# Patient Record
Sex: MALE | Race: WHITE | NOT HISPANIC OR LATINO | Employment: FULL TIME | URBAN - METROPOLITAN AREA
[De-identification: names, ages, dates, MRNs, and addresses within clinical notes are randomized per-mention and may not be internally consistent; named-entity substitution may affect disease eponyms.]

---

## 2017-01-03 ENCOUNTER — NON-PROVIDER VISIT (OUTPATIENT)
Dept: INTERNAL MEDICINE | Facility: IMAGING CENTER | Age: 62
End: 2017-01-03
Payer: COMMERCIAL

## 2017-01-03 ENCOUNTER — HOSPITAL ENCOUNTER (OUTPATIENT)
Facility: MEDICAL CENTER | Age: 62
End: 2017-01-03
Attending: FAMILY MEDICINE
Payer: COMMERCIAL

## 2017-01-03 DIAGNOSIS — E11.9 TYPE 2 DIABETES MELLITUS WITHOUT COMPLICATION, WITHOUT LONG-TERM CURRENT USE OF INSULIN (HCC): ICD-10-CM

## 2017-01-03 DIAGNOSIS — Z01.89 ENCOUNTER FOR ROUTINE LABORATORY TESTING: Primary | ICD-10-CM

## 2017-01-03 DIAGNOSIS — E78.5 DYSLIPIDEMIA: ICD-10-CM

## 2017-01-03 DIAGNOSIS — E55.9 VITAMIN D DEFICIENCY: ICD-10-CM

## 2017-01-03 DIAGNOSIS — Z87.19 HISTORY OF PANCREATITIS: ICD-10-CM

## 2017-01-03 LAB
25(OH)D3 SERPL-MCNC: 22 NG/ML (ref 30–100)
ALBUMIN SERPL BCP-MCNC: 4.5 G/DL (ref 3.2–4.9)
ALBUMIN/GLOB SERPL: 1.6 G/DL
ALP SERPL-CCNC: 52 U/L (ref 30–99)
ALT SERPL-CCNC: 29 U/L (ref 2–50)
ANION GAP SERPL CALC-SCNC: 11 MMOL/L (ref 0–11.9)
AST SERPL-CCNC: 21 U/L (ref 12–45)
BASOPHILS # BLD AUTO: 0.04 K/UL (ref 0–0.12)
BASOPHILS NFR BLD AUTO: 0.4 % (ref 0–1.8)
BILIRUB SERPL-MCNC: 0.9 MG/DL (ref 0.1–1.5)
BUN SERPL-MCNC: 16 MG/DL (ref 8–22)
CALCIUM SERPL-MCNC: 9.8 MG/DL (ref 8.5–10.5)
CHLORIDE SERPL-SCNC: 96 MMOL/L (ref 96–112)
CHOLEST SERPL-MCNC: 136 MG/DL (ref 100–199)
CO2 SERPL-SCNC: 25 MMOL/L (ref 20–33)
CREAT SERPL-MCNC: 0.85 MG/DL (ref 0.5–1.4)
CRP SERPL HS-MCNC: 1.1 MG/L (ref 0–7.5)
EOSINOPHIL # BLD: 0.1 K/UL (ref 0–0.51)
EOSINOPHIL NFR BLD AUTO: 0.9 % (ref 0–6.9)
ERYTHROCYTE [DISTWIDTH] IN BLOOD BY AUTOMATED COUNT: 37.5 FL (ref 35.9–50)
EST. AVERAGE GLUCOSE BLD GHB EST-MCNC: 243 MG/DL
GLOBULIN SER CALC-MCNC: 2.8 G/DL (ref 1.9–3.5)
GLUCOSE SERPL-MCNC: 279 MG/DL (ref 65–99)
HBA1C MFR BLD: 10.1 % (ref 0–5.6)
HCT VFR BLD AUTO: 45.7 % (ref 42–52)
HDLC SERPL-MCNC: 35 MG/DL
HGB BLD-MCNC: 16.4 G/DL (ref 14–18)
IMM GRANULOCYTES # BLD AUTO: 0.04 K/UL (ref 0–0.11)
IMM GRANULOCYTES NFR BLD AUTO: 0.4 % (ref 0–0.9)
LDLC SERPL CALC-MCNC: 61 MG/DL
LIPASE SERPL-CCNC: 56 U/L (ref 11–82)
LYMPHOCYTES # BLD: 1.94 K/UL (ref 1–4.8)
LYMPHOCYTES NFR BLD AUTO: 18 % (ref 22–41)
MCH RBC QN AUTO: 30.1 PG (ref 27–33)
MCHC RBC AUTO-ENTMCNC: 35.9 G/DL (ref 33.7–35.3)
MCV RBC AUTO: 84 FL (ref 81.4–97.8)
MONOCYTES # BLD: 0.79 K/UL (ref 0–0.85)
MONOCYTES NFR BLD AUTO: 7.3 % (ref 0–13.4)
NEUTROPHILS # BLD: 7.84 K/UL (ref 1.82–7.42)
NEUTROPHILS NFR BLD AUTO: 73 % (ref 44–72)
NRBC # BLD AUTO: 0 K/UL
NRBC BLD-RTO: 0 /100 WBC
PLATELET # BLD AUTO: 139 K/UL (ref 164–446)
PMV BLD AUTO: 10.8 FL (ref 9–12.9)
POTASSIUM SERPL-SCNC: 4.2 MMOL/L (ref 3.6–5.5)
PROT SERPL-MCNC: 7.3 G/DL (ref 6–8.2)
PSA SERPL DL<=0.01 NG/ML-MCNC: 0.87 NG/ML (ref 0–4)
RBC # BLD AUTO: 5.44 M/UL (ref 4.7–6.1)
SODIUM SERPL-SCNC: 132 MMOL/L (ref 135–145)
TRIGL SERPL-MCNC: 200 MG/DL (ref 0–149)
TSH SERPL DL<=0.005 MIU/L-ACNC: 2.31 UIU/ML (ref 0.3–3.7)
WBC # BLD AUTO: 10.8 K/UL (ref 4.8–10.8)

## 2017-01-03 PROCEDURE — 83036 HEMOGLOBIN GLYCOSYLATED A1C: CPT

## 2017-01-03 PROCEDURE — 82306 VITAMIN D 25 HYDROXY: CPT

## 2017-01-03 PROCEDURE — 80061 LIPID PANEL: CPT

## 2017-01-03 PROCEDURE — 80053 COMPREHEN METABOLIC PANEL: CPT

## 2017-01-03 PROCEDURE — 86141 C-REACTIVE PROTEIN HS: CPT

## 2017-01-03 PROCEDURE — 83690 ASSAY OF LIPASE: CPT

## 2017-01-03 PROCEDURE — 85025 COMPLETE CBC W/AUTO DIFF WBC: CPT

## 2017-01-03 PROCEDURE — 83704 LIPOPROTEIN BLD QUAN PART: CPT

## 2017-01-03 PROCEDURE — 84443 ASSAY THYROID STIM HORMONE: CPT

## 2017-01-03 PROCEDURE — 80061 LIPID PANEL: CPT | Mod: 91

## 2017-01-03 PROCEDURE — 84153 ASSAY OF PSA TOTAL: CPT

## 2017-01-03 NOTE — MR AVS SNAPSHOT
Sharad Millan   1/3/2017 8:00 AM   Non-Provider Visit   MRN: [id number]    Department:  [facility]   Dept Phone:  [phone]    Description:  Male : 1955   Provider:  Yaz Henson R.N.           Reason for Visit     Labs Only           Allergies as of 1/3/2017     Allergen Noted Reactions    Bydureon [Exenatide] 10/12/2012   Hives    hives    Cycloset [Bromocriptine Mesylate] 2012   Unspecified    Pt states he can't remember what happens to him.    Morphine 10/21/2015   Vomiting      You were diagnosed with     Encounter for routine laboratory testing   [[id number]]  -  Primary       Vital Signs     Smoking Status                   Never Smoker            Basic Information     Date Of Birth Sex Race Ethnicity Preferred Language    1955 Male White Non- English      Your appointments     Marvin 10, 2017  1:30 PM   Established Patient with Julian Bhakta M.D.   [facility] (--)    [address].  [place] [id number] [phone]           You will be receiving a confirmation call a few days before your appointment from our automated call confirmation system.            2017 10:00 AM   MA DX30 with S YASMANY MG 1   [facility] IMAGING [facility] MAMMOGRAPHY ([place])    [address]  [place] [address]   [phone]            2017  3:00 PM   FOLLOW UP with Ganga Chavez M.D.   [facility] (--)    [address], [address]  [place] [address]   [phone]              Problem List              ICD-10-CM Priority Class Noted - Resolved    CAD (coronary artery disease) I25.10   2009 - Present    Dyslipidemia E78.5   2009 - Present    Fatty liver K76.0   2011 - Present    PSVT (paroxysmal supraventricular tachycardia) (HCC) I47.1   2012 - Present    Hypertension I10   2012 - Present    Aortic stenosis (Chronic) I35.0    10/20/2011 - Present    Carotid bruit (Chronic) R09.89   12/9/2009 - Present    History of cardiac catheterization (Chronic) Z98.890   7/13/2009 - Present    Obesity (Chronic) E66.9   8/23/2011 - Present    Murmur (Chronic) R01.1   7/7/2009 - Present    Memory loss (Chronic) R41.3   10/20/2011 - Present    Palpitations (Chronic) R00.2   10/20/2011 - Present    History of echocardiogram (Chronic) Z92.89   10/20/2011 - Present    Sciatica (Chronic) M54.30   8/23/2011 - Present    Overweight E66.3   12/6/2012 - Present    Vitamin B12 deficiency E53.8   12/6/2012 - Present    Pain R52   12/6/2012 - Present    Acute reaction to stress F43.0   1/14/2013 - Present    HDL deficiency E78.6   7/26/2013 - Present    Need for pneumococcal vaccination Z23   2/24/2014 - Present    Vitamin D deficiency disease E55.9   3/14/2014 - Present    DM2 (diabetes mellitus, type 2) ([facility]) E11.9   12/24/2014 - Present    Acute pancreatitis K85.90   10/21/2015 - Present    Dehydration E86.0   10/21/2015 - Present    Transaminitis R74.0   10/21/2015 - Present    Elevated lipase R74.8   10/21/2015 - Present    Cholelithiasis K80.20   10/21/2015 - Present    Diabetes mellitus ([facility]) E11.9   10/21/2015 - Present    SVT (supraventricular tachycardia) ([facility]) I47.1   10/24/2015 - Present    Chest pain at rest R07.9   4/29/2016 - Present    S/P coronary artery stent placement Z95.5   4/29/2016 - Present    Coronary artery disease, non-occlusive I25.10   8/31/2016 - Present      Health Maintenance        Date Due Completion Dates    A1C SCREENING 5/17/2016 11/17/2015, 10/21/2015, 12/15/2014, 2/24/2014, 6/25/2013, 3/28/2013, 11/26/2012, 5/8/2012, 2/24/2012, 1/3/2011, 9/23/2010, 5/10/2010, 10/27/2009, 6/23/2009, 2/18/2009    FASTING LIPID PROFILE 11/17/2016 11/17/2015, 10/22/2015, 12/15/2014 (Done), 6/25/2013, 3/28/2013, 11/26/2012, 8/14/2012, 2/24/2012, 1/3/2011, 7/8/2010, 10/27/2009, 2/18/2009    Override on 12/15/2014: Done    DIABETES MONOFILAMTENT /  LE EXAM 11/17/2016 11/17/2015, 1/27/2014, 1/27/2014 (Done), 1/8/2013 (Done)    Override on 1/27/2014: Done    Override on 1/8/2013: Done    SERUM CREATININE 12/9/2016 12/9/2015, 11/17/2015, 11/9/2015, 10/27/2015, 10/26/2015, 10/25/2015, 10/24/2015, 10/23/2015, 10/22/2015, 10/22/2015, 10/21/2015, 12/15/2014, 6/25/2013, 3/28/2013, 11/26/2012, 7/31/2012, 5/8/2012, 2/24/2012, 8/16/2011, 1/3/2011, 7/8/2010, 10/27/2009, 6/23/2009, 2/18/2009    RETINAL SCREENING 1/11/2017 1/11/2016, 9/24/2014 (Done), 6/27/2013 (Done), 12/7/2012 (Done)    Override on 9/24/2014: Done    Override on 6/27/2013: Done    Override on 12/7/2012: Done (Dr Smith)    URINE ACR / MICROALBUMIN 12/28/2017 12/28/2016, 11/17/2015, 12/15/2014, 6/25/2013, 3/28/2013, 11/26/2012, 5/8/2012, 2/24/2012, 10/27/2009, 6/23/2009    COLONOSCOPY 11/28/2022 11/28/2012 (Done), 5/23/2011 (Done)    Override on 11/28/2012: Done    Override on 5/23/2011: Done (due 5 years Dr Mayers)    IMM DTaP/Tdap/Td Vaccine (2 - Td) 2/24/2024 2/24/2014            Current Immunizations     Influenza TIV (IM) 12/10/2012  4:35 PM    Influenza Vaccine Adult HD 1/10/2014  2:56 PM    Influenza Vaccine Pediatric 10/22/2009    Influenza Vaccine Quad Inj (Pf) 12/28/2016    Influenza Vaccine Quad Inj (Preserved) 10/27/2015  1:57 PM    Pneumococcal polysaccharide vaccine (PPSV-23) 6/25/2013    SHINGLES VACCINE 6/25/2013    Tdap Vaccine 2/24/2014  2:07 PM      Below and/or attached are the medications your provider expects you to take. Review all of your home medications and newly ordered medications with your provider and/or pharmacist. Follow medication instructions as directed by your provider and/or pharmacist. Please keep your medication list with you and share with your provider. Update the information when medications are discontinued, doses are changed, or new medications (including over-the-counter products) are added; and carry medication information at all times in the event of emergency  situations     Allergies:  BYDUREON - Hives     CYCLOSET - Unspecified     MORPHINE - Vomiting               Medications  Valid as of: January 03, 2017 - 10:10 AM    Generic Name Brand Name Tablet Size Instructions for use    Aspirin (Tab)  MG Take 325 mg by mouth every 48 hours. 1 tab po every other day        Cholecalciferol (Cap) vitamin D3 5000 UNITS Take 1 Each by mouth every day.        Coenzyme Q10   Take 1 Tab by mouth every day.        Cyanocobalamin (SL Tab) Vitamin B-12 5000 MCG Place 1 Tab under tongue every day.        GlipiZIDE (TABLET SR 24 HR) GLIPIZIDE XL 10 MG TAKE 1 TABLET BY MOUTH EVERY MORNING.        Glucose Blood (Strip) glucose blood  1 Strip by Other route 2 Times a Day.        Lancets (Misc) ACCU-CHEK MULTICLIX LANCETS  1 Each by Does not apply route 2 Times a Day.        Losartan Potassium (Tab) COZAAR 100 MG TAKE 1 TABLET BY MOUTH EVERY DAY.        MetFORMIN HCl (Tab) GLUCOPHAGE 1000 MG TAKE 1 TABLET BY MOUTH 2 TIMES A DAY.        Metoprolol Succinate (TABLET SR 24 HR) TOPROL XL 50 MG TAKE 1 TABLET BY MOUTH 2 TIMES A DAY        Niacin (Tab CR) Niacin  MG Take 4 Tabs by mouth every evening.        Rosuvastatin Calcium (Tab) CRESTOR 10 MG TAKE 1 TABLET BY MOUTH EVERY EVENING        .                 Medicines prescribed today were sent to:     [facility] #[id number] - [place], [place] - [address] [place] [id number]    Phone: [phone] Fax: [phone]    Open 24 Hours?: No      Medication refill instructions:       If your prescription bottle indicates you have medication refills left, it is not necessary to call your provider’s office. Please contact your pharmacy and they will refill your medication.    If your prescription bottle indicates you do not have any refills left, you may request refills at any time through one of the following ways: The online [employer] system (except Urgent Care), by calling your provider’s office, or by asking your pharmacy to contact  your provider’s office with a refill request. Medication refills are processed only during regular business hours and may not be available until the next business day. Your provider may request additional information or to have a follow-up visit with you prior to refilling your medication.   *Please Note: Medication refills are assigned a new Rx number when refilled electronically. Your pharmacy may indicate that no refills were authorized even though a new prescription for the same medication is available at the pharmacy. Please request the medicine by name with the pharmacy before contacting your provider for a refill.        Other Notes About Your Plan     Ongoing Rsided pain.  Colonoscopy 5/23/11 repeat in 5 yrs  Dexa  PSA  A1c  1/8/13  8.6  2/24/14=9.5   Opthmology-Luis 12/7/12  [facility] (1/8/13)           MyChart Access Code: Activation code not generated  Current MyChart Status: Active

## 2017-01-09 ENCOUNTER — OFFICE VISIT (OUTPATIENT)
Dept: INTERNAL MEDICINE | Facility: IMAGING CENTER | Age: 62
End: 2017-01-09
Payer: COMMERCIAL

## 2017-01-09 VITALS
WEIGHT: 223 LBS | TEMPERATURE: 96.6 F | OXYGEN SATURATION: 96 % | SYSTOLIC BLOOD PRESSURE: 130 MMHG | HEART RATE: 88 BPM | RESPIRATION RATE: 14 BRPM | HEIGHT: 70 IN | DIASTOLIC BLOOD PRESSURE: 70 MMHG | BODY MASS INDEX: 31.92 KG/M2

## 2017-01-09 DIAGNOSIS — E66.9 OBESITY (BMI 30.0-34.9): ICD-10-CM

## 2017-01-09 DIAGNOSIS — E11.9 TYPE 2 DIABETES MELLITUS WITHOUT COMPLICATION, WITHOUT LONG-TERM CURRENT USE OF INSULIN (HCC): ICD-10-CM

## 2017-01-09 DIAGNOSIS — I25.10 CORONARY ARTERY DISEASE INVOLVING NATIVE HEART WITHOUT ANGINA PECTORIS, UNSPECIFIED VESSEL OR LESION TYPE: ICD-10-CM

## 2017-01-09 DIAGNOSIS — E55.9 VITAMIN D DEFICIENCY: ICD-10-CM

## 2017-01-09 DIAGNOSIS — E78.5 DYSLIPIDEMIA: ICD-10-CM

## 2017-01-09 DIAGNOSIS — R05.8 PRODUCTIVE COUGH: Primary | ICD-10-CM

## 2017-01-09 DIAGNOSIS — E78.6 HDL DEFICIENCY: ICD-10-CM

## 2017-01-09 LAB
CHOLEST SERPL-MCNC: 139 MG/DL (ref 100–199)
HDL PARTICAL NO Q4363: 29.7 UMOL/L
HDL SERPL QN: 8.8 NM
HDLC SERPL-MCNC: 31 MG/DL
HLD.LARGE SERPL-SCNC: 3.4 UMOL/L
LDL MED SERPL QN: 20.1 NM
LDL SERPL QN: 20.1 NM
LDL SERPL-SCNC: 1018 NMOL/L
LDL SMALL SERPL-SCNC: 659 NMOL/L
LDL SMALL SERPL-SCNC: 659 NMOL/L
LDLC SERPL CALC-MCNC: 65 MG/DL (ref 0–99)
LP IR SCORE Q4364: 70
TRIGL SERPL-MCNC: 214 MG/DL (ref 0–149)
VLDL LARGE SERPL-SCNC: 9.7 NMOL/L
VLDL SERPL QN: 51.1 NM

## 2017-01-09 PROCEDURE — 99214 OFFICE O/P EST MOD 30 MIN: CPT | Performed by: FAMILY MEDICINE

## 2017-01-09 RX ORDER — ACETAMINOPHEN AND CODEINE PHOSPHATE 120; 12 MG/5ML; MG/5ML
5-10 SOLUTION ORAL EVERY 6 HOURS PRN
Qty: 120 ML | Refills: 0 | Status: SHIPPED
Start: 2017-01-09 | End: 2017-08-07

## 2017-01-09 RX ORDER — AZITHROMYCIN 250 MG/1
TABLET, FILM COATED ORAL
Qty: 6 TAB | Refills: 0 | Status: SHIPPED | OUTPATIENT
Start: 2017-01-09 | End: 2017-01-14

## 2017-01-09 NOTE — PATIENT INSTRUCTIONS
Current Outpatient Prescriptions Ordered in [facility]   Medication Sig Dispense Refill   • azithromycin (ZITHROMAX) 250 MG Tab Two by mouth on day one, then one by mouth on days 2-5 6 Tab 0   • metformin (GLUCOPHAGE) 1000 MG tablet TAKE 1 TABLET BY MOUTH 2 TIMES A DAY. 60 Tab 11   • GLIPIZIDE XL 10 MG TABLET SR 24 HR TAKE 1 TABLET BY MOUTH EVERY MORNING. 30 Tab 11   • metoprolol SR (TOPROL XL) 50 MG TABLET SR 24 HR TAKE 1 TABLET BY MOUTH 2 TIMES A DAY 60 Tab 11   • rosuvastatin (CRESTOR) 10 MG Tab TAKE 1 TABLET BY MOUTH EVERY EVENING 30 Tab 11   • losartan (COZAAR) 100 MG Tab TAKE 1 TABLET BY MOUTH EVERY DAY. 30 Tab 2   • glucose blood strip 1 Strip by Other route 2 Times a Day. 60 Strip 11   • ACCU-CHEK MULTICLIX LANCETS Misc 1 Each by Does not apply route 2 Times a Day. 180 Each 12   • Coenzyme Q10 (CO Q 10 PO) Take 1 Tab by mouth every day.     • aspirin (ASA) 325 MG TABS Take 325 mg by mouth every 48 hours. 1 tab po every other day     • Niacin  MG TBCR Take 4 Tabs by mouth every evening.     • Cholecalciferol (VITAMIN D3) 5000 UNITS CAPS Take 1 Each by mouth every day.     • Cyanocobalamin (VITAMIN B-12) 5000 MCG SUBL Place 1 Tab under tongue every day.       No current [facility]-ordered facility-administered medications on file.

## 2017-01-09 NOTE — PROGRESS NOTES
SUBJECTIVE:    Chief Complaint   Patient presents with   • Cough   • Diabetes   • Hyperlipidemia   • Obesity       Sharad Millan is a 61 y.o. male,   Established Patient     PROBLEM #1-HISTORY OF PRESENT ILLNESS  New Problem  PATIENT STATEMENT OF PROBLEM - Productive cough  ONSET - 2+ weeks  COURSE - worsening. No fever.   INTENSITY/STATUS/LOCATION/RADIATION - moderate/ worsening  AGGRAVATORS - ?  RELIEVERS - none  TREATMENTS/COMPLIANCE/@GOAL? - OTC's/ na/ no     PROBLEM #2-HISTORY OF PRESENT ILLNESS  Existing Problem, but requiring re-evaluation  PATIENT STATEMENT OF PROBLEM - DM2, dyslipidemia, obesity  ONSET - years  COURSE - he realizes his compliance and Therapeutic Lifestyle Changes's are poor, but he is motivated to improve over the next 3 months. Current pertinent labs:   HIGH & INT RISK: FBS(279)/A1c(10.1)/LDL-P/Small LDL-P/L-HDL-P/L-VLDL-P/TRIG/HDL/HDL-S/VLDL-S/HDL-P/LP-IR/  UACR/D(22).    Counseled.   INTENSITY/STATUS/LOCATION/RADIATION - variable/ present  AGGRAVATORS - Multifactorial including inadequate Therapeutic Lifestyle Changes    RELIEVERS - meds?   TREATMENTS/COMPLIANCE/@GOAL? - same/ no/ no     Allergies   Allergen Reactions   • Bydureon [Exenatide] Hives     hives   • Cycloset [Bromocriptine Mesylate] Unspecified     Pt states he can't remember what happens to him.   • Morphine Vomiting       Patient Active Problem List    Diagnosis Date Noted   • Coronary artery disease, non-occlusive 08/31/2016   • Chest pain at rest 04/29/2016   • S/P coronary artery stent placement 04/29/2016   • SVT (supraventricular tachycardia) ([facility]) 10/24/2015   • Acute pancreatitis 10/21/2015   • Dehydration 10/21/2015   • Transaminitis 10/21/2015   • Elevated lipase 10/21/2015   • Cholelithiasis 10/21/2015   • Diabetes mellitus ([facility]) 10/21/2015   • DM2 (diabetes mellitus, type 2) ([facility]) 12/24/2014   • Vitamin D deficiency disease 03/14/2014   • Need for pneumococcal vaccination 02/24/2014   • HDL deficiency  07/26/2013   • Acute reaction to stress 01/14/2013   • Overweight 12/06/2012   • Vitamin B12 deficiency 12/06/2012   • Pain 12/06/2012   • PSVT (paroxysmal supraventricular tachycardia) ([facility]) 02/14/2012   • Hypertension 02/14/2012   • Aortic stenosis 10/20/2011   • Memory loss 10/20/2011   • Palpitations 10/20/2011   • History of echocardiogram 10/20/2011   • Obesity 08/23/2011   • Sciatica 08/23/2011   • Fatty liver 01/17/2011   • Carotid bruit 12/09/2009   • History of cardiac catheterization 07/13/2009   • Murmur 07/07/2009   • CAD (coronary artery disease) 06/05/2009   • Dyslipidemia 06/05/2009       Outpatient Encounter Prescriptions as of 1/9/2017   Medication Sig Dispense Refill   • azithromycin (ZITHROMAX) 250 MG Tab Two by mouth on day one, then one by mouth on days 2-5 6 Tab 0   • metformin (GLUCOPHAGE) 1000 MG tablet TAKE 1 TABLET BY MOUTH 2 TIMES A DAY. 60 Tab 11   • GLIPIZIDE XL 10 MG TABLET SR 24 HR TAKE 1 TABLET BY MOUTH EVERY MORNING. 30 Tab 11   • metoprolol SR (TOPROL XL) 50 MG TABLET SR 24 HR TAKE 1 TABLET BY MOUTH 2 TIMES A DAY 60 Tab 11   • rosuvastatin (CRESTOR) 10 MG Tab TAKE 1 TABLET BY MOUTH EVERY EVENING 30 Tab 11   • losartan (COZAAR) 100 MG Tab TAKE 1 TABLET BY MOUTH EVERY DAY. 30 Tab 2   • glucose blood strip 1 Strip by Other route 2 Times a Day. 60 Strip 11   • ACCU-CHEK MULTICLIX LANCETS Misc 1 Each by Does not apply route 2 Times a Day. 180 Each 12   • Coenzyme Q10 (CO Q 10 PO) Take 1 Tab by mouth every day.     • aspirin (ASA) 325 MG TABS Take 325 mg by mouth every 48 hours. 1 tab po every other day     • Niacin  MG TBCR Take 4 Tabs by mouth every evening.     • Cholecalciferol (VITAMIN D3) 5000 UNITS CAPS Take 1 Each by mouth every day.     • Cyanocobalamin (VITAMIN B-12) 5000 MCG SUBL Place 1 Tab under tongue every day.       No facility-administered encounter medications on file as of 1/9/2017.       Social History   Substance Use Topics   • Smoking status: Never Smoker    •  "Smokeless tobacco: Never Used   • Alcohol Use: No       Family History   Problem Relation Age of Onset   • Heart Disease Mother    • Heart Disease Father        Patient's Past, Social, and Family History reviewed and updated by me in EPIC today.    REVIEW OF SYMPTOMS:    GEN: Denies weight changes, appetite changes, unusual weakness, bleeding, fever, chills, recent trauma or infections.    HEENT: Denies vision changes, hearing changes, epistaxis, unusual sneezing, sore throat, swallowing difficulties, ear pain, or facial pain.    NECK: Denies neck pain, swellings, or stiffness.    LUNGS: Denies cough, dyspnea, orthopnea, or hemoptysis.    HEART: Denies palpitations or chest pain.    ABD: Denies abdomen pain, eructation, nausea, vomiting, hematemesis, diarrhea, constipation, hematochezia, melena, acholic stools, or flatulence.    GENT: Denies recent dysuria, urine frequency, urine hesitancy, urine urgency, urine flow-slow, urine retention, nocturia, polyuria, dark urine, or incontinence.    BONES/JOINTS/EXTREMITIES: Denies arthralgia, joint stiffness, back pain, muscle cramps, or myalgia.    SKIN: Denies rashes, lesions, anhidrosis, bruising, pruritus.    NEURO: Denies memory loss, disorientation, syncope, diplopia, dizziness, vertigo, clumsiness, paresthesias, or cephalgia.    OBJECTIVE:    /70 mmHg  Pulse 88  Temp(Src) 35.9 °C (96.6 °F)  Resp 14  Ht 1.778 m (5' 10\")  Wt 101.152 kg (223 lb)  BMI 32.00 kg/m2  SpO2 96%  Body mass index is 32 kg/(m^2).    Well developed, well nourished male, no acute distress, non-ill appearing. Comfortable, appears stated age, pleasant and cooperative  HEAD: atraumatic, normocephalic   EYES: Conjunctiva normal, EOMI, PERRLA, acuity grossly intact.   EARS/NOSE/THROAT: TM's normal, no SSX of infection, no perforation, no hemotympanum, acuity grossly intact. Oropharynx: benign, no lesions noted. Nares: benign.   NECK: supple, no adenopathy, no thyromegaly or nodules, no JVD, " no carotid bruits.   CHEST/LUNGS: clear to auscultation and percussion bilaterally. No adventitious breath sounds.   CARDIOVASCULAR: regular rate and rhythm, no murmur. PMI not displaced. Good central and peripheral pulses.   BACK: no CVA tenderness.   ABDOMEN: soft, non-tender, non-distended, no masses, no hepatosplenomegaly. Normal active bowel tones.   : deferred.   Rectal: deferred.   Extremities: warm/well-perfused, no cyanosis, clubbing, or edema.   SKIN: clear, unbroken, no rashes, normal turgor.   Neuro: Mental Status: Alert and Oriented x 3. CN II-XII grossly intact. Gait normal. Non-focal, intact. Normal strength, sensation    ASSESSMENT:    1. Productive cough  azithromycin (ZITHROMAX) 250 MG Tab   2. Type 2 diabetes mellitus without complication, without long-term current use of insulin (HCC)  Diabetic Monofilament LE Exam   3. Coronary artery disease involving native heart without angina pectoris, unspecified vessel or lesion type     4. Dyslipidemia     5. HDL deficiency     6. Obesity (BMI 30.0-34.9)     7. Vitamin D deficiency         PLAN:    Total Face-to-Face time spent with patient: 30 minutes  Amount of time spent counseling patient and/or coordinating care: 20 minutes    The nature of patient counseling as below:  -Patient Education, including below topics:  -Differential Diagnoses and treatment options discussed  -Risks, benefits, alternatives discussed  -Labs reviewed with patient in detail  -Health Maintenance Exam issues discussed  -Rest/ fluids/ close observation/ OTC medications  -Exercise  -Dietary recommendations discussed  -Weight Loss strategies discussed  -Therapeutic Lifestyle Changes discussed    The nature of coordination of care as below:  -Medications added: Zpack  -Medications discontinued: none  -Medications adjusted: none  -Continue (other) present chronic medications  -Blood Pressure Diary  -Blood Sugar Diary  -Seek medical attention immediately if worse    FOLLOW-UP:  -in  3 months  -and sooner if test/consult results warrant  And for Health Care Maintenance Exams  And as needed.

## 2017-01-09 NOTE — MR AVS SNAPSHOT
"        Sharad Millan   2017 2:00 PM   Office Visit   MRN: [id number]    Department:  [facility]   Dept Phone:  [phone]    Description:  Male : 1955   Provider:  Julian Bhakta M.D.           Reason for Visit     Cough     Diabetes     Hyperlipidemia     Obesity           Allergies as of 2017     Allergen Noted Reactions    Bydureon [Exenatide] 10/12/2012   Hives    hives    Cycloset [Bromocriptine Mesylate] 2012   Unspecified    Pt states he can't remember what happens to him.    Morphine 10/21/2015   Vomiting      You were diagnosed with     Productive cough   [292862]  -  Primary     Type 2 diabetes mellitus without complication, without long-term current use of insulin (HCC)   [5074514]       Coronary artery disease involving native heart without angina pectoris, unspecified vessel or lesion type   [2240501]       Dyslipidemia   [365371]       HDL deficiency   [759653]       Obesity (BMI 30.0-34.9)   [216852]       Vitamin D deficiency   [1749041]         Vital Signs     Blood Pressure Pulse Temperature Respirations Height Weight    130/70 mmHg 88 35.9 °C (96.6 °F) 14 1.778 m (5' 10\") 101.152 kg (223 lb)    Body Mass Index Oxygen Saturation Smoking Status             32.00 kg/m2 96% Never Smoker          Basic Information     Date Of Birth Sex Race Ethnicity Preferred Language    1955 Male White Non- English      Your appointments     2017 10:00 AM   MA DX30 with ABIODUN JADE MG 1   [facility] IMAGING [facility]AP[facility]    [address]  [place] [address]   [phone]            2017  3:00 PM   FOLLOW UP with Ganga Chavez M.D.   [facility] (--)    [address], [address]  [place] [address]   [phone]            2017  9:30 AM   Established Patient with Julian Bhakta M.D.   [facility] (--)    [address]  [place] [address]   [phone]           You will be receiving a confirmation call a few days before your appointment from our automated call confirmation system.              Problem List              ICD-10-CM Priority Class Noted - Resolved    CAD (coronary artery disease) I25.10   6/5/2009 - Present    Dyslipidemia E78.5   6/5/2009 - Present    Fatty liver K76.0   1/17/2011 - Present    PSVT (paroxysmal supraventricular tachycardia) ([facility]) I47.1   2/14/2012 - Present    Hypertension I10   2/14/2012 - Present    Aortic stenosis (Chronic) I35.0   10/20/2011 - Present    Carotid bruit (Chronic) R09.89   12/9/2009 - Present    History of cardiac catheterization (Chronic) Z98.890   7/13/2009 - Present    Obesity (Chronic) E66.9   8/23/2011 - Present    Murmur (Chronic) R01.1   7/7/2009 - Present    Memory loss (Chronic) R41.3   10/20/2011 - Present    Palpitations (Chronic) R00.2   10/20/2011 - Present    History of echocardiogram (Chronic) Z92.89   10/20/2011 - Present    Sciatica (Chronic) M54.30   8/23/2011 - Present    Overweight E66.3   12/6/2012 - Present    Vitamin B12 deficiency E53.8   12/6/2012 - Present    Pain R52   12/6/2012 - Present    Acute reaction to stress F43.0   1/14/2013 - Present    HDL deficiency E78.6   7/26/2013 - Present    Need for pneumococcal vaccination Z23   2/24/2014 - Present    Vitamin D deficiency disease E55.9   3/14/2014 - Present    DM2 (diabetes mellitus, type 2) ([facility]) E11.9   12/24/2014 - Present    Acute pancreatitis K85.90   10/21/2015 - Present    Dehydration E86.0   10/21/2015 - Present    Transaminitis R74.0   10/21/2015 - Present    Elevated lipase R74.8   10/21/2015 - Present    Cholelithiasis K80.20   10/21/2015 - Present    Diabetes mellitus (HCC) E11.9   10/21/2015 - Present    SVT (supraventricular tachycardia) ([facility]) I47.1   10/24/2015 - Present    Chest pain at rest R07.9   4/29/2016 - Present    S/P coronary artery stent placement Z95.5   4/29/2016 -  Present    Coronary artery disease, non-occlusive I25.10   8/31/2016 - Present      Health Maintenance        Date Due Completion Dates    RETINAL SCREENING 1/11/2017 1/11/2016, 9/24/2014 (Done), 6/27/2013 (Done), 12/7/2012 (Done)    Override on 9/24/2014: Done    Override on 6/27/2013: Done    Override on 12/7/2012: Done (Dr Smith)    A1C SCREENING 7/3/2017 1/3/2017, 11/17/2015, 10/21/2015, 12/15/2014, 2/24/2014, 6/25/2013, 3/28/2013, 11/26/2012, 5/8/2012, 2/24/2012, 1/3/2011, 9/23/2010, 5/10/2010, 10/27/2009, 6/23/2009, 2/18/2009    URINE ACR / MICROALBUMIN 12/28/2017 12/28/2016, 11/17/2015, 12/15/2014, 6/25/2013, 3/28/2013, 11/26/2012, 5/8/2012, 2/24/2012, 10/27/2009, 6/23/2009    FASTING LIPID PROFILE 1/3/2018 1/3/2017, 11/17/2015, 10/22/2015, 12/15/2014 (Done), 6/25/2013, 3/28/2013, 11/26/2012, 8/14/2012, 2/24/2012, 1/3/2011, 7/8/2010, 10/27/2009, 2/18/2009    Override on 12/15/2014: Done    SERUM CREATININE 1/3/2018 1/3/2017, 12/9/2015, 11/17/2015, 11/9/2015, 10/27/2015, 10/26/2015, 10/25/2015, 10/24/2015, 10/23/2015, 10/22/2015, 10/22/2015, 10/21/2015, 12/15/2014, 6/25/2013, 3/28/2013, 11/26/2012, 7/31/2012, 5/8/2012, 2/24/2012, 8/16/2011, 1/3/2011, 7/8/2010, 10/27/2009, 6/23/2009, 2/18/2009    DIABETES MONOFILAMTENT / LE EXAM 1/9/2018 1/9/2017 (Done), 11/17/2015, 1/27/2014, 1/27/2014 (Done), 1/8/2013 (Done)    Override on 1/9/2017: Done    Override on 1/27/2014: Done    Override on 1/8/2013: Done    COLONOSCOPY 11/28/2022 11/28/2012 (Done), 5/23/2011 (Done)    Override on 11/28/2012: Done    Override on 5/23/2011: Done (due 5 years Dr Mayers)    IMM DTaP/Tdap/Td Vaccine (2 - Td) 2/24/2024 2/24/2014            Current Immunizations     Influenza TIV (IM) 12/10/2012  4:35 PM    Influenza Vaccine Adult HD 1/10/2014  2:56 PM    Influenza Vaccine Pediatric 10/22/2009    Influenza Vaccine Quad Inj (Pf) 12/28/2016    Influenza Vaccine Quad Inj (Preserved) 10/27/2015  1:57 PM    Pneumococcal polysaccharide vaccine  (PPSV-23) 6/25/2013    SHINGLES VACCINE 6/25/2013    Tdap Vaccine 2/24/2014  2:07 PM      Below and/or attached are the medications your provider expects you to take. Review all of your home medications and newly ordered medications with your provider and/or pharmacist. Follow medication instructions as directed by your provider and/or pharmacist. Please keep your medication list with you and share with your provider. Update the information when medications are discontinued, doses are changed, or new medications (including over-the-counter products) are added; and carry medication information at all times in the event of emergency situations     Allergies:  BYDUREON - Hives     CYCLOSET - Unspecified     MORPHINE - Vomiting               Medications  Valid as of: January 09, 2017 -  3:39 PM    Generic Name Brand Name Tablet Size Instructions for use    Aspirin (Tab)  MG Take 325 mg by mouth every 48 hours. 1 tab po every other day        Azithromycin (Tab) ZITHROMAX 250 MG Two by mouth on day one, then one by mouth on days 2-5        Cholecalciferol (Cap) vitamin D3 5000 UNITS Take 1 Each by mouth every day.        Coenzyme Q10   Take 1 Tab by mouth every day.        Cyanocobalamin (SL Tab) Vitamin B-12 5000 MCG Place 1 Tab under tongue every day.        GlipiZIDE (TABLET SR 24 HR) GLIPIZIDE XL 10 MG TAKE 1 TABLET BY MOUTH EVERY MORNING.        Glucose Blood (Strip) glucose blood  1 Strip by Other route 2 Times a Day.        Lancets (Misc) ACCU-CHEK MULTICLIX LANCETS  1 Each by Does not apply route 2 Times a Day.        Losartan Potassium (Tab) COZAAR 100 MG TAKE 1 TABLET BY MOUTH EVERY DAY.        MetFORMIN HCl (Tab) GLUCOPHAGE 1000 MG TAKE 1 TABLET BY MOUTH 2 TIMES A DAY.        Metoprolol Succinate (TABLET SR 24 HR) TOPROL XL 50 MG TAKE 1 TABLET BY MOUTH 2 TIMES A DAY        Niacin (Tab CR) Niacin  MG Take 4 Tabs by mouth every evening.        Rosuvastatin Calcium (Tab) CRESTOR 10 MG TAKE 1 TABLET BY  MOUTH EVERY EVENING        .                 Medicines prescribed today were sent to:     [id number] - [place], [place] - [address]    [address] [place] [address]    Phone: [phone] Fax: [phone]    Open 24 Hours?: No    [id number] - [place], [place] - [address] [place] [address]    Phone: [phone] Fax: [phone]    Open 24 Hours?: No      Medication refill instructions:       If your prescription bottle indicates you have medication refills left, it is not necessary to call your provider’s office. Please contact your pharmacy and they will refill your medication.    If your prescription bottle indicates you do not have any refills left, you may request refills at any time through one of the following ways: The online [employer] system (except Urgent Care), by calling your provider’s office, or by asking your pharmacy to contact your provider’s office with a refill request. Medication refills are processed only during regular business hours and may not be available until the next business day. Your provider may request additional information or to have a follow-up visit with you prior to refilling your medication.   *Please Note: Medication refills are assigned a new Rx number when refilled electronically. Your pharmacy may indicate that no refills were authorized even though a new prescription for the same medication is available at the pharmacy. Please request the medicine by name with the pharmacy before contacting your provider for a refill.        Instructions    Current Outpatient Prescriptions Ordered in [facility]   Medication Sig Dispense Refill   • azithromycin (ZITHROMAX) 250 MG Tab Two by mouth on day one, then one by mouth on days 2-5 6 Tab 0   • metformin (GLUCOPHAGE) 1000 MG tablet TAKE 1 TABLET BY MOUTH 2 TIMES A DAY. 60 Tab 11   • GLIPIZIDE XL 10 MG TABLET SR 24 HR TAKE 1 TABLET BY MOUTH EVERY MORNING. 30 Tab 11   •  metoprolol SR (TOPROL XL) 50 MG TABLET SR 24 HR TAKE 1 TABLET BY MOUTH 2 TIMES A DAY 60 Tab 11   • rosuvastatin (CRESTOR) 10 MG Tab TAKE 1 TABLET BY MOUTH EVERY EVENING 30 Tab 11   • losartan (COZAAR) 100 MG Tab TAKE 1 TABLET BY MOUTH EVERY DAY. 30 Tab 2   • glucose blood strip 1 Strip by Other route 2 Times a Day. 60 Strip 11   • ACCU-CHEK MULTICLIX LANCETS Misc 1 Each by Does not apply route 2 Times a Day. 180 Each 12   • Coenzyme Q10 (CO Q 10 PO) Take 1 Tab by mouth every day.     • aspirin (ASA) 325 MG TABS Take 325 mg by mouth every 48 hours. 1 tab po every other day     • Niacin  MG TBCR Take 4 Tabs by mouth every evening.     • Cholecalciferol (VITAMIN D3) 5000 UNITS CAPS Take 1 Each by mouth every day.     • Cyanocobalamin (VITAMIN B-12) 5000 MCG SUBL Place 1 Tab under tongue every day.       No current [facility]-ordered facility-administered medications on file.           Other Notes About Your Plan     Ongoing Rsided pain.  Colonoscopy 5/23/11 repeat in 5 yrs  Dexa  PSA  A1c  1/8/13  8.6  2/24/14=9.5   Opthmology-[facility] 12/7/12  [facility] (1/8/13)           MyChart Access Code: Activation code not generated  Current MyChart Status: Active

## 2017-01-17 RX ORDER — LOSARTAN POTASSIUM 100 MG/1
100 TABLET ORAL
Qty: 30 TAB | Refills: 11 | Status: SHIPPED | OUTPATIENT
Start: 2017-01-17 | End: 2018-04-25 | Stop reason: SDUPTHER

## 2017-01-17 RX ORDER — METOPROLOL SUCCINATE 50 MG/1
TABLET, EXTENDED RELEASE ORAL
Qty: 60 TAB | Refills: 11 | Status: SHIPPED | OUTPATIENT
Start: 2017-01-17 | End: 2018-03-06 | Stop reason: SDUPTHER

## 2017-01-17 RX ORDER — ROSUVASTATIN CALCIUM 10 MG/1
TABLET, COATED ORAL
Qty: 30 TAB | Refills: 11 | Status: SHIPPED | OUTPATIENT
Start: 2017-01-17 | End: 2018-03-06 | Stop reason: SDUPTHER

## 2017-01-17 RX ORDER — GLIPIZIDE 10 MG/1
10 TABLET, FILM COATED, EXTENDED RELEASE ORAL EVERY MORNING
Qty: 30 TAB | Refills: 11 | Status: SHIPPED | OUTPATIENT
Start: 2017-01-17 | End: 2018-03-06 | Stop reason: SDUPTHER

## 2017-08-07 ENCOUNTER — OFFICE VISIT (OUTPATIENT)
Dept: INTERNAL MEDICINE | Facility: IMAGING CENTER | Age: 62
End: 2017-08-07
Payer: COMMERCIAL

## 2017-08-07 VITALS
TEMPERATURE: 96.6 F | BODY MASS INDEX: 31.92 KG/M2 | RESPIRATION RATE: 14 BRPM | WEIGHT: 223 LBS | OXYGEN SATURATION: 97 % | HEIGHT: 70 IN | HEART RATE: 78 BPM | DIASTOLIC BLOOD PRESSURE: 70 MMHG | SYSTOLIC BLOOD PRESSURE: 140 MMHG

## 2017-08-07 DIAGNOSIS — I10 ESSENTIAL HYPERTENSION: ICD-10-CM

## 2017-08-07 DIAGNOSIS — E55.9 VITAMIN D DEFICIENCY DISEASE: Chronic | ICD-10-CM

## 2017-08-07 DIAGNOSIS — Z13.29 SCREENING FOR THYROID DISORDER: ICD-10-CM

## 2017-08-07 DIAGNOSIS — E53.8 VITAMIN B12 DEFICIENCY: Chronic | ICD-10-CM

## 2017-08-07 DIAGNOSIS — E87.1 CHRONIC HYPONATREMIA: Chronic | ICD-10-CM

## 2017-08-07 DIAGNOSIS — E66.9 OBESITY (BMI 30-39.9): Chronic | ICD-10-CM

## 2017-08-07 DIAGNOSIS — S09.90XD CHI (CLOSED HEAD INJURY), SUBSEQUENT ENCOUNTER: ICD-10-CM

## 2017-08-07 DIAGNOSIS — E11.8 TYPE 2 DIABETES MELLITUS WITH COMPLICATION, WITHOUT LONG-TERM CURRENT USE OF INSULIN (HCC): ICD-10-CM

## 2017-08-07 DIAGNOSIS — H92.02 LEFT EAR PAIN: ICD-10-CM

## 2017-08-07 PROBLEM — Z87.820 HISTORY OF MULTIPLE CONCUSSIONS: Status: ACTIVE | Noted: 2017-08-07

## 2017-08-07 LAB
HBA1C MFR BLD: NORMAL % (ref ?–5.8)
INT CON NEG: NEGATIVE
INT CON POS: POSITIVE

## 2017-08-07 PROCEDURE — 83036 HEMOGLOBIN GLYCOSYLATED A1C: CPT | Performed by: FAMILY MEDICINE

## 2017-08-07 PROCEDURE — 99215 OFFICE O/P EST HI 40 MIN: CPT | Performed by: FAMILY MEDICINE

## 2017-08-07 RX ORDER — ONDANSETRON 4 MG/1
4 TABLET, FILM COATED ORAL EVERY 4 HOURS PRN
Qty: 10 TAB | Refills: 1 | Status: SHIPPED | OUTPATIENT
Start: 2017-08-07 | End: 2018-10-02

## 2017-08-07 RX ORDER — METHYLPREDNISOLONE 4 MG/1
TABLET ORAL
Qty: 21 TAB | Refills: 0 | Status: SHIPPED | OUTPATIENT
Start: 2017-08-07 | End: 2017-11-02

## 2017-08-07 NOTE — MR AVS SNAPSHOT
Sharad Millan   2017 10:00 AM   Appointment   MRN: [id number]    Department:  [facility]   Dept Phone:  [phone]    Description:  Male : 1955   Provider:  Monika Zarate M.D.           Allergies as of 2017     Allergen Noted Reactions    Bydureon [Exenatide] 10/12/2012   Hives    hives    Cycloset [Bromocriptine Mesylate] 2012   Unspecified    Pt states he can't remember what happens to him.    Morphine 10/21/2015   Vomiting      Vital Signs     Smoking Status                   Never Smoker            Basic Information     Date Of Birth Sex Race Ethnicity Preferred Language    1955 Male White Non- English      Your appointments     Aug 07, 2017 10:00 AM   Established Patient with Monika Zarate M.D.   [facility] (--)    [address]  [facility] [address]   [phone]           You will be receiving a confirmation call a few days before your appointment from our automated call confirmation system.              Problem List              ICD-10-CM Priority Class Noted - Resolved    CAD (coronary artery disease) I25.10   2009 - Present    Dyslipidemia E78.5   2009 - Present    Fatty liver K76.0   2011 - Present    PSVT (paroxysmal supraventricular tachycardia) (CMS-HCC) I47.1   2012 - Present    Hypertension I10   2012 - Present    Aortic stenosis (Chronic) I35.0   10/20/2011 - Present    Carotid bruit (Chronic) R09.89   2009 - Present    History of cardiac catheterization (Chronic) Z98.890   2009 - Present    Obesity (Chronic) E66.9   2011 - Present    Murmur (Chronic) R01.1   2009 - Present    Memory loss (Chronic) R41.3   10/20/2011 - Present    Palpitations (Chronic) R00.2   10/20/2011 - Present    History of echocardiogram (Chronic) Z92.89   10/20/2011 - Present    Sciatica (Chronic) M54.30   2011 - Present    Overweight E66.3   2012 - Present    Vitamin  B12 deficiency E53.8   12/6/2012 - Present    Pain R52   12/6/2012 - Present    Acute reaction to stress F43.0   1/14/2013 - Present    HDL deficiency E78.6   7/26/2013 - Present    Need for pneumococcal vaccination Z23   2/24/2014 - Present    Vitamin D deficiency disease E55.9   3/14/2014 - Present    DM2 (diabetes mellitus, type 2) (CMS-HCC) E11.9   12/24/2014 - Present    Acute pancreatitis K85.90   10/21/2015 - Present    Dehydration E86.0   10/21/2015 - Present    Transaminitis R74.0   10/21/2015 - Present    Elevated lipase R74.8   10/21/2015 - Present    Cholelithiasis K80.20   10/21/2015 - Present    Diabetes mellitus (CMS-HCC) E11.9   10/21/2015 - Present    SVT (supraventricular tachycardia) I47.1   10/24/2015 - Present    Chest pain at rest R07.9   4/29/2016 - Present    S/P coronary artery stent placement Z95.5   4/29/2016 - Present    Coronary artery disease, non-occlusive I25.10   8/31/2016 - Present      Health Maintenance        Date Due Completion Dates    COLONOSCOPY 5/23/2016 5/23/2011 (Done)    Override on 5/23/2011: Done (due 5 years Dr Mayers)    RETINAL SCREENING 1/11/2017 1/11/2016, 9/24/2014 (Done), 6/27/2013 (Done), 12/7/2012 (Done)    Override on 9/24/2014: Done    Override on 6/27/2013: Done    Override on 12/7/2012: Done (Dr Smith)    A1C SCREENING 4/3/2017 1/3/2017, 11/17/2015, 10/21/2015, 12/15/2014, 2/24/2014, 6/25/2013, 3/28/2013, 11/26/2012, 5/8/2012, 2/24/2012, 1/3/2011, 9/23/2010, 5/10/2010, 10/27/2009, 6/23/2009, 2/18/2009    IMM INFLUENZA (1) 9/1/2017 12/28/2016, 10/27/2015, 1/10/2014, 12/10/2012, 10/22/2009    URINE ACR / MICROALBUMIN 12/28/2017 12/28/2016, 11/17/2015, 12/15/2014, 6/25/2013, 3/28/2013, 11/26/2012, 5/8/2012, 2/24/2012, 10/27/2009, 6/23/2009    FASTING LIPID PROFILE 1/3/2018 1/3/2017, 1/3/2017, 11/17/2015, 10/22/2015, 12/15/2014 (Done), 12/15/2014, 6/25/2013, 3/28/2013, 11/26/2012, 8/14/2012, 5/8/2012, 2/24/2012, 1/3/2011, 7/8/2010, 10/27/2009, 2/18/2009     Override on 12/15/2014: Done    SERUM CREATININE 1/3/2018 1/3/2017, 12/9/2015, 11/17/2015, 11/9/2015, 10/27/2015, 10/26/2015, 10/25/2015, 10/24/2015, 10/23/2015, 10/22/2015, 10/22/2015, 10/21/2015, 12/15/2014, 6/25/2013, 3/28/2013, 11/26/2012, 7/31/2012, 5/8/2012, 2/24/2012, 8/16/2011, 1/3/2011, 7/8/2010, 10/27/2009, 6/23/2009, 2/18/2009    DIABETES MONOFILAMENT / LE EXAM 1/9/2018 1/9/2017, 1/9/2017 (Done), 11/17/2015, 1/27/2014, 1/27/2014 (Done), 1/8/2013 (Done)    Override on 1/9/2017: Done    Override on 1/27/2014: Done    Override on 1/8/2013: Done    IMM DTaP/Tdap/Td Vaccine (2 - Td) 2/24/2024 2/24/2014            Current Immunizations     Influenza TIV (IM) 12/10/2012  4:35 PM    Influenza Vaccine Adult HD 1/10/2014  2:56 PM    Influenza Vaccine Pediatric 10/22/2009    Influenza Vaccine Quad Inj (Pf) 12/28/2016    Influenza Vaccine Quad Inj (Preserved) 10/27/2015  1:57 PM    Pneumococcal polysaccharide vaccine (PPSV-23) 6/25/2013    SHINGLES VACCINE 6/25/2013    Tdap Vaccine 2/24/2014  2:07 PM      Below and/or attached are the medications your provider expects you to take. Review all of your home medications and newly ordered medications with your provider and/or pharmacist. Follow medication instructions as directed by your provider and/or pharmacist. Please keep your medication list with you and share with your provider. Update the information when medications are discontinued, doses are changed, or new medications (including over-the-counter products) are added; and carry medication information at all times in the event of emergency situations     Allergies:  BYDUREON - Hives     CYCLOSET - Unspecified     MORPHINE - Vomiting               Medications  Valid as of: August 07, 2017 -  9:57 AM    Generic Name Brand Name Tablet Size Instructions for use    Acetaminophen-Codeine (Solution) TYLENOL-CODEINE 120-12 MG/5ML Take 5-10 mL by mouth every 6 hours as needed.        Aspirin (Tab)  MG Take 325 mg  by mouth every 48 hours. 1 tab po every other day        Cholecalciferol (Cap) vitamin D3 5000 UNITS Take 1 Each by mouth every day.        Coenzyme Q10   Take 1 Tab by mouth every day.        Cyanocobalamin (SL Tab) Vitamin B-12 5000 MCG Place 1 Tab under tongue every day.        GlipiZIDE (TABLET SR 24 HR) GLUCOTROL 10 MG Take 1 Tab by mouth every morning.        Glucose Blood (Strip) glucose blood  1 Strip by Other route 2 Times a Day.        Lancets (Misc) ACCU-CHEK MULTICLIX LANCETS  1 Each by Does not apply route 2 Times a Day.        Losartan Potassium (Tab) COZAAR 100 MG Take 1 Tab by mouth every day.        MetFORMIN HCl (Tab) GLUCOPHAGE 1000 MG TAKE 1 TABLET BY MOUTH 2 TIMES A DAY.        Metoprolol Succinate (TABLET SR 24 HR) TOPROL XL 50 MG TAKE 1 TABLET BY MOUTH 2 TIMES A DAY        Niacin (Tab CR) Niacin  MG Take 4 Tabs by mouth every evening.        Rosuvastatin Calcium (Tab) CRESTOR 10 MG TAKE 1 TABLET BY MOUTH EVERY EVENING        .                 Medicines prescribed today were sent to:     [id number] - [place], [place] - [address] [place] [address]    Phone: [phone] Fax: [phone]    Open 24 Hours?: No      Medication refill instructions:       If your prescription bottle indicates you have medication refills left, it is not necessary to call your provider’s office. Please contact your pharmacy and they will refill your medication.    If your prescription bottle indicates you do not have any refills left, you may request refills at any time through one of the following ways: The online [employer] system (except Urgent Care), by calling your provider’s office, or by asking your pharmacy to contact your provider’s office with a refill request. Medication refills are processed only during regular business hours and may not be available until the next business day. Your provider may request additional information or to have a follow-up visit with you prior to  refilling your medication.   *Please Note: Medication refills are assigned a new Rx number when refilled electronically. Your pharmacy may indicate that no refills were authorized even though a new prescription for the same medication is available at the pharmacy. Please request the medicine by name with the pharmacy before contacting your provider for a refill.        Other Notes About Your Plan     Colonoscopy 5/23/11 repeat in 5 yrs     Opthmology-[facility] 12/7/12  [facility] (1/8/13)           MyChart Access Code: Activation code not generated  Current MyChart Status: Active

## 2017-08-07 NOTE — PROGRESS NOTES
"Chief Complaint   Patient presents with   • Establish Care   • Concussion     possible 8/1/2017, following closed head injury       HPI:  Patient is a 61 y.o. male established patient who presents today to transfer care from Dr. Bhakta to myself and to follow-up on his ER visit at [facility] [place], [place] on 8/1/2017. He unfortunately had a strap snap on a truck bed as he was attempting to close the door causing him to lurch forward and strike the ground head/face first with no LOC (remembers entire event). He sustained a R sided scalp laceration that required 2 staples (due for removal today) and R face abrasions/ R eye area contusions. Head CT was negative at that visit and since then, he has been having intermittent nausea and feeling foggy in his head. He has had four prior concussions and these symptoms are similar in nature.  [facility] records reviewed at this visit with patient. He is also concerned about new L foot bruising since the accident and new L ear pain and fullness feeling since being exposed to a flying spark at work that entered his ear canal approximately three weeks ago. In regards to his chronic medical issues, he has not been seen since January 9, 2017 despite recommended 3 month f/u. He became instantly defensive when I inquired about this and especially with discussion about his chronic uncontrolled DMII. 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 He is not interested in any type of injectable medication for BG control and has not attempted any exercise or diet control since January. He reports three prior referrals to diabetic education and does not want another one at this time. He saw Dr. Reynolds in the remote past for DM and felt that it was not helpful either. He is seven months overdue for his yearly retinal exam and five year screening " "colonoscopy was due May 2016. He attempted to argue with me about me changing the due dates set by Dr. Bhakta until I showed him his medical record snap shot screen. He also reported that his HgA1c was \"7\" two years ago, but with record review in front of him, we noted that his HgA1c was 7.6 3/28/13 and has been worsening/ uncontrolled since then. HgA1c 10.7 today up from 10.1 in January and pt does not check his home BG at all. He continues to do things the way he wants to do them despite counseling now and at past visits.     Patient Active Problem List    Diagnosis Date Noted   • History of multiple concussions 08/07/2017   • Obesity (BMI 30-39.9) 08/07/2017   • Chronic hyponatremia 08/07/2017   • S/P coronary artery stent placement 04/29/2016   • SVT (supraventricular tachycardia) 10/24/2015   • DM2 (diabetes mellitus, type 2) (CMS-[facility]) 12/24/2014   • Vitamin D deficiency disease 03/14/2014   • HDL deficiency 07/26/2013   • Vitamin B12 deficiency 12/06/2012   • Hypertension 02/14/2012   • Aortic stenosis 10/20/2011   • Memory loss 10/20/2011   • Palpitations 10/20/2011   • History of echocardiogram 10/20/2011   • Obesity 08/23/2011   • Sciatica 08/23/2011   • Fatty liver 01/17/2011   • Carotid bruit 12/09/2009   • History of cardiac catheterization 07/13/2009   • Murmur 07/07/2009   • CAD (coronary artery disease) 06/05/2009   • Dyslipidemia 06/05/2009     Past medical, surgical, family, and social history was reviewed and updated in Epic chart by me today.     Medications and allergies reviewed and updated in Epic chart by me today.     ROS:  Pertinent positives listed above in HPI. All other systems have been reviewed and are negative.    PE:   /70 mmHg  Pulse 78  Temp(Src) 35.9 °C (96.6 °F)  Resp 14  Ht 1.778 m (5' 10\")  Wt 101.152 kg (223 lb)  BMI 32.00 kg/m2  SpO2 97%  Vital signs reviewed with patient.     Gen: Well developed; well nourished; no acute distress; appears older than stated " age  HEENT: Normocephalic; R scalp wound with two staples in place over thick scab; small skin abrasion above R eyebrow; one inch round R cheek abrasion with scab and surrounding erythema; R periorbital ecchymosis; PEERLA b/l; sclera clear b/l; R external auditory canals WNL; b/l TM WNL but partially obscured with dense hair growth in canals; L ear canal tissue is a bit grijalva and small area of external canal ecchymosis noted; no L external ear issues noted; oropharynx clear; oral mucosa moist; tongue midline; dentition adequate   Neck: No adenopathy; no thyromegaly  CV: Regular rate and rhythm; S1/ S2 present; no murmur, gallop or rub noted  Pulm: No respiratory distress; clear to ascultation b/l; no wheezing or stridor noted b/l  Abd: Adequate bowel sounds noted; soft and nontender; no rebound, rigidity, nor distention; obese  Extremities: No peripheral edema b/l LE extremities/ no clubbing nor cyanosis noted; L foot has bruised 2nd-5th toes with no joint deformities/ no edema/ bounding L radial pulse  Skin: Warm and dry; no rashes noted; significant b/l arm skin changes noted - multiple scabs/ AKs/ dry skin diffusely  Neuro: No focal deficits note/ stable gait  Psych: AAOx4; mood and affect are appropriate    2 staples removed from scalp wound in normal fashion with staple remover/ no complications and no bleeding noted. Wound care instructions provided to patient.    A/P:  1. Chronic type 2 diabetes mellitus with complication, without long-term current use of insulin (CMS-[facility])  Uncontrolled/ HgA1c 10.7 (10.1 in January) today/ Pt remains non compliant with yearly retinal exam (will allow me to make referral) and will not check home BG/ he refuses injectable medication at this time and will only take his current oral medications, despite the fact that his BG control is extremely poor. He was encouraged to work on his daily diet choices and he continues to have many issues/excuses surrounding daily exercise and  weight loss strategies. He is very aware of the implications of poor DM control on his overall health and well being but chooses not to make necessary lifestyle changes. I provided encouragement to him that Sol JIN and I am ready to help him when he is ready to regain control of his DM. He will be due for repeat HgA1c in 3 months and microalbumin in January.   - POCT A1C  - REFERRAL TO OPHTHALMOLOGY  - COMP METABOLIC PANEL; Future  - MICROALBUMIN CREAT RATIO URINE (LAB COLLECT); Future    2. CHI (closed head injury), subsequent encounter  2 scalp staples removed as detailed above/ supportive care recommended/ zofran PRN for nausea control. Pt well versed in CHI care due to four prior concussions in the past.   - ondansetron (ZOFRAN) 4 MG Tab tablet; Take 1 Tab by mouth every four hours as needed for Nausea/Vomiting.  Dispense: 10 Tab; Refill: 1    3. Left ear pain  Probable eustachian tube dysfunction - will trial medrol dose pack and flonase twice per day. Pt cautioned about medrol and BG control.   - MethylPREDNISolone (MEDROL DOSEPAK) 4 MG Tablet Therapy Pack; As directed on the packaging label.  Dispense: 21 Tab; Refill: 0    4. Chronic vitamin D deficiency disease  Pt to continue daily vitamin D supplementation. Due for vitamin D level in January.   - VITAMIN D,25 HYDROXY; Future    5. Chronic vitamin B12 deficiency  Pt to continue daily B12 supplementation. Due for B12 level in January.   - VITAMIN B12; Future    6. Chronic obesity (BMI 30-39.9)  Pt well aware of need to improve his cardiovascular health but has verbally presented many self-perceived barriers to daily movement. Remains non compliant with ADA diet also.     7. Chronic hyponatremia  Stable/ mild/ fasting CMP due in January.   - COMP METABOLIC PANEL; Future    8. Chronic essential hypertension  Stable/ controlled with current medications/ He would benefit from ACE use but he is not open to medication changes at this visit.   - LIPID PROFILE;  Future  - CBC WITH DIFFERENTIAL; Future    9. Screening for thyroid disorder  Due in January.  - TSH WITH REFLEX TO FT4; Future     Face to face time: 60 minutes with 50% of time spent with direct patient counseling and medical management   Pt encouraged to allow referral for overdue screening colonoscopy. I would like to see him in 3 months/ PRN sooner if current condition changes.

## 2017-09-29 DIAGNOSIS — Z95.5 S/P CORONARY ARTERY STENT PLACEMENT: ICD-10-CM

## 2017-09-29 DIAGNOSIS — I25.10 CORONARY ARTERY DISEASE INVOLVING NATIVE HEART WITHOUT ANGINA PECTORIS, UNSPECIFIED VESSEL OR LESION TYPE: Chronic | ICD-10-CM

## 2017-09-29 DIAGNOSIS — I47.10 SVT (SUPRAVENTRICULAR TACHYCARDIA): ICD-10-CM

## 2017-10-13 ENCOUNTER — TELEPHONE (OUTPATIENT)
Dept: CARDIOLOGY | Facility: MEDICAL CENTER | Age: 62
End: 2017-10-13

## 2017-10-13 ENCOUNTER — OFFICE VISIT (OUTPATIENT)
Dept: CARDIOLOGY | Facility: MEDICAL CENTER | Age: 62
End: 2017-10-13
Payer: COMMERCIAL

## 2017-10-13 VITALS
SYSTOLIC BLOOD PRESSURE: 130 MMHG | BODY MASS INDEX: 31.92 KG/M2 | HEART RATE: 76 BPM | WEIGHT: 223 LBS | HEIGHT: 70 IN | DIASTOLIC BLOOD PRESSURE: 70 MMHG

## 2017-10-13 DIAGNOSIS — R09.89 CAROTID BRUIT, UNSPECIFIED LATERALITY: Chronic | ICD-10-CM

## 2017-10-13 DIAGNOSIS — E78.5 DYSLIPIDEMIA: Chronic | ICD-10-CM

## 2017-10-13 DIAGNOSIS — Z95.5 S/P CORONARY ARTERY STENT PLACEMENT: ICD-10-CM

## 2017-10-13 DIAGNOSIS — I25.10 CORONARY ARTERY DISEASE INVOLVING NATIVE HEART WITHOUT ANGINA PECTORIS, UNSPECIFIED VESSEL OR LESION TYPE: Chronic | ICD-10-CM

## 2017-10-13 DIAGNOSIS — I10 ESSENTIAL HYPERTENSION: Chronic | ICD-10-CM

## 2017-10-13 DIAGNOSIS — I47.10 SVT (SUPRAVENTRICULAR TACHYCARDIA): ICD-10-CM

## 2017-10-13 DIAGNOSIS — I35.0 AORTIC VALVE STENOSIS, ETIOLOGY OF CARDIAC VALVE DISEASE UNSPECIFIED: Chronic | ICD-10-CM

## 2017-10-13 PROCEDURE — 99214 OFFICE O/P EST MOD 30 MIN: CPT | Performed by: INTERNAL MEDICINE

## 2017-10-13 ASSESSMENT — ENCOUNTER SYMPTOMS
MYALGIAS: 0
HEADACHES: 0
DIZZINESS: 0
PALPITATIONS: 0
LOSS OF CONSCIOUSNESS: 0
PND: 0
CLAUDICATION: 0
HEARTBURN: 0

## 2017-10-13 NOTE — LETTER
[facility]   [address], [place] [address]  [place] [address]  Phone: [phone]  Fax: [phone]              Sharad Millan  1955    Encounter Date: 10/13/2017    Ganga Chavez M.D.          PROGRESS NOTE:  Subjective:   Sharad Millan is a 61 y.o. male who presents today for a followup evaluation of aortic stenosis, CAD, palpitations, PSVT, hypertension and hyperlipidemia.    Last seen on 8/31/2016.    Since 8/31/2016 appointment the patient's had no cardiac symptoms.  Had two episodes of palpitations responsive to vagal maneuver.  No lightheadedness or dizziness.  No angina pectoralis.  No shortness of breath.    Since his October, 2015 hospitalization the patient has had almost constant sharp left localized anterior sharp stabbing chest pain radiating to his back.  Not related to exertion.  No shortness of breath or angina pectoris.  No palpitations or syncope.    Between 10/21/2015-10/27/2015 hospitalized at [facility].  Gallstone pancreatitis.  Laparoscopic cholecystectomy.  Echocardiogram showed moderate aortic stenosis.    On 12/3/2013 [facility] ER.  SVT.  Converted with a adenosine.  Toprol increased 100 mg daily.    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  He developed chest pain.  He took a customers sublingual nitroglycerin with relief.  He went to [facility] ER.  EKG showed no changes.  Patient left after waiting an hour and a half to be seen by the doctor.          Past Medical History:   Diagnosis Date   • Aortic stenosis 10/20/2011   • Arrhythmia    • Carotid bruit 12/9/2009   • Chronic right shoulder pain    • Diabetes    • Fatty liver 1/17/2011   • Heart attack    • History of cardiac catheterization 7/13/2009   • History of echocardiogram 10/20/2011   • Hypertension    • Memory loss 10/20/2011   • Murmur 7/7/2009   • Obesity 8/23/2011   • Palpitations 10/20/2011   • PSVT (paroxysmal  supraventricular tachycardia) (CMS-HCC) 2/14/2012   • S/P coronary artery stent placement 1996    coronary stent implantation x3, [facility]   • S/P coronary artery stent placement 1998    coronary stent implantation; [facility]   • S/P coronary artery stent placement 2003    cardiac catheterization; patent stents; [place]   • Sciatica 8/23/2011   • Uncontrolled diabetes mellitus (CMS-HCC) 11/29/2010     Past Surgical History:   Procedure Laterality Date   • CAROTID STENT ANGIOGRAPHY  1996   • CATARACT EXTRACTION WITH IOL     • PO BY LAPAROSCOPY N/A 10/25/2015    Procedure: PO BY LAPAROSCOPY-with grams ;  Surgeon: Ronnie Bowman M.D.;  Location: SURGERY [facility];  Service:      Family History   Problem Relation Age of Onset   • Heart Disease Mother    • Heart Disease Father      History   Smoking Status   • Never Smoker   Smokeless Tobacco   • Never Used     Allergies   Allergen Reactions   • Bydureon [Exenatide] Hives     hives   • Cycloset [Bromocriptine Mesylate] Unspecified     Pt states he can't remember what happens to him.   • Morphine Vomiting     Outpatient Encounter Prescriptions as of 10/13/2017   Medication Sig Dispense Refill   • MethylPREDNISolone (MEDROL DOSEPAK) 4 MG Tablet Therapy Pack As directed on the packaging label. 21 Tab 0   • ondansetron (ZOFRAN) 4 MG Tab tablet Take 1 Tab by mouth every four hours as needed for Nausea/Vomiting. 10 Tab 1   • glipiZIDE SR (GLIPIZIDE XL) 10 MG TABLET SR 24 HR Take 1 Tab by mouth every morning. 30 Tab 11   • losartan (COZAAR) 100 MG Tab Take 1 Tab by mouth every day. 30 Tab 11   • metformin (GLUCOPHAGE) 1000 MG tablet TAKE 1 TABLET BY MOUTH 2 TIMES A DAY. 60 Tab 11   • metoprolol SR (TOPROL XL) 50 MG TABLET SR 24 HR TAKE 1 TABLET BY MOUTH 2 TIMES A DAY (Patient taking differently: Take 50 mg by mouth. TAKE 1 TABLET BY MOUTH 2 TIMES A DAY) 60 Tab 11   • rosuvastatin (CRESTOR) 10 MG Tab TAKE 1 TABLET BY MOUTH EVERY EVENING 30 Tab 11   •  "glucose blood strip 1 Strip by Other route 2 Times a Day. 60 Strip 11   • ACCU-CHEK MULTICLIX LANCETS Misc 1 Each by Does not apply route 2 Times a Day. 180 Each 12   • Coenzyme Q10 (CO Q 10 PO) Take 1 Tab by mouth every day.     • aspirin (ASA) 325 MG TABS Take 325 mg by mouth every 48 hours. 1 tab po every other day     • Niacin  MG TBCR Take 4 Tabs by mouth every evening.     • Cholecalciferol (VITAMIN D3) 5000 UNITS CAPS Take 1 Each by mouth every day.     • Cyanocobalamin (VITAMIN B-12) 5000 MCG SUBL Place 1 Tab under tongue every day.       No facility-administered encounter medications on file as of 10/13/2017.      Review of Systems   Cardiovascular: Negative for chest pain, palpitations, claudication, leg swelling and PND.   Gastrointestinal: Negative for heartburn.   Musculoskeletal: Negative for myalgias.   Neurological: Negative for dizziness, loss of consciousness and headaches.        Objective:   /70   Pulse 76   Ht 1.778 m (5' 10\")   Wt 101.2 kg (223 lb)   BMI 32.00 kg/m²      Physical Exam   Constitutional: He is oriented to person, place, and time. He appears well-nourished. No distress.   HENT:   Head: Normocephalic.   Neck: No JVD present.   Normal jugular venous pressure.   Cardiovascular: Normal rate, regular rhythm, S1 normal and S2 normal.  Exam reveals no gallop and no friction rub.    Murmur heard.   Systolic murmur is present with a grade of 3/6   Pulses:       Carotid pulses are 2+ on the right side with bruit, and 2+ on the left side with bruit.       Radial pulses are 2+ on the right side, and 2+ on the left side.   A2 component present.   Pulmonary/Chest: Effort normal and breath sounds normal. He has no wheezes. He has no rhonchi. He has no rales.   Distinct palpable sternal left-sided chest pain.   Abdominal: He exhibits no distension and no mass.   Obese.   Musculoskeletal: He exhibits no edema.   Neurological: He is alert and oriented to person, place, and time.   "   Skin: Skin is warm and dry.   Psychiatric: He has a normal mood and affect. His behavior is normal.       Assessment:     1. Coronary artery disease involving native heart without angina pectoris, unspecified vessel or lesion type     2. Aortic valve stenosis, etiology of cardiac valve disease unspecified  ECHOCARDIOGRAM COMP W/O CONT   3. Carotid bruit, unspecified laterality  CAROTID DUPLEX   4. S/P coronary artery stent placement     5. Essential hypertension     6. Dyslipidemia     7. SVT (supraventricular tachycardia) (CMS-[facility])       08/08/2012 Echo: EF 55-60%. Moderate aortic stenosis. Peak aortic valve gradient 63 mmHg. Moderate LVH.   07/25/2013 Echo: EF 70-75%. Moderate aortic stenosis. Peak aortic valve gradient 59 mmHg. Moderate LVH.  10/23/2015 ECHOCARDIOGRAM  Normal left ventricular systolic function.  Left ventricular ejection fraction is visually estimated to be 60%.  Grade I diastolic dysfunction.  Mild mitral regurgitation.  Moderate aortic stenosis.  Vmax 3.47 m/s. Transvalvular gradients are - Peak: 48 mmHg, Mean: 30   mmHg.  Mild pulmonic insufficiency.    08/14/2012 Lab: Cholesterol 109. Triglycerides 121. HDL 36. LDL 49.  03/28/2013 Lab: Cholesterol 98. Triglycerides 110. HDL 30. LDL 46.    09/27/2013 EKG: Normal sinus rhythm. No acute changes.       Medical Decision Making:  Today's Assessment / Status / Plan:     Aortic stenosis. Moderate. Asymptomatic.    Hyperlipidemia. Continue current therapy. Check lipid panel.    Hypertension. Controlled. Continue current therapy. CMP pending.    CAD. Clinically stable. Continue current therapy.    Coronary stent implantation: 1996. 1998.    Bilateral carotid bruits.    Obesity. Counseled for weight loss.    SVT. 12/3/2013. One episode of palpitations.    Situational stress syndrome.    Recommendation  Continue current therapy.  He's not interested in any new medications or procedures.  Agrees for echocardiogram and carotid ultrasound.        Monika  DYLAN Zarate M.D.  [address]  [address]  [facility] [address]  VIA In Basket

## 2017-10-13 NOTE — TELEPHONE ENCOUNTER
To BLAKE Jay/Jeremiah     Patient saw Dr Chavez today and the doctor told him to get some names of family physicians that are taking patients. The names of the doctors are: Fatmata Holder, Zackery Trevino, Micehlle Yusuf and Luis Kennedy. He wants a call back at [phone] to find out which one Dr Chavez recommends.

## 2017-10-13 NOTE — PROGRESS NOTES
Subjective:   Sharad Millan is a 61 y.o. male who presents today for a followup evaluation of aortic stenosis, CAD, palpitations, PSVT, hypertension and hyperlipidemia.    Last seen on 8/31/2016.    Since 8/31/2016 appointment the patient's had no cardiac symptoms.  Had two episodes of palpitations responsive to vagal maneuver.  No lightheadedness or dizziness.  No angina pectoralis.  No shortness of breath.    Since his October, 2015 hospitalization the patient has had almost constant sharp left localized anterior sharp stabbing chest pain radiating to his back.  Not related to exertion.  No shortness of breath or angina pectoris.  No palpitations or syncope.    Between 10/21/2015-10/27/2015 hospitalized at [facility].  Gallstone pancreatitis.  Laparoscopic cholecystectomy.  Echocardiogram showed moderate aortic stenosis.    On 12/3/2013 [facility] ER.  SVT.  Converted with a adenosine.  Toprol increased 100 mg daily.    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  He developed chest pain.  He took a customers sublingual nitroglycerin with relief.  He went to [facility] ER.  EKG showed no changes.  Patient left after waiting an hour and a half to be seen by the doctor.          Past Medical History:   Diagnosis Date   • Aortic stenosis 10/20/2011   • Arrhythmia    • Carotid bruit 12/9/2009   • Chronic right shoulder pain    • Diabetes    • Fatty liver 1/17/2011   • Heart attack    • History of cardiac catheterization 7/13/2009   • History of echocardiogram 10/20/2011   • Hypertension    • Memory loss 10/20/2011   • Murmur 7/7/2009   • Obesity 8/23/2011   • Palpitations 10/20/2011   • PSVT (paroxysmal supraventricular tachycardia) (CMS-[facility]) 2/14/2012   • S/P coronary artery stent placement 1996    coronary stent implantation x3, [facility]   • S/P coronary artery stent placement 1998    coronary stent implantation; LAD   • S/P coronary artery stent placement 2003     cardiac catheterization; patent stents; [place]   • Sciatica 8/23/2011   • Uncontrolled diabetes mellitus (CMS-HCC) 11/29/2010     Past Surgical History:   Procedure Laterality Date   • CAROTID STENT ANGIOGRAPHY  1996   • CATARACT EXTRACTION WITH IOL     • PO BY LAPAROSCOPY N/A 10/25/2015    Procedure: PO BY LAPAROSCOPY-with grams ;  Surgeon: Ronnie Bowman M.D.;  Location: SURGERY [facility];  Service:      Family History   Problem Relation Age of Onset   • Heart Disease Mother    • Heart Disease Father      History   Smoking Status   • Never Smoker   Smokeless Tobacco   • Never Used     Allergies   Allergen Reactions   • Bydureon [Exenatide] Hives     hives   • Cycloset [Bromocriptine Mesylate] Unspecified     Pt states he can't remember what happens to him.   • Morphine Vomiting     Outpatient Encounter Prescriptions as of 10/13/2017   Medication Sig Dispense Refill   • MethylPREDNISolone (MEDROL DOSEPAK) 4 MG Tablet Therapy Pack As directed on the packaging label. 21 Tab 0   • ondansetron (ZOFRAN) 4 MG Tab tablet Take 1 Tab by mouth every four hours as needed for Nausea/Vomiting. 10 Tab 1   • glipiZIDE SR (GLIPIZIDE XL) 10 MG TABLET SR 24 HR Take 1 Tab by mouth every morning. 30 Tab 11   • losartan (COZAAR) 100 MG Tab Take 1 Tab by mouth every day. 30 Tab 11   • metformin (GLUCOPHAGE) 1000 MG tablet TAKE 1 TABLET BY MOUTH 2 TIMES A DAY. 60 Tab 11   • metoprolol SR (TOPROL XL) 50 MG TABLET SR 24 HR TAKE 1 TABLET BY MOUTH 2 TIMES A DAY (Patient taking differently: Take 50 mg by mouth. TAKE 1 TABLET BY MOUTH 2 TIMES A DAY) 60 Tab 11   • rosuvastatin (CRESTOR) 10 MG Tab TAKE 1 TABLET BY MOUTH EVERY EVENING 30 Tab 11   • glucose blood strip 1 Strip by Other route 2 Times a Day. 60 Strip 11   • ACCU-CHEK MULTICLIX LANCETS Misc 1 Each by Does not apply route 2 Times a Day. 180 Each 12   • Coenzyme Q10 (CO Q 10 PO) Take 1 Tab by mouth every day.     • aspirin (ASA) 325 MG TABS Take 325 mg by mouth  "every 48 hours. 1 tab po every other day     • Niacin  MG TBCR Take 4 Tabs by mouth every evening.     • Cholecalciferol (VITAMIN D3) 5000 UNITS CAPS Take 1 Each by mouth every day.     • Cyanocobalamin (VITAMIN B-12) 5000 MCG SUBL Place 1 Tab under tongue every day.       No facility-administered encounter medications on file as of 10/13/2017.      Review of Systems   Cardiovascular: Negative for chest pain, palpitations, claudication, leg swelling and PND.   Gastrointestinal: Negative for heartburn.   Musculoskeletal: Negative for myalgias.   Neurological: Negative for dizziness, loss of consciousness and headaches.        Objective:   /70   Pulse 76   Ht 1.778 m (5' 10\")   Wt 101.2 kg (223 lb)   BMI 32.00 kg/m²     Physical Exam   Constitutional: He is oriented to person, place, and time. He appears well-nourished. No distress.   HENT:   Head: Normocephalic.   Neck: No JVD present.   Normal jugular venous pressure.   Cardiovascular: Normal rate, regular rhythm, S1 normal and S2 normal.  Exam reveals no gallop and no friction rub.    Murmur heard.   Systolic murmur is present with a grade of 3/6   Pulses:       Carotid pulses are 2+ on the right side with bruit, and 2+ on the left side with bruit.       Radial pulses are 2+ on the right side, and 2+ on the left side.   A2 component present.   Pulmonary/Chest: Effort normal and breath sounds normal. He has no wheezes. He has no rhonchi. He has no rales.   Distinct palpable sternal left-sided chest pain.   Abdominal: He exhibits no distension and no mass.   Obese.   Musculoskeletal: He exhibits no edema.   Neurological: He is alert and oriented to person, place, and time.   Skin: Skin is warm and dry.   Psychiatric: He has a normal mood and affect. His behavior is normal.       Assessment:     1. Coronary artery disease involving native heart without angina pectoris, unspecified vessel or lesion type     2. Aortic valve stenosis, etiology of cardiac " valve disease unspecified  ECHOCARDIOGRAM COMP W/O CONT   3. Carotid bruit, unspecified laterality  CAROTID DUPLEX   4. S/P coronary artery stent placement     5. Essential hypertension     6. Dyslipidemia     7. SVT (supraventricular tachycardia) (CMS-[facility])       08/08/2012 Echo: EF 55-60%. Moderate aortic stenosis. Peak aortic valve gradient 63 mmHg. Moderate LVH.   07/25/2013 Echo: EF 70-75%. Moderate aortic stenosis. Peak aortic valve gradient 59 mmHg. Moderate LVH.  10/23/2015 ECHOCARDIOGRAM  Normal left ventricular systolic function.  Left ventricular ejection fraction is visually estimated to be 60%.  Grade I diastolic dysfunction.  Mild mitral regurgitation.  Moderate aortic stenosis.  Vmax 3.47 m/s. Transvalvular gradients are - Peak: 48 mmHg, Mean: 30   mmHg.  Mild pulmonic insufficiency.    08/14/2012 Lab: Cholesterol 109. Triglycerides 121. HDL 36. LDL 49.  03/28/2013 Lab: Cholesterol 98. Triglycerides 110. HDL 30. LDL 46.    09/27/2013 EKG: Normal sinus rhythm. No acute changes.       Medical Decision Making:  Today's Assessment / Status / Plan:     Aortic stenosis. Moderate. Asymptomatic.    Hyperlipidemia. Continue current therapy. Check lipid panel.    Hypertension. Controlled. Continue current therapy. CMP pending.    CAD. Clinically stable. Continue current therapy.    Coronary stent implantation: 1996. 1998.    Bilateral carotid bruits.    Obesity. Counseled for weight loss.    SVT. 12/3/2013. One episode of palpitations.    Situational stress syndrome.    Recommendation  Continue current therapy.  He's not interested in any new medications or procedures.  Agrees for echocardiogram and carotid ultrasound.

## 2017-10-14 ENCOUNTER — NON-PROVIDER VISIT (OUTPATIENT)
Dept: INTERNAL MEDICINE | Facility: IMAGING CENTER | Age: 62
End: 2017-10-14
Payer: COMMERCIAL

## 2017-10-14 DIAGNOSIS — Z23 NEED FOR INFLUENZA VACCINATION: ICD-10-CM

## 2017-10-14 PROCEDURE — 90686 IIV4 VACC NO PRSV 0.5 ML IM: CPT | Performed by: FAMILY MEDICINE

## 2017-10-14 PROCEDURE — 90471 IMMUNIZATION ADMIN: CPT | Performed by: FAMILY MEDICINE

## 2017-11-01 ENCOUNTER — HOSPITAL ENCOUNTER (OUTPATIENT)
Dept: CARDIOLOGY | Facility: MEDICAL CENTER | Age: 62
End: 2017-11-01
Attending: INTERNAL MEDICINE
Payer: COMMERCIAL

## 2017-11-01 ENCOUNTER — HOSPITAL ENCOUNTER (OUTPATIENT)
Dept: RADIOLOGY | Facility: MEDICAL CENTER | Age: 62
End: 2017-11-01
Attending: INTERNAL MEDICINE
Payer: COMMERCIAL

## 2017-11-01 DIAGNOSIS — R09.89 CAROTID BRUIT, UNSPECIFIED LATERALITY: Chronic | ICD-10-CM

## 2017-11-01 DIAGNOSIS — I35.0 AORTIC VALVE STENOSIS, ETIOLOGY OF CARDIAC VALVE DISEASE UNSPECIFIED: Chronic | ICD-10-CM

## 2017-11-01 PROCEDURE — 93306 TTE W/DOPPLER COMPLETE: CPT | Mod: 26 | Performed by: INTERNAL MEDICINE

## 2017-11-01 PROCEDURE — 93880 EXTRACRANIAL BILAT STUDY: CPT

## 2017-11-01 PROCEDURE — 93306 TTE W/DOPPLER COMPLETE: CPT

## 2017-11-01 PROCEDURE — 93880 EXTRACRANIAL BILAT STUDY: CPT | Mod: 26 | Performed by: INTERNAL MEDICINE

## 2017-11-02 ENCOUNTER — OFFICE VISIT (OUTPATIENT)
Dept: MEDICAL GROUP | Facility: MEDICAL CENTER | Age: 62
End: 2017-11-02
Payer: COMMERCIAL

## 2017-11-02 VITALS
RESPIRATION RATE: 18 BRPM | OXYGEN SATURATION: 98 % | WEIGHT: 224 LBS | HEART RATE: 95 BPM | DIASTOLIC BLOOD PRESSURE: 70 MMHG | HEIGHT: 70 IN | SYSTOLIC BLOOD PRESSURE: 132 MMHG | BODY MASS INDEX: 32.07 KG/M2

## 2017-11-02 DIAGNOSIS — E66.9 OBESITY (BMI 30-39.9): Chronic | ICD-10-CM

## 2017-11-02 DIAGNOSIS — E11.8 TYPE 2 DIABETES MELLITUS WITH COMPLICATION, WITHOUT LONG-TERM CURRENT USE OF INSULIN (HCC): Chronic | ICD-10-CM

## 2017-11-02 DIAGNOSIS — E78.5 DYSLIPIDEMIA: Chronic | ICD-10-CM

## 2017-11-02 DIAGNOSIS — I10 ESSENTIAL HYPERTENSION: ICD-10-CM

## 2017-11-02 DIAGNOSIS — R05.9 COUGH: ICD-10-CM

## 2017-11-02 LAB
LV EJECT FRACT  99904: 60
LV EJECT FRACT MOD 2C 99903: 62.78
LV EJECT FRACT MOD 4C 99902: 51.64
LV EJECT FRACT MOD BP 99901: 57.33

## 2017-11-02 PROCEDURE — 99214 OFFICE O/P EST MOD 30 MIN: CPT | Performed by: INTERNAL MEDICINE

## 2017-11-02 NOTE — PROGRESS NOTES
CC: Establishing care multiple issues    HPI:   Sharad presents today with the following.    1. Type 2 diabetes mellitus with complication, without long-term current use of insulin (CMS-HCC)  Presents with his last A1c just under 3 months ago. Diabetes been poorly controlled maintain on dual oral therapy. He reports he is very hesitant to do any injectable medications because of the testing frequently that goes along with it. He does have some financial barriers as well. Does not check his sugars regularly.    2. Dyslipidemia  Maintain on statin without myalgias.    3. Obesity (BMI 30-39.9)  Weight is persistently elevated but has been stable over the last few months. He does report a poor diet.    4. Cough  Main concern today is cough for the past 7 days. Fairly nonproductive occasional clear sputum. Does report stuffy nose but no earache and only a mild sore throat but no fevers.    5. Essential hypertension  Denying any current chest pain or shortness of breath blood pressure under reasonable control and office today.      Patient Active Problem List    Diagnosis Date Noted   • History of multiple concussions 08/07/2017   • Obesity (BMI 30-39.9) 08/07/2017   • Chronic hyponatremia 08/07/2017   • S/P coronary artery stent placement 04/29/2016   • SVT (supraventricular tachycardia) (CMS-HCC) 10/24/2015   • DM2 (diabetes mellitus, type 2) (CMS-HCC) 12/24/2014   • Vitamin D deficiency disease 03/14/2014   • HDL deficiency 07/26/2013   • Vitamin B12 deficiency 12/06/2012   • HTN (hypertension) 10/12/2012   • Aortic stenosis 10/20/2011   • Memory loss 10/20/2011   • Palpitations 10/20/2011   • Obesity 08/23/2011   • Sciatica 08/23/2011   • Fatty liver 01/17/2011   • Carotid bruit 12/09/2009   • History of cardiac catheterization 07/13/2009   • Murmur 07/07/2009   • CAD (coronary artery disease) 06/05/2009   • Dyslipidemia 06/05/2009       Current Outpatient Prescriptions   Medication Sig Dispense Refill   • ondansetron  "(ZOFRAN) 4 MG Tab tablet Take 1 Tab by mouth every four hours as needed for Nausea/Vomiting. 10 Tab 1   • glipiZIDE SR (GLIPIZIDE XL) 10 MG TABLET SR 24 HR Take 1 Tab by mouth every morning. 30 Tab 11   • losartan (COZAAR) 100 MG Tab Take 1 Tab by mouth every day. 30 Tab 11   • metformin (GLUCOPHAGE) 1000 MG tablet TAKE 1 TABLET BY MOUTH 2 TIMES A DAY. 60 Tab 11   • metoprolol SR (TOPROL XL) 50 MG TABLET SR 24 HR TAKE 1 TABLET BY MOUTH 2 TIMES A DAY (Patient taking differently: Take 50 mg by mouth. TAKE 1 TABLET BY MOUTH 2 TIMES A DAY) 60 Tab 11   • rosuvastatin (CRESTOR) 10 MG Tab TAKE 1 TABLET BY MOUTH EVERY EVENING 30 Tab 11   • glucose blood strip 1 Strip by Other route 2 Times a Day. 60 Strip 11   • ACCU-CHEK MULTICLIX LANCETS Misc 1 Each by Does not apply route 2 Times a Day. 180 Each 12   • Coenzyme Q10 (CO Q 10 PO) Take 1 Tab by mouth every day.     • aspirin (ASA) 325 MG TABS Take 325 mg by mouth every 48 hours. 1 tab po every other day     • Cholecalciferol (VITAMIN D3) 5000 UNITS CAPS Take 1 Each by mouth every day.     • Cyanocobalamin (VITAMIN B-12) 5000 MCG SUBL Place 1 Tab under tongue every day.       No current facility-administered medications for this visit.          Allergies as of 11/02/2017 - Reviewed 10/13/2017   Allergen Reaction Noted   • Bydureon [exenatide] Hives 10/12/2012   • Cycloset [bromocriptine mesylate] Unspecified 06/21/2012   • Morphine Vomiting 10/21/2015        ROS: As per HPI.    /70   Pulse 95   Resp 18   Ht 1.778 m (5' 10\")   Wt 101.6 kg (224 lb)   SpO2 98%   BMI 32.14 kg/m²      Physical Exam:  Gen:         Alert and oriented, No apparent distress.  Heent:       TMs clear bilaterally, oropharynx without erythema or exudates  Neck:        No Lymphadenopathy or Bruits.  Lungs:     Clear to auscultation bilaterally  CV:          Regular rate and rhythm. No murmurs, rubs or gallops.               Ext:          No clubbing, cyanosis, edema.      Assessment and Plan. "   61 y.o. male with the following issues.    1. Type 2 diabetes mellitus with complication, without long-term current use of insulin (CMS-[facility])  Will get blood work in the coming weeks in follow-up at that time. Likely discussion about adding Victoza versus  Jardiance  - COMP METABOLIC PANEL; Future  - LIPID PROFILE; Future  - HEMOGLOBIN A1C; Future  - MICROALBUMIN CREAT RATIO URINE; Future    2. Dyslipidemia  Continue statin rechecking cholesterol    3. Obesity (BMI 30-39.9)  Discussed diet exercise weight loss.  - Patient identified as having weight management issue.  Appropriate orders and counseling given.    4. Cough  Likely viral in etiology. Have recommended over-the-counter pain control and symptom relief. Patient will followup if spiking fevers or symptoms worsen.    5. Essential hypertension  Currently well controlled, Discuss diet, exercise and salt restriction.

## 2017-11-17 ENCOUNTER — TELEPHONE (OUTPATIENT)
Dept: CARDIOLOGY | Facility: MEDICAL CENTER | Age: 62
End: 2017-11-17

## 2017-11-17 NOTE — TELEPHONE ENCOUNTER
Called mobile number and LM to call back office. Also called home number and pts wife said he was not home. She will notify with to call office.     0930: pt called back. Explained Dr. Chavez's recommendations and echo results. Pt agrees to see MD admitting to a recent episode of chest discomfort. 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 It went away after about 1 hour with rest. Explained to pt this may be a symptom of his aortic stenosis or another heart problem and that he should refrain from any strenuous activity and go to ER if symptoms reoccur and do not subside or if worse than previous. Pt agreed. Appt arrange for 11/20. Pt confirmed appt.     ----- Message from Yaz Singh R.N. sent at 11/16/2017  6:17 PM PST -----  Dr. Duran reviewed echo and would like pt to come in for follow up d/t severe aortic stenosis on echo.

## 2017-11-20 ENCOUNTER — OFFICE VISIT (OUTPATIENT)
Dept: CARDIOLOGY | Facility: MEDICAL CENTER | Age: 62
End: 2017-11-20
Payer: COMMERCIAL

## 2017-11-20 VITALS
BODY MASS INDEX: 31.92 KG/M2 | WEIGHT: 223 LBS | RESPIRATION RATE: 14 BRPM | DIASTOLIC BLOOD PRESSURE: 80 MMHG | HEIGHT: 70 IN | SYSTOLIC BLOOD PRESSURE: 148 MMHG | HEART RATE: 72 BPM | OXYGEN SATURATION: 98 %

## 2017-11-20 DIAGNOSIS — R07.2 PRECORDIAL PAIN: ICD-10-CM

## 2017-11-20 DIAGNOSIS — I10 ESSENTIAL HYPERTENSION: ICD-10-CM

## 2017-11-20 DIAGNOSIS — I35.0 AORTIC VALVE STENOSIS, ETIOLOGY OF CARDIAC VALVE DISEASE UNSPECIFIED: Chronic | ICD-10-CM

## 2017-11-20 DIAGNOSIS — E78.5 DYSLIPIDEMIA: Chronic | ICD-10-CM

## 2017-11-20 DIAGNOSIS — I25.10 CORONARY ARTERY DISEASE INVOLVING NATIVE HEART WITHOUT ANGINA PECTORIS, UNSPECIFIED VESSEL OR LESION TYPE: Chronic | ICD-10-CM

## 2017-11-20 LAB — EKG IMPRESSION: NORMAL

## 2017-11-20 PROCEDURE — 99214 OFFICE O/P EST MOD 30 MIN: CPT | Performed by: INTERNAL MEDICINE

## 2017-11-20 PROCEDURE — 93000 ELECTROCARDIOGRAM COMPLETE: CPT | Performed by: INTERNAL MEDICINE

## 2017-11-20 ASSESSMENT — ENCOUNTER SYMPTOMS
LOSS OF CONSCIOUSNESS: 0
HEADACHES: 0
MYALGIAS: 0
CLAUDICATION: 0
PALPITATIONS: 0
COUGH: 1
PND: 0
DIZZINESS: 0
HEARTBURN: 0

## 2017-11-20 NOTE — PROGRESS NOTES
Subjective:   Sharad Millan is a 61 y.o. male who presents today for a followup evaluation of aortic stenosis, CAD, palpitations, PSVT, hypertension and hyperlipidemiaAnd to review the results of his recent echocardiogram..    Last seen on 10/13/2017.    Since 10/13/2017 appointment the patient's developed a upper respiratory infection being treated conservatively.  At the time of his last appointment and echocardiogram was done now shows severe aortic stenosis.  The patient has no new cardiac symptoms referenced to his aortic stenosis.  He reports 2 weeks ago while eating at a restaurant he had a left upper chest pain that lasted for one hour, resolved spontaneously and has not recurred.    Since 8/31/2016 appointment the patient's had no cardiac symptoms.  Had two episodes of palpitations responsive to vagal maneuver.  No lightheadedness or dizziness.  No angina pectoralis.  No shortness of breath.    Since his October, 2015 hospitalization the patient has had almost constant sharp left localized anterior sharp stabbing chest pain radiating to his back.  Not related to exertion.  No shortness of breath or angina pectoris.  No palpitations or syncope.    Between 10/21/2015-10/27/2015 hospitalized at [facility].  Gallstone pancreatitis.  Laparoscopic cholecystectomy.  Echocardiogram showed moderate aortic stenosis.    On 12/3/2013 [facility] ER.  SVT.  Converted with a adenosine.  Toprol increased 100 mg daily.    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  He developed chest pain.  He took a customers sublingual nitroglycerin with relief.  He went to [facility] ER.  EKG showed no changes.  Patient left after waiting an hour and a half to be seen by the doctor.          Past Medical History:   asdfasdfads Date   • Aortic stenosis 10/20/2011   • Arrhythmia    • Carotid bruit 12/9/2009   • Chronic right shoulder pain    • Diabetes    • Fatty liver 1/17/2011   • Heart attack     • History of cardiac catheterization 7/13/2009   • History of echocardiogram 10/20/2011   • HTN (hypertension) 10/12/2012   • Hypertension    • Memory loss 10/20/2011   • Murmur 7/7/2009   • Obesity 8/23/2011   • Palpitations 10/20/2011   • PSVT (paroxysmal supraventricular tachycardia) (CMS-HCC) 2/14/2012   • S/P coronary artery stent placement 1996    coronary stent implantation x3, [facility]   • S/P coronary artery stent placement 1998    coronary stent implantation; LAD   • S/P coronary artery stent placement 2003    cardiac catheterization; patent stents; [place]   • Sciatica 8/23/2011   • Uncontrolled diabetes mellitus (CMS-HCC) 11/29/2010     Past Surgical History:   Procedure Laterality Date   • CAROTID STENT ANGIOGRAPHY  1996   • CATARACT EXTRACTION WITH IOL     • PO BY LAPAROSCOPY N/A 10/25/2015    Procedure: PO BY LAPAROSCOPY-with grams ;  Surgeon: Ronnie Bowman M.D.;  Location: SURGERY [facility];  Service:      Family History   Problem Relation Age of Onset   • Heart Disease Mother    • Heart Disease Father      History   Smoking Status   • Never Smoker   Smokeless Tobacco   • Never Used     Allergies   Allergen Reactions   • Bydureon [Exenatide] Hives     hives   • Cycloset [Bromocriptine Mesylate] Unspecified     Pt states he can't remember what happens to him.   • Morphine Vomiting     Outpatient Encounter Prescriptions as of 11/20/2017   Medication Sig Dispense Refill   • ondansetron (ZOFRAN) 4 MG Tab tablet Take 1 Tab by mouth every four hours as needed for Nausea/Vomiting. 10 Tab 1   • glipiZIDE SR (GLIPIZIDE XL) 10 MG TABLET SR 24 HR Take 1 Tab by mouth every morning. 30 Tab 11   • losartan (COZAAR) 100 MG Tab Take 1 Tab by mouth every day. 30 Tab 11   • metformin (GLUCOPHAGE) 1000 MG tablet TAKE 1 TABLET BY MOUTH 2 TIMES A DAY. 60 Tab 11   • metoprolol SR (TOPROL XL) 50 MG TABLET SR 24 HR TAKE 1 TABLET BY MOUTH 2 TIMES A DAY (Patient taking differently: Take 50 mg  "by mouth 2 Times a Day.) 60 Tab 11   • rosuvastatin (CRESTOR) 10 MG Tab TAKE 1 TABLET BY MOUTH EVERY EVENING 30 Tab 11   • glucose blood strip 1 Strip by Other route 2 Times a Day. 60 Strip 11   • ACCU-CHEK MULTICLIX LANCETS Misc 1 Each by Does not apply route 2 Times a Day. 180 Each 12   • Coenzyme Q10 (CO Q 10 PO) Take 1 Tab by mouth every day.     • aspirin (ASA) 325 MG TABS Take 325 mg by mouth every 48 hours. 1 tab po every other day     • Cholecalciferol (VITAMIN D3) 5000 UNITS CAPS Take 1 Each by mouth every day.     • Cyanocobalamin (VITAMIN B-12) 5000 MCG SUBL Place 1 Tab under tongue every day.       No facility-administered encounter medications on file as of 11/20/2017.      Review of Systems   Respiratory: Positive for cough.    Cardiovascular: Negative for chest pain, palpitations, claudication, leg swelling and PND.   Gastrointestinal: Negative for heartburn.   Musculoskeletal: Negative for myalgias.   Neurological: Negative for dizziness, loss of consciousness and headaches.        Objective:   /80   Pulse 72   Resp 14   Ht 1.778 m (5' 10\")   Wt 101.2 kg (223 lb)   SpO2 98%   BMI 32.00 kg/m²     Physical Exam   Constitutional: He is oriented to person, place, and time. No distress.   HENT:   Head: Normocephalic.   Neck: No JVD present.   Normal jugular venous pressure.   Cardiovascular: Normal rate, regular rhythm, S1 normal and S2 normal.  Exam reveals no gallop and no friction rub.    Murmur heard.   Systolic murmur is present with a grade of 3/6   Pulses:       Carotid pulses are 2+ on the right side with bruit, and 2+ on the left side with bruit.       Radial pulses are 2+ on the right side, and 2+ on the left side.   A2 component present.   Pulmonary/Chest: Effort normal and breath sounds normal. He has no wheezes. He has no rhonchi. He has no rales.   Distinct palpable sternal left-sided chest pain.   Abdominal: He exhibits no distension and no mass.   Obese.   Musculoskeletal: He " exhibits no edema.   Neurological: He is alert and oriented to person, place, and time.   Skin: Skin is warm and dry.   Psychiatric: He has a normal mood and affect. His behavior is normal.       Assessment:     1. Precordial pain  EKG    NM-CARDIAC STRESS TEST   2. Aortic valve stenosis, etiology of cardiac valve disease unspecified     3. Coronary artery disease involving native heart without angina pectoris, unspecified vessel or lesion type  NM-CARDIAC STRESS TEST   4. Essential hypertension     5. Dyslipidemia       08/08/2012 Echo: EF 55-60%. Moderate aortic stenosis. Peak aortic valve gradient 63 mmHg. Moderate LVH.   07/25/2013 Echo: EF 70-75%. Moderate aortic stenosis. Peak aortic valve gradient 59 mmHg. Moderate LVH.  10/23/2015 ECHOCARDIOGRAM  Normal left ventricular systolic function.  Left ventricular ejection fraction is visually estimated to be 60%.  Grade I diastolic dysfunction.  Mild mitral regurgitation.  Moderate aortic stenosis.  Vmax 3.47 m/s. Transvalvular gradients are - Peak: 48 mmHg, Mean: 30   mmHg.  Mild pulmonic insufficiency.    08/14/2012 Lab: Cholesterol 109. Triglycerides 121. HDL 36. LDL 49.  03/28/2013 Lab: Cholesterol 98. Triglycerides 110. HDL 30. LDL 46.    09/27/2013 EKG: Normal sinus rhythm. No acute changes.  11/20/2017 EKG: Normal sinus rhythm, rate 73. Minor lateral ST-T wave abnormalities. 1st degree AV block. Reviewed by myself.     Medical Decision Making:  Today's Assessment / Status / Plan:     Aortic stenosis. Severe. Asymptomatic.    Chest pain.    Hyperlipidemia. Continue current therapy. Check lipid panel.    Hypertension. Controlled. Continue current therapy. CMP pending.    CAD. Clinically stable. Continue current therapy.    Coronary stent implantation: 1996. 1998.    Bilateral carotid bruits.    Obesity. Counseled for weight loss.    SVT. 12/3/2013. One episode of palpitations.    Situational stress syndrome.     URI.    Recommendation  I had a lengthy and  detailed discussion with patient which I reviewed the current status of his aortic stenosis in addition to concerned about his chest pain symptoms.  We'll proceed with a pharmacologic stress test.  We'll wait for the patient to recover from his respiratory infection.  We'll continue to monitor him clinically with an early follow-up.

## 2017-11-22 ENCOUNTER — HOSPITAL ENCOUNTER (OUTPATIENT)
Dept: LAB | Facility: MEDICAL CENTER | Age: 62
End: 2017-11-22
Attending: INTERNAL MEDICINE
Payer: COMMERCIAL

## 2017-11-22 DIAGNOSIS — E11.8 TYPE 2 DIABETES MELLITUS WITH COMPLICATION, WITHOUT LONG-TERM CURRENT USE OF INSULIN (HCC): Chronic | ICD-10-CM

## 2017-11-22 LAB
ALBUMIN SERPL BCP-MCNC: 4.1 G/DL (ref 3.2–4.9)
ALBUMIN/GLOB SERPL: 1.4 G/DL
ALP SERPL-CCNC: 50 U/L (ref 30–99)
ALT SERPL-CCNC: 27 U/L (ref 2–50)
ANION GAP SERPL CALC-SCNC: 9 MMOL/L (ref 0–11.9)
AST SERPL-CCNC: 23 U/L (ref 12–45)
BILIRUB SERPL-MCNC: 0.6 MG/DL (ref 0.1–1.5)
BUN SERPL-MCNC: 19 MG/DL (ref 8–22)
CALCIUM SERPL-MCNC: 9.8 MG/DL (ref 8.5–10.5)
CHLORIDE SERPL-SCNC: 96 MMOL/L (ref 96–112)
CHOLEST SERPL-MCNC: 118 MG/DL (ref 100–199)
CO2 SERPL-SCNC: 27 MMOL/L (ref 20–33)
CREAT SERPL-MCNC: 0.86 MG/DL (ref 0.5–1.4)
CREAT UR-MCNC: 88.2 MG/DL
EST. AVERAGE GLUCOSE BLD GHB EST-MCNC: 263 MG/DL
GFR SERPL CREATININE-BSD FRML MDRD: >60 ML/MIN/1.73 M 2
GLOBULIN SER CALC-MCNC: 2.9 G/DL (ref 1.9–3.5)
GLUCOSE SERPL-MCNC: 262 MG/DL (ref 65–99)
HBA1C MFR BLD: 10.8 % (ref 0–5.6)
HDLC SERPL-MCNC: 34 MG/DL
LDLC SERPL CALC-MCNC: 42 MG/DL
MICROALBUMIN UR-MCNC: 17.5 MG/DL
MICROALBUMIN/CREAT UR: 198 MG/G (ref 0–30)
POTASSIUM SERPL-SCNC: 4.4 MMOL/L (ref 3.6–5.5)
PROT SERPL-MCNC: 7 G/DL (ref 6–8.2)
SODIUM SERPL-SCNC: 132 MMOL/L (ref 135–145)
TRIGL SERPL-MCNC: 210 MG/DL (ref 0–149)

## 2017-11-22 PROCEDURE — 82570 ASSAY OF URINE CREATININE: CPT

## 2017-11-22 PROCEDURE — 80061 LIPID PANEL: CPT

## 2017-11-22 PROCEDURE — 36415 COLL VENOUS BLD VENIPUNCTURE: CPT

## 2017-11-22 PROCEDURE — 83036 HEMOGLOBIN GLYCOSYLATED A1C: CPT

## 2017-11-22 PROCEDURE — 82043 UR ALBUMIN QUANTITATIVE: CPT

## 2017-11-22 PROCEDURE — 80053 COMPREHEN METABOLIC PANEL: CPT

## 2017-11-28 ENCOUNTER — OFFICE VISIT (OUTPATIENT)
Dept: MEDICAL GROUP | Facility: MEDICAL CENTER | Age: 62
End: 2017-11-28
Payer: COMMERCIAL

## 2017-11-28 ENCOUNTER — TELEPHONE (OUTPATIENT)
Dept: CARDIOLOGY | Facility: MEDICAL CENTER | Age: 62
End: 2017-11-28

## 2017-11-28 VITALS
DIASTOLIC BLOOD PRESSURE: 64 MMHG | RESPIRATION RATE: 16 BRPM | HEART RATE: 79 BPM | SYSTOLIC BLOOD PRESSURE: 114 MMHG | WEIGHT: 223.6 LBS | OXYGEN SATURATION: 97 % | HEIGHT: 70 IN | BODY MASS INDEX: 32.01 KG/M2 | TEMPERATURE: 97.8 F

## 2017-11-28 DIAGNOSIS — E78.5 DYSLIPIDEMIA: Chronic | ICD-10-CM

## 2017-11-28 DIAGNOSIS — E11.9 TYPE 2 DIABETES MELLITUS WITHOUT COMPLICATION, WITHOUT LONG-TERM CURRENT USE OF INSULIN (HCC): Chronic | ICD-10-CM

## 2017-11-28 DIAGNOSIS — J01.10 ACUTE NON-RECURRENT FRONTAL SINUSITIS: ICD-10-CM

## 2017-11-28 PROCEDURE — 99214 OFFICE O/P EST MOD 30 MIN: CPT | Performed by: INTERNAL MEDICINE

## 2017-11-28 RX ORDER — AZITHROMYCIN 250 MG/1
250 TABLET, FILM COATED ORAL DAILY
Qty: 6 TAB | Refills: 0 | Status: SHIPPED | OUTPATIENT
Start: 2017-11-28 | End: 2017-12-03

## 2017-11-28 RX ORDER — BENZONATATE 100 MG/1
100 CAPSULE ORAL 3 TIMES DAILY PRN
Qty: 30 CAP | Refills: 0 | Status: SHIPPED | OUTPATIENT
Start: 2017-11-28 | End: 2018-03-06

## 2017-11-28 RX ORDER — ALBUTEROL SULFATE 90 UG/1
2 AEROSOL, METERED RESPIRATORY (INHALATION) EVERY 6 HOURS PRN
Qty: 8.5 G | Refills: 6 | Status: SHIPPED | OUTPATIENT
Start: 2017-11-28 | End: 2019-02-15

## 2017-11-28 NOTE — TELEPHONE ENCOUNTER
Was the patient seen in the last year in this department? Yes     Does patient have an active prescription for medications requested? Yes     Received Request Via: Patient     Patient called and is requesting a 90 day supply now.

## 2017-11-28 NOTE — PROGRESS NOTES
CC: Follow-up multiple issues    HPI:   Sharad presents today with the following.    1. Type 2 diabetes mellitus without complication, without long-term current use of insulin (CMS-HCC)  Presents after blood work showing A1c of 10.8 reports compliance to medications. He is very has been to do anything injectable. He is willing to take additional oral medications.    2. Dyslipidemia  Maintain on statin without myalgias LDL controlled 42. He is recently diagnosed with aortic stenosis followed by cardiology with stress test pending.    3. Acute non-recurrent frontal sinusitis  He's had sinus pain and tenderness as well as cough the past 4 weeks without significant improvement. No further fevers but has no more tenderness in the sinuses.      Patient Active Problem List    Diagnosis Date Noted   • Precordial pain 11/20/2017   • History of multiple concussions 08/07/2017   • Obesity (BMI 30-39.9) 08/07/2017   • Chronic hyponatremia 08/07/2017   • S/P coronary artery stent placement 04/29/2016   • SVT (supraventricular tachycardia) (CMS-HCC) 10/24/2015   • DM2 (diabetes mellitus, type 2) (CMS-HCC) 12/24/2014   • Vitamin D deficiency disease 03/14/2014   • HDL deficiency 07/26/2013   • Vitamin B12 deficiency 12/06/2012   • HTN (hypertension) 10/12/2012   • Aortic stenosis 10/20/2011   • Memory loss 10/20/2011   • Palpitations 10/20/2011   • Obesity 08/23/2011   • Sciatica 08/23/2011   • Fatty liver 01/17/2011   • Carotid bruit 12/09/2009   • History of cardiac catheterization 07/13/2009   • Murmur 07/07/2009   • CAD (coronary artery disease) 06/05/2009   • Dyslipidemia 06/05/2009       Current Outpatient Prescriptions   Medication Sig Dispense Refill   • azithromycin (ZITHROMAX Z-MARTA) 250 MG Tab Take 1 Tab by mouth every day for 5 days. Take 2 tabs on day 1 6 Tab 0   • benzonatate (TESSALON) 100 MG Cap Take 1 Cap by mouth 3 times a day as needed for Cough. 30 Cap 0   • albuterol 108 (90 Base) MCG/ACT Aero Soln inhalation  "aerosol Inhale 2 Puffs by mouth every 6 hours as needed for Shortness of Breath. 8.5 g 6   • Empagliflozin 25 MG Tab Take 25 mg by mouth every day. 30 Tab 6   • ondansetron (ZOFRAN) 4 MG Tab tablet Take 1 Tab by mouth every four hours as needed for Nausea/Vomiting. 10 Tab 1   • glipiZIDE SR (GLIPIZIDE XL) 10 MG TABLET SR 24 HR Take 1 Tab by mouth every morning. 30 Tab 11   • losartan (COZAAR) 100 MG Tab Take 1 Tab by mouth every day. 30 Tab 11   • metformin (GLUCOPHAGE) 1000 MG tablet TAKE 1 TABLET BY MOUTH 2 TIMES A DAY. 60 Tab 11   • metoprolol SR (TOPROL XL) 50 MG TABLET SR 24 HR TAKE 1 TABLET BY MOUTH 2 TIMES A DAY (Patient taking differently: Take 50 mg by mouth 2 Times a Day.) 60 Tab 11   • rosuvastatin (CRESTOR) 10 MG Tab TAKE 1 TABLET BY MOUTH EVERY EVENING 30 Tab 11   • glucose blood strip 1 Strip by Other route 2 Times a Day. 60 Strip 11   • ACCU-CHEK MULTICLIX LANCETS Misc 1 Each by Does not apply route 2 Times a Day. 180 Each 12   • Coenzyme Q10 (CO Q 10 PO) Take 1 Tab by mouth every day.     • aspirin (ASA) 325 MG TABS Take 325 mg by mouth every 48 hours. 1 tab po every other day     • Cholecalciferol (VITAMIN D3) 5000 UNITS CAPS Take 1 Each by mouth every day.     • Cyanocobalamin (VITAMIN B-12) 5000 MCG SUBL Place 1 Tab under tongue every day.       No current facility-administered medications for this visit.          Allergies as of 11/28/2017 - Reviewed 11/28/2017   Allergen Reaction Noted   • Bydureon [exenatide] Hives 10/12/2012   • Cycloset [bromocriptine mesylate] Unspecified 06/21/2012   • Morphine Vomiting 10/21/2015        ROS: As per HPI.    /64   Pulse 79   Temp 36.6 °C (97.8 °F)   Resp 16   Ht 1.778 m (5' 10\")   Wt 101.4 kg (223 lb 9.6 oz)   SpO2 97%   BMI 32.08 kg/m²     Physical Exam:  Gen:         Alert and oriented, No apparent distress.  Heent:       TMs clear bilaterally, oropharynx without erythema or exudates pain with palpation over maxillary sinus  Neck:        No " Lymphadenopathy or Bruits.  Lungs:     Clear to auscultation bilaterally  CV:          Regular rate and rhythm. No murmurs, rubs or gallops.               Ext:          No clubbing, cyanosis, edema.      Assessment and Plan.   61 y.o. male with the following issues.    1. Type 2 diabetes mellitus without complication, without long-term current use of insulin (CMS-[facility])  Started on medication below cautioned about side effects will see back in 3 months.  - Empagliflozin 25 MG Tab; Take 25 mg by mouth every day.  Dispense: 30 Tab; Refill: 6    2. Dyslipidemia  Lipids currently well controlled. Discussed continued diet and exercise recheck 6 months to 1 year.    3. Acute non-recurrent maxillary sinusitis  We'll treat for possible sinus infection and given medications below. Cough still present and therefore given an inhaler.  - azithromycin (ZITHROMAX Z-MARTA) 250 MG Tab; Take 1 Tab by mouth every day for 5 days. Take 2 tabs on day 1  Dispense: 6 Tab; Refill: 0  - benzonatate (TESSALON) 100 MG Cap; Take 1 Cap by mouth 3 times a day as needed for Cough.  Dispense: 30 Cap; Refill: 0  - albuterol 108 (90 Base) MCG/ACT Aero Soln inhalation aerosol; Inhale 2 Puffs by mouth every 6 hours as needed for Shortness of Breath.  Dispense: 8.5 g; Refill: 6

## 2017-11-28 NOTE — TELEPHONE ENCOUNTER
questions about 12/01 echo appt   Received: Today   Message Contents   Lore Currie R.N.             GOLD/Clover       Patient is having stress echo done on 12/01, he wants to discuss the appt with you, and go over what, if any meds he needs to hold prior to the test. He can be reached on his cell at [phone], he will be unavailable from 9:30-10:30 am.      Returned patient call. Pt states SW told him to call in about a few things. First being his current cold. Pt states he is still wrestling with some upper head congestion and phlegm. Pt wants to know if the test should be postponed. Pt encouraged to do so. Discussed rationale with patient and if it is a routine exam and not urgent or emergent. Re-scheduling until he is past present illness is ideal.  Pt given scheduling ph # to re-schedule exam.

## 2017-12-01 ENCOUNTER — APPOINTMENT (OUTPATIENT)
Dept: RADIOLOGY | Facility: MEDICAL CENTER | Age: 62
End: 2017-12-01
Attending: INTERNAL MEDICINE
Payer: COMMERCIAL

## 2017-12-04 ENCOUNTER — TELEPHONE (OUTPATIENT)
Dept: MEDICAL GROUP | Facility: MEDICAL CENTER | Age: 62
End: 2017-12-04

## 2017-12-04 NOTE — TELEPHONE ENCOUNTER
DOCUMENTATION OF PAR STATUS:    1. Name of Medication & Dose: Jardiance 25mg     2. Name of Prescription Coverage Company & phone #: [place] [facility]    3. Date Prior Auth Submitted: 12/04/2017    4. What information was given to obtain insurance decision? [email or web address],  Last office note    5. Prior Auth Status? Pending    6. Patient Notified: N\A

## 2017-12-07 ENCOUNTER — OFFICE VISIT (OUTPATIENT)
Dept: MEDICAL GROUP | Facility: MEDICAL CENTER | Age: 62
End: 2017-12-07
Payer: COMMERCIAL

## 2017-12-07 VITALS
HEART RATE: 94 BPM | TEMPERATURE: 98.7 F | WEIGHT: 222 LBS | DIASTOLIC BLOOD PRESSURE: 70 MMHG | SYSTOLIC BLOOD PRESSURE: 132 MMHG | OXYGEN SATURATION: 97 % | BODY MASS INDEX: 31.78 KG/M2 | RESPIRATION RATE: 16 BRPM | HEIGHT: 70 IN

## 2017-12-07 DIAGNOSIS — M54.41 LOW BACK PAIN WITH RIGHT-SIDED SCIATICA, UNSPECIFIED BACK PAIN LATERALITY, UNSPECIFIED CHRONICITY: ICD-10-CM

## 2017-12-07 PROCEDURE — 99214 OFFICE O/P EST MOD 30 MIN: CPT | Performed by: FAMILY MEDICINE

## 2017-12-07 RX ORDER — TRAMADOL HYDROCHLORIDE 50 MG/1
50 TABLET ORAL EVERY 4 HOURS PRN
Qty: 15 TAB | Refills: 0 | Status: SHIPPED | OUTPATIENT
Start: 2017-12-07 | End: 2018-04-17

## 2017-12-07 NOTE — PROGRESS NOTES
CC: Lower back pain    HPI:   Sharad presents today for lower back pain, radiates to the left buttock . Patient stated that, usually he gets this pain intermittently. He usually goes to his Chiropractor in [place] to take care of it, and has been working for that all this years, but this time it did, he saw his chiropractor few days ago, was unable to fix his pain, has been trying the Bio tens ( Electric shots), has not been working. OTC pain medication has not been working. Tried an old left over oxycodone, and that helped him, and he was able to sleep last night. Denies any recent trauma, has been having normal urinary and bowel control, denies leg weakness.      Patient Active Problem List    Diagnosis Date Noted   • Precordial pain 11/20/2017   • History of multiple concussions 08/07/2017   • Obesity (BMI 30-39.9) 08/07/2017   • Chronic hyponatremia 08/07/2017   • S/P coronary artery stent placement 04/29/2016   • SVT (supraventricular tachycardia) (CMS-HCC) 10/24/2015   • DM2 (diabetes mellitus, type 2) (CMS-HCC) 12/24/2014   • Vitamin D deficiency disease 03/14/2014   • HDL deficiency 07/26/2013   • Vitamin B12 deficiency 12/06/2012   • HTN (hypertension) 10/12/2012   • Aortic stenosis 10/20/2011   • Memory loss 10/20/2011   • Palpitations 10/20/2011   • Obesity 08/23/2011   • Sciatica 08/23/2011   • Fatty liver 01/17/2011   • Carotid bruit 12/09/2009   • History of cardiac catheterization 07/13/2009   • Murmur 07/07/2009   • CAD (coronary artery disease) 06/05/2009   • Dyslipidemia 06/05/2009       Current Outpatient Prescriptions   Medication Sig Dispense Refill   • tramadol (ULTRAM) 50 MG Tab Take 1 Tab by mouth every four hours as needed. 15 Tab 0   • albuterol 108 (90 Base) MCG/ACT Aero Soln inhalation aerosol Inhale 2 Puffs by mouth every 6 hours as needed for Shortness of Breath. 8.5 g 6   • Empagliflozin 25 MG Tab Take 25 mg by mouth every day. 90 Tab 3   • ondansetron (ZOFRAN) 4 MG Tab tablet Take 1  "Tab by mouth every four hours as needed for Nausea/Vomiting. 10 Tab 1   • glipiZIDE SR (GLIPIZIDE XL) 10 MG TABLET SR 24 HR Take 1 Tab by mouth every morning. 30 Tab 11   • losartan (COZAAR) 100 MG Tab Take 1 Tab by mouth every day. 30 Tab 11   • metformin (GLUCOPHAGE) 1000 MG tablet TAKE 1 TABLET BY MOUTH 2 TIMES A DAY. 60 Tab 11   • metoprolol SR (TOPROL XL) 50 MG TABLET SR 24 HR TAKE 1 TABLET BY MOUTH 2 TIMES A DAY (Patient taking differently: Take 50 mg by mouth 2 Times a Day.) 60 Tab 11   • rosuvastatin (CRESTOR) 10 MG Tab TAKE 1 TABLET BY MOUTH EVERY EVENING 30 Tab 11   • glucose blood strip 1 Strip by Other route 2 Times a Day. 60 Strip 11   • ACCU-CHEK MULTICLIX LANCETS Misc 1 Each by Does not apply route 2 Times a Day. 180 Each 12   • Coenzyme Q10 (CO Q 10 PO) Take 1 Tab by mouth every day.     • aspirin (ASA) 325 MG TABS Take 325 mg by mouth every 48 hours. 1 tab po every other day     • Cholecalciferol (VITAMIN D3) 5000 UNITS CAPS Take 1 Each by mouth every day.     • Cyanocobalamin (VITAMIN B-12) 5000 MCG SUBL Place 1 Tab under tongue every day.     • benzonatate (TESSALON) 100 MG Cap Take 1 Cap by mouth 3 times a day as needed for Cough. 30 Cap 0     No current facility-administered medications for this visit.          Allergies as of 12/07/2017 - Reviewed 12/07/2017   Allergen Reaction Noted   • Bydureon [exenatide] Hives 10/12/2012   • Cycloset [bromocriptine mesylate] Unspecified 06/21/2012   • Morphine Vomiting 10/21/2015        ROS: Denies any chest pain, Shortness of breath, Changes bowel or bladder, Lower extremity edema.    Physical Exam:  /70   Pulse 94   Temp 37.1 °C (98.7 °F)   Resp 16   Ht 1.778 m (5' 10\")   Wt 100.7 kg (222 lb)   SpO2 97%   BMI 31.85 kg/m²   Gen.: Well-developed, well-nourished, no apparent distress,pleasant and cooperative with the examination  Back: Unable to do side to side movements or leaning forward.S  Extremities: normal strength, and " reflexes    Assessment and Plan.   61 y.o. male     1. Low back pain with right-sided sciatica, unspecified back pain laterality, unspecified chronicity  ?Sciatica.  Has had x ray recently ( performed by his chiropractor) was normal, refused lumbar MRI.  Will give tramadol, if the pain contiues, work up with MRI, and a referral to neurosurgery might be indicated.    - tramadol (ULTRAM) 50 MG Tab; Take 1 Tab by mouth every four hours as needed.  Dispense: 15 Tab; Refill: 0

## 2017-12-11 ENCOUNTER — APPOINTMENT (OUTPATIENT)
Dept: RADIOLOGY | Facility: MEDICAL CENTER | Age: 62
End: 2017-12-11
Attending: INTERNAL MEDICINE
Payer: COMMERCIAL

## 2017-12-14 ENCOUNTER — TELEPHONE (OUTPATIENT)
Dept: CARDIOLOGY | Facility: MEDICAL CENTER | Age: 62
End: 2017-12-14

## 2017-12-14 NOTE — TELEPHONE ENCOUNTER
----- Message from Lore Dias sent at 12/14/2017  1:44 PM PST -----  Regarding: Patient calling about cardiac stress test  GOLD/Jeremiah      Patient calling about cardiac stress test he's scheduled for on 12/19. He says he's in a lot of pain for his sciatic pain and doesn't think he can lie down to take the test at this time. He wants to speak to you before he cancels the appt. He can be reached at [phone].

## 2017-12-14 NOTE — TELEPHONE ENCOUNTER
S/w pt. He went into detail regarding the nerve pain in his back and that his PCP has not given him medication. Explained to the pt that he would not need to lie flat for more than a few minutes for the images but for most part can sit in whatever position is comfortable to him.     He verbally  agrees to proceed with the testing.     ----------------------------------------------------------------------  ----- Message from Lore Dias sent at 12/14/2017  1:44 PM PST -----  Regarding: Patient calling about cardiac stress test  GOLD/Jeremiah      Patient calling about cardiac stress test he's scheduled for on 12/19. He says he's in a lot of pain for his sciatic pain and doesn't think he can lie down to take the test at this time. He wants to speak to you before he cancels the appt. He can be reached at [phone].

## 2017-12-19 ENCOUNTER — HOSPITAL ENCOUNTER (OUTPATIENT)
Dept: RADIOLOGY | Facility: MEDICAL CENTER | Age: 62
End: 2017-12-19
Attending: INTERNAL MEDICINE
Payer: COMMERCIAL

## 2017-12-19 DIAGNOSIS — I25.10 CORONARY ARTERY DISEASE INVOLVING NATIVE HEART WITHOUT ANGINA PECTORIS, UNSPECIFIED VESSEL OR LESION TYPE: Chronic | ICD-10-CM

## 2017-12-19 DIAGNOSIS — R07.2 PRECORDIAL PAIN: ICD-10-CM

## 2017-12-19 PROCEDURE — 78452 HT MUSCLE IMAGE SPECT MULT: CPT

## 2017-12-19 PROCEDURE — 700111 HCHG RX REV CODE 636 W/ 250 OVERRIDE (IP)

## 2017-12-19 RX ORDER — REGADENOSON 0.08 MG/ML
INJECTION, SOLUTION INTRAVENOUS
Status: COMPLETED
Start: 2017-12-19 | End: 2017-12-19

## 2017-12-19 RX ADMIN — REGADENOSON 0.4 MG: 0.08 INJECTION, SOLUTION INTRAVENOUS at 10:48

## 2017-12-20 ENCOUNTER — TELEPHONE (OUTPATIENT)
Dept: CARDIOLOGY | Facility: MEDICAL CENTER | Age: 62
End: 2017-12-20

## 2017-12-20 NOTE — TELEPHONE ENCOUNTER
"Pt explains in detail how his nerve pain substantially improved after the stress test and wants to know if Dr. Saldaña has any idea of why or how the MPI  helped his untreatable nerve pain.  Also went over MPI results with pt.      To SW: any ideas? Also, any concern with the \"transient ischemic dilatation\"  found on the MPI?    ----------------------------------------------------------------------  ----- Message from Dorina Carter sent at 12/20/2017  8:02 AM PST -----  Regarding: pain disappeared after chem stress test!!  Contact: [phone]  GOLD/parth    Pt calling to report the excruciating pain in right sciatic nerve area has completely disappeared after doing chemical stress test yesterday.      This is very good news, but worried there may be a bigger problem at hand i.e. like a blockage has moved to a different area.      Please call Sharad on cell# [phone] to discuss    "

## 2017-12-22 NOTE — TELEPHONE ENCOUNTER
Discussed with Dr. Chavez. He has no idea why the stress test would have improved his nerve pain and is not concerned about the MPI results. Per Dr. Chavez, pt should watch for any increase in SOB or chest pain and notify us.       S/w pt and notified. Pt states he wants to have a more in-depth discussion with the doctor about his aortic valve issue and associated nerve pain. Offered pt a sooner appt. He agreed and appreciated. Appt arranged for 1/15. Pt confirmed.

## 2017-12-23 ENCOUNTER — HOSPITAL ENCOUNTER (OUTPATIENT)
Dept: RADIOLOGY | Facility: MEDICAL CENTER | Age: 62
End: 2017-12-23
Attending: NURSE PRACTITIONER
Payer: COMMERCIAL

## 2017-12-23 ENCOUNTER — OFFICE VISIT (OUTPATIENT)
Dept: URGENT CARE | Facility: PHYSICIAN GROUP | Age: 62
End: 2017-12-23
Payer: COMMERCIAL

## 2017-12-23 VITALS
WEIGHT: 222 LBS | HEART RATE: 98 BPM | TEMPERATURE: 97.5 F | OXYGEN SATURATION: 96 % | DIASTOLIC BLOOD PRESSURE: 84 MMHG | HEIGHT: 70 IN | SYSTOLIC BLOOD PRESSURE: 158 MMHG | BODY MASS INDEX: 31.78 KG/M2

## 2017-12-23 DIAGNOSIS — S80.11XA CONTUSION OF RIGHT LOWER LEG, INITIAL ENCOUNTER: ICD-10-CM

## 2017-12-23 DIAGNOSIS — M79.661 PAIN IN RIGHT LOWER LEG: ICD-10-CM

## 2017-12-23 DIAGNOSIS — M54.31 SCIATICA OF RIGHT SIDE: ICD-10-CM

## 2017-12-23 PROCEDURE — 73590 X-RAY EXAM OF LOWER LEG: CPT | Mod: RT

## 2017-12-23 PROCEDURE — 99214 OFFICE O/P EST MOD 30 MIN: CPT | Performed by: NURSE PRACTITIONER

## 2017-12-23 RX ORDER — KETOROLAC TROMETHAMINE 30 MG/ML
60 INJECTION, SOLUTION INTRAMUSCULAR; INTRAVENOUS ONCE
Status: COMPLETED | OUTPATIENT
Start: 2017-12-23 | End: 2017-12-23

## 2017-12-23 RX ORDER — HYDROCODONE BITARTRATE AND ACETAMINOPHEN 5; 325 MG/1; MG/1
1-2 TABLET ORAL EVERY 4 HOURS PRN
Qty: 20 TAB | Refills: 0 | Status: SHIPPED | OUTPATIENT
Start: 2017-12-23 | End: 2018-01-12

## 2017-12-23 RX ORDER — PREDNISONE 20 MG/1
TABLET ORAL
Qty: 10 TAB | Refills: 0 | Status: SHIPPED | OUTPATIENT
Start: 2017-12-23 | End: 2018-04-17

## 2017-12-23 RX ORDER — METHOCARBAMOL 750 MG/1
750 TABLET, FILM COATED ORAL 3 TIMES DAILY
Qty: 30 TAB | Refills: 0 | Status: SHIPPED | OUTPATIENT
Start: 2017-12-23 | End: 2018-10-02

## 2017-12-23 RX ADMIN — KETOROLAC TROMETHAMINE 60 MG: 30 INJECTION, SOLUTION INTRAMUSCULAR; INTRAVENOUS at 14:04

## 2017-12-23 ASSESSMENT — ENCOUNTER SYMPTOMS
FEVER: 0
BACK PAIN: 1
SENSORY CHANGE: 0
VOMITING: 0
ORTHOPNEA: 0
MYALGIAS: 1
NAUSEA: 0
WEAKNESS: 0
TINGLING: 0
COUGH: 0
SHORTNESS OF BREATH: 0
FOCAL WEAKNESS: 0
CHILLS: 0

## 2017-12-23 NOTE — PROGRESS NOTES
"Subjective:      Sharad Millan is a 62 y.o. male who presents with Leg Pain (x4wks R leg pain hip, shin pain)            HPI New problem. Mr. Millan is a 62 year old male with right sided sciatica for 4 weeks as well as right shin pain following trauma x 4 weeks. He initially had laceration to this area that has healed nicely. His pain is \"20/10\", right buttocks with radiation to right thigh, burning, stinging pain. Denies bowel or bladder issues, pelvic pain or abdominal pain. He has been taking tramadol, no improvement, icing and aleve. The aleve has provided the most relief. History of sciatica in past but not for this length of time.  Bydureon [exenatide]; Cycloset [bromocriptine mesylate]; and Morphine  Current Outpatient Prescriptions on File Prior to Visit   Medication Sig Dispense Refill   • tramadol (ULTRAM) 50 MG Tab Take 1 Tab by mouth every four hours as needed. 15 Tab 0   • benzonatate (TESSALON) 100 MG Cap Take 1 Cap by mouth 3 times a day as needed for Cough. 30 Cap 0   • albuterol 108 (90 Base) MCG/ACT Aero Soln inhalation aerosol Inhale 2 Puffs by mouth every 6 hours as needed for Shortness of Breath. 8.5 g 6   • Empagliflozin 25 MG Tab Take 25 mg by mouth every day. 90 Tab 3   • ondansetron (ZOFRAN) 4 MG Tab tablet Take 1 Tab by mouth every four hours as needed for Nausea/Vomiting. 10 Tab 1   • glipiZIDE SR (GLIPIZIDE XL) 10 MG TABLET SR 24 HR Take 1 Tab by mouth every morning. 30 Tab 11   • losartan (COZAAR) 100 MG Tab Take 1 Tab by mouth every day. 30 Tab 11   • metformin (GLUCOPHAGE) 1000 MG tablet TAKE 1 TABLET BY MOUTH 2 TIMES A DAY. 60 Tab 11   • metoprolol SR (TOPROL XL) 50 MG TABLET SR 24 HR TAKE 1 TABLET BY MOUTH 2 TIMES A DAY (Patient taking differently: Take 50 mg by mouth 2 Times a Day.) 60 Tab 11   • rosuvastatin (CRESTOR) 10 MG Tab TAKE 1 TABLET BY MOUTH EVERY EVENING 30 Tab 11   • glucose blood strip 1 Strip by Other route 2 Times a Day. 60 Strip 11   • ACCU-CHEK MULTICLIX " "LANCETS Misc 1 Each by Does not apply route 2 Times a Day. 180 Each 12   • Coenzyme Q10 (CO Q 10 PO) Take 1 Tab by mouth every day.     • aspirin (ASA) 325 MG TABS Take 325 mg by mouth every 48 hours. 1 tab po every other day     • Cholecalciferol (VITAMIN D3) 5000 UNITS CAPS Take 1 Each by mouth every day.     • Cyanocobalamin (VITAMIN B-12) 5000 MCG SUBL Place 1 Tab under tongue every day.       No current facility-administered medications on file prior to visit.      Social History     Social History   • Marital status:      Spouse name: N/A   • Number of children: N/A   • Years of education: N/A     Occupational History   • Not on file.     Social History Main Topics   • Smoking status: Never Smoker   • Smokeless tobacco: Never Used   • Alcohol use No   • Drug use: No   • Sexual activity: Yes     Partners: Female     Other Topics Concern   • Not on file     Social History Narrative   • No narrative on file     family history includes Heart Disease in his father and mother.      Review of Systems   Constitutional: Negative for chills and fever.   Respiratory: Negative for cough and shortness of breath.    Cardiovascular: Negative for chest pain and orthopnea.   Gastrointestinal: Negative for nausea and vomiting.   Genitourinary: Negative for dysuria.   Musculoskeletal: Positive for back pain and myalgias.   Neurological: Negative for tingling, sensory change, focal weakness and weakness.          Objective:     /84   Pulse 98   Temp 36.4 °C (97.5 °F)   Ht 1.778 m (5' 10\")   Wt 100.7 kg (222 lb)   SpO2 96%   BMI 31.85 kg/m²      Physical Exam   Constitutional: He appears well-developed and well-nourished. No distress.   Visibly uncomfortable.   Cardiovascular: Normal rate, regular rhythm and normal heart sounds.    No murmur heard.  Pulmonary/Chest: Effort normal and breath sounds normal.   Musculoskeletal:        Lumbar back: He exhibits decreased range of motion, tenderness, pain and spasm. He " exhibits no swelling.        Right lower leg: He exhibits tenderness and bony tenderness. He exhibits no swelling and no edema.   Neurological: He is alert. No sensory deficit. He exhibits normal muscle tone. Gait normal.               Assessment/Plan:     1. Sciatica of right side  REFERRAL TO PHYSIATRY (PMR)    ketorolac (TORADOL) injection 60 mg    predniSONE (DELTASONE) 20 MG Tab    methocarbamol (ROBAXIN) 750 MG Tab    hydrocodone-acetaminophen (NORCO) 5-325 MG Tab per tablet   2. Contusion of right lower leg, initial encounter     3. Pain in right lower leg  DX-TIBIA AND FIBULA RIGHT     X-ray negative for fracture  toradol here in clinic.  Referral to Dr Baldwin.  [place]/robaxin  Patient is cautioned on sedation potential of narcotic medication; no drinking, driving or operating heavy machinery while on this medication.  Start prednisone tomorrow.  Continue ice/heat.  [facility] reviewed, this patient demonstrates no concerning habits.

## 2018-01-15 ENCOUNTER — OFFICE VISIT (OUTPATIENT)
Dept: CARDIOLOGY | Facility: MEDICAL CENTER | Age: 63
End: 2018-01-15
Payer: COMMERCIAL

## 2018-01-15 VITALS
DIASTOLIC BLOOD PRESSURE: 82 MMHG | WEIGHT: 224 LBS | OXYGEN SATURATION: 96 % | SYSTOLIC BLOOD PRESSURE: 144 MMHG | HEIGHT: 70 IN | HEART RATE: 78 BPM | BODY MASS INDEX: 32.07 KG/M2

## 2018-01-15 DIAGNOSIS — E78.5 DYSLIPIDEMIA: Chronic | ICD-10-CM

## 2018-01-15 DIAGNOSIS — Z95.5 S/P CORONARY ARTERY STENT PLACEMENT: ICD-10-CM

## 2018-01-15 DIAGNOSIS — I35.0 AORTIC VALVE STENOSIS, ETIOLOGY OF CARDIAC VALVE DISEASE UNSPECIFIED: Chronic | ICD-10-CM

## 2018-01-15 DIAGNOSIS — I10 ESSENTIAL HYPERTENSION: ICD-10-CM

## 2018-01-15 DIAGNOSIS — R00.2 PALPITATIONS: Chronic | ICD-10-CM

## 2018-01-15 DIAGNOSIS — I47.10 SVT (SUPRAVENTRICULAR TACHYCARDIA): ICD-10-CM

## 2018-01-15 DIAGNOSIS — I25.10 CORONARY ARTERY DISEASE INVOLVING NATIVE CORONARY ARTERY OF NATIVE HEART WITHOUT ANGINA PECTORIS: Chronic | ICD-10-CM

## 2018-01-15 PROCEDURE — 99214 OFFICE O/P EST MOD 30 MIN: CPT | Performed by: INTERNAL MEDICINE

## 2018-01-15 ASSESSMENT — ENCOUNTER SYMPTOMS
CLAUDICATION: 0
LOSS OF CONSCIOUSNESS: 0
PALPITATIONS: 0
PND: 0
COUGH: 1
MYALGIAS: 0
HEARTBURN: 0
HEADACHES: 0
DIZZINESS: 0

## 2018-01-15 NOTE — PROGRESS NOTES
Subjective:   Sharad Millan is a 62 y.o. male who presents today for a followup evaluation of aortic stenosis, CAD, coronary stent, palpitations, PSVT, hypertension and hyperlipidemia.    Last seen on 11/20/2017.    Since 11/20/2017 appointment the patient has had no cardiac symptoms.  Continues to work full time mainly in [place].  Has developed severe left hip pain and has sought chiropractic and acupuncture therapy.  Was seen at urgent care and was given prednisone and anti-inflammatory and ultimately had some just transient relief but has come back and continues to be a problem.    Since 10/13/2017 appointment the patient's developed a upper respiratory infection being treated conservatively.  At the time of his last appointment and echocardiogram was done now shows severe aortic stenosis.  The patient has no new cardiac symptoms referenced to his aortic stenosis.  He reports 2 weeks ago while eating at a restaurant he had a left upper chest pain that lasted for one hour, resolved spontaneously and has not recurred.    Since 8/31/2016 appointment the patient's had no cardiac symptoms.  Had two episodes of palpitations responsive to vagal maneuver.  No lightheadedness or dizziness.  No angina pectoralis.  No shortness of breath.    Since his October, 2015 hospitalization the patient has had almost constant sharp left localized anterior sharp stabbing chest pain radiating to his back.  Not related to exertion.  No shortness of breath or angina pectoris.  No palpitations or syncope.    Between 10/21/2015-10/27/2015 hospitalized at [facility].  Gallstone pancreatitis.  Laparoscopic cholecystectomy.  Echocardiogram showed moderate aortic stenosis.    On 12/3/2013 [facility] ER.  SVT.  Converted with a adenosine.  Toprol increased 100 mg daily.    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  He developed chest pain.  He took a customers sublingual nitroglycerin with  relief.  He went to [facility] ER.  EKG showed no changes.  Patient left after waiting an hour and a half to be seen by the doctor.          Past Medical History:   asdfasdfads Date   • Aortic stenosis 10/20/2011   • Arrhythmia    • Carotid bruit 12/9/2009   • Chronic right shoulder pain    • Diabetes    • Fatty liver 1/17/2011   • Heart attack    • History of cardiac catheterization 7/13/2009   • History of echocardiogram 10/20/2011   • HTN (hypertension) 10/12/2012   • Hypertension    • Memory loss 10/20/2011   • Murmur 7/7/2009   • Obesity 8/23/2011   • Palpitations 10/20/2011   • PSVT (paroxysmal supraventricular tachycardia) (CMS-HCC) 2/14/2012   • S/P coronary artery stent placement 1996    coronary stent implantation , [facility]   • S/P coronary artery stent placement 1998    coronary stent implantation; [facility]   • S/P coronary artery stent placement 2003    cardiac catheterization; patent stents; [place]   • Sciatica 8/23/2011   • Uncontrolled diabetes mellitus (CMS-HCC) 11/29/2010     Past Surgical History:   Procedure Laterality Date   • CAROTID STENT ANGIOGRAPHY  1996   • CATARACT EXTRACTION WITH IOL     • PO BY LAPAROSCOPY N/A 10/25/2015    Procedure: PO BY LAPAROSCOPY-with grams ;  Surgeon: Ronnie Bowman M.D.;  Location: SURGERY [facility];  Service:      Family History   Problem Relation Age of Onset   • Heart Disease Mother    • Heart Disease Father      History   Smoking Status   • Never Smoker   Smokeless Tobacco   • Never Used     Allergies   Allergen Reactions   • Bydureon [Exenatide] Hives     hives   • Cycloset [Bromocriptine Mesylate] Unspecified     Pt states he can't remember what happens to him.   • Morphine Vomiting     Outpatient Encounter Prescriptions as of 1/15/2018   Medication Sig Dispense Refill   • albuterol 108 (90 Base) MCG/ACT Aero Soln inhalation aerosol Inhale 2 Puffs by mouth every 6 hours as needed for Shortness of Breath. 8.5 g 6   • Empagliflozin 25  MG Tab Take 25 mg by mouth every day. 90 Tab 3   • glipiZIDE SR (GLIPIZIDE XL) 10 MG TABLET SR 24 HR Take 1 Tab by mouth every morning. 30 Tab 11   • losartan (COZAAR) 100 MG Tab Take 1 Tab by mouth every day. 30 Tab 11   • metformin (GLUCOPHAGE) 1000 MG tablet TAKE 1 TABLET BY MOUTH 2 TIMES A DAY. 60 Tab 11   • metoprolol SR (TOPROL XL) 50 MG TABLET SR 24 HR TAKE 1 TABLET BY MOUTH 2 TIMES A DAY (Patient taking differently: Take 50 mg by mouth 2 Times a Day.) 60 Tab 11   • rosuvastatin (CRESTOR) 10 MG Tab TAKE 1 TABLET BY MOUTH EVERY EVENING 30 Tab 11   • glucose blood strip 1 Strip by Other route 2 Times a Day. 60 Strip 11   • ACCU-CHEK MULTICLIX LANCETS Misc 1 Each by Does not apply route 2 Times a Day. 180 Each 12   • Coenzyme Q10 (CO Q 10 PO) Take 1 Tab by mouth every day.     • aspirin (ASA) 325 MG TABS Take 325 mg by mouth every 48 hours. 1 tab po every other day     • Cholecalciferol (VITAMIN D3) 5000 UNITS CAPS Take 1 Each by mouth every day.     • Cyanocobalamin (VITAMIN B-12) 5000 MCG SUBL Place 1 Tab under tongue every day.     • predniSONE (DELTASONE) 20 MG Tab Take 2 tabs daily starting Dec. 24 10 Tab 0   • methocarbamol (ROBAXIN) 750 MG Tab Take 1 Tab by mouth 3 times a day. 30 Tab 0   • tramadol (ULTRAM) 50 MG Tab Take 1 Tab by mouth every four hours as needed. 15 Tab 0   • benzonatate (TESSALON) 100 MG Cap Take 1 Cap by mouth 3 times a day as needed for Cough. 30 Cap 0   • ondansetron (ZOFRAN) 4 MG Tab tablet Take 1 Tab by mouth every four hours as needed for Nausea/Vomiting. 10 Tab 1     No facility-administered encounter medications on file as of 1/15/2018.      Review of Systems   Respiratory: Positive for cough.    Cardiovascular: Negative for chest pain, palpitations, claudication, leg swelling and PND.   Gastrointestinal: Negative for heartburn.   Musculoskeletal: Negative for myalgias.   Neurological: Negative for dizziness, loss of consciousness and headaches.        Objective:   /82    "Pulse 78   Ht 1.778 m (5' 10\")   Wt 101.6 kg (224 lb)   SpO2 96%   BMI 32.14 kg/m²     Physical Exam   Constitutional: He is oriented to person, place, and time. No distress.   HENT:   Head: Normocephalic.   Neck: No JVD present.   Normal jugular venous pressure.   Cardiovascular: Normal rate, regular rhythm, S1 normal and S2 normal.  Exam reveals no gallop and no friction rub.    Murmur heard.   Systolic murmur is present with a grade of 3/6   Pulses:       Carotid pulses are 2+ on the right side with bruit, and 2+ on the left side with bruit.       Radial pulses are 2+ on the right side, and 2+ on the left side.        Femoral pulses are 2+ on the right side, and 2+ on the left side.       Posterior tibial pulses are 2+ on the right side, and 2+ on the left side.   Pulmonary/Chest: Effort normal and breath sounds normal. He has no wheezes. He has no rhonchi. He has no rales.   Distinct palpable sternal left-sided chest pain.   Abdominal: He exhibits no distension and no mass.   Obese.   Musculoskeletal: He exhibits no edema.   Neurological: He is alert and oriented to person, place, and time.   Skin: Skin is warm and dry.   Psychiatric: He has a normal mood and affect. His behavior is normal.     08/08/2012 Echo: EF 55-60%. Moderate aortic stenosis. Peak aortic valve gradient 63 mmHg. Moderate LVH.     07/25/2013 Echo: EF 70-75%. Moderate aortic stenosis. Peak aortic valve gradient 59 mmHg. Moderate LVH.    10/23/2015 ECHOCARDIOGRAM  Normal left ventricular systolic function.  Left ventricular ejection fraction is visually estimated to be 60%.  Grade I diastolic dysfunction.  Mild mitral regurgitation.  Moderate aortic stenosis.  Vmax 3.47 m/s. Transvalvular gradients are - Peak: 48 mmHg, Mean: 30   mmHg.  Mild pulmonic insufficiency.    11/01/2017 ECHOCARDIOGRAM  Normal left ventricular chamber size.  Left ventricular ejection fraction is visually estimated to be 60%.  Grade I diastolic dysfunction.  Severe " aortic stenosis.  Transvalvular gradients are - Peak: 72 mmHg, Mean: 50 mmHg.  Ascending aorta is borderline dilated with a diameter of 3.6 cm.  Compared to the report of the prior study done - the aortic stenosis is   now severe.     12/19/2017 in PI   No evidence of significant jeopardized viable myocardium or prior myocardial    infarction.   Transient ischemic dilatation calculated at 1.17 visually correlated.   Normal left ventricular size, ejection fraction, and wall motion.   ECG INTERPRETATION   Negative stress ECG for ischemia.    08/14/2012 Lab: Cholesterol 109. Triglycerides 121. HDL 36. LDL 49.  03/28/2013 Lab: Cholesterol 98. Triglycerides 110. HDL 30. LDL 46.    09/27/2013 EKG: Normal sinus rhythm. No acute changes.  11/20/2017 EKG: Normal sinus rhythm, rate 73. Minor lateral ST-T wave abnormalities. 1st degree AV block. Reviewed by myself.    Assessment:     1. Aortic valve stenosis, etiology of cardiac valve disease unspecified     2. Coronary artery disease involving native coronary artery of native heart without angina pectoris     3. S/P coronary artery stent placement     4. Dyslipidemia     5. Essential hypertension     6. SVT (supraventricular tachycardia) (CMS-HCC)     7. Palpitations            Medical Decision Making:  Today's Assessment / Status / Plan:     Aortic stenosis. Severe. Asymptomatic.    CAD. Clinically stable. Continue current therapy.    Coronary stent implantation: 1996. 1998.    Hyperlipidemia. Continue current therapy. Check lipid panel.    Hypertension. Controlled. Continue current therapy. CMP pending.    Bilateral carotid bruits.    Obesity. Counseled for weight loss.    SVT. 12/3/2013.      Right hip pain.     Situational stress syndrome.     Recommendation and Discussion  I reviewed with the patient results of his MPI which was unremarkable.  He is asymptomatic for his severe aortic stenosis.  We'll monitor for the development of symptoms related to his aortic stenosis  such as chest discomfort, exertional shortness of breath and lightheadedness.  Recommend follow-up with his PCP Dr. Zackery Trevino for right hip pain evaluation.

## 2018-02-21 DIAGNOSIS — K63.5 POLYP OF COLON, UNSPECIFIED PART OF COLON, UNSPECIFIED TYPE: ICD-10-CM

## 2018-03-06 RX ORDER — METOPROLOL SUCCINATE 50 MG/1
TABLET, EXTENDED RELEASE ORAL
Qty: 60 TAB | Refills: 11 | Status: SHIPPED | OUTPATIENT
Start: 2018-03-06 | End: 2019-01-29 | Stop reason: SDUPTHER

## 2018-03-06 RX ORDER — ROSUVASTATIN CALCIUM 10 MG/1
TABLET, COATED ORAL
Qty: 30 TAB | Refills: 11 | Status: SHIPPED | OUTPATIENT
Start: 2018-03-06 | End: 2019-02-01 | Stop reason: SDUPTHER

## 2018-03-06 RX ORDER — GLIPIZIDE 10 MG/1
10 TABLET, FILM COATED, EXTENDED RELEASE ORAL EVERY MORNING
Qty: 30 TAB | Refills: 11 | Status: SHIPPED | OUTPATIENT
Start: 2018-03-06 | End: 2019-01-31 | Stop reason: SDUPTHER

## 2018-04-17 ENCOUNTER — OFFICE VISIT (OUTPATIENT)
Dept: MEDICAL GROUP | Facility: MEDICAL CENTER | Age: 63
End: 2018-04-17
Payer: COMMERCIAL

## 2018-04-17 VITALS
BODY MASS INDEX: 31.35 KG/M2 | HEIGHT: 70 IN | RESPIRATION RATE: 16 BRPM | OXYGEN SATURATION: 98 % | SYSTOLIC BLOOD PRESSURE: 126 MMHG | TEMPERATURE: 98 F | DIASTOLIC BLOOD PRESSURE: 70 MMHG | HEART RATE: 81 BPM | WEIGHT: 219 LBS

## 2018-04-17 DIAGNOSIS — E11.9 TYPE 2 DIABETES MELLITUS WITHOUT COMPLICATION, WITHOUT LONG-TERM CURRENT USE OF INSULIN (HCC): Chronic | ICD-10-CM

## 2018-04-17 DIAGNOSIS — E78.5 DYSLIPIDEMIA: Chronic | ICD-10-CM

## 2018-04-17 DIAGNOSIS — I73.00 RAYNAUD'S DISEASE WITHOUT GANGRENE: ICD-10-CM

## 2018-04-17 DIAGNOSIS — R25.1 TREMOR: ICD-10-CM

## 2018-04-17 DIAGNOSIS — I10 ESSENTIAL HYPERTENSION: ICD-10-CM

## 2018-04-17 LAB
HBA1C MFR BLD: 9.1 % (ref ?–5.8)
INT CON NEG: NEGATIVE
INT CON POS: POSITIVE

## 2018-04-17 PROCEDURE — 99214 OFFICE O/P EST MOD 30 MIN: CPT | Performed by: INTERNAL MEDICINE

## 2018-04-17 PROCEDURE — 83036 HEMOGLOBIN GLYCOSYLATED A1C: CPT | Performed by: INTERNAL MEDICINE

## 2018-04-17 NOTE — PROGRESS NOTES
CC: Follow-up diabetes with complaints of hand pain and tremor.    HPI:   Sharad presents today with the following.    1. Type 2 diabetes mellitus without complication, without long-term current use of insulin (CMS-HCC)  Presents reporting new medication Jardiance was only started 2 months ago. A1c has come down from 10.8-9.1 compliant with other medications and denying hypoglycemia.    2. Essential hypertension  Well controlled maintain on medications beta blocker for his heart as well as ACE inhibitor eluding control of proteinuria.    3. Dyslipidemia  Coming due for recheck of cholesterol maintain the medication without side effect.    4. Tremor  Complaining of bilateral intention tremor. Does not drink alcohol to no. 4. Denies any family history not having any changes to his gait or cognition.    5. Hand pain  Complaining of bilateral hand pain in the cold. He reports been present for several years his fingers will turn right and the cold weather and be painful.    6. BMI 31.0-31.9,adult  Weight has gone down slightly since last visit.      Patient Active Problem List    Diagnosis Date Noted   • Tremor 04/17/2018   • Raynaud's disease without gangrene 04/17/2018   • Precordial pain 11/20/2017   • History of multiple concussions 08/07/2017   • Obesity (BMI 30-39.9) 08/07/2017   • Chronic hyponatremia 08/07/2017   • S/P coronary artery stent placement 04/29/2016   • SVT (supraventricular tachycardia) (CMS-HCC) 10/24/2015   • DM2 (diabetes mellitus, type 2) (CMS-HCC) 12/24/2014   • Vitamin D deficiency disease 03/14/2014   • HDL deficiency 07/26/2013   • Vitamin B12 deficiency 12/06/2012   • Essential hypertension 10/12/2012   • Aortic stenosis 10/20/2011   • Memory loss 10/20/2011   • Palpitations 10/20/2011   • Obesity 08/23/2011   • Sciatica 08/23/2011   • Fatty liver 01/17/2011   • Carotid bruit 12/09/2009   • History of cardiac catheterization 07/13/2009   • Murmur 07/07/2009   • CAD (coronary artery disease)  "06/05/2009   • Dyslipidemia 06/05/2009       Current Outpatient Prescriptions   Medication Sig Dispense Refill   • glipiZIDE SR (GLIPIZIDE XL) 10 MG TABLET SR 24 HR Take 1 Tab by mouth every morning. 30 Tab 11   • metformin (GLUCOPHAGE) 1000 MG tablet TAKE 1 TABLET BY MOUTH 2 TIMES A DAY. 60 Tab 11   • metoprolol SR (TOPROL XL) 50 MG TABLET SR 24 HR TAKE 1 TABLET BY MOUTH 2 TIMES A DAY 60 Tab 11   • rosuvastatin (CRESTOR) 10 MG Tab TAKE 1 TABLET BY MOUTH EVERY EVENING 30 Tab 11   • methocarbamol (ROBAXIN) 750 MG Tab Take 1 Tab by mouth 3 times a day. 30 Tab 0   • albuterol 108 (90 Base) MCG/ACT Aero Soln inhalation aerosol Inhale 2 Puffs by mouth every 6 hours as needed for Shortness of Breath. 8.5 g 6   • Empagliflozin 25 MG Tab Take 25 mg by mouth every day. 90 Tab 3   • losartan (COZAAR) 100 MG Tab Take 1 Tab by mouth every day. 30 Tab 11   • glucose blood strip 1 Strip by Other route 2 Times a Day. 60 Strip 11   • ACCU-CHEK MULTICLIX LANCETS Misc 1 Each by Does not apply route 2 Times a Day. 180 Each 12   • aspirin (ASA) 325 MG TABS Take 325 mg by mouth every 48 hours. 1 tab po every other day     • Cholecalciferol (VITAMIN D3) 5000 UNITS CAPS Take 1 Each by mouth every day.     • Cyanocobalamin (VITAMIN B-12) 5000 MCG SUBL Place 1 Tab under tongue every day.     • ondansetron (ZOFRAN) 4 MG Tab tablet Take 1 Tab by mouth every four hours as needed for Nausea/Vomiting. 10 Tab 1     No current facility-administered medications for this visit.          Allergies as of 04/17/2018 - Reviewed 04/17/2018   Allergen Reaction Noted   • Bydureon [exenatide] Hives 10/12/2012   • Cycloset [bromocriptine mesylate] Unspecified 06/21/2012   • Morphine Vomiting 10/21/2015        ROS: Denies Chest pain, SOB, LE edema.    /70   Pulse 81   Temp 36.7 °C (98 °F)   Resp 16   Ht 1.778 m (5' 10\")   Wt 99.3 kg (219 lb)   SpO2 98%   BMI 31.42 kg/m²      Physical Exam:  Gen:         Alert and oriented, No apparent " distress.  Neck:        No Lymphadenopathy or Bruits.  Lungs:     Clear to auscultation bilaterally  CV:          Regular rate and rhythm. No murmurs, rubs or gallops.               Ext:          No clubbing, cyanosis, edema.      Assessment and Plan.   62 y.o. male with the following issues.    1. Type 2 diabetes mellitus without complication, without long-term current use of insulin (CMS-[facility])  Improving in control only on medication for 2 months. He will increase diet and exercise will see back in 3 months with repeat.  - POCT  A1C  - HEMOGLOBIN A1C; Future  - MICROALBUMIN CREAT RATIO URINE; Future  - Diabetic Monofilament Lower Extremity Exam    2. Essential hypertension  Currently well controlled, Discuss diet, exercise and salt restriction.  No change to medication therapy.     3. Dyslipidemia  Recheck cholesterol  - COMP METABOLIC PANEL; Future  - LIPID PROFILE; Future    4. Tremor  Likely benign essential tremor given the multiple agents needed for blood pressure and other comorbidities may be a candidate for something like primidone.  - REFERRAL TO NEUROLOGY    5. Raynaud's disease without gangrene  Hasn't been to start calcium channel blocker currently may consider low-dose amlodipine to avoid duplication of rate controlling effects.    6. BMI 31.0-31.9,adult  Patient's body mass index is 31.42 kg/m². Exercise and nutrition counseling were performed at this visit.  Set goal of 15lbs in next 3 months.    - Patient identified as having weight management issue.  Appropriate orders and counseling given.

## 2018-04-25 RX ORDER — LOSARTAN POTASSIUM 100 MG/1
100 TABLET ORAL
Qty: 90 EACH | Refills: 3 | Status: SHIPPED | OUTPATIENT
Start: 2018-04-25 | End: 2019-03-29 | Stop reason: SDUPTHER

## 2018-08-31 ENCOUNTER — HOSPITAL ENCOUNTER (OUTPATIENT)
Dept: RADIOLOGY | Facility: MEDICAL CENTER | Age: 63
End: 2018-08-31
Attending: FAMILY MEDICINE
Payer: COMMERCIAL

## 2018-08-31 ENCOUNTER — OFFICE VISIT (OUTPATIENT)
Dept: URGENT CARE | Facility: PHYSICIAN GROUP | Age: 63
End: 2018-08-31
Payer: COMMERCIAL

## 2018-08-31 VITALS
HEIGHT: 70 IN | HEART RATE: 80 BPM | RESPIRATION RATE: 13 BRPM | WEIGHT: 220 LBS | BODY MASS INDEX: 31.5 KG/M2 | DIASTOLIC BLOOD PRESSURE: 92 MMHG | SYSTOLIC BLOOD PRESSURE: 150 MMHG | OXYGEN SATURATION: 96 % | TEMPERATURE: 98.2 F

## 2018-08-31 DIAGNOSIS — M54.16 LUMBAR RADICULOPATHY: ICD-10-CM

## 2018-08-31 PROCEDURE — 72100 X-RAY EXAM L-S SPINE 2/3 VWS: CPT

## 2018-08-31 PROCEDURE — 99214 OFFICE O/P EST MOD 30 MIN: CPT | Performed by: FAMILY MEDICINE

## 2018-08-31 RX ORDER — KETOROLAC TROMETHAMINE 30 MG/ML
60 INJECTION, SOLUTION INTRAMUSCULAR; INTRAVENOUS ONCE
Status: COMPLETED | OUTPATIENT
Start: 2018-08-31 | End: 2018-08-31

## 2018-08-31 RX ORDER — PREDNISONE 10 MG/1
30 TABLET ORAL DAILY
Qty: 9 TAB | Refills: 0 | Status: SHIPPED | OUTPATIENT
Start: 2018-08-31 | End: 2018-09-03

## 2018-08-31 RX ORDER — CYCLOBENZAPRINE HCL 5 MG
5-10 TABLET ORAL 3 TIMES DAILY PRN
Qty: 30 TAB | Refills: 0 | Status: SHIPPED | OUTPATIENT
Start: 2018-08-31 | End: 2018-10-02

## 2018-08-31 RX ADMIN — KETOROLAC TROMETHAMINE 60 MG: 30 INJECTION, SOLUTION INTRAMUSCULAR; INTRAVENOUS at 17:25

## 2018-08-31 ASSESSMENT — PAIN SCALES - GENERAL: PAINLEVEL: 9=SEVERE PAIN

## 2018-08-31 NOTE — PROGRESS NOTES
"Chief Complaint   Patient presents with   • Back Pain     Sciatica left side x 1 week         Back Pain  This is a new problem. The current episode started in the past 7 days. The problem occurs constantly. The problem is unchanged. The pain is present in the lumbar spine. The quality of the pain is described as aching. The pain does radiate down left leg to left knee. The pain is moderate. The symptoms are aggravated by position. Pertinent negatives include no bladder incontinence, bowel incontinence, dysuria, fever, headaches, numbness or weakness. Treatments tried: motrin.  The treatment provided no relief.     Social History   Substance Use Topics   • Smoking status: Never Smoker   • Smokeless tobacco: Never Used   • Alcohol use No       Family hx was reviewed - no pertinent past family hx      Past medical history was unremarkable and not pertinent to current issue    Review of Systems   Constitutional: Negative for fever.   Gastrointestinal: Negative for bowel incontinence.   Genitourinary: Negative for bladder incontinence, dysuria, urgency and frequency.   Musculoskeletal: Positive for back pain.   Neurological: Negative for weakness, numbness and headaches.   All other systems reviewed and are negative.         Objective:     Blood pressure 150/92, pulse 80, temperature 36.8 °C (98.2 °F), resp. rate 13, height 1.778 m (5' 10\"), weight 99.8 kg (220 lb), SpO2 96 %.    Physical Exam   Constitutional: pt is oriented to person, place, and time. Pt appears well-developed and well-nourished. No distress.   HENT:   Head: Normocephalic and atraumatic.   Eyes: EOM are normal. Pupils are equal, round, and reactive to light. No scleral icterus.   Neck: Normal range of motion. Neck supple. No thyromegaly present.   Cardiovascular: Normal rate, regular rhythm and normal heart sounds.    Pulmonary/Chest: Effort normal and breath sounds normal. No respiratory distress.  no wheezes.   no rales.  no tenderness. "   Musculoskeletal: pt exhibits no edema.        Right knee: Normal.        Left knee: Normal.               Lumbar back: pt exhibits decreased range of motion, spasm and tenderness . Pt exhibits no bony tenderness, no swelling, no edema, no deformity  .   Neurological: patient is alert and oriented to person, place, and time. Patient has normal reflexes. No cranial nerve deficit. Patient exhibits normal muscle tone.   Reflex Scores:       Patellar reflexes are 2+ on the right side and 2+ on the left side.  STRAIGHT LEG RAISE, MARIAJOSE NEGATIVE BILAT  Skin: Skin is warm and dry. No rash noted. No erythema.   Psychiatric: patient has a normal mood and affect.  behavior is normal.   Nursing note and vitals reviewed.         Narrative       8/31/2018 3:46 PM    HISTORY/REASON FOR EXAM:  Lumbar spine pain and sciatica      TECHNIQUE/ EXAM DESCRIPTION AND NUMBER OF VIEWS:  3 views of the lumbar spine.    COMPARISON: None.    FINDINGS:  Vertebral body height is well maintained.  There is no evidence of fracture.  Vertebral alignment is normal.  There is mild degenerative disc disease and facet arthropathy.     Impression       No compression deformity or acute fracture is identified.  FINDINGS ARE CONSISTENT WITH MILD DEGENERATIVE DISC DISEASE AND FACET ARTHROPATHY.   Reading Provider Reading Date   Colby Lewis M.D. Aug 31, 2018          Assessment/Plan:       1. Lumbar radiculopathy     X-rays were personally reviewed by myself.   There is no fracture, just arthritic changes.     - ketorolac (TORADOL) injection 60 mg; 2 mL by Intramuscular route Once.  - DX-LUMBAR SPINE-2 OR 3 VIEWS; Future       - Diclofenac Sodium (VOLTAREN) 1 % Gel; Apply 4 g to skin as directed 3 times a day as needed.  Dispense: 1 Tube; Refill: 0  - predniSONE (DELTASONE) 10 MG Tab; Take 3 Tabs by mouth every day for 3 days.  Dispense: 9 Tab; Refill: 0  - cyclobenzaprine (FLEXERIL) 5 MG tablet; Take 1-2 Tabs by mouth 3 times a day as needed.   Dispense: 30 Tab; Refill: 0          Follow up in one week if no improvement, sooner if symptoms worsen.

## 2018-09-12 ENCOUNTER — OFFICE VISIT (OUTPATIENT)
Dept: MEDICAL GROUP | Facility: MEDICAL CENTER | Age: 63
End: 2018-09-12
Payer: COMMERCIAL

## 2018-09-12 VITALS
OXYGEN SATURATION: 95 % | HEIGHT: 70 IN | WEIGHT: 214 LBS | HEART RATE: 84 BPM | RESPIRATION RATE: 16 BRPM | BODY MASS INDEX: 30.64 KG/M2 | TEMPERATURE: 97.4 F | SYSTOLIC BLOOD PRESSURE: 136 MMHG | DIASTOLIC BLOOD PRESSURE: 66 MMHG

## 2018-09-12 DIAGNOSIS — I10 ESSENTIAL HYPERTENSION: ICD-10-CM

## 2018-09-12 DIAGNOSIS — E78.5 DYSLIPIDEMIA: Chronic | ICD-10-CM

## 2018-09-12 DIAGNOSIS — E11.9 TYPE 2 DIABETES MELLITUS WITHOUT COMPLICATION, WITHOUT LONG-TERM CURRENT USE OF INSULIN (HCC): Chronic | ICD-10-CM

## 2018-09-12 DIAGNOSIS — Z23 FLU VACCINE NEED: ICD-10-CM

## 2018-09-12 LAB
HBA1C MFR BLD: 9.2 % (ref ?–5.8)
INT CON NEG: NEGATIVE
INT CON POS: POSITIVE

## 2018-09-12 PROCEDURE — 90686 IIV4 VACC NO PRSV 0.5 ML IM: CPT | Performed by: INTERNAL MEDICINE

## 2018-09-12 PROCEDURE — 90471 IMMUNIZATION ADMIN: CPT | Performed by: INTERNAL MEDICINE

## 2018-09-12 PROCEDURE — 83036 HEMOGLOBIN GLYCOSYLATED A1C: CPT | Performed by: INTERNAL MEDICINE

## 2018-09-12 PROCEDURE — 99214 OFFICE O/P EST MOD 30 MIN: CPT | Mod: 25 | Performed by: INTERNAL MEDICINE

## 2018-09-12 RX ORDER — GLIMEPIRIDE 4 MG/1
4 TABLET ORAL 2 TIMES DAILY
Qty: 180 TAB | Refills: 3 | Status: SHIPPED | OUTPATIENT
Start: 2018-09-12 | End: 2018-10-02

## 2018-09-12 NOTE — PROGRESS NOTES
CC: Follow-up diabetes, dyslipidemia, hypertension    HPI:   Sharad presents today with the following.    1. Type 2 diabetes mellitus without complication, without long-term current use of insulin ([facility])  Recently started on Jardiance has had some weight loss and initial glycemic improvements but he is still elevated at 9.2.  He is trying to watch his diet but not exercising.  Denying any hypoglycemia and he does report compliance to meds    2. Dyslipidemia  Maintain on statin without myalgia coming due for recheck    3. Essential hypertension  Blood pressure improved control since last visit still slightly on the higher side.  Denying any chest pain shortness of breath or edema    4. Flu vaccine need        Patient Active Problem List    Diagnosis Date Noted   • Tremor 04/17/2018   • Raynaud's disease without gangrene 04/17/2018   • Precordial pain 11/20/2017   • History of multiple concussions 08/07/2017   • Obesity (BMI 30-39.9) 08/07/2017   • Chronic hyponatremia 08/07/2017   • S/P coronary artery stent placement 04/29/2016   • SVT (supraventricular tachycardia) ([facility]) 10/24/2015   • DM2 (diabetes mellitus, type 2) ([facility]) 12/24/2014   • Vitamin D deficiency disease 03/14/2014   • HDL deficiency 07/26/2013   • Vitamin B12 deficiency 12/06/2012   • Essential hypertension 10/12/2012   • Aortic stenosis 10/20/2011   • Memory loss 10/20/2011   • Palpitations 10/20/2011   • Obesity 08/23/2011   • Sciatica 08/23/2011   • Fatty liver 01/17/2011   • Carotid bruit 12/09/2009   • History of cardiac catheterization 07/13/2009   • Murmur 07/07/2009   • CAD (coronary artery disease) 06/05/2009   • Dyslipidemia 06/05/2009       Current Outpatient Prescriptions   Medication Sig Dispense Refill   • glimepiride (AMARYL) 4 MG Tab Take 1 Tab by mouth 2 times a day. 180 Tab 3   • Naproxen Sodium (ALEVE PO) Take  by mouth.     • Diclofenac Sodium (VOLTAREN) 1 % Gel Apply 4 g to skin as directed 3 times a day as needed. 1 Tube 0   •  "cyclobenzaprine (FLEXERIL) 5 MG tablet Take 1-2 Tabs by mouth 3 times a day as needed. 30 Tab 0   • losartan (COZAAR) 100 MG Tab Take 1 Tab by mouth every day. 90 Each 3   • glipiZIDE SR (GLIPIZIDE XL) 10 MG TABLET SR 24 HR Take 1 Tab by mouth every morning. 30 Tab 11   • metformin (GLUCOPHAGE) 1000 MG tablet TAKE 1 TABLET BY MOUTH 2 TIMES A DAY. 60 Tab 11   • metoprolol SR (TOPROL XL) 50 MG TABLET SR 24 HR TAKE 1 TABLET BY MOUTH 2 TIMES A DAY 60 Tab 11   • rosuvastatin (CRESTOR) 10 MG Tab TAKE 1 TABLET BY MOUTH EVERY EVENING 30 Tab 11   • methocarbamol (ROBAXIN) 750 MG Tab Take 1 Tab by mouth 3 times a day. 30 Tab 0   • albuterol 108 (90 Base) MCG/ACT Aero Soln inhalation aerosol Inhale 2 Puffs by mouth every 6 hours as needed for Shortness of Breath. 8.5 g 6   • Empagliflozin 25 MG Tab Take 25 mg by mouth every day. 90 Tab 3   • glucose blood strip 1 Strip by Other route 2 Times a Day. 60 Strip 11   • ACCU-CHEK MULTICLIX LANCETS Misc 1 Each by Does not apply route 2 Times a Day. 180 Each 12   • aspirin (ASA) 325 MG TABS Take 325 mg by mouth every 48 hours. 1 tab po every other day     • Cholecalciferol (VITAMIN D3) 5000 UNITS CAPS Take 1 Each by mouth every day.     • Cyanocobalamin (VITAMIN B-12) 5000 MCG SUBL Place 1 Tab under tongue every day.     • ondansetron (ZOFRAN) 4 MG Tab tablet Take 1 Tab by mouth every four hours as needed for Nausea/Vomiting. 10 Tab 1     No current facility-administered medications for this visit.          Allergies as of 09/12/2018 - Reviewed 09/12/2018   Allergen Reaction Noted   • Bydureon [exenatide] Hives 10/12/2012   • Cycloset [bromocriptine mesylate] Unspecified 06/21/2012   • Morphine Vomiting 10/21/2015        ROS: Denies Chest pain, SOB, LE edema.    /66   Pulse 84   Temp 36.3 °C (97.4 °F)   Resp 16   Ht 1.77 m (5' 9.69\")   Wt 97.1 kg (214 lb)   SpO2 95%   BMI 30.98 kg/m²     Physical Exam:  Gen:         Alert and oriented, No apparent distress.  Neck:        " No Lymphadenopathy or Bruits.  Lungs:     Clear to auscultation bilaterally  CV:          Regular rate and rhythm. No murmurs, rubs or gallops.               Ext:          No clubbing, cyanosis, edema.      Assessment and Plan.   62 y.o. male with the following issues.    1. Type 2 diabetes mellitus without complication, without long-term current use of insulin ([facility])  Switching to Amaryl 4 mg twice daily we will see back in 3 months.  - POCT  A1C  - HEMOGLOBIN A1C; Future  - MICROALBUMIN CREAT RATIO URINE; Future  - glimepiride (AMARYL) 4 MG Tab; Take 1 Tab by mouth 2 times a day.  Dispense: 180 Tab; Refill: 3    2. Dyslipidemia  Recheck cholesterol  - COMP METABOLIC PANEL; Future  - LIPID PROFILE; Future    3. Essential hypertension  Currently well controlled, Discuss diet, exercise and salt restriction.  No change to medication therapy.    4. Flu vaccine need    - INFLUENZA VACCINE QUAD INJ >3Y(PF)

## 2018-09-19 DIAGNOSIS — I35.0 AORTIC VALVE STENOSIS, ETIOLOGY OF CARDIAC VALVE DISEASE UNSPECIFIED: Chronic | ICD-10-CM

## 2018-10-01 ENCOUNTER — OFFICE VISIT (OUTPATIENT)
Dept: CARDIOLOGY | Facility: MEDICAL CENTER | Age: 63
End: 2018-10-01
Payer: COMMERCIAL

## 2018-10-01 VITALS
HEIGHT: 70 IN | WEIGHT: 210 LBS | HEART RATE: 80 BPM | DIASTOLIC BLOOD PRESSURE: 72 MMHG | OXYGEN SATURATION: 97 % | BODY MASS INDEX: 30.06 KG/M2 | SYSTOLIC BLOOD PRESSURE: 144 MMHG

## 2018-10-01 DIAGNOSIS — I25.10 CORONARY ARTERY DISEASE, NON-OCCLUSIVE: ICD-10-CM

## 2018-10-01 DIAGNOSIS — E78.5 DYSLIPIDEMIA: Chronic | ICD-10-CM

## 2018-10-01 DIAGNOSIS — I47.10 SVT (SUPRAVENTRICULAR TACHYCARDIA): ICD-10-CM

## 2018-10-01 DIAGNOSIS — I35.0 AORTIC VALVE STENOSIS, ETIOLOGY OF CARDIAC VALVE DISEASE UNSPECIFIED: Chronic | ICD-10-CM

## 2018-10-01 DIAGNOSIS — Z95.5 S/P CORONARY ARTERY STENT PLACEMENT: ICD-10-CM

## 2018-10-01 DIAGNOSIS — I10 ESSENTIAL HYPERTENSION: ICD-10-CM

## 2018-10-01 DIAGNOSIS — R00.2 PALPITATIONS: Chronic | ICD-10-CM

## 2018-10-01 PROCEDURE — 99214 OFFICE O/P EST MOD 30 MIN: CPT | Performed by: INTERNAL MEDICINE

## 2018-10-01 ASSESSMENT — ENCOUNTER SYMPTOMS
MYALGIAS: 0
PALPITATIONS: 0
DIZZINESS: 0
SHORTNESS OF BREATH: 1
LOSS OF CONSCIOUSNESS: 0
COUGH: 0

## 2018-10-01 NOTE — PROGRESS NOTES
Chief Complaint   Patient presents with   • Coronary Artery Disease       Subjective:   Sharad Millan is a 62 y.o. male who presents today for a followup evaluation of aortic stenosis, CAD, coronary stent, palpitations, PSVT, hypertension and hyperlipidemia.     Last seen on 1/15/2018.    Since 1/18/2018 the patient reports subtle symptoms of exertional shortness of breath.  No lightheadedness dizziness or syncope.     Since 11/20/2017 appointment the patient has had no cardiac symptoms.  Continues to work full time mainly in [place].  Has developed severe left hip pain and has sought chiropractic and acupuncture therapy.  Was seen at urgent care and was given prednisone and anti-inflammatory and ultimately had some just transient relief but has come back and continues to be a problem.     Since 10/13/2017 appointment the patient's developed a upper respiratory infection being treated conservatively.  At the time of his last appointment and echocardiogram was done now shows severe aortic stenosis.  The patient has no new cardiac symptoms referenced to his aortic stenosis.  He reports 2 weeks ago while eating at a restaurant he had a left upper chest pain that lasted for one hour, resolved spontaneously and has not recurred.     Since 8/31/2016 appointment the patient's had no cardiac symptoms.  Had two episodes of palpitations responsive to vagal maneuver.  No lightheadedness or dizziness.  No angina pectoralis.  No shortness of breath.     Since his October, 2015 hospitalization the patient has had almost constant sharp left localized anterior sharp stabbing chest pain radiating to his back.  Not related to exertion.  No shortness of breath or angina pectoris.  No palpitations or syncope.     Between 10/21/2015-10/27/2015 hospitalized at [facility].  Gallstone pancreatitis.  Laparoscopic cholecystectomy.  Echocardiogram showed moderate aortic stenosis.     On 12/3/2013 [facility]  ER.  SVT.  Converted with a adenosine.  Toprol increased 100 mg daily.     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  He developed chest pain.  He took a customers sublingual nitroglycerin with relief.  He went to [facility] ER.  EKG showed no changes.  Patient left after waiting an hour and a half to be seen by the doctor.    Past Medical History:   Diagnosis Date   • Aortic stenosis 10/20/2011   • Arrhythmia    • Carotid bruit 12/9/2009   • Chronic right shoulder pain    • Diabetes    • Fatty liver 1/17/2011   • Heart attack (HCC)    • History of cardiac catheterization 7/13/2009   • History of echocardiogram 10/20/2011   • HTN (hypertension) 10/12/2012   • Hypertension    • Memory loss 10/20/2011   • Murmur 7/7/2009   • Obesity 8/23/2011   • Palpitations 10/20/2011   • PSVT (paroxysmal supraventricular tachycardia) ([facility]) 2/14/2012   • S/P coronary artery stent placement 1996    coronary stent implantation , [facility]   • S/P coronary artery stent placement 1998    coronary stent implantation; [facility]   • S/P coronary artery stent placement 2003    cardiac catheterization; patent stents; [place]   • Sciatica 8/23/2011   • Uncontrolled diabetes mellitus (HCC) 11/29/2010     Past Surgical History:   Procedure Laterality Date   • PO BY LAPAROSCOPY N/A 10/25/2015    Procedure: PO BY LAPAROSCOPY-with grams ;  Surgeon: Ronnie Bowman M.D.;  Location: SURGERY [facility];  Service:    • CAROTID STENT ANGIOGRAPHY  1996   • CATARACT EXTRACTION WITH IOL       Family History   Problem Relation Age of Onset   • Heart Disease Mother    • Heart Disease Father      Social History     Social History   • Marital status:      Spouse name: N/A   • Number of children: N/A   • Years of education: N/A     Occupational History   • Not on file.     Social History Main Topics   • Smoking status: Never Smoker   • Smokeless tobacco: Never Used   • Alcohol use No   • Drug use: No   •  Sexual activity: Yes     Partners: Female     Other Topics Concern   • Not on file     Social History Narrative   • No narrative on file     Allergies   Allergen Reactions   • Bydureon [Exenatide] Hives     hives   • Cycloset [Bromocriptine Mesylate] Unspecified     Pt states he can't remember what happens to him.   • Morphine Vomiting     Outpatient Encounter Prescriptions as of 10/1/2018   Medication Sig Dispense Refill   • losartan (COZAAR) 100 MG Tab Take 1 Tab by mouth every day. 90 Each 3   • glipiZIDE SR (GLIPIZIDE XL) 10 MG TABLET SR 24 HR Take 1 Tab by mouth every morning. 30 Tab 11   • metformin (GLUCOPHAGE) 1000 MG tablet TAKE 1 TABLET BY MOUTH 2 TIMES A DAY. 60 Tab 11   • metoprolol SR (TOPROL XL) 50 MG TABLET SR 24 HR TAKE 1 TABLET BY MOUTH 2 TIMES A DAY 60 Tab 11   • rosuvastatin (CRESTOR) 10 MG Tab TAKE 1 TABLET BY MOUTH EVERY EVENING 30 Tab 11   • albuterol 108 (90 Base) MCG/ACT Aero Soln inhalation aerosol Inhale 2 Puffs by mouth every 6 hours as needed for Shortness of Breath. 8.5 g 6   • glucose blood strip 1 Strip by Other route 2 Times a Day. 60 Strip 11   • ACCU-CHEK MULTICLIX LANCETS Misc 1 Each by Does not apply route 2 Times a Day. 180 Each 12   • aspirin (ASA) 325 MG TABS Take 325 mg by mouth every 48 hours. 1 tab po every other day     • glimepiride (AMARYL) 4 MG Tab Take 1 Tab by mouth 2 times a day. 180 Tab 3   • Naproxen Sodium (ALEVE PO) Take  by mouth.     • Diclofenac Sodium (VOLTAREN) 1 % Gel Apply 4 g to skin as directed 3 times a day as needed. 1 Tube 0   • cyclobenzaprine (FLEXERIL) 5 MG tablet Take 1-2 Tabs by mouth 3 times a day as needed. 30 Tab 0   • methocarbamol (ROBAXIN) 750 MG Tab Take 1 Tab by mouth 3 times a day. 30 Tab 0   • Empagliflozin 25 MG Tab Take 25 mg by mouth every day. 90 Tab 3   • ondansetron (ZOFRAN) 4 MG Tab tablet Take 1 Tab by mouth every four hours as needed for Nausea/Vomiting. 10 Tab 1   • Cholecalciferol (VITAMIN D3) 5000 UNITS CAPS Take 1 Each by  "mouth every day.     • Cyanocobalamin (VITAMIN B-12) 5000 MCG SUBL Place 1 Tab under tongue every day.       No facility-administered encounter medications on file as of 10/1/2018.      Review of Systems   Respiratory: Positive for shortness of breath. Negative for cough.    Cardiovascular: Negative for chest pain and palpitations.   Musculoskeletal: Negative for myalgias.   Neurological: Negative for dizziness and loss of consciousness.        Objective:   /72 (BP Location: Left arm, Patient Position: Sitting, BP Cuff Size: Adult)   Pulse 80   Ht 1.778 m (5' 10\")   Wt 95.3 kg (210 lb)   SpO2 97%   BMI 30.13 kg/m²     Physical Exam   Constitutional: He is oriented to person, place, and time. He appears well-developed and well-nourished.   Neck: No JVD present.   Cardiovascular: Normal rate, regular rhythm and intact distal pulses.    Murmur heard.  Pulses:       Carotid pulses are on the right side with bruit, and on the left side with bruit.  Pulmonary/Chest: Effort normal and breath sounds normal. No respiratory distress. He has no wheezes. He has no rales.   Musculoskeletal: He exhibits no edema.   Neurological: He is alert and oriented to person, place, and time.   Skin: Skin is warm and dry.   Psychiatric: He has a normal mood and affect. His behavior is normal.     08/08/2012 Echo: EF 55-60%. Moderate aortic stenosis. Peak aortic valve gradient 63 mmHg. Moderate LVH.      07/25/2013 Echo: EF 70-75%. Moderate aortic stenosis. Peak aortic valve gradient 59 mmHg. Moderate LVH.     10/23/2015 ECHOCARDIOGRAM  Normal left ventricular systolic function.  Left ventricular ejection fraction is visually estimated to be 60%.  Grade I diastolic dysfunction.  Mild mitral regurgitation.  Moderate aortic stenosis.  Vmax 3.47 m/s. Transvalvular gradients are - Peak: 48 mmHg, Mean: 30   mmHg.  Mild pulmonic insufficiency.     11/01/2017 ECHOCARDIOGRAM  Normal left ventricular chamber size.  Left ventricular ejection " fraction is visually estimated to be 60%.  Grade I diastolic dysfunction.  Severe aortic stenosis.  Transvalvular gradients are - Peak: 72 mmHg, Mean: 50 mmHg.  Ascending aorta is borderline dilated with a diameter of 3.6 cm.  Compared to the report of the prior study done - the aortic stenosis is   now severe.      12/19/2017 in [facility]   No evidence of significant jeopardized viable myocardium or prior myocardial    infarction.   Transient ischemic dilatation calculated at 1.17 visually correlated.   Normal left ventricular size, ejection fraction, and wall motion.   ECG INTERPRETATION   Negative stress ECG for ischemia.     08/14/2012 Lab: Cholesterol 109. Triglycerides 121. HDL 36. LDL 49.  03/28/2013 Lab: Cholesterol 98. Triglycerides 110. HDL 30. LDL 46.     09/27/2013 EKG: Normal sinus rhythm. No acute changes.  11/20/2017 EKG: Normal sinus rhythm, rate 73. Minor lateral ST-T wave abnormalities. 1st degree AV block. Reviewed by myself.    Assessment:     1. Aortic valve stenosis, etiology of cardiac valve disease unspecified  EC-ECHOCARDIOGRAM COMPLETE W/O CONT   2. Essential hypertension     3. Coronary artery disease, non-occlusive     4. Dyslipidemia     5. S/P coronary artery stent placement     6. SVT (supraventricular tachycardia) (HCC)     7. Palpitations         Medical Decision Making:  Today's Assessment / Status / Plan:   Aortic stenosis. Severe.  Symptomatic     CAD. Clinically stable. Continue current therapy.     Coronary stent implantation: 1996. 1998.     Hyperlipidemia. Continue current therapy. Check lipid panel.     Hypertension. Controlled. Continue current therapy. CMP pending.     Bilateral carotid bruits.     Obesity. Counseled for weight loss.     SVT. 12/3/2013.       Right hip pain.      Situational stress syndrome.      Recommendation and Discussion  1.  I reviewed with the patient my concern that he is become symptomatic in reference to his aortic stenosis.  2.  I strongly recommended  that he have a repeat echocardiogram.  3.  The patient states that he cannot afford it and would have to wait until the first of the year.  4.  He was instructed to notify me of any worsening shortness of breath.  5.  Instructed to obtain echocardiogram as soon as possible.  6.  Follow-up arranged.

## 2018-10-02 ENCOUNTER — OFFICE VISIT (OUTPATIENT)
Dept: NEUROLOGY | Facility: MEDICAL CENTER | Age: 63
End: 2018-10-02
Payer: COMMERCIAL

## 2018-10-02 VITALS
HEART RATE: 80 BPM | DIASTOLIC BLOOD PRESSURE: 64 MMHG | TEMPERATURE: 98 F | OXYGEN SATURATION: 95 % | WEIGHT: 215.8 LBS | RESPIRATION RATE: 16 BRPM | HEIGHT: 70 IN | BODY MASS INDEX: 30.9 KG/M2 | SYSTOLIC BLOOD PRESSURE: 132 MMHG

## 2018-10-02 DIAGNOSIS — R25.1 TREMOR: Primary | ICD-10-CM

## 2018-10-02 PROCEDURE — 99205 OFFICE O/P NEW HI 60 MIN: CPT | Performed by: PSYCHIATRY & NEUROLOGY

## 2018-10-02 RX ORDER — GABAPENTIN 300 MG/1
CAPSULE ORAL
Qty: 120 CAP | Refills: 1 | Status: SHIPPED | OUTPATIENT
Start: 2018-10-02 | End: 2019-02-09

## 2018-10-02 ASSESSMENT — PAIN SCALES - GENERAL: PAINLEVEL: 4=SLIGHT-MODERATE PAIN

## 2018-10-02 ASSESSMENT — ENCOUNTER SYMPTOMS
TREMORS: 1
SENSORY CHANGE: 0
DOUBLE VISION: 0
DEPRESSION: 0
CONSTIPATION: 0
LOSS OF CONSCIOUSNESS: 0
MEMORY LOSS: 0
FOCAL WEAKNESS: 0

## 2018-10-02 ASSESSMENT — PATIENT HEALTH QUESTIONNAIRE - PHQ9: CLINICAL INTERPRETATION OF PHQ2 SCORE: 0

## 2018-10-02 NOTE — PROGRESS NOTES
Subjective:      Sharad Millan is a 62 y.o. male who presents from the office of Dr. Trevino, for consultation, with a history of upper extremity tremor dating back several years and which has slowly and steadily worsened.    HPI    Patient is a pleasant right-handed gentleman was tremulousness began indolently and has increased slowly ever since. Beginning in the hands, right more than left, there is now more of a bilateral issue. It is always increased when he is using the extremity, though it fluctuates in severity, improves consistently when he is more relaxed or in repose. When bad enough he even has an internalized sense of tremulousness though no one is aware of this. The head, neck and voice have never been involved. Stress, fatigue and anxiety will worsen the symptoms.    Voice quality has not been affected, he denies issues with loss of smell, drooling, visual change, postural instability when walking, loss of dexterity with the hands outside of that due to the tremor itself, focal weakness or sensory distortion. He has no history of constipation or change in urinary habit. He has had no change in his medication regimen during this time, medications have not been added in the hopes of providing symptomatic relief. He does not drink, so response to alcohol is moot. A directed workup has not been done.    Medical history is remarkable for CAD, PVD, hypertension, dyslipidemia, diabetes, aortic stenosis, and both vitamin B-12 and D deficiencies, no history of neurodegenerative disease, CVA, seizure, migraine, liver or kidney disease, blood dyscrasia, malignancy, or severe pulmonary disease. There is no surgical history of note from my standpoint.    He knows nothing of his father in his side of the family, no one in the immediate family tree including his mother, both siblings and 4 children have suffered from tremor, neurodegenerative disease, seizure, or migraine. 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    He is on Toprol-XL 50 mg daily, Crestor, Glucophage, Cozaar, glipizide, Jardiance and adult aspirin daily, the rest as per the electronic health record, noncontributory from my standpoint.    Review of Systems   Constitutional: Negative for malaise/fatigue.   Eyes: Negative for double vision.   Cardiovascular: Negative for leg swelling.   Gastrointestinal: Negative for constipation.   Genitourinary: Negative for dysuria.   Neurological: Positive for tremors. Negative for sensory change, focal weakness and loss of consciousness.   Psychiatric/Behavioral: Negative for depression and memory loss.   All other systems reviewed and are negative.       Objective:     /64 (BP Location: Left arm, Patient Position: Sitting)   Pulse 80   Temp 36.7 °C (98 °F) (Temporal)   Resp 16   Ht 1.778 m (5' 10\")   Wt 97.9 kg (215 lb 12.8 oz)   SpO2 95%   BMI 30.96 kg/m²      Physical Exam    He appears in no acute distress. His vital signs are stable. There was no malar rash or sialorrhea. The neck is supple, range of motion is full, carotid pulses are present bilaterally without asymmetry to palpation. Cardiac evaluation reveals a regular rhythm. There is alert from the edema.    He is fully oriented, there is no aphasia, apraxia, or inattention.    PERRLA/EOMI, visual fields are full to finger counting, there is no facial asymmetry, bradykinesia, hypophonia, or tachyphemia. Facial movements are symmetric, the tongue and uvula are midline, sensory exam is intact to temperature and light touch bilaterally, shoulder shrug and head rotation are intact.    Musculoskeletal exam reveals a fine tremulousness in both hands when held against gravity on sustained posture, low amplitude and higher frequency. Tone is normal in with distraction, the tremor attenuates at rest. Strength is 5/5 " bilaterally. Reflexes are diminished throughout, the ankle jerks are absent bilaterally, both toes are downgoing.    He stands easily, there is no postural instability, arm swing is symmetric. Stride length is maintained, he does not shuffle. There is no appendicular dystaxia. Tremulousness with the hands is seen through a full range of motion, it seems to increase slightly with intention only. Repetitive movements show good amplitude and frequencies bilaterally, and all 4 extremities, without asymmetry. Heel, toe, and even tandem walking can be done without issue.    Sensory exam reveals a mild stocking loss of vibration below the ankles bilaterally, temperature is intact throughout, Romberg is absent.     Assessment/Plan:     1. Tremor  As described, I suspect that this is an essential, benign tremor, an action based process, not something suggestive of Parkinson's disease or other EPS dysfunction. Dr. Trevino had recommended a trial of alcohol, as did I, to see if there is a response, he was told that a simple drink could suppress the tremor consistently, if so, the diagnosis is established. For now, symptomatic relief will be provided, I don't think additional diagnostics are indicated. This is not related to his other medical comorbidities, it is not a medication effect.    Face to face time was spent reviewing the nature of this type of tremor versus other symptomatic tremors or primary tremulousness including Parkinson's disease. The rationale for my treatment recommendations was reviewed in full. Already on a beta blocker, before I attempted changeover to Inderal, Neurontin will be tried first, starting at 300 mg daily at bedtime, increasing by 300 mg on a weekly basis up to 1200 mg daily at bedtime. Side effects were reviewed. Phone contact in the interim. Mysoline would be another option, though we have to be careful because of the nature of his work and soporific side effects that could occur. Still, if  his tremor is alcohol sensitive, Mysoline is more likely to provide benefit. Other medications would include carbonic anhydrase inhibitors. We will follow-up in 6 months, phone contact in the interim.    - gabapentin (NEURONTIN) 300 MG Cap; 1 tab at bed for 1 week, then 2 tab at bed for 1 week, then 3 tab at bed for 1 week, then 4 tab qhs.  Dispense: 120 Cap; Refill: 1    Time: 60 minutes spent face-to-face with the patient for exam, review, discussion, and education, of this over 50% of the time spent counseling and coordination care.

## 2018-12-26 ENCOUNTER — HOSPITAL ENCOUNTER (OUTPATIENT)
Dept: CARDIOLOGY | Facility: MEDICAL CENTER | Age: 63
End: 2018-12-26
Attending: INTERNAL MEDICINE
Payer: COMMERCIAL

## 2018-12-26 DIAGNOSIS — I35.0 AORTIC VALVE STENOSIS, ETIOLOGY OF CARDIAC VALVE DISEASE UNSPECIFIED: Chronic | ICD-10-CM

## 2018-12-26 LAB
LV EJECT FRACT  99904: 65
LV EJECT FRACT MOD 2C 99903: 58.78
LV EJECT FRACT MOD 4C 99902: 67.24
LV EJECT FRACT MOD BP 99901: 65.06

## 2018-12-26 PROCEDURE — 93306 TTE W/DOPPLER COMPLETE: CPT | Mod: 26 | Performed by: INTERNAL MEDICINE

## 2018-12-26 PROCEDURE — 93306 TTE W/DOPPLER COMPLETE: CPT

## 2018-12-27 ENCOUNTER — TELEPHONE (OUTPATIENT)
Dept: CARDIOLOGY | Facility: MEDICAL CENTER | Age: 63
End: 2018-12-27

## 2018-12-27 NOTE — TELEPHONE ENCOUNTER
CONCLUSIONS  Normal left ventricular systolic function.  Left ventricular ejection fraction is visually estimated to be 65-70%.  Moderate left ventricular hypertrophy.  Severe aortic stenosis.    Patient has a hx of severe AS and an appointment in Feb.  Do you want him to wait that long for the appt?

## 2019-01-09 PROCEDURE — 93010 ELECTROCARDIOGRAM REPORT: CPT | Performed by: INTERNAL MEDICINE

## 2019-01-10 ENCOUNTER — TELEPHONE (OUTPATIENT)
Dept: CARDIOLOGY | Facility: MEDICAL CENTER | Age: 64
End: 2019-01-10

## 2019-01-11 NOTE — TELEPHONE ENCOUNTER
His aortic stenosis is been severe for over a year.  Will wait for his regular follow-up.  Thank you

## 2019-01-28 ENCOUNTER — HOSPITAL ENCOUNTER (OUTPATIENT)
Dept: LAB | Facility: MEDICAL CENTER | Age: 64
End: 2019-01-28
Attending: INTERNAL MEDICINE
Payer: COMMERCIAL

## 2019-01-28 DIAGNOSIS — E11.9 TYPE 2 DIABETES MELLITUS WITHOUT COMPLICATION, WITHOUT LONG-TERM CURRENT USE OF INSULIN (HCC): Chronic | ICD-10-CM

## 2019-01-28 DIAGNOSIS — E78.5 DYSLIPIDEMIA: Chronic | ICD-10-CM

## 2019-01-28 LAB
ALBUMIN SERPL BCP-MCNC: 4.7 G/DL (ref 3.2–4.9)
ALBUMIN/GLOB SERPL: 1.7 G/DL
ALP SERPL-CCNC: 50 U/L (ref 30–99)
ALT SERPL-CCNC: 30 U/L (ref 2–50)
ANION GAP SERPL CALC-SCNC: 10 MMOL/L (ref 0–11.9)
AST SERPL-CCNC: 26 U/L (ref 12–45)
BILIRUB SERPL-MCNC: 0.6 MG/DL (ref 0.1–1.5)
BUN SERPL-MCNC: 19 MG/DL (ref 8–22)
CALCIUM SERPL-MCNC: 9.7 MG/DL (ref 8.5–10.5)
CHLORIDE SERPL-SCNC: 100 MMOL/L (ref 96–112)
CHOLEST SERPL-MCNC: 116 MG/DL (ref 100–199)
CO2 SERPL-SCNC: 25 MMOL/L (ref 20–33)
CREAT SERPL-MCNC: 0.84 MG/DL (ref 0.5–1.4)
CREAT UR-MCNC: 66.3 MG/DL
EST. AVERAGE GLUCOSE BLD GHB EST-MCNC: 226 MG/DL
FASTING STATUS PATIENT QL REPORTED: NORMAL
GLOBULIN SER CALC-MCNC: 2.7 G/DL (ref 1.9–3.5)
GLUCOSE SERPL-MCNC: 153 MG/DL (ref 65–99)
HBA1C MFR BLD: 9.5 % (ref 0–5.6)
HDLC SERPL-MCNC: 36 MG/DL
LDLC SERPL CALC-MCNC: 48 MG/DL
MICROALBUMIN UR-MCNC: 4.4 MG/DL
MICROALBUMIN/CREAT UR: 66 MG/G (ref 0–30)
POTASSIUM SERPL-SCNC: 4 MMOL/L (ref 3.6–5.5)
PROT SERPL-MCNC: 7.4 G/DL (ref 6–8.2)
SODIUM SERPL-SCNC: 135 MMOL/L (ref 135–145)
TRIGL SERPL-MCNC: 161 MG/DL (ref 0–149)

## 2019-01-28 PROCEDURE — 83036 HEMOGLOBIN GLYCOSYLATED A1C: CPT

## 2019-01-28 PROCEDURE — 80053 COMPREHEN METABOLIC PANEL: CPT

## 2019-01-28 PROCEDURE — 80061 LIPID PANEL: CPT

## 2019-01-28 PROCEDURE — 82043 UR ALBUMIN QUANTITATIVE: CPT

## 2019-01-28 PROCEDURE — 82570 ASSAY OF URINE CREATININE: CPT

## 2019-01-28 PROCEDURE — 36415 COLL VENOUS BLD VENIPUNCTURE: CPT

## 2019-01-28 RX ORDER — EMPAGLIFLOZIN 25 MG/1
TABLET, FILM COATED ORAL
Qty: 90 TAB | Refills: 3 | Status: SHIPPED | OUTPATIENT
Start: 2019-01-28 | End: 2020-03-03

## 2019-01-29 RX ORDER — METOPROLOL SUCCINATE 50 MG/1
TABLET, EXTENDED RELEASE ORAL
Qty: 180 TAB | Refills: 3 | Status: SHIPPED | OUTPATIENT
Start: 2019-01-29 | End: 2020-08-03

## 2019-01-31 ENCOUNTER — OFFICE VISIT (OUTPATIENT)
Dept: URGENT CARE | Facility: PHYSICIAN GROUP | Age: 64
End: 2019-01-31
Payer: COMMERCIAL

## 2019-01-31 VITALS
BODY MASS INDEX: 31.5 KG/M2 | HEART RATE: 77 BPM | DIASTOLIC BLOOD PRESSURE: 70 MMHG | WEIGHT: 220 LBS | RESPIRATION RATE: 14 BRPM | OXYGEN SATURATION: 96 % | TEMPERATURE: 98.1 F | HEIGHT: 70 IN | SYSTOLIC BLOOD PRESSURE: 140 MMHG

## 2019-01-31 DIAGNOSIS — R21 RASH IN ADULT: ICD-10-CM

## 2019-01-31 DIAGNOSIS — L29.8 PRURITIC ERYTHEMATOUS RASH: ICD-10-CM

## 2019-01-31 PROCEDURE — 99214 OFFICE O/P EST MOD 30 MIN: CPT | Performed by: NURSE PRACTITIONER

## 2019-01-31 RX ORDER — GLIPIZIDE 10 MG/1
10 TABLET, FILM COATED, EXTENDED RELEASE ORAL EVERY MORNING
Qty: 90 TAB | Refills: 3 | Status: SHIPPED | OUTPATIENT
Start: 2019-01-31 | End: 2020-06-06 | Stop reason: SDUPTHER

## 2019-01-31 RX ORDER — METHYLPREDNISOLONE 4 MG/1
4 TABLET ORAL DAILY
Qty: 1 KIT | Refills: 0 | Status: SHIPPED | OUTPATIENT
Start: 2019-01-31 | End: 2019-02-09

## 2019-01-31 RX ORDER — KETOCONAZOLE 20 MG/G
1 CREAM TOPICAL 2 TIMES DAILY
Qty: 1 G | Refills: 0 | Status: SHIPPED | OUTPATIENT
Start: 2019-01-31 | End: 2020-01-06

## 2019-01-31 RX ORDER — BETAMETHASONE DIPROPIONATE 0.05 %
OINTMENT (GRAM) TOPICAL
Qty: 1 G | Refills: 0 | Status: SHIPPED | OUTPATIENT
Start: 2019-01-31 | End: 2020-01-06

## 2019-02-01 NOTE — PROGRESS NOTES
Subjective:      Sharad Millan is a 63 y.o. male who presents with Rash (on arms itching )            Rash   This is a new problem. Episode onset: pt reports new onset of rash to both forearms that developed 2 weeks ago. The problem is unchanged. The rash is characterized by redness, itchiness and dryness. It is unknown if there was an exposure to a precipitant. Treatments tried: aloe vera ointment, calamine lotion, rubbing alcohol. The treatment provided no relief. There is no history of eczema.       Review of Systems   Skin: Positive for itching and rash.   All other systems reviewed and are negative.    Past Medical History:   Diagnosis Date   • Aortic stenosis 10/20/2011   • Arrhythmia    • Carotid bruit 12/9/2009   • Chronic right shoulder pain    • Diabetes    • Fatty liver 1/17/2011   • Heart attack (HCC)    • History of cardiac catheterization 7/13/2009   • History of echocardiogram 10/20/2011   • HTN (hypertension) 10/12/2012   • Hypertension    • Memory loss 10/20/2011   • Murmur 7/7/2009   • Obesity 8/23/2011   • Palpitations 10/20/2011   • PSVT (paroxysmal supraventricular tachycardia) (HCC) 2/14/2012   • S/P coronary artery stent placement 1996    coronary stent implantation , [facility]   • S/P coronary artery stent placement 1998    coronary stent implantation; [facility]   • S/P coronary artery stent placement 2003    cardiac catheterization; patent stents; [place]   • Sciatica 8/23/2011   • Uncontrolled diabetes mellitus (HCC) 11/29/2010      Past Surgical History:   Procedure Laterality Date   • PO BY LAPAROSCOPY N/A 10/25/2015    Procedure: PO BY LAPAROSCOPY-with grams ;  Surgeon: Ronnie Bowman M.D.;  Location: SURGERY [facility];  Service:    • CAROTID STENT ANGIOGRAPHY  1996   • CATARACT EXTRACTION WITH IOL        Social History     Social History   • Marital status:      Spouse name: N/A   • Number of children: N/A   • Years of education: N/A  "    Occupational History   • Not on file.     Social History Main Topics   • Smoking status: Never Smoker   • Smokeless tobacco: Never Used   • Alcohol use No   • Drug use: No   • Sexual activity: Yes     Partners: Female     Other Topics Concern   • Not on file     Social History Narrative   • No narrative on file          Objective:     /70   Pulse 77   Temp 36.7 °C (98.1 °F) (Temporal)   Resp 14   Ht 1.778 m (5' 10\")   Wt 99.8 kg (220 lb)   SpO2 96%   BMI 31.57 kg/m²      Physical Exam   Constitutional: He is oriented to person, place, and time. Vital signs are normal. He appears well-developed and well-nourished.   HENT:   Head: Normocephalic and atraumatic.   Eyes: Pupils are equal, round, and reactive to light. EOM are normal.   Neck: Normal range of motion.   Cardiovascular: Normal rate and regular rhythm.    Pulmonary/Chest: Effort normal.   Musculoskeletal: Normal range of motion.   Neurological: He is alert and oriented to person, place, and time.   Skin: Skin is warm and dry. Capillary refill takes less than 2 seconds. Rash noted. Rash is maculopapular.        Psychiatric: He has a normal mood and affect. His speech is normal and behavior is normal. Thought content normal.   Vitals reviewed.              Assessment/Plan:     1. Pruritic erythematous rash  - MethylPREDNISolone (MEDROL DOSEPAK) 4 MG Tablet Therapy Pack; Take 1 Tab by mouth every day.  Dispense: 1 Kit; Refill: 0  - ketoconazole (NIZORAL) 2 % Cream; Apply 1 Application to affected area(s) 2 times a day.  Dispense: 1 g; Refill: 0  - betamethasone dipropionate (DIPROLENE) 0.05 % Ointment; Apply to affected areas twice daily for one week  Dispense: 1 g; Refill: 0    2. Rash in adult    Unknown cause of rash  OTC benadryl at night for itch relief  OTC daily non drowsy antihistamine  Supportive care, differential diagnoses, and indications for immediate follow-up discussed with patient.    Pathogenesis of diagnosis discussed including " typical length and natural progression.      Instructed to return to  or nearest emergency department if symptoms fail to improve, for any change in condition, further concerns, or new concerning symptoms.  Patient states understanding of the plan of care and discharge instructions.

## 2019-02-03 RX ORDER — ROSUVASTATIN CALCIUM 10 MG/1
TABLET, COATED ORAL
Qty: 90 TAB | Refills: 2 | Status: SHIPPED | OUTPATIENT
Start: 2019-02-03 | End: 2019-10-07 | Stop reason: SDUPTHER

## 2019-02-05 ENCOUNTER — OFFICE VISIT (OUTPATIENT)
Dept: CARDIOLOGY | Facility: MEDICAL CENTER | Age: 64
End: 2019-02-05
Payer: COMMERCIAL

## 2019-02-05 VITALS
OXYGEN SATURATION: 96 % | HEART RATE: 78 BPM | WEIGHT: 213.2 LBS | HEIGHT: 70 IN | DIASTOLIC BLOOD PRESSURE: 72 MMHG | BODY MASS INDEX: 30.52 KG/M2 | SYSTOLIC BLOOD PRESSURE: 134 MMHG

## 2019-02-05 DIAGNOSIS — I10 ESSENTIAL HYPERTENSION: ICD-10-CM

## 2019-02-05 DIAGNOSIS — I25.10 CORONARY ARTERY DISEASE, NON-OCCLUSIVE: ICD-10-CM

## 2019-02-05 DIAGNOSIS — I35.0 AORTIC VALVE STENOSIS, ETIOLOGY OF CARDIAC VALVE DISEASE UNSPECIFIED: Chronic | ICD-10-CM

## 2019-02-05 DIAGNOSIS — E78.5 DYSLIPIDEMIA: Chronic | ICD-10-CM

## 2019-02-05 PROCEDURE — 99214 OFFICE O/P EST MOD 30 MIN: CPT | Performed by: INTERNAL MEDICINE

## 2019-02-05 RX ORDER — METOPROLOL SUCCINATE 25 MG/1
75 TABLET, EXTENDED RELEASE ORAL 2 TIMES DAILY
Refills: 0 | COMMUNITY
Start: 2018-12-20 | End: 2019-03-29 | Stop reason: SDUPTHER

## 2019-02-05 ASSESSMENT — ENCOUNTER SYMPTOMS
LOSS OF CONSCIOUSNESS: 0
SHORTNESS OF BREATH: 1
COUGH: 0
PALPITATIONS: 0
MYALGIAS: 0
DIZZINESS: 0

## 2019-02-05 NOTE — PROGRESS NOTES
Chief Complaint   Patient presents with   • Aortic Stenosis   • Coronary Artery Disease   • HTN (Uncontrolled)   • Supraventricular Tachycardia (SVT)       Subjective:   Sharad Millan is a 62 y.o. male who presents today for a followup evaluation of aortic stenosis, CAD, coronary stent, palpitations, PSVT, hypertension and hyperlipidemia plus recent hospitalization.     Last seen on 10/1/2018.    Since 10/1/2018 appointment the patient was hospitalized.  [place]  Hospitalized 12/12/2018 at [facility].  Presented with SVT and chest pain converted with adenosine.  NSTEMI.  Peak troponin 1.7.  MPI was normal with a EF of 56%.  Discharged on increased dose of metoprolol of 75 mg twice daily.  Since discharge no recurrent chest discomfort or SVT.  Denies exertional shortness of breath but admits to using inhaler for episodic shortness of breath.    Since 1/18/2018 the patient reports subtle symptoms of exertional shortness of breath.  No lightheadedness dizziness or syncope.     Since 11/20/2017 appointment the patient has had no cardiac symptoms.  Continues to work full time mainly in [place].  Has developed severe left hip pain and has sought chiropractic and acupuncture therapy.  Was seen at urgent care and was given prednisone and anti-inflammatory and ultimately had some just transient relief but has come back and continues to be a problem.     Since 10/13/2017 appointment the patient's developed a upper respiratory infection being treated conservatively.  At the time of his last appointment and echocardiogram was done now shows severe aortic stenosis.  The patient has no new cardiac symptoms referenced to his aortic stenosis.  He reports 2 weeks ago while eating at a restaurant he had a left upper chest pain that lasted for one hour, resolved spontaneously and has not recurred.     Since 8/31/2016 appointment the patient's had no cardiac symptoms.  Had two  episodes of palpitations responsive to vagal maneuver.  No lightheadedness or dizziness.  No angina pectoralis.  No shortness of breath.     Since his October, 2015 hospitalization the patient has had almost constant sharp left localized anterior sharp stabbing chest pain radiating to his back.  Not related to exertion.  No shortness of breath or angina pectoris.  No palpitations or syncope.     Between 10/21/2015-10/27/2015 hospitalized at [facility].  Gallstone pancreatitis.  Laparoscopic cholecystectomy.  Echocardiogram showed moderate aortic stenosis.     On 12/3/2013 [facility] ER.  SVT.  Converted with a adenosine.  Toprol increased 100 mg daily.     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  He developed chest pain.  He took a customers sublingual nitroglycerin with relief.  He went to [facility] ER.  EKG showed no changes.  Patient left after waiting an hour and a half to be seen by the doctor.    Past Medical History:   Diagnosis Date   • Aortic stenosis 10/20/2011   • Arrhythmia    • Carotid bruit 12/9/2009   • Chronic right shoulder pain    • Diabetes    • Fatty liver 1/17/2011   • Heart attack (HCC)    • History of cardiac catheterization 7/13/2009   • History of echocardiogram 10/20/2011   • HTN (hypertension) 10/12/2012   • Hypertension    • Memory loss 10/20/2011   • Murmur 7/7/2009   • Obesity 8/23/2011   • Palpitations 10/20/2011   • PSVT (paroxysmal supraventricular tachycardia) ([facility]) 2/14/2012   • S/P coronary artery stent placement 1996    coronary stent implantation , [facility]   • S/P coronary artery stent placement 1998    coronary stent implantation; [facility]   • S/P coronary artery stent placement 2003    cardiac catheterization; patent stents; [place]   • Sciatica 8/23/2011   • Uncontrolled diabetes mellitus (HCC) 11/29/2010     Past Surgical History:   Procedure Laterality Date   • PO BY LAPAROSCOPY N/A 10/25/2015    Procedure: PO  BY LAPAROSCOPY-with grams ;  Surgeon: Ronnie Bowman M.D.;  Location: SURGERY [facility];  Service:    • CAROTID STENT ANGIOGRAPHY  1996   • CATARACT EXTRACTION WITH IOL       Family History   Problem Relation Age of Onset   • Heart Disease Mother    • Heart Disease Father      Social History     Social History   • Marital status:      Spouse name: N/A   • Number of children: N/A   • Years of education: N/A     Occupational History   • Not on file.     Social History Main Topics   • Smoking status: Never Smoker   • Smokeless tobacco: Never Used   • Alcohol use No   • Drug use: No   • Sexual activity: Yes     Partners: Female     Other Topics Concern   • Not on file     Social History Narrative   • No narrative on file     Allergies   Allergen Reactions   • Bydureon [Exenatide] Hives     hives   • Cycloset [Bromocriptine Mesylate] Unspecified     Pt states he can't remember what happens to him.   • Morphine Vomiting     Outpatient Encounter Prescriptions as of 2/5/2019   Medication Sig Dispense Refill   • metoprolol SR (TOPROL XL) 25 MG TABLET SR 24 HR Take 75 mg by mouth 2 Times a Day.  0   • rosuvastatin (CRESTOR) 10 MG Tab TAKE 1 TABLET BY MOUTH EVERY EVENING 90 Tab 2   • ketoconazole (NIZORAL) 2 % Cream Apply 1 Application to affected area(s) 2 times a day. 1 g 0   • glipiZIDE SR (GLUCOTROL) 10 MG TABLET SR 24 HR TAKE 1 TAB BY MOUTH EVERY MORNING. 90 Tab 3   • metoprolol SR (TOPROL XL) 50 MG TABLET SR 24 HR TAKE 1 TABLET BY MOUTH 2 TIMES A  Tab 3   • metformin (GLUCOPHAGE) 1000 MG tablet TAKE 1 TABLET BY MOUTH 2 TIMES A DAY. 180 Tab 3   • JARDIANCE 25 MG Tab TAKE 1 TABLET BY MOUTH EVERY DAY 90 Tab 3   • losartan (COZAAR) 100 MG Tab Take 1 Tab by mouth every day. 90 Each 3   • glucose blood strip 1 Strip by Other route 2 Times a Day. 60 Strip 11   • ACCU-CHEK MULTICLIX LANCETS Misc 1 Each by Does not apply route 2 Times a Day. 180 Each 12   • aspirin (ASA) 325 MG TABS Take 325 mg by mouth  "every 48 hours. 1 tab po every other day     • MethylPREDNISolone (MEDROL DOSEPAK) 4 MG Tablet Therapy Pack Take 1 Tab by mouth every day. 1 Kit 0   • betamethasone dipropionate (DIPROLENE) 0.05 % Ointment Apply to affected areas twice daily for one week 1 g 0   • Empagliflozin (JARDIANCE) 25 MG Tab Take  by mouth.     • gabapentin (NEURONTIN) 300 MG Cap 1 tab at bed for 1 week, then 2 tab at bed for 1 week, then 3 tab at bed for 1 week, then 4 tab qhs. 120 Cap 1   • Naproxen Sodium (ALEVE PO) Take  by mouth.     • albuterol 108 (90 Base) MCG/ACT Aero Soln inhalation aerosol Inhale 2 Puffs by mouth every 6 hours as needed for Shortness of Breath. (Patient not taking: Reported on 1/31/2019) 8.5 g 6     No facility-administered encounter medications on file as of 2/5/2019.      Review of Systems   Respiratory: Positive for shortness of breath. Negative for cough.    Cardiovascular: Negative for chest pain and palpitations.   Musculoskeletal: Negative for myalgias.   Neurological: Negative for dizziness and loss of consciousness.        Objective:   /72 (BP Location: Left arm, Patient Position: Sitting, BP Cuff Size: Adult)   Pulse 78   Ht 1.778 m (5' 10\")   Wt 96.7 kg (213 lb 3.2 oz)   SpO2 96%   BMI 30.59 kg/m²      Physical Exam   Constitutional: He is oriented to person, place, and time. He appears well-developed and well-nourished.   Neck: No JVD present.   Cardiovascular: Normal rate, regular rhythm and intact distal pulses.    Murmur heard.  Pulses:       Carotid pulses are on the right side with bruit, and on the left side with bruit.  Pulmonary/Chest: Effort normal and breath sounds normal. No respiratory distress. He has no wheezes. He has no rales.   Musculoskeletal: He exhibits no edema.   Neurological: He is alert and oriented to person, place, and time.   Skin: Skin is warm and dry.   Psychiatric: He has a normal mood and affect. His behavior is normal.     CAROTID ULTRASOUND 11/01/2017  Mild " bilateral internal carotid artery stenosis less than 50%.    08/08/2012 ECHOCARDIOGRAM  EF 55-60%. Moderate aortic stenosis. Peak aortic valve gradient 63 mmHg. Moderate LVH.      07/25/2013 ECHOCARDIOGRAM  EF 70-75%. Moderate aortic stenosis. Peak aortic valve gradient 59 mmHg. Moderate LVH.     10/23/2015 ECHOCARDIOGRAM  Normal left ventricular systolic function.  Left ventricular ejection fraction is visually estimated to be 60%.  Grade I diastolic dysfunction.  Mild mitral regurgitation.  Moderate aortic stenosis.  Vmax 3.47 m/s. Transvalvular gradients are - Peak: 48 mmHg, Mean: 30   mmHg.  Mild pulmonic insufficiency.     11/01/2017 ECHOCARDIOGRAM  Normal left ventricular chamber size.  Left ventricular ejection fraction is visually estimated to be 60%.  Grade I diastolic dysfunction.  Severe aortic stenosis.  Transvalvular gradients are - Peak: 72 mmHg, Mean: 50 mmHg.  Ascending aorta is borderline dilated with a diameter of 3.6 cm.  Compared to the report of the prior study done - the aortic stenosis is   now severe.    12/26/2018 ECHOCARDIOGRAM    Normal left ventricular systolic function.  Left ventricular ejection fraction is visually estimated to be 65-70%.  Moderate left ventricular hypertrophy.  Severe aortic stenosis.    12/19/2017 MPI   No evidence of significant jeopardized viable myocardium or prior myocardial    infarction.   Transient ischemic dilatation calculated at 1.17 visually correlated.   Normal left ventricular size, ejection fraction, and wall motion.    12/12/2018 MPI  Normal perfusion.  EF 56%.    08/14/2012 Lab: Cholesterol 109. Triglycerides 121. HDL 36. LDL 49.  03/28/2013 Lab: Cholesterol 98. Triglycerides 110. HDL 30. LDL 46.     09/27/2013 EKG: Normal sinus rhythm. No acute changes.  11/20/2017 EKG: Normal sinus rhythm, rate 73. Minor lateral ST-T wave abnormalities. 1st degree AV block. Reviewed by myself.    Assessment:     No diagnosis found.    Medical Decision Making:   Today's Assessment / Status / Plan:   Aortic stenosis. Severe.  Symptomatic     CAD. Clinically stable.      Coronary stent implantation: 1996. 1998.    NSTEMI 12/12/2018.  Related to recurrent SVT.     Hyperlipidemia. Continue current therapy. Check lipid panel.     Hypertension. Controlled. Continue current therapy. CMP pending.     Bilateral carotid bruits.     Obesity. Counseled for weight loss.     SVT. 12/3/2013. 10/22/2015 12/12/2018.      Right hip pain.     Situational stress syndrome.      Recommendation and Discussion  1.  I had a detailed discussion with the patient with regards to his current cardiac condition in particular related to his severe aortic stenosis which I believe is symptomatic with shortness of breath.  2.  I implored the patient to proceed with further evaluation for aortic valve replacement.  3.  The patient adamantly refuses further evaluation.  4.  The patient is nonetheless agreeable to report any worsening symptoms.  5.  Reviewed recent blood work including lipid panel on 1/28/2019 which is satisfactory.  6.  Continue current cardiac therapy.  7.  Follow-up 4 months.

## 2019-02-15 ENCOUNTER — OFFICE VISIT (OUTPATIENT)
Dept: URGENT CARE | Facility: PHYSICIAN GROUP | Age: 64
End: 2019-02-15
Payer: COMMERCIAL

## 2019-02-15 VITALS
TEMPERATURE: 97.7 F | RESPIRATION RATE: 13 BRPM | HEIGHT: 70 IN | OXYGEN SATURATION: 96 % | BODY MASS INDEX: 30.35 KG/M2 | HEART RATE: 89 BPM | DIASTOLIC BLOOD PRESSURE: 84 MMHG | WEIGHT: 212 LBS | SYSTOLIC BLOOD PRESSURE: 144 MMHG

## 2019-02-15 DIAGNOSIS — M54.31 SCIATICA OF RIGHT SIDE: ICD-10-CM

## 2019-02-15 PROCEDURE — 99214 OFFICE O/P EST MOD 30 MIN: CPT | Performed by: NURSE PRACTITIONER

## 2019-02-15 RX ORDER — METHYLPREDNISOLONE 4 MG/1
4 TABLET ORAL DAILY
Qty: 1 KIT | Refills: 0 | Status: SHIPPED | OUTPATIENT
Start: 2019-02-15 | End: 2019-02-20

## 2019-02-15 RX ORDER — KETOROLAC TROMETHAMINE 30 MG/ML
60 INJECTION, SOLUTION INTRAMUSCULAR; INTRAVENOUS ONCE
Status: COMPLETED | OUTPATIENT
Start: 2019-02-15 | End: 2019-02-15

## 2019-02-15 RX ORDER — CYCLOBENZAPRINE HCL 5 MG
5-10 TABLET ORAL 3 TIMES DAILY PRN
Qty: 30 TAB | Refills: 0 | Status: SHIPPED | OUTPATIENT
Start: 2019-02-15 | End: 2020-01-06

## 2019-02-15 RX ORDER — OXYCODONE HYDROCHLORIDE AND ACETAMINOPHEN 5; 325 MG/1; MG/1
1-2 TABLET ORAL EVERY 4 HOURS PRN
Qty: 15 TAB | Refills: 0 | Status: SHIPPED | OUTPATIENT
Start: 2019-02-15 | End: 2019-02-18

## 2019-02-15 RX ADMIN — KETOROLAC TROMETHAMINE 60 MG: 30 INJECTION, SOLUTION INTRAMUSCULAR; INTRAVENOUS at 16:50

## 2019-02-15 ASSESSMENT — PAIN SCALES - GENERAL: PAINLEVEL: 9=SEVERE PAIN

## 2019-02-15 ASSESSMENT — ENCOUNTER SYMPTOMS
FEVER: 0
BACK PAIN: 1
DIZZINESS: 0
TINGLING: 1
SHORTNESS OF BREATH: 0
BOWEL INCONTINENCE: 0
PARESIS: 0
VOMITING: 0
EYE PAIN: 0
NAUSEA: 0
PARESTHESIAS: 0
PERIANAL NUMBNESS: 0
LEG PAIN: 1
WEIGHT LOSS: 0
MYALGIAS: 0
CHILLS: 0
SORE THROAT: 0

## 2019-02-16 NOTE — PROGRESS NOTES
"Subjective:   Sharad Millan is a 63 y.o. male who presents for Back Pain (right side sciatica x 1 week)        Back Pain   This is a new problem. The current episode started in the past 7 days. The problem occurs constantly. The problem is unchanged. The pain is present in the lumbar spine. The quality of the pain is described as shooting. The pain does not radiate. The pain is at a severity of 10/10. The pain is severe. The pain is the same all the time. The symptoms are aggravated by twisting, standing, lying down, position and bending. Stiffness is present all day. Associated symptoms include leg pain and tingling. Pertinent negatives include no bladder incontinence, bowel incontinence, chest pain, fever, paresis, paresthesias, pelvic pain, perianal numbness or weight loss. He has tried NSAIDs for the symptoms. The treatment provided no relief.     Review of Systems   Constitutional: Negative for chills, fever and weight loss.   HENT: Negative for sore throat.    Eyes: Negative for pain.   Respiratory: Negative for shortness of breath.    Cardiovascular: Negative for chest pain.   Gastrointestinal: Negative for bowel incontinence, nausea and vomiting.   Genitourinary: Negative for bladder incontinence, hematuria and pelvic pain.   Musculoskeletal: Positive for back pain. Negative for myalgias.   Skin: Negative for rash.   Neurological: Positive for tingling. Negative for dizziness and paresthesias.     Allergies   Allergen Reactions   • Bydureon [Exenatide] Hives     hives   • Cycloset [Bromocriptine Mesylate] Unspecified     Pt states he can't remember what happens to him.   • Morphine Vomiting      Objective:   /84   Pulse 89   Temp 36.5 °C (97.7 °F)   Resp 13   Ht 1.778 m (5' 10\")   Wt 96.2 kg (212 lb)   SpO2 96%   BMI 30.42 kg/m²   Physical Exam   Constitutional: He is oriented to person, place, and time. He appears well-developed and well-nourished. No distress.   HENT:   Head: " Normocephalic and atraumatic.   Right Ear: Tympanic membrane normal.   Left Ear: Tympanic membrane normal.   Nose: Nose normal. Right sinus exhibits no maxillary sinus tenderness and no frontal sinus tenderness. Left sinus exhibits no maxillary sinus tenderness and no frontal sinus tenderness.   Mouth/Throat: Uvula is midline, oropharynx is clear and moist and mucous membranes are normal. No posterior oropharyngeal edema, posterior oropharyngeal erythema or tonsillar abscesses. No tonsillar exudate.   Eyes: Pupils are equal, round, and reactive to light. Conjunctivae and EOM are normal. Right eye exhibits no discharge. Left eye exhibits no discharge.   Cardiovascular: Normal rate and regular rhythm.    No murmur heard.  Pulmonary/Chest: Effort normal and breath sounds normal. No respiratory distress.   Abdominal: Soft. He exhibits no distension. There is no tenderness.   Musculoskeletal:        Lumbar back: He exhibits decreased range of motion, tenderness, pain and spasm. He exhibits no swelling.        Back:    Spine- without midline tenderness, step-off or deformity. Without scoliosis or kyphosis. Without noted paraspinous spasm. DROM with lateral bend, and flexion/extension. Without noted tenderness over the sacroiliac notches. Sensation intact bilaterally, BLE motor 5/5 and symmetrical  strength. Negative Straight leg raise.  Gait- WNL without foot drop. TTP of left gluteal region  Appears to be in severe amount of pain.      Neurological: He is alert and oriented to person, place, and time. He has normal reflexes. No sensory deficit.   Skin: Skin is warm, dry and intact.   Psychiatric: He has a normal mood and affect.         Assessment/Plan:   1. Sciatica of right side  - REFERRAL TO EMG - PHYSIATRY (PMR)  - ketorolac (TORADOL) injection 60 mg; 2 mL by Intramuscular route Once.  - MethylPREDNISolone (MEDROL DOSEPAK) 4 MG Tablet Therapy Pack; Take 1 Tab by mouth every day.  Dispense: 1 Kit; Refill: 0  -  cyclobenzaprine (FLEXERIL) 5 MG tablet; Take 1-2 Tabs by mouth 3 times a day as needed.  Dispense: 30 Tab; Refill: 0  - oxyCODONE-acetaminophen (PERCOCET) 5-325 MG Tab; Take 1-2 Tabs by mouth every four hours as needed for up to 3 days.  Dispense: 15 Tab; Refill: 0  - Consent for Opiate Prescription  Patient appears to be in severe amount of pain in exam room.[employer]ck query was performed to review the . The patient appears appropriate to receive medication as prescribed.    Advised prescribed medication will cause sedation, avoid driving, operating heavy machinery, and drinking alcohol    Patient given precautionary s/sx that mandate immediate follow up and evaluation in the ED. Advised of risks of not doing so.    DDX, Supportive care, and indications for immediate follow-up discussed with patient.    Instructed to return to clinic or nearest emergency department if we are not available for any change in condition, further concerns, or worsening of symptoms.    The patient demonstrated a good understanding and agreed with the treatment plan.

## 2019-02-20 ENCOUNTER — OFFICE VISIT (OUTPATIENT)
Dept: PHYSICAL MEDICINE AND REHAB | Facility: MEDICAL CENTER | Age: 64
End: 2019-02-20
Payer: COMMERCIAL

## 2019-02-20 VITALS
TEMPERATURE: 97.2 F | WEIGHT: 217.15 LBS | BODY MASS INDEX: 31.09 KG/M2 | SYSTOLIC BLOOD PRESSURE: 128 MMHG | DIASTOLIC BLOOD PRESSURE: 62 MMHG | OXYGEN SATURATION: 97 % | HEIGHT: 70 IN | HEART RATE: 82 BPM

## 2019-02-20 DIAGNOSIS — M47.816 LUMBAR SPONDYLOSIS: ICD-10-CM

## 2019-02-20 DIAGNOSIS — R20.0 NUMBNESS AND TINGLING OF RIGHT LEG: ICD-10-CM

## 2019-02-20 DIAGNOSIS — M53.3 CHRONIC RIGHT SI JOINT PAIN: ICD-10-CM

## 2019-02-20 DIAGNOSIS — R00.2 PALPITATIONS: Chronic | ICD-10-CM

## 2019-02-20 DIAGNOSIS — F40.240 CLAUSTROPHOBIA: ICD-10-CM

## 2019-02-20 DIAGNOSIS — M51.36 LUMBAR DEGENERATIVE DISC DISEASE: ICD-10-CM

## 2019-02-20 DIAGNOSIS — R20.2 NUMBNESS AND TINGLING OF RIGHT LEG: ICD-10-CM

## 2019-02-20 DIAGNOSIS — I25.10 CORONARY ARTERY DISEASE INVOLVING NATIVE CORONARY ARTERY OF NATIVE HEART WITHOUT ANGINA PECTORIS: Chronic | ICD-10-CM

## 2019-02-20 DIAGNOSIS — M54.41 ACUTE BILATERAL LOW BACK PAIN WITH RIGHT-SIDED SCIATICA: ICD-10-CM

## 2019-02-20 DIAGNOSIS — Z95.5 S/P CORONARY ARTERY STENT PLACEMENT: ICD-10-CM

## 2019-02-20 DIAGNOSIS — M51.36 OTHER INTERVERTEBRAL DISC DEGENERATION, LUMBAR REGION: ICD-10-CM

## 2019-02-20 DIAGNOSIS — E11.9 TYPE 2 DIABETES MELLITUS WITHOUT COMPLICATION, WITHOUT LONG-TERM CURRENT USE OF INSULIN (HCC): Chronic | ICD-10-CM

## 2019-02-20 DIAGNOSIS — G89.29 CHRONIC RIGHT SI JOINT PAIN: ICD-10-CM

## 2019-02-20 DIAGNOSIS — R29.898 WEAKNESS OF RIGHT LOWER EXTREMITY: ICD-10-CM

## 2019-02-20 PROCEDURE — 99205 OFFICE O/P NEW HI 60 MIN: CPT | Performed by: PHYSICAL MEDICINE & REHABILITATION

## 2019-02-20 RX ORDER — ALPRAZOLAM 1 MG/1
1 TABLET ORAL PRN
Qty: 2 TAB | Refills: 0 | Status: SHIPPED | OUTPATIENT
Start: 2019-02-20 | End: 2019-02-21

## 2019-02-20 ASSESSMENT — PATIENT HEALTH QUESTIONNAIRE - PHQ9: CLINICAL INTERPRETATION OF PHQ2 SCORE: 0

## 2019-02-20 ASSESSMENT — PAIN SCALES - GENERAL: PAINLEVEL: 8=MODERATE-SEVERE PAIN

## 2019-02-20 NOTE — PROGRESS NOTES
New patient note    Physiatry (physical medicine and  Rehabilitation), interventional spine and sports medicine    Date of Service: 2/20/2019    Chief complaint:   Chief Complaint   Patient presents with   • New Patient     low back pain        HISTORY    HPI: Sharad Millan 63 y.o. male who presents today with Diagnoses of Lumbar spondylosis, Lumbar degenerative disc disease, Acute bilateral low back pain with right-sided sciatica, Chronic right SI joint pain, Palpitations, S/P coronary artery stent placement, Type 2 diabetes mellitus without complication, without long-term current use of insulin ([facility]), Coronary artery disease involving native coronary artery of native heart without angina pectoris, Claustrophobia, Other intervertebral disc degeneration, lumbar region, Weakness of right lower extremity right L5, and Numbness and tingling of right leg L5 were pertinent to this visit.    heart attack in December 2018. History of cardiac stent. States that he may need an aortic valve replacement. Followed by cardiology.     Has been to a chiropractor for the past 30 years. Tried acupuncture.     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     Chronic intermittent mostly right-sided low back pain radiating down the posterior aspect of the right leg and occasionally to the anterior aspect of the right groin.  He has had several bouts with severe flares of this.  Aching in quality.  Denies numbness.  The patient can do miles of exercise on an ecliptically machine  when this is not flared. Worse with sitting.     Tried NSAIDs, norco, oxycodone, medrol dosepak and muscle relaxers.     Psychological testing for pain. 15 minutes    Opioid Risk Score: 0    Interpretation of Opioid Risk Score   Score 0-3 = Low risk of abuse. Do UDS at least once per year.  Score 4-7 = Moderate risk of abuse. Do UDS 1-4 times per  year.  Score 8+ = High risk of abuse. Refer to specialist.    PHQ  Depression Screen (PHQ-2/PHQ-9) 5/7/2015 10/2/2018 2/20/2019   PHQ-2 Total Score 0 - -   PHQ-2 Total Score - 0 0       Interpretation of PHQ-9 Total Score   Score Severity   1-4 No Depression   5-9 Mild Depression   10-14 Moderate Depression   15-19 Moderately Severe Depression   20-27 Severe Depression     Medical records review:  I reviewed the note from the referring provider Rodger Sullivan A.P.* dated 2/15/2019.  Percocet, Medrol Dosepak, Flexeril were given.  Toradol shot was also given.  Patient was referred to physiatry..       ROS:   Red Flags ROS:   Fever, Chills, Sweats: Denies  Involuntary Weight Loss: Denies  Bladder Incontinence: Denies  Bowel Incontinence: denies  Saddle Anesthesia: Denies    All other systems reviewed and negative.       PMHx:   Past Medical History:   Diagnosis Date   • Aortic stenosis 10/20/2011   • Arrhythmia    • Carotid bruit 12/9/2009   • Chronic right shoulder pain    • Diabetes    • Fatty liver 1/17/2011   • Heart attack (HCC)    • History of cardiac catheterization 7/13/2009   • History of echocardiogram 10/20/2011   • HTN (hypertension) 10/12/2012   • Hypertension    • Memory loss 10/20/2011   • Murmur 7/7/2009   • Obesity 8/23/2011   • Palpitations 10/20/2011   • PSVT (paroxysmal supraventricular tachycardia) (HCC) 2/14/2012   • S/P coronary artery stent placement 1996    coronary stent implantation x3, [facility]   • S/P coronary artery stent placement 1998    coronary stent implantation; [facility]   • S/P coronary artery stent placement 2003    cardiac catheterization; patent stents; [place]   • Sciatica 8/23/2011   • Uncontrolled diabetes mellitus (HCC) 11/29/2010       PSHx:   Past Surgical History:   Procedure Laterality Date   • PO BY LAPAROSCOPY N/A 10/25/2015    Procedure: PO BY LAPAROSCOPY-with grams ;  Surgeon: Ronnie Bowman M.D.;  Location: SURGERY [facility];  Service:     • CAROTID STENT ANGIOGRAPHY  1996   • CATARACT EXTRACTION WITH IOL         Family history   Family History   Problem Relation Age of Onset   • Heart Disease Mother    • Heart Disease Father          Medications: reviewed on epic.   Outpatient Prescriptions Marked as Taking for the 2/20/19 encounter (Office Visit) with Stiven Baldwin M.D.   Medication Sig Dispense Refill   • ALPRAZolam (XANAX) 1 MG Tab Take 1 Tab by mouth as needed for Sleep (take one tab 1 hour prior to MRI. OK to repeat x 1. do not drive on this med) for up to 1 day. 2 Tab 0   • cyclobenzaprine (FLEXERIL) 5 MG tablet Take 1-2 Tabs by mouth 3 times a day as needed. 30 Tab 0   • rosuvastatin (CRESTOR) 10 MG Tab TAKE 1 TABLET BY MOUTH EVERY EVENING 90 Tab 2   • betamethasone dipropionate (DIPROLENE) 0.05 % Ointment Apply to affected areas twice daily for one week 1 g 0   • glipiZIDE SR (GLUCOTROL) 10 MG TABLET SR 24 HR TAKE 1 TAB BY MOUTH EVERY MORNING. 90 Tab 3   • metoprolol SR (TOPROL XL) 50 MG TABLET SR 24 HR TAKE 1 TABLET BY MOUTH 2 TIMES A  Tab 3   • metformin (GLUCOPHAGE) 1000 MG tablet TAKE 1 TABLET BY MOUTH 2 TIMES A DAY. 180 Tab 3   • JARDIANCE 25 MG Tab TAKE 1 TABLET BY MOUTH EVERY DAY 90 Tab 3   • losartan (COZAAR) 100 MG Tab Take 1 Tab by mouth every day. 90 Each 3   • glucose blood strip 1 Strip by Other route 2 Times a Day. 60 Strip 11   • ACCU-CHEK MULTICLIX LANCETS Misc 1 Each by Does not apply route 2 Times a Day. 180 Each 12   • aspirin (ASA) 325 MG TABS Take 325 mg by mouth every 48 hours. 1 tab po every other day          Allergies:   Allergies   Allergen Reactions   • Bydureon [Exenatide] Hives     hives   • Cycloset [Bromocriptine Mesylate] Unspecified     Pt states he can't remember what happens to him.   • Morphine Vomiting       Social Hx:   Social History     Social History   • Marital status:      Spouse name: N/A   • Number of children: N/A   • Years of education: N/A     Occupational History   • Not on  "file.     Social History Main Topics   • Smoking status: Never Smoker   • Smokeless tobacco: Never Used   • Alcohol use No   • Drug use: No   • Sexual activity: Yes     Partners: Female     Other Topics Concern   •  Service No   • Blood Transfusions No   • Caffeine Concern No   • Occupational Exposure No   • Hobby Hazards No   • Sleep Concern No   • Stress Concern No   • Weight Concern No   • Special Diet No   • Back Care Yes     BELT SUPPORT   • Exercise Yes   • Bike Helmet No   • Seat Belt Yes   • Self-Exams No     Social History Narrative   • No narrative on file         EXAMINATION     Physical Exam:   Vitals: Blood pressure 128/62, pulse 82, temperature 36.2 °C (97.2 °F), temperature source Temporal, height 1.778 m (5' 10\"), weight 98.5 kg (217 lb 2.5 oz), SpO2 97 %.    Constitutional:   Body Habitus: Body mass index is 31.16 kg/m².  Cooperation: Fully cooperates with exam  Appearance: Well-groomed, well-nourished, not disheveled     Eyes: No scleral icterus to suggest severe liver disease, no proptosis to suggest severe hyperthyroid    ENT -no obvious auditory deficits, no obvious tongue lesions, tongue midline, no facial droop     Skin -no rashes or lesions noted     Respiratory-  breathing comfortable on room air, no audible wheezing    Cardiovascular- capillary refills less than 2 seconds. No lower extremity edema is noted.     Gastrointestinal - no obvious abdominal masses, No tenderness to palpation in the abdomen    Psychiatric- alert and oriented ×3. Normal affect.     Gait - normal gait, no use of ambulatory device, nonantalgic. the patient can toe walk with ease. the patient can heel walk with ease. the patient can tandem walk with ease..     Musculoskeletal -   Thoracic/Lumbar Spine/Sacral Spine/Hips   Inspection: No evidence of atrophy in bilateral lower extremities throughout     ROM: full  AROM with flexion, extension, lateral flexion, and rotation bilaterally     Palpation:   No " tenderness to palpation in midline at T1-T12 levels. No tenderness to palpation in the left and right of the midline T1-L5, POSITIVE for tenderness to palpation to the para-midline region in the lower lumbar levels.  palpation over SI joint: positive right, negative left    palpation over buttock: negative bilaterally    palpation in hip or over the greater trochanters: negative bilaterally      Lumbar spine Special tests  Neuro tension  Straight leg test positive right, negative left    Slump test positive right, negative left      HIP  FAIR test negative bilaterally    Range of motion in the hips is within normal limits in flexion, extension, abduction, internal rotation, external rotation.    SI joint tests  Observation patient sits on one buttocks: yes, sitting on the left buttocks only.   Thigh thrust test negative bilaterally    MARIAJOSE test negative bilaterally       Neuro       Key points for the international standards for neurological classification of spinal cord injury (ISNCSCI) to light touch.     Dermatome R L   C4 2 2   C5 2 2   C6 2 2   C7 2 2   C8 2 2   T1 2 2   T2 2 2   L2 2 2   L3 2 2   L4 2 2   L5 1 2   S1 2 2   S2 2 2           Motor Exam Lower Extremities    ? Myotome R L   Hip flexion L2 5 5   Knee extension L3 5 5   Ankle dorsiflexion L4 5 5   Toe extension L5 4 5   Ankle plantarflexion S1 5 5           Tone on Modified Alejandra Scale    R L  R L   Shoulder Adduction Negative  Negative  Hip Flexion Negative  Negative    Elbow Flexion Negative  Negative  Hip Extension Negative  Negative    Elbow Extension Negative  Negative  Hip Adduction Negative  Negative    Wrist Flexion Negative  Negative  Knee Extension Negative  Negative    Finger Flexion Negative  Negative  Knee Flexion Negative  Negative       Dorsiflexion Negative  Negative       Plantar Flexion Negative  Negative        Babinski sign negative bilaterally   Clonus of the ankle negative bilaterally     Reflexes  ?  R L                Patella  1+ 1+   Achilles   1+ 1+           MEDICAL DECISION MAKING    Medical records review: see under HPI section.     DATA    Labs:   Lab Results   Component Value Date/Time    SODIUM 135 01/28/2019 10:32 AM    POTASSIUM 4.0 01/28/2019 10:32 AM    CHLORIDE 100 01/28/2019 10:32 AM    CO2 25 01/28/2019 10:32 AM    ANION 10.0 01/28/2019 10:32 AM    GLUCOSE 153 (H) 01/28/2019 10:32 AM    BUN 19 01/28/2019 10:32 AM    CREATININE 0.84 01/28/2019 10:32 AM    CREATININE 1.0 02/18/2009 09:00 AM    CALCIUM 9.7 01/28/2019 10:32 AM    ASTSGOT 26 01/28/2019 10:32 AM    ALTSGPT 30 01/28/2019 10:32 AM    TBILIRUBIN 0.6 01/28/2019 10:32 AM    ALBUMIN 4.7 01/28/2019 10:32 AM    TOTPROTEIN 7.4 01/28/2019 10:32 AM    GLOBULIN 2.7 01/28/2019 10:32 AM    AGRATIO 1.7 01/28/2019 10:32 AM   ]    Lab Results   Component Value Date/Time    PROTHROMBTM 10.6 07/31/2012 09:45 PM    INR 0.96 07/31/2012 09:45 PM        Lab Results   Component Value Date/Time    WBC 10.8 01/03/2017 08:00 AM    RBC 5.44 01/03/2017 08:00 AM    HEMOGLOBIN 16.4 01/03/2017 08:00 AM    HEMATOCRIT 45.7 01/03/2017 08:00 AM    MCV 84.0 01/03/2017 08:00 AM    MCH 30.1 01/03/2017 08:00 AM    MCHC 35.9 (H) 01/03/2017 08:00 AM    MPV 10.8 01/03/2017 08:00 AM    NEUTSPOLYS 73.00 (H) 01/03/2017 08:00 AM    LYMPHOCYTES 18.00 (L) 01/03/2017 08:00 AM    MONOCYTES 7.30 01/03/2017 08:00 AM    EOSINOPHILS 0.90 01/03/2017 08:00 AM    BASOPHILS 0.40 01/03/2017 08:00 AM    HYPOCHROMIA 1+ 06/25/2013 10:30 AM        Lab Results   Component Value Date/Time    HBA1C 9.5 (H) 01/28/2019 10:32 AM        Imaging:   I personally reviewed following images, these are my reads  X-ray lumbar spine 8/31/2018  Degenerative disease worse L5-S1.  Facet arthropathy worse bilaterally of L5-S1.  Difficult to evaluate the neural foramen L5-S1 with possible neuroforaminal stenosis.  This is better evaluated with an MRI.    IMAGING radiology reads. I reviewed the following radiology reads            Results for orders placed during the hospital encounter of 12/22/15   EG-[id number]-V/O    Impression 1.  No evidence for biliary obstruction or choledocholithiasis.    2.  No evidence for pancreatic divisum.    3.  Subcentimeter cystic structures in the pancreatic head may be related to recent pancreatitis. IPMNs cannot be excluded.    4.  Status post cholecystectomy with a small amount of fluid in the gallbladder fossa                                                                                                            Results for orders placed during the hospital encounter of 01/02/08   DX-ANKLE 3+ VIEWS    Impression IMPRESSION:    1. DISTAL FIBULAR FRACTURE.    2. SMALL AVULSION FRACTURE OF THE MEDIAL MALLEOLUS.    3. OSTEOARTHRITIS AFFECTS THE TIBIOTALAR JOINT AND THE MIDFOOT.      BDK/bht            Read By KEYLA MAI MD on Jan 2 2008 10:32AM  : BHT Transcription Date: Marvin  3 2008  7:26AM  THIS DOCUMENT HAS BEEN ELECTRONICALLY SIGNED BY: KEYLA MAI MD on Marvin    3 2008  1:31PM            Results for orders placed during the hospital encounter of 07/01/09   DX-CHEST-2 VIEWS    Impression IMPRESSION:    NORMAL CHEST.       GEK/bht     Read By MALENA GRADY MD on Jul 1 2009 11:09AM  : LORNE Transcription Date: Jul 2 2009  7:15AM  THIS DOCUMENT HAS BEEN ELECTRONICALLY SIGNED BY: MALENA GRADY MD on   Jul 2 2009  3:29PM         Results for orders placed during the hospital encounter of 10/22/09   DX-CLAVICLE    Impression IMPRESSION:     1. NO EVIDENCE OF FRACTURE.    2. MILD DEGENERATIVE CHANGE OF THE ACROMIOCLAVICULAR JOINT.     3. CALCIFIC TENDINITIS OF THE ROTATOR CUFF.      MACIW/tyrone      Read By ELE GOMEZ MD on Oct 22 2009  9:47AM  : WXK Transcription Date: Oct 22 2009  6:18PM  THIS DOCUMENT HAS BEEN ELECTRONICALLY SIGNED BY: ELE GOMEZ MD on Oct   22 2009  7:34PM                               Results for orders placed during the hospital  encounter of 05/14/10   DX-HIP-COMPLETE - UNILATERAL 2+                  Results for orders placed during the hospital encounter of 08/31/18   DX-LUMBAR SPINE-2 OR 3 VIEWS    Impression No compression deformity or acute fracture is identified.  FINDINGS ARE CONSISTENT WITH MILD DEGENERATIVE DISC DISEASE AND FACET ARTHROPATHY.            Results for orders placed during the hospital encounter of 12/16/08   [id number] (WITH 1-VIEW CXR)    Impression IMPRESSION:     NEGATIVE RIB SERIES AND CHEST X-RAY.    JADIANN/polina    Read By LLOYD GRISSOM MD on Dec 16 2008  1:56PM  : POLINA Transcription Date: Dec 16 2008  2:36PM  THIS DOCUMENT HAS BEEN ELECTRONICALLY SIGNED BY: LLOYD GRISSOM MD on   Dec 16 2008  3:16PM                           Results for orders placed during the hospital encounter of 12/23/17   DX-TIBIA AND FIBULA RIGHT    Impression No acute fracture is identified.               Diagnosis   Visit Diagnoses     ICD-10-CM   1. Lumbar spondylosis M47.816   2. Lumbar degenerative disc disease M51.36   3. Acute bilateral low back pain with right-sided sciatica M54.41   4. Chronic right SI joint pain M53.3    G89.29   5. Palpitations R00.2   6. S/P coronary artery stent placement Z95.5   7. Type 2 diabetes mellitus without complication, without long-term current use of insulin (HCC) E11.9   8. Coronary artery disease involving native coronary artery of native heart without angina pectoris I25.10   9. Claustrophobia F40.240   10. Other intervertebral disc degeneration, lumbar region M51.36   11. Weakness of right lower extremity right L5 R29.898   12. Numbness and tingling of right leg L5 R20.0    R20.2           ASSESSMENT AND PLAN:  Sharad [facility] 63 y.o. male      Sharad was seen today for new patient.    Diagnoses and all orders for this visit:    Lumbar spondylosis  -     ALPRAZolam (XANAX) 1 MG Tab; Take 1 Tab by mouth as needed for Sleep (take one tab 1 hour prior to MRI. OK to repeat  x 1. do not drive on this med) for up to 1 day.  -     MR-LUMBAR SPINE-W/O; Future    Lumbar degenerative disc disease  -     ALPRAZolam (XANAX) 1 MG Tab; Take 1 Tab by mouth as needed for Sleep (take one tab 1 hour prior to MRI. OK to repeat x 1. do not drive on this med) for up to 1 day.  -     MR-LUMBAR SPINE-W/O; Future    Acute bilateral low back pain with right-sided sciatica  -     ALPRAZolam (XANAX) 1 MG Tab; Take 1 Tab by mouth as needed for Sleep (take one tab 1 hour prior to MRI. OK to repeat x 1. do not drive on this med) for up to 1 day.  -     MR-LUMBAR SPINE-W/O; Future    Chronic right SI joint pain  -     ALPRAZolam (XANAX) 1 MG Tab; Take 1 Tab by mouth as needed for Sleep (take one tab 1 hour prior to MRI. OK to repeat x 1. do not drive on this med) for up to 1 day.  -     MR-LUMBAR SPINE-W/O; Future    Palpitations  -     ALPRAZolam (XANAX) 1 MG Tab; Take 1 Tab by mouth as needed for Sleep (take one tab 1 hour prior to MRI. OK to repeat x 1. do not drive on this med) for up to 1 day.    S/P coronary artery stent placement  -     ALPRAZolam (XANAX) 1 MG Tab; Take 1 Tab by mouth as needed for Sleep (take one tab 1 hour prior to MRI. OK to repeat x 1. do not drive on this med) for up to 1 day.    Type 2 diabetes mellitus without complication, without long-term current use of insulin (HCC)    Coronary artery disease involving native coronary artery of native heart without angina pectoris  -     ALPRAZolam (XANAX) 1 MG Tab; Take 1 Tab by mouth as needed for Sleep (take one tab 1 hour prior to MRI. OK to repeat x 1. do not drive on this med) for up to 1 day.    Claustrophobia  -     ALPRAZolam (XANAX) 1 MG Tab; Take 1 Tab by mouth as needed for Sleep (take one tab 1 hour prior to MRI. OK to repeat x 1. do not drive on this med) for up to 1 day.    Other intervertebral disc degeneration, lumbar region  -     MR-LUMBAR SPINE-W/O; Future    Weakness of right lower extremity right L5  -     MR-LUMBAR  SPINE-W/O; Future    Numbness and tingling of right leg L5  -     MR-LUMBAR SPINE-W/O; Future        -    PLAN  Physical therapy: The patient is failed physical therapy in the past.  I do not think this is beneficial for him at this time currently.  I will consider physical therapy in the future.    Diagnostic workup: MRI as above    Medications: Failed narcotics, oral steroids, NSAIDs, Tylenol.    I do not recommend oral steroids.  There have been several studies done show no efficacy and low back pain and oral steroids have significant side effects.  There was no pain relief in this large randomized controlled study.  The functional changes were statistically significant but not clinically significant.  Also when there is a disc herniation and radiculopathy these areas typically have a poor blood supply so they respond better to epidurals and local steroids.    https://jamanetwork.com/journals/sandi/fullarticle/5870397     Interventional program: To be decided after the MRI.  The patient likely has a right lower lumbar radiculopathy      Follow-up: After the MRI        Please note that this dictation was created using voice recognition software. I have made every reasonable attempt to correct obvious errors but there may be errors of grammar and content that I may have overlooked prior to finalization of this note.      Stiven Baldwin MD  Physical Medicine and Rehabilitation  Interventional Spine and Sports Physiatry  [facility]               Rodger Conde A.P.*   CC Zackery Trevino M.D.

## 2019-02-20 NOTE — Clinical Note
Dear Zackery Trevino M.D. and Rodger Sullivan A.P.,   Thank you for the referral of Sharad Millan.  The patient likely has a right lower lumbar radiculopathy.  I have ordered an MRI for further workup and will consider him for an epidural.  Please see my note for more details    Should you have any questions or concerns please do not hesitate to contact me.  Stiven Baldwin M.D.

## 2019-03-05 ENCOUNTER — HOSPITAL ENCOUNTER (OUTPATIENT)
Dept: RADIOLOGY | Facility: MEDICAL CENTER | Age: 64
End: 2019-03-05
Attending: PHYSICAL MEDICINE & REHABILITATION
Payer: COMMERCIAL

## 2019-03-05 DIAGNOSIS — M51.36 LUMBAR DEGENERATIVE DISC DISEASE: ICD-10-CM

## 2019-03-05 DIAGNOSIS — R20.0 NUMBNESS AND TINGLING OF RIGHT LEG: ICD-10-CM

## 2019-03-05 DIAGNOSIS — M54.41 ACUTE BILATERAL LOW BACK PAIN WITH RIGHT-SIDED SCIATICA: ICD-10-CM

## 2019-03-05 DIAGNOSIS — M47.816 LUMBAR SPONDYLOSIS: ICD-10-CM

## 2019-03-05 DIAGNOSIS — M53.3 CHRONIC RIGHT SI JOINT PAIN: ICD-10-CM

## 2019-03-05 DIAGNOSIS — M51.36 OTHER INTERVERTEBRAL DISC DEGENERATION, LUMBAR REGION: ICD-10-CM

## 2019-03-05 DIAGNOSIS — R20.2 NUMBNESS AND TINGLING OF RIGHT LEG: ICD-10-CM

## 2019-03-05 DIAGNOSIS — R29.898 WEAKNESS OF RIGHT LOWER EXTREMITY: ICD-10-CM

## 2019-03-05 DIAGNOSIS — G89.29 CHRONIC RIGHT SI JOINT PAIN: ICD-10-CM

## 2019-03-05 PROCEDURE — 72148 MRI LUMBAR SPINE W/O DYE: CPT

## 2019-03-11 ENCOUNTER — OFFICE VISIT (OUTPATIENT)
Dept: PHYSICAL MEDICINE AND REHAB | Facility: MEDICAL CENTER | Age: 64
End: 2019-03-11
Payer: COMMERCIAL

## 2019-03-11 VITALS
SYSTOLIC BLOOD PRESSURE: 120 MMHG | WEIGHT: 219.58 LBS | HEART RATE: 74 BPM | HEIGHT: 70 IN | TEMPERATURE: 96.9 F | OXYGEN SATURATION: 95 % | BODY MASS INDEX: 31.44 KG/M2 | DIASTOLIC BLOOD PRESSURE: 72 MMHG

## 2019-03-11 DIAGNOSIS — E11.9 TYPE 2 DIABETES MELLITUS WITHOUT COMPLICATION, WITHOUT LONG-TERM CURRENT USE OF INSULIN (HCC): ICD-10-CM

## 2019-03-11 DIAGNOSIS — R00.2 PALPITATIONS: ICD-10-CM

## 2019-03-11 DIAGNOSIS — M79.10 MYALGIA: ICD-10-CM

## 2019-03-11 DIAGNOSIS — G89.29 CHRONIC RIGHT SI JOINT PAIN: ICD-10-CM

## 2019-03-11 DIAGNOSIS — M51.36 LUMBAR DEGENERATIVE DISC DISEASE: ICD-10-CM

## 2019-03-11 DIAGNOSIS — M47.816 LUMBAR SPONDYLOSIS: ICD-10-CM

## 2019-03-11 DIAGNOSIS — M54.41 ACUTE BILATERAL LOW BACK PAIN WITH RIGHT-SIDED SCIATICA: ICD-10-CM

## 2019-03-11 DIAGNOSIS — I25.10 CORONARY ARTERY DISEASE INVOLVING NATIVE CORONARY ARTERY OF NATIVE HEART WITHOUT ANGINA PECTORIS: ICD-10-CM

## 2019-03-11 DIAGNOSIS — M53.3 CHRONIC RIGHT SI JOINT PAIN: ICD-10-CM

## 2019-03-11 DIAGNOSIS — Z95.5 S/P CORONARY ARTERY STENT PLACEMENT: ICD-10-CM

## 2019-03-11 PROCEDURE — 99214 OFFICE O/P EST MOD 30 MIN: CPT | Performed by: PHYSICAL MEDICINE & REHABILITATION

## 2019-03-11 RX ORDER — MELOXICAM 7.5 MG/1
15 TABLET ORAL DAILY
Qty: 28 TAB | Refills: 0 | Status: SHIPPED | OUTPATIENT
Start: 2019-03-11 | End: 2020-01-06

## 2019-03-11 ASSESSMENT — PAIN SCALES - GENERAL: PAINLEVEL: 6=MODERATE PAIN

## 2019-03-11 NOTE — PROGRESS NOTES
Follow up patient note  Interventional spine and sports physiatry, Physical medicine rehabilitation      Chief complaint:   Chief Complaint   Patient presents with   • Follow-Up     back pain          HISTORY    Please see new patient note dated  by Dr Baldwin,  for more details.     HPI  Patient identification: Sharad Millan 63 y.o. male  With Diagnoses of Lumbar spondylosis, Lumbar degenerative disc disease, Acute bilateral low back pain with right-sided sciatica, Chronic right SI joint pain, Palpitations, S/P coronary artery stent placement, Type 2 diabetes mellitus without complication, without long-term current use of insulin (HCC), Coronary artery disease involving native coronary artery of native heart without angina pectoris, and Myalgia were pertinent to this visit.       -Right-sided low back pain with associated right hip pain, moderate intensity 4-5/10 in intensity, right groin pain as well.  Chronic, constant.       ROS Red Flags :   -  Fever, Chills, Sweats: Denies  Involuntary Weight Loss: Denies  Bowel/Bladder Incontinence: Denies  Saddle Anesthesia: Denies        PMHx:   Past Medical History:   Diagnosis Date   • Aortic stenosis 10/20/2011   • Arrhythmia    • Carotid bruit 12/9/2009   • Chronic right shoulder pain    • Diabetes    • Fatty liver 1/17/2011   • Heart attack (HCC)    • History of cardiac catheterization 7/13/2009   • History of echocardiogram 10/20/2011   • HTN (hypertension) 10/12/2012   • Hypertension    • Memory loss 10/20/2011   • Murmur 7/7/2009   • Obesity 8/23/2011   • Palpitations 10/20/2011   • PSVT (paroxysmal supraventricular tachycardia) (HCC) 2/14/2012   • S/P coronary artery stent placement 1996    coronary stent implantation , [facility]   • S/P coronary artery stent placement 1998    coronary stent implantation; [facility]   • S/P coronary artery stent placement 2003    cardiac catheterization; patent stents; [place]   • Sciatica 8/23/2011   • Uncontrolled  diabetes mellitus (HCC) 11/29/2010       PSHx:   Past Surgical History:   Procedure Laterality Date   • PO BY LAPAROSCOPY N/A 10/25/2015    Procedure: PO BY LAPAROSCOPY-with grams ;  Surgeon: Ronnie Bowman M.D.;  Location: SURGERY [facility];  Service:    • CAROTID STENT ANGIOGRAPHY  1996   • CATARACT EXTRACTION WITH IOL         Family history   Family History   Problem Relation Age of Onset   • Heart Disease Mother    • Heart Disease Father          Medications:   Outpatient Prescriptions Marked as Taking for the 3/11/19 encounter (Office Visit) with Stiven Baldwin M.D.   Medication Sig Dispense Refill   • meloxicam (MOBIC) 7.5 MG Tab Take 2 Tabs by mouth every day. For 14 days then stop. Do not take other NSAIDs. No refills. 28 Tab 0   • cyclobenzaprine (FLEXERIL) 5 MG tablet Take 1-2 Tabs by mouth 3 times a day as needed. 30 Tab 0   • rosuvastatin (CRESTOR) 10 MG Tab TAKE 1 TABLET BY MOUTH EVERY EVENING 90 Tab 2   • ketoconazole (NIZORAL) 2 % Cream Apply 1 Application to affected area(s) 2 times a day. 1 g 0   • betamethasone dipropionate (DIPROLENE) 0.05 % Ointment Apply to affected areas twice daily for one week 1 g 0   • glipiZIDE SR (GLUCOTROL) 10 MG TABLET SR 24 HR TAKE 1 TAB BY MOUTH EVERY MORNING. 90 Tab 3   • metoprolol SR (TOPROL XL) 50 MG TABLET SR 24 HR TAKE 1 TABLET BY MOUTH 2 TIMES A  Tab 3   • metformin (GLUCOPHAGE) 1000 MG tablet TAKE 1 TABLET BY MOUTH 2 TIMES A DAY. 180 Tab 3   • JARDIANCE 25 MG Tab TAKE 1 TABLET BY MOUTH EVERY DAY 90 Tab 3   • losartan (COZAAR) 100 MG Tab Take 1 Tab by mouth every day. 90 Each 3   • glucose blood strip 1 Strip by Other route 2 Times a Day. 60 Strip 11   • ACCU-CHEK MULTICLIX LANCETS Misc 1 Each by Does not apply route 2 Times a Day. 180 Each 12   • aspirin (ASA) 325 MG TABS Take 325 mg by mouth every 48 hours. 1 tab po every other day          Current Outpatient Prescriptions on File Prior to Visit   Medication Sig Dispense Refill   •  cyclobenzaprine (FLEXERIL) 5 MG tablet Take 1-2 Tabs by mouth 3 times a day as needed. 30 Tab 0   • rosuvastatin (CRESTOR) 10 MG Tab TAKE 1 TABLET BY MOUTH EVERY EVENING 90 Tab 2   • ketoconazole (NIZORAL) 2 % Cream Apply 1 Application to affected area(s) 2 times a day. 1 g 0   • betamethasone dipropionate (DIPROLENE) 0.05 % Ointment Apply to affected areas twice daily for one week 1 g 0   • glipiZIDE SR (GLUCOTROL) 10 MG TABLET SR 24 HR TAKE 1 TAB BY MOUTH EVERY MORNING. 90 Tab 3   • metoprolol SR (TOPROL XL) 50 MG TABLET SR 24 HR TAKE 1 TABLET BY MOUTH 2 TIMES A  Tab 3   • metformin (GLUCOPHAGE) 1000 MG tablet TAKE 1 TABLET BY MOUTH 2 TIMES A DAY. 180 Tab 3   • JARDIANCE 25 MG Tab TAKE 1 TABLET BY MOUTH EVERY DAY 90 Tab 3   • losartan (COZAAR) 100 MG Tab Take 1 Tab by mouth every day. 90 Each 3   • glucose blood strip 1 Strip by Other route 2 Times a Day. 60 Strip 11   • ACCU-CHEK MULTICLIX LANCETS Misc 1 Each by Does not apply route 2 Times a Day. 180 Each 12   • aspirin (ASA) 325 MG TABS Take 325 mg by mouth every 48 hours. 1 tab po every other day     • metoprolol SR (TOPROL XL) 25 MG TABLET SR 24 HR Take 75 mg by mouth 2 Times a Day.  0     No current facility-administered medications on file prior to visit.          Allergies:   Allergies   Allergen Reactions   • Bydureon [Exenatide] Hives     hives   • Cycloset [Bromocriptine Mesylate] Unspecified     Pt states he can't remember what happens to him.   • Morphine Vomiting       Social Hx:   Social History     Social History   • Marital status:      Spouse name: N/A   • Number of children: N/A   • Years of education: N/A     Occupational History   • Not on file.     Social History Main Topics   • Smoking status: Never Smoker   • Smokeless tobacco: Never Used   • Alcohol use No   • Drug use: No   • Sexual activity: Yes     Partners: Female     Other Topics Concern   •  Service No   • Blood Transfusions No   • Caffeine Concern No   •  "Occupational Exposure No   • Hobby Hazards No   • Sleep Concern No   • Stress Concern No   • Weight Concern No   • Special Diet No   • Back Care Yes     BELT SUPPORT   • Exercise Yes   • Bike Helmet No   • Seat Belt Yes   • Self-Exams No     Social History Narrative   • No narrative on file         EXAMINATION     Physical Exam:   Vitals: Blood pressure 120/72, pulse 74, temperature 36.1 °C (96.9 °F), temperature source Temporal, height 1.778 m (5' 10\"), weight 99.6 kg (219 lb 9.3 oz), SpO2 95 %.    Constitutional:   Body Habitus: Body mass index is 31.51 kg/m².  Cooperation: Fully cooperates with exam  Appearance: Well-groomed no disheveled    Respiratory-  breathing comfortable on room air, no audible wheezing  Cardiovascular- capillary refills less than 2 seconds. No lower extremity edema is noted.   Psychiatric- alert and oriented ×3. Normal affect.    MSK: -Slight numbness in an L5 distribution, slight weakness in an L5 distribution on the right.  Remainder of sensory and motor exam is within normal limits.          MEDICAL DECISION MAKING    DATA    Labs:   Lab Results   Component Value Date/Time    SODIUM 135 01/28/2019 10:32 AM    POTASSIUM 4.0 01/28/2019 10:32 AM    CHLORIDE 100 01/28/2019 10:32 AM    CO2 25 01/28/2019 10:32 AM    GLUCOSE 153 (H) 01/28/2019 10:32 AM    BUN 19 01/28/2019 10:32 AM    CREATININE 0.84 01/28/2019 10:32 AM    CREATININE 1.0 02/18/2009 09:00 AM        Lab Results   Component Value Date/Time    PROTHROMBTM 10.6 07/31/2012 09:45 PM    INR 0.96 07/31/2012 09:45 PM        Lab Results   Component Value Date/Time    WBC 10.8 01/03/2017 08:00 AM    RBC 5.44 01/03/2017 08:00 AM    HEMOGLOBIN 16.4 01/03/2017 08:00 AM    HEMATOCRIT 45.7 01/03/2017 08:00 AM    MCV 84.0 01/03/2017 08:00 AM    MCH 30.1 01/03/2017 08:00 AM    MCHC 35.9 (H) 01/03/2017 08:00 AM    MPV 10.8 01/03/2017 08:00 AM    NEUTSPOLYS 73.00 (H) 01/03/2017 08:00 AM    LYMPHOCYTES 18.00 (L) 01/03/2017 08:00 AM    MONOCYTES " 7.30 01/03/2017 08:00 AM    EOSINOPHILS 0.90 01/03/2017 08:00 AM    BASOPHILS 0.40 01/03/2017 08:00 AM    HYPOCHROMIA 1+ 06/25/2013 10:30 AM        Lab Results   Component Value Date/Time    HBA1C 9.5 (H) 01/28/2019 10:32 AM          Imaging:   I personally reviewed following images    MRI lumbar spine 3/5/2019  Mild left L5-S1 neuroforaminal stenosis.  The disc spaces are well preserved especially for her age.  Mild lumbar spondylosis bilaterally L4-5 and L5-S1.    I reviewed the following radiology reports         Results for orders placed during the hospital encounter of 12/22/15   LN-HZEPUWA-V/O    Impression 1.  No evidence for biliary obstruction or choledocholithiasis.    2.  No evidence for pancreatic divisum.    3.  Subcentimeter cystic structures in the pancreatic head may be related to recent pancreatitis. IPMNs cannot be excluded.    4.  Status post cholecystectomy with a small amount of fluid in the gallbladder fossa                                   Results for orders placed during the hospital encounter of 03/05/19   MR-LUMBAR SPINE-W/O    Impression Mild degenerative disease in the lumbar spine as described above.                Results for orders placed during the hospital encounter of 03/05/19   MR-LUMBAR SPINE-W/O    Impression Mild degenerative disease in the lumbar spine as described above.                                                                                                                           Results for orders placed during the hospital encounter of 10/21/15   CT-ABDOMEN-PELVIS WITH    Impression 1.  Findings consistent with acute pancreatitis. No pseudocyst and no evidence of pancreatic necrosis. Followup recommended.  2.  Gallstones and gallbladder sludge. No gallbladder and no wall thickening.  3.  No hydronephrosis or evidence of bowel obstruction.  4.  Splenomegaly. Portal venous system is opacified and appears patent.  5.  Coronary artery calcifications.  6.  Dependent  atelectasis both lung bases. Pneumonitis not excluded.                               Results for orders placed during the hospital encounter of 01/02/08   DX-ANKLE 3+ VIEWS    Impression IMPRESSION:    1. DISTAL FIBULAR FRACTURE.    2. SMALL AVULSION FRACTURE OF THE MEDIAL MALLEOLUS.    3. OSTEOARTHRITIS AFFECTS THE TIBIOTALAR JOINT AND THE MIDFOOT.      BDK/bht            Read By KEYLA MAI MD on Jan 2 2008 10:32AM  : BHT Transcription Date: Marvin  3 2008  7:26AM  THIS DOCUMENT HAS BEEN ELECTRONICALLY SIGNED BY: KEYLA MAI MD on Marvin    3 2008  1:31PM            Results for orders placed during the hospital encounter of 07/01/09   DX-CHEST-2 VIEWS    Impression IMPRESSION:    NORMAL CHEST.       GEK/bht     Read By MALENA GRADY MD on Jul 1 2009 11:09AM  : BHT Transcription Date: Jul 2 2009  7:15AM  THIS DOCUMENT HAS BEEN ELECTRONICALLY SIGNED BY: MALENA GRADY MD on   Jul 2 2009  3:29PM         Results for orders placed during the hospital encounter of 10/22/09   DX-CLAVICLE    Impression IMPRESSION:     1. NO EVIDENCE OF FRACTURE.    2. MILD DEGENERATIVE CHANGE OF THE ACROMIOCLAVICULAR JOINT.     3. CALCIFIC TENDINITIS OF THE ROTATOR CUFF.      JHW/wjk      Read By ELE GOMEZ MD on Oct 22 2009  9:47AM  : WXK Transcription Date: Oct 22 2009  6:18PM  THIS DOCUMENT HAS BEEN ELECTRONICALLY SIGNED BY: ELE GOMEZ MD on Oct   22 2009  7:34PM                               Results for orders placed during the hospital encounter of 05/14/10   DX-HIP-COMPLETE - UNILATERAL 2+                  Results for orders placed during the hospital encounter of 08/31/18   DX-LUMBAR SPINE-2 OR 3 VIEWS    Impression No compression deformity or acute fracture is identified.  FINDINGS ARE CONSISTENT WITH MILD DEGENERATIVE DISC DISEASE AND FACET ARTHROPATHY.            Results for orders placed during the hospital encounter of 12/16/08   MT-ZFIE-[id number] (WITH 1-VIEW CXR)     Impression IMPRESSION:     NEGATIVE RIB SERIES AND CHEST X-RAY.    PENNY/polina    Read By LLOYD GRISSOM MD on Dec 16 2008  1:56PM  : POLINA Transcription Date: Dec 16 2008  2:36PM  THIS DOCUMENT HAS BEEN ELECTRONICALLY SIGNED BY: LLOYD GRISSOM MD on   Dec 16 2008  3:16PM                           Results for orders placed during the hospital encounter of 12/23/17   DX-TIBIA AND FIBULA RIGHT    Impression No acute fracture is identified.               DIAGNOSIS   Visit Diagnoses     ICD-10-CM   1. Lumbar spondylosis M47.816   2. Lumbar degenerative disc disease M51.36   3. Acute bilateral low back pain with right-sided sciatica M54.41   4. Chronic right SI joint pain M53.3    G89.29   5. Palpitations R00.2   6. S/P coronary artery stent placement Z95.5   7. Type 2 diabetes mellitus without complication, without long-term current use of insulin (HCC) E11.9   8. Coronary artery disease involving native coronary artery of native heart without angina pectoris I25.10   9. Myalgia M79.10         ASSESSMENT and PLAN:     Sharad Garcia 63 y.o. male      Sharad was seen today for follow-up.    Diagnoses and all orders for this visit:    Lumbar spondylosis  -     REFERRAL TO PHYSICAL THERAPY Reason for Therapy: Eval/Treat/Report  -     meloxicam (MOBIC) 7.5 MG Tab; Take 2 Tabs by mouth every day. For 14 days then stop. Do not take other NSAIDs. No refills.    Lumbar degenerative disc disease  -     REFERRAL TO PHYSICAL THERAPY Reason for Therapy: Eval/Treat/Report  -     meloxicam (MOBIC) 7.5 MG Tab; Take 2 Tabs by mouth every day. For 14 days then stop. Do not take other NSAIDs. No refills.    Acute bilateral low back pain with right-sided sciatica  -     REFERRAL TO PHYSICAL THERAPY Reason for Therapy: Eval/Treat/Report  -     meloxicam (MOBIC) 7.5 MG Tab; Take 2 Tabs by mouth every day. For 14 days then stop. Do not take other NSAIDs. No refills.    Chronic right SI joint pain  -     REFERRAL TO  PHYSICAL THERAPY Reason for Therapy: Eval/Treat/Report  -     meloxicam (MOBIC) 7.5 MG Tab; Take 2 Tabs by mouth every day. For 14 days then stop. Do not take other NSAIDs. No refills.    Palpitations    S/P coronary artery stent placement    Type 2 diabetes mellitus without complication, without long-term current use of insulin (HCC)    Coronary artery disease involving native coronary artery of native heart without angina pectoris    Myalgia  -     REFERRAL TO PHYSIATRY (PMR)  -     meloxicam (MOBIC) 7.5 MG Tab; Take 2 Tabs by mouth every day. For 14 days then stop. Do not take other NSAIDs. No refills.        -I set the patient up for trigger point injections in the areas of pain in the lumbar paraspinous muscles, cherry-sacroiliac joint.  Should the patient failed this and physical therapy then I would consider him for fluoroscopically guided sacroiliac joint injection.  I also ordered physical therapy for the patient to include lumbar strengthening and stretching exercises as well as core strengthening.  Consider x-ray right hip.    Follow up: 3/26/2019     Thank you for allowing me to participate in the care of this patient. If you have any questions please not hesitate to contact me.          Please note that this dictation was created using voice recognition software. I have made every reasonable attempt to correct obvious errors but there may be errors of grammar and content that I may have overlooked prior to finalization of this note.      Stiven Baldwin MD  Interventional Spine and Sports Physiatry  Physical Medicine and Rehabilitation  [facility]  3/12/2019  8:00 AM

## 2019-03-13 ENCOUNTER — TELEPHONE (OUTPATIENT)
Dept: PHYSICAL MEDICINE AND REHAB | Facility: MEDICAL CENTER | Age: 64
End: 2019-03-13

## 2019-03-13 DIAGNOSIS — M54.31 SCIATICA OF RIGHT SIDE: Chronic | ICD-10-CM

## 2019-03-20 ENCOUNTER — TELEPHONE (OUTPATIENT)
Dept: PHYSICAL MEDICINE AND REHAB | Facility: MEDICAL CENTER | Age: 64
End: 2019-03-20

## 2019-03-20 NOTE — TELEPHONE ENCOUNTER
Received a call from patient regarding a call that was made to him last week from our office regarding the referral. I advised him that his previous PCP was willing to place the referral that was needed however moving forward he would need to est care and notify his health insurance. Patient understood and stated he may be changing his health insurance due to this issue. Patient advise.

## 2019-03-26 ENCOUNTER — APPOINTMENT (OUTPATIENT)
Dept: PHYSICAL MEDICINE AND REHAB | Facility: MEDICAL CENTER | Age: 64
End: 2019-03-26
Payer: COMMERCIAL

## 2019-03-29 DIAGNOSIS — I10 ESSENTIAL HYPERTENSION: Primary | ICD-10-CM

## 2019-03-29 RX ORDER — LOSARTAN POTASSIUM 100 MG/1
TABLET ORAL
Qty: 90 TAB | Refills: 3 | Status: SHIPPED | OUTPATIENT
Start: 2019-03-29 | End: 2020-04-06

## 2019-03-29 RX ORDER — METOPROLOL SUCCINATE 25 MG/1
TABLET, EXTENDED RELEASE ORAL
Qty: 540 TAB | Refills: 3 | Status: SHIPPED | OUTPATIENT
Start: 2019-03-29 | End: 2020-01-06

## 2019-04-25 ENCOUNTER — TELEPHONE (OUTPATIENT)
Dept: CARDIOLOGY | Facility: MEDICAL CENTER | Age: 64
End: 2019-04-25

## 2019-04-25 NOTE — TELEPHONE ENCOUNTER
jury duty excuse form   Received: Today   Message Contents   Dorina Grier R.N.   Phone Number: [phone]             GOLD/parul     Pt called & left a voicemail msg earlier:     Please call Sharad to discuss a jury duty form that pt wants signed by GOLD in order to be excused from jury duty.     Pt cell# [phone] or home# [phone]      Contacted patient.  He is requesting a letter excluding him from jury duty do to his symptoms (severe aortic stenosis.  Previous notes show he adamantly refuses to proceed with valve replacement).  Advised patient to drop off paperwork to review.  Patient verbalizes understanding.

## 2019-06-07 ENCOUNTER — OFFICE VISIT (OUTPATIENT)
Dept: CARDIOLOGY | Facility: MEDICAL CENTER | Age: 64
End: 2019-06-07
Payer: COMMERCIAL

## 2019-06-07 VITALS
HEIGHT: 70 IN | SYSTOLIC BLOOD PRESSURE: 112 MMHG | HEART RATE: 78 BPM | BODY MASS INDEX: 31.35 KG/M2 | WEIGHT: 219 LBS | DIASTOLIC BLOOD PRESSURE: 60 MMHG | OXYGEN SATURATION: 97 %

## 2019-06-07 DIAGNOSIS — I10 ESSENTIAL HYPERTENSION: ICD-10-CM

## 2019-06-07 DIAGNOSIS — I35.0 AORTIC VALVE STENOSIS, ETIOLOGY OF CARDIAC VALVE DISEASE UNSPECIFIED: Chronic | ICD-10-CM

## 2019-06-07 DIAGNOSIS — Z95.5 S/P CORONARY ARTERY STENT PLACEMENT: ICD-10-CM

## 2019-06-07 DIAGNOSIS — I25.10 CORONARY ARTERY DISEASE, NON-OCCLUSIVE: ICD-10-CM

## 2019-06-07 DIAGNOSIS — E78.5 DYSLIPIDEMIA: Chronic | ICD-10-CM

## 2019-06-07 DIAGNOSIS — I35.0 AORTIC VALVE STENOSIS, SEVERE: ICD-10-CM

## 2019-06-07 PROCEDURE — 99214 OFFICE O/P EST MOD 30 MIN: CPT | Performed by: INTERNAL MEDICINE

## 2019-06-07 ASSESSMENT — ENCOUNTER SYMPTOMS
MYALGIAS: 0
SHORTNESS OF BREATH: 0
LOSS OF CONSCIOUSNESS: 0
DIZZINESS: 0
COUGH: 0
PALPITATIONS: 0

## 2019-06-07 NOTE — PROGRESS NOTES
Chief Complaint   Patient presents with   • Aortic Stenosis       Subjective:   Sharad Millan is a 63 y.o. male who presents today for a followup evaluation of aortic stenosis, CAD, coronary stent, palpitations, PSVT, hypertension and hyperlipidemia plus recent hospitalization.     Last seen on 2/5/2019.    Since 2/5/2019 patient states that he feels no different than he had before.  Despite this denial he does admit to mild discomfort and subtle shortness of breath when he is doing some fairly strenuous activity.  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  He also lost a contract with [employer].  As a result he has some financial stress.  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  Nonetheless, he now has more seriously looked in and research the aortic valve procedures for his aortic stenosis.    Since 10/1/2018 appointment the patient was hospitalized.  [place]  Hospitalized 12/12/2018 at [facility].  Presented with SVT and chest pain converted with adenosine.  NSTEMI.  Peak troponin 1.7.  MPI was normal with a EF of 56%.  Discharged on increased dose of metoprolol of 75 mg twice daily.  Since discharge no recurrent chest discomfort or SVT.  Denies exertional shortness of breath but admits to using inhaler for episodic shortness of breath.    Since 1/18/2018 the patient reports subtle symptoms of exertional shortness of breath.  No lightheadedness dizziness or syncope.     Since 11/20/2017 appointment the patient has had no cardiac symptoms.  Continues to work full time mainly in [place].  Has developed severe left hip pain and has sought chiropractic and acupuncture therapy.  Was seen at urgent care and was given prednisone and anti-inflammatory and ultimately had some just transient relief but has come back and continues to be a problem.     Since 10/13/2017 appointment the patient's  developed a upper respiratory infection being treated conservatively.  At the time of his last appointment and echocardiogram was done now shows severe aortic stenosis.  The patient has no new cardiac symptoms referenced to his aortic stenosis.  He reports 2 weeks ago while eating at a restaurant he had a left upper chest pain that lasted for one hour, resolved spontaneously and has not recurred.     Since 8/31/2016 appointment the patient's had no cardiac symptoms.  Had two episodes of palpitations responsive to vagal maneuver.  No lightheadedness or dizziness.  No angina pectoralis.  No shortness of breath.     Since his October, 2015 hospitalization the patient has had almost constant sharp left localized anterior sharp stabbing chest pain radiating to his back.  Not related to exertion.  No shortness of breath or angina pectoris.  No palpitations or syncope.     Between 10/21/2015-10/27/2015 hospitalized at [facility].  Gallstone pancreatitis.  Laparoscopic cholecystectomy.  Echocardiogram showed moderate aortic stenosis.     On 12/3/2013 [facility] ER.  SVT.  Converted with a adenosine.  Toprol increased 100 mg daily.     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  He developed chest pain.  He took a customers sublingual nitroglycerin with relief.  He went to [facility] ER.  EKG showed no changes.  Patient left after waiting an hour and a half to be seen by the doctor.    Past Medical History:   Diagnosis Date   • Aortic stenosis 10/20/2011   • Arrhythmia    • Carotid bruit 12/9/2009   • Chronic right shoulder pain    • Diabetes    • Fatty liver 1/17/2011   • Heart attack (HCC)    • History of cardiac catheterization 7/13/2009   • History of echocardiogram 10/20/2011   • HTN (hypertension) 10/12/2012   • Hypertension    • Memory loss 10/20/2011   • Murmur 7/7/2009   • Obesity 8/23/2011   • Palpitations 10/20/2011   • PSVT (paroxysmal supraventricular tachycardia) ([facility])  2/14/2012   • S/P coronary artery stent placement 1996    coronary stent implantation x3, [facility]   • S/P coronary artery stent placement 1998    coronary stent implantation; [facility]   • S/P coronary artery stent placement 2003    cardiac catheterization; patent stents; [place]   • Sciatica 8/23/2011   • Uncontrolled diabetes mellitus (HCC) 11/29/2010     Past Surgical History:   Procedure Laterality Date   • PO BY LAPAROSCOPY N/A 10/25/2015    Procedure: PO BY LAPAROSCOPY-with grams ;  Surgeon: Ronnie Bowman M.D.;  Location: SURGERY [facility];  Service:    • CAROTID STENT ANGIOGRAPHY  1996   • CATARACT EXTRACTION WITH IOL       Family History   Problem Relation Age of Onset   • Heart Disease Mother    • Heart Disease Father      Social History     Social History   • Marital status:      Spouse name: N/A   • Number of children: N/A   • Years of education: N/A     Occupational History   • Not on file.     Social History Main Topics   • Smoking status: Never Smoker   • Smokeless tobacco: Never Used   • Alcohol use No   • Drug use: No   • Sexual activity: Yes     Partners: Female     Other Topics Concern   •  Service No   • Blood Transfusions No   • Caffeine Concern No   • Occupational Exposure No   • Hobby Hazards No   • Sleep Concern No   • Stress Concern No   • Weight Concern No   • Special Diet No   • Back Care Yes     BELT SUPPORT   • Exercise Yes   • Bike Helmet No   • Seat Belt Yes   • Self-Exams No     Social History Narrative   • No narrative on file     Allergies   Allergen Reactions   • Bydureon [Exenatide] Hives     hives   • Cycloset [Bromocriptine Mesylate] Unspecified     Pt states he can't remember what happens to him.   • Morphine Vomiting     Outpatient Encounter Prescriptions as of 6/7/2019   Medication Sig Dispense Refill   • metoprolol SR (TOPROL XL) 25 MG TABLET SR 24 HR TAKE 3 TABLETS BY MOUTH TWICE A  Tab 3   • losartan (COZAAR) 100 MG Tab TAKE  "1 TABLET BY MOUTH EVERY DAY 90 Tab 3   • meloxicam (MOBIC) 7.5 MG Tab Take 2 Tabs by mouth every day. For 14 days then stop. Do not take other NSAIDs. No refills. 28 Tab 0   • cyclobenzaprine (FLEXERIL) 5 MG tablet Take 1-2 Tabs by mouth 3 times a day as needed. 30 Tab 0   • rosuvastatin (CRESTOR) 10 MG Tab TAKE 1 TABLET BY MOUTH EVERY EVENING 90 Tab 2   • ketoconazole (NIZORAL) 2 % Cream Apply 1 Application to affected area(s) 2 times a day. 1 g 0   • betamethasone dipropionate (DIPROLENE) 0.05 % Ointment Apply to affected areas twice daily for one week 1 g 0   • glipiZIDE SR (GLUCOTROL) 10 MG TABLET SR 24 HR TAKE 1 TAB BY MOUTH EVERY MORNING. 90 Tab 3   • metoprolol SR (TOPROL XL) 50 MG TABLET SR 24 HR TAKE 1 TABLET BY MOUTH 2 TIMES A  Tab 3   • metformin (GLUCOPHAGE) 1000 MG tablet TAKE 1 TABLET BY MOUTH 2 TIMES A DAY. 180 Tab 3   • JARDIANCE 25 MG Tab TAKE 1 TABLET BY MOUTH EVERY DAY 90 Tab 3   • glucose blood strip 1 Strip by Other route 2 Times a Day. 60 Strip 11   • ACCU-CHEK MULTICLIX LANCETS Misc 1 Each by Does not apply route 2 Times a Day. 180 Each 12   • aspirin (ASA) 325 MG TABS Take 325 mg by mouth every 48 hours. 1 tab po every other day       No facility-administered encounter medications on file as of 6/7/2019.      Review of Systems   Respiratory: Negative for cough and shortness of breath.    Cardiovascular: Negative for chest pain and palpitations.   Musculoskeletal: Negative for myalgias.   Neurological: Negative for dizziness and loss of consciousness.        Objective:   /60 (BP Location: Left arm, Patient Position: Sitting, BP Cuff Size: Adult)   Pulse 78   Ht 1.778 m (5' 10\")   Wt 99.3 kg (219 lb)   SpO2 97%   BMI 31.42 kg/m²     Physical Exam   Constitutional: He is oriented to person, place, and time. He appears well-developed and well-nourished.   Neck: No JVD present.   Cardiovascular: Normal rate, regular rhythm and intact distal pulses.    Murmur heard.  Pulses:       " Carotid pulses are on the right side with bruit, and on the left side with bruit.  Pulmonary/Chest: Effort normal and breath sounds normal. No respiratory distress. He has no wheezes. He has no rales.   Musculoskeletal: He exhibits no edema.   Neurological: He is alert and oriented to person, place, and time.   Skin: Skin is warm and dry.   Psychiatric: He has a normal mood and affect. His behavior is normal.     CAROTID ULTRASOUND 11/01/2017  Mild bilateral internal carotid artery stenosis less than 50%.    08/08/2012 ECHOCARDIOGRAM  EF 55-60%. Moderate aortic stenosis. Peak aortic valve gradient 63 mmHg. Moderate LVH.      07/25/2013 ECHOCARDIOGRAM  EF 70-75%. Moderate aortic stenosis. Peak aortic valve gradient 59 mmHg. Moderate LVH.     10/23/2015 ECHOCARDIOGRAM  Normal left ventricular systolic function.  Left ventricular ejection fraction is visually estimated to be 60%.  Grade I diastolic dysfunction.  Mild mitral regurgitation.  Moderate aortic stenosis.  Vmax 3.47 m/s. Transvalvular gradients are - Peak: 48 mmHg, Mean: 30   mmHg.  Mild pulmonic insufficiency.     11/01/2017 ECHOCARDIOGRAM  Normal left ventricular chamber size.  Left ventricular ejection fraction is visually estimated to be 60%.  Grade I diastolic dysfunction.  Severe aortic stenosis.  Transvalvular gradients are - Peak: 72 mmHg, Mean: 50 mmHg.  Ascending aorta is borderline dilated with a diameter of 3.6 cm.  Compared to the report of the prior study done - the aortic stenosis is   now severe.    12/26/2018 ECHOCARDIOGRAM    Normal left ventricular systolic function.  Left ventricular ejection fraction is visually estimated to be 65-70%.  Moderate left ventricular hypertrophy.  Severe aortic stenosis.    12/19/2017 MPI   No evidence of significant jeopardized viable myocardium or prior myocardial    infarction.   Transient ischemic dilatation calculated at 1.17 visually correlated.   Normal left ventricular size, ejection fraction, and wall  "motion.    12/12/2018 MPI  Normal perfusion.  EF 56%.    08/14/2012 Lab: Cholesterol 109. Triglycerides 121. HDL 36. LDL 49.  03/28/2013 Lab: Cholesterol 98. Triglycerides 110. HDL 30. LDL 46.     09/27/2013 EKG: Normal sinus rhythm. No acute changes.  11/20/2017 EKG: Normal sinus rhythm, rate 73. Minor lateral ST-T wave abnormalities. 1st degree AV block. Reviewed by myself.    Assessment:     1. Aortic valve stenosis, etiology of cardiac valve disease unspecified     2. Aortic valve stenosis, severe  EC-ECHOCARDIOGRAM COMPLETE W/O CONT   3. Coronary artery disease, non-occlusive     4. S/P coronary artery stent placement     5. Essential hypertension     6. Dyslipidemia         Medical Decision Making:  Today's Assessment / Status / Plan:   Assessment  Aortic stenosis. Severe.  Symptomatic     CAD. Clinically stable.      Coronary stent implantation: 1996. 1998.    NSTEMI 12/12/2018.  Related to recurrent SVT.     Hyperlipidemia. Continue current therapy. Check lipid panel.     Hypertension. Controlled. Continue current therapy. CMP pending.     Bilateral carotid bruits.     Obesity. Counseled for weight loss.     SVT. 12/3/2013. 10/22/2015 12/12/2018.      Right hip pain.     Situational stress syndrome.      Recommendation and Discussion  1.  The patient initiated and a comprehensive detailed discussion with regards to ultimately undergoing valve replacement either TAVR or SAVR having researched and read with regards to the potential benefits and risks of both procedures.  2.  He is coming to the realization that he requires the procedure and I recommended to be referred to the valve clinic however at this time he refuses.  3.  Because of the above reasons regarding his financial situation and schedule and his wish to enroll in Medicare he states that undergoing the procedure this year is \"impossible\".  4.  The patient will carefully avoid any strenuous activity.  5.  In the event he has the development of more " symptoms either of chest discomfort, shortness of breath or lightheadedness he will notify me promptly.  6.  He requests another follow-up echocardiogram.  7.  Follow-up 3 months.

## 2019-06-24 ENCOUNTER — HOSPITAL ENCOUNTER (OUTPATIENT)
Dept: CARDIOLOGY | Facility: MEDICAL CENTER | Age: 64
End: 2019-06-24
Attending: INTERNAL MEDICINE
Payer: COMMERCIAL

## 2019-06-24 DIAGNOSIS — I35.0 AORTIC VALVE STENOSIS, SEVERE: ICD-10-CM

## 2019-06-24 PROCEDURE — 93306 TTE W/DOPPLER COMPLETE: CPT | Mod: 26 | Performed by: INTERNAL MEDICINE

## 2019-06-24 PROCEDURE — 93306 TTE W/DOPPLER COMPLETE: CPT

## 2019-06-25 DIAGNOSIS — I35.0 SEVERE AORTIC STENOSIS: ICD-10-CM

## 2019-06-25 DIAGNOSIS — I25.10 CORONARY ARTERY DISEASE INVOLVING NATIVE CORONARY ARTERY OF NATIVE HEART WITHOUT ANGINA PECTORIS: Chronic | ICD-10-CM

## 2019-06-25 DIAGNOSIS — I35.0 AORTIC VALVE STENOSIS, ETIOLOGY OF CARDIAC VALVE DISEASE UNSPECIFIED: Chronic | ICD-10-CM

## 2019-06-25 LAB
LV EJECT FRACT  99904: 70
LV EJECT FRACT MOD 2C 99903: 56.54
LV EJECT FRACT MOD 4C 99902: 81.59
LV EJECT FRACT MOD BP 99901: 73.2

## 2019-06-28 ENCOUNTER — TELEPHONE (OUTPATIENT)
Dept: CARDIOLOGY | Facility: MEDICAL CENTER | Age: 64
End: 2019-06-28

## 2019-06-28 DIAGNOSIS — I25.10 CORONARY ARTERY DISEASE INVOLVING NATIVE CORONARY ARTERY OF NATIVE HEART WITHOUT ANGINA PECTORIS: Chronic | ICD-10-CM

## 2019-06-28 DIAGNOSIS — R06.02 SOB (SHORTNESS OF BREATH): ICD-10-CM

## 2019-06-28 DIAGNOSIS — I25.10 CORONARY ARTERY DISEASE, NON-OCCLUSIVE: ICD-10-CM

## 2019-06-28 DIAGNOSIS — I35.0 AORTIC VALVE STENOSIS, SEVERE: ICD-10-CM

## 2019-06-28 NOTE — TELEPHONE ENCOUNTER
Zoe from [facility] called and states patient cannot be seen until a heart cath is complete    To GOLD hadley to order LHC?

## 2019-07-26 NOTE — TELEPHONE ENCOUNTER
Patient called back.  Patient is agreeable to C & RHC however doesn't want to schedule it until after 12/1/19 due to insurance reasons.  Patient understands and verbalizes if symptoms worsen he will have caths scheduled sooner.     Cardiac caths ordered    To Teresa - is it too far out to schedule in December?

## 2019-07-26 NOTE — TELEPHONE ENCOUNTER
Called patient and told him to call us at least 2 weeks before he wants to scheduled his angiogram in December.

## 2019-10-07 RX ORDER — ROSUVASTATIN CALCIUM 10 MG/1
TABLET, COATED ORAL
Qty: 90 TAB | Refills: 0 | Status: SHIPPED | OUTPATIENT
Start: 2019-10-07 | End: 2020-01-15 | Stop reason: SDUPTHER

## 2019-12-11 NOTE — TELEPHONE ENCOUNTER
Follow up:  Contacted patient.  Discussed getting LHC and RHC.  Patient now agreeable to the procedure stating he is starting to feel SOB is worsening.     LHC/RHC ordered.  This procedure is in preparation for patient to be seen by cardiothoracic surgeon consultation.    Task to

## 2019-12-12 NOTE — TELEPHONE ENCOUNTER
Per patients request patient is scheduled on 1-8-20 for a R&L hrt cath with Dr. Chavez. Patient was told to hold glipizide, jardiance and metformin AM day of procedure and to hold metformin for 48hrs after. Patient to check in at 7:30 for a 9:30 procedure. Updated H&P to be done on admit by NP. Pre admit to call patient.

## 2020-01-06 DIAGNOSIS — Z01.810 PRE-OPERATIVE CARDIOVASCULAR EXAMINATION: ICD-10-CM

## 2020-01-06 DIAGNOSIS — Z01.812 PRE-OPERATIVE LABORATORY EXAMINATION: ICD-10-CM

## 2020-01-06 LAB
ALBUMIN SERPL BCP-MCNC: 4.5 G/DL (ref 3.2–4.9)
ALBUMIN/GLOB SERPL: 1.4 G/DL
ALP SERPL-CCNC: 47 U/L (ref 30–99)
ALT SERPL-CCNC: 30 U/L (ref 2–50)
ANION GAP SERPL CALC-SCNC: 11 MMOL/L (ref 0–11.9)
APTT PPP: 27 SEC (ref 24.7–36)
AST SERPL-CCNC: 22 U/L (ref 12–45)
BILIRUB SERPL-MCNC: 0.4 MG/DL (ref 0.1–1.5)
BUN SERPL-MCNC: 15 MG/DL (ref 8–22)
CALCIUM SERPL-MCNC: 9.6 MG/DL (ref 8.5–10.5)
CHLORIDE SERPL-SCNC: 100 MMOL/L (ref 96–112)
CO2 SERPL-SCNC: 26 MMOL/L (ref 20–33)
CREAT SERPL-MCNC: 0.97 MG/DL (ref 0.5–1.4)
EKG IMPRESSION: NORMAL
ERYTHROCYTE [DISTWIDTH] IN BLOOD BY AUTOMATED COUNT: 39.8 FL (ref 35.9–50)
GLOBULIN SER CALC-MCNC: 3.3 G/DL (ref 1.9–3.5)
GLUCOSE SERPL-MCNC: 236 MG/DL (ref 65–99)
HCT VFR BLD AUTO: 42.6 % (ref 42–52)
HGB BLD-MCNC: 13.6 G/DL (ref 14–18)
INR PPP: 0.91 (ref 0.87–1.13)
MCH RBC QN AUTO: 24.8 PG (ref 27–33)
MCHC RBC AUTO-ENTMCNC: 31.9 G/DL (ref 33.7–35.3)
MCV RBC AUTO: 77.6 FL (ref 81.4–97.8)
PLATELET # BLD AUTO: 135 K/UL (ref 164–446)
PMV BLD AUTO: 9.7 FL (ref 9–12.9)
POTASSIUM SERPL-SCNC: 4.3 MMOL/L (ref 3.6–5.5)
PROT SERPL-MCNC: 7.8 G/DL (ref 6–8.2)
PROTHROMBIN TIME: 12.4 SEC (ref 12–14.6)
RBC # BLD AUTO: 5.49 M/UL (ref 4.7–6.1)
SODIUM SERPL-SCNC: 137 MMOL/L (ref 135–145)
WBC # BLD AUTO: 9.6 K/UL (ref 4.8–10.8)

## 2020-01-06 PROCEDURE — 80053 COMPREHEN METABOLIC PANEL: CPT

## 2020-01-06 PROCEDURE — 36415 COLL VENOUS BLD VENIPUNCTURE: CPT

## 2020-01-06 PROCEDURE — 85027 COMPLETE CBC AUTOMATED: CPT

## 2020-01-06 PROCEDURE — 85610 PROTHROMBIN TIME: CPT

## 2020-01-06 PROCEDURE — 85730 THROMBOPLASTIN TIME PARTIAL: CPT

## 2020-01-06 PROCEDURE — 93005 ELECTROCARDIOGRAM TRACING: CPT

## 2020-01-06 PROCEDURE — 93010 ELECTROCARDIOGRAM REPORT: CPT | Performed by: INTERNAL MEDICINE

## 2020-01-07 NOTE — PROGRESS NOTES
History:  Primary Diagnosis: severe aortic stenosis     HPI:  Mr. Millan is a patient of Dr. Chavez with significant history for severe aortic stenosis, coronary artery disease s/p stenting in 1996 (unknown vessel), palpitations, PSVT, hypertension and hyperlipidemia.     He was seen in the office in June by Dr. Chavez. He described mild chest discomfort and shortness of breath with activity. Recent echocardiogram showed severe AS, valve area 0.63cm2, vmax 4.4m/s and mean gradient of 52mmHg. Normal LV function with concentric LV hypertrophy. He was referred for pre-valve catheterization.    Currently patient denies chest pain, shortness of breath or palpitations at rest, however when he has minimal activity he is limited by chest pressure and shortness of breath. He denies any history of syncope or pre-syncope.     Medical history:   Past Medical History:   Diagnosis Date   • Aortic stenosis 10/20/2011   • Arrhythmia    • Breath shortness    • Carotid bruit 12/9/2009   • Cataract     cataract extraction with IOL   • Chronic right shoulder pain    • Diabetes     oral medication   • Fatty liver 1/17/2011   • Heart attack (HCC) 12/2018   • History of cardiac catheterization 7/13/2009   • History of echocardiogram 10/20/2011   • HTN (hypertension) 10/12/2012   • Hypertension    • Memory loss 10/20/2011   • Murmur 7/7/2009   • Obesity 8/23/2011   • Palpitations 10/20/2011   • PSVT (paroxysmal supraventricular tachycardia) (HCC) 2/14/2012   • S/P coronary artery stent placement 1996    coronary stent implantation , [facility]   • S/P coronary artery stent placement 1998    coronary stent implantation; [facility]   • S/P coronary artery stent placement 2003    cardiac catheterization; patent stents; [place]   • Sciatica 8/23/2011   • Uncontrolled diabetes mellitus (HCC) 11/29/2010       Surgical history:   Past Surgical History:   Procedure Laterality Date   • PO BY LAPAROSCOPY N/A 10/25/2015     Procedure: PO BY LAPAROSCOPY-with grams ;  Surgeon: Ronnie Bowman M.D.;  Location: SURGERY [facility];  Service:    • CAROTID STENT ANGIOGRAPHY  1996   • CATARACT EXTRACTION WITH IOL Bilateral        Social history:   Social History     Tobacco Use   Smoking Status Never Smoker   Smokeless Tobacco Never Used      Social History     Substance and Sexual Activity   Alcohol Use No   • Alcohol/week: 0.0 oz      Social History     Substance and Sexual Activity   Drug Use No        Family history:   Family History   Problem Relation Age of Onset   • Heart Disease Mother    • Heart Disease Father        Allergies:   Allergies   Allergen Reactions   • Bydureon [Exenatide] Hives     hives   • Cycloset [Bromocriptine Mesylate] Unspecified     Pt states he can't remember what happens to him.   • Morphine Vomiting       Home medications:   Current Outpatient Medications:   •  rosuvastatin (CRESTOR) 10 MG Tab, TAKE 1 TABLET BY MOUTH EVERY DAY IN THE EVENING, Disp: 90 Tab, Rfl: 0  •  losartan (COZAAR) 100 MG Tab, TAKE 1 TABLET BY MOUTH EVERY DAY, Disp: 90 Tab, Rfl: 3  •  glipiZIDE SR (GLUCOTROL) 10 MG TABLET SR 24 HR, TAKE 1 TAB BY MOUTH EVERY MORNING., Disp: 90 Tab, Rfl: 3  •  metoprolol SR (TOPROL XL) 50 MG TABLET SR 24 HR, TAKE 1 TABLET BY MOUTH 2 TIMES A DAY, Disp: 180 Tab, Rfl: 3  •  metformin (GLUCOPHAGE) 1000 MG tablet, TAKE 1 TABLET BY MOUTH 2 TIMES A DAY., Disp: 180 Tab, Rfl: 3  •  JARDIANCE 25 MG Tab, TAKE 1 TABLET BY MOUTH EVERY DAY, Disp: 90 Tab, Rfl: 3  •  glucose blood strip, 1 Strip by Other route 2 Times a Day., Disp: 60 Strip, Rfl: 11  •  aspirin (ASA) 325 MG TABS, Take 325 mg by mouth every 48 hours. 1 tab po every other day, Disp: , Rfl:     Review of Systems:  Review of Systems   Constitutional: Negative for malaise/fatigue and weight loss.   HENT: Negative for nosebleeds.    Respiratory: Positive for shortness of breath.    Cardiovascular: Positive for chest pain. Negative for orthopnea and leg  swelling.   Gastrointestinal: Negative for abdominal pain and nausea.   Neurological: Negative for loss of consciousness.   Endo/Heme/Allergies: Bruises/bleeds easily.       Physical Examination:  Physical Exam   Constitutional: He is oriented to person, place, and time. He appears well-developed and well-nourished.   HENT:   Head: Normocephalic and atraumatic.   Neck: No JVD present.   Cardiovascular: Normal rate and regular rhythm.   Murmur (4/6 musical systolic ejection murmur) heard.  Pulmonary/Chest: Effort normal and breath sounds normal. No respiratory distress. He has no rales.   Abdominal: Soft. Bowel sounds are normal. He exhibits no distension. There is no tenderness.   Musculoskeletal:         General: No edema.   Neurological: He is oriented to person, place, and time.   Skin: Skin is warm and dry. He is not diaphoretic. No pallor.   Psychiatric: Judgment normal.   Vitals reviewed.      Impression:  Severe Aortic Stenosis  Coronary Artery disease  HTN  HLD    Plan:  Left cardiac cath with possible intervention and aortic root shot.     The risks, benefits, and alternatives to coronary angiography with IV sedation were discussed in great detail. Specific risks mentioned include bleeding, infection, kidney damage, allergic reaction, cardiac perforation with possible tamponade requiring pericardiocentesis or possibly open heart surgery. In addition, we discussed that 10% of patients will experience small to moderate bruising at the site of the arterial puncture. Lastly, the risks of heart attack, stroke and death were discussed; the risk of major complications such as heart attack or stroke caused by the angiogram is approximately 1%; the risk of death is approximately 1 in 1000. The patient verbalized understanding of the potential complications and wishes to proceed with this procedure.    The risks/benefits of the procedure will be further discussed with the consenting physician performing the  procedure.

## 2020-01-08 ENCOUNTER — APPOINTMENT (OUTPATIENT)
Dept: CARDIOLOGY | Facility: MEDICAL CENTER | Age: 65
End: 2020-01-08
Attending: INTERNAL MEDICINE
Payer: MEDICARE

## 2020-01-08 ENCOUNTER — HOSPITAL ENCOUNTER (OUTPATIENT)
Facility: MEDICAL CENTER | Age: 65
End: 2020-01-09
Attending: INTERNAL MEDICINE | Admitting: INTERNAL MEDICINE
Payer: MEDICARE

## 2020-01-08 DIAGNOSIS — I25.10 CORONARY ARTERY DISEASE, NON-OCCLUSIVE: ICD-10-CM

## 2020-01-08 DIAGNOSIS — I25.10 CORONARY ARTERY DISEASE INVOLVING NATIVE CORONARY ARTERY OF NATIVE HEART WITHOUT ANGINA PECTORIS: Chronic | ICD-10-CM

## 2020-01-08 DIAGNOSIS — I35.0 AORTIC VALVE STENOSIS, SEVERE: ICD-10-CM

## 2020-01-08 DIAGNOSIS — R06.02 SOB (SHORTNESS OF BREATH): ICD-10-CM

## 2020-01-08 LAB — EKG IMPRESSION: NORMAL

## 2020-01-08 PROCEDURE — 96365 THER/PROPH/DIAG IV INF INIT: CPT | Mod: XU

## 2020-01-08 PROCEDURE — 93567 NJX CAR CTH SPRVLV AORTGRPHY: CPT | Performed by: INTERNAL MEDICINE

## 2020-01-08 PROCEDURE — 93005 ELECTROCARDIOGRAM TRACING: CPT | Performed by: INTERNAL MEDICINE

## 2020-01-08 PROCEDURE — 700101 HCHG RX REV CODE 250

## 2020-01-08 PROCEDURE — 93454 CORONARY ARTERY ANGIO S&I: CPT | Mod: 26,59 | Performed by: INTERNAL MEDICINE

## 2020-01-08 PROCEDURE — G0278 ILIAC ART ANGIO,CARDIAC CATH: HCPCS | Performed by: INTERNAL MEDICINE

## 2020-01-08 PROCEDURE — G0378 HOSPITAL OBSERVATION PER HR: HCPCS

## 2020-01-08 PROCEDURE — 92928 PRQ TCAT PLMT NTRAC ST 1 LES: CPT | Mod: LD | Performed by: INTERNAL MEDICINE

## 2020-01-08 PROCEDURE — 93571 IV DOP VEL&/PRESS C FLO 1ST: CPT | Mod: 26,52,LD | Performed by: INTERNAL MEDICINE

## 2020-01-08 PROCEDURE — 99152 MOD SED SAME PHYS/QHP 5/>YRS: CPT | Performed by: INTERNAL MEDICINE

## 2020-01-08 PROCEDURE — 700102 HCHG RX REV CODE 250 W/ 637 OVERRIDE(OP)

## 2020-01-08 PROCEDURE — 700102 HCHG RX REV CODE 250 W/ 637 OVERRIDE(OP): Performed by: PHYSICIAN ASSISTANT

## 2020-01-08 PROCEDURE — 700117 HCHG RX CONTRAST REV CODE 255: Performed by: INTERNAL MEDICINE

## 2020-01-08 PROCEDURE — 160002 HCHG RECOVERY MINUTES (STAT)

## 2020-01-08 PROCEDURE — 700111 HCHG RX REV CODE 636 W/ 250 OVERRIDE (IP)

## 2020-01-08 PROCEDURE — 700105 HCHG RX REV CODE 258: Performed by: PHYSICIAN ASSISTANT

## 2020-01-08 PROCEDURE — A9270 NON-COVERED ITEM OR SERVICE: HCPCS | Performed by: PHYSICIAN ASSISTANT

## 2020-01-08 PROCEDURE — A9270 NON-COVERED ITEM OR SERVICE: HCPCS

## 2020-01-08 PROCEDURE — C1760 CLOSURE DEV, VASC: HCPCS

## 2020-01-08 RX ORDER — SODIUM CHLORIDE 9 MG/ML
1000 INJECTION, SOLUTION INTRAVENOUS ONCE
Status: COMPLETED | OUTPATIENT
Start: 2020-01-08 | End: 2020-01-08

## 2020-01-08 RX ORDER — VERAPAMIL HYDROCHLORIDE 2.5 MG/ML
INJECTION, SOLUTION INTRAVENOUS
Status: COMPLETED
Start: 2020-01-08 | End: 2020-01-08

## 2020-01-08 RX ORDER — SODIUM CHLORIDE 9 MG/ML
INJECTION, SOLUTION INTRAVENOUS CONTINUOUS
Status: DISCONTINUED | OUTPATIENT
Start: 2020-01-08 | End: 2020-01-08

## 2020-01-08 RX ORDER — PRASUGREL 10 MG/1
10 TABLET, FILM COATED ORAL DAILY
Status: DISCONTINUED | OUTPATIENT
Start: 2020-01-09 | End: 2020-01-09 | Stop reason: HOSPADM

## 2020-01-08 RX ORDER — PRASUGREL 10 MG/1
10 TABLET, FILM COATED ORAL DAILY
Qty: 30 TAB | Refills: 11 | Status: SHIPPED | OUTPATIENT
Start: 2020-01-09 | End: 2020-08-03

## 2020-01-08 RX ORDER — ROSUVASTATIN CALCIUM 20 MG/1
20 TABLET, COATED ORAL EVERY EVENING
Status: DISCONTINUED | OUTPATIENT
Start: 2020-01-08 | End: 2020-01-09 | Stop reason: HOSPADM

## 2020-01-08 RX ORDER — HEPARIN SODIUM 200 [USP'U]/100ML
INJECTION, SOLUTION INTRAVENOUS
Status: COMPLETED
Start: 2020-01-08 | End: 2020-01-08

## 2020-01-08 RX ORDER — ACETAMINOPHEN 325 MG/1
650 TABLET ORAL EVERY 4 HOURS PRN
Status: DISCONTINUED | OUTPATIENT
Start: 2020-01-08 | End: 2020-01-09 | Stop reason: HOSPADM

## 2020-01-08 RX ORDER — PRASUGREL 10 MG/1
TABLET, FILM COATED ORAL
Status: COMPLETED
Start: 2020-01-08 | End: 2020-01-08

## 2020-01-08 RX ORDER — LIDOCAINE HYDROCHLORIDE 20 MG/ML
INJECTION, SOLUTION INFILTRATION; PERINEURAL
Status: COMPLETED
Start: 2020-01-08 | End: 2020-01-08

## 2020-01-08 RX ORDER — ASPIRIN 325 MG
TABLET ORAL
Status: COMPLETED
Start: 2020-01-08 | End: 2020-01-08

## 2020-01-08 RX ORDER — PRASUGREL 10 MG/1
60 TABLET, FILM COATED ORAL ONCE
Status: DISCONTINUED | OUTPATIENT
Start: 2020-01-08 | End: 2020-01-08

## 2020-01-08 RX ORDER — HEPARIN SODIUM,PORCINE 1000/ML
VIAL (ML) INJECTION
Status: COMPLETED
Start: 2020-01-08 | End: 2020-01-08

## 2020-01-08 RX ORDER — BIVALIRUDIN 250 MG/5ML
INJECTION, POWDER, LYOPHILIZED, FOR SOLUTION INTRAVENOUS
Status: COMPLETED
Start: 2020-01-08 | End: 2020-01-08

## 2020-01-08 RX ORDER — MIDAZOLAM HYDROCHLORIDE 1 MG/ML
INJECTION INTRAMUSCULAR; INTRAVENOUS
Status: COMPLETED
Start: 2020-01-08 | End: 2020-01-08

## 2020-01-08 RX ORDER — LOSARTAN POTASSIUM 50 MG/1
100 TABLET ORAL DAILY
Status: DISCONTINUED | OUTPATIENT
Start: 2020-01-08 | End: 2020-01-09 | Stop reason: HOSPADM

## 2020-01-08 RX ORDER — ACETAMINOPHEN 325 MG/1
650 TABLET ORAL EVERY 4 HOURS PRN
Status: DISCONTINUED | OUTPATIENT
Start: 2020-01-08 | End: 2020-01-08

## 2020-01-08 RX ADMIN — ACETAMINOPHEN 650 MG: 325 TABLET, FILM COATED ORAL at 16:55

## 2020-01-08 RX ADMIN — METOPROLOL SUCCINATE 75 MG: 50 TABLET, EXTENDED RELEASE ORAL at 16:54

## 2020-01-08 RX ADMIN — HEPARIN SODIUM 2000 UNITS: 200 INJECTION, SOLUTION INTRAVENOUS at 11:56

## 2020-01-08 RX ADMIN — LIDOCAINE HYDROCHLORIDE: 20 INJECTION, SOLUTION INFILTRATION; PERINEURAL at 11:54

## 2020-01-08 RX ADMIN — FENTANYL CITRATE 75 MCG: 0.05 INJECTION, SOLUTION INTRAMUSCULAR; INTRAVENOUS at 12:50

## 2020-01-08 RX ADMIN — MIDAZOLAM HYDROCHLORIDE 2 MG: 1 INJECTION, SOLUTION INTRAMUSCULAR; INTRAVENOUS at 12:20

## 2020-01-08 RX ADMIN — VERAPAMIL HYDROCHLORIDE 2.5 MG: 2.5 INJECTION INTRAVENOUS at 11:56

## 2020-01-08 RX ADMIN — MIDAZOLAM HYDROCHLORIDE 1.5 MG: 1 INJECTION, SOLUTION INTRAMUSCULAR; INTRAVENOUS at 12:54

## 2020-01-08 RX ADMIN — BIVALIRUDIN 250 MG: 250 INJECTION INTRACAVERNOUS at 12:20

## 2020-01-08 RX ADMIN — ASPIRIN 325 MG: 325 TABLET, FILM COATED ORAL at 12:50

## 2020-01-08 RX ADMIN — NITROGLYCERIN 10 ML: 20 INJECTION INTRAVENOUS at 11:56

## 2020-01-08 RX ADMIN — HEPARIN SODIUM: 1000 INJECTION, SOLUTION INTRAVENOUS; SUBCUTANEOUS at 11:54

## 2020-01-08 RX ADMIN — SODIUM CHLORIDE 1000 ML: 9 INJECTION, SOLUTION INTRAVENOUS at 14:30

## 2020-01-08 RX ADMIN — LOSARTAN POTASSIUM 100 MG: 50 TABLET, FILM COATED ORAL at 16:55

## 2020-01-08 RX ADMIN — ROSUVASTATIN CALCIUM 20 MG: 20 TABLET, FILM COATED ORAL at 16:55

## 2020-01-08 RX ADMIN — PRASUGREL 60 MG: 10 TABLET, FILM COATED ORAL at 12:50

## 2020-01-08 RX ADMIN — IOHEXOL 203 ML: 350 INJECTION, SOLUTION INTRAVENOUS at 12:50

## 2020-01-08 ASSESSMENT — PATIENT HEALTH QUESTIONNAIRE - PHQ9
SUM OF ALL RESPONSES TO PHQ9 QUESTIONS 1 AND 2: 0
2. FEELING DOWN, DEPRESSED, IRRITABLE, OR HOPELESS: NOT AT ALL
1. LITTLE INTEREST OR PLEASURE IN DOING THINGS: NOT AT ALL

## 2020-01-08 ASSESSMENT — COGNITIVE AND FUNCTIONAL STATUS - GENERAL
DAILY ACTIVITIY SCORE: 24
SUGGESTED CMS G CODE MODIFIER MOBILITY: CH
SUGGESTED CMS G CODE MODIFIER DAILY ACTIVITY: CH
MOBILITY SCORE: 24

## 2020-01-08 ASSESSMENT — LIFESTYLE VARIABLES
TOTAL SCORE: 0
CONSUMPTION TOTAL: NEGATIVE
ALCOHOL_USE: NO
AVERAGE NUMBER OF DAYS PER WEEK YOU HAVE A DRINK CONTAINING ALCOHOL: 0
TOTAL SCORE: 0
HAVE PEOPLE ANNOYED YOU BY CRITICIZING YOUR DRINKING: NO
HOW MANY TIMES IN THE PAST YEAR HAVE YOU HAD 5 OR MORE DRINKS IN A DAY: 0
EVER HAD A DRINK FIRST THING IN THE MORNING TO STEADY YOUR NERVES TO GET RID OF A HANGOVER: NO
EVER FELT BAD OR GUILTY ABOUT YOUR DRINKING: NO
HAVE YOU EVER FELT YOU SHOULD CUT DOWN ON YOUR DRINKING: NO
TOTAL SCORE: 0
ON A TYPICAL DAY WHEN YOU DRINK ALCOHOL HOW MANY DRINKS DO YOU HAVE: 0

## 2020-01-08 ASSESSMENT — ENCOUNTER SYMPTOMS
SHORTNESS OF BREATH: 1
LOSS OF CONSCIOUSNESS: 0
ORTHOPNEA: 0
NAUSEA: 0
WEIGHT LOSS: 0
BRUISES/BLEEDS EASILY: 1
ABDOMINAL PAIN: 0

## 2020-01-08 NOTE — PROGRESS NOTES
Patient seen after cath. No pain or bleeding at groin site. No chest chest pain.     Angiomax infusing. 1L NS after cath. Received aspirin and prasugrel in the lab.    Monitor overnight. Prasugrel sent to pharmacy for price check.

## 2020-01-08 NOTE — PROCEDURES
DATE OF SERVICE:  01/08/2020    PROCEDURE:  Cardiac catheterization and percutaneous coronary intervention.  A.  Selective coronary angiography.  B.  iFR, proximal left anterior descending artery.  C.  Coronary stent implantation, proximal left anterior descending artery, 2.5x8 mm   Collin drug-eluting stent.  D.  Ascending aortography.  E.  Distal abdominal aortogram and bilateral iliofemoral angiography.  F.  Right femoral artery approach.  G.  Perclose.  H.  Conscious sedation supervision.    PREPROCEDURE DIAGNOSES:  1.  Aortic stenosis, severe, symptomatic.  2.  Previous left anterior descending artery stent, 1996.  3.  Diabetes mellitus.    POSTPROCEDURE DIAGNOSES:  1.  Left anterior descending artery 90% stenosis.  2.  iFR, left anterior descending artery, 0.39.  3.  Normal ascending aorta.  4.  Normal distal abdominal aorta and bilateral iliofemoral arteries.    PHYSICIAN:  Ganga Chavez MD    COMPLICATIONS:  None.    MEDICATIONS:  1.  Versed 3.5 mg IV.  2.  Fentanyl 100 mcg IV.  3.  Lidocaine 2% subcutaneous.  4.  Angiomax IV bolus and infusion.  5.  Nitroglycerin 150 mcg intracoronary.  6.  Prasugrel 60 mg p.o.    DESCRIPTION OF PROCEDURE:  After informed consent was obtained, the patient   was brought to the cardiac catheterization laboratory where he was prepped,   draped, and anesthetized in the usual manner.    Ultrasound interrogation of the right radial and ulnar artery was performed   and was not felt that either vessel was adequate for cannulation.    Next, using ultrasound guidance and modified Seldinger technique, a 4-[place] x   10 cm introducer sheath was inserted in the left femoral artery.    Next, a 4-[place] JL 4.0 left coronary catheter was inserted in the ostium of   left coronary artery and left coronary angiograms were obtained in various   projections.    Next, a 4-[place] 3D right coronary catheter was inserted in the ostium of the   right coronary artery and right coronary  angiograms were obtained in various   projections.    Review of the angiograms demonstrated patent proximal left anterior descending   artery stents, followed by a focal 90% stenosis.    Next, a 4-[place] introducer sheath was exchanged for a 6-[place] introducer   sheath.    Next, a 6-[place] extra backup 3.5 guiding catheter was inserted in the ostium   of the left coronary artery.    Next, a 0.014 Doppler flow wire was normalized and advanced into the distal   left anterior descending artery.  The guide catheter was removed.  IC   nitroglycerin was given.  Baseline IFR was 0.39 with pullback showing no   significant drift.  It was elected to proceed with coronary intervention of   the proximal left anterior descending artery.    Angiomax was given.    Next, a 0.014 [place] [employer] wire was advanced to the distal left anterior   descending artery.    Next, the proximal stenosis was predilated with a 2.5x8 mm balloon at 14   atmospheres, a 2.5x10 mm [place] cutting balloon at 6 atmospheres and a   2.5x8 mm noncompliant balloon catheter at 15 atmospheres.    Next, a 2.5x8 mm Collin drug-eluting stent was placed across the residual   stenosis and deployed at 12 atmospheres with post deployment inflation of 15   atmospheres.    The balloon and wire removed and final angiograms were performed.    Next, a 4-[place] pigtail catheter was advanced into the ascending aorta and   [place]gas[place] ascending aortogram was obtained.    Next, the pigtail catheter was retracted to the level of the distal abdominal   aorta above the bifurcation aortogram and bilateral iliofemoral angiogram was   performed.    Next, a right femoral artery angiogram was performed and a Perclose device was   successfully deployed for hemostasis.  The patient tolerated the procedure   Well.    CONSCIOUS SEDATION SUPERVISION:  I supervised and monitored the administration   of intravenous conscious sedation from 11:49 a.m.  until 12:49 p.m.    Omnipaque 350, 203 mL.   Baseline GFR greater than 60.    MEDICATIONS:  1.  IV Versed.  2.  IV fentanyl.  3.  Lidocaine 2% subcutaneous  4.  Angiomax IV bolus and infusion.  5.  Prasugrel 60 mg p.o.    FINDINGS:  HEMODYNAMICS:  LEFT HEART PRESSURES:  Central aortic pressure systolic 139, diastolic 59, mean of 89.    ASCENDING AORTOGRAPHY:  The ascending aorta appeared to be normal caliber.    Aortic valve was calcified with a restricted leaflet motion.  No aortic   regurgitation.    CORONARY ARTERIOGRAPHY:  1.  Left main artery:  Left main artery is large caliber, angiographically   normal, bifurcates into left anterior descending artery and circumflex artery.  2.  Left anterior descending artery:  Left anterior descending artery is a small caliber   vessel, has  patent proximal stent followed by a focal eccentric 90% stenosis with   moderate diffuse disease in the early proximal distal vessel.  The small caliber   first diagonal branch has a subtotal stenosis.  3.  Circumflex artery:  The circumflex gives rise to 2 major marginal branches   that are widely patent.  The second marginal branch has mild proximal   disease.  4.  Right coronary artery:  The right coronary artery gives rise to a small   caliber posterior descending artery and a branch and a trifurcating   posterolateral system.  The right coronary artery is patent with mild diffuse   smooth atheromatous disease.  The posterior descending artery ostium has 50%   stenosis.    POST-STENT IMPLANTATION, proximal left anterior descending artery, 2.5x8 mm   Collin drug-eluting stent demonstrates good stent expansion, less than 10%   residual stenosis, no evidence of dissection or thrombus, and ZAY-3 antegrade   flow.    PLAN:  1.  Post PCI Angiomax x4 hours.  2.  Dual antiplatelet therapy with aspirin and Prasugrel.  3.  Maximize treatment for contributing coronary risk factors in particular diabetes mellitus.  4.  Proceed with TAVR evaluation.        ____________________________________     MD MARJAN FELDMAN / SHELDON    DD:  01/08/2020 13:46:32  DT:  01/08/2020 14:12:55    D#:  [id number]  Job#:  [id number]

## 2020-01-08 NOTE — OR NURSING
1312 Patient arrived from cath lab s/p [facility] x1  Stent mid LAD. Patient wide awake, denies pain, denies nausea. Right groin access site dressing clean dry intact. angiomax infusing as ordered. Vss.   1330 Patient provided with water tolerating well.   1440 Criteria met to discharge patient out of ppu  1447 Report given to amrit JIN T803 bed 1 with all her personal belongings.   1510 patient transported to room with all his personal belongings. Checked surgical site with DAVIN Espinal no bleeding no hematoma.

## 2020-01-09 ENCOUNTER — TELEPHONE (OUTPATIENT)
Dept: CARDIOLOGY | Facility: MEDICAL CENTER | Age: 65
End: 2020-01-09

## 2020-01-09 VITALS
DIASTOLIC BLOOD PRESSURE: 71 MMHG | HEIGHT: 70 IN | WEIGHT: 214.29 LBS | OXYGEN SATURATION: 94 % | BODY MASS INDEX: 30.68 KG/M2 | HEART RATE: 78 BPM | SYSTOLIC BLOOD PRESSURE: 123 MMHG | RESPIRATION RATE: 17 BRPM | TEMPERATURE: 98 F

## 2020-01-09 DIAGNOSIS — R06.02 SOB (SHORTNESS OF BREATH): ICD-10-CM

## 2020-01-09 DIAGNOSIS — I35.0 SEVERE AORTIC STENOSIS: ICD-10-CM

## 2020-01-09 LAB
ANION GAP SERPL CALC-SCNC: 8 MMOL/L (ref 0–11.9)
BUN SERPL-MCNC: 17 MG/DL (ref 8–22)
CALCIUM SERPL-MCNC: 8.8 MG/DL (ref 8.5–10.5)
CHLORIDE SERPL-SCNC: 102 MMOL/L (ref 96–112)
CO2 SERPL-SCNC: 27 MMOL/L (ref 20–33)
CREAT SERPL-MCNC: 0.9 MG/DL (ref 0.5–1.4)
EKG IMPRESSION: NORMAL
ERYTHROCYTE [DISTWIDTH] IN BLOOD BY AUTOMATED COUNT: 39.8 FL (ref 35.9–50)
GLUCOSE SERPL-MCNC: 188 MG/DL (ref 65–99)
HCT VFR BLD AUTO: 37.3 % (ref 42–52)
HGB BLD-MCNC: 12.2 G/DL (ref 14–18)
MCH RBC QN AUTO: 25.2 PG (ref 27–33)
MCHC RBC AUTO-ENTMCNC: 32.7 G/DL (ref 33.7–35.3)
MCV RBC AUTO: 77.1 FL (ref 81.4–97.8)
PLATELET # BLD AUTO: 108 K/UL (ref 164–446)
PMV BLD AUTO: 9.9 FL (ref 9–12.9)
POTASSIUM SERPL-SCNC: 4 MMOL/L (ref 3.6–5.5)
RBC # BLD AUTO: 4.84 M/UL (ref 4.7–6.1)
SODIUM SERPL-SCNC: 137 MMOL/L (ref 135–145)
WBC # BLD AUTO: 7.3 K/UL (ref 4.8–10.8)

## 2020-01-09 PROCEDURE — 36415 COLL VENOUS BLD VENIPUNCTURE: CPT

## 2020-01-09 PROCEDURE — A9270 NON-COVERED ITEM OR SERVICE: HCPCS | Performed by: INTERNAL MEDICINE

## 2020-01-09 PROCEDURE — 700102 HCHG RX REV CODE 250 W/ 637 OVERRIDE(OP): Performed by: PHYSICIAN ASSISTANT

## 2020-01-09 PROCEDURE — A9270 NON-COVERED ITEM OR SERVICE: HCPCS | Performed by: PHYSICIAN ASSISTANT

## 2020-01-09 PROCEDURE — 85027 COMPLETE CBC AUTOMATED: CPT

## 2020-01-09 PROCEDURE — 93005 ELECTROCARDIOGRAM TRACING: CPT | Performed by: INTERNAL MEDICINE

## 2020-01-09 PROCEDURE — 700102 HCHG RX REV CODE 250 W/ 637 OVERRIDE(OP): Performed by: INTERNAL MEDICINE

## 2020-01-09 PROCEDURE — 80048 BASIC METABOLIC PNL TOTAL CA: CPT

## 2020-01-09 PROCEDURE — G0378 HOSPITAL OBSERVATION PER HR: HCPCS

## 2020-01-09 PROCEDURE — 93010 ELECTROCARDIOGRAM REPORT: CPT | Performed by: INTERNAL MEDICINE

## 2020-01-09 RX ORDER — ASPIRIN 81 MG/1
81 TABLET ORAL DAILY
Qty: 30 TAB | Refills: 11 | Status: SHIPPED | OUTPATIENT
Start: 2020-01-10 | End: 2020-08-27

## 2020-01-09 RX ORDER — PRASUGREL 10 MG/1
10 TABLET, FILM COATED ORAL DAILY
Qty: 30 TAB | Refills: 1 | Status: SHIPPED
Start: 2020-01-09 | End: 2020-01-09

## 2020-01-09 RX ADMIN — PRASUGREL 10 MG: 10 TABLET, FILM COATED ORAL at 04:43

## 2020-01-09 RX ADMIN — ACETAMINOPHEN 650 MG: 325 TABLET, FILM COATED ORAL at 06:02

## 2020-01-09 RX ADMIN — ASPIRIN 81 MG: 81 TABLET, COATED ORAL at 04:42

## 2020-01-09 RX ADMIN — LOSARTAN POTASSIUM 100 MG: 50 TABLET, FILM COATED ORAL at 04:42

## 2020-01-09 NOTE — DISCHARGE SUMMARY
Admission date  1/8/2020  Discharge date  1/9/2020  Discharge diagnosis    Symptomatic severe aortic stenosis    Preop work-up underwent elective left and right heart cath 1/8/2020 status post PCI to proximal LAD    Type 2 diabetes    CAD with previous LAD stent, 1996    Consults  None    Procedures  PROCEDURE 1/8/2020:  Cardiac catheterization and percutaneous coronary intervention.  A.  Selective coronary angiography.  B.  iFR, proximal left anterior descending artery.  C.  Coronary stent implantation, proximal left anterior descending artery, 2.5x8 mm   Collin drug-eluting stent.  D.  Ascending aortography.  E.  Distal abdominal aortogram and bilateral iliofemoral angiography.  F.  Right femoral artery approach.    HPI/hospital course  64-year-old male presented 1/8/2020 to undergo elective left and right heart cath as preop work-up for severe aortic stenosis.    Labs reviewed  WBC 7.3, hemoglobin 12.2, hematocrit 37.3, platelet count 108, sodium 137, potassium 4, creatinine 0.90, glucose 188, triglycerides 161, LDL 48, HDL 36    Discharge meds    Resume glipizide SR 10 mg daily.  Resume Jardiance 25 mg daily.  Resume metformin 1000 mg twice a day (resume 1/10/2020).  Resume losartan 100 mg daily.  Resume Toprol-XL 50 twice a day.  Prescribed aspirin 81 mg daily.  Prescribed Prasugrel (Effient) 10 mg daily (meds to bed provided , discussed with  to check for affordability).  Prescribed rosuvastatin (Crestor) 20 mg daily.    Discharge instructions    Patient is to follow-up with our cardiology office on 1/23/2020.  Compliance with dual antiplatelet therapy (aspirin 81 mg and Effient 10 mg) discussed.  Close monitoring of blood pressure heart rate were discussed  Cath site care discussed.  Patient verbalized understanding.

## 2020-01-09 NOTE — CARE PLAN
Problem: Communication  Goal: The ability to communicate needs accurately and effectively will improve  Outcome: PROGRESSING AS EXPECTED  Intervention: [place] patient and significant other/support system to call light to alert staff of needs  Flowsheets (Taken 1/8/2020 7914)  Oriented to:: All of the Following : Location of Bathroom, Visiting Policy, Unit Routine, Call Light and Bedside Controls, Bedside Rail Policy, Smoking Policy, Rights and Responsibilities, Bedside Report, and Patient Education Notebook     Problem: Safety  Goal: Will remain free from injury  Outcome: PROGRESSING AS EXPECTED  Goal: Will remain free from falls  Outcome: PROGRESSING AS EXPECTED     Problem: Infection  Goal: Will remain free from infection  Outcome: PROGRESSING AS EXPECTED

## 2020-01-09 NOTE — PROGRESS NOTES
"Pt c/o \"6/10 R hip pain.\" Groin site dressing CDI. Cardiology notified. New orders received. Repositioned and heat packs applied.  "

## 2020-01-09 NOTE — DISCHARGE PLANNING
Anticipated Discharge Disposition: home with new effient    Action: RN CM called patient's pharmacy to check on cost of Effient. RN CM gave them his current insurance information and the copay will be $41.   Per BS Nicole JIN, patient is aware of copay and agreeable to pay when he receives the meds to beds for his stent.    Barriers to Discharge: medications delivered    Plan: Case coordination to be available for discharge needs

## 2020-01-09 NOTE — PROGRESS NOTES
Assumed care of pt, pt is asking about discharge. Spoke with [facility] pharmacy. Meds will be delivered this AM.

## 2020-01-09 NOTE — DISCHARGE PLANNING
MEDS TO BEDS      DELIVERED TO PATIENT IN ROOM        Effient 10 mg #30 provided at $38.75 copay.     Pt education provided, all questions answered, including but not limited to potential side effects, what to do if a dose is missed, when to contact his physician.  Pt verbalized understanding to take this medication once daily along with an 81mg ASA, with or without regard to food,  for a duration of 12 months unless otherwise directed by cardiology.  Pt understands to begin this medication AFTER discharge from hospital. Pt verbalized understanding that he must refill this medication at his local drug outlet.     All questions answered.  Pt verbalized understanding including when to return to the ED.  Paola Vuong, Clinical Pharmacist

## 2020-01-09 NOTE — PROGRESS NOTES
Pt dc'd home. IV and monitor removed; monitor room notified. Pt left unit via walking with son. Personal belongings with pt when leaving unit. Pt given discharge instructions prior to leaving unit including prescription and when to visit with physician; verbalizes understanding. Copy of discharge instructions with pt and in the chart.

## 2020-01-09 NOTE — PROGRESS NOTES
Pt back from cath with DAVIN Fox. R groin site CDI no s/s of hematoma or bleeding, CMS intact. Monitor on pt. Verified with monitor room. VSS, post sed vitals in place.

## 2020-01-09 NOTE — PROGRESS NOTES
2 RN skin check complete with DAVIN Patton.   Devices in place NA.  Skin assessed under devices NA.  Confirmed pressure ulcers found on NA.  New potential pressure ulcers noted on NA. Wound consult placed NA.  Ears red/blanching pt states wears reading glasses, sacrum red/ slow blanching, R groin site CDI, generalized redness/rash. Pt states he is out in the sun a lot for work and denies any eczema/psoriasis.

## 2020-01-09 NOTE — PROGRESS NOTES
Report received from Tresa JIN. Pt care assumed. Assessment performed. Pt AOx4. Pt laying supine in bed. Pt denies pain and no signs of distress. Bed in low, locked position. Pt educated on calling to ambulate. Call light within reach. Treaded socks on pt.  Hourly rounding in place.

## 2020-01-09 NOTE — DISCHARGE INSTRUCTIONS
Discharge Instructions    Discharged to home by car with relative. Discharged via walking, hospital escort: Refused.  Special equipment needed: Not Applicable    Be sure to schedule a follow-up appointment with your primary care doctor or any specialists as instructed.     Discharge Plan:   Diet Plan: Discussed  Activity Level: Discussed  Confirmed Symptoms Management: Discussed  Medication Reconciliation Updated: Yes  Influenza Vaccine Indication: Not indicated: Previously immunized this influenza season and > 8 years of age    I understand that a diet low in cholesterol, fat, and sodium is recommended for good health. Unless I have been given specific instructions below for another diet, I accept this instruction as my diet prescription.   Other diet: Cardiac    Special Instructions: None    · Is patient discharged on Warfarin / Coumadin?   No     Depression / Suicide Risk    As you are discharged from this [facility] facility, it is important to learn how to keep safe from harming yourself.    Recognize the warning signs:  · Abrupt changes in personality, positive or negative- including increase in energy   · Giving away possessions  · Change in eating patterns- significant weight changes-  positive or negative  · Change in sleeping patterns- unable to sleep or sleeping all the time   · Unwillingness or inability to communicate  · Depression  · Unusual sadness, discouragement and loneliness  · Talk of wanting to die  · Neglect of personal appearance   · Rebelliousness- reckless behavior  · Withdrawal from people/activities they love  · Confusion- inability to concentrate     If you or a loved one observes any of these behaviors or has concerns about self-harm, here's what you can do:  · Talk about it- your feelings and reasons for harming yourself  · Remove any means that you might use to hurt yourself (examples: pills, rope, extension cords, firearm)  · Get professional help from the community (Mental Health,  Substance Abuse, psychological counseling)  · Do not be alone:Call your Safe Contact- someone whom you trust who will be there for you.  · Call your local CRISIS HOTLINE 683-4928 or 405-919-3041  · Call your local Children's Mobile Crisis Response Team [place] (116) 280-7459 or www.[employer]  · Call the toll free National Suicide Prevention Hotlines   · National Suicide Prevention Lifeline 071-005-BXVO (3779)  · [employer] Hope Line Network 800-SUICIDE (296-7685)            Aortic Valve Stenosis  Aortic valve stenosis is a narrowing of the aortic valve. The aortic valve opens and closes to regulate blood flow between the lower left chamber of the heart (left ventricle) and the blood vessel that leads away from the heart (aorta). When the aortic valve becomes narrow, it makes it difficult for the heart to pump blood into the aorta, which causes the heart to work harder. The extra work can weaken the heart over time.  Aortic valve stenosis can range from mild to severe. If untreated, it can become more severe over time and can lead to heart failure.  What are the causes?  This condition may be caused by:  · Buildup of calcium around and on the valve. This can occur with aging. This is the most common cause of aortic valve stenosis.  · Birth defect.  · Rheumatic fever.  · Radiation to the chest.  What increases the risk?  You may be more likely to develop this condition if:  · You are over the age of 65.  · You were born with an abnormal bicuspid valve.  What are the signs or symptoms?  You may have no symptoms until your condition becomes severe. It may take 10-20 years for mild or moderate aortic valve stenosis to become severe. Symptoms may include:  · Shortness of breath. This may get worse during physical activity.  · Feeling unusually weak and tired (fatigue).  · Extreme discomfort in the chest, neck, or arm (angina).  · A heartbeat that is irregular or faster than normal (palpitations).  · Dizziness  or fainting. This may happen when you get physically tired or after you take certain heart medicines, such as nitroglycerin.  How is this diagnosed?  This condition may be diagnosed with:  · A physical exam.  · Echocardiogram. This is a type of imaging test that uses sound waves (ultrasound) to make an image of your heart. There are two types that may be used:  ¨ Transthoracic echocardiogram (TTE). This type of echocardiogram is noninvasive, and it is usually done first.  ¨ Transesophageal echocardiogram (ROMERO). This type of echocardiogram is done by passing a flexible tube down your esophagus. The heart and the esophagus are close to each other, so your health care provider can take very clear, detailed pictures of the heart using this type of test.  · Cardiac catheterization. In this procedure, a thin, flexible tube (catheter) is passed through a large vein in your neck, groin, or arm. This procedure provides information about arteries, structures, blood pressure, and oxygen levels in your heart.  · Electrocardiogram (ECG). This records the electrical impulses of your heart and assesses heart function.  · Stress tests. These are tests that evaluate the blood supply to your heart and your heart’s response to exercise.  · Blood tests.  You may work with a health care provider who specializes in the heart (cardiologist).  How is this treated?  Treatment depends on how severe your condition is and what your symptoms are. You will need to have your heart checked regularly to make sure that your condition is not getting worse or causing serious problems. If your condition is mild, no treatment may be needed.  Treatment may include:  · Medicines that help keep your heart rate regular.  · Medicines that thin your blood (anticoagulants) to prevent the formation of blood clots.  · Antibiotic medicines to help prevent infection.  · Surgery to replace your aortic valve. This is the most common treatment for aortic valve  stenosis. Several types of surgeries are available. The surgery may be done through a large incision over your heart (open heart surgery), or it may be done using a minimally invasive technique (transcatheter aortic valve replacement, or TAVR).  Follow these instructions at home:  Lifestyle  · Limit alcohol intake to no more than 1 drink per day for nonpregnant women and 2 drinks per day for men. One drink equals 12 oz of beer, 5 oz of wine, or 1½ oz of hard liquor.  · Do not use any tobacco products, such as cigarettes, chewing tobacco, or e-cigarettes. If you need help quitting, ask your health care provider.  · Work with your health care provider to manage your blood pressure and cholesterol.  · Maintain a healthy weight.  Eating and drinking  · Follow instructions from your health care provider about eating or drinking restrictions.  ¨ Limit how much caffeine you drink. Caffeine can affect your heart's rate and rhythm.  · Drink enough fluid to keep your urine clear or pale yellow.  · Eat a heart-healthy diet. This should include plenty of fresh fruits and vegetables. If you eat meat, it should be lean cuts. Avoid foods that are:  ¨ High in salt, saturated fat, or sugar.  ¨ Canned or highly processed.  ¨ Fried.  Activity  · Return to your normal activities as told by your health care provider. Ask your health care provider what activities are safe for you.  · Exercise regularly, as told by your health care provider. Ask your health care provider what types of exercise are safe for you.  · If your aortic valve stenosis is mild, you may need to avoid only very intense physical activity. The more severe your aortic valve stenosis is, the more activities you may need to avoid.  General instructions  · Take over-the-counter and prescription medicines only as told by your health care provider.  · If you are a woman and you plan to become pregnant, talk with your health care provider before you become pregnant.  · Tell  all health care providers who care for you that you have aortic valve stenosis.  · Keep all follow-up visits as told by your health care provider. This is important.  Contact a health care provider if:  · You have a fever.  Get help right away if:  · You develop chest pain or tightness.  · You develop shortness of breath or difficulty breathing.  · You feel light-headed.  · You feel like you might faint.  · Your heartbeat is irregular or faster than normal.  These symptoms may represent a serious problem that is an emergency. Do not wait to see if the symptoms will go away. Get medical help right away. Call your local emergency services (911 in the [place] Do not drive yourself to the hospital.   This information is not intended to replace advice given to you by your health care provider. Make sure you discuss any questions you have with your health care provider.  Document Released: 09/15/2004 Document Revised: 05/25/2017 Document Reviewed: 11/20/2016  [employer] Interactive Patient Education © 2017 [employer]      Stent Placement  Care After    A catheter was placed through the blood vessel in your groin and directed to an area of blocked blood flow. A balloon, and possibly a metal stent were used to open the blockage. If no other blockages are present below this area, your symptoms should improve. If blockages are present below this area, surgery may still be necessary.  STENT:  A catheter was placed in your groin through which a metal mesh tube was placed in a narrowed part of the artery to facilitate blood flow.  You were given intravenous sedation. These medications are rapidly cleared from your bloodstream. You may feel some discomfort at the insertion site after the local anesthetic wears off. This discomfort should gradually improve over the next several days.  · Only take over-the-counter or prescription medicines for pain, discomfort, or fever as directed by your caregiver.  · Complications are very  uncommon after this procedure. Go to the nearest emergency department if you develop any of the following symptoms:  · Worsening pain.  · Bleeding.  · Swelling at the puncture site.  · Lightheadedness.  · Dizziness or fainting.  · Fever or chills.  · If oozing, bleeding, or a lump appears at the puncture site, apply firm pressure directly to the site steadily for 15 minutes and go to the emergency department.  · Keep the skin around the insertion site dry. You may take showers after 24 hours. If the area does get wet, dry the skin completely. Avoid baths until the skin puncture site heals, usually 5 to 7 days.  · Development of redness, increased soreness, or swelling may be signs of a skin infection. Contact your physician.  · Rest for the remainder of the day and avoid any heavy lifting (more than 10 pounds or 4.5 kg). Do not operate heavy machinery, drive, or make legal decisions for the first 24 hours after the procedure. Have a responsible person drive you home.  · You may resume your usual diet after the procedure. Avoid alcoholic beverages for 24 hours after the procedure.  Document Released: 12/18/2006 Document Revised: 03/11/2013 Document Reviewed: 10/17/2007  [employer]® Patient Information ©2014 [employer].

## 2020-01-09 NOTE — TELEPHONE ENCOUNTER
Message   Received: Today   Message Contents   SayGALINA Walter R.N.             Good morning   Dr Chavez likes to see  in 2-3 weeks , and patient likes to see Augusto for his severe AS after the follow up visit ( post LHC ) with Dr Chavez, is there a way you can arrange these appointments?     He underwent LHC 1/8/20.     Thank you so much   brayan      New Referral initiated for cardiothoracic consultation for severe aortic stenosis    FV scheduled for 1/23 at 1:40 with SW    Contacted patient to inform.  Patient states he's doing good.  No issues to report.

## 2020-01-15 ENCOUNTER — TELEPHONE (OUTPATIENT)
Dept: CARDIOLOGY | Facility: MEDICAL CENTER | Age: 65
End: 2020-01-15

## 2020-01-15 DIAGNOSIS — I25.10 CORONARY ARTERY DISEASE INVOLVING NATIVE CORONARY ARTERY OF NATIVE HEART WITHOUT ANGINA PECTORIS: Chronic | ICD-10-CM

## 2020-01-15 DIAGNOSIS — E78.5 DYSLIPIDEMIA: ICD-10-CM

## 2020-01-15 DIAGNOSIS — E11.9 TYPE 2 DIABETES MELLITUS WITHOUT COMPLICATION, WITHOUT LONG-TERM CURRENT USE OF INSULIN (HCC): Chronic | ICD-10-CM

## 2020-01-15 RX ORDER — ROSUVASTATIN CALCIUM 20 MG/1
20 TABLET, COATED ORAL
Qty: 90 TAB | Refills: 3 | Status: SHIPPED | OUTPATIENT
Start: 2020-01-15 | End: 2020-08-27

## 2020-01-15 NOTE — TELEPHONE ENCOUNTER
----- Message -----  From: GALINA Jensen  Sent: 1/9/2020  11:37 AM PST  To: BLAKE Mcallister Dr discharged him on 20 mg of Crestor, I was just looking at his discharge meds and it said only 10 mg daily, would you please at your convenience call him back and ask him to take Crestor 20 mg daily.  Thank you so much

## 2020-01-15 NOTE — TELEPHONE ENCOUNTER
He was informed and will comply.  New Rx called in and lab work sent for recheck in 2 months.  I asked him about getting a referral to Endocrinology for his Diabetes, and he agrees.  Referral made.  He is looking for a PCP and I will research.

## 2020-01-23 ENCOUNTER — OFFICE VISIT (OUTPATIENT)
Dept: CARDIOLOGY | Facility: MEDICAL CENTER | Age: 65
End: 2020-01-23
Payer: MEDICARE

## 2020-01-23 VITALS
HEIGHT: 70 IN | DIASTOLIC BLOOD PRESSURE: 78 MMHG | HEART RATE: 80 BPM | BODY MASS INDEX: 30.92 KG/M2 | OXYGEN SATURATION: 95 % | WEIGHT: 216 LBS | SYSTOLIC BLOOD PRESSURE: 140 MMHG

## 2020-01-23 DIAGNOSIS — E78.5 DYSLIPIDEMIA: Chronic | ICD-10-CM

## 2020-01-23 DIAGNOSIS — R26.2 AMBULATORY DYSFUNCTION: ICD-10-CM

## 2020-01-23 DIAGNOSIS — E11.9 TYPE 2 DIABETES MELLITUS WITHOUT COMPLICATION, WITHOUT LONG-TERM CURRENT USE OF INSULIN (HCC): Chronic | ICD-10-CM

## 2020-01-23 DIAGNOSIS — I35.0 AORTIC VALVE STENOSIS, ETIOLOGY OF CARDIAC VALVE DISEASE UNSPECIFIED: Chronic | ICD-10-CM

## 2020-01-23 DIAGNOSIS — Z95.5 S/P DRUG ELUTING CORONARY STENT PLACEMENT: ICD-10-CM

## 2020-01-23 DIAGNOSIS — R07.2 PRECORDIAL CHEST PAIN: ICD-10-CM

## 2020-01-23 DIAGNOSIS — I25.10 CORONARY ARTERY DISEASE, OCCLUSIVE: ICD-10-CM

## 2020-01-23 DIAGNOSIS — I10 ESSENTIAL HYPERTENSION: ICD-10-CM

## 2020-01-23 PROCEDURE — 99214 OFFICE O/P EST MOD 30 MIN: CPT | Performed by: INTERNAL MEDICINE

## 2020-01-23 ASSESSMENT — ENCOUNTER SYMPTOMS
LOSS OF CONSCIOUSNESS: 0
SHORTNESS OF BREATH: 0
MYALGIAS: 0
COUGH: 0
DIZZINESS: 0
PALPITATIONS: 0

## 2020-01-23 NOTE — PROGRESS NOTES
"Chief Complaint   Patient presents with   • Coronary Artery Disease     F/V: 6MON   • Aortic Stenosis       Subjective:   Sharad Millan is a 63 y.o. male who presents today for a followup evaluation of aortic stenosis, CAD, coronary stent, palpitations, PSVT, hypertension and hyperlipidemia plus recent hospitalization.     Last seen on at the time of his coronary invention 01/08/2020.    Since discharge 1/9/2020 the patient states that for the first 3 days he felt \"great\".  Then he developed a nonexertional stabbing localized chest pain and subsequently has felt generally tired and weak.  Has not heard from cardiothoracic surgery despite referral on 1/9/2020.  Has been referred to endocrinology but has not yet contacted their office.  Still ruminating about his valve procedure.    Since 2/5/2019 patient states that he feels no different than he had before.  Despite this denial he does admit to mild discomfort and subtle shortness of breath when he is doing some fairly strenuous activity.  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  He also lost a contract with [employer].  As a result he has some financial stress.  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  Nonetheless, he now has more seriously looked in and research the aortic valve procedures for his aortic stenosis.    Since 10/1/2018 appointment the patient was hospitalized.  [place]  Hospitalized 12/12/2018 at [facility].  Presented with SVT and chest pain converted with adenosine.  NSTEMI.  Peak troponin 1.7.  MPI was normal with a EF of 56%.  Discharged on increased dose of metoprolol of 75 mg twice daily.  Since discharge no recurrent chest discomfort or SVT.  Denies exertional shortness of breath but admits to using inhaler for episodic shortness of breath.    Since 1/18/2018 the patient reports subtle symptoms of exertional shortness " of breath.  No lightheadedness dizziness or syncope.     Since 11/20/2017 appointment the patient has had no cardiac symptoms.  Continues to work full time mainly in [place].  Has developed severe left hip pain and has sought chiropractic and acupuncture therapy.  Was seen at urgent care and was given prednisone and anti-inflammatory and ultimately had some just transient relief but has come back and continues to be a problem.     Since 10/13/2017 appointment the patient's developed a upper respiratory infection being treated conservatively.  At the time of his last appointment and echocardiogram was done now shows severe aortic stenosis.  The patient has no new cardiac symptoms referenced to his aortic stenosis.  He reports 2 weeks ago while eating at a restaurant he had a left upper chest pain that lasted for one hour, resolved spontaneously and has not recurred.     Since 8/31/2016 appointment the patient's had no cardiac symptoms.  Had two episodes of palpitations responsive to vagal maneuver.  No lightheadedness or dizziness.  No angina pectoralis.  No shortness of breath.     Since his October, 2015 hospitalization the patient has had almost constant sharp left localized anterior sharp stabbing chest pain radiating to his back.  Not related to exertion.  No shortness of breath or angina pectoris.  No palpitations or syncope.     Between 10/21/2015-10/27/2015 hospitalized at [facility].  Gallstone pancreatitis.  Laparoscopic cholecystectomy.  Echocardiogram showed moderate aortic stenosis.     On 12/3/2013 [facility].  SVT.  Converted with a adenosine.  Toprol increased 100 mg daily.     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  He developed chest pain.  He took a customers sublingual nitroglycerin with relief.  He went to [facility] ER.  EKG showed no changes.  Patient left after waiting an hour and a half to be seen by the doctor.    Past Medical History:    Diagnosis Date   • Aortic stenosis 10/20/2011   • Arrhythmia    • Breath shortness    • Carotid bruit 12/9/2009   • Cataract     cataract extraction with IOL   • Chronic right shoulder pain    • Diabetes     oral medication   • Fatty liver 1/17/2011   • Heart attack (HCC) 12/2018   • History of cardiac catheterization 7/13/2009   • History of echocardiogram 10/20/2011   • HTN (hypertension) 10/12/2012   • Hypertension    • Memory loss 10/20/2011   • Murmur 7/7/2009   • Obesity 8/23/2011   • Palpitations 10/20/2011   • PSVT (paroxysmal supraventricular tachycardia) (HCC) 2/14/2012   • S/P coronary artery stent placement 1996    coronary stent implantation x3, [facility]   • S/P coronary artery stent placement 1998    coronary stent implantation; [facility]   • S/P coronary artery stent placement 2003    cardiac catheterization; patent stents; [place]   • Sciatica 8/23/2011   • Uncontrolled diabetes mellitus (HCC) 11/29/2010     Past Surgical History:   Procedure Laterality Date   • PO BY LAPAROSCOPY N/A 10/25/2015    Procedure: PO BY LAPAROSCOPY-with grams ;  Surgeon: Ronnie Bowman M.D.;  Location: SURGERY [facility];  Service:    • CAROTID STENT ANGIOGRAPHY  1996   • CATARACT EXTRACTION WITH IOL Bilateral      Family History   Problem Relation Age of Onset   • Heart Disease Mother    • Heart Disease Father      Social History     Socioeconomic History   • Marital status:      Spouse name: Not on file   • Number of children: Not on file   • Years of education: Not on file   • Highest education level: Not on file   Occupational History   • Not on file   Social Needs   • Financial resource strain: Not on file   • Food insecurity:     Worry: Not on file     Inability: Not on file   • Transportation needs:     Medical: Not on file     Non-medical: Not on file   Tobacco Use   • Smoking status: Never Smoker   • Smokeless tobacco: Never Used   Substance and Sexual Activity   • Alcohol use: No      Alcohol/week: 0.0 oz   • Drug use: No   • Sexual activity: Yes     Partners: Female   Lifestyle   • Physical activity:     Days per week: Not on file     Minutes per session: Not on file   • Stress: Not on file   Relationships   • Social connections:     Talks on phone: Not on file     Gets together: Not on file     Attends Orthodox service: Not on file     Active member of club or organization: Not on file     Attends meetings of clubs or organizations: Not on file     Relationship status: Not on file   • Intimate partner violence:     Fear of current or ex partner: Not on file     Emotionally abused: Not on file     Physically abused: Not on file     Forced sexual activity: Not on file   Other Topics Concern   •  Service No   • Blood Transfusions No   • Caffeine Concern No   • Occupational Exposure No   • Hobby Hazards No   • Sleep Concern No   • Stress Concern No   • Weight Concern No   • Special Diet No   • Back Care Yes     Comment: BELT SUPPORT   • Exercise Yes   • Bike Helmet No   • Seat Belt Yes   • Self-Exams No   Social History Narrative   • Not on file     Allergies   Allergen Reactions   • Bydureon [Exenatide] Hives     hives   • Cycloset [Bromocriptine Mesylate] Unspecified     Pt states he can't remember what happens to him.   • Morphine Vomiting     Outpatient Encounter Medications as of 1/23/2020   Medication Sig Dispense Refill   • rosuvastatin (CRESTOR) 20 MG Tab Take 1 Tab by mouth every day. 90 Tab 3   • aspirin EC 81 MG EC tablet Take 1 Tab by mouth every day. 30 Tab 11   • prasugrel (EFFIENT) 10 MG Tab Take 1 Tab by mouth every day. 30 Tab 11   • losartan (COZAAR) 100 MG Tab TAKE 1 TABLET BY MOUTH EVERY DAY 90 Tab 3   • glipiZIDE SR (GLUCOTROL) 10 MG TABLET SR 24 HR TAKE 1 TAB BY MOUTH EVERY MORNING. 90 Tab 3   • metoprolol SR (TOPROL XL) 50 MG TABLET SR 24 HR TAKE 1 TABLET BY MOUTH 2 TIMES A  Tab 3   • metformin (GLUCOPHAGE) 1000 MG tablet TAKE 1 TABLET BY MOUTH 2 TIMES A  "DAY. 180 Tab 3   • JARDIANCE 25 MG Tab TAKE 1 TABLET BY MOUTH EVERY DAY 90 Tab 3   • glucose blood strip 1 Strip by Other route 2 Times a Day. 60 Strip 11     No facility-administered encounter medications on file as of 1/23/2020.      Review of Systems   Respiratory: Negative for cough and shortness of breath.    Cardiovascular: Negative for chest pain and palpitations.   Musculoskeletal: Negative for myalgias.   Neurological: Negative for dizziness and loss of consciousness.        Objective:   /78 (BP Location: Left arm, Patient Position: Sitting, BP Cuff Size: Adult)   Pulse 80   Ht 1.778 m (5' 10\")   Wt 98 kg (216 lb)   SpO2 95%   BMI 30.99 kg/m²     Physical Exam   Constitutional: He is oriented to person, place, and time. He appears well-developed and well-nourished.   Neck: No JVD present.   Cardiovascular: Normal rate, regular rhythm and intact distal pulses.   Murmur heard.  Pulses:       Carotid pulses are on the right side with bruit and on the left side with bruit.  Pulmonary/Chest: Effort normal and breath sounds normal. No respiratory distress. He has no wheezes. He has no rales.   Musculoskeletal:         General: No edema.   Neurological: He is alert and oriented to person, place, and time.   Skin: Skin is warm and dry.   Psychiatric: He has a normal mood and affect. His behavior is normal.     CAROTID ULTRASOUND 11/01/2017  Mild bilateral internal carotid artery stenosis less than 50%.    08/08/2012 ECHOCARDIOGRAM  EF 55-60%. Moderate aortic stenosis. Peak aortic valve gradient 63 mmHg. Moderate LVH.      07/25/2013 ECHOCARDIOGRAM  EF 70-75%. Moderate aortic stenosis. Peak aortic valve gradient 59 mmHg. Moderate LVH.     10/23/2015 ECHOCARDIOGRAM  Normal left ventricular systolic function.  Left ventricular ejection fraction is visually estimated to be 60%.  Grade I diastolic dysfunction.  Mild mitral regurgitation.  Moderate aortic stenosis.  Vmax 3.47 m/s. Transvalvular gradients are - " Peak: 48 mmHg, Mean: 30   mmHg.  Mild pulmonic insufficiency.     11/01/2017 ECHOCARDIOGRAM  Normal left ventricular chamber size.  Left ventricular ejection fraction is visually estimated to be 60%.  Grade I diastolic dysfunction.  Severe aortic stenosis.  Transvalvular gradients are - Peak: 72 mmHg, Mean: 50 mmHg.  Ascending aorta is borderline dilated with a diameter of 3.6 cm.  Compared to the report of the prior study done - the aortic stenosis is   now severe.    12/26/2018 ECHOCARDIOGRAM    Normal left ventricular systolic function.  Left ventricular ejection fraction is visually estimated to be 65-70%.  Moderate left ventricular hypertrophy.  Severe aortic stenosis.    12/19/2017 MPI   No evidence of significant jeopardized viable myocardium or prior myocardial    infarction.   Transient ischemic dilatation calculated at 1.17 visually correlated.   Normal left ventricular size, ejection fraction, and wall motion.    12/12/2018 MPI  Normal perfusion.  EF 56%.    08/14/2012 Lab: Cholesterol 109. Triglycerides 121. HDL 36. LDL 49.  03/28/2013 Lab: Cholesterol 98. Triglycerides 110. HDL 30. LDL 46.     09/27/2013 EKG: Normal sinus rhythm. No acute changes.  11/20/2017 EKG: Normal sinus rhythm, rate 73. Minor lateral ST-T wave abnormalities. 1st degree AV block. Reviewed by myself.    CARDIAC CATHETERIZATION 01/08/2020  A.  Selective coronary angiography.  B.  iFR, proximal left anterior descending artery.  C.  Coronary stent implantation, proximal left anterior descending artery, 2.5x8 mm   [employer] drug-eluting stent.  D.  Ascending aortography.  E.  Distal abdominal aortogram and bilateral iliofemoral angiography.  F.  Right femoral artery approach.  G.  Perclose.  PREPROCEDURE DIAGNOSES:  1.  Aortic stenosis, severe, symptomatic.  2.  Previous left anterior descending artery stent, 1996.  3.  Diabetes mellitus.  POSTPROCEDURE DIAGNOSES:  1.  Left anterior descending artery 90% stenosis.  2.  iFR, left anterior  descending artery, 0.39.  3.  Normal ascending aorta.  4.  Normal distal abdominal aorta and bilateral iliofemoral arteries.    Assessment:     1. Precordial chest pain  NM-CARDIAC STRESS TEST   2. Aortic valve stenosis, etiology of cardiac valve disease unspecified  REFERRAL TO CARDIOTHORACIC SURGERY   3. Coronary artery disease, occlusive  REFERRAL TO CARDIOTHORACIC SURGERY    NM-CARDIAC STRESS TEST   4. S/P drug eluting coronary stent placement  NM-CARDIAC STRESS TEST   5. Essential hypertension  Comp Metabolic Panel   6. Dyslipidemia  LIPID PANEL   7. Type 2 diabetes mellitus without complication, without long-term current use of insulin ([facility])  HEMOGLOBIN A1C (Glycohemoglobin GHB Total/A1C with MBG Estimate)   8. Ambulatory dysfunction  NM-CARDIAC STRESS TEST       Medical Decision Making:  Today's Assessment / Status / Plan:   Assessment  1.  Aortic stenosis. Severe.  Symptomatic  2.  CAD.  3.  Coronary stent implantation: Left anterior descending artery.  1996. 1998.  2020  4.  NSTEMI 12/12/2018.  Related to recurrent SVT.  5.  Hyperlipidemia  6.  Hypertension.   7.  Diabetes mellitus.  8.  Bilateral carotid bruits.  9 obesity. Counseled for weight loss.  10.  SVT. 12/3/2013. 10/22/2015 12/12/2018.      Recommendation and Discussion  1.  Pharmacologic MPI to evaluate chest pain and perfusion in the left anterior descending artery distribution.  2.  I have contacted cardiothoracic surgery office to arrange consultation for aortic valve procedure.  3.  Strongly admonished the patient to contact endocrinology office to optimize diabetes mellitus.  4.  CMP lipid panel and glycohemoglobin.  5.  Follow-up 3 months.

## 2020-01-23 NOTE — PROGRESS NOTES
Chief Complaint   Patient presents with   • Coronary Artery Disease     F/V: 6MON   • Aortic Stenosis       Subjective:   Sharad Millan is a 64 y.o. male who presents today     Past Medical History:   Diagnosis Date   • Aortic stenosis 10/20/2011   • Arrhythmia    • Breath shortness    • Carotid bruit 12/9/2009   • Cataract     cataract extraction with IOL   • Chronic right shoulder pain    • Diabetes     oral medication   • Fatty liver 1/17/2011   • Heart attack (HCC) 12/2018   • History of cardiac catheterization 7/13/2009   • History of echocardiogram 10/20/2011   • HTN (hypertension) 10/12/2012   • Hypertension    • Memory loss 10/20/2011   • Murmur 7/7/2009   • Obesity 8/23/2011   • Palpitations 10/20/2011   • PSVT (paroxysmal supraventricular tachycardia) ([facility]) 2/14/2012   • S/P coronary artery stent placement 1996    coronary stent implantation , [facility]   • S/P coronary artery stent placement 1998    coronary stent implantation; [facility]   • S/P coronary artery stent placement 2003    cardiac catheterization; patent stents; [place]   • Sciatica 8/23/2011   • Uncontrolled diabetes mellitus (HCC) 11/29/2010     Past Surgical History:   Procedure Laterality Date   • PO BY LAPAROSCOPY N/A 10/25/2015    Procedure: PO BY LAPAROSCOPY-with grams ;  Surgeon: Ronnie Bowman M.D.;  Location: SURGERY [facility];  Service:    • CAROTID STENT ANGIOGRAPHY  1996   • CATARACT EXTRACTION WITH IOL Bilateral      Family History   Problem Relation Age of Onset   • Heart Disease Mother    • Heart Disease Father      Social History     Socioeconomic History   • Marital status:      Spouse name: Not on file   • Number of children: Not on file   • Years of education: Not on file   • Highest education level: Not on file   Occupational History   • Not on file   Social Needs   • Financial resource strain: Not on file   • Food insecurity:     Worry: Not on file     Inability: Not on file   •  Transportation needs:     Medical: Not on file     Non-medical: Not on file   Tobacco Use   • Smoking status: Never Smoker   • Smokeless tobacco: Never Used   Substance and Sexual Activity   • Alcohol use: No     Alcohol/week: 0.0 oz   • Drug use: No   • Sexual activity: Yes     Partners: Female   Lifestyle   • Physical activity:     Days per week: Not on file     Minutes per session: Not on file   • Stress: Not on file   Relationships   • Social connections:     Talks on phone: Not on file     Gets together: Not on file     Attends Jew service: Not on file     Active member of club or organization: Not on file     Attends meetings of clubs or organizations: Not on file     Relationship status: Not on file   • Intimate partner violence:     Fear of current or ex partner: Not on file     Emotionally abused: Not on file     Physically abused: Not on file     Forced sexual activity: Not on file   Other Topics Concern   •  Service No   • Blood Transfusions No   • Caffeine Concern No   • Occupational Exposure No   • Hobby Hazards No   • Sleep Concern No   • Stress Concern No   • Weight Concern No   • Special Diet No   • Back Care Yes     Comment: BELT SUPPORT   • Exercise Yes   • Bike Helmet No   • Seat Belt Yes   • Self-Exams No   Social History Narrative   • Not on file     Allergies   Allergen Reactions   • Bydureon [Exenatide] Hives     hives   • Cycloset [Bromocriptine Mesylate] Unspecified     Pt states he can't remember what happens to him.   • Morphine Vomiting     Outpatient Encounter Medications as of 1/23/2020   Medication Sig Dispense Refill   • rosuvastatin (CRESTOR) 20 MG Tab Take 1 Tab by mouth every day. 90 Tab 3   • aspirin EC 81 MG EC tablet Take 1 Tab by mouth every day. 30 Tab 11   • prasugrel (EFFIENT) 10 MG Tab Take 1 Tab by mouth every day. 30 Tab 11   • losartan (COZAAR) 100 MG Tab TAKE 1 TABLET BY MOUTH EVERY DAY 90 Tab 3   • glipiZIDE SR (GLUCOTROL) 10 MG TABLET SR 24 HR TAKE 1 TAB  "BY MOUTH EVERY MORNING. 90 Tab 3   • metoprolol SR (TOPROL XL) 50 MG TABLET SR 24 HR TAKE 1 TABLET BY MOUTH 2 TIMES A  Tab 3   • metformin (GLUCOPHAGE) 1000 MG tablet TAKE 1 TABLET BY MOUTH 2 TIMES A DAY. 180 Tab 3   • JARDIANCE 25 MG Tab TAKE 1 TABLET BY MOUTH EVERY DAY 90 Tab 3   • glucose blood strip 1 Strip by Other route 2 Times a Day. 60 Strip 11     No facility-administered encounter medications on file as of 1/23/2020.      ROS     Objective:   /78 (BP Location: Left arm, Patient Position: Sitting, BP Cuff Size: Adult)   Pulse 80   Ht 1.778 m (5' 10\")   Wt 98 kg (216 lb)   SpO2 95%   BMI 30.99 kg/m²     Physical Exam    Assessment:   No diagnosis found.    Medical Decision Making:  Today's Assessment / Status / Plan:     "

## 2020-01-24 ENCOUNTER — HOSPITAL ENCOUNTER (OUTPATIENT)
Dept: LAB | Facility: MEDICAL CENTER | Age: 65
End: 2020-01-24
Attending: INTERNAL MEDICINE
Payer: MEDICARE

## 2020-01-24 DIAGNOSIS — I25.10 CORONARY ARTERY DISEASE INVOLVING NATIVE CORONARY ARTERY OF NATIVE HEART WITHOUT ANGINA PECTORIS: Chronic | ICD-10-CM

## 2020-01-24 DIAGNOSIS — E78.5 DYSLIPIDEMIA: ICD-10-CM

## 2020-01-24 DIAGNOSIS — I10 ESSENTIAL HYPERTENSION: ICD-10-CM

## 2020-01-24 DIAGNOSIS — E11.9 TYPE 2 DIABETES MELLITUS WITHOUT COMPLICATION, WITHOUT LONG-TERM CURRENT USE OF INSULIN (HCC): Chronic | ICD-10-CM

## 2020-01-24 LAB
ALBUMIN SERPL BCP-MCNC: 4.1 G/DL (ref 3.2–4.9)
ALBUMIN/GLOB SERPL: 1.3 G/DL
ALP SERPL-CCNC: 43 U/L (ref 30–99)
ALT SERPL-CCNC: 18 U/L (ref 2–50)
ANION GAP SERPL CALC-SCNC: 10 MMOL/L (ref 0–11.9)
AST SERPL-CCNC: 15 U/L (ref 12–45)
BILIRUB SERPL-MCNC: 0.4 MG/DL (ref 0.1–1.5)
BUN SERPL-MCNC: 23 MG/DL (ref 8–22)
CALCIUM SERPL-MCNC: 9.6 MG/DL (ref 8.5–10.5)
CHLORIDE SERPL-SCNC: 101 MMOL/L (ref 96–112)
CHOLEST SERPL-MCNC: 106 MG/DL (ref 100–199)
CO2 SERPL-SCNC: 26 MMOL/L (ref 20–33)
CREAT SERPL-MCNC: 0.98 MG/DL (ref 0.5–1.4)
EST. AVERAGE GLUCOSE BLD GHB EST-MCNC: 237 MG/DL
FASTING STATUS PATIENT QL REPORTED: NORMAL
GLOBULIN SER CALC-MCNC: 3.2 G/DL (ref 1.9–3.5)
GLUCOSE SERPL-MCNC: 182 MG/DL (ref 65–99)
HBA1C MFR BLD: 9.9 % (ref 0–5.6)
HDLC SERPL-MCNC: 30 MG/DL
LDLC SERPL CALC-MCNC: 37 MG/DL
POTASSIUM SERPL-SCNC: 4.2 MMOL/L (ref 3.6–5.5)
PROT SERPL-MCNC: 7.3 G/DL (ref 6–8.2)
SODIUM SERPL-SCNC: 137 MMOL/L (ref 135–145)
TRIGL SERPL-MCNC: 196 MG/DL (ref 0–149)

## 2020-01-24 PROCEDURE — 83036 HEMOGLOBIN GLYCOSYLATED A1C: CPT | Mod: GA

## 2020-01-24 PROCEDURE — 80061 LIPID PANEL: CPT

## 2020-01-24 PROCEDURE — 80053 COMPREHEN METABOLIC PANEL: CPT

## 2020-01-24 PROCEDURE — 36415 COLL VENOUS BLD VENIPUNCTURE: CPT | Mod: GA

## 2020-01-28 ENCOUNTER — TELEPHONE (OUTPATIENT)
Dept: CARDIOLOGY | Facility: MEDICAL CENTER | Age: 65
End: 2020-01-28

## 2020-01-28 NOTE — TELEPHONE ENCOUNTER
Patient called with c/o epistaxis intermittently on Sat and Sun. He denies sneezing or blowing nose.  He used ice and packing to stop. He then held his Effient without getting advice.  He states he felt better without the medication.  He c/o tired and fatigue which improved when he stopped taking the medication.     Denied pale skin and any other bleeding.  Denies any cardiac symptoms.    Discussed the importance of taking an antiplatelet medication diligently due to stent placement.     Discussed using normal saline nose spray daily, packing and ice packs.  When to contact clinic or seek UC or ER care.     [employer] text sent to [facility] to discuss further.     SW recommends Ponaris nose spray and to remain on regimen.  Patient is on metoprolol SR - will discuss when patient takes the medication and possibly taking it at a different time.     _________    Contacted patient. Discussed above notes.  Patient will remain on regimen for now.  The name of the OTC spray will be sent to patient in [employer] as a reminder.  Patient will contact clinic for any changes.

## 2020-01-31 ENCOUNTER — HOSPITAL ENCOUNTER (OUTPATIENT)
Dept: RADIOLOGY | Facility: MEDICAL CENTER | Age: 65
End: 2020-01-31
Attending: INTERNAL MEDICINE
Payer: MEDICARE

## 2020-01-31 DIAGNOSIS — R07.2 PRECORDIAL CHEST PAIN: ICD-10-CM

## 2020-01-31 DIAGNOSIS — I25.10 CORONARY ARTERY DISEASE, OCCLUSIVE: ICD-10-CM

## 2020-01-31 DIAGNOSIS — Z95.5 S/P DRUG ELUTING CORONARY STENT PLACEMENT: ICD-10-CM

## 2020-01-31 DIAGNOSIS — R26.2 AMBULATORY DYSFUNCTION: ICD-10-CM

## 2020-01-31 PROCEDURE — 93018 CV STRESS TEST I&R ONLY: CPT | Performed by: INTERNAL MEDICINE

## 2020-01-31 PROCEDURE — 700111 HCHG RX REV CODE 636 W/ 250 OVERRIDE (IP)

## 2020-01-31 PROCEDURE — A9502 TC99M TETROFOSMIN: HCPCS

## 2020-01-31 PROCEDURE — 78452 HT MUSCLE IMAGE SPECT MULT: CPT | Mod: 26 | Performed by: INTERNAL MEDICINE

## 2020-01-31 RX ORDER — REGADENOSON 0.08 MG/ML
INJECTION, SOLUTION INTRAVENOUS
Status: COMPLETED
Start: 2020-01-31 | End: 2020-01-31

## 2020-01-31 RX ADMIN — REGADENOSON 0.4 MG: 0.08 INJECTION, SOLUTION INTRAVENOUS at 10:10

## 2020-02-06 ENCOUNTER — TELEPHONE (OUTPATIENT)
Dept: CARDIOLOGY | Facility: MEDICAL CENTER | Age: 65
End: 2020-02-06

## 2020-02-07 NOTE — TELEPHONE ENCOUNTER
Myocardial Perfusion   Report   NUCLEAR IMAGING INTERPRETATION   * Small, equivocal, fully reversible, mild severity defect limited to the    apical inferior wall. SDS of 1 indicating very minimal ischemia if any.    * Nondiagnostic due to resting ECG.   * Normal left ventricular size, ejection fraction, and wall motion.   ECG INTERPRETATION   Nondiagnostic due to resting ECG.    To SW

## 2020-03-03 DIAGNOSIS — E11.9 TYPE 2 DIABETES MELLITUS WITHOUT COMPLICATION, WITHOUT LONG-TERM CURRENT USE OF INSULIN (HCC): Chronic | ICD-10-CM

## 2020-03-03 RX ORDER — EMPAGLIFLOZIN 25 MG/1
TABLET, FILM COATED ORAL
Qty: 90 TAB | Refills: 3 | Status: SHIPPED | OUTPATIENT
Start: 2020-03-03 | End: 2020-06-06 | Stop reason: SDUPTHER

## 2020-04-06 DIAGNOSIS — I10 ESSENTIAL HYPERTENSION: ICD-10-CM

## 2020-04-13 ENCOUNTER — TELEPHONE (OUTPATIENT)
Dept: CARDIOTHORACIC SURGERY | Facility: MEDICAL CENTER | Age: 65
End: 2020-04-13

## 2020-04-13 RX ORDER — LOSARTAN POTASSIUM 100 MG/1
TABLET ORAL
Qty: 90 TAB | Refills: 3 | Status: SHIPPED | OUTPATIENT
Start: 2020-04-13 | End: 2020-08-27

## 2020-04-13 NOTE — TELEPHONE ENCOUNTER
Called and spoke with patient to see if he wanted to schedule consultation with Dr. Casas. He had been previously contacted and had not wanted to set up apt. He states he had never received a call from our office and is upset that it took too long. Would like to wait three months for corona virus to go away. He may go back to [place] to get another opinion. Urged patient needed to see a surgeon sooner than later and discussed sns/sxs of worsening aortic stenosis. He stated he would call us if or when he was ready to see surgeon.

## 2020-06-06 ENCOUNTER — OFFICE VISIT (OUTPATIENT)
Dept: URGENT CARE | Facility: PHYSICIAN GROUP | Age: 65
End: 2020-06-06
Payer: MEDICARE

## 2020-06-06 VITALS
TEMPERATURE: 97.3 F | OXYGEN SATURATION: 100 % | WEIGHT: 215 LBS | BODY MASS INDEX: 30.78 KG/M2 | DIASTOLIC BLOOD PRESSURE: 68 MMHG | RESPIRATION RATE: 15 BRPM | HEART RATE: 81 BPM | SYSTOLIC BLOOD PRESSURE: 136 MMHG | HEIGHT: 70 IN

## 2020-06-06 DIAGNOSIS — E11.9 TYPE 2 DIABETES MELLITUS WITHOUT COMPLICATION, WITHOUT LONG-TERM CURRENT USE OF INSULIN (HCC): Chronic | ICD-10-CM

## 2020-06-06 DIAGNOSIS — E11.9 TYPE 2 DIABETES MELLITUS WITHOUT COMPLICATION (HCC): ICD-10-CM

## 2020-06-06 PROCEDURE — 99213 OFFICE O/P EST LOW 20 MIN: CPT | Performed by: FAMILY MEDICINE

## 2020-06-06 RX ORDER — EMPAGLIFLOZIN 25 MG/1
1 TABLET, FILM COATED ORAL
Qty: 90 TAB | Refills: 1 | Status: SHIPPED | OUTPATIENT
Start: 2020-06-06 | End: 2020-08-27

## 2020-06-06 RX ORDER — GLIPIZIDE 10 MG/1
10 TABLET, FILM COATED, EXTENDED RELEASE ORAL EVERY MORNING
Qty: 90 TAB | Refills: 1 | Status: SHIPPED | OUTPATIENT
Start: 2020-06-06 | End: 2020-08-27

## 2020-06-06 ASSESSMENT — ENCOUNTER SYMPTOMS
VOMITING: 0
FEVER: 0
TINGLING: 0
SHORTNESS OF BREATH: 0
SENSORY CHANGE: 0

## 2020-06-06 ASSESSMENT — FIBROSIS 4 INDEX: FIB4 SCORE: 2.13

## 2020-06-06 NOTE — PROGRESS NOTES
Subjective:     Sharad Millan is a 64 y.o. male who presents for Medication Refill (cant find a PCP, glipizide, metformin)    HPI  Pt presents for medication refill  Patient with long history of type 2 diabetes  Patient normally takes metformin, glipizide, and Jardiance  He is currently between PCPs and having difficulties getting into a new physician as muster not taking new Medicare patients  He has no issues with significant hyper or hypoglycemia lately  Continues to follow a diabetic diet  Has no feelings of dizziness, nausea, abdominal pain, does not feel ill otherwise    Review of Systems   Constitutional: Negative for fever.   Respiratory: Negative for shortness of breath.    Cardiovascular: Negative for chest pain.   Gastrointestinal: Negative for vomiting.   Skin: Negative for rash.   Neurological: Negative for tingling and sensory change.       PMH:  has a past medical history of Aortic stenosis (10/20/2011), Arrhythmia, Breath shortness, Carotid bruit (12/9/2009), Cataract, Chronic right shoulder pain, Diabetes, Fatty liver (1/17/2011), Heart attack ([facility]) (12/2018), History of cardiac catheterization (7/13/2009), History of echocardiogram (10/20/2011), HTN (hypertension) (10/12/2012), Hypertension, Memory loss (10/20/2011), Murmur (7/7/2009), Obesity (8/23/2011), Palpitations (10/20/2011), PSVT (paroxysmal supraventricular tachycardia) ([facility]) (2/14/2012), S/P coronary artery stent placement (1996), S/P coronary artery stent placement (1998), S/P coronary artery stent placement (2003), Sciatica (8/23/2011), and Uncontrolled diabetes mellitus ([facility]) (11/29/2010).  MEDS:   Current Outpatient Medications:   •  losartan (COZAAR) 100 MG Tab, TAKE 1 TABLET BY MOUTH EVERY DAY, Disp: 90 Tab, Rfl: 3  •  JARDIANCE 25 MG Tab, TAKE 1 TABLET BY MOUTH EVERY DAY, Disp: 90 Tab, Rfl: 3  •  rosuvastatin (CRESTOR) 20 MG Tab, Take 1 Tab by mouth every day., Disp: 90 Tab, Rfl: 3  •  aspirin EC 81 MG EC tablet, Take 1 Tab  "by mouth every day., Disp: 30 Tab, Rfl: 11  •  prasugrel (EFFIENT) 10 MG Tab, Take 1 Tab by mouth every day., Disp: 30 Tab, Rfl: 11  •  glipiZIDE SR (GLUCOTROL) 10 MG TABLET SR 24 HR, TAKE 1 TAB BY MOUTH EVERY MORNING., Disp: 90 Tab, Rfl: 3  •  metoprolol SR (TOPROL XL) 50 MG TABLET SR 24 HR, TAKE 1 TABLET BY MOUTH 2 TIMES A DAY, Disp: 180 Tab, Rfl: 3  •  metformin (GLUCOPHAGE) 1000 MG tablet, TAKE 1 TABLET BY MOUTH 2 TIMES A DAY., Disp: 180 Tab, Rfl: 3  •  glucose blood strip, 1 Strip by Other route 2 Times a Day., Disp: 60 Strip, Rfl: 11  ALLERGIES:   Allergies   Allergen Reactions   • Bydureon [Exenatide] Hives     hives   • Cycloset [Bromocriptine Mesylate] Unspecified     Pt states he can't remember what happens to him.   • Morphine Vomiting     SURGHX:   Past Surgical History:   Procedure Laterality Date   • PO BY LAPAROSCOPY N/A 10/25/2015    Procedure: PO BY LAPAROSCOPY-with grams ;  Surgeon: Ronnie Bowman M.D.;  Location: SURGERY [facility];  Service:    • CAROTID STENT ANGIOGRAPHY  1996   • CATARACT EXTRACTION WITH IOL Bilateral      SOCHX:  reports that he has never smoked. He has never used smokeless tobacco. He reports that he does not drink alcohol or use drugs.  FH: Family history was reviewed, not contributing to acute complaint      Objective:   /68   Pulse 81   Temp 36.3 °C (97.3 °F)   Resp 15   Ht 1.778 m (5' 10\")   Wt 97.5 kg (215 lb)   SpO2 100%   BMI 30.85 kg/m²     Physical Exam  Constitutional:       General: He is not in acute distress.     Appearance: He is well-developed. He is not diaphoretic.   HENT:      Head: Normocephalic and atraumatic.   Skin:     General: Skin is warm and dry.      Findings: No rash.   Neurological:      Mental Status: He is alert and oriented to person, place, and time.   Psychiatric:         Behavior: Behavior normal.         Thought Content: Thought content normal.         Judgment: Judgment normal.         Assessment/Plan: "   Assessment    1. Type 2 diabetes mellitus without complication (HCC)  - glucose blood strip; 1 Strip by Other route 2 Times a Day.  Dispense: 60 Strip; Refill: 11  - metformin (GLUCOPHAGE) 1000 MG tablet; Take 1 Tab by mouth 2 times a day.  Dispense: 180 Tab; Refill: 1  - glipiZIDE SR (GLUCOTROL) 10 MG TABLET SR 24 HR; Take 1 Tab by mouth every morning.  Dispense: 90 Tab; Refill: 1  - REFERRAL TO FOLLOW-UP WITH PRIMARY CARE    2. Type 2 diabetes mellitus without complication, without long-term current use of insulin (HCC)  - Empagliflozin (JARDIANCE) 25 MG Tab; Take 1 Tab by mouth every day.  Dispense: 90 Tab; Refill: 1      Patient with type 2 diabetes.  Have refilled his medications today and will refer for follow-up with new PCP.  Patient agreeable to plan and will follow-up in urgent care on an as-needed basis.

## 2020-06-07 ENCOUNTER — TELEPHONE (OUTPATIENT)
Dept: URGENT CARE | Facility: PHYSICIAN GROUP | Age: 65
End: 2020-06-07

## 2020-06-07 DIAGNOSIS — E11.9 TYPE 2 DIABETES MELLITUS WITHOUT COMPLICATION, WITHOUT LONG-TERM CURRENT USE OF INSULIN (HCC): ICD-10-CM

## 2020-06-08 RX ORDER — BLOOD-GLUCOSE METER
1 EACH MISCELLANEOUS ONCE
Qty: 1 DEVICE | Refills: 0 | Status: SHIPPED | OUTPATIENT
Start: 2020-06-08 | End: 2020-06-08

## 2020-06-08 RX ORDER — LANCETS 30 GAUGE
EACH MISCELLANEOUS
Qty: 100 EACH | Refills: 3 | Status: SHIPPED | OUTPATIENT
Start: 2020-06-08 | End: 2020-08-03

## 2020-07-16 ENCOUNTER — TELEPHONE (OUTPATIENT)
Dept: CARDIOLOGY | Facility: MEDICAL CENTER | Age: 65
End: 2020-07-16

## 2020-07-17 NOTE — TELEPHONE ENCOUNTER
Laurence-  There was a voicemail from this patient for Yara.  Apparently, Dr. Jackson has filled a Z pack and Prednisone for him in the past for breathing difficulties as he does not have a PCP and is wondering if he would do it again.  Could you handle this for me?  SW may know him well and may ok it, so maybe a Tiger Text?    Britney Conde

## 2020-07-17 NOTE — TELEPHONE ENCOUNTER
Spoke with patient and advised that we are unable to prescribe medications as we are a cardiology specialty. Patient verbalized understanding stating that he normally goes to Urgent Care for these meds but was hoping to avoid the extra time taken for the COVID assessments. Patient was appreciative for the return phone call.

## 2020-07-27 ENCOUNTER — OFFICE VISIT (OUTPATIENT)
Dept: URGENT CARE | Facility: PHYSICIAN GROUP | Age: 65
End: 2020-07-27
Payer: MEDICARE

## 2020-07-27 VITALS
HEIGHT: 70 IN | DIASTOLIC BLOOD PRESSURE: 58 MMHG | RESPIRATION RATE: 22 BRPM | SYSTOLIC BLOOD PRESSURE: 102 MMHG | HEART RATE: 80 BPM | TEMPERATURE: 98 F | WEIGHT: 215 LBS | BODY MASS INDEX: 30.78 KG/M2 | OXYGEN SATURATION: 98 %

## 2020-07-27 DIAGNOSIS — J20.6 ACUTE BRONCHITIS DUE TO RHINOVIRUS: ICD-10-CM

## 2020-07-27 PROCEDURE — 99214 OFFICE O/P EST MOD 30 MIN: CPT | Performed by: NURSE PRACTITIONER

## 2020-07-27 RX ORDER — PROMETHAZINE HYDROCHLORIDE AND CODEINE PHOSPHATE 6.25; 1 MG/5ML; MG/5ML
5-10 SYRUP ORAL
Qty: 120 ML | Refills: 0 | Status: SHIPPED | OUTPATIENT
Start: 2020-07-27 | End: 2020-08-03

## 2020-07-27 RX ORDER — AZITHROMYCIN 250 MG/1
TABLET, FILM COATED ORAL
Qty: 6 TAB | Refills: 0 | Status: SHIPPED | OUTPATIENT
Start: 2020-07-27 | End: 2020-08-03

## 2020-07-27 RX ORDER — PREDNISONE 20 MG/1
TABLET ORAL
Qty: 10 TAB | Refills: 0 | Status: SHIPPED | OUTPATIENT
Start: 2020-07-27 | End: 2020-08-03

## 2020-07-27 ASSESSMENT — FIBROSIS 4 INDEX: FIB4 SCORE: 2.13

## 2020-07-27 ASSESSMENT — ENCOUNTER SYMPTOMS
COUGH: 1
SHORTNESS OF BREATH: 1
MYALGIAS: 0
FEVER: 0
SORE THROAT: 0
DIARRHEA: 0
SPUTUM PRODUCTION: 0
WHEEZING: 0
NAUSEA: 0
CHILLS: 0

## 2020-07-27 NOTE — PROGRESS NOTES
Subjective:      Sharad Millan is a 64 y.o. male who presents with Cough (x3wks chest congestion sob, allergies)  Both the patient and myself, the provider, are masked for this evaluation.          HPI New. 64 year old male with cough and congestion for 3 weeks. Per his report, he lives in [facility] and this happens on a yearly basis. He denies fever, chills, myalgia, wheezing or nausea/diarrhea. He does have shortness of breath but he states this is due to faulty heart valve. This has not worsened with this illness. He has not taken any medication for this. No covid exposure, has not been traveling.  Bydureon [exenatide]; Cycloset [bromocriptine mesylate]; and Morphine  Current Outpatient Medications on File Prior to Visit   Medication Sig Dispense Refill   • Empagliflozin (JARDIANCE) 25 MG Tab Take 1 Tab by mouth every day. 90 Tab 1   • metformin (GLUCOPHAGE) 1000 MG tablet Take 1 Tab by mouth 2 times a day. 180 Tab 1   • glipiZIDE SR (GLUCOTROL) 10 MG TABLET SR 24 HR Take 1 Tab by mouth every morning. 90 Tab 1   • losartan (COZAAR) 100 MG Tab TAKE 1 TABLET BY MOUTH EVERY DAY 90 Tab 3   • rosuvastatin (CRESTOR) 20 MG Tab Take 1 Tab by mouth every day. 90 Tab 3   • aspirin EC 81 MG EC tablet Take 1 Tab by mouth every day. 30 Tab 11   • metoprolol SR (TOPROL XL) 50 MG TABLET SR 24 HR TAKE 1 TABLET BY MOUTH 2 TIMES A  Tab 3   • Lancets Lancets order: Lancets for True Metrix meter. Sig: use daily and prn ssx high or low sugar 100 Each 3   • glucose blood strip 1 Strip by Other route 2 Times a Day. 60 Strip 11   • prasugrel (EFFIENT) 10 MG Tab Take 1 Tab by mouth every day. (Patient not taking: Reported on 7/27/2020) 30 Tab 11     No current facility-administered medications on file prior to visit.      Social History     Socioeconomic History   • Marital status:      Spouse name: Not on file   • Number of children: Not on file   • Years of education: Not on file   • Highest education level:  Not on file   Occupational History   • Not on file   Social Needs   • Financial resource strain: Not on file   • Food insecurity     Worry: Not on file     Inability: Not on file   • Transportation needs     Medical: Not on file     Non-medical: Not on file   Tobacco Use   • Smoking status: Never Smoker   • Smokeless tobacco: Never Used   Substance and Sexual Activity   • Alcohol use: No     Alcohol/week: 0.0 oz   • Drug use: No   • Sexual activity: Yes     Partners: Female   Lifestyle   • Physical activity     Days per week: Not on file     Minutes per session: Not on file   • Stress: Not on file   Relationships   • Social connections     Talks on phone: Not on file     Gets together: Not on file     Attends Taoist service: Not on file     Active member of club or organization: Not on file     Attends meetings of clubs or organizations: Not on file     Relationship status: Not on file   • Intimate partner violence     Fear of current or ex partner: Not on file     Emotionally abused: Not on file     Physically abused: Not on file     Forced sexual activity: Not on file   Other Topics Concern   •  Service No   • Blood Transfusions No   • Caffeine Concern No   • Occupational Exposure No   • Hobby Hazards No   • Sleep Concern No   • Stress Concern No   • Weight Concern No   • Special Diet No   • Back Care Yes     Comment: BELT SUPPORT   • Exercise Yes   • Bike Helmet No   • Seat Belt Yes   • Self-Exams No   Social History Narrative   • Not on file     Breast Cancer-related family history is not on file.      Review of Systems   Constitutional: Negative for chills and fever.   HENT: Positive for congestion. Negative for sore throat.    Respiratory: Positive for cough and shortness of breath. Negative for sputum production and wheezing.    Cardiovascular: Negative for chest pain.   Gastrointestinal: Negative for diarrhea and nausea.   Musculoskeletal: Negative for myalgias.          Objective:     /58   " Pulse 80   Temp 36.7 °C (98 °F) (Temporal)   Resp (!) 22   Ht 1.778 m (5' 10\")   Wt 97.5 kg (215 lb)   SpO2 98%   BMI 30.85 kg/m²      Physical Exam  Vitals signs and nursing note reviewed.   Constitutional:       Appearance: Normal appearance.   HENT:      Head: Normocephalic.      Right Ear: Tympanic membrane normal.      Left Ear: Tympanic membrane normal.      Nose: Congestion present.   Eyes:      General:         Right eye: No discharge.         Left eye: No discharge.      Extraocular Movements: Extraocular movements intact.      Conjunctiva/sclera: Conjunctivae normal.      Pupils: Pupils are equal, round, and reactive to light.   Neck:      Musculoskeletal: Normal range of motion and neck supple.   Cardiovascular:      Rate and Rhythm: Normal rate and regular rhythm.      Heart sounds: Murmur present.   Pulmonary:      Effort: Pulmonary effort is normal.      Breath sounds: Normal breath sounds. No wheezing or rales.      Comments: Mild shortness of breath in clinic. Again, not new to this patient.  Musculoskeletal: Normal range of motion.   Skin:     General: Skin is warm and dry.   Neurological:      General: No focal deficit present.      Mental Status: He is alert and oriented to person, place, and time.                 Assessment/Plan:       1. Acute bronchitis due to Rhinovirus  azithromycin (ZITHROMAX) 250 MG Tab    predniSONE (DELTASONE) 20 MG Tab    promethazine-codeine (PHENERGAN-CODEINE) 6.25-10 MG/5ML Syrup     Saturation is 98 on RA.  BBS clear.  Zithromax/prednisone and cough medication.  Patient is cautioned on sedation potential of narcotic medication; no drinking, driving or operating heavy machinery while on this medication.  [place]  reviewed, no concerns.  He has appt Thursday with cardiology.  "

## 2020-07-30 ENCOUNTER — HOSPITAL ENCOUNTER (OUTPATIENT)
Dept: LAB | Facility: MEDICAL CENTER | Age: 65
End: 2020-07-30
Attending: INTERNAL MEDICINE
Payer: MEDICARE

## 2020-07-30 ENCOUNTER — OFFICE VISIT (OUTPATIENT)
Dept: CARDIOLOGY | Facility: MEDICAL CENTER | Age: 65
End: 2020-07-30
Payer: MEDICARE

## 2020-07-30 VITALS
OXYGEN SATURATION: 96 % | WEIGHT: 217.6 LBS | BODY MASS INDEX: 31.15 KG/M2 | HEART RATE: 90 BPM | SYSTOLIC BLOOD PRESSURE: 142 MMHG | HEIGHT: 70 IN | DIASTOLIC BLOOD PRESSURE: 66 MMHG

## 2020-07-30 DIAGNOSIS — I20.0 UNSTABLE ANGINA PECTORIS (HCC): ICD-10-CM

## 2020-07-30 DIAGNOSIS — Z95.5 S/P DRUG ELUTING CORONARY STENT PLACEMENT: ICD-10-CM

## 2020-07-30 DIAGNOSIS — I25.10 CORONARY ARTERY DISEASE, OCCLUSIVE: ICD-10-CM

## 2020-07-30 DIAGNOSIS — I35.0 SEVERE AORTIC STENOSIS: ICD-10-CM

## 2020-07-30 LAB
ALBUMIN SERPL BCP-MCNC: 4.5 G/DL (ref 3.2–4.9)
ALBUMIN/GLOB SERPL: 1.6 G/DL
ALP SERPL-CCNC: 47 U/L (ref 30–99)
ALT SERPL-CCNC: 26 U/L (ref 2–50)
ANION GAP SERPL CALC-SCNC: 16 MMOL/L (ref 7–16)
ANISOCYTOSIS BLD QL SMEAR: ABNORMAL
APPEARANCE UR: CLEAR
AST SERPL-CCNC: 18 U/L (ref 12–45)
BASOPHILS # BLD AUTO: 0.2 % (ref 0–1.8)
BASOPHILS # BLD: 0.03 K/UL (ref 0–0.12)
BILIRUB SERPL-MCNC: 0.5 MG/DL (ref 0.1–1.5)
BILIRUB UR QL STRIP.AUTO: NEGATIVE
BUN SERPL-MCNC: 23 MG/DL (ref 8–22)
CALCIUM SERPL-MCNC: 9.5 MG/DL (ref 8.5–10.5)
CHLORIDE SERPL-SCNC: 97 MMOL/L (ref 96–112)
CHOLEST SERPL-MCNC: 122 MG/DL (ref 100–199)
CO2 SERPL-SCNC: 24 MMOL/L (ref 20–33)
COLOR UR: YELLOW
COMMENT 1642: NORMAL
CREAT SERPL-MCNC: 1.03 MG/DL (ref 0.5–1.4)
CRP SERPL HS-MCNC: 0.26 MG/DL (ref 0–0.75)
EOSINOPHIL # BLD AUTO: 0 K/UL (ref 0–0.51)
EOSINOPHIL NFR BLD: 0 % (ref 0–6.9)
ERYTHROCYTE [DISTWIDTH] IN BLOOD BY AUTOMATED COUNT: 41.8 FL (ref 35.9–50)
EST. AVERAGE GLUCOSE BLD GHB EST-MCNC: 249 MG/DL
FASTING STATUS PATIENT QL REPORTED: NORMAL
FIBRINOGEN PPP-MCNC: 326 MG/DL (ref 215–460)
GLOBULIN SER CALC-MCNC: 2.8 G/DL (ref 1.9–3.5)
GLUCOSE SERPL-MCNC: 270 MG/DL (ref 65–99)
GLUCOSE UR STRIP.AUTO-MCNC: >=1000 MG/DL
HBA1C MFR BLD: 10.3 % (ref 0–5.6)
HCT VFR BLD AUTO: 41.5 % (ref 42–52)
HCYS SERPL-SCNC: 14.31 UMOL/L
HDLC SERPL-MCNC: 40 MG/DL
HGB BLD-MCNC: 12.2 G/DL (ref 14–18)
IMM GRANULOCYTES # BLD AUTO: 0.06 K/UL (ref 0–0.11)
IMM GRANULOCYTES NFR BLD AUTO: 0.5 % (ref 0–0.9)
KETONES UR STRIP.AUTO-MCNC: 15 MG/DL
LDLC SERPL CALC-MCNC: 58 MG/DL
LEUKOCYTE ESTERASE UR QL STRIP.AUTO: NEGATIVE
LG PLATELETS BLD QL SMEAR: NORMAL
LYMPHOCYTES # BLD AUTO: 1.03 K/UL (ref 1–4.8)
LYMPHOCYTES NFR BLD: 8.4 % (ref 22–41)
MAGNESIUM SERPL-MCNC: 2.1 MG/DL (ref 1.5–2.5)
MCH RBC QN AUTO: 22.3 PG (ref 27–33)
MCHC RBC AUTO-ENTMCNC: 29.4 G/DL (ref 33.7–35.3)
MCV RBC AUTO: 76 FL (ref 81.4–97.8)
MICRO URNS: ABNORMAL
MICROCYTES BLD QL SMEAR: ABNORMAL
MONOCYTES # BLD AUTO: 0.64 K/UL (ref 0–0.85)
MONOCYTES NFR BLD AUTO: 5.2 % (ref 0–13.4)
MORPHOLOGY BLD-IMP: NORMAL
NEUTROPHILS # BLD AUTO: 10.57 K/UL (ref 1.82–7.42)
NEUTROPHILS NFR BLD: 85.7 % (ref 44–72)
NITRITE UR QL STRIP.AUTO: NEGATIVE
NRBC # BLD AUTO: 0 K/UL
NRBC BLD-RTO: 0 /100 WBC
OVALOCYTES BLD QL SMEAR: NORMAL
PH UR STRIP.AUTO: 5.5 [PH] (ref 5–8)
PLATELET # BLD AUTO: 175 K/UL (ref 164–446)
PLATELET BLD QL SMEAR: NORMAL
PMV BLD AUTO: 11 FL (ref 9–12.9)
POIKILOCYTOSIS BLD QL SMEAR: NORMAL
POTASSIUM SERPL-SCNC: 4.7 MMOL/L (ref 3.6–5.5)
PROT SERPL-MCNC: 7.3 G/DL (ref 6–8.2)
PROT UR QL STRIP: NEGATIVE MG/DL
RBC # BLD AUTO: 5.46 M/UL (ref 4.7–6.1)
RBC BLD AUTO: PRESENT
RBC UR QL AUTO: NEGATIVE
SODIUM SERPL-SCNC: 137 MMOL/L (ref 135–145)
SP GR UR STRIP.AUTO: 1.04
TRIGL SERPL-MCNC: 118 MG/DL (ref 0–149)
TSH SERPL DL<=0.005 MIU/L-ACNC: 0.97 UIU/ML (ref 0.38–5.33)
UROBILINOGEN UR STRIP.AUTO-MCNC: 0.2 MG/DL
WBC # BLD AUTO: 12.3 K/UL (ref 4.8–10.8)

## 2020-07-30 PROCEDURE — 80053 COMPREHEN METABOLIC PANEL: CPT

## 2020-07-30 PROCEDURE — 84443 ASSAY THYROID STIM HORMONE: CPT

## 2020-07-30 PROCEDURE — 83735 ASSAY OF MAGNESIUM: CPT

## 2020-07-30 PROCEDURE — 83036 HEMOGLOBIN GLYCOSYLATED A1C: CPT | Mod: GA

## 2020-07-30 PROCEDURE — 83695 ASSAY OF LIPOPROTEIN(A): CPT | Mod: GA

## 2020-07-30 PROCEDURE — 86140 C-REACTIVE PROTEIN: CPT

## 2020-07-30 PROCEDURE — 85384 FIBRINOGEN ACTIVITY: CPT

## 2020-07-30 PROCEDURE — 85025 COMPLETE CBC W/AUTO DIFF WBC: CPT

## 2020-07-30 PROCEDURE — 36415 COLL VENOUS BLD VENIPUNCTURE: CPT

## 2020-07-30 PROCEDURE — 83090 ASSAY OF HOMOCYSTEINE: CPT | Mod: GA

## 2020-07-30 PROCEDURE — 80061 LIPID PANEL: CPT

## 2020-07-30 PROCEDURE — 99215 OFFICE O/P EST HI 40 MIN: CPT | Performed by: INTERNAL MEDICINE

## 2020-07-30 PROCEDURE — 81003 URINALYSIS AUTO W/O SCOPE: CPT

## 2020-07-30 ASSESSMENT — ENCOUNTER SYMPTOMS
DIZZINESS: 0
COUGH: 0
LOSS OF CONSCIOUSNESS: 0
MYALGIAS: 0
PALPITATIONS: 0
SHORTNESS OF BREATH: 0

## 2020-07-30 ASSESSMENT — FIBROSIS 4 INDEX: FIB4 SCORE: 2.13

## 2020-07-30 NOTE — PROGRESS NOTES
"Chief Complaint   Patient presents with   • Follow-Up     Precordial Chest Pain       Subjective:   Sharad Millan is a 63 y.o. male who presents today for a followup evaluation of aortic stenosis, CAD, coronary stent, palpitations, PSVT, hypertension and hyperlipidemia plus recent hospitalization.     Last seen on 1/23/2020    Since 1/23/2020 the patient had initially done well after his stent but now has developed significant exertional angina pectoris with substernal burning aching chest discomfort and shortness of breath relieved with rest.  Unfortunately there was some type of scheduling mixup and confusion regarding appointment with [facility].  The patient was discouraged and frustrated and sought further assistance.  Also since going on Medicare he had difficulty finding a PCP and an endocrinologist.  Ultimately he is come under the care of Dr. Don Khoury at [facility] who has referred him to Dr. Zackery Schmitz, endocrinologist and [place].  Additionally Dr. Khoury has spoke to him about going to [facility] or [facility] in [place], [place].    Since discharge 1/9/2020 the patient states that for the first 3 days he felt \"great\".  Then he developed a nonexertional stabbing localized chest pain and subsequently has felt generally tired and weak.  Has not heard from cardiothoracic surgery despite referral on 1/9/2020.  Has been referred to endocrinology but has not yet contacted their office.  Still ruminating about his valve procedure.    Since 2/5/2019 patient states that he feels no different than he had before.  Despite this denial he does admit to mild discomfort and subtle shortness of breath when he is doing some fairly strenuous activity.  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  He also lost a contract with [employer].  As a result he has some financial stress.  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  Nonetheless, he now has more seriously looked in and research the aortic valve procedures for his aortic stenosis.    Since 10/1/2018 appointment the patient was hospitalized.  [place]  Hospitalized 12/12/2018 at [facility].  Presented with SVT and chest pain converted with adenosine.  NSTEMI.  Peak troponin 1.7.  MPI was normal with a EF of 56%.  Discharged on increased dose of metoprolol of 75 mg twice daily.  Since discharge no recurrent chest discomfort or SVT.  Denies exertional shortness of breath but admits to using inhaler for episodic shortness of breath.    Since 1/18/2018 the patient reports subtle symptoms of exertional shortness of breath.  No lightheadedness dizziness or syncope.     Since 11/20/2017 appointment the patient has had no cardiac symptoms.  Continues to work full time mainly in [place].  Has developed severe left hip pain and has sought chiropractic and acupuncture therapy.  Was seen at urgent care and was given prednisone and anti-inflammatory and ultimately had some just transient relief but has come back and continues to be a problem.     Since 10/13/2017 appointment the patient's developed a upper respiratory infection being treated conservatively.  At the time of his last appointment and echocardiogram was done now shows severe aortic stenosis.  The patient has no new cardiac symptoms referenced to his aortic stenosis.  He reports 2 weeks ago while eating at a restaurant he had a left upper chest pain that lasted for one hour, resolved spontaneously and has not recurred.     Since 8/31/2016 appointment the patient's had no cardiac symptoms.  Had two episodes of palpitations responsive to vagal maneuver.  No lightheadedness or dizziness.  No angina pectoralis.  No shortness of breath.     Since his October, 2015 hospitalization the patient has had almost constant sharp left localized  anterior sharp stabbing chest pain radiating to his back.  Not related to exertion.  No shortness of breath or angina pectoris.  No palpitations or syncope.     Between 10/21/2015-10/27/2015 hospitalized at [facility].  Gallstone pancreatitis.  Laparoscopic cholecystectomy.  Echocardiogram showed moderate aortic stenosis.     On 12/3/2013 [facility] ER.  SVT.  Converted with a adenosine.  Toprol increased 100 mg daily.     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  He developed chest pain.  He took a customers sublingual nitroglycerin with relief.  He went to [facility] ER.  EKG showed no changes.  Patient left after waiting an hour and a half to be seen by the doctor.    Past Medical History:   Diagnosis Date   • Aortic stenosis 10/20/2011   • Arrhythmia    • Breath shortness    • Carotid bruit 12/9/2009   • Cataract     cataract extraction with IOL   • Chronic right shoulder pain    • Diabetes     oral medication   • Fatty liver 1/17/2011   • Heart attack (HCC) 12/2018   • History of cardiac catheterization 7/13/2009   • History of echocardiogram 10/20/2011   • HTN (hypertension) 10/12/2012   • Hypertension    • Memory loss 10/20/2011   • Murmur 7/7/2009   • Obesity 8/23/2011   • Palpitations 10/20/2011   • PSVT (paroxysmal supraventricular tachycardia) ([facility]) 2/14/2012   • S/P coronary artery stent placement 1996    coronary stent implantation [address]   • S/P coronary artery stent placement 1998    coronary stent implantation; LAD   • S/P coronary artery stent placement 2003    cardiac catheterization; patent stents; [place]   • Sciatica 8/23/2011   • Uncontrolled diabetes mellitus (HCC) 11/29/2010     Past Surgical History:   Procedure Laterality Date   • PO BY LAPAROSCOPY N/A 10/25/2015    Procedure: PO BY LAPAROSCOPY-with grams ;  Surgeon: Ronnie Bowman M.D.;  Location: SURGERY [facility];  Service:    • CAROTID STENT ANGIOGRAPHY   1996   • CATARACT EXTRACTION WITH IOL Bilateral      Family History   Problem Relation Age of Onset   • Heart Disease Mother    • Heart Disease Father      Social History     Socioeconomic History   • Marital status:      Spouse name: Not on file   • Number of children: Not on file   • Years of education: Not on file   • Highest education level: Not on file   Occupational History   • Not on file   Social Needs   • Financial resource strain: Not on file   • Food insecurity     Worry: Not on file     Inability: Not on file   • Transportation needs     Medical: Not on file     Non-medical: Not on file   Tobacco Use   • Smoking status: Never Smoker   • Smokeless tobacco: Never Used   Substance and Sexual Activity   • Alcohol use: No     Alcohol/week: 0.0 oz   • Drug use: No   • Sexual activity: Yes     Partners: Female   Lifestyle   • Physical activity     Days per week: Not on file     Minutes per session: Not on file   • Stress: Not on file   Relationships   • Social connections     Talks on phone: Not on file     Gets together: Not on file     Attends Jainism service: Not on file     Active member of club or organization: Not on file     Attends meetings of clubs or organizations: Not on file     Relationship status: Not on file   • Intimate partner violence     Fear of current or ex partner: Not on file     Emotionally abused: Not on file     Physically abused: Not on file     Forced sexual activity: Not on file   Other Topics Concern   •  Service No   • Blood Transfusions No   • Caffeine Concern No   • Occupational Exposure No   • Hobby Hazards No   • Sleep Concern No   • Stress Concern No   • Weight Concern No   • Special Diet No   • Back Care Yes     Comment: BELT SUPPORT   • Exercise Yes   • Bike Helmet No   • Seat Belt Yes   • Self-Exams No   Social History Narrative   • Not on file     Allergies   Allergen Reactions   • Bydureon [Exenatide] Hives     hives   • Cycloset [Bromocriptine Mesylate]  "Unspecified     Pt states he can't remember what happens to him.   • Morphine Vomiting     Outpatient Encounter Medications as of 7/30/2020   Medication Sig Dispense Refill   • azithromycin (ZITHROMAX) 250 MG Tab Take as directed 6 Tab 0   • predniSONE (DELTASONE) 20 MG Tab Take 2 tabs daily for 5 days 10 Tab 0   • promethazine-codeine (PHENERGAN-CODEINE) 6.25-10 MG/5ML Syrup Take 5-10 mL by mouth at bedtime as needed for Cough for up to 12 days. 120 mL 0   • Lancets Lancets order: Lancets for True Metrix meter. Sig: use daily and prn ssx high or low sugar 100 Each 3   • glucose blood strip 1 Strip by Other route 2 Times a Day. 60 Strip 11   • Empagliflozin (JARDIANCE) 25 MG Tab Take 1 Tab by mouth every day. 90 Tab 1   • metformin (GLUCOPHAGE) 1000 MG tablet Take 1 Tab by mouth 2 times a day. 180 Tab 1   • glipiZIDE SR (GLUCOTROL) 10 MG TABLET SR 24 HR Take 1 Tab by mouth every morning. 90 Tab 1   • losartan (COZAAR) 100 MG Tab TAKE 1 TABLET BY MOUTH EVERY DAY 90 Tab 3   • rosuvastatin (CRESTOR) 20 MG Tab Take 1 Tab by mouth every day. 90 Tab 3   • aspirin EC 81 MG EC tablet Take 1 Tab by mouth every day. 30 Tab 11   • metoprolol SR (TOPROL XL) 50 MG TABLET SR 24 HR TAKE 1 TABLET BY MOUTH 2 TIMES A  Tab 3   • prasugrel (EFFIENT) 10 MG Tab Take 1 Tab by mouth every day. (Patient not taking: Reported on 7/27/2020) 30 Tab 11     No facility-administered encounter medications on file as of 7/30/2020.      Review of Systems   Respiratory: Negative for cough and shortness of breath.    Cardiovascular: Negative for chest pain and palpitations.   Musculoskeletal: Negative for myalgias.   Neurological: Negative for dizziness and loss of consciousness.        Objective:   /66 (BP Location: Left arm, Patient Position: Sitting, BP Cuff Size: Adult)   Pulse 90   Ht 1.778 m (5' 10\")   Wt 98.7 kg (217 lb 9.6 oz)   SpO2 96%   BMI 31.22 kg/m²     Physical Exam   Constitutional: He is oriented to person, place, and " time. He appears well-developed and well-nourished.   Neck: No JVD present.   Cardiovascular: Normal rate, regular rhythm and intact distal pulses.   Murmur heard.  Pulses:       Carotid pulses are on the right side with bruit and on the left side with bruit.  Pulmonary/Chest: Effort normal and breath sounds normal. No respiratory distress. He has no wheezes. He has no rales.   Musculoskeletal:         General: No edema.   Neurological: He is alert and oriented to person, place, and time.   Skin: Skin is warm and dry.   Psychiatric: He has a normal mood and affect. His behavior is normal.     CAROTID ULTRASOUND 11/01/2017  Mild bilateral internal carotid artery stenosis less than 50%.    08/08/2012 ECHOCARDIOGRAM  EF 55-60%. Moderate aortic stenosis. Peak aortic valve gradient 63 mmHg. Moderate LVH.      07/25/2013 ECHOCARDIOGRAM  EF 70-75%. Moderate aortic stenosis. Peak aortic valve gradient 59 mmHg. Moderate LVH.     10/23/2015 ECHOCARDIOGRAM  Normal left ventricular systolic function.  Left ventricular ejection fraction is visually estimated to be 60%.  Grade I diastolic dysfunction.  Mild mitral regurgitation.  Moderate aortic stenosis.  Vmax 3.47 m/s. Transvalvular gradients are - Peak: 48 mmHg, Mean: 30   mmHg.  Mild pulmonic insufficiency.     11/01/2017 ECHOCARDIOGRAM  Normal left ventricular chamber size.  Left ventricular ejection fraction is visually estimated to be 60%.  Grade I diastolic dysfunction.  Severe aortic stenosis.  Transvalvular gradients are - Peak: 72 mmHg, Mean: 50 mmHg.  Ascending aorta is borderline dilated with a diameter of 3.6 cm.  Compared to the report of the prior study done - the aortic stenosis is   now severe.    12/26/2018 ECHOCARDIOGRAM    Normal left ventricular systolic function.  Left ventricular ejection fraction is visually estimated to be 65-70%.  Moderate left ventricular hypertrophy.  Severe aortic stenosis.    12/19/2017 MPI   No evidence of significant jeopardized  viable myocardium or prior myocardial    infarction.   Transient ischemic dilatation calculated at 1.17 visually correlated.   Normal left ventricular size, ejection fraction, and wall motion.    12/12/2018 MPI  Normal perfusion.  EF 56%.    08/14/2012 Lab: Cholesterol 109. Triglycerides 121. HDL 36. LDL 49.  03/28/2013 Lab: Cholesterol 98. Triglycerides 110. HDL 30. LDL 46.     09/27/2013 EKG: Normal sinus rhythm. No acute changes.  11/20/2017 EKG: Normal sinus rhythm, rate 73. Minor lateral ST-T wave abnormalities. 1st degree AV block. Reviewed by myself.    CARDIAC CATHETERIZATION 01/08/2020  A.  Selective coronary angiography.  B.  iFR, proximal left anterior descending artery.  C.  Coronary stent implantation, proximal left anterior descending artery, 2.5x8 mm Collin drug-eluting stent.  D.  Ascending aortography.  E.  Distal abdominal aortogram and bilateral iliofemoral angiography.  F.  Right femoral artery approach.  G.  Perclose.  PREPROCEDURE DIAGNOSES:  1.  Aortic stenosis, severe, symptomatic.  2.  Previous left anterior descending artery stent, 1996.  3.  Diabetes mellitus.  POSTPROCEDURE DIAGNOSES:  1.  Left anterior descending artery 90% stenosis.  2.  iFR, left anterior descending artery, 0.39.  3.  Normal ascending aorta.  4.  Normal distal abdominal aorta and bilateral iliofemoral arteries.    MPI 01/31/2020   * Small, equivocal, fully reversible, mild severity defect limited to the    apical inferior wall. SDS of 1 indicating very minimal ischemia if any.    * Nondiagnostic due to resting ECG.   * Normal left ventricular size, ejection fraction, and wall motion.   ECG INTERPRETATION   Nondiagnostic due to resting ECG    Assessment:     1. Unstable angina pectoris (HCC)     2. Coronary artery disease, occlusive     3. S/P drug eluting coronary stent placement     4. Severe aortic stenosis         Medical Decision Making:  Today's Assessment / Status / Plan:   Assessment  1.  Aortic stenosis. Severe.   Symptomatic with unstable angina pectoris and shortness of breath  2.  CAD.  3.  Coronary stent implantation: Left anterior descending artery.  1996. 1998.  2020  4.  NSTEMI 12/12/2018.  Related to recurrent SVT.  5.  Hyperlipidemia  6.  Hypertension.   7.  Diabetes mellitus.  8.  Bilateral carotid bruits.  9 obesity. Counseled for weight loss.  10.  SVT. 12/3/2013. 10/22/2015 12/12/2018.      Recommendation and Discussion  1.  The patient has become more unstable due to severe aortic stenosis with shortness of breath and unstable angina pectoris.  2.  In addition to his severe aortic stenosis I am concerned about either in-stent restenosis or progressive coronary disease causing his unstable angina pectoris in view of his recent MPI results post PCI.  3.  I reviewed with the patient my above impression and would recommend that he be hospitalized with repeat cardiac catheterization and then proceed with treatment for his aortic stenosis either with TAVR or SAVR however the patient wishes to follow-up with Dr. Khoury and consider going to [facility].  4.  I urged him to do this urgently and asked him to call me no later than next week and keep me informed of the progress and definitive schedule and plan for his cardiac disease.  5.  I also informed him to absolutely avoid any activity that would bring on his angina pectoris and avoid any work that he is doing at this time.  6.  Follow-up arrangements made.

## 2020-07-30 NOTE — LETTER
"     [facility] [facility]   [address]  [place] [address]  Phone: [phone]  Fax: [phone]              Sharad Millan  1955    Encounter Date: 7/30/2020    Ganga Chavez M.D.          PROGRESS NOTE:  Chief Complaint   Patient presents with   • Follow-Up     Precordial Chest Pain       Subjective:   Sharad Millan is a 63 y.o. male who presents today for a followup evaluation of aortic stenosis, CAD, coronary stent, palpitations, PSVT, hypertension and hyperlipidemia plus recent hospitalization.     Last seen on 1/23/2020        Since discharge 1/9/2020 the patient states that for the first 3 days he felt \"great\".  Then he developed a nonexertional stabbing localized chest pain and subsequently has felt generally tired and weak.  Has not heard from cardiothoracic surgery despite referral on 1/9/2020.  Has been referred to endocrinology but has not yet contacted their office.  Still ruminating about his valve procedure.    Since 2/5/2019 patient states that he feels no different than he had before.  Despite this denial he does admit to mild discomfort and subtle shortness of breath when he is doing some fairly strenuous activity.  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  He also lost a contract with [employer].  As a result he has some financial stress.  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  Nonetheless, he now has more seriously looked in and research the aortic valve procedures for his aortic stenosis.    Since 10/1/2018 appointment the patient was hospitalized.  [place]  Hospitalized 12/12/2018 at [facility].  Presented with SVT and chest pain converted with adenosine.  NSTEMI.  Peak troponin 1.7.  MPI was normal with a EF of 56%.  Discharged on increased dose of metoprolol of 75 mg twice daily.  Since discharge no " recurrent chest discomfort or SVT.  Denies exertional shortness of breath but admits to using inhaler for episodic shortness of breath.    Since 1/18/2018 the patient reports subtle symptoms of exertional shortness of breath.  No lightheadedness dizziness or syncope.     Since 11/20/2017 appointment the patient has had no cardiac symptoms.  Continues to work full time mainly in [place].  Has developed severe left hip pain and has sought chiropractic and acupuncture therapy.  Was seen at urgent care and was given prednisone and anti-inflammatory and ultimately had some just transient relief but has come back and continues to be a problem.     Since 10/13/2017 appointment the patient's developed a upper respiratory infection being treated conservatively.  At the time of his last appointment and echocardiogram was done now shows severe aortic stenosis.  The patient has no new cardiac symptoms referenced to his aortic stenosis.  He reports 2 weeks ago while eating at a restaurant he had a left upper chest pain that lasted for one hour, resolved spontaneously and has not recurred.     Since 8/31/2016 appointment the patient's had no cardiac symptoms.  Had two episodes of palpitations responsive to vagal maneuver.  No lightheadedness or dizziness.  No angina pectoralis.  No shortness of breath.     Since his October, 2015 hospitalization the patient has had almost constant sharp left localized anterior sharp stabbing chest pain radiating to his back.  Not related to exertion.  No shortness of breath or angina pectoris.  No palpitations or syncope.     Between 10/21/2015-10/27/2015 hospitalized at [facility].  Gallstone pancreatitis.  Laparoscopic cholecystectomy.  Echocardiogram showed moderate aortic stenosis.     On 12/3/2013 [facility] ER.  SVT.  Converted with a adenosine.  Toprol increased 100 mg daily.     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  He developed chest  pain.  He took a customers sublingual nitroglycerin with relief.  He went to [facility] ER.  EKG showed no changes.  Patient left after waiting an hour and a half to be seen by the doctor.    Past Medical History:   Diagnosis Date   • Aortic stenosis 10/20/2011   • Arrhythmia    • Breath shortness    • Carotid bruit 12/9/2009   • Cataract     cataract extraction with IOL   • Chronic right shoulder pain    • Diabetes     oral medication   • Fatty liver 1/17/2011   • Heart attack (HCC) 12/2018   • History of cardiac catheterization 7/13/2009   • History of echocardiogram 10/20/2011   • HTN (hypertension) 10/12/2012   • Hypertension    • Memory loss 10/20/2011   • Murmur 7/7/2009   • Obesity 8/23/2011   • Palpitations 10/20/2011   • PSVT (paroxysmal supraventricular tachycardia) ([facility]) 2/14/2012   • S/P coronary artery stent placement 1996    coronary stent implantation , [facility]   • S/P coronary artery stent placement 1998    coronary stent implantation; [facility]   • S/P coronary artery stent placement 2003    cardiac catheterization; patent stents; [place]   • Sciatica 8/23/2011   • Uncontrolled diabetes mellitus ([facility]) 11/29/2010     Past Surgical History:   Procedure Laterality Date   • PO BY LAPAROSCOPY N/A 10/25/2015    Procedure: PO BY LAPAROSCOPY-with grams ;  Surgeon: Ronnie Bowman M.D.;  Location: SURGERY [facility];  Service:    • CAROTID STENT ANGIOGRAPHY  1996   • CATARACT EXTRACTION WITH IOL Bilateral      Family History   Problem Relation Age of Onset   • Heart Disease Mother    • Heart Disease Father      Social History     Socioeconomic History   • Marital status:      Spouse name: Not on file   • Number of children: Not on file   • Years of education: Not on file   • Highest education level: Not on file   Occupational History   • Not on file   Social Needs   • Financial resource strain: Not on file   • Food insecurity     Worry: Not on file     Inability: Not on file   •  Transportation needs     Medical: Not on file     Non-medical: Not on file   Tobacco Use   • Smoking status: Never Smoker   • Smokeless tobacco: Never Used   Substance and Sexual Activity   • Alcohol use: No     Alcohol/week: 0.0 oz   • Drug use: No   • Sexual activity: Yes     Partners: Female   Lifestyle   • Physical activity     Days per week: Not on file     Minutes per session: Not on file   • Stress: Not on file   Relationships   • Social connections     Talks on phone: Not on file     Gets together: Not on file     Attends Spiritism service: Not on file     Active member of club or organization: Not on file     Attends meetings of clubs or organizations: Not on file     Relationship status: Not on file   • Intimate partner violence     Fear of current or ex partner: Not on file     Emotionally abused: Not on file     Physically abused: Not on file     Forced sexual activity: Not on file   Other Topics Concern   •  Service No   • Blood Transfusions No   • Caffeine Concern No   • Occupational Exposure No   • Hobby Hazards No   • Sleep Concern No   • Stress Concern No   • Weight Concern No   • Special Diet No   • Back Care Yes     Comment: BELT SUPPORT   • Exercise Yes   • Bike Helmet No   • Seat Belt Yes   • Self-Exams No   Social History Narrative   • Not on file     Allergies   Allergen Reactions   • Bydureon [Exenatide] Hives     hives   • Cycloset [Bromocriptine Mesylate] Unspecified     Pt states he can't remember what happens to him.   • Morphine Vomiting     Outpatient Encounter Medications as of 7/30/2020   Medication Sig Dispense Refill   • azithromycin (ZITHROMAX) 250 MG Tab Take as directed 6 Tab 0   • predniSONE (DELTASONE) 20 MG Tab Take 2 tabs daily for 5 days 10 Tab 0   • promethazine-codeine (PHENERGAN-CODEINE) 6.25-10 MG/5ML Syrup Take 5-10 mL by mouth at bedtime as needed for Cough for up to 12 days. 120 mL 0   • Lancets Lancets order: Lancets for True Metrix meter. Sig: use daily and  "prn ssx high or low sugar 100 Each 3   • glucose blood strip 1 Strip by Other route 2 Times a Day. 60 Strip 11   • Empagliflozin (JARDIANCE) 25 MG Tab Take 1 Tab by mouth every day. 90 Tab 1   • metformin (GLUCOPHAGE) 1000 MG tablet Take 1 Tab by mouth 2 times a day. 180 Tab 1   • glipiZIDE SR (GLUCOTROL) 10 MG TABLET SR 24 HR Take 1 Tab by mouth every morning. 90 Tab 1   • losartan (COZAAR) 100 MG Tab TAKE 1 TABLET BY MOUTH EVERY DAY 90 Tab 3   • rosuvastatin (CRESTOR) 20 MG Tab Take 1 Tab by mouth every day. 90 Tab 3   • aspirin EC 81 MG EC tablet Take 1 Tab by mouth every day. 30 Tab 11   • metoprolol SR (TOPROL XL) 50 MG TABLET SR 24 HR TAKE 1 TABLET BY MOUTH 2 TIMES A  Tab 3   • prasugrel (EFFIENT) 10 MG Tab Take 1 Tab by mouth every day. (Patient not taking: Reported on 7/27/2020) 30 Tab 11     No facility-administered encounter medications on file as of 7/30/2020.      Review of Systems   Respiratory: Negative for cough and shortness of breath.    Cardiovascular: Negative for chest pain and palpitations.   Musculoskeletal: Negative for myalgias.   Neurological: Negative for dizziness and loss of consciousness.        Objective:   /66 (BP Location: Left arm, Patient Position: Sitting, BP Cuff Size: Adult)   Pulse 90   Ht 1.778 m (5' 10\")   Wt 98.7 kg (217 lb 9.6 oz)   SpO2 96%   BMI 31.22 kg/m²     Physical Exam   Constitutional: He is oriented to person, place, and time. He appears well-developed and well-nourished.   Neck: No JVD present.   Cardiovascular: Normal rate, regular rhythm and intact distal pulses.   Murmur heard.  Pulses:       Carotid pulses are on the right side with bruit and on the left side with bruit.  Pulmonary/Chest: Effort normal and breath sounds normal. No respiratory distress. He has no wheezes. He has no rales.   Musculoskeletal:         General: No edema.   Neurological: He is alert and oriented to person, place, and time.   Skin: Skin is warm and dry.   "   Psychiatric: He has a normal mood and affect. His behavior is normal.     CAROTID ULTRASOUND 11/01/2017  Mild bilateral internal carotid artery stenosis less than 50%.    08/08/2012 ECHOCARDIOGRAM  EF 55-60%. Moderate aortic stenosis. Peak aortic valve gradient 63 mmHg. Moderate LVH.      07/25/2013 ECHOCARDIOGRAM  EF 70-75%. Moderate aortic stenosis. Peak aortic valve gradient 59 mmHg. Moderate LVH.     10/23/2015 ECHOCARDIOGRAM  Normal left ventricular systolic function.  Left ventricular ejection fraction is visually estimated to be 60%.  Grade I diastolic dysfunction.  Mild mitral regurgitation.  Moderate aortic stenosis.  Vmax 3.47 m/s. Transvalvular gradients are - Peak: 48 mmHg, Mean: 30   mmHg.  Mild pulmonic insufficiency.     11/01/2017 ECHOCARDIOGRAM  Normal left ventricular chamber size.  Left ventricular ejection fraction is visually estimated to be 60%.  Grade I diastolic dysfunction.  Severe aortic stenosis.  Transvalvular gradients are - Peak: 72 mmHg, Mean: 50 mmHg.  Ascending aorta is borderline dilated with a diameter of 3.6 cm.  Compared to the report of the prior study done - the aortic stenosis is   now severe.    12/26/2018 ECHOCARDIOGRAM    Normal left ventricular systolic function.  Left ventricular ejection fraction is visually estimated to be 65-70%.  Moderate left ventricular hypertrophy.  Severe aortic stenosis.    12/19/2017 MPI   No evidence of significant jeopardized viable myocardium or prior myocardial    infarction.   Transient ischemic dilatation calculated at 1.17 visually correlated.   Normal left ventricular size, ejection fraction, and wall motion.    12/12/2018 MPI  Normal perfusion.  EF 56%.    08/14/2012 Lab: Cholesterol 109. Triglycerides 121. HDL 36. LDL 49.  03/28/2013 Lab: Cholesterol 98. Triglycerides 110. HDL 30. LDL 46.     09/27/2013 EKG: Normal sinus rhythm. No acute changes.  11/20/2017 EKG: Normal sinus rhythm, rate 73. Minor lateral ST-T wave abnormalities.  1st degree AV block. Reviewed by myself.    CARDIAC CATHETERIZATION 01/08/2020  A.  Selective coronary angiography.  B.  iFR, proximal left anterior descending artery.  C.  Coronary stent implantation, proximal left anterior descending artery, 2.5x8 mm   [place] drug-eluting stent.  D.  Ascending aortography.  E.  Distal abdominal aortogram and bilateral iliofemoral angiography.  F.  Right femoral artery approach.  G.  Perclose.  PREPROCEDURE DIAGNOSES:  1.  Aortic stenosis, severe, symptomatic.  2.  Previous left anterior descending artery stent, 1996.  3.  Diabetes mellitus.  POSTPROCEDURE DIAGNOSES:  1.  Left anterior descending artery 90% stenosis.  2.  iFR, left anterior descending artery, 0.39.  3.  Normal ascending aorta.  4.  Normal distal abdominal aorta and bilateral iliofemoral arteries.    Assessment:     No diagnosis found.    Medical Decision Making:  Today's Assessment / Status / Plan:   Assessment  1.  Aortic stenosis. Severe.  Symptomatic  2.  CAD.  3.  Coronary stent implantation: Left anterior descending artery.  1996. 1998.  2020  4.  NSTEMI 12/12/2018.  Related to recurrent SVT.  5.  Hyperlipidemia  6.  Hypertension.   7.  Diabetes mellitus.  8.  Bilateral carotid bruits.  9 obesity. Counseled for weight loss.  10.  SVT. 12/3/2013. 10/22/2015 12/12/2018.      Recommendation and Discussion  1.  Pharmacologic MPI to evaluate chest pain and perfusion in the left anterior descending artery distribution.  2.  I have contacted cardiothoracic surgery office to arrange consultation for aortic valve procedure.  3.  Strongly admonished the patient to contact endocrinology office to optimize diabetes mellitus.  4.  CMP lipid panel and glycohemoglobin.  5.  Follow-up 3 months.         Don Khoury D.O.  [address]  [address]  [place] [address]  VIA Facsimile: [phone]

## 2020-08-01 LAB — LPA SERPL-MCNC: 128 MG/DL

## 2020-08-03 ENCOUNTER — APPOINTMENT (OUTPATIENT)
Dept: RADIOLOGY | Facility: MEDICAL CENTER | Age: 65
DRG: 280 | End: 2020-08-03
Attending: EMERGENCY MEDICINE
Payer: MEDICARE

## 2020-08-03 ENCOUNTER — TELEPHONE (OUTPATIENT)
Dept: CARDIOLOGY | Facility: MEDICAL CENTER | Age: 65
End: 2020-08-03

## 2020-08-03 ENCOUNTER — HOSPITAL ENCOUNTER (INPATIENT)
Facility: MEDICAL CENTER | Age: 65
LOS: 2 days | DRG: 280 | End: 2020-08-05
Attending: EMERGENCY MEDICINE | Admitting: INTERNAL MEDICINE
Payer: MEDICARE

## 2020-08-03 ENCOUNTER — APPOINTMENT (OUTPATIENT)
Dept: RADIOLOGY | Facility: MEDICAL CENTER | Age: 65
DRG: 280 | End: 2020-08-03
Payer: MEDICARE

## 2020-08-03 DIAGNOSIS — R79.89 ELEVATED TROPONIN: ICD-10-CM

## 2020-08-03 DIAGNOSIS — R07.9 ACUTE CHEST PAIN: ICD-10-CM

## 2020-08-03 DIAGNOSIS — I25.110 CORONARY ARTERY DISEASE INVOLVING NATIVE CORONARY ARTERY OF NATIVE HEART WITH UNSTABLE ANGINA PECTORIS (HCC): Chronic | ICD-10-CM

## 2020-08-03 PROBLEM — J18.9 COMMUNITY ACQUIRED PNEUMONIA OF RIGHT LOWER LOBE OF LUNG: Status: ACTIVE | Noted: 2020-08-03

## 2020-08-03 PROBLEM — I21.4 NSTEMI (NON-ST ELEVATED MYOCARDIAL INFARCTION) (HCC): Status: ACTIVE | Noted: 2020-08-03

## 2020-08-03 PROBLEM — I50.33 ACUTE ON CHRONIC DIASTOLIC CONGESTIVE HEART FAILURE (HCC): Status: ACTIVE | Noted: 2020-08-03

## 2020-08-03 LAB
ALBUMIN SERPL BCP-MCNC: 4.5 G/DL (ref 3.2–4.9)
ALBUMIN/GLOB SERPL: 1.7 G/DL
ALP SERPL-CCNC: 50 U/L (ref 30–99)
ALT SERPL-CCNC: 31 U/L (ref 2–50)
ANION GAP SERPL CALC-SCNC: 17 MMOL/L (ref 7–16)
AST SERPL-CCNC: 21 U/L (ref 12–45)
BASOPHILS # BLD AUTO: 0.3 % (ref 0–1.8)
BASOPHILS # BLD: 0.04 K/UL (ref 0–0.12)
BILIRUB SERPL-MCNC: 0.3 MG/DL (ref 0.1–1.5)
BUN SERPL-MCNC: 23 MG/DL (ref 8–22)
CALCIUM SERPL-MCNC: 9.3 MG/DL (ref 8.5–10.5)
CHLORIDE SERPL-SCNC: 96 MMOL/L (ref 96–112)
CO2 SERPL-SCNC: 22 MMOL/L (ref 20–33)
CREAT SERPL-MCNC: 1.13 MG/DL (ref 0.5–1.4)
EKG IMPRESSION: NORMAL
EKG IMPRESSION: NORMAL
EOSINOPHIL # BLD AUTO: 0.04 K/UL (ref 0–0.51)
EOSINOPHIL NFR BLD: 0.3 % (ref 0–6.9)
ERYTHROCYTE [DISTWIDTH] IN BLOOD BY AUTOMATED COUNT: 39.9 FL (ref 35.9–50)
GLOBULIN SER CALC-MCNC: 2.7 G/DL (ref 1.9–3.5)
GLUCOSE SERPL-MCNC: 309 MG/DL (ref 65–99)
HCT VFR BLD AUTO: 39.8 % (ref 42–52)
HGB BLD-MCNC: 12.2 G/DL (ref 14–18)
IMM GRANULOCYTES # BLD AUTO: 0.06 K/UL (ref 0–0.11)
IMM GRANULOCYTES NFR BLD AUTO: 0.5 % (ref 0–0.9)
LYMPHOCYTES # BLD AUTO: 1.85 K/UL (ref 1–4.8)
LYMPHOCYTES NFR BLD: 14.1 % (ref 22–41)
MCH RBC QN AUTO: 22.6 PG (ref 27–33)
MCHC RBC AUTO-ENTMCNC: 30.7 G/DL (ref 33.7–35.3)
MCV RBC AUTO: 73.6 FL (ref 81.4–97.8)
MONOCYTES # BLD AUTO: 0.95 K/UL (ref 0–0.85)
MONOCYTES NFR BLD AUTO: 7.2 % (ref 0–13.4)
NEUTROPHILS # BLD AUTO: 10.21 K/UL (ref 1.82–7.42)
NEUTROPHILS NFR BLD: 77.6 % (ref 44–72)
NRBC # BLD AUTO: 0 K/UL
NRBC BLD-RTO: 0 /100 WBC
PLATELET # BLD AUTO: 175 K/UL (ref 164–446)
PMV BLD AUTO: 10 FL (ref 9–12.9)
POTASSIUM SERPL-SCNC: 4.3 MMOL/L (ref 3.6–5.5)
PROCALCITONIN SERPL-MCNC: 0.16 NG/ML
PROT SERPL-MCNC: 7.2 G/DL (ref 6–8.2)
RBC # BLD AUTO: 5.41 M/UL (ref 4.7–6.1)
SODIUM SERPL-SCNC: 135 MMOL/L (ref 135–145)
TROPONIN T SERPL-MCNC: 75 NG/L (ref 6–19)
WBC # BLD AUTO: 13.2 K/UL (ref 4.8–10.8)

## 2020-08-03 PROCEDURE — 700102 HCHG RX REV CODE 250 W/ 637 OVERRIDE(OP): Performed by: INTERNAL MEDICINE

## 2020-08-03 PROCEDURE — 85025 COMPLETE CBC W/AUTO DIFF WBC: CPT

## 2020-08-03 PROCEDURE — 80053 COMPREHEN METABOLIC PANEL: CPT

## 2020-08-03 PROCEDURE — A9270 NON-COVERED ITEM OR SERVICE: HCPCS | Performed by: INTERNAL MEDICINE

## 2020-08-03 PROCEDURE — 99285 EMERGENCY DEPT VISIT HI MDM: CPT

## 2020-08-03 PROCEDURE — 84145 PROCALCITONIN (PCT): CPT

## 2020-08-03 PROCEDURE — 700111 HCHG RX REV CODE 636 W/ 250 OVERRIDE (IP): Performed by: INTERNAL MEDICINE

## 2020-08-03 PROCEDURE — 93005 ELECTROCARDIOGRAM TRACING: CPT | Performed by: EMERGENCY MEDICINE

## 2020-08-03 PROCEDURE — 71045 X-RAY EXAM CHEST 1 VIEW: CPT

## 2020-08-03 PROCEDURE — 99223 1ST HOSP IP/OBS HIGH 75: CPT | Performed by: INTERNAL MEDICINE

## 2020-08-03 PROCEDURE — 96367 TX/PROPH/DG ADDL SEQ IV INF: CPT

## 2020-08-03 PROCEDURE — 700111 HCHG RX REV CODE 636 W/ 250 OVERRIDE (IP): Performed by: EMERGENCY MEDICINE

## 2020-08-03 PROCEDURE — C9803 HOPD COVID-19 SPEC COLLECT: HCPCS | Performed by: INTERNAL MEDICINE

## 2020-08-03 PROCEDURE — 99223 1ST HOSP IP/OBS HIGH 75: CPT | Mod: AI | Performed by: INTERNAL MEDICINE

## 2020-08-03 PROCEDURE — 96372 THER/PROPH/DIAG INJ SC/IM: CPT

## 2020-08-03 PROCEDURE — 93005 ELECTROCARDIOGRAM TRACING: CPT

## 2020-08-03 PROCEDURE — 700101 HCHG RX REV CODE 250: Performed by: EMERGENCY MEDICINE

## 2020-08-03 PROCEDURE — 770020 HCHG ROOM/CARE - TELE (206)

## 2020-08-03 PROCEDURE — 700102 HCHG RX REV CODE 250 W/ 637 OVERRIDE(OP): Performed by: EMERGENCY MEDICINE

## 2020-08-03 PROCEDURE — 96365 THER/PROPH/DIAG IV INF INIT: CPT

## 2020-08-03 PROCEDURE — A9270 NON-COVERED ITEM OR SERVICE: HCPCS | Performed by: EMERGENCY MEDICINE

## 2020-08-03 PROCEDURE — 700105 HCHG RX REV CODE 258: Performed by: EMERGENCY MEDICINE

## 2020-08-03 PROCEDURE — 84484 ASSAY OF TROPONIN QUANT: CPT

## 2020-08-03 RX ORDER — PRASUGREL 10 MG/1
10 TABLET, FILM COATED ORAL DAILY
Status: DISCONTINUED | OUTPATIENT
Start: 2020-08-04 | End: 2020-08-03

## 2020-08-03 RX ORDER — DOXYCYCLINE 100 MG/1
100 TABLET ORAL EVERY 12 HOURS
Status: DISCONTINUED | OUTPATIENT
Start: 2020-08-04 | End: 2020-08-05

## 2020-08-03 RX ORDER — ASPIRIN 325 MG
325 TABLET ORAL DAILY
Status: DISCONTINUED | OUTPATIENT
Start: 2020-08-04 | End: 2020-08-05 | Stop reason: HOSPADM

## 2020-08-03 RX ORDER — SODIUM CHLORIDE 9 MG/ML
INJECTION, SOLUTION INTRAVENOUS CONTINUOUS
Status: DISCONTINUED | OUTPATIENT
Start: 2020-08-04 | End: 2020-08-04

## 2020-08-03 RX ORDER — ASPIRIN 81 MG/1
81 TABLET, CHEWABLE ORAL ONCE
Status: COMPLETED | OUTPATIENT
Start: 2020-08-03 | End: 2020-08-03

## 2020-08-03 RX ORDER — AMOXICILLIN 250 MG
2 CAPSULE ORAL 2 TIMES DAILY
Status: DISCONTINUED | OUTPATIENT
Start: 2020-08-03 | End: 2020-08-05 | Stop reason: HOSPADM

## 2020-08-03 RX ORDER — ASPIRIN 81 MG/1
324 TABLET, CHEWABLE ORAL DAILY
Status: DISCONTINUED | OUTPATIENT
Start: 2020-08-04 | End: 2020-08-05 | Stop reason: HOSPADM

## 2020-08-03 RX ORDER — DEXTROSE MONOHYDRATE 25 G/50ML
50 INJECTION, SOLUTION INTRAVENOUS
Status: DISCONTINUED | OUTPATIENT
Start: 2020-08-03 | End: 2020-08-05 | Stop reason: HOSPADM

## 2020-08-03 RX ORDER — NITROGLYCERIN 0.4 MG/1
0.4 TABLET SUBLINGUAL
Status: DISCONTINUED | OUTPATIENT
Start: 2020-08-03 | End: 2020-08-05 | Stop reason: HOSPADM

## 2020-08-03 RX ORDER — PIOGLITAZONEHYDROCHLORIDE 45 MG/1
45 TABLET ORAL DAILY
Status: ON HOLD | COMMUNITY
End: 2020-08-03

## 2020-08-03 RX ORDER — METOPROLOL SUCCINATE 50 MG/1
50 TABLET, EXTENDED RELEASE ORAL 2 TIMES DAILY
Status: DISCONTINUED | OUTPATIENT
Start: 2020-08-03 | End: 2020-08-05 | Stop reason: HOSPADM

## 2020-08-03 RX ORDER — ONDANSETRON 2 MG/ML
4 INJECTION INTRAMUSCULAR; INTRAVENOUS EVERY 4 HOURS PRN
Status: DISCONTINUED | OUTPATIENT
Start: 2020-08-03 | End: 2020-08-05 | Stop reason: HOSPADM

## 2020-08-03 RX ORDER — ONDANSETRON 4 MG/1
4 TABLET, ORALLY DISINTEGRATING ORAL EVERY 4 HOURS PRN
Status: DISCONTINUED | OUTPATIENT
Start: 2020-08-03 | End: 2020-08-05 | Stop reason: HOSPADM

## 2020-08-03 RX ORDER — ASPIRIN 300 MG/1
300 SUPPOSITORY RECTAL DAILY
Status: DISCONTINUED | OUTPATIENT
Start: 2020-08-04 | End: 2020-08-05 | Stop reason: HOSPADM

## 2020-08-03 RX ORDER — POLYETHYLENE GLYCOL 3350 17 G/17G
1 POWDER, FOR SOLUTION ORAL
Status: DISCONTINUED | OUTPATIENT
Start: 2020-08-03 | End: 2020-08-05 | Stop reason: HOSPADM

## 2020-08-03 RX ORDER — IPRATROPIUM BROMIDE AND ALBUTEROL SULFATE 2.5; .5 MG/3ML; MG/3ML
3 SOLUTION RESPIRATORY (INHALATION)
Status: DISCONTINUED | OUTPATIENT
Start: 2020-08-03 | End: 2020-08-05 | Stop reason: HOSPADM

## 2020-08-03 RX ORDER — LOSARTAN POTASSIUM AND HYDROCHLOROTHIAZIDE 12.5; 1 MG/1; MG/1
1 TABLET ORAL DAILY
COMMUNITY
End: 2020-08-03

## 2020-08-03 RX ORDER — METOPROLOL SUCCINATE 25 MG/1
25 TABLET, EXTENDED RELEASE ORAL 2 TIMES DAILY
Status: ON HOLD | COMMUNITY
End: 2020-08-05

## 2020-08-03 RX ORDER — FUROSEMIDE 10 MG/ML
20 INJECTION INTRAMUSCULAR; INTRAVENOUS DAILY
Status: DISCONTINUED | OUTPATIENT
Start: 2020-08-03 | End: 2020-08-05

## 2020-08-03 RX ORDER — BISACODYL 10 MG
10 SUPPOSITORY, RECTAL RECTAL
Status: DISCONTINUED | OUTPATIENT
Start: 2020-08-03 | End: 2020-08-05 | Stop reason: HOSPADM

## 2020-08-03 RX ORDER — ATORVASTATIN CALCIUM 80 MG/1
80 TABLET, FILM COATED ORAL EVERY EVENING
Status: DISCONTINUED | OUTPATIENT
Start: 2020-08-03 | End: 2020-08-05 | Stop reason: HOSPADM

## 2020-08-03 RX ADMIN — METOPROLOL SUCCINATE 50 MG: 50 TABLET, EXTENDED RELEASE ORAL at 23:36

## 2020-08-03 RX ADMIN — FUROSEMIDE 20 MG: 10 INJECTION, SOLUTION INTRAMUSCULAR; INTRAVENOUS at 23:36

## 2020-08-03 RX ADMIN — ENOXAPARIN SODIUM 100 MG: 100 INJECTION SUBCUTANEOUS at 21:26

## 2020-08-03 RX ADMIN — ATORVASTATIN CALCIUM 80 MG: 80 TABLET, FILM COATED ORAL at 23:36

## 2020-08-03 RX ADMIN — CEFTRIAXONE SODIUM 2 G: 2 INJECTION, POWDER, FOR SOLUTION INTRAMUSCULAR; INTRAVENOUS at 21:26

## 2020-08-03 RX ADMIN — DOXYCYCLINE 100 MG: 100 INJECTION, POWDER, LYOPHILIZED, FOR SOLUTION INTRAVENOUS at 22:07

## 2020-08-03 RX ADMIN — ASPIRIN 81 MG: 81 TABLET, CHEWABLE ORAL at 21:26

## 2020-08-03 ASSESSMENT — ENCOUNTER SYMPTOMS
CHILLS: 0
NAUSEA: 0
SHORTNESS OF BREATH: 1
ORTHOPNEA: 1
HEMOPTYSIS: 0
VOMITING: 0
DIZZINESS: 0
COUGH: 1
ABDOMINAL PAIN: 1
WHEEZING: 0
FEVER: 0
SPUTUM PRODUCTION: 0

## 2020-08-03 ASSESSMENT — LIFESTYLE VARIABLES
HAVE YOU EVER FELT YOU SHOULD CUT DOWN ON YOUR DRINKING: NO
AVERAGE NUMBER OF DAYS PER WEEK YOU HAVE A DRINK CONTAINING ALCOHOL: 0
ALCOHOL_USE: NO
TOTAL SCORE: 0
EVER FELT BAD OR GUILTY ABOUT YOUR DRINKING: NO
HOW MANY TIMES IN THE PAST YEAR HAVE YOU HAD 5 OR MORE DRINKS IN A DAY: 0
EVER HAD A DRINK FIRST THING IN THE MORNING TO STEADY YOUR NERVES TO GET RID OF A HANGOVER: NO
ON A TYPICAL DAY WHEN YOU DRINK ALCOHOL HOW MANY DRINKS DO YOU HAVE: 0
HAVE PEOPLE ANNOYED YOU BY CRITICIZING YOUR DRINKING: NO
CONSUMPTION TOTAL: NEGATIVE
EVER_SMOKED: NEVER

## 2020-08-03 ASSESSMENT — PATIENT HEALTH QUESTIONNAIRE - PHQ9
1. LITTLE INTEREST OR PLEASURE IN DOING THINGS: NOT AT ALL
2. FEELING DOWN, DEPRESSED, IRRITABLE, OR HOPELESS: NOT AT ALL
SUM OF ALL RESPONSES TO PHQ9 QUESTIONS 1 AND 2: 0

## 2020-08-03 ASSESSMENT — FIBROSIS 4 INDEX: FIB4 SCORE: 1.31

## 2020-08-03 NOTE — TELEPHONE ENCOUNTER
GOLD/priti    Pt calling to report congestion and pain he discussed with SW on 7/30 is worse.  Pt is worried this is a cardiac condition.  Pt has discomfort center of chest.  Pt can't lay down to sleep, trouble breathing.  Coughing is worse.        Please call Sharad

## 2020-08-03 NOTE — TELEPHONE ENCOUNTER
Patient called the office with complaints of worsening chest congestion as well as burning chest pain.  I spoke with the patient.  I believe his symptoms are most likely related to his underlying coronary disease and severe aortic stenosis which have been accelerated.  I recommended that he go to the emergency room for evaluation and he was agreeable.

## 2020-08-03 NOTE — TELEPHONE ENCOUNTER
Per Dr. Chavez, he called patient and instructed him to go to the ER. Dr. Chavez asked this RN to call the ER to advise them to consult Dr. Crooks when patient arrives.     Called ED and spoke to Byron, charge nurse, and advised him of SW's request. Byron JIN verbalized understanding.

## 2020-08-03 NOTE — TELEPHONE ENCOUNTER
"Spoke to patient. Patient feels that his symptoms are allergy related. Went to urgent care recently and was given a Z-heather along with prednisone and cough medicine. States \"phlegm thickened\" and symptoms worsened as of yesterday. He does not want to go to the hospital \"until he has no other choice\".    Patient sounds SOB when speaking. Asked patient if he was SOB and he reported yes. Ultimately, he states that while he is aware of his cardiac condition, he feels his current symptoms are allergy related and is requesting recommendations. States he does not have baseline out    Advised patient that symptoms will be reported to Dr. Chavez for recommendations, as his dyspnea is concerning and may possibly be cardiac related.    Notified Dr. Chavez. Dr. Chavez stated that he will speak to him personally via telephone.  "

## 2020-08-04 ENCOUNTER — APPOINTMENT (OUTPATIENT)
Dept: CARDIOLOGY | Facility: MEDICAL CENTER | Age: 65
DRG: 280 | End: 2020-08-04
Attending: INTERNAL MEDICINE
Payer: MEDICARE

## 2020-08-04 LAB
ACT BLD: 186 SEC (ref 74–137)
ANION GAP SERPL CALC-SCNC: 14 MMOL/L (ref 7–16)
APTT PPP: 33.8 SEC (ref 24.7–36)
BASOPHILS # BLD AUTO: 0.3 % (ref 0–1.8)
BASOPHILS # BLD: 0.04 K/UL (ref 0–0.12)
BUN SERPL-MCNC: 22 MG/DL (ref 8–22)
CALCIUM SERPL-MCNC: 9.2 MG/DL (ref 8.5–10.5)
CHLORIDE SERPL-SCNC: 98 MMOL/L (ref 96–112)
CHOLEST SERPL-MCNC: 111 MG/DL (ref 100–199)
CO2 SERPL-SCNC: 25 MMOL/L (ref 20–33)
COVID ORDER STATUS COVID19: NORMAL
CREAT SERPL-MCNC: 1.07 MG/DL (ref 0.5–1.4)
EKG IMPRESSION: NORMAL
EOSINOPHIL # BLD AUTO: 0.13 K/UL (ref 0–0.51)
EOSINOPHIL NFR BLD: 1.1 % (ref 0–6.9)
ERYTHROCYTE [DISTWIDTH] IN BLOOD BY AUTOMATED COUNT: 40.4 FL (ref 35.9–50)
EST. AVERAGE GLUCOSE BLD GHB EST-MCNC: 249 MG/DL
GLUCOSE BLD-MCNC: 161 MG/DL (ref 65–99)
GLUCOSE BLD-MCNC: 184 MG/DL (ref 65–99)
GLUCOSE BLD-MCNC: 190 MG/DL (ref 65–99)
GLUCOSE BLD-MCNC: 197 MG/DL (ref 65–99)
GLUCOSE SERPL-MCNC: 234 MG/DL (ref 65–99)
HBA1C MFR BLD: 10.3 % (ref 0–5.6)
HCT VFR BLD AUTO: 40.2 % (ref 42–52)
HDLC SERPL-MCNC: 46 MG/DL
HGB BLD-MCNC: 12.5 G/DL (ref 14–18)
IMM GRANULOCYTES # BLD AUTO: 0.08 K/UL (ref 0–0.11)
IMM GRANULOCYTES NFR BLD AUTO: 0.7 % (ref 0–0.9)
INR PPP: 1.05 (ref 0.87–1.13)
LDLC SERPL CALC-MCNC: 43 MG/DL
LV EJECT FRACT  99904: 50
LV EJECT FRACT MOD 2C 99903: 67.45
LV EJECT FRACT MOD 4C 99902: 61.32
LV EJECT FRACT MOD BP 99901: 61.78
LYMPHOCYTES # BLD AUTO: 2.43 K/UL (ref 1–4.8)
LYMPHOCYTES NFR BLD: 20.2 % (ref 22–41)
MCH RBC QN AUTO: 23.1 PG (ref 27–33)
MCHC RBC AUTO-ENTMCNC: 31.1 G/DL (ref 33.7–35.3)
MCV RBC AUTO: 74.2 FL (ref 81.4–97.8)
MONOCYTES # BLD AUTO: 1 K/UL (ref 0–0.85)
MONOCYTES NFR BLD AUTO: 8.3 % (ref 0–13.4)
NEUTROPHILS # BLD AUTO: 8.34 K/UL (ref 1.82–7.42)
NEUTROPHILS NFR BLD: 69.4 % (ref 44–72)
NRBC # BLD AUTO: 0 K/UL
NRBC BLD-RTO: 0 /100 WBC
PLATELET # BLD AUTO: 177 K/UL (ref 164–446)
PMV BLD AUTO: 9.9 FL (ref 9–12.9)
POTASSIUM SERPL-SCNC: 4.2 MMOL/L (ref 3.6–5.5)
PROTHROMBIN TIME: 14 SEC (ref 12–14.6)
RBC # BLD AUTO: 5.42 M/UL (ref 4.7–6.1)
SARS-COV-2 RNA RESP QL NAA+PROBE: NOTDETECTED
SODIUM SERPL-SCNC: 137 MMOL/L (ref 135–145)
SPECIMEN SOURCE: NORMAL
TRIGL SERPL-MCNC: 108 MG/DL (ref 0–149)
TROPONIN T SERPL-MCNC: 88 NG/L (ref 6–19)
TROPONIN T SERPL-MCNC: 89 NG/L (ref 6–19)
WBC # BLD AUTO: 12 K/UL (ref 4.8–10.8)

## 2020-08-04 PROCEDURE — 99233 SBSQ HOSP IP/OBS HIGH 50: CPT | Mod: GC | Performed by: INTERNAL MEDICINE

## 2020-08-04 PROCEDURE — 80061 LIPID PANEL: CPT

## 2020-08-04 PROCEDURE — B2111ZZ FLUOROSCOPY OF MULTIPLE CORONARY ARTERIES USING LOW OSMOLAR CONTRAST: ICD-10-PCS | Performed by: INTERNAL MEDICINE

## 2020-08-04 PROCEDURE — 93571 IV DOP VEL&/PRESS C FLO 1ST: CPT | Mod: 26,52,LD | Performed by: INTERNAL MEDICINE

## 2020-08-04 PROCEDURE — U0003 INFECTIOUS AGENT DETECTION BY NUCLEIC ACID (DNA OR RNA); SEVERE ACUTE RESPIRATORY SYNDROME CORONAVIRUS 2 (SARS-COV-2) (CORONAVIRUS DISEASE [COVID-19]), AMPLIFIED PROBE TECHNIQUE, MAKING USE OF HIGH THROUGHPUT TECHNOLOGIES AS DESCRIBED BY CMS-2020-01-R: HCPCS

## 2020-08-04 PROCEDURE — C1887 CATHETER, GUIDING: HCPCS

## 2020-08-04 PROCEDURE — 700117 HCHG RX CONTRAST REV CODE 255: Performed by: INTERNAL MEDICINE

## 2020-08-04 PROCEDURE — 93005 ELECTROCARDIOGRAM TRACING: CPT | Performed by: INTERNAL MEDICINE

## 2020-08-04 PROCEDURE — 93454 CORONARY ARTERY ANGIO S&I: CPT | Mod: 26 | Performed by: INTERNAL MEDICINE

## 2020-08-04 PROCEDURE — 80048 BASIC METABOLIC PNL TOTAL CA: CPT

## 2020-08-04 PROCEDURE — 93010 ELECTROCARDIOGRAM REPORT: CPT | Performed by: INTERNAL MEDICINE

## 2020-08-04 PROCEDURE — 85610 PROTHROMBIN TIME: CPT

## 2020-08-04 PROCEDURE — 93306 TTE W/DOPPLER COMPLETE: CPT

## 2020-08-04 PROCEDURE — 84484 ASSAY OF TROPONIN QUANT: CPT | Mod: 91

## 2020-08-04 PROCEDURE — 700111 HCHG RX REV CODE 636 W/ 250 OVERRIDE (IP): Performed by: INTERNAL MEDICINE

## 2020-08-04 PROCEDURE — 99232 SBSQ HOSP IP/OBS MODERATE 35: CPT | Performed by: INTERNAL MEDICINE

## 2020-08-04 PROCEDURE — A9270 NON-COVERED ITEM OR SERVICE: HCPCS | Performed by: INTERNAL MEDICINE

## 2020-08-04 PROCEDURE — 700105 HCHG RX REV CODE 258: Performed by: INTERNAL MEDICINE

## 2020-08-04 PROCEDURE — 83036 HEMOGLOBIN GLYCOSYLATED A1C: CPT

## 2020-08-04 PROCEDURE — 85730 THROMBOPLASTIN TIME PARTIAL: CPT

## 2020-08-04 PROCEDURE — 700111 HCHG RX REV CODE 636 W/ 250 OVERRIDE (IP)

## 2020-08-04 PROCEDURE — 4A033BC MEASUREMENT OF ARTERIAL PRESSURE, CORONARY, PERCUTANEOUS APPROACH: ICD-10-PCS | Performed by: INTERNAL MEDICINE

## 2020-08-04 PROCEDURE — 82962 GLUCOSE BLOOD TEST: CPT | Mod: 91

## 2020-08-04 PROCEDURE — 700102 HCHG RX REV CODE 250 W/ 637 OVERRIDE(OP): Performed by: INTERNAL MEDICINE

## 2020-08-04 PROCEDURE — 85347 COAGULATION TIME ACTIVATED: CPT

## 2020-08-04 PROCEDURE — 770020 HCHG ROOM/CARE - TELE (206)

## 2020-08-04 PROCEDURE — 700101 HCHG RX REV CODE 250

## 2020-08-04 PROCEDURE — 36415 COLL VENOUS BLD VENIPUNCTURE: CPT

## 2020-08-04 PROCEDURE — 93306 TTE W/DOPPLER COMPLETE: CPT | Mod: 26 | Performed by: INTERNAL MEDICINE

## 2020-08-04 PROCEDURE — 85025 COMPLETE CBC W/AUTO DIFF WBC: CPT

## 2020-08-04 PROCEDURE — 99152 MOD SED SAME PHYS/QHP 5/>YRS: CPT | Performed by: INTERNAL MEDICINE

## 2020-08-04 RX ORDER — HEPARIN SODIUM,PORCINE 1000/ML
VIAL (ML) INJECTION
Status: COMPLETED
Start: 2020-08-04 | End: 2020-08-04

## 2020-08-04 RX ORDER — MIDAZOLAM HYDROCHLORIDE 1 MG/ML
INJECTION INTRAMUSCULAR; INTRAVENOUS
Status: COMPLETED
Start: 2020-08-04 | End: 2020-08-04

## 2020-08-04 RX ORDER — VERAPAMIL HYDROCHLORIDE 2.5 MG/ML
INJECTION, SOLUTION INTRAVENOUS
Status: COMPLETED
Start: 2020-08-04 | End: 2020-08-04

## 2020-08-04 RX ORDER — HEPARIN SODIUM 200 [USP'U]/100ML
INJECTION, SOLUTION INTRAVENOUS
Status: COMPLETED
Start: 2020-08-04 | End: 2020-08-04

## 2020-08-04 RX ORDER — LIDOCAINE HYDROCHLORIDE 20 MG/ML
INJECTION, SOLUTION INFILTRATION; PERINEURAL
Status: COMPLETED
Start: 2020-08-04 | End: 2020-08-04

## 2020-08-04 RX ADMIN — SODIUM CHLORIDE: 9 INJECTION, SOLUTION INTRAVENOUS at 02:29

## 2020-08-04 RX ADMIN — ASPIRIN 325 MG: 325 TABLET, FILM COATED ORAL at 05:13

## 2020-08-04 RX ADMIN — ATORVASTATIN CALCIUM 80 MG: 80 TABLET, FILM COATED ORAL at 17:36

## 2020-08-04 RX ADMIN — METOPROLOL SUCCINATE 50 MG: 50 TABLET, EXTENDED RELEASE ORAL at 05:13

## 2020-08-04 RX ADMIN — HUMAN ALBUMIN MICROSPHERES AND PERFLUTREN 3 ML: 10; .22 INJECTION, SOLUTION INTRAVENOUS at 12:47

## 2020-08-04 RX ADMIN — INSULIN HUMAN 2 UNITS: 100 INJECTION, SOLUTION PARENTERAL at 00:02

## 2020-08-04 RX ADMIN — DOXYCYCLINE 100 MG: 100 TABLET, FILM COATED ORAL at 05:13

## 2020-08-04 RX ADMIN — DOXYCYCLINE 100 MG: 100 TABLET, FILM COATED ORAL at 17:36

## 2020-08-04 RX ADMIN — HEPARIN SODIUM: 1000 INJECTION, SOLUTION INTRAVENOUS; SUBCUTANEOUS at 13:37

## 2020-08-04 RX ADMIN — METOPROLOL SUCCINATE 50 MG: 50 TABLET, EXTENDED RELEASE ORAL at 17:37

## 2020-08-04 RX ADMIN — HEPARIN SODIUM 2000 UNITS: 200 INJECTION, SOLUTION INTRAVENOUS at 13:38

## 2020-08-04 RX ADMIN — MIDAZOLAM HYDROCHLORIDE 2 MG: 1 INJECTION, SOLUTION INTRAMUSCULAR; INTRAVENOUS at 13:38

## 2020-08-04 RX ADMIN — DOCUSATE SODIUM 50 MG AND SENNOSIDES 8.6 MG 2 TABLET: 8.6; 5 TABLET, FILM COATED ORAL at 17:36

## 2020-08-04 RX ADMIN — HEPARIN SODIUM: 1000 INJECTION, SOLUTION INTRAVENOUS; SUBCUTANEOUS at 13:48

## 2020-08-04 RX ADMIN — VERAPAMIL HYDROCHLORIDE 5 MG: 2.5 INJECTION, SOLUTION INTRAVENOUS at 13:39

## 2020-08-04 RX ADMIN — NITROGLYCERIN 10 ML: 20 INJECTION INTRAVENOUS at 13:37

## 2020-08-04 RX ADMIN — FENTANYL CITRATE 100 MCG: 50 INJECTION INTRAMUSCULAR; INTRAVENOUS at 13:38

## 2020-08-04 RX ADMIN — INSULIN HUMAN 2 UNITS: 100 INJECTION, SOLUTION PARENTERAL at 17:46

## 2020-08-04 RX ADMIN — AMPICILLIN SODIUM AND SULBACTAM SODIUM 3 G: 2; 1 INJECTION, POWDER, FOR SOLUTION INTRAMUSCULAR; INTRAVENOUS at 17:36

## 2020-08-04 RX ADMIN — IOHEXOL 35 ML: 350 INJECTION, SOLUTION INTRAVENOUS at 13:56

## 2020-08-04 RX ADMIN — LIDOCAINE HYDROCHLORIDE: 20 INJECTION, SOLUTION INFILTRATION; PERINEURAL at 13:37

## 2020-08-04 ASSESSMENT — COGNITIVE AND FUNCTIONAL STATUS - GENERAL
SUGGESTED CMS G CODE MODIFIER MOBILITY: CH
SUGGESTED CMS G CODE MODIFIER DAILY ACTIVITY: CH
DAILY ACTIVITIY SCORE: 24
MOBILITY SCORE: 24

## 2020-08-04 ASSESSMENT — ENCOUNTER SYMPTOMS
DIARRHEA: 0
VOMITING: 0
CONSTIPATION: 0
NERVOUS/ANXIOUS: 0
FEVER: 0
WEAKNESS: 0
PALPITATIONS: 0
COUGH: 1
SORE THROAT: 0
BLURRED VISION: 0
CHILLS: 0
NAUSEA: 0
DEPRESSION: 0
ABDOMINAL PAIN: 0
SHORTNESS OF BREATH: 1
HEADACHES: 0
FALLS: 0
DIZZINESS: 0

## 2020-08-04 ASSESSMENT — FIBROSIS 4 INDEX: FIB4 SCORE: 1.4

## 2020-08-04 NOTE — ASSESSMENT & PLAN NOTE
-mild VOL, last ECHO with severe AS and LVEF of 70%  -IV lasix 20 mg daily  -monitor on tele  -trend Cr and UOP

## 2020-08-04 NOTE — ASSESSMENT & PLAN NOTE
-possibly progressive  -Cards consulted  -mild VOL on exam, gentle IV diuresis with IV lasix 20 mg daily  -monitor Cr and volume status closely    Cardiology recommends surgical aortic valve replacement

## 2020-08-04 NOTE — ED NOTES
Pt ambulated to green 28.  Agree with triage note.  Pt in gown and placed on monitors.  ERP to see.

## 2020-08-04 NOTE — ED PROVIDER NOTES
.  ED Provider Note    Scribed for Ney Monsivais M.D. by Ramonita Kirkpatrick. 8/3/2020  8:00 PM    Primary care provider: Pcp Pt States None  Means of arrival: Walk-in  History obtained from: Patient  History limited by: None    CHIEF COMPLAINT  Chief Complaint   Patient presents with   • Cough   • Shortness of Breath       HPI  Sharad Millan is a 64 y.o. male who presents to the Emergency Department for gradually worsening shortness of breath onset 2 weeks ago and a cough 3 days ago. He describes the cough as a tickle and has body aches. He states that both symptoms worsen upon ambulation. The patient reports traveling today to [place] and states that his symptoms worsened upon his return, and he contacted his cardiologist, Dr. Orona, which prompted his visit to the ED today. He takes a half dose of aspirin daily. The patient adds that he had stents placed in January 2020 and has had bronchitis in the past. He denies fever, change in baseline leg swelling, and hemoptysis. He adds that 1 week ago he was prescribed Z-heather and Prednisone, which usually makes his congestion symptoms go away, states that these are not helping to alleviate his symptoms this time.     REVIEW OF SYSTEMS  Pertinent positives include: shortness of breath and cough. Pertinent negatives include: fever, change in baseline leg swelling, and hemoptysis. See history of present illness. All other systems are negative.     PAST MEDICAL HISTORY   has a past medical history of Aortic stenosis (10/20/2011), Arrhythmia, Breath shortness, Carotid bruit (12/9/2009), Cataract, Chronic right shoulder pain, Diabetes, Fatty liver (1/17/2011), Heart attack (HCC) (12/2018), History of cardiac catheterization (7/13/2009), History of echocardiogram (10/20/2011), HTN (hypertension) (10/12/2012), Hypertension, Memory loss (10/20/2011), Murmur (7/7/2009), Obesity (8/23/2011), Palpitations (10/20/2011), PSVT (paroxysmal supraventricular tachycardia) ([facility])  (2/14/2012), S/P coronary artery stent placement (1996), S/P coronary artery stent placement (1998), S/P coronary artery stent placement (2003), Sciatica (8/23/2011), and Uncontrolled diabetes mellitus (HCC) (11/29/2010).    SURGICAL HISTORY   has a past surgical history that includes cataract extraction with iol (Bilateral); carotid stent angiography (1996); and tree by laparoscopy (N/A, 10/25/2015).    SOCIAL HISTORY  Social History     Tobacco Use   • Smoking status: Never Smoker   • Smokeless tobacco: Never Used   Substance Use Topics   • Alcohol use: No     Alcohol/week: 0.0 oz   • Drug use: No      Social History     Substance and Sexual Activity   Drug Use No       FAMILY HISTORY  Family History   Problem Relation Age of Onset   • Heart Disease Mother    • Heart Disease Father        CURRENT MEDICATIONS  Home Medications     Reviewed by Savanna Howadr R.N. (Registered Nurse) on 08/03/20 at 1824  Med List Status: Partial   Medication Last Dose Status   aspirin EC 81 MG EC tablet  Active   Empagliflozin (JARDIANCE) 25 MG Tab  Active   glipiZIDE SR (GLUCOTROL) 10 MG TABLET SR 24 HR  Active   glucose blood strip  Active   Lancets  Active   losartan (COZAAR) 100 MG Tab  Active   metformin (GLUCOPHAGE) 1000 MG tablet  Active   metoprolol SR (TOPROL XL) 50 MG TABLET SR 24 HR  Active   prasugrel (EFFIENT) 10 MG Tab  Active   predniSONE (DELTASONE) 20 MG Tab  Active   promethazine-codeine (PHENERGAN-CODEINE) 6.25-10 MG/5ML Syrup  Active   rosuvastatin (CRESTOR) 20 MG Tab  Active                ALLERGIES  Allergies   Allergen Reactions   • Bydureon [Exenatide] Hives     hives   • Cycloset [Bromocriptine Mesylate] Unspecified     Pt states he can't remember what happens to him.   • Morphine Vomiting       PHYSICAL EXAM  VITAL SIGNS: BP (!) 95/72   Pulse 88   Temp 37.6 °C (99.7 °F) (Temporal)   Resp 16   Wt 99.7 kg (219 lb 12.8 oz)   SpO2 98%   BMI 31.54 kg/m²     Constitutional: Alert in no apparent  distress.  HENT: No signs of trauma, Bilateral external ears normal, Nose normal. Uvula midline.   Eyes: Pupils are equal and reactive, Conjunctiva normal, Non-icteric.   Neck: Normal range of motion, No tenderness, Supple, No stridor.   Lymphatic: No lymphadenopathy noted.   Cardiovascular: Regular rate and rhythm, no murmurs.   Thorax & Lungs: Normal breath sounds, No respiratory distress, No wheezing, No chest tenderness.   Abdomen:  Soft, No tenderness, No peritoneal signs, No masses, No pulsatile masses.   Skin: Warm, Dry, No erythema, No rash.   Back: No bony tenderness, No CVA tenderness.   Extremities: Intact distal pulses, No edema, No tenderness, No cyanosis.  Musculoskeletal: Good range of motion in all major joints. No tenderness to palpation or major deformities noted.   Neurologic: Alert , Normal motor function, Normal sensory function, No focal deficits noted.   Psychiatric: Affect normal, Judgment normal, Mood normal.     DIAGNOSTIC STUDIES / PROCEDURES    LABS  Labs Reviewed   CBC WITH DIFFERENTIAL - Abnormal; Notable for the following components:       Result Value    WBC 13.2 (*)     Hemoglobin 12.2 (*)     Hematocrit 39.8 (*)     MCV 73.6 (*)     MCH 22.6 (*)     MCHC 30.7 (*)     Neutrophils-Polys 77.60 (*)     Lymphocytes 14.10 (*)     Neutrophils (Absolute) 10.21 (*)     Monos (Absolute) 0.95 (*)     All other components within normal limits   COMP METABOLIC PANEL - Abnormal; Notable for the following components:    Anion Gap 17.0 (*)     Glucose 309 (*)     Bun 23 (*)     All other components within normal limits   TROPONIN - Abnormal; Notable for the following components:    Troponin T 75 (*)     All other components within normal limits   ESTIMATED GFR      All labs reviewed by me.    EKG  12 Lead EKG interpreted by me to show:  Indication: chest pain  Normal sinus rhythm  Rate 89  Axis: Normal  Intervals: Normal  T wave inversion in lead 1 in AVL along with LVH  Increased ST elevation in 1  and 2   My impression of this EKG: changed from prior from 1/6/20    RADIOLOGY  DX-CHEST-PORTABLE (1 VIEW)   Final Result      Ill-defined opacity in the right perihilar region. Developing pneumonia is possible in the appropriate clinical settings.      CL-LEFT HEART CATHETERIZATION W/ AORTIC ROOT    (Results Pending)   CL-LEFT HEART CATHETERIZATION WITH POSSIBLE INTERVENTION    (Results Pending)   EC-ECHOCARDIOGRAM COMPLETE W/O CONT    (Results Pending)     The radiologist's interpretation of all radiological studies have been reviewed by me.    COURSE & MEDICAL DECISION MAKING  Nursing notes, VSTILAx reviewed in chart.    64 y.o. male p/w chief complaint of shortness of breath onset 2 weeks ago and cough 3 days ago.    8:00 PM Patient seen and examined at bedside.      I verified that the patient was wearing a mask and I was wearing appropriate PPE every time I entered the room. The patient's mask was on the patient at all times during my encounter except for a brief view of the oropharynx.     The differential diagnoses include but are not limited to:     Heart score 5  Given abnormal EKG page cardiology and discuss chest pain with cardiology along with x-ray findings concerning for pneumonia  Patient started on antibiotics to cover for CAP  No fluids given as patient is not septic  Patient with significant changes in EKG from prior along with elevated troponin.  Aspirin given to patient along with Lovenox    8:05 PM - Paged cardiology.     8:37 PM -  I discussed the patient's case and the above findings with Dr. Crooks (Cardiology) who recommends a Lovenox injection and will consider CT surgery consult after cath in a.m.    8:43 PM - Paged Hospitalist.     9:02 PM - Per, RN the patient states that he wants to go home and that he can get medications outside of the hospital.     9:06 PM - Patient was reevaluated at bedside. Discussed lab and radiology results with the patient and informed them of the plan of  care and the concerns if the patient decides to leave AMA.     9:09 PM - I discussed the patient's case and the above findings with Dr. Daniels (Hospitalist) who agrees to evaluate the patient for admission.       DISPOSITION:  Patient will be hospitalized by Dr. Daniels in guarded condition.      FINAL IMPRESSION  1. Acute chest pain    2. Elevated troponin          I, Ramonita Kirkpatrick (Scribe), am scribing for, and in the presence of, Ney Monsivais M.D..    Electronically signed by: Ramonita Kirkpatrick (Scribe), 8/3/2020    INey M.D. personally performed the services described in this documentation, as scribed by Ramonita Kirkpatrick in my presence, and it is both accurate and complete. C.    The note accurately reflects work and decisions made by me.  Ney Monsivais M.D.  8/4/2020  3:50 AM

## 2020-08-04 NOTE — ED NOTES
Med rec updated and complete. Allergies reviewed.  Pt denies antibiotic use in last 14 days.      Home pharmacy [facility]

## 2020-08-04 NOTE — PROGRESS NOTES
Report received. Patient oriented x4. Pain level 0 out of 10. Denies SOB at this time, non productive cough. Crackles heard at bases.  Fall risk interventions in place, bed in low position, pt up self.  Assessment completed.

## 2020-08-04 NOTE — PROGRESS NOTES
Hospital Medicine Daily Progress Note    Date of Service  8/4/2020    Chief Complaint  64 y.o. male admitted 8/3/2020 with progressive shortness of breath    Hospital Course    Mr. Sharad Millan is a 64 y.o. male with history of coronary artery disease status post drug-eluting stent to LAD in January 2020 and prior in 1996, aortic stenosis who presented on 8/3/2020 with cough and shortness of breath x2 weeks.  Patient discontinued Effient 2 months ago because of nuisance bleeding.  Patient did see his primary care physician and was given a Z-Aftab and 5-day course of steroids with no improvement in symptoms.  Patient was given ceftriaxone and doxycycline as well as full dose aspirin and enoxaparin for presumed NSTEMI in the emergency room.  Patient has severe aortic stenosis that has been lost to follow-up.  Cardiology was consulted Dr. Avani Crooks.  Echocardiogram showed ejection fraction 50-54%, moderate concentric left ventricular hypertrophy, critical aortic stenosis, mild mitral regurgitation.  Left heart catheterization 8/4 showed severe aortic stenosis and obstructive coronary artery disease of the proximal-mid LAD.  Surgical aortic valve replacement plus LIMA bypass was recommended        Interval Problem Update  Patient was seen and examined at bedside.  I have personally reviewed vitals, labs, and imaging.    8/4.  Afebrile.  Stable vitals.  Telemetry shows sinus rhythm rate 77-82.  On room air.  He denies fever, chills, chest pains.  He reports shortness of breath is improved.  Plan for cardiac catheterization today.  Transition from ceftriaxone to Unasyn for Community-acquired pneumonia.      Consultants/Specialty  Cardiology    Code Status  Full    Disposition  Medical clearance    Review of Systems  Review of Systems   Constitutional: Negative for chills and fever.   HENT: Negative for congestion and sore throat.    Eyes: Negative for blurred vision.   Respiratory: Positive for cough and  shortness of breath.    Cardiovascular: Negative for chest pain, palpitations and leg swelling.   Gastrointestinal: Negative for abdominal pain, constipation, diarrhea, nausea and vomiting.   Genitourinary: Negative for dysuria, frequency and urgency.   Musculoskeletal: Negative for falls.   Skin: Negative for rash.   Neurological: Negative for dizziness, weakness and headaches.   Psychiatric/Behavioral: Negative for depression. The patient is not nervous/anxious.    All other systems reviewed and are negative.       Physical Exam  Temp:  [36 °C (96.8 °F)-37.6 °C (99.7 °F)] 36.1 °C (97 °F)  Pulse:  [80-90] 80  Resp:  [16-18] 18  BP: ()/(64-87) 117/68  SpO2:  [95 %-98 %] 95 %    Physical Exam  Vitals signs and nursing note reviewed.   Constitutional:       General: He is not in acute distress.     Appearance: Normal appearance. He is obese.   HENT:      Head: Normocephalic and atraumatic.      Nose: Nose normal.      Mouth/Throat:      Mouth: Mucous membranes are moist.      Pharynx: Oropharynx is clear.   Eyes:      Extraocular Movements: Extraocular movements intact.      Conjunctiva/sclera: Conjunctivae normal.   Neck:      Musculoskeletal: Normal range of motion and neck supple.   Cardiovascular:      Rate and Rhythm: Normal rate and regular rhythm.      Pulses: Normal pulses.      Heart sounds: Normal heart sounds. No murmur. No friction rub. No gallop.    Pulmonary:      Effort: Pulmonary effort is normal. No respiratory distress.      Breath sounds: Rales present. No wheezing.   Chest:      Chest wall: No tenderness.   Abdominal:      General: Abdomen is flat. Bowel sounds are normal. There is no distension.      Palpations: Abdomen is soft. There is no mass.      Tenderness: There is no abdominal tenderness. There is no guarding.   Musculoskeletal: Normal range of motion.      Right lower leg: Edema present.      Left lower leg: Edema present.   Skin:     General: Skin is warm.      Capillary Refill:  Capillary refill takes less than 2 seconds.   Neurological:      General: No focal deficit present.      Mental Status: He is alert and oriented to person, place, and time. Mental status is at baseline.      Cranial Nerves: No cranial nerve deficit.      Motor: No weakness.   Psychiatric:         Mood and Affect: Mood normal.         Behavior: Behavior normal.         Fluids    Intake/Output Summary (Last 24 hours) at 8/4/2020 0843  Last data filed at 8/4/2020 0230  Gross per 24 hour   Intake --   Output 2300 ml   Net -2300 ml       Laboratory  Recent Labs     08/03/20 1948 08/04/20  0017   WBC 13.2* 12.0*   RBC 5.41 5.42   HEMOGLOBIN 12.2* 12.5*   HEMATOCRIT 39.8* 40.2*   MCV 73.6* 74.2*   MCH 22.6* 23.1*   MCHC 30.7* 31.1*   RDW 39.9 40.4   PLATELETCT 175 177   MPV 10.0 9.9     Recent Labs     08/03/20 1948 08/04/20 0017   SODIUM 135 137   POTASSIUM 4.3 4.2   CHLORIDE 96 98   CO2 22 25   GLUCOSE 309* 234*   BUN 23* 22   CREATININE 1.13 1.07   CALCIUM 9.3 9.2     Recent Labs     08/04/20  0017   APTT 33.8   INR 1.05         Recent Labs     08/04/20  0017   TRIGLYCERIDE 108   HDL 46   LDL 43       Imaging  EC-ECHOCARDIOGRAM COMPLETE W/ CONT   Final Result      DX-CHEST-PORTABLE (1 VIEW)   Final Result      Ill-defined opacity in the right perihilar region. Developing pneumonia is possible in the appropriate clinical settings.      CL-LEFT HEART CATHETERIZATION W/ AORTIC ROOT    (Results Pending)   CL-LEFT HEART CATHETERIZATION WITH POSSIBLE INTERVENTION    (Results Pending)        Assessment/Plan  * NSTEMI (non-ST elevated myocardial infarction) ([facility])- (present on admission)  Assessment & Plan  -Cards consulted  -trend trops  -admit to tele  -stop ARB  -continue metoprolol, hold effient  -continue full dose ASA  -nitro PRN  Echo results noted    Severe aortic stenosis- (present on admission)  Assessment & Plan  -possibly progressive  -Cards consulted  -mild VOL on exam, gentle IV diuresis with IV lasix 20 mg  daily  -monitor Cr and volume status closely    Cardiology recommends surgical aortic valve replacement    CAD (coronary artery disease)- (present on admission)  Assessment & Plan  -profound, most recent CHARBEL in 1/2020 and on effient/ASA  -now with NSTEMI  -Cards consulted  -concurrent severe AS as well    Left heart cath results noted.  Appreciate cardiology input.    S/P drug eluting coronary stent placement- (present on admission)  Assessment & Plan  Most recently in January 2020 and previously in 1996    Dyslipidemia- (present on admission)  Assessment & Plan  -change to full dose lipitor given NSTEMI    Community acquired pneumonia of right lower lobe of lung- (present on admission)  Assessment & Plan  -progressive from recent bronchitis?  -empiric IV ctx and doxy  -check procal  -BD's  -RT  -check COVID for screening, low pre-test probability at this time    Transition from ceftriaxone to Unasyn    Acute on chronic diastolic congestive heart failure (HCC)- (present on admission)  Assessment & Plan  -mild VOL, last ECHO with severe AS and LVEF of 70%  -IV lasix 20 mg daily  -monitor on tele  -trend Cr and UOP    Obesity (BMI 30-39.9)- (present on admission)  Assessment & Plan  Body mass index is 31.32 kg/m².  Counseled about diet and exercise    DM2 (diabetes mellitus, type 2) (HCC)- (present on admission)  Assessment & Plan  Lab Results   Component Value Date/Time    HBA1C 10.3 (H) 08/04/2020 0017    HBA1C 10.3 (H) 07/30/2020 1049    HBA1C 9.9 (H) 01/24/2020 0901     Results from last 7 days   Lab Units 08/04/20  1216 08/04/20  0517 08/04/20  0001   ACCU CHECK GLUCOSE 788 mg/dL 161* 184* 197*     I have ordered insulin sliding scale with D50 and glucagon for hypoglycemia per protocol.  Diabetic diet  Diabetic education    HTN (hypertension)- (present on admission)  Assessment & Plan  -well controlled in the ER, continue outpatient BB, hold ARB for cath and diuresis       VTE prophylaxis: Hold off on  anticoagulation in anticipation of surgery for coronary disease and critical aortic stenosis

## 2020-08-04 NOTE — ED TRIAGE NOTES
Pt comes in complaining of SOB and cough. Pt appears weak and cough present. Pt also with chest pain. Pt sent here by cardiology.

## 2020-08-04 NOTE — ASSESSMENT & PLAN NOTE
-Cards consulted  -trend trops  -admit to tele  -stop ARB  -continue metoprolol, hold effient  -continue full dose ASA  -nitro PRN  Echo results noted

## 2020-08-04 NOTE — ED NOTES
"Went in to medicate pt, pt refusing medications states \"I have these at home, I just wanted some test done and now I am ready to leave.\" ERP notified.   "

## 2020-08-04 NOTE — PROCEDURES
"CARDIAC CATHETERIZATION REPORT    REFERRING: Avani Crooks M.D.    PROCEDURE PHYSICIAN: Luis Hewitt MD, [facility], [facility]  ASSISTANT: None    IMPRESSIONS:  1. Severe aortic stenosis by non-invasive evaluation  2. Obstructive one vessel CAD- involving the proximal-mid LAD    Recommendations:  SAVR + LIMA    Pre-procedure diagnosis: Severe AS  Post-procedure diagnosis: Same    Procedure performed  Selective coronary angiography  Instantaneous wave free ratio (iFR)    Conscious sedation was supervised by myself and administered by trained personnel using fentanyl and versed between 1327 and 1351. The patient tolerated sedation without complication.     Procedure Description  1. Access: 6 Stateless left radial artery Micropuncture technique was utilized following local anesthesia with lidocaine.  A radial cocktail containing verapamil, heparin and saline was administered in the radial artery sheath    2. Diagnostic description: The catheter was passed to the central circulation with the aide of J tipped 0.35\" wire. 6F JR4, 6F JL4 and 6F EBU 4.0 were used to inject the coronary circulation. The LAD had ambiguous stenosis- negative remodeling and widely patent stent. A pressure wire was used to perform iFR with reading of 0.34 indicating flow limiting stenosis. Once all diagnostic information was obtained the equipment was removed from the body.      3. Hemostasis: Radial band      Findings   Hemodynamics: Aorta: 98/54 mmHg    Coronary Anatomy   Left Main: Normal   LAD: Diffuse atherosclerosis and a long segment of stenosis from the proximal to mid vessel estimated at 60% stenosis. The distal vessel appears small and underfilled. The iFR is 0.34   LCx: 25% stenosis proximally and in the mid segment.    RCA: Dominant, Diffuse luminal irregularities no more than 30% stenosis     Technical Factors  1. Complications: None  2. Estimated Blood Loss: <50 cc  3. Specimens: None  4. Contrast Volume: 36 ml  5. Medications: Radial " cocktail (Verapamil 2.5 mg, Nitroglycerin 100 mcg) Heparin 11,000 units  6. Radiation (air kerma): 187 mGy

## 2020-08-04 NOTE — ASSESSMENT & PLAN NOTE
Lab Results   Component Value Date/Time    HBA1C 10.3 (H) 08/04/2020 0017    HBA1C 10.3 (H) 07/30/2020 1049    HBA1C 9.9 (H) 01/24/2020 0901     Results from last 7 days   Lab Units 08/04/20  1216 08/04/20  0517 08/04/20  0001   ACCU CHECK GLUCOSE 788 mg/dL 161* 184* 197*     I have ordered insulin sliding scale with D50 and glucagon for hypoglycemia per protocol.  Diabetic diet  Diabetic education

## 2020-08-04 NOTE — ASSESSMENT & PLAN NOTE
-progressive from recent bronchitis?  -empiric IV ctx and doxy  -check procal  -BD's  -RT  -check COVID for screening, low pre-test probability at this time    Transition from ceftriaxone to Unasyn

## 2020-08-04 NOTE — H&P
Hospital Medicine History & Physical Note    Date of Service  8/3/2020    Primary Care Physician  Pcp Pt States None    Code Status  Full Code    Chief Complaint  Chief Complaint   Patient presents with   • Cough   • Shortness of Breath       History of Presenting Illness  64 y.o. male who presented 8/3/2020 with history of extensive coronary disease status post most recent drug-eluting stent in 2020 January in 1996 on Effient and dual antiplatelet therapy because of above chief complaint.  Patient has not been feeling well with shortness of breath for the last 2 weeks and was given a Z-Aftab & 5-day burst of steroids by his outpatient primary care doctor with no improvement in symptoms in full completion of 5 days.  He also has additional cough that is dry and denies any obvious COVID contacts.  In the ER he was noted to have ST depressions in V5 V6 as well as mild ST segment elevations or possible J-point elevation V2 V5 with an elevated troponin of 75 and hyperglycemia in the 300s.  Dr. Avani Crooks of cardiology has been consulted and the patient was given ceftriaxone doxycycline full dose aspirin as well as 100 mg of subcu Lovenox for presumed NSTEMI.  Patient also has a history of severe aortic stenosis and the plan is to go through a left heart cath for possible evaluation of SAVR versus TAVR.    His anginal symptoms have been accelerating to the point where he has any impressive dyspnea on exertion.    Review of Systems  Review of Systems   Constitutional: Positive for malaise/fatigue. Negative for chills and fever.   Respiratory: Positive for cough and shortness of breath. Negative for hemoptysis, sputum production and wheezing.    Cardiovascular: Positive for chest pain, orthopnea and leg swelling.   Gastrointestinal: Positive for abdominal pain (when exerting himself). Negative for nausea and vomiting.   Neurological: Negative for dizziness.   All other systems reviewed and are negative.      Past Medical  History   has a past medical history of Aortic stenosis (10/20/2011), Arrhythmia, Breath shortness, Carotid bruit (2009), Cataract, Chronic right shoulder pain, Diabetes, Fatty liver (2011), Heart attack ([facility]) (2018), History of cardiac catheterization (2009), History of echocardiogram (10/20/2011), HTN (hypertension) (10/12/2012), Hypertension, Memory loss (10/20/2011), Murmur (2009), Obesity (2011), Palpitations (10/20/2011), PSVT (paroxysmal supraventricular tachycardia) ([facility]) (2012), S/P coronary artery stent placement (), S/P coronary artery stent placement (), S/P coronary artery stent placement (), Sciatica (2011), and Uncontrolled diabetes mellitus ([facility]) (2010).    Surgical History   has a past surgical history that includes cataract extraction with iol (Bilateral); carotid stent angiography (); and tree by laparoscopy (N/A, 10/25/2015).     Family History  family history includes Heart Disease in his father and mother.     Social History   reports that he has never smoked. He has never used smokeless tobacco. He reports that he does not drink alcohol or use drugs.    Allergies  Allergies   Allergen Reactions   • Bydureon [Exenatide] Hives     hives   • Cycloset [Bromocriptine Mesylate] Unspecified     Pt states he can't remember what happens to him.   • Morphine Vomiting       Medications  Prior to Admission Medications   Prescriptions Last Dose Informant Patient Reported? Taking?   Empagliflozin (JARDIANCE) 25 MG Tab   No No   Sig: Take 1 Tab by mouth every day.   Lancets   No No   Sig: Lancets order: Lancets for True Metrix meter. Sig: use daily and prn ssx high or low sugar   aspirin EC 81 MG EC tablet   No No   Sig: Take 1 Tab by mouth every day.   glipiZIDE SR (GLUCOTROL) 10 MG TABLET SR 24 HR   No No   Sig: Take 1 Tab by mouth every morning.   glucose blood strip   No No   Si Strip by Other route 2 Times a Day.   losartan (COZAAR) 100 MG  Tab   No No   Sig: TAKE 1 TABLET BY MOUTH EVERY DAY   metformin (GLUCOPHAGE) 1000 MG tablet   No No   Sig: Take 1 Tab by mouth 2 times a day.   metoprolol SR (TOPROL XL) 50 MG TABLET SR 24 HR  Patient No No   Sig: TAKE 1 TABLET BY MOUTH 2 TIMES A DAY   prasugrel (EFFIENT) 10 MG Tab   No No   Sig: Take 1 Tab by mouth every day.   Patient not taking: Reported on 7/27/2020   predniSONE (DELTASONE) 20 MG Tab   No No   Sig: Take 2 tabs daily for 5 days   promethazine-codeine (PHENERGAN-CODEINE) 6.25-10 MG/5ML Syrup   No No   Sig: Take 5-10 mL by mouth at bedtime as needed for Cough for up to 12 days.   rosuvastatin (CRESTOR) 20 MG Tab   No No   Sig: Take 1 Tab by mouth every day.      Facility-Administered Medications: None       Physical Exam  Temp:  [37.6 °C (99.7 °F)] 37.6 °C (99.7 °F)  Pulse:  [84-90] 90  Resp:  [16] 16  BP: ()/(64-87) 151/79  SpO2:  [96 %-98 %] 98 %    Physical Exam  Vitals signs and nursing note reviewed.   Constitutional:       General: He is not in acute distress.     Appearance: He is well-developed.      Comments: Sitting upright in the gurney  Speaking in full sentences   HENT:      Head: Normocephalic and atraumatic.      Mouth/Throat:      Pharynx: No oropharyngeal exudate.   Eyes:      General: No scleral icterus.     Pupils: Pupils are equal, round, and reactive to light.   Neck:      Musculoskeletal: Normal range of motion and neck supple.      Thyroid: No thyromegaly.   Cardiovascular:      Rate and Rhythm: Normal rate and regular rhythm.      Heart sounds: Murmur (2/6 RUSB) present.   Pulmonary:      Effort: Pulmonary effort is normal. No respiratory distress.      Breath sounds: Normal breath sounds. No wheezing.   Abdominal:      General: Bowel sounds are normal. There is no distension.      Palpations: Abdomen is soft.      Tenderness: There is no abdominal tenderness.   Musculoskeletal: Normal range of motion.         General: No tenderness.      Right lower leg: Edema  present.      Left lower leg: Edema present.      Comments: Warm peripherally     Skin:     General: Skin is warm and dry.      Findings: No rash.   Neurological:      Mental Status: He is alert and oriented to person, place, and time.      Cranial Nerves: No cranial nerve deficit.         Laboratory:  Recent Labs     08/03/20 1948   WBC 13.2*   RBC 5.41   HEMOGLOBIN 12.2*   HEMATOCRIT 39.8*   MCV 73.6*   MCH 22.6*   MCHC 30.7*   RDW 39.9   PLATELETCT 175   MPV 10.0     Recent Labs     08/03/20 1948   SODIUM 135   POTASSIUM 4.3   CHLORIDE 96   CO2 22   GLUCOSE 309*   BUN 23*   CREATININE 1.13   CALCIUM 9.3     Recent Labs     08/03/20 1948   ALTSGPT 31   ASTSGOT 21   ALKPHOSPHAT 50   TBILIRUBIN 0.3   GLUCOSE 309*         No results for input(s): NTPROBNP in the last 72 hours.      Recent Labs     08/03/20 1948   TROPONINT 75*       Imaging:  DX-CHEST-PORTABLE (1 VIEW)   Final Result      Ill-defined opacity in the right perihilar region. Developing pneumonia is possible in the appropriate clinical settings.      CL-LEFT HEART CATHETERIZATION W/ AORTIC ROOT    (Results Pending)   CL-LEFT HEART CATHETERIZATION WITH POSSIBLE INTERVENTION    (Results Pending)   EC-ECHOCARDIOGRAM COMPLETE W/O CONT    (Results Pending)         Assessment/Plan:  I anticipate this patient will require at least two midnights for appropriate medical management, necessitating inpatient admission.    * NSTEMI (non-ST elevated myocardial infarction) (HCC)- (present on admission)  Assessment & Plan  -HDS  -Cards consulted  -trend trops  -admit to tele  -stop ARB  -continue BB, hold effient  -continue full dose ASA  -weight based lovenox BID  -LHC and NPO at midnight  -nitro PRN  -consider repeat ECHO  -lipid panel and A1c, control blood sugars    CAD (coronary artery disease)- (present on admission)  Assessment & Plan  -profound, most recent CHARBEL in 1/2020 and on effient/ASA  -now with NSTEMI  -Cards consulted  -concurrent severe AS as  well  -[facility] in the AM and see below    Dyslipidemia- (present on admission)  Assessment & Plan  -change to full dose lipitor given NSTEMI    Community acquired pneumonia of right lower lobe of lung- (present on admission)  Assessment & Plan  -progressive from recent bronchitis?  -empiric IV ctx and doxy  -check procal  -BD's  -RT  -check COVID for screening, low pre-test probability at this time    Acute on chronic diastolic congestive heart failure (HCC)- (present on admission)  Assessment & Plan  -mild VOL, last ECHO with severe AS and LVEF of 70%  -IV lasix 20 mg daily  -monitor on tele  -trend Cr and UOP    Severe aortic stenosis- (present on admission)  Assessment & Plan  -possibly progressive, might be a candidate for TAVR  -Cards consulted  -mild VOL on exam, gentle IV diuresis with IV lasix 20 mg daily  -monitor Cr and volume status closely    Obesity (BMI 30-39.9)- (present on admission)  Assessment & Plan  -encourage weight loss    DM2 (diabetes mellitus, type 2) (HCC)- (present on admission)  Assessment & Plan  -poorly controlled in the ER, 300's and most recent A1c is 10  -repeat A1c  -moderate ISS    HTN (hypertension)- (present on admission)  Assessment & Plan  -well controlled in the ER, continue outpatient BB, hold ARB for cath and diuresis

## 2020-08-04 NOTE — HEART FAILURE PROGRAM
Patient follows with Dr. Chavez in cardiology clinic for his severe AS, symptomatic with USA and SOB, CAD, stenting to LAD 1996, 1998, and 2020, HTN, HLD, and history of SVT.    In addition to these, he also has DM, bilateral carotid bruits, and obesity.    At the last office visit with Dr. Chavez on Thursday, 7/30/20, Dr advised patient that he should be hospitalized with cardiac cath and proceed with tx for AV. Patient stated he wanted to f/u with Dr. Khoury and consider going to [place]. Dr. Chavez urged patent to do this immediately and to absolutely avoid any activity that would induce angina.    It looks like on 8/3/20, patient called cardiology clinic with c/o worsening chest congestion and burning CP. Dr. Chavez told him to go to the ER and patient agreed.    He is now admitted to [facility] and will have angiogram today. Dr. DARRYL Crooks has diagnosed acute HF due to AS. Patient's last echocardiogram was in June of 2019 showing EF of 70%.    Repeat echo is pending. I will need to wait for diagnostics to reveal what patient's EF is as this will determine auditing for GDMT.    Meanwhile, patient does have an active nSTEMI diagnosis. He is currently on ASA, Atorva 80, and Toprol XL. He also has an order for Cardiac Rehab Consult:         Per Dr. Crooks's note, patient is a never smoker, has a stressful job, and has strong familial history of Heart Disease.     Dr. Crooks has also recommended that CT surgery be consulted for AVR and CABG.     Specific measures for HF + DM:   • Patient is prescribed diabetes treatment at discharge in the form of glycemic control (diet or anti-hyperglycemic medication) or a f/u appointment for diabetes management is scheduled at discharge - needs to appear on AVS: in house, he's on insulin. At home, he's on Jardiance.  • Prescribed lipid lowering medication at discharge: as above, is on high intensity statin.    For informational purposes, below are all HF measures  as well as ACS:    HF Measures:  1. Documentation of LV systolic function (echo or cath) PTA, during this hospitalization, or plan to assess post discharge or reason for not assessing documented  2. Documentation of fluid intake and urine output every nursing shift  3. 2 hour post diuretic assessment documented 2 hours after diuretic given  4. HF Patient Education using the Living Well With Heart Failure Booklet and Symptom Tracker documented every nursing shift  5. Nutrition consult for diet education  6. Daily weights (one weight documented every 24 hours) on a standing scale unless standing is contraindicated in which case bed scale can be used - have patient write weight on symptom tracker  7. For LVEF less than or equal to 40%, ACE-I, ARNI or ARB prescribed at discharge   8. For LVEF less than or equal to 40%, an Evidence Based Beta Blocker (bisoprolol, carvedilol, toprol xl) must be prescribed at discharge  9. For LVEF less than or equal to 35% aldosterone blockade prescribed at discharge  10. The combination of hydralazine and isosorbide dinitrate is recommended to reduce morbidity and mortality for patients self-described  Americans with NYHA class III-IV HFrEF (EF 40% or less), receiving optimal therapy with ACE inhibitors and beta blockers, unless contraindicated (Class I, [facility]: A).  11. If a HF patient is diabetic or is newly diagnosed with DM: prescribed diabetes treatment at discharge in the form of glycemic control (diet or anti-hyperglycemic medication) or f/u appointment for diabetes management scheduled at discharge.  12. If a HF patient has diabetes: prescribed lipid lowering medication at discharge  13. Documented smoking cessation advice or counseling  14. If a HF patient has a-fib: anticoagulation is prescribed upon discharge or contraindication is documented  15. Screening for and administering immunizations as long as no contraindications: Pneumonia (regardless of age) and  Influenza  16. Written discharge instructions include:  ? Daily weights  ? Record weight on tracker  ? Bring tracker to appointments  ? Call MD for weight gain of 3lb /day or 5lb/week  ? HF medication teaching  ? Low sodium diet  ? Follow up appointment within seven calendar days of d/c must include: date, time and location  ? Activity  ? Worsening symptoms    What if any of the above HF measures are contraindicated?  ? Request that the discharging provider document the medication/intervention and the contraindication specifically in a progress note  ? For example: “no CHF meds due to hypotension” is not enough. It needs to say: “No ACE-I, ARNI, ARB due to hypotension”; “No Beta Blockade due to bradycardia”…       Meds to Beds BEDSIDE NURSING RESPONSIBILITIES:    If not already done, please assess patient for Meds to Beds Opt-In which can be found in the admit navigator. Evidence shows that meds to beds improves medication compliance and patient outcomes.        ACS Measures:    1. ASA prescribed at discharge  2. P2Y12 Inhibitor prescribed at discharge; Please note: for ACS patients who are treated medically without PCI and stenting, DAPT has been demonstrated to reduce recurrent CV events. Source: 2013 ACCF/AHA Guideline for the management of STEMI  3. Beta blockade prescribed at discharge, if patient also has HFrEF (EF less than or equal to 40%), this needs to be one of the three evidence based beta blockers: carvedilol, bisoprolol, Toprol XL  4. High intensity statin prescribed at discharge (atorvastatin 40 mg or rosuvastatin 20 mg)  5. ACE-I or ARB prescribed on discharge for LVEF less than 40%  6. Aldosterone blockade prescribed for patients with EF less than 40% AND history of diabetes mellitus OR history of heart failure, heart failure on presentation or in-hospital event  7. Intensive Cardiac Rehab referral order is placed  8. Use the Acute Coronary Syndrome discharge instructions to document that patient  has been provided with the contact information for Intensive Cardiac Rehab  9. Evaluation of LV systolic function can be by angiogram, or echo before discharge, can not be a future plan for LVSF assessment  10. Daily documentation in education tab of ACS education  11. Documentation of smoking cessation counseling       What if any of the above ACS Measures are contraindicated?    · Request that the discharging provider document the medication/intervention and the contraindication specifically in a progress note  · For example: “no ACE-I meds due to hypotension” is not enough. It needs to say: “No ACE-I, ARNI, ARB due to hypotension”; “No Beta Blockade due to bradycardia”…

## 2020-08-04 NOTE — PROGRESS NOTES
2 RN skin check complete with Tahmina RN  Devices in place n/a.  Skin assessed under devices intact.  Confirmed pressure ulcers found on n/a.  New potential pressure ulcers noted on n/a. Wound consult placed n/a.

## 2020-08-04 NOTE — ASSESSMENT & PLAN NOTE
-profound, most recent CHARBEL in 1/2020 and on effient/ASA  -now with NSTEMI  -Cards consulted  -concurrent severe AS as well    Left heart cath results noted.  Appreciate cardiology input.

## 2020-08-04 NOTE — CONSULTS
Cardiology Initial Consultation    Date of Service  8/3/2020    Referring Physician  Ney Monsivais M.D.    Reason for Consultation  Worsening angina, new CHF    History of Presenting Illness  Sharad Millan is a 64 y.o. male who presented 8/3/2020 with worsening angina.  He has had severe to stenosis since June 2019.  Over the past 3 weeks, he is at progressive shortness of breath resulting in frequent coughing.  He reports this is typical for this time of year and just finished a Z-Aftab which did not help.  He is now having exertional angina, he will have substernal chest pressure and burning.  It does not radiate.  It finally resolves with rest.  This is been happening fairly consistently for the past week.  He called in the office today, was short of breath at rest and having chest pain.  He was advised to come the emergency room.    Has known coronary artery disease with drug-eluting stent to the LAD 1/8/2020, prior LAD stent 1996.  Has residual 50% ostial PDA disease.  Patient reports he never really felt different after either stent.  Was on dual antiplatelet therapy with Effient until approximately 2 months ago at which time he prematurely discontinue the Effient because of nuisance bleeding.    Has severe aortic stenosis by echocardiogram June 2019.  He was supposed to see a surgeon consultation as an outpatient but somehow this fell through.  He was then [place] but had yet to establish.    Has paroxysmal SVT, admitted to [facility] in [place] in 2018.  Also converted with adenosine for SVT in 2013.  Hypertension.  Hyperlipidemia, LDL goal at 58.  Type 2 diabetes, has been on Jardiance, diabetes has not been controlled.  Mild bilateral internal carotid artery stenosis in 2017.    No significant chronic kidney disease at this point.  Prior gallstone pancreatitis and laparoscopic cholecystectomy.    Reports he has been gaining weight since he was told to stop  exercising because the valve.    Travels for work to [place].  Stressful job.  He has been working to build a house for his wife Millie.    Review of Systems  Review of Systems   All other systems reviewed and are negative.      Past Medical History   has a past medical history of Aortic stenosis (10/20/2011), Arrhythmia, Breath shortness, Carotid bruit (2009), Cataract, Chronic right shoulder pain, Diabetes, Fatty liver (2011), Heart attack ([facility]) (2018), History of cardiac catheterization (2009), History of echocardiogram (10/20/2011), HTN (hypertension) (10/12/2012), Hypertension, Memory loss (10/20/2011), Murmur (2009), Obesity (2011), Palpitations (10/20/2011), PSVT (paroxysmal supraventricular tachycardia) ([facility]) (2012), S/P coronary artery stent placement (), S/P coronary artery stent placement (), S/P coronary artery stent placement (), Sciatica (2011), and Uncontrolled diabetes mellitus ([facility]) (2010).    Surgical History   has a past surgical history that includes cataract extraction with iol (Bilateral); carotid stent angiography (); and tree by laparoscopy (N/A, 10/25/2015).    Family History  family history includes Heart Disease in his father and mother.    Social History   reports that he has never smoked. He has never used smokeless tobacco. He reports that he does not drink alcohol or use drugs.    Medications  Prior to Admission Medications   Prescriptions Last Dose Informant Patient Reported? Taking?   Empagliflozin (JARDIANCE) 25 MG Tab   No No   Sig: Take 1 Tab by mouth every day.   Lancets   No No   Sig: Lancets order: Lancets for True Metrix meter. Sig: use daily and prn ssx high or low sugar   aspirin EC 81 MG EC tablet   No No   Sig: Take 1 Tab by mouth every day.   glipiZIDE SR (GLUCOTROL) 10 MG TABLET SR 24 HR   No No   Sig: Take 1 Tab by mouth every morning.   glucose blood strip   No No   Si Strip by Other route 2 Times a Day.    losartan (COZAAR) 100 MG Tab   No No   Sig: TAKE 1 TABLET BY MOUTH EVERY DAY   metformin (GLUCOPHAGE) 1000 MG tablet   No No   Sig: Take 1 Tab by mouth 2 times a day.   metoprolol SR (TOPROL XL) 50 MG TABLET SR 24 HR  Patient No No   Sig: TAKE 1 TABLET BY MOUTH 2 TIMES A DAY   prasugrel (EFFIENT) 10 MG Tab   No No   Sig: Take 1 Tab by mouth every day.   Patient not taking: Reported on 7/27/2020   predniSONE (DELTASONE) 20 MG Tab   No No   Sig: Take 2 tabs daily for 5 days   promethazine-codeine (PHENERGAN-CODEINE) 6.25-10 MG/5ML Syrup   No No   Sig: Take 5-10 mL by mouth at bedtime as needed for Cough for up to 12 days.   rosuvastatin (CRESTOR) 20 MG Tab   No No   Sig: Take 1 Tab by mouth every day.      Facility-Administered Medications: None       Allergies  Allergies   Allergen Reactions   • Bydureon [Exenatide] Hives     hives   • Cycloset [Bromocriptine Mesylate] Unspecified     Pt states he can't remember what happens to him.   • Morphine Vomiting       Vital signs in last 24 hours  Temp:  [37.6 °C (99.7 °F)] 37.6 °C (99.7 °F)  Pulse:  [88] 88  Resp:  [16] 16  BP: (95)/(72) 95/72  SpO2:  [98 %] 98 %    Physical Exam  Physical Exam     General: No acute distress. Well nourished.  HEENT: EOM grossly intact, no scleral icterus, no pharyngeal erythema.   Neck:  No JVD, no bruits, trachea midline  CVS: RRR. Normal S1, difficulty here S2, 3 out of 6 late peaking systolic murmur throughout, radiating to the right carotid, trivial LE edema.  2+ radial pulses, 2+ PT pulses  Resp: Mild coarse breath sounds bilaterally at the bases, borderline mild increased work of breathing  Abdomen: Soft, NT, no clarence hepatomegaly, obese.  MSK/Ext: No clubbing or cyanosis.  Skin: Warm and dry, no rashes.  Neurological: CN III-XII grossly intact. No focal deficits.   Psych: A&O x 3, appropriate affect, good judgement      Lab Review  Lab Results   Component Value Date/Time    WBC 13.2 (H) 08/03/2020 07:48 PM    RBC 5.41 08/03/2020  07:48 PM    HEMOGLOBIN 12.2 (L) 08/03/2020 07:48 PM    HEMATOCRIT 39.8 (L) 08/03/2020 07:48 PM    MCV 73.6 (L) 08/03/2020 07:48 PM    MCH 22.6 (L) 08/03/2020 07:48 PM    MCHC 30.7 (L) 08/03/2020 07:48 PM    MPV 10.0 08/03/2020 07:48 PM      Lab Results   Component Value Date/Time    SODIUM 137 07/30/2020 10:49 AM    POTASSIUM 4.7 07/30/2020 10:49 AM    CHLORIDE 97 07/30/2020 10:49 AM    CO2 24 07/30/2020 10:49 AM    GLUCOSE 270 (H) 07/30/2020 10:49 AM    BUN 23 (H) 07/30/2020 10:49 AM    CREATININE 1.03 07/30/2020 10:49 AM    CREATININE 1.0 02/18/2009 09:00 AM      Lab Results   Component Value Date/Time    ASTSGOT 18 07/30/2020 10:49 AM    ALTSGPT 26 07/30/2020 10:49 AM     Lab Results   Component Value Date/Time    CHOLSTRLTOT 122 07/30/2020 10:49 AM    LDL 58 07/30/2020 10:49 AM    HDL 40 07/30/2020 10:49 AM    TRIGLYCERIDE 118 07/30/2020 10:49 AM       No results for input(s): NTPROBNP in the last 72 hours.    Cardiac Imaging and Procedures Review  EKG:  My personal interpretation of the EKG dated 8-3-2020 is sinus, 89, borderline anterior ST elevation, lateral ST depression , concerning for ischemia    Echocardiogram:  6-  Normal left ventricular systolic function.  Left ventricular ejection fraction is visually estimated to be 70%.  Moderate concentric left ventricular hypertrophy.  Severe aortic stenosis.    Cardiac Catheterization:  1-8-2020  PROCEDURE:  Cardiac catheterization and percutaneous coronary intervention.  A.  Selective coronary angiography.  B.  iFR, proximal left anterior descending artery.  C.  Coronary stent implantation, proximal left anterior descending artery, 2.5x8 mm   Collin drug-eluting stent.  D.  Ascending aortography.  E.  Distal abdominal aortogram and bilateral iliofemoral angiography.  F.  Right femoral artery approach.  G.  Perclose.  H.  Conscious sedation supervision.     PREPROCEDURE DIAGNOSES:  1.  Aortic stenosis, severe, symptomatic.  2.  Previous left anterior  descending artery stent, 1996.  3.  Diabetes mellitus.     POSTPROCEDURE DIAGNOSES:  1.  Left anterior descending artery 90% stenosis.  2.  iFR, left anterior descending artery, 0.39.  3.  Normal ascending aorta.  4.  Normal distal abdominal aorta and bilateral iliofemoral arteries.     NUCLEAR IMAGING INTERPRETATION 1-   * Small, equivocal, fully reversible, mild severity defect limited to the    apical inferior wall. SDS of 1 indicating very minimal ischemia if any.    * Nondiagnostic due to resting ECG.   * Normal left ventricular size, ejection fraction, and wall motion.   ECG INTERPRETATION   Nondiagnostic due to resting ECG.    CAROTID ULTRASOUND 11/01/2017  Mild bilateral internal carotid artery stenosis less than 50%.       Imaging  DX-CHEST-PORTABLE (1 VIEW)   Final Result      Ill-defined opacity in the right perihilar region. Developing pneumonia is possible in the appropriate clinical settings.      CL-LEFT HEART CATHETERIZATION W/ AORTIC ROOT    (Results Pending)         Assessment/Plan  -Severe AS by echo June 2019 and left heart catheterization January 2020  -CAD, prior LAD stent 1-8-2020, prior LAD stent 1996, residual 50% ostial PDA disease, discontinuation of Effient 2 months ago.  -Non-ST elevation MI in the setting of severe aortic stenosis and prior coronary disease  -Acute CHF due to severe aortic stenosis, volume overload  -HTN  -HLP, LDL 58  -DM2, has been on Jardiance  -Mild bilateral carotid artery stenosis 2017.  -Mildly elevated creatinine    PLAN:  -Treatment for ACS with lovenox overnight and ASA, can transition to DVT prophylaxis tomorrow  -Lasix for acute CHF  -C tomorrow  -CT surgery consult for AV replacement and CABG  -Echo tomorrow  -Continue statin, BB  -Stop Effient  -Hold losartan tonight prior to heart angiogram    I also had a long conversation with his wife Millie.  Patient can no longer postpone facing his severe aortic stenosis.  He is not certain if he would like  to have a TAVR versus open heart surgery.  This will be discussed with CT surgery tomorrow.    ERIK Cabral.    Thank you for allowing me to participate in the care of this patient.    I will continue to follow this patient    Please contact me with any questions.    Avani Crooks M.D.   Cardiologist, [facility]  [phone]

## 2020-08-04 NOTE — PROGRESS NOTES
"[facility] Cardiology Follow-up Consult Note    Date of note:    8/4/2020        Consulting Physician: Junaid Casas D.O.    Patient ID:  Name:   Sharad Millan   YOB: 1955  Age:   64 y.o.  male   MRN:   [id number]    Chief Complaint   Patient presents with   • Cough   • Shortness of Breath       ID: 64 y.o. with hx of severe aortic stenosis since June 2019 s/p LAD stent placement in 1996 and January 2020 who presented on 8/3/20 with worseing angina. He reports shortness of breath, frequent coughing, and substernal chest pressure.     Interim Events:  -No acute events overnight.  -He reports that he feels better when not exerting himself. He reports a \"tickle\" in his throat when laying down that resolves when sitting up.  -He states he knows that he needs to fix his aortic stenosis, and has been putting it off for a year. He is agreeable for testing and further discussion about this today.     ROS  No NV, No Bleeding, No dizziness   All other review of systems reviewed and negative.    Past medical, surgical, social, and family history reviewed and unchanged from admission except as noted in assessment and plan.    Medications: Reviewed in MAR  Current Facility-Administered Medications   Medication Dose Frequency Provider Last Rate Last Dose   • NS infusion   Continuous Avani Crooks M.D. 100 mL/hr at 08/04/20 0229     • lidocaine (XYLOCAINE) 1 % injection 0.5 mL  0.5 mL Once PRN Avani Crooks M.D.       • metoprolol SR (TOPROL XL) tablet 50 mg  50 mg BID Bari Conklin M.D.   50 mg at 08/04/20 0513   • senna-docusate (PERICOLACE or SENOKOT S) 8.6-50 MG per tablet 2 Tab  2 Tab BID Bari Conklin M.D.        And   • polyethylene glycol/lytes (MIRALAX) PACKET 1 Packet  1 Packet QDAY PRN Bari Conklin M.D.        And   • magnesium hydroxide (MILK OF MAGNESIA) suspension 30 mL  30 mL QDAY PRN Bari Conklin M.D.        And   • bisacodyl (DULCOLAX) suppository 10 mg  10 mg QDAY " "PRN Bari Conklin M.D.       • aspirin (ASA) tablet 325 mg  325 mg DAILY Bari Conklin M.D.   325 mg at 08/04/20 0513    Or   • aspirin (ASA) chewable tab 324 mg  324 mg DAILY Bari Conklin M.D.        Or   • aspirin (ASA) suppository 300 mg  300 mg DAILY Bari Conklin M.D.       • atorvastatin (LIPITOR) tablet 80 mg  80 mg Q EVENING Bari Conklin M.D.   80 mg at 08/03/20 2336   • nitroglycerin (NITROSTAT) tablet 0.4 mg  0.4 mg Q5 MIN PRN Bari Conklin M.D.       • ondansetron (ZOFRAN) syringe/vial injection 4 mg  4 mg Q4HRS PRN Bari Conklin M.D.       • ondansetron (ZOFRAN ODT) dispertab 4 mg  4 mg Q4HRS PRN Bari Conklin M.D.       • furosemide (LASIX) injection 20 mg  20 mg DAILY Bari Conklin M.D.   20 mg at 08/03/20 2336   • insulin regular (HumuLIN R,NovoLIN R) injection  2-9 Units Q6HRS Bari Conklin M.D.   Stopped at 08/04/20 0600    And   • glucose 4 g chewable tablet 16 g  16 g Q15 MIN PRN Bari Conklin M.D.        And   • dextrose 50% (D50W) injection 50 mL  50 mL Q15 MIN PRN Bari Conklin M.D.       • cefTRIAXone (ROCEPHIN) 2 g in  mL IVPB  2 g Q24HRS Bari Conklin M.D.       • doxycycline monohydrate (ADOXA) tablet 100 mg  100 mg Q12HRS Bari Conklin M.D.   100 mg at 08/04/20 0513   • ipratropium-albuterol (DUONEB) nebulizer solution  3 mL Q4H PRN (RT) Bari Conklin M.D.       Last reviewed on 8/3/2020 11:12 PM by Melyssa Gaston R.N.    Allergies   Allergen Reactions   • Bydureon [Exenatide] Hives     hives   • Cycloset [Bromocriptine Mesylate] Unspecified     Pt states he can't remember what happens to him.   • Morphine Vomiting       Physical Exam  Body mass index is 31.32 kg/m². /71   Pulse 78   Temp 36.4 °C (97.5 °F) (Temporal)   Resp 18   Ht 1.778 m (5' 10\")   Wt 99 kg (218 lb 4.1 oz)   SpO2 96%    Vitals:    08/03/20 2201 08/04/20 0000 08/04/20 0400 08/04/20 0807   BP: 151/79 131/78 117/68 105/71   Pulse: 90 81 80 78 " "  Resp:  18 18 18   Temp:  36 °C (96.8 °F) 36.1 °C (97 °F) 36.4 °C (97.5 °F)   TempSrc:  Temporal Temporal Temporal   SpO2: 98% 95% 95% 96%   Weight:  99 kg (218 lb 4.1 oz)     Height:  1.778 m (5' 10\")      Oxygen Therapy:  Pulse Oximetry: 96 %, O2 (LPM): 0, O2 Delivery Device: None - Room Air    General: no apparent distress  Eyes: nl conjunctiva  ENT: OP clear  Neck: no JVD   Lungs: normal respiratory effort, CTAB  Heart: RRR, systolic murmur 3/6, no rubs or gallops,   EXT: trace edema bilateral lower extremities.   Abdomen: soft, non tender, non distended,  Neurological: No focal deficits  Psychiatric: Appropriate affect, A/O x 3  Skin: Warm extremities    Labs (personally reviewed and notable for):   Recent Results (from the past 24 hour(s))   EKG    Collection Time: 20  6:18 PM   Result Value Ref Range    Report       [facility] Emergency Dept.    Test Date:  2020  Pt Name:    IRLANDA MURPHY                Department: ER  MRN:        [id number]                      Room:  Gender:     Male                         Technician: [id number]  :        1955                   Requested By:ER TRIAGE PROTOCOL  Order #:    [id number]                    Reading MD:    Measurements  Intervals                                Axis  Rate:       89                           P:          67  FL:         196                          QRS:        -23  QRSD:       102                          T:          149  QT:         364  QTc:        443    Interpretive Statements  SINUS RHYTHM  PROBABLE LEFT ATRIAL ABNORMALITY  LVH WITH SECONDARY REPOLARIZATION ABNORMALITY  ANTERIOR Q WAVES, POSSIBLY DUE TO LVH  Compared to ECG 2020 05:11:47  Left ventricular hypertrophy now present  Early repolarization now present  Q waves now present  ST (T wave) deviation no longer present     CBC WITH DIFFERENTIAL    Collection Time: 20  7:48 PM   Result Value Ref Range    WBC 13.2 (H) 4.8 - 10.8 K/uL    RBC 5.41 " 4.70 - 6.10 M/uL    Hemoglobin 12.2 (L) 14.0 - 18.0 g/dL    Hematocrit 39.8 (L) 42.0 - 52.0 %    MCV 73.6 (L) 81.4 - 97.8 fL    MCH 22.6 (L) 27.0 - 33.0 pg    MCHC 30.7 (L) 33.7 - 35.3 g/dL    RDW 39.9 35.9 - 50.0 fL    Platelet Count 175 164 - 446 K/uL    MPV 10.0 9.0 - 12.9 fL    Neutrophils-Polys 77.60 (H) 44.00 - 72.00 %    Lymphocytes 14.10 (L) 22.00 - 41.00 %    Monocytes 7.20 0.00 - 13.40 %    Eosinophils 0.30 0.00 - 6.90 %    Basophils 0.30 0.00 - 1.80 %    Immature Granulocytes 0.50 0.00 - 0.90 %    Nucleated RBC 0.00 /100 WBC    Neutrophils (Absolute) 10.21 (H) 1.82 - 7.42 K/uL    Lymphs (Absolute) 1.85 1.00 - 4.80 K/uL    Monos (Absolute) 0.95 (H) 0.00 - 0.85 K/uL    Eos (Absolute) 0.04 0.00 - 0.51 K/uL    Baso (Absolute) 0.04 0.00 - 0.12 K/uL    Immature Granulocytes (abs) 0.06 0.00 - 0.11 K/uL    NRBC (Absolute) 0.00 K/uL   COMP METABOLIC PANEL    Collection Time: 08/03/20  7:48 PM   Result Value Ref Range    Sodium 135 135 - 145 mmol/L    Potassium 4.3 3.6 - 5.5 mmol/L    Chloride 96 96 - 112 mmol/L    Co2 22 20 - 33 mmol/L    Anion Gap 17.0 (H) 7.0 - 16.0    Glucose 309 (H) 65 - 99 mg/dL    Bun 23 (H) 8 - 22 mg/dL    Creatinine 1.13 0.50 - 1.40 mg/dL    Calcium 9.3 8.5 - 10.5 mg/dL    AST(SGOT) 21 12 - 45 U/L    ALT(SGPT) 31 2 - 50 U/L    Alkaline Phosphatase 50 30 - 99 U/L    Total Bilirubin 0.3 0.1 - 1.5 mg/dL    Albumin 4.5 3.2 - 4.9 g/dL    Total Protein 7.2 6.0 - 8.2 g/dL    Globulin 2.7 1.9 - 3.5 g/dL    A-G Ratio 1.7 g/dL   TROPONIN    Collection Time: 08/03/20  7:48 PM   Result Value Ref Range    Troponin T 75 (H) 6 - 19 ng/L   ESTIMATED GFR    Collection Time: 08/03/20  7:48 PM   Result Value Ref Range    GFR If African American >60 >60 mL/min/1.73 m 2    GFR If Non African American >60 >60 mL/min/1.73 m 2   PROCALCITONIN    Collection Time: 08/03/20  7:48 PM   Result Value Ref Range    Procalcitonin 0.16 <0.25 ng/mL   EKG    Collection Time: 08/03/20  9:47 PM   Result Value Ref Range     Report       [facility] Emergency Dept.    Test Date:  2020  Pt Name:    IRLANDA MURPHY                Department: ER  MRN:        [id number]                      Room:        28  Gender:     Male                         Technician: [id number]  :        1955                   Requested By:ER TRIAGE PROTOCOL  Order #:    [id number]                    Reading MD:    Measurements  Intervals                                Axis  Rate:       54                           P:          61  ME:         148                          QRS:        -70  QRSD:       104                          T:          93  QT:         424  QTc:        402    Interpretive Statements  SINUS BRADYCARDIA  LAD, CONSIDER LEFT ANTERIOR FASCICULAR BLOCK  BORDERLINE LOW VOLTAGE IN FRONTAL LEADS  CONSIDER RIGHT VENTRICULAR HYPERTROPHY  NONSPECIFIC T ABNORMALITIES, LATERAL LEADS  Compared to ECG 2020 18:18:08  T-wave abnormality now present  Sinus rhythm no longer present  Left ventricular hypertrophy no lo nger present  Early repolarization no longer present  Q waves no longer present     ACCU-CHEK GLUCOSE    Collection Time: 20 12:01 AM   Result Value Ref Range    Glucose - Accu-Ck 197 (H) 65 - 99 mg/dL   APTT    Collection Time: 20 12:17 AM   Result Value Ref Range    APTT 33.8 24.7 - 36.0 sec   Basic Metabolic Panel (BMP)    Collection Time: 20 12:17 AM   Result Value Ref Range    Sodium 137 135 - 145 mmol/L    Potassium 4.2 3.6 - 5.5 mmol/L    Chloride 98 96 - 112 mmol/L    Co2 25 20 - 33 mmol/L    Glucose 234 (H) 65 - 99 mg/dL    Bun 22 8 - 22 mg/dL    Creatinine 1.07 0.50 - 1.40 mg/dL    Calcium 9.2 8.5 - 10.5 mg/dL    Anion Gap 14.0 7.0 - 16.0   CBC with Differential    Collection Time: 20 12:17 AM   Result Value Ref Range    WBC 12.0 (H) 4.8 - 10.8 K/uL    RBC 5.42 4.70 - 6.10 M/uL    Hemoglobin 12.5 (L) 14.0 - 18.0 g/dL    Hematocrit 40.2 (L) 42.0 - 52.0 %    MCV 74.2 (L) 81.4 - 97.8 fL     MCH 23.1 (L) 27.0 - 33.0 pg    MCHC 31.1 (L) 33.7 - 35.3 g/dL    RDW 40.4 35.9 - 50.0 fL    Platelet Count 177 164 - 446 K/uL    MPV 9.9 9.0 - 12.9 fL    Neutrophils-Polys 69.40 44.00 - 72.00 %    Lymphocytes 20.20 (L) 22.00 - 41.00 %    Monocytes 8.30 0.00 - 13.40 %    Eosinophils 1.10 0.00 - 6.90 %    Basophils 0.30 0.00 - 1.80 %    Immature Granulocytes 0.70 0.00 - 0.90 %    Nucleated RBC 0.00 /100 WBC    Neutrophils (Absolute) 8.34 (H) 1.82 - 7.42 K/uL    Lymphs (Absolute) 2.43 1.00 - 4.80 K/uL    Monos (Absolute) 1.00 (H) 0.00 - 0.85 K/uL    Eos (Absolute) 0.13 0.00 - 0.51 K/uL    Baso (Absolute) 0.04 0.00 - 0.12 K/uL    Immature Granulocytes (abs) 0.08 0.00 - 0.11 K/uL    NRBC (Absolute) 0.00 K/uL   INR (Prothrombin/ INR)    Collection Time: 20 12:17 AM   Result Value Ref Range    PT 14.0 12.0 - 14.6 sec    INR 1.05 0.87 - 1.13   Lipid Profile (Tomorrow AM)    Collection Time: 20 12:17 AM   Result Value Ref Range    Cholesterol,Tot 111 100 - 199 mg/dL    Triglycerides 108 0 - 149 mg/dL    HDL 46 >=40 mg/dL    LDL 43 <100 mg/dL   Troponin - STAT Once    Collection Time: 20 12:17 AM   Result Value Ref Range    Troponin T 89 (H) 6 - 19 ng/L   HEMOGLOBIN A1C    Collection Time: 20 12:17 AM   Result Value Ref Range    Glycohemoglobin 10.3 (H) 0.0 - 5.6 %    Est Avg Glucose 249 mg/dL   ESTIMATED GFR    Collection Time: 20 12:17 AM   Result Value Ref Range    GFR If African American >60 >60 mL/min/1.73 m 2    GFR If Non African American >60 >60 mL/min/1.73 m 2   EKG in four (4) hours    Collection Time: 20  1:14 AM   Result Value Ref Range    Report       [facility]    Test Date:  2020  Pt Name:    IRLANDA MURPHY                Department: 171  MRN:        [id number]                      Room:       [facility]  Gender:     Male                         Technician: DORIAN  :        1955                   Requested By:BARBY REYNAGA  Order #:    [id number]                     Reading MD: Avani Crooks    Measurements  Intervals                                Axis  Rate:       83                           P:          56  [place]:         200                          QRS:        -27  QRSD:       100                          T:          155  QT:         388  QTc:        456    Interpretive Statements  SINUS RHYTHM  LVH WITH SECONDARY REPOLARIZATION ABNORMALITY  ANTERIOR ST ELEVATION, PROBABLY DUE TO LVH  Electronically Signed On 8-4-2020 2:21:57 PDT by Avani Crooks     Routine (COVID/SARS COV-2 In-House PCR up to 24 hours)    Collection Time: 08/04/20  1:41 AM    Specimen: Nasopharyngeal; Respirate   Result Value Ref Range    COVID Order Status Received    SARS-CoV-2, PCR (In-House)    Collection Time: 08/04/20  1:41 AM   Result Value Ref Range    SARS-CoV-2 Source NP Swab    Troponin in four (4) hours    Collection Time: 08/04/20  3:44 AM   Result Value Ref Range    Troponin T 88 (H) 6 - 19 ng/L   ACCU-CHEK GLUCOSE    Collection Time: 08/04/20  5:17 AM   Result Value Ref Range    Glucose - Accu-Ck 184 (H) 65 - 99 mg/dL       Cardiac Imaging and Procedures Review:    EKG and telemetry tracings personally reviewed    Echocardiogram 6/2019:  Normal left ventricular systolic function.  Left ventricular ejection fraction is visually estimated to be 70%.  Moderate concentric left ventricular hypertrophy.  Severe aortic stenosis.    Impression and Medical Decision Making:    Severe aortic stenosis  Diagnoses with Echo from 06/2019. He has not followed up with this, but is motivated today to get this fixed soon.   -echocardiogram  -[facility] today  -CT surgery consult for aortic valve replacement and possible CABG    NSTEMI  Patient presented with chest pain, angina, and elevated troponins. Patient with a hx of 2 stents placed to LAD in Jan 2020 and in 1996. He reports stopping his effient 2 months ago.  -[facility] today  -therapeutic lovenox upon admission, tranisiton to DVT ppx dose    Acute CHF   Patient  with orthopnea and exertional dyspnea. Patient with trace edema in extremities. Likely due to severe aortic stenosis with volume overload  -Lasix  -CT surgery consult    CAD with prior stent placed  -continue metoprolol and lipitor    Italo Navarrete, PGY-2    Attending cardiologist: Dr. Stacy    Thank you for allowing me to participate in the care of this patient.  Please call or text via [employer] if clarification would be useful.

## 2020-08-05 ENCOUNTER — APPOINTMENT (OUTPATIENT)
Dept: CARDIOLOGY | Facility: MEDICAL CENTER | Age: 65
DRG: 280 | End: 2020-08-05
Attending: INTERNAL MEDICINE
Payer: MEDICARE

## 2020-08-05 ENCOUNTER — PATIENT OUTREACH (OUTPATIENT)
Dept: HEALTH INFORMATION MANAGEMENT | Facility: OTHER | Age: 65
End: 2020-08-05

## 2020-08-05 VITALS
OXYGEN SATURATION: 98 % | RESPIRATION RATE: 17 BRPM | TEMPERATURE: 97.6 F | WEIGHT: 218.26 LBS | HEIGHT: 70 IN | DIASTOLIC BLOOD PRESSURE: 73 MMHG | SYSTOLIC BLOOD PRESSURE: 123 MMHG | BODY MASS INDEX: 31.25 KG/M2 | HEART RATE: 74 BPM

## 2020-08-05 DIAGNOSIS — I25.119 CORONARY ARTERY DISEASE INVOLVING NATIVE CORONARY ARTERY OF NATIVE HEART WITH ANGINA PECTORIS (HCC): ICD-10-CM

## 2020-08-05 PROBLEM — J18.9 COMMUNITY ACQUIRED PNEUMONIA OF RIGHT LOWER LOBE OF LUNG: Status: RESOLVED | Noted: 2020-08-03 | Resolved: 2020-08-05

## 2020-08-05 PROBLEM — I50.33 ACUTE ON CHRONIC DIASTOLIC CONGESTIVE HEART FAILURE (HCC): Status: RESOLVED | Noted: 2020-08-03 | Resolved: 2020-08-05

## 2020-08-05 PROBLEM — I21.4 NSTEMI (NON-ST ELEVATED MYOCARDIAL INFARCTION) (HCC): Status: RESOLVED | Noted: 2020-08-03 | Resolved: 2020-08-05

## 2020-08-05 LAB
ANION GAP SERPL CALC-SCNC: 14 MMOL/L (ref 7–16)
BASOPHILS # BLD AUTO: 0.4 % (ref 0–1.8)
BASOPHILS # BLD: 0.03 K/UL (ref 0–0.12)
BUN SERPL-MCNC: 19 MG/DL (ref 8–22)
CALCIUM SERPL-MCNC: 8.6 MG/DL (ref 8.5–10.5)
CHLORIDE SERPL-SCNC: 97 MMOL/L (ref 96–112)
CO2 SERPL-SCNC: 23 MMOL/L (ref 20–33)
CREAT SERPL-MCNC: 1 MG/DL (ref 0.5–1.4)
EOSINOPHIL # BLD AUTO: 0.15 K/UL (ref 0–0.51)
EOSINOPHIL NFR BLD: 2.2 % (ref 0–6.9)
ERYTHROCYTE [DISTWIDTH] IN BLOOD BY AUTOMATED COUNT: 41.1 FL (ref 35.9–50)
GLUCOSE BLD-MCNC: 165 MG/DL (ref 65–99)
GLUCOSE BLD-MCNC: 194 MG/DL (ref 65–99)
GLUCOSE SERPL-MCNC: 401 MG/DL (ref 65–99)
HCT VFR BLD AUTO: 37.9 % (ref 42–52)
HGB BLD-MCNC: 11.7 G/DL (ref 14–18)
IMM GRANULOCYTES # BLD AUTO: 0.03 K/UL (ref 0–0.11)
IMM GRANULOCYTES NFR BLD AUTO: 0.4 % (ref 0–0.9)
LYMPHOCYTES # BLD AUTO: 1.21 K/UL (ref 1–4.8)
LYMPHOCYTES NFR BLD: 17.9 % (ref 22–41)
MAGNESIUM SERPL-MCNC: 2 MG/DL (ref 1.5–2.5)
MCH RBC QN AUTO: 22.8 PG (ref 27–33)
MCHC RBC AUTO-ENTMCNC: 30.9 G/DL (ref 33.7–35.3)
MCV RBC AUTO: 73.9 FL (ref 81.4–97.8)
MONOCYTES # BLD AUTO: 0.6 K/UL (ref 0–0.85)
MONOCYTES NFR BLD AUTO: 8.9 % (ref 0–13.4)
NEUTROPHILS # BLD AUTO: 4.75 K/UL (ref 1.82–7.42)
NEUTROPHILS NFR BLD: 70.2 % (ref 44–72)
NRBC # BLD AUTO: 0 K/UL
NRBC BLD-RTO: 0 /100 WBC
PHOSPHATE SERPL-MCNC: 3.5 MG/DL (ref 2.5–4.5)
PLATELET # BLD AUTO: 127 K/UL (ref 164–446)
PMV BLD AUTO: 10.2 FL (ref 9–12.9)
POTASSIUM SERPL-SCNC: 4.1 MMOL/L (ref 3.6–5.5)
RBC # BLD AUTO: 5.13 M/UL (ref 4.7–6.1)
SODIUM SERPL-SCNC: 134 MMOL/L (ref 135–145)
WBC # BLD AUTO: 6.8 K/UL (ref 4.8–10.8)

## 2020-08-05 PROCEDURE — 83735 ASSAY OF MAGNESIUM: CPT

## 2020-08-05 PROCEDURE — 700102 HCHG RX REV CODE 250 W/ 637 OVERRIDE(OP): Performed by: NURSE PRACTITIONER

## 2020-08-05 PROCEDURE — 700105 HCHG RX REV CODE 258: Performed by: INTERNAL MEDICINE

## 2020-08-05 PROCEDURE — A9270 NON-COVERED ITEM OR SERVICE: HCPCS | Performed by: NURSE PRACTITIONER

## 2020-08-05 PROCEDURE — 84100 ASSAY OF PHOSPHORUS: CPT

## 2020-08-05 PROCEDURE — 700111 HCHG RX REV CODE 636 W/ 250 OVERRIDE (IP): Performed by: INTERNAL MEDICINE

## 2020-08-05 PROCEDURE — 700102 HCHG RX REV CODE 250 W/ 637 OVERRIDE(OP): Performed by: INTERNAL MEDICINE

## 2020-08-05 PROCEDURE — 36415 COLL VENOUS BLD VENIPUNCTURE: CPT

## 2020-08-05 PROCEDURE — 99232 SBSQ HOSP IP/OBS MODERATE 35: CPT | Mod: GC | Performed by: INTERNAL MEDICINE

## 2020-08-05 PROCEDURE — 80048 BASIC METABOLIC PNL TOTAL CA: CPT

## 2020-08-05 PROCEDURE — 85025 COMPLETE CBC W/AUTO DIFF WBC: CPT

## 2020-08-05 PROCEDURE — A9270 NON-COVERED ITEM OR SERVICE: HCPCS | Performed by: INTERNAL MEDICINE

## 2020-08-05 PROCEDURE — 82962 GLUCOSE BLOOD TEST: CPT

## 2020-08-05 PROCEDURE — 99223 1ST HOSP IP/OBS HIGH 75: CPT | Performed by: THORACIC SURGERY (CARDIOTHORACIC VASCULAR SURGERY)

## 2020-08-05 PROCEDURE — 99239 HOSP IP/OBS DSCHRG MGMT >30: CPT | Performed by: INTERNAL MEDICINE

## 2020-08-05 RX ORDER — AMOXICILLIN AND CLAVULANATE POTASSIUM 875; 125 MG/1; MG/1
1 TABLET, FILM COATED ORAL EVERY 12 HOURS
Qty: 8 TAB | Refills: 0 | Status: SHIPPED | OUTPATIENT
Start: 2020-08-05 | End: 2020-08-06

## 2020-08-05 RX ORDER — METOPROLOL SUCCINATE 50 MG/1
50 TABLET, EXTENDED RELEASE ORAL 2 TIMES DAILY
Qty: 60 TAB | Refills: 0 | Status: SHIPPED | OUTPATIENT
Start: 2020-08-05 | End: 2020-08-27

## 2020-08-05 RX ORDER — FUROSEMIDE 20 MG/1
20 TABLET ORAL DAILY
Qty: 60 TAB | Refills: 0 | Status: SHIPPED | OUTPATIENT
Start: 2020-08-05 | End: 2020-08-06 | Stop reason: SDUPTHER

## 2020-08-05 RX ORDER — AMOXICILLIN AND CLAVULANATE POTASSIUM 875; 125 MG/1; MG/1
1 TABLET, FILM COATED ORAL EVERY 12 HOURS
Status: DISCONTINUED | OUTPATIENT
Start: 2020-08-05 | End: 2020-08-05 | Stop reason: HOSPADM

## 2020-08-05 RX ORDER — FUROSEMIDE 20 MG/1
20 TABLET ORAL
Status: DISCONTINUED | OUTPATIENT
Start: 2020-08-05 | End: 2020-08-05 | Stop reason: HOSPADM

## 2020-08-05 RX ORDER — ACETAMINOPHEN 325 MG/1
650 TABLET ORAL EVERY 6 HOURS PRN
Status: DISCONTINUED | OUTPATIENT
Start: 2020-08-05 | End: 2020-08-05 | Stop reason: HOSPADM

## 2020-08-05 RX ADMIN — AMPICILLIN SODIUM AND SULBACTAM SODIUM 3 G: 2; 1 INJECTION, POWDER, FOR SOLUTION INTRAMUSCULAR; INTRAVENOUS at 00:01

## 2020-08-05 RX ADMIN — ASPIRIN 325 MG: 325 TABLET, FILM COATED ORAL at 06:07

## 2020-08-05 RX ADMIN — METOPROLOL SUCCINATE 50 MG: 50 TABLET, EXTENDED RELEASE ORAL at 06:07

## 2020-08-05 RX ADMIN — ACETAMINOPHEN 650 MG: 325 TABLET, FILM COATED ORAL at 04:08

## 2020-08-05 RX ADMIN — AMPICILLIN SODIUM AND SULBACTAM SODIUM 3 G: 2; 1 INJECTION, POWDER, FOR SOLUTION INTRAMUSCULAR; INTRAVENOUS at 06:07

## 2020-08-05 RX ADMIN — INSULIN HUMAN 2 UNITS: 100 INJECTION, SOLUTION PARENTERAL at 00:05

## 2020-08-05 RX ADMIN — FUROSEMIDE 20 MG: 10 INJECTION, SOLUTION INTRAMUSCULAR; INTRAVENOUS at 06:07

## 2020-08-05 RX ADMIN — DOXYCYCLINE 100 MG: 100 TABLET, FILM COATED ORAL at 06:07

## 2020-08-05 RX ADMIN — INSULIN HUMAN 2 UNITS: 100 INJECTION, SOLUTION PARENTERAL at 07:48

## 2020-08-05 RX ADMIN — DOCUSATE SODIUM 50 MG AND SENNOSIDES 8.6 MG 2 TABLET: 8.6; 5 TABLET, FILM COATED ORAL at 06:07

## 2020-08-05 ASSESSMENT — ENCOUNTER SYMPTOMS
CONSTITUTIONAL NEGATIVE: 1
NEUROLOGICAL NEGATIVE: 1
MUSCULOSKELETAL NEGATIVE: 1
PSYCHIATRIC NEGATIVE: 1
GASTROINTESTINAL NEGATIVE: 1
RESPIRATORY NEGATIVE: 1
EYES NEGATIVE: 1
CARDIOVASCULAR NEGATIVE: 1

## 2020-08-05 ASSESSMENT — PATIENT HEALTH QUESTIONNAIRE - PHQ9
SUM OF ALL RESPONSES TO PHQ9 QUESTIONS 1 AND 2: 0
1. LITTLE INTEREST OR PLEASURE IN DOING THINGS: NOT AT ALL
2. FEELING DOWN, DEPRESSED, IRRITABLE, OR HOPELESS: NOT AT ALL

## 2020-08-05 NOTE — PROGRESS NOTES
[facility] Cardiology Follow-up Consult Note    Date of note:    8/5/2020      Consulting Physician: Junaid Casas D.O.    Patient ID:  Name:   Sharad Millan   YOB: 1955  Age:   64 y.o.  male   MRN:   [id number]    Chief Complaint   Patient presents with   • Cough   • Shortness of Breath       ID: 64 y.o. with hx of severe aortic stenosis since June 2019 s/p LAD stent placement in 1996 and January 2020 who presented on 8/3/20 with worseing angina. He reported shortness of breath, frequent coughing, and substernal chest pressure upon admission    Interim Events:  -No acute events overnight  -C yesterday showing severe aortic stenosis and diffuse LAD atherosclerosis with long segment at 60% stenosis.  -Telemonitor without acute events. SR 71-77 overnight.  -Patient reports he feels great today and wants to be discharged. He understands that it is important to have heart surgery to replace is aortic valve and for LIMA, but he would like to do this as an outpatient.     ROS  No NV, No Bleeding, No dizziness   All other review of systems reviewed and negative.    Past medical, surgical, social, and family history reviewed and unchanged from admission except as noted in assessment and plan.    Medications: Reviewed in MAR  Current Facility-Administered Medications   Medication Dose Frequency Provider Last Rate Last Dose   • acetaminophen (TYLENOL) tablet 650 mg  650 mg Q6HRS PRN Shawna Samuel, A.P.R.N.   650 mg at 08/05/20 0408   • ampicillin/sulbactam (UNASYN) 3 g in  mL IVPB  3 g Q6HRS Junaid Casas D.O.   Stopped at 08/05/20 0637   • metoprolol SR (TOPROL XL) tablet 50 mg  50 mg BID Bari Conklin M.D.   50 mg at 08/05/20 0607   • senna-docusate (PERICOLACE or SENOKOT S) 8.6-50 MG per tablet 2 Tab  2 Tab BID Bari Conklin M.D.   2 Tab at 08/05/20 0607    And   • polyethylene glycol/lytes (MIRALAX) PACKET 1 Packet  1 Packet QDAY PRN Bari Conklin M.D.        And   • magnesium  "hydroxide (MILK OF MAGNESIA) suspension 30 mL  30 mL QDAY PRN Bari Conklin M.D.        And   • bisacodyl (DULCOLAX) suppository 10 mg  10 mg QDAY PRN Bari Conklin M.D.       • aspirin (ASA) tablet 325 mg  325 mg DAILY Bari Conklin M.D.   325 mg at 08/05/20 0607    Or   • aspirin (ASA) chewable tab 324 mg  324 mg DAILY Bari Conklin M.D.        Or   • aspirin (ASA) suppository 300 mg  300 mg DAILY Bari Conklin M.D.       • atorvastatin (LIPITOR) tablet 80 mg  80 mg Q EVENING Bari Conklin M.D.   80 mg at 08/04/20 1736   • nitroglycerin (NITROSTAT) tablet 0.4 mg  0.4 mg Q5 MIN PRN Bari Conklin M.D.       • ondansetron (ZOFRAN) syringe/vial injection 4 mg  4 mg Q4HRS PRJOSE MIGUEL Conklin M.D.       • ondansetron (ZOFRAN ODT) dispertab 4 mg  4 mg Q4HRS PRN Bari Conklin M.D.       • furosemide (LASIX) injection 20 mg  20 mg DAILY Bari Conklin M.D.   20 mg at 08/05/20 0607   • insulin regular (HumuLIN R,NovoLIN R) injection  2-9 Units Q6HRS Bari Conklin M.D.   2 Units at 08/05/20 0748    And   • glucose 4 g chewable tablet 16 g  16 g Q15 MIN PRN Bari Conklin M.D.        And   • dextrose 50% (D50W) injection 50 mL  50 mL Q15 MIN PRN Bari Conklin M.D.       • doxycycline monohydrate (ADOXA) tablet 100 mg  100 mg Q12HRS Bari Conklin M.D.   100 mg at 08/05/20 0607   • ipratropium-albuterol (DUONEB) nebulizer solution  3 mL Q4H PRN (RT) Bari T Lindstedt, M.D.       Last reviewed on 8/3/2020 11:12 PM by Melyssa Gaston R.N.    Allergies   Allergen Reactions   • Bydureon [Exenatide] Hives     hives   • Cycloset [Bromocriptine Mesylate] Unspecified     Pt states he can't remember what happens to him.   • Morphine Vomiting       Physical Exam  Body mass index is 31.32 kg/m². /59   Pulse 70   Temp 36.2 °C (97.1 °F) (Temporal)   Resp 16   Ht 1.778 m (5' 10\")   Wt 99 kg (218 lb 4.1 oz)   SpO2 98%    Vitals:    08/04/20 2030 08/04/20 2141 08/04/20 2352 " 08/05/20 0335   BP: 115/70 111/68 118/62 115/59   Pulse: 79 78 75 70   Resp: 17 17 17 16   Temp: 36.6 °C (97.8 °F) 36.1 °C (97 °F) 36.4 °C (97.6 °F) 36.2 °C (97.1 °F)   TempSrc: Temporal Temporal Temporal Temporal   SpO2: 97% 96% 96% 98%   Weight:       Height:        Oxygen Therapy:  Pulse Oximetry: 98 %, O2 (LPM): 0, O2 Delivery Device: None - Room Air    General: no apparent distress  Eyes: nl conjunctiva  ENT: OP clear  Neck: no JVD   Lungs: normal respiratory effort, CTAB  Heart: RRR, no murmurs, no rubs or gallops,   EXT: no edema bilateral lower extremities.  Abdomen: soft, non tender, non distended,  Neurological: No focal deficits  Psychiatric: Appropriate affect, A/O x 3  Skin: Warm extremities    Labs (personally reviewed and notable for):   Recent Results (from the past 24 hour(s))   ACCU-CHEK GLUCOSE    Collection Time: 08/04/20 12:16 PM   Result Value Ref Range    Glucose - Accu-Ck 161 (H) 65 - 99 mg/dL   EC-ECHOCARDIOGRAM COMPLETE W/ CONT    Collection Time: 08/04/20 12:44 PM   Result Value Ref Range    Eject.Frac. MOD BP 61.78     Eject.Frac. MOD 4C 61.32     Eject.Frac. MOD 2C 67.45     Left Ventrical Ejection Fraction 50    POC ACT (resulted by nursing)    Collection Time: 08/04/20  1:45 PM   Result Value Ref Range    Istat Activated Clotting Time 186 (H) 74 - 137 sec   ACCU-CHEK GLUCOSE    Collection Time: 08/04/20  5:45 PM   Result Value Ref Range    Glucose - Accu-Ck 190 (H) 65 - 99 mg/dL   ACCU-CHEK GLUCOSE    Collection Time: 08/05/20 12:03 AM   Result Value Ref Range    Glucose - Accu-Ck 194 (H) 65 - 99 mg/dL       Cardiac Imaging and Procedures Review:    EKG and telemetry tracings personally reviewed    Echocardiogram 8/4/2020:  Mildly reduced left ventricular systolic function.  Left ventricular ejection fraction is visually estimated to be 50-54%.  Moderate concentric left ventricular hypertrophy.  Critical aortic stenosis.  Mild mitral regurgitation.  Unable to estimate pulmonary artery  pressure due to an inadequate   tricuspid regurgitant jet.  Compared to the images of the study done 06/24/2020 there has been a   decrease in left ventricular ejection fraction and increase in aortic   valve gradient.     Impression and Medical Decision Making:    Severe aortic stenosis  Diagnosed with Echo from 06/2019 and again seen during this hospitalization. He had not followed up to get this fixed, but reports that he is now motivated to have sugery. Echo showed critial aortic stenosis and LHC showed severe aortic stenosis and recommended SAVR and LIMA. He talked to Dr. Casas this morning, and the patient decided he wants to do this as an outpatient.  -CT surgery follow up to plan outpatient SAVR and CABG x1.     NSTEMI  Patient presented with chest pain, angina, and elevated troponins. Chest pain has since resolved. LHC showed obstructive one vessel CAD involving the proximal-mid LAD. Patient with a hx of 2 stents placed to LAD in Jan 2020 and in 1996. He reports stopping his effient 2 months ago.  -Continue ASA     Acute CHF, resolved   Patient with orthopnea and exertional dyspnea upon admission. He reports this has resolved. Patient with no edema in extremities. Likely due to severe aortic stenosis with volume overload  -ok to stop diuresis    CAD with prior stent placed  -continue metoprolol and lipitor    DM with A1c of 10.3  Patient was counseled on the importance of improving his diabetic control to improve outcomes after cardiac surgery. He has an appointment with an endocrinologist outpatient to work on this.     Patient is appropriate for discharge with close follow up from a cardiac standpoint. Cardiology team signing off.     Italo Navarrete, PGY-2     Attending cardiologist: Dr. Stacy    Thank you for allowing me to participate in the care of this patient.  Please call or text via [employer] if clarification would be useful.

## 2020-08-05 NOTE — DISCHARGE INSTRUCTIONS
Discharge Instructions per PEDRITO RomeroO.    DIET: Diet Order    ACTIVITY: As tolerated    A proper diet that is low in grease, fat, and salt, along with 30 minutes of exercise per day will lead to weight loss, and better controlled blood sugar and blood pressure.    DIAGNOSIS: NSTEMI (non-ST elevated myocardial infarction) (HCC)    Follow up with your Primary Care Provider Pcp as scheduled or sooner if your symptoms persist or worsen.  Return to Emergency Room for sever chest pain, shortness of breath, signs of a stroke, or any other emergencies.            Discharge Instructions    Discharged to home by car with relative. Discharged via wheelchair, hospital escort: Yes.  Special equipment needed: Not Applicable    Be sure to schedule a follow-up appointment with your primary care doctor or any specialists as instructed.     Discharge Plan:   Diet Plan: Discussed  Activity Level: Discussed  Confirmed Follow up Appointment: Appointment Scheduled  Confirmed Symptoms Management: Discussed  Medication Reconciliation Updated: Yes    I understand that a diet low in cholesterol, fat, and sodium is recommended for good health. Unless I have been given specific instructions below for another diet, I accept this instruction as my diet prescription.   Other diet: Heart Healthy Diet     Special Instructions: Diagnosis:  Acute Coronary Syndrome (ACS) is a diagnosis that encompasses cardiac-related chest pain and heart attack. ACS occurs when the blood flow to the heart muscle is severely reduced or cut off completely due to a slow process called atherosclerosis.  Atherosclerosis is a disease in which the coronary arteries become narrow from a buildup of fat, cholesterol, and other substances that combine to form plaque. If the plaque breaks, a blood clot will form and block the blood flow to the heart muscle. This lack of blood flow can cause damage or death to the heart muscle which is called a heart attack or  Myocardial Infarction (MI). There are two different types of MIs:  ST Elevation Myocardial Infarction or STEMI (the most severe type of heart attack) and Non-ST Elevation Myocardial Infarction or NSTEMI.    Treatment Plan:  · Cardiac Diet  - Low fat, low salt, low cholesterol   · Cardiac Rehab  - Your doctor has ordered you a referral to Psychiatric Rehab.  Call [phone] to schedule an appointment.  · Attend my follow-up appointment with my Cardiologist.  · Take my medications as prescribed by my doctor  · Exercise daily  · Lower my bad cholesterol and raise my good cholesterol, lower my blood pressure, Reduce stress and Control my diabetes    Medications:  Certain medications are used to treat ACS.  Remember to always take medications as prescribed and never stop talking medications unless told by your doctor.    You have been prescribed the following medicatons:    Aspirin - Aspirin is used as a blood thinning medication and you will require this medication indefinitely.  Beta-Blocker - Beta-Blocker metoprolol is used to lower blood pressure and heart rate, and/or helps your heart heal after a heart attack.  Statin - Statin crestor is used to lower cholesterol.  Angiotensin Receptor Blocker (ARB) - Angiotensin Receptor Blocker losartan is used to lower blood pressure and treat heart failure.    · Is patient discharged on Warfarin / Coumadin?   No           Special Instructions:   HF Patient Discharge Instructions  · Monitor your weight daily, and maintain a weight chart, to track your weight changes.   · Activity as tolerated, unless your Doctor has ordered otherwise. Other activity order: Activity as tolerated.  · Follow a low fat, low cholesterol, low salt diet unless instructed otherwise by your Doctor. Read the labels on the back of food products and track your intake of fat, cholesterol and salt.   · Fluid Restriction No. If a Fluid Restriction has been ordered by your Doctor, measure fluids with a measuring cup  to ensure that you are not exceeding the restriction.   · No smoking.  · Oxygen No. If your Doctor has ordered that you wear Oxygen at home, it is important to wear it as ordered.  · Did you receive an explanation from staff on the importance of taking each of your medications and why it is necessary to keep taking them unless your doctor says to stop? Yes  · Were all of your questions answered about how to manage your heart failure and what to do if you have increased signs and symptoms after you go home? Yes  · Do you feel like your heart failure care team involved you in the care treatment plan and allowed you to make decisions regarding your care while in the hospital and addressed any discharge needs you might have? Yes    See the educational handout provided at discharge for more information on monitoring your daily weight, activity and diet. This also explains more about Heart Failure, symptoms of a flare-up and some of the tests that you have undergone.     Warning Signs of a Flare-Up include:  · Swelling in the ankles or lower legs.  · Shortness of breath, while at rest, or while doing normal activities.   · Shortness of breath at night when in bed, or coughing in bed.   · Requiring more pillows to sleep at night, or needing to sit up at night to sleep.  · Feeling weak, dizzy or fatigued.     When to call your Doctor:  · Call [facility] seven days a week from 8:00 a.m. to 8:00 p.m. for medical questions [phone].  · Call your Primary Care Physician or Cardiologist if:   1. You experience any pain radiating to your jaw or neck.  2. You have any difficulty breathing.  3. You experience weight gain of 3 lbs in a day or 5 lbs in a week.   4. You feel any palpitations or irregular heartbeats.  5. You become dizzy or lose consciousness.   If you have had an angiogram or had a pacemaker or AICD placed, and experience:  1. Bleeding, drainage or swelling at the surgical / puncture site.  2. Fever  greater than 100.0 F  3. Shock from internal defibrillator.  4. Cool and / or numb extremities.            Depression / Suicide Risk    As you are discharged from this [facility] facility, it is important to learn how to keep safe from harming yourself.    Recognize the warning signs:  · Abrupt changes in personality, positive or negative- including increase in energy   · Giving away possessions  · Change in eating patterns- significant weight changes-  positive or negative  · Change in sleeping patterns- unable to sleep or sleeping all the time   · Unwillingness or inability to communicate  · Depression  · Unusual sadness, discouragement and loneliness  · Talk of wanting to die  · Neglect of personal appearance   · Rebelliousness- reckless behavior  · Withdrawal from people/activities they love  · Confusion- inability to concentrate     If you or a loved one observes any of these behaviors or has concerns about self-harm, here's what you can do:  · Talk about it- your feelings and reasons for harming yourself  · Remove any means that you might use to hurt yourself (examples: pills, rope, extension cords, firearm)  · Get professional help from the community (Mental Health, Substance Abuse, psychological counseling)  · Do not be alone:Call your Safe Contact- someone whom you trust who will be there for you.  · Call your local CRISIS HOTLINE 840-2475 or 341-903-5508  · Call your local Children's Mobile Crisis Response Team [place] (566) 268-2381 or www.[employer]  · Call the toll free National Suicide Prevention Hotlines   · National Suicide Prevention Lifeline 648-142-KJHT (7756)  · National Hope Line Network 800-SUICIDE (399-6345)        Cardiac Rehabilitation  What is cardiac rehabilitation?  Cardiac rehabilitation is a treatment program that helps improve the health and well-being of people who have heart problems. Cardiac rehabilitation includes exercise training, education, and counseling to help  you get stronger and return to an active lifestyle. This program can help you get better faster and reduce any future hospital stays.  Why might I need cardiac rehabilitation?  Cardiac rehabilitation programs can help when you have or have had:  · A heart attack.  · Heart failure.  · Peripheral artery disease.  · Coronary artery disease.  · Angina.  · Lung or breathing problems.  Cardiac rehabilitation programs are also used when you have had:  · Coronary artery bypass graft surgery.  · Heart valve replacement.  · Heart stent placement.  · Heart transplant.  · Aneurysm repair.  What are the benefits of cardiac rehabilitation?  Cardiac rehabilitation can help you:  · Reduce problems like chest pain and trouble breathing.  · Change risk factors that contribute to heart disease, such as:  ? Smoking.  ? High blood pressure.  ? High cholesterol.  ? Diabetes.  ? Being inactive.  ? Weighing over 30% more than your ideal weight.  ? Diet.  · Improve your emotional outlook so you feel:  ? More hopeful.  ? Better about yourself.  ? More confident about taking care of yourself.  · Get support from health experts as well as other people with similar problems.  · Learn healthy ways to manage stress.  · Learn how to manage and understand your medicines.  · Teach your family about your condition and how to participate in your recovery.  What happens in cardiac rehabilitation?  You will be assessed by a cardiac rehabilitation team. They will check your health history and do a physical exam. You may need blood tests, exercise stress tests, and other evaluations to make sure that you are ready to start cardiac rehabilitation.  The cardiac rehabilitation team works with you to make a plan based on your health and goals. Your program will be tailored to fit you and your needs and may change as you progress. You may work with a health care team that includes:  · Doctors.  · Nurses.  · Dietitians.  · Psychologists.  · Exercise  specialists.  · Physical and occupational therapists.  What are the phases of cardiac rehabilitation?  A cardiac rehabilitation program is often divided into phases. You advance from one phase to the next.  Phase 1  This phase starts while you are still in the hospital. You may:  · Start by walking in your room and then in the mason.  · Do some simple exercises with a therapist.    Phase 2  This phase begins when you go home or to another facility. You will travel to a cardiac rehabilitation center or another place where rehabilitation is offered. This phase may last 8-12 weeks. During this phase:  · You will slowly increase your activity level while being closely watched by a nurse or therapist.  · You will have medical tests and exams to monitor your progress.  · Your exercises may include strength or resistance training along with activities that cause your heart to beat faster (aerobic exercises), such as walking on a treadmill.  · Your condition will determine how often and how long these sessions last.  · You may learn how to:  ? Cook heart-healthy meals.  ? Control your blood sugar, if this applies.  ? Stop smoking.  ? Manage your medicines. You may need help with scheduling or planning how and when to take your medicines. If you have questions about your medicines, it is very important that you talk with your health care provider.    Phase 3  This phase continues for the rest of your life. In this phase:  · There will be less supervision.  · You may continue to participate in cardiac rehabilitation activities or become part of a group in your community.  · You may benefit from talking about your experience with other people who are facing similar challenges.  Follow these instructions at home:  · Take over-the-counter and prescription medicines only as told by your health care provider.  · Keep all follow-up visits as told by your health care provider. This is important.  Get help right away if:  · You have  severe chest discomfort, especially if the pain is crushing or pressure-like and spreads to your arms, back, neck, or jaw. Do not wait to see if the pain will go away.  · You have weakness or numbness in your face, arms, or legs, especially on one side of the body.  · Your speech is slurred.  · You are confused.  · You have a sudden, severe headache or loss of vision.  · You have shortness of breath.  · You are sweating and have nausea.  · You feel dizzy or faint.  · You are fatigued.  These symptoms may represent a serious problem that is an emergency. Do not wait to see if the symptoms will go away. Get medical help right away. Call your local emergency services (911 in the [place] Do not drive yourself to the hospital.  Summary  · Cardiac rehabilitation is a treatment program that helps improve the health and well-being of people who have heart problems.  · A cardiac rehabilitation program is often divided into phases. You advance from one phase to the next.  · The cardiac rehabilitation team works with you to make a plan based on your health and goals.  · Cardiac rehabilitation includes exercise training, education, and counseling to help you get stronger and return to an active lifestyle.  This information is not intended to replace advice given to you by your health care provider. Make sure you discuss any questions you have with your health care provider.  Document Released: 09/26/2009 Document Revised: 04/08/2020 Document Reviewed: 10/17/2019  [employer] Patient Education © 2020 [employer]      Atherosclerosis    Atherosclerosis is narrowing and hardening of the arteries. Arteries are blood vessels that carry blood from the heart to all parts of the body. This blood contains oxygen. Arteries can become narrow or clogged with a buildup of fat, cholesterol, calcium, and other substances (plaque). Plaque decreases the amount of blood that can flow through the artery.  Atherosclerosis can affect any artery in  the body, including:  · Heart arteries (coronary artery disease). This may cause a heart attack.  · Brain arteries. This may cause a stroke (cerebrovascular accident).  · Leg, arm, and pelvis arteries (peripheral artery disease). This may cause pain and numbness.  · Kidney arteries. This may cause kidney (renal) failure.  Treatment may slow the disease and prevent further damage to the heart, brain, peripheral arteries, and kidneys.  What are the causes?  Atherosclerosis develops slowly over many years. The inner layers of your arteries become damaged and allow the gradual buildup of plaque. The exact cause of atherosclerosis is not fully understood. Symptoms of atherosclerosis do not occur until the artery becomes narrow or blocked.  What increases the risk?  The following factors may make you more likely to develop this condition:  · High blood pressure.  · High cholesterol.  · Being middle-aged or older.  · Having a family history of atherosclerosis.  · Having high blood fats (triglycerides).  · Diabetes.  · Being overweight.  · Smoking tobacco.  · Not exercising enough (sedentary lifestyle).  · Having a substance in the blood called C-reactive protein (CRP). This is a sign of increased levels of inflammation in the body.  · Sleep apnea.  · Being stressed.  · Drinking too much alcohol.  What are the signs or symptoms?  This condition may not cause any symptoms. If you have symptoms, they are caused by damage to an area of your body that is not getting enough blood.  · Coronary artery disease may cause chest pain and shortness of breath.  · Decreased blood supply to your brain may cause a stroke. Signs of a stroke may include sudden:  ? Weakness on one side of the body.  ? Confusion.  ? Changes in vision.  ? Inability to speak or understand speech.  ? Loss of balance, coordination, or the ability to walk.  ? Severe headache.  ? Loss of consciousness.  · Peripheral arterial disease may cause pain and numbness,  "often in the legs and hips.  · Renal failure may cause fatigue, nausea, swelling, and itchy skin.  How is this diagnosed?  This condition is diagnosed based on your medical history and a physical exam. During the exam:  · Your health care provider will:  ? Check your pulse in different places.  ? Listen for a \"whooshing\" sound over your arteries (bruit).  · You may have tests, such as:  ? Blood tests to check your levels of cholesterol, triglycerides, and CRP.  ? Electrocardiogram (ECG) to check for heart damage.  ? Chest X-ray to see if you have an enlarged heart, which is a sign of heart failure.  ? Stress test to see how your heart reacts to exercise.  ? Echocardiogram to get images of the inside of your heart.  ? Ankle-brachial index to compare blood pressure in your arms to blood pressure in your ankles.  ? Ultrasound of your peripheral arteries to check blood flow.  ? CT scan to check for damage to your heart or brain.  ? X-rays of blood vessels after dye has been injected (angiogram) to check blood flow.  How is this treated?  Treatment starts with lifestyle changes, which may include:  · Changing your diet.  · Losing weight.  · Reducing stress.  · Exercising and being physically active more regularly.  · Not smoking.  You may also need medicine to:  · Lower triglycerides and cholesterol.  · Control blood pressure.  · Prevent blood clots.  · Lower inflammation in your body.  · Control your blood sugar.  Sometimes, surgery is needed to:  · Remove plaque from an artery (endarterectomy).  · Open or widen a narrowed heart artery (angioplasty).  · Create a new path for your blood with one of these procedures:  ? Heart (coronary) artery bypass graft surgery.  ? Peripheral artery bypass graft surgery.  Follow these instructions at home:  Eating and drinking    · Eat a heart-healthy diet. Talk with your health care provider or a diet and nutrition specialist (dietitian) if you need help. A heart-healthy diet " "involves:  ? Limiting unhealthy fats and increasing healthy fats. Some examples of healthy fats are olive oil and canola oil.  ? Eating plant-based foods, such as fruits, vegetables, nuts, whole grains, and legumes (such as peas and lentils).  · Limit alcohol intake to no more than 1 drink a day for nonpregnant women and 2 drinks a day for men. One drink equals 12 oz of beer, 5 oz of wine, or 1½ oz of hard liquor.  Lifestyle  · Follow an exercise program as told by your health care provider.  · Maintain a healthy weight. Lose weight if your health care provider says that you need to do that.  · Rest when you are tired.  · Learn to manage your stress.  · Do not use any products that contain nicotine or tobacco, such as cigarettes and e-cigarettes. If you need help quitting, ask your health care provider.  · Do not abuse drugs.  General instructions  · Take over-the-counter and prescription medicines only as told by your health care provider.  · Manage other health conditions as told by your health care provider.  · Keep all follow-up visits as told by your health care provider. This is important.  Contact a health care provider if:  · You have chest pain or discomfort. This includes squeezing chest pain that may feel like indigestion (angina).  · You have shortness of breath.  · You have an irregular heartbeat.  · You have unexplained fatigue.  · You have unexplained pain or numbness in an arm, leg, or hip.  · You have nausea, swelling of your hands or feet, and itchy skin.  Get help right away if:  · You have any symptoms of a heart attack, such as:  ? Chest pain.  ? Shortness of breath.  ? Pain in your neck, jaw, arms, back, or stomach.  ? Cold sweat.  ? Nausea.  ? Light-headedness.  · You have any symptoms of a stroke. \"BE FAST\" is an easy way to remember the main warning signs of a stroke:  ? B - Balance. Signs are dizziness, sudden trouble walking, or loss of balance.  ? E - Eyes. Signs are trouble seeing or a " sudden change in vision.  ? F - Face. Signs are sudden weakness or numbness of the face, or the face or eyelid drooping on one side.  ? A - Arms. Signs are weakness or numbness in an arm. This happens suddenly and usually on one side of the body.  ? S - Speech. Signs are sudden trouble speaking, slurred speech, or trouble understanding what people say.  ? T - Time. Time to call emergency services. Write down what time symptoms started.  · You have other signs of a stroke, such as:  ? A sudden, severe headache with no known cause.  ? Nausea or vomiting.  ? Seizure.  These symptoms may represent a serious problem that is an emergency. Do not wait to see if the symptoms will go away. Get medical help right away. Call your local emergency services (911 in the [place] Do not drive yourself to the hospital.  Summary  · Atherosclerosis is narrowing and hardening of the arteries.  · Arteries can become narrow or clogged with a buildup of fat, cholesterol, calcium, and other substances (plaque).  · This condition may not cause any symptoms. If you do have symptoms, they are caused by damage to an area of your body that is not getting enough blood.  · Treatment may include lifestyle changes and medicines. In some cases, surgery is needed.  This information is not intended to replace advice given to you by your health care provider. Make sure you discuss any questions you have with your health care provider.  Document Released: 03/09/2005 Document Revised: 03/29/2019 Document Reviewed: 08/23/2018  [employer] Patient Education © 2020 [employer]

## 2020-08-05 NOTE — PROGRESS NOTES
Saw patient just prior to discharge and provided my card, an IS which his baseline was 2750 cc, and the cardiac surgery FAQ tip sheet.  This includes physical prehab instructions, dental cleaning instructions, post surgery physical restrictions, and post discharge home needs.    Patient verbalized he would call me if he had questions and when he decided on a surgery date.

## 2020-08-05 NOTE — DISCHARGE SUMMARY
Discharge Summary    CHIEF COMPLAINT ON ADMISSION  Chief Complaint   Patient presents with   • Cough   • Shortness of Breath       Reason for Admission  Chest Pain     Admission Date  8/3/2020    CODE STATUS  Full Code    HPI & HOSPITAL COURSE  Mr. Sharad Millan is a 64 y.o. male with history of coronary artery disease status post drug-eluting stent to LAD in January 2020 and prior in 1996, aortic stenosis who presented on 8/3/2020 with cough and shortness of breath x2 weeks.  Patient discontinued Effient 2 months ago because of nuisance bleeding.  Patient did see his primary care physician and was given a Z-Aftab and 5-day course of steroids with no improvement in symptoms.  Patient was given ceftriaxone and doxycycline as well as full dose aspirin and enoxaparin for presumed NSTEMI in the emergency room.  Patient has severe aortic stenosis that has been lost to follow-up.  Cardiology was consulted Dr. Avani Crooks.  Echocardiogram showed ejection fraction 50-54%, moderate concentric left ventricular hypertrophy, critical aortic stenosis, mild mitral regurgitation.  Left heart catheterization 8/4 showed severe aortic stenosis and obstructive coronary artery disease of the proximal-mid LAD.  Surgical aortic valve replacement plus LIMA bypass was recommended.  Patient was evaluated by cardiac surgery Dr. Cain.  Patient was agreeable to discharge home and perform cardiac surgery electively for aortic valve replacement and CABG.  In the time of discharge chest pain had resolved and he was on room air with stable vitals.  Patient will be discharged on core cardiac measures.  Patient was euvolemic and will be discharged on furosemide 20 mg p.o. daily.  He will complete a 5-day course of antibiotics for CAP with Augmentin.  Medically stable for discharge home.  He was referred to cardiac rehab at the time of discharge.     I did  the patient about his poorly controlled diabetes.  He was about to establish  care with outpatient endocrinologist but ended up being admitted to the hospital during this admission.  He will need insulin for blood sugar control.  He did not wish to stay for diabetic teaching, education, and to get a good insulin regimen.  He will be discharged home on his oral medications to follow-up with endocrinologist as an outpatient.  I did  him extensively about diet and exercise.    Therefore, he is discharged in fair and stable condition to home with close outpatient follow-up.    The patient met 2-midnight criteria for an inpatient stay at the time of discharge.    Discharge Date  8/5/2020    FOLLOW UP ITEMS POST DISCHARGE  None    DISCHARGE DIAGNOSES  Principal Problem (Resolved):    NSTEMI (non-ST elevated myocardial infarction) ([facility]) POA: Yes  Active Problems:    CAD (coronary artery disease) (Chronic) POA: Yes    Severe aortic stenosis POA: Yes    Dyslipidemia (Chronic) POA: Yes      Overview:     S/P drug eluting coronary stent placement POA: Yes      Overview: CHARBEL to LAD 1/8/20    HTN (hypertension) POA: Yes      Overview: ICD-10 transition    DM2 (diabetes mellitus, type 2) ([facility]) (Chronic) POA: Yes    Obesity (BMI 30-39.9) (Chronic) POA: Yes  Resolved Problems:    Acute on chronic diastolic congestive heart failure (HCC) POA: Yes    Community acquired pneumonia of right lower lobe of lung POA: Yes      FOLLOW UP  Future Appointments   Date Time Provider Department Center   11/9/2020 10:20 AM Ganga Chavez M.D. [facility] None     Avani Crooks M.D.  [address]  [address]  [place] [address]  [phone]    In 2 weeks      Dorys Casas M.D.  [address]  [address]  [place] [address]  [phone]    In 2 weeks      [facility] Endocrinology-Zackery Schmitz, DO  [address]  [address]  [facility]  [phone]  In 2 days  As needed, If symptoms worsen      MEDICATIONS ON DISCHARGE     Medication List      START taking these medications      Instructions    amoxicillin-clavulanate 875-125 MG Tabs  Commonly known as: AUGMENTIN   Take 1 Tab by mouth every 12 hours for 4 days.  Dose: 1 Tab     furosemide 20 MG Tabs  Commonly known as: LASIX   Take 1 Tab by mouth every day.  Dose: 20 mg        CHANGE how you take these medications      Instructions   metoprolol SR 50 MG Tb24  What changed:   · when to take this  · Another medication with the same name was removed. Continue taking this medication, and follow the directions you see here.  Commonly known as: TOPROL XL   Take 1 Tab by mouth 2 Times a Day.  Dose: 50 mg        CONTINUE taking these medications      Instructions   aspirin 81 MG EC tablet   Take 1 Tab by mouth every day.  Dose: 81 mg     glipiZIDE SR 10 MG Tb24  Commonly known as: GLUCOTROL   Take 1 Tab by mouth every morning.  Dose: 10 mg     Jardiance 25 MG Tabs  Generic drug: Empagliflozin   Take 1 Tab by mouth every day.  Dose: 1 Tab     losartan 100 MG Tabs  Commonly known as: COZAAR   TAKE 1 TABLET BY MOUTH EVERY DAY     metformin 1000 MG tablet  Commonly known as: GLUCOPHAGE   Take 1 Tab by mouth 2 times a day.  Dose: 1,000 mg     rosuvastatin 20 MG Tabs  Commonly known as: CRESTOR   Take 1 Tab by mouth every day.  Dose: 20 mg            Allergies  Allergies   Allergen Reactions   • Bydureon [Exenatide] Hives     hives   • Cycloset [Bromocriptine Mesylate] Unspecified     Pt states he can't remember what happens to him.   • Morphine Vomiting       DIET  Orders Placed This Encounter   Procedures   • Diet Order     Standing Status:   Standing     Number of Occurrences:   1     Order Specific Question:   Diet:     Answer:   Cardiac [6]     Order Specific Question:   Second Modifier:     Answer:   Regular [1]       ACTIVITY  As tolerated.  Weight bearing as tolerated    CONSULTATIONS  Cards  Cardiac surgery    PROCEDURES  Left heart cath    LABORATORY  Lab Results   Component Value Date    SODIUM 137 08/04/2020    POTASSIUM 4.2 08/04/2020    CHLORIDE 98  08/04/2020    CO2 25 08/04/2020    GLUCOSE 234 (H) 08/04/2020    BUN 22 08/04/2020    CREATININE 1.07 08/04/2020    CREATININE 1.0 02/18/2009        Lab Results   Component Value Date    WBC 12.0 (H) 08/04/2020    HEMOGLOBIN 12.5 (L) 08/04/2020    HEMATOCRIT 40.2 (L) 08/04/2020    PLATELETCT 177 08/04/2020        I discussed medications and side effects with the patient.  I discussed prognosis and importance of medical compliance with the patient.  I counseled the patient about diet, exercise, weight loss, and life style modifications.  All questions and concerns have been addressed.  Total time of the discharge process was 36 minutes.

## 2020-08-05 NOTE — DISCHARGE PLANNING
Care Transition Team Assessment    RN NIKIA spoke with patient at bedside to complete transition assessment.  Patient verified information on face sheet as correct.  Patient lives at home w/ his wife.  Their son lives here in [place] and can assist as needed.  His wife, Millie, is his NOK, [phone].  States that his son will be coming to get him on discharge.  Patient states he is independent w/ his ADLs/IADLs on a normal basis and transports himself to and from appointments and other destinations.  He uses the [facility] on [address] for his medications.      Patient will be a planned readmit w/ cardiothroacic surgery in the near future.    DC Plan:  Home w/ no needs    Information Source  Orientation : Oriented x 4  Information Given By: Patient  Who is responsible for making decisions for patient? : Patient    Readmission Evaluation  Is this a readmission?: Yes - unplanned readmission  Why do you think you were readmitted?: chest pain    Elopement Risk  Legal Hold: No  Ambulatory or Self Mobile in Wheelchair: Yes  Disoriented: No  Psychiatric Symptoms: None  History of Wandering: No  Elopement this Admit: No  Vocalizing Wanting to Leave: No  Displays Behaviors, Body Language Wanting to Leave: No-Not at Risk for Elopement  Elopement Risk: Not at Risk for Elopement    Interdisciplinary Discharge Planning  Primary Care Physician: Dr. Don Khoury  Lives with - Patient's Self Care Capacity: Spouse  Patient or legal guardian wants to designate a caregiver (see row info): No  Support Systems: Spouse / Significant Other, Children  Housing / Facility: [address]  Do You Take your Prescribed Medications Regularly: Yes  Able to Return to Previous ADL's: Yes  Mobility Issues: No  Prior Services: None  Patient Expects to be Discharged to:: Home  Assistance Needed: No  Durable Medical Equipment: Not Applicable    Discharge Preparedness  What is your plan after discharge?: Home with help  What are your discharge supports?:  Spouse  Prior Functional Level: Ambulatory, Drives Self, Independent with Medication Management, Independent with Activities of Daily Living  Difficulity with ADLs: None  Difficulity with IADLs: None    Functional Assesment  Prior Functional Level: Ambulatory, Drives Self, Independent with Medication Management, Independent with Activities of Daily Living    Finances  Financial Barriers to Discharge: No  Prescription Coverage: Yes    Vision / Hearing Impairment  Vision Impairment : No  Hearing Impairment : No         Advance Directive  Advance Directive?: None  Advance Directive offered?: AD Booklet refused    Domestic Abuse  Have you ever been the victim of abuse or violence?: No  Physical Abuse or Sexual Abuse: No  Verbal Abuse or Emotional Abuse: No  Possible Abuse Reported to:: Not Applicable    Psychological Assessment  History of Substance Abuse: None  History of Psychiatric Problems: No  Non-compliant with Treatment: No  Newly Diagnosed Illness: Yes    Discharge Risks or Barriers  Discharge risks or barriers?: No    Anticipated Discharge Information  Discharge Disposition: Discharged to home/self care (01)  Discharge Address: [facility] [address]   [place] [address]  Discharge Contact Phone Number: [phone]

## 2020-08-05 NOTE — CONSULTS
REFERRING PHYSICIAN: Luis Hewitt MD.    CONSULTING PHYSICIAN: Dorys Casas MD, FACS.    CHIEF COMPLAINT: Chest pain and shortness of breath.    HISTORY OF PRESENT ILLNESS: The patient is a 64 y.o. male with history of coronary artery disease s/p PCI to LAD 1/20 stopped effient two months ago due to bleeding, severe aortic stenosis, paroxysmal SVT, hypertension, hyperlipidemia, diabetes mellitus type 2, fatty liver, obesity, peripheral artery disease s/p stent and acute systolic and diastolic heart failure due to valvular disease. He presented to [facility] with chest pain and of breath was diagnosed with NSTEMI and acute CHF from valvular disease. He was treated with diuretic therapy.  He underwent cardiac catheterization where he was found to have a significant LAD lesion.  Today, he states he feels much better with his breathing and has experienced no further chest pain.  He is very anxious to go home and schedule his surgery electively.    PAST MEDICAL HISTORY:   Past Medical History:   Diagnosis Date   • Aortic stenosis 10/20/2011   • Arrhythmia    • Breath shortness    • Carotid bruit 12/9/2009   • Cataract     cataract extraction with IOL   • Chronic right shoulder pain    • Diabetes     oral medication   • Fatty liver 1/17/2011   • Heart attack (HCC) 12/2018   • History of cardiac catheterization 7/13/2009   • History of echocardiogram 10/20/2011   • HTN (hypertension) 10/12/2012   • Hypertension    • Memory loss 10/20/2011   • Murmur 7/7/2009   • Obesity 8/23/2011   • Palpitations 10/20/2011   • PSVT (paroxysmal supraventricular tachycardia) (HCC) 2/14/2012   • S/P coronary artery stent placement 1996    coronary stent implantation , [facility]   • S/P coronary artery stent placement 1998    coronary stent implantation; LAD   • S/P coronary artery stent placement 2003    cardiac catheterization; patent stents; [place]   • Sciatica 8/23/2011   • Uncontrolled diabetes  mellitus (HCC) 11/29/2010       PAST SURGICAL HISTORY:   Past Surgical History:   Procedure Laterality Date   • PO BY LAPAROSCOPY N/A 10/25/2015    Procedure: PO BY LAPAROSCOPY-with grams ;  Surgeon: Ronnie Bowman M.D.;  Location: SURGERY [facility];  Service:    • CAROTID STENT ANGIOGRAPHY  1996   • CATARACT EXTRACTION WITH IOL Bilateral        FAMILY HISTORY:   Family History   Problem Relation Age of Onset   • Heart Disease Mother    • Heart Disease Father         SOCIAL HISTORY:   Social History     Socioeconomic History   • Marital status:      Spouse name: Not on file   • Number of children: Not on file   • Years of education: Not on file   • Highest education level: Not on file   Occupational History   • Not on file   Social Needs   • Financial resource strain: Not on file   • Food insecurity     Worry: Not on file     Inability: Not on file   • Transportation needs     Medical: Not on file     Non-medical: Not on file   Tobacco Use   • Smoking status: Never Smoker   • Smokeless tobacco: Never Used   Substance and Sexual Activity   • Alcohol use: No     Alcohol/week: 0.0 oz   • Drug use: No   • Sexual activity: Yes     Partners: Female   Lifestyle   • Physical activity     Days per week: Not on file     Minutes per session: Not on file   • Stress: Not on file   Relationships   • Social connections     Talks on phone: Not on file     Gets together: Not on file     Attends Synagogue service: Not on file     Active member of club or organization: Not on file     Attends meetings of clubs or organizations: Not on file     Relationship status: Not on file   • Intimate partner violence     Fear of current or ex partner: Not on file     Emotionally abused: Not on file     Physically abused: Not on file     Forced sexual activity: Not on file   Other Topics Concern   •  Service No   • Blood Transfusions No   • Caffeine Concern No   • Occupational Exposure No   • Hobby Hazards No   •  Sleep Concern No   • Stress Concern No   • Weight Concern No   • Special Diet No   • Back Care Yes     Comment: BELT SUPPORT   • Exercise Yes   • Bike Helmet No   • Seat Belt Yes   • Self-Exams No   Social History Narrative   • Not on file       ALLERGIES:   Allergies   Allergen Reactions   • Bydureon [Exenatide] Hives     hives   • Cycloset [Bromocriptine Mesylate] Unspecified     Pt states he can't remember what happens to him.   • Morphine Vomiting        CURRENT MEDICATIONS:     Current Facility-Administered Medications:   •  acetaminophen (TYLENOL) tablet 650 mg, 650 mg, Oral, Q6HRS PRN, MARKUS OlsonP.RJOHNNA, 650 mg at 08/05/20 0408  •  ampicillin/sulbactam (UNASYN) 3 g in  mL IVPB, 3 g, Intravenous, Q6HRS, Junaid Casas D.O., Stopped at 08/05/20 0637  •  metoprolol SR (TOPROL XL) tablet 50 mg, 50 mg, Oral, BID, Bari Conklin M.D., 50 mg at 08/05/20 0607  •  senna-docusate (PERICOLACE or SENOKOT S) 8.6-50 MG per tablet 2 Tab, 2 Tab, Oral, BID, 2 Tab at 08/05/20 0607 **AND** polyethylene glycol/lytes (MIRALAX) PACKET 1 Packet, 1 Packet, Oral, QDAY PRN **AND** magnesium hydroxide (MILK OF MAGNESIA) suspension 30 mL, 30 mL, Oral, QDAY PRN **AND** bisacodyl (DULCOLAX) suppository 10 mg, 10 mg, Rectal, QDAY PRN, Bari Conklin M.D.  •  aspirin (ASA) tablet 325 mg, 325 mg, Oral, DAILY, 325 mg at 08/05/20 0607 **OR** aspirin (ASA) chewable tab 324 mg, 324 mg, Oral, DAILY **OR** aspirin (ASA) suppository 300 mg, 300 mg, Rectal, DAILY, Bari Conklin M.D.  •  atorvastatin (LIPITOR) tablet 80 mg, 80 mg, Oral, Q EVENING, Bari Conklin M.D., 80 mg at 08/04/20 1737  •  nitroglycerin (NITROSTAT) tablet 0.4 mg, 0.4 mg, Sublingual, Q5 MIN PRN, Bari Conklin M.D.  •  ondansetron (ZOFRAN) syringe/vial injection 4 mg, 4 mg, Intravenous, Q4HRS PRN, Bari Conklin M.D.  •  ondansetron (ZOFRAN ODT) dispertab 4 mg, 4 mg, Oral, Q4HRS PRN, Bari Conklin M.D.  •  furosemide (LASIX) injection 20  mg, 20 mg, Intravenous, DAILY, Bari Conklin M.D., 20 mg at 08/05/20 0607  •  insulin regular (HumuLIN R,NovoLIN R) injection, 2-9 Units, Subcutaneous, Q6HRS, 2 Units at 08/05/20 0748 **AND** POC Blood Glucose, , , Q6H **AND** NOTIFY MD and PharmD, , , Once **AND** glucose 4 g chewable tablet 16 g, 16 g, Oral, Q15 MIN PRN **AND** dextrose 50% (D50W) injection 50 mL, 50 mL, Intravenous, Q15 MIN PRN, Bari Conklin M.D.  •  doxycycline monohydrate (ADOXA) tablet 100 mg, 100 mg, Oral, Q12HRS, Bari Conklin M.D., 100 mg at 08/05/20 0607  •  ipratropium-albuterol (DUONEB) nebulizer solution, 3 mL, Nebulization, Q4H PRN (RT), Bari Conklin M.D.     LABS REVIEWED:  Lab Results   Component Value Date/Time    SODIUM 137 08/04/2020 12:17 AM    POTASSIUM 4.2 08/04/2020 12:17 AM    CHLORIDE 98 08/04/2020 12:17 AM    CO2 25 08/04/2020 12:17 AM    GLUCOSE 234 (H) 08/04/2020 12:17 AM    BUN 22 08/04/2020 12:17 AM    CREATININE 1.07 08/04/2020 12:17 AM    CREATININE 1.0 02/18/2009 09:00 AM      Lab Results   Component Value Date/Time    PROTHROMBTM 14.0 08/04/2020 12:17 AM    INR 1.05 08/04/2020 12:17 AM      Lab Results   Component Value Date/Time    WBC 12.0 (H) 08/04/2020 12:17 AM    RBC 5.42 08/04/2020 12:17 AM    HEMOGLOBIN 12.5 (L) 08/04/2020 12:17 AM    HEMATOCRIT 40.2 (L) 08/04/2020 12:17 AM    MCV 74.2 (L) 08/04/2020 12:17 AM    MCH 23.1 (L) 08/04/2020 12:17 AM    MCHC 31.1 (L) 08/04/2020 12:17 AM    MPV 9.9 08/04/2020 12:17 AM    NEUTSPOLYS 69.40 08/04/2020 12:17 AM    LYMPHOCYTES 20.20 (L) 08/04/2020 12:17 AM    MONOCYTES 8.30 08/04/2020 12:17 AM    EOSINOPHILS 1.10 08/04/2020 12:17 AM    BASOPHILS 0.30 08/04/2020 12:17 AM    HYPOCHROMIA 1+ 06/25/2013 10:30 AM    ANISOCYTOSIS 2+ 07/30/2020 10:49 AM        IMAGING REVIEWED AND INTERPRETED:    ECHOCARDIOGRAM [facility] 8/4/2020:  Mildly reduced left ventricular systolic function.  Left ventricular ejection fraction is visually estimated to be 50-54%.  Moderate  concentric left ventricular hypertrophy.  Critical aortic stenosis. LAWRENCE 0.8cm2  Vmax is 4.8  m/s. Transvalvular gradients are - Peak: 94 mmHg, Mean:  60 mmHg.   Mild mitral regurgitation.  Unable to estimate pulmonary artery pressure due to an inadequate   tricuspid regurgitant jet.    ANGIOGRAM [facility] 8/5/2020:  1. Severe aortic stenosis by non-invasive evaluation  2. Obstructive one vessel CAD- involving the proximal-mid LAD. Diffuse atherosclerosis and a long segment of stenosis from the proximal to mid vessel estimated at 60% stenosis. The distal vessel appears small and underfilled. The iFR is 0.34      Review of Systems   Constitutional: Negative.    HENT: Negative.    Eyes: Negative.    Respiratory: Negative.    Cardiovascular: Negative.    Gastrointestinal: Negative.    Genitourinary: Negative.    Musculoskeletal: Negative.    Skin: Negative.    Neurological: Negative.    Endo/Heme/Allergies: Negative.    Psychiatric/Behavioral: Negative.      Physical Exam   Constitutional: He is oriented to person, place, and time and well-developed, well-nourished, and in no distress. Vital signs are normal. No distress.   HENT:   Head: Normocephalic and atraumatic.   Right Ear: Hearing normal.   Left Ear: Hearing normal.   Nose: Nose normal.   Mouth/Throat: Uvula is midline. Normal dentition.   Eyes: Pupils are equal, round, and reactive to light. Conjunctivae are normal.   Neck: Trachea normal. No JVD present. No thyromegaly present.   Cardiovascular: Normal rate and regular rhythm.   Murmur heard.   Systolic murmur is present with a grade of 3/6.  Pulmonary/Chest: Effort normal and breath sounds normal.   Abdominal: Soft. Bowel sounds are normal. There is no abdominal tenderness.   Musculoskeletal: Normal range of motion.   Neurological: He is alert and oriented to person, place, and time. He has normal motor skills and normal sensation.   Skin: Skin is warm, dry and intact.   Psychiatric: Mood, memory, affect and  judgment normal.         IMPRESSION:  Severe symptomatic aortic stenosis, CAD (s/p PCI), NSTEMI, acute on chronic LV systolic and diastolic failure, peripheral vascular disease, DM type 2.      PLAN:  I recommend that he undergo aortic valve replacement, coronary artery bypass grafting x1 and intraoperative transesophageal echocardiography.    The procedure, its risks, benefits, potential complications and alternative treatments were discussed with the patient in detail including the risks should he decide not to undergo my recommended treatment. All of his questions were answered to his satisfaction and he is willing to proceed with the operation. The risks include death, stroke,  infection: to include a rare bacterial infection related to the use of the heart/lung machine, cherry-operative myocardial infarction, dysrhythmias, diaphragmatic paralysis, chest wall paresthesia, tracheostomy, kidney or other organ failure, possible return to the operating room for bleeding, bleeding requiring transfusion with its attendant risks including AIDS or hepatitis, dehiscence of surgical incisions, respiratory complications including the need for prolonged ventilator support, Protamine or other drug reaction, peripheral neuropathy, loss of limb, and miscount of surgical items. The STS mortality risk score is 3.2% and the morbidity and mortality risk score is 23% for AVR/CABG. The scores were discussed with patient.    The operation will be scheduled at [facility] in the near future per patient's request. He does not want to have the operation during this admission. The risks were clearly explained to him.    Findings and recommendations have been discussed with the patient’s cardiologist Luis Hewitt MD.  Thank you for this very challenging consultation and participation in the patient’s care.  I will keep you apprised of all future developments.      Sincerely,      Dorys Casas MD, FACS.

## 2020-08-06 ENCOUNTER — OFFICE VISIT (OUTPATIENT)
Dept: CARDIOLOGY | Facility: MEDICAL CENTER | Age: 65
End: 2020-08-06
Payer: MEDICARE

## 2020-08-06 VITALS
DIASTOLIC BLOOD PRESSURE: 60 MMHG | OXYGEN SATURATION: 98 % | HEART RATE: 80 BPM | BODY MASS INDEX: 30.49 KG/M2 | SYSTOLIC BLOOD PRESSURE: 108 MMHG | WEIGHT: 213 LBS | HEIGHT: 70 IN

## 2020-08-06 DIAGNOSIS — I25.10 CORONARY ARTERY DISEASE INVOLVING NATIVE CORONARY ARTERY OF NATIVE HEART WITHOUT ANGINA PECTORIS: ICD-10-CM

## 2020-08-06 DIAGNOSIS — Z79.899 HIGH RISK MEDICATION USE: ICD-10-CM

## 2020-08-06 DIAGNOSIS — I35.0 SEVERE AORTIC STENOSIS: ICD-10-CM

## 2020-08-06 DIAGNOSIS — R06.09 DYSPNEA ON EXERTION: ICD-10-CM

## 2020-08-06 DIAGNOSIS — Z95.5 S/P DRUG ELUTING CORONARY STENT PLACEMENT: ICD-10-CM

## 2020-08-06 PROCEDURE — 99214 OFFICE O/P EST MOD 30 MIN: CPT | Performed by: INTERNAL MEDICINE

## 2020-08-06 RX ORDER — FUROSEMIDE 20 MG/1
20 TABLET ORAL DAILY
Qty: 30 TAB | Refills: 3 | Status: SHIPPED | OUTPATIENT
Start: 2020-08-06 | End: 2020-08-27

## 2020-08-06 ASSESSMENT — ENCOUNTER SYMPTOMS
FALLS: 0
SENSORY CHANGE: 0
DEPRESSION: 0
BLURRED VISION: 0
SHORTNESS OF BREATH: 1
DIAPHORESIS: 0
DOUBLE VISION: 0
COUGH: 0
MEMORY LOSS: 0
FEVER: 0
MYALGIAS: 0
HEADACHES: 0
BRUISES/BLEEDS EASILY: 0
ABDOMINAL PAIN: 0
PALPITATIONS: 0
DIZZINESS: 0

## 2020-08-06 ASSESSMENT — FIBROSIS 4 INDEX: FIB4 SCORE: 1.93

## 2020-08-06 NOTE — PROGRESS NOTES
Chief Complaint   Patient presents with   • CHF (Acute)     HF NEW       Subjective:   Sharad Millan is a 64 y.o. male who presents today for cardiac care and management due to established coronary to disease, severe aortic stenosis, hypertension and hyperlipidemia.    Patient is already has a scheduled surgery with CT surgery.  He will undergo one-vessel bypass along with aortic valve replacement.    Patient is feeling better these days. Does get winded upon walking up inclines or for distance. No symptoms at rest or with daily living activities.    Past Medical History:   Diagnosis Date   • Aortic stenosis 10/20/2011   • Arrhythmia    • Breath shortness    • Carotid bruit 12/9/2009   • Cataract     cataract extraction with IOL   • Chronic right shoulder pain    • Diabetes     oral medication   • Fatty liver 1/17/2011   • Heart attack (HCC) 12/2018   • History of cardiac catheterization 7/13/2009   • History of echocardiogram 10/20/2011   • HTN (hypertension) 10/12/2012   • Hypertension    • Memory loss 10/20/2011   • Murmur 7/7/2009   • Obesity 8/23/2011   • Palpitations 10/20/2011   • PSVT (paroxysmal supraventricular tachycardia) ([facility]) 2/14/2012   • S/P coronary artery stent placement 1996    coronary stent implantation [address]   • S/P coronary artery stent placement 1998    coronary stent implantation; [facility]   • S/P coronary artery stent placement 2003    cardiac catheterization; patent stents; [place]   • Sciatica 8/23/2011   • Uncontrolled diabetes mellitus (HCC) 11/29/2010     Past Surgical History:   Procedure Laterality Date   • PO BY LAPAROSCOPY N/A 10/25/2015    Procedure: PO BY LAPAROSCOPY-with grams ;  Surgeon: Ronnie Bowman M.D.;  Location: SURGERY [facility];  Service:    • CAROTID STENT ANGIOGRAPHY  1996   • CATARACT EXTRACTION WITH IOL Bilateral      Family History   Problem Relation Age of Onset   • Heart Disease Mother    • Heart Disease Father      Social  History     Socioeconomic History   • Marital status:      Spouse name: Not on file   • Number of children: Not on file   • Years of education: Not on file   • Highest education level: Not on file   Occupational History   • Not on file   Social Needs   • Financial resource strain: Not on file   • Food insecurity     Worry: Not on file     Inability: Not on file   • Transportation needs     Medical: Not on file     Non-medical: Not on file   Tobacco Use   • Smoking status: Never Smoker   • Smokeless tobacco: Never Used   Substance and Sexual Activity   • Alcohol use: No     Alcohol/week: 0.0 oz   • Drug use: No   • Sexual activity: Yes     Partners: Female   Lifestyle   • Physical activity     Days per week: Not on file     Minutes per session: Not on file   • Stress: Not on file   Relationships   • Social connections     Talks on phone: Not on file     Gets together: Not on file     Attends Mu-ism service: Not on file     Active member of club or organization: Not on file     Attends meetings of clubs or organizations: Not on file     Relationship status: Not on file   • Intimate partner violence     Fear of current or ex partner: Not on file     Emotionally abused: Not on file     Physically abused: Not on file     Forced sexual activity: Not on file   Other Topics Concern   •  Service No   • Blood Transfusions No   • Caffeine Concern No   • Occupational Exposure No   • Hobby Hazards No   • Sleep Concern No   • Stress Concern No   • Weight Concern No   • Special Diet No   • Back Care Yes     Comment: BELT SUPPORT   • Exercise Yes   • Bike Helmet No   • Seat Belt Yes   • Self-Exams No   Social History Narrative   • Not on file     Allergies   Allergen Reactions   • Bydureon [Exenatide] Hives     hives   • Cycloset [Bromocriptine Mesylate] Unspecified     Pt states he can't remember what happens to him.   • Morphine Vomiting     Outpatient Encounter Medications as of 8/6/2020   Medication Sig  "Dispense Refill   • furosemide (LASIX) 20 MG Tab Take 1 Tab by mouth every day. 30 Tab 3   • metoprolol SR (TOPROL XL) 50 MG TABLET SR 24 HR Take 1 Tab by mouth 2 Times a Day. 60 Tab 0   • Empagliflozin (JARDIANCE) 25 MG Tab Take 1 Tab by mouth every day. 90 Tab 1   • metformin (GLUCOPHAGE) 1000 MG tablet Take 1 Tab by mouth 2 times a day. 180 Tab 1   • glipiZIDE SR (GLUCOTROL) 10 MG TABLET SR 24 HR Take 1 Tab by mouth every morning. 90 Tab 1   • losartan (COZAAR) 100 MG Tab TAKE 1 TABLET BY MOUTH EVERY DAY 90 Tab 3   • rosuvastatin (CRESTOR) 20 MG Tab Take 1 Tab by mouth every day. 90 Tab 3   • aspirin EC 81 MG EC tablet Take 1 Tab by mouth every day. 30 Tab 11   • [DISCONTINUED] furosemide (LASIX) 20 MG Tab Take 1 Tab by mouth every day. 60 Tab 0   • [DISCONTINUED] amoxicillin-clavulanate (AUGMENTIN) 875-125 MG Tab Take 1 Tab by mouth every 12 hours for 4 days. (Patient not taking: Reported on 8/6/2020) 8 Tab 0     No facility-administered encounter medications on file as of 8/6/2020.      Review of Systems   Constitutional: Negative for diaphoresis and fever.   HENT: Negative for nosebleeds.    Eyes: Negative for blurred vision and double vision.   Respiratory: Positive for shortness of breath. Negative for cough.    Cardiovascular: Negative for chest pain and palpitations.   Gastrointestinal: Negative for abdominal pain.   Genitourinary: Negative for dysuria and frequency.   Musculoskeletal: Negative for falls and myalgias.   Skin: Negative for rash.   Neurological: Negative for dizziness, sensory change and headaches.   Endo/Heme/Allergies: Does not bruise/bleed easily.   Psychiatric/Behavioral: Negative for depression and memory loss.        Objective:   /60 (BP Location: Right arm, Patient Position: Sitting, BP Cuff Size: Adult)   Pulse 80   Ht 1.778 m (5' 10\")   Wt 96.6 kg (213 lb)   SpO2 98%   BMI 30.56 kg/m²     Physical Exam   Constitutional: He is oriented to person, place, and time. No " distress.   HENT:   Head: Normocephalic and atraumatic.   Right Ear: External ear normal.   Left Ear: External ear normal.   Eyes: Right eye exhibits no discharge. Left eye exhibits no discharge.   Neck: No JVD present. No thyromegaly present.   Cardiovascular: Normal rate, regular rhythm and intact distal pulses. Exam reveals no gallop and no friction rub.   Murmur heard.  4/6 systolic murmur heart best at the aortic area.   Pulmonary/Chest: Breath sounds normal. No respiratory distress.   Abdominal: Bowel sounds are normal. He exhibits no distension. There is no abdominal tenderness.   Musculoskeletal:         General: No tenderness or edema.   Neurological: He is alert and oriented to person, place, and time. No cranial nerve deficit.   Skin: Skin is warm and dry. He is not diaphoretic.   Psychiatric: He has a normal mood and affect. His behavior is normal.   Nursing note and vitals reviewed.      Assessment:     1. Severe aortic stenosis     2. S/P drug eluting coronary stent placement     3. Coronary artery disease involving native coronary artery of native heart without angina pectoris     4. Dyspnea on exertion     5. High risk medication use         Medical Decision Making:  Today's Assessment / Status / Plan:   Await CT surgery.  In the meantime, patient is stable.  Continue current medications without change.  Patient will resume care with his primary cardiologist Dr. Orona in the future.

## 2020-08-11 ENCOUNTER — OFFICE VISIT (OUTPATIENT)
Dept: URGENT CARE | Facility: PHYSICIAN GROUP | Age: 65
End: 2020-08-11
Payer: MEDICARE

## 2020-08-11 ENCOUNTER — HOSPITAL ENCOUNTER (OUTPATIENT)
Dept: RADIOLOGY | Facility: MEDICAL CENTER | Age: 65
End: 2020-08-11
Attending: NURSE PRACTITIONER
Payer: MEDICARE

## 2020-08-11 ENCOUNTER — HOSPITAL ENCOUNTER (OUTPATIENT)
Dept: LAB | Facility: MEDICAL CENTER | Age: 65
End: 2020-08-11
Attending: INTERNAL MEDICINE
Payer: MEDICARE

## 2020-08-11 VITALS
TEMPERATURE: 96.9 F | RESPIRATION RATE: 22 BRPM | HEART RATE: 82 BPM | OXYGEN SATURATION: 99 % | HEIGHT: 70 IN | WEIGHT: 213 LBS | SYSTOLIC BLOOD PRESSURE: 100 MMHG | BODY MASS INDEX: 30.49 KG/M2 | DIASTOLIC BLOOD PRESSURE: 56 MMHG

## 2020-08-11 DIAGNOSIS — I35.0 SEVERE AORTIC STENOSIS: ICD-10-CM

## 2020-08-11 DIAGNOSIS — R06.09 EXERTIONAL DYSPNEA: ICD-10-CM

## 2020-08-11 DIAGNOSIS — R05.9 COUGH: ICD-10-CM

## 2020-08-11 DIAGNOSIS — J18.9 PNEUMONIA OF RIGHT LUNG DUE TO INFECTIOUS ORGANISM, UNSPECIFIED PART OF LUNG: Primary | ICD-10-CM

## 2020-08-11 PROCEDURE — 71046 X-RAY EXAM CHEST 2 VIEWS: CPT

## 2020-08-11 PROCEDURE — 99214 OFFICE O/P EST MOD 30 MIN: CPT | Performed by: NURSE PRACTITIONER

## 2020-08-11 PROCEDURE — 82570 ASSAY OF URINE CREATININE: CPT

## 2020-08-11 PROCEDURE — 82043 UR ALBUMIN QUANTITATIVE: CPT

## 2020-08-11 RX ORDER — AMOXICILLIN AND CLAVULANATE POTASSIUM 875; 125 MG/1; MG/1
1 TABLET, FILM COATED ORAL 2 TIMES DAILY
Qty: 20 TAB | Refills: 0 | Status: SHIPPED | OUTPATIENT
Start: 2020-08-11 | End: 2020-08-21

## 2020-08-11 RX ORDER — DOXYCYCLINE HYCLATE 100 MG/1
100 CAPSULE ORAL 2 TIMES DAILY
Qty: 20 CAP | Refills: 0 | Status: SHIPPED | OUTPATIENT
Start: 2020-08-11 | End: 2020-08-21

## 2020-08-11 ASSESSMENT — ENCOUNTER SYMPTOMS
SPUTUM PRODUCTION: 0
LEG PAIN: 0
ABDOMINAL PAIN: 0
HEADACHES: 0
RHINORRHEA: 0
ORTHOPNEA: 1
VOMITING: 0
SYNCOPE: 0
SWOLLEN GLANDS: 0
PND: 0
SHORTNESS OF BREATH: 1
WHEEZING: 0
CLAUDICATION: 0
NECK PAIN: 0
FEVER: 0
HEMOPTYSIS: 0
SORE THROAT: 0

## 2020-08-11 ASSESSMENT — FIBROSIS 4 INDEX: FIB4 SCORE: 1.93

## 2020-08-11 NOTE — PROGRESS NOTES
Subjective:      Sharad Millan is a 64 y.o. male who presents with Shortness of Breath (productive cough, tickle in chest like pneumonia x1day was hospitalized)            Shortness of Breath  This is a new problem. The current episode started in the past 7 days (was just discharged from hospital. Scheduled for valve replacement in his heart on Sept 2 ). The problem has been gradually worsening. Associated symptoms include orthopnea. Pertinent negatives include no abdominal pain, chest pain, claudication, coryza, ear pain, fever, headaches, hemoptysis, leg pain, leg swelling, neck pain, PND, rash, rhinorrhea, sore throat, sputum production, swollen glands, syncope, vomiting or wheezing. Exacerbated by: laying down. He has tried nothing for the symptoms.   On his discharge paperwork it says he was suppose to be discharged with an antibiotic. His pharmacy says that the antibiotic was discontinued and he never got it.   Taking all medications as prescribed.     Review of Systems   Constitutional: Negative for fever.   HENT: Negative for ear pain, rhinorrhea and sore throat.    Respiratory: Positive for shortness of breath. Negative for hemoptysis, sputum production and wheezing.    Cardiovascular: Positive for orthopnea. Negative for chest pain, claudication, leg swelling, syncope and PND.   Gastrointestinal: Negative for abdominal pain and vomiting.   Musculoskeletal: Negative for neck pain.   Skin: Negative for rash.   Neurological: Negative for headaches.     Allergies   Allergen Reactions   • Bydureon [Exenatide] Hives     hives   • Cycloset [Bromocriptine Mesylate] Unspecified     Pt states he can't remember what happens to him.   • Morphine Vomiting     Past Medical History:   Diagnosis Date   • Aortic stenosis 10/20/2011   • Arrhythmia    • Breath shortness    • Carotid bruit 12/9/2009   • Cataract     cataract extraction with IOL   • Chronic right shoulder pain    • Diabetes     oral medication   • Fatty  liver 1/17/2011   • Heart attack (HCC) 12/2018   • History of cardiac catheterization 7/13/2009   • History of echocardiogram 10/20/2011   • HTN (hypertension) 10/12/2012   • Hypertension    • Memory loss 10/20/2011   • Murmur 7/7/2009   • Obesity 8/23/2011   • Palpitations 10/20/2011   • PSVT (paroxysmal supraventricular tachycardia) (HCC) 2/14/2012   • S/P coronary artery stent placement 1996    coronary stent implantation 3, [facility]   • S/P coronary artery stent placement 1998    coronary stent implantation; [facility]   • S/P coronary artery stent placement 2003    cardiac catheterization; patent stents; [place]   • Sciatica 8/23/2011   • Uncontrolled diabetes mellitus (HCC) 11/29/2010     Past Surgical History:   Procedure Laterality Date   • PO BY LAPAROSCOPY N/A 10/25/2015    Procedure: PO BY LAPAROSCOPY-with grams ;  Surgeon: Ronnie Bowman M.D.;  Location: SURGERY [facility];  Service:    • CAROTID STENT ANGIOGRAPHY  1996   • CATARACT EXTRACTION WITH IOL Bilateral      Family History   Problem Relation Age of Onset   • Heart Disease Mother    • Heart Disease Father      Social History     Tobacco Use   • Smoking status: Never Smoker   • Smokeless tobacco: Never Used   Substance Use Topics   • Alcohol use: No     Alcohol/week: 0.0 oz     Patient Active Problem List   Diagnosis   • CAD (coronary artery disease)   • HTN (hypertension)   • Dyslipidemia   • Fatty liver   • Carotid bruit   • History of cardiac catheterization   • Obesity   • Murmur   • Memory loss   • Palpitations   • Sciatica   • Essential hypertension   • Vitamin B12 deficiency   • HDL deficiency   • Vitamin D deficiency disease   • DM2 (diabetes mellitus, type 2) ([facility])   • SVT (supraventricular tachycardia) ([facility])   • S/P drug eluting coronary stent placement   • History of multiple concussions   • Obesity (BMI 30-39.9)   • Chronic hyponatremia   • Precordial pain   • Tremor   • Raynaud's disease without gangrene   •  "Nonrheumatic aortic (valve) stenosis   • Coronary artery disease, occlusive   • Precordial chest pain   • Ambulatory dysfunction   • Unstable angina pectoris (HCC)   • Severe aortic stenosis     Current Outpatient Medications on File Prior to Visit   Medication Sig Dispense Refill   • metoprolol SR (TOPROL XL) 50 MG TABLET SR 24 HR Take 1 Tab by mouth 2 Times a Day. 60 Tab 0   • metformin (GLUCOPHAGE) 1000 MG tablet Take 1 Tab by mouth 2 times a day. 180 Tab 1   • losartan (COZAAR) 100 MG Tab TAKE 1 TABLET BY MOUTH EVERY DAY 90 Tab 3   • rosuvastatin (CRESTOR) 20 MG Tab Take 1 Tab by mouth every day. 90 Tab 3   • aspirin EC 81 MG EC tablet Take 1 Tab by mouth every day. 30 Tab 11   • furosemide (LASIX) 20 MG Tab Take 1 Tab by mouth every day. 30 Tab 3   • Empagliflozin (JARDIANCE) 25 MG Tab Take 1 Tab by mouth every day. 90 Tab 1   • glipiZIDE SR (GLUCOTROL) 10 MG TABLET SR 24 HR Take 1 Tab by mouth every morning. 90 Tab 1     No current facility-administered medications on file prior to visit.           Objective:     /56   Pulse 82   Temp 36.1 °C (96.9 °F) (Temporal)   Resp (!) 22   Ht 1.778 m (5' 10\")   Wt 96.6 kg (213 lb)   SpO2 99%   BMI 30.56 kg/m²      Physical Exam  Vitals signs reviewed.   Constitutional:       General: He is not in acute distress.     Appearance: Normal appearance. He is normal weight. He is not ill-appearing.   HENT:      Head: Normocephalic.   Neck:      Musculoskeletal: Normal range of motion and neck supple.   Cardiovascular:      Rate and Rhythm: Normal rate.      Pulses: Normal pulses.      Heart sounds: Murmur present.   Pulmonary:      Effort: Pulmonary effort is normal. No accessory muscle usage.      Breath sounds: Decreased breath sounds and wheezing present.      Comments: Speaks in full sentences.  Deep congested cough periodically during exam    Musculoskeletal:      Right lower le+ Edema present.      Left lower le+ Edema present.   Skin:     General: " Skin is warm.      Capillary Refill: Capillary refill takes less than 2 seconds.   Neurological:      Mental Status: He is alert and oriented to person, place, and time.   Psychiatric:         Mood and Affect: Mood normal.         Thought Content: Thought content normal.         Judgment: Judgment normal.         CXR:   8/11/2020 4:30 PM     HISTORY/REASON FOR EXAM: Cough.  Shortness of breath     TECHNIQUE/EXAM DESCRIPTION AND NUMBER OF VIEWS:  Two views of the chest.     COMPARISON:  August 3     FINDINGS:  Cardiomediastinal contours are stable with upper normal heart size.     There is similar increased right perihilar opacity which is mild. This most likely is in the middle lobe.     No pleural space process is evident.     IMPRESSION:     Right perihilar opacification is similar to comparison and is concerning for pneumonia.             Last Resulted: 08/11/20  4:37 PM                  Assessment/Plan:         1. Pneumonia of right lung due to infectious organism, unspecified part of lung  amoxicillin-clavulanate (AUGMENTIN) 875-125 MG Tab    doxycycline (VIBRAMYCIN) 100 MG Cap   2. Cough  DX-CHEST-2 VIEWS   3. Exertional dyspnea  DX-CHEST-2 VIEWS   4. Severe aortic stenosis       Dual coverage antibiotic due to recent hospitalization, antibiotic use and risk factors.     Educated in proper administration of medication(s) ordered today including safety, possible SE, risks, benefits, rationale and alternatives to therapy.     Educated in proper administration of medication(s) ordered today including safety, possible SE, risks, benefits, rationale and alternatives to therapy.     FU pcp w/in 7 -10 days.     Consider probiotic use due to recent antibiotics.

## 2020-08-12 LAB
CREAT UR-MCNC: 24.64 MG/DL
MICROALBUMIN UR-MCNC: 1.5 MG/DL
MICROALBUMIN/CREAT UR: 61 MG/G (ref 0–30)

## 2020-08-13 ENCOUNTER — HOSPITAL ENCOUNTER (OUTPATIENT)
Facility: MEDICAL CENTER | Age: 65
End: 2020-08-13
Attending: THORACIC SURGERY (CARDIOTHORACIC VASCULAR SURGERY) | Admitting: THORACIC SURGERY (CARDIOTHORACIC VASCULAR SURGERY)
Payer: MEDICARE

## 2020-08-19 ENCOUNTER — TELEPHONE (OUTPATIENT)
Dept: CARDIOTHORACIC SURGERY | Facility: MEDICAL CENTER | Age: 65
End: 2020-08-19

## 2020-08-19 ENCOUNTER — TELEPHONE (OUTPATIENT)
Dept: CARDIOLOGY | Facility: MEDICAL CENTER | Age: 65
End: 2020-08-19

## 2020-08-19 NOTE — TELEPHONE ENCOUNTER
TT    Patient has had a drop in BP in the last few days. PT states lowest was 95/53 but, over the last few days it has been under 100/60. Please call the Pt back at [phone].    Thank you,  Tresa KNUTSON

## 2020-08-19 NOTE — TELEPHONE ENCOUNTER
Returned call. Pt said that his BP has always run around 120s/70s, but ever since his recent hospital admission when he was started on Lasix his BP has been around 100/50-60s. Yesterday at his PCP, Dr. Khoury's office it was 105/58. He said his PCP wasn't too concerned, but he wanted to check with us if this was too low. He says that he is feeling fine, no symptoms other than occasional lightheadedness, which he said he had prior to the Lasix as well. He has been hydrating well and estimates that he drinks over 2L of water per day. Advised that as long as he is feeling ok, The BP range he is describing isn't overly concerning. Encouraged him to notify us if he develops new or worsening symptoms or isn't tolerating lower BP.

## 2020-08-20 ENCOUNTER — TELEPHONE (OUTPATIENT)
Dept: CARDIOTHORACIC SURGERY | Facility: MEDICAL CENTER | Age: 65
End: 2020-08-20

## 2020-08-20 NOTE — TELEPHONE ENCOUNTER
Called patient back to schedule pre op education and prehabilitation.  Time and date were set for Wednesday the 26th of August at 10:00.    Call time 5 minutes.

## 2020-08-26 ENCOUNTER — NON-PROVIDER VISIT (OUTPATIENT)
Dept: CARDIOTHORACIC SURGERY | Facility: MEDICAL CENTER | Age: 65
End: 2020-08-26
Payer: MEDICARE

## 2020-08-26 ENCOUNTER — HOSPITAL ENCOUNTER (OUTPATIENT)
Dept: RADIOLOGY | Facility: MEDICAL CENTER | Age: 65
DRG: 219 | End: 2020-08-26
Attending: INTERNAL MEDICINE
Payer: MEDICARE

## 2020-08-26 DIAGNOSIS — R06.02 SHORTNESS OF BREATH: ICD-10-CM

## 2020-08-26 DIAGNOSIS — R05.9 COUGH: ICD-10-CM

## 2020-08-26 PROCEDURE — 71046 X-RAY EXAM CHEST 2 VIEWS: CPT

## 2020-08-26 NOTE — NON-PROVIDER
Problem: Pre Op  Goal: Optimal preparation for CABG/Heart Valve surgery    Intervention: Pre Op education to patient. Provide patient with Patient Guideline for Cardiac Surgery (See Pt. Ed.)  Discussed anatomy and physiology of cardiac surgery with patient and family. Reviewed post-op expectations to include ventilator management, cardiac monitoring, tubes and drains, the use of incentive spirometry with return demonstration, early ambulation,including walking 4 times a day, up in the recliner 3 times a day for meals, cough and deep breathing with heart pillow, chest splinting with pillow, pain control, and expected length of stay. Also provided information on Cardiac Rehab and how to schedule an appointment. Patient and family state full understanding of all information given.    Intervention: Baseline assessment to include IS volume and lung sounds.  Pre-op IS: 3250  Prehabilitation IS: 3000    Intervention: Patient instructed to be NPO at midnight except cardiac medications and the Powerade drink upon waking the morning of surgery.  Drink to be provided in pre op appointment the day before surgery. (No ASA, coumadin or Plavix)    Intervention: Shower with chlorhexidine x 2  Instructed patient to wash entire body with chlorhexidine wipes prior to bedtime the night before surgery.  Wipes to be provided in pre op appointment the day before surgery.  Patient instructed to NOT shave prior to surgery.    Intervention:  5 Meter walk test times were 5 5 5.    He was instructed to get a Covid test by tomorrow as his surgery is next wednesday; address and times provided.    He was instructed to not take his losartan Tuesday, and as he takes his ASA in the evening to not take it on Tuesday either.    Of note patient presented to education with worsening symptoms that were reviewed and investigated in depth during this visit.    He had redness and swelling in bilateral upper arms which appeared after a brief stint out int he  "sun, I also noted white discoloration in bilateral hands and poor cap refill, approximately 4-5 seconds. Discovered that after his last hospitalization he had a bout of PNA and was placed on amoxicillin and doxycycline, and attributed the redness to sunburn.  He was instructed to stay out of the sun or wear a long sleeved shirt.    He also reported he was seen by his endocrinologist and was switched from oral diabetic medications to insulin, humalog and tresiba.  He has a blood sugar monitoring device attached to the back of his right arm; he states he can remove this prior to surgery next Wednesday.  He  Reports since starting the insulin shots constipation and abdominal distension, his abdomen was hard to the touch; bowel sounds auscultated; he states he has had to take senokot TID and an occasional fleet enema for bowel movements.    He reports worsening swelling in his feet; reports his feet are about twice the size they normally are; 3-4+ pitting edema upon exam; and skin up to his knee is tight and swollen bilaterally.  He remains on lasix 20 mg QD from his prior hospital discharge.    He also reports his blood pressures have been lower \"than they've ever been before\" reporting that at the doctors office (endocronologist) they were in the 90's and this was consistent when he took them at home, he reports being asymptomatic.  I took his BP which was 118/60.    He has reported orthopnea, is sleeping in a recliner, and has major coughing bouts when he leans too far to the side, or is slightly recumbent.  He reports his sputum as white, no reports of pink frothy sputum, yellow or green either.  Lungs auscultated; clear but loud audible murmur noted.    These findings were reported to the surgery team;  He does not feel at this time he needs to go to the hospital for his symptoms.  He was told if he declines or feels he needs medical attention to do so and call our office to let us know.    Education time: 1 hour " 45 minutes.

## 2020-08-27 ENCOUNTER — APPOINTMENT (OUTPATIENT)
Dept: ADMISSIONS | Facility: MEDICAL CENTER | Age: 65
End: 2020-08-27
Attending: THORACIC SURGERY (CARDIOTHORACIC VASCULAR SURGERY)
Payer: MEDICARE

## 2020-08-27 ENCOUNTER — TELEPHONE (OUTPATIENT)
Dept: CARDIOTHORACIC SURGERY | Facility: MEDICAL CENTER | Age: 65
End: 2020-08-27

## 2020-08-27 ENCOUNTER — APPOINTMENT (OUTPATIENT)
Dept: RADIOLOGY | Facility: MEDICAL CENTER | Age: 65
DRG: 219 | End: 2020-08-27
Attending: EMERGENCY MEDICINE
Payer: MEDICARE

## 2020-08-27 ENCOUNTER — HOSPITAL ENCOUNTER (INPATIENT)
Facility: MEDICAL CENTER | Age: 65
LOS: 12 days | DRG: 219 | End: 2020-09-08
Attending: EMERGENCY MEDICINE | Admitting: HOSPITALIST
Payer: MEDICARE

## 2020-08-27 DIAGNOSIS — I35.0 SEVERE AORTIC STENOSIS: ICD-10-CM

## 2020-08-27 DIAGNOSIS — I50.23 ACUTE ON CHRONIC SYSTOLIC HEART FAILURE (HCC): ICD-10-CM

## 2020-08-27 DIAGNOSIS — J81.0 ACUTE PULMONARY EDEMA (HCC): ICD-10-CM

## 2020-08-27 DIAGNOSIS — G89.18 ACUTE POSTOPERATIVE PAIN: ICD-10-CM

## 2020-08-27 DIAGNOSIS — R60.9 PERIPHERAL EDEMA: ICD-10-CM

## 2020-08-27 DIAGNOSIS — I47.10 SVT (SUPRAVENTRICULAR TACHYCARDIA) (HCC): ICD-10-CM

## 2020-08-27 DIAGNOSIS — I25.110 CORONARY ARTERY DISEASE INVOLVING NATIVE CORONARY ARTERY OF NATIVE HEART WITH UNSTABLE ANGINA PECTORIS (HCC): Chronic | ICD-10-CM

## 2020-08-27 DIAGNOSIS — Z95.2 S/P AVR (AORTIC VALVE REPLACEMENT): ICD-10-CM

## 2020-08-27 LAB
ALBUMIN SERPL BCP-MCNC: 4.2 G/DL (ref 3.2–4.9)
ALBUMIN/GLOB SERPL: 1.6 G/DL
ALP SERPL-CCNC: 59 U/L (ref 30–99)
ALT SERPL-CCNC: 35 U/L (ref 2–50)
ANION GAP SERPL CALC-SCNC: 17 MMOL/L (ref 7–16)
APTT PPP: 27.7 SEC (ref 24.7–36)
AST SERPL-CCNC: 22 U/L (ref 12–45)
BASOPHILS # BLD AUTO: 0.4 % (ref 0–1.8)
BASOPHILS # BLD: 0.04 K/UL (ref 0–0.12)
BILIRUB SERPL-MCNC: 0.4 MG/DL (ref 0.1–1.5)
BUN SERPL-MCNC: 18 MG/DL (ref 8–22)
CALCIUM SERPL-MCNC: 9.1 MG/DL (ref 8.5–10.5)
CHLORIDE SERPL-SCNC: 96 MMOL/L (ref 96–112)
CO2 SERPL-SCNC: 22 MMOL/L (ref 20–33)
COVID ORDER STATUS COVID19: NORMAL
CREAT SERPL-MCNC: 1.17 MG/DL (ref 0.5–1.4)
EKG IMPRESSION: NORMAL
EOSINOPHIL # BLD AUTO: 0.07 K/UL (ref 0–0.51)
EOSINOPHIL NFR BLD: 0.7 % (ref 0–6.9)
ERYTHROCYTE [DISTWIDTH] IN BLOOD BY AUTOMATED COUNT: 42 FL (ref 35.9–50)
GLOBULIN SER CALC-MCNC: 2.7 G/DL (ref 1.9–3.5)
GLUCOSE BLD-MCNC: 140 MG/DL (ref 65–99)
GLUCOSE SERPL-MCNC: 209 MG/DL (ref 65–99)
HCT VFR BLD AUTO: 40 % (ref 42–52)
HGB BLD-MCNC: 12 G/DL (ref 14–18)
IMM GRANULOCYTES # BLD AUTO: 0.02 K/UL (ref 0–0.11)
IMM GRANULOCYTES NFR BLD AUTO: 0.2 % (ref 0–0.9)
INR PPP: 1 (ref 0.87–1.13)
LYMPHOCYTES # BLD AUTO: 1.74 K/UL (ref 1–4.8)
LYMPHOCYTES NFR BLD: 17 % (ref 22–41)
MCH RBC QN AUTO: 22.3 PG (ref 27–33)
MCHC RBC AUTO-ENTMCNC: 30 G/DL (ref 33.7–35.3)
MCV RBC AUTO: 74.3 FL (ref 81.4–97.8)
MONOCYTES # BLD AUTO: 1.43 K/UL (ref 0–0.85)
MONOCYTES NFR BLD AUTO: 14 % (ref 0–13.4)
NEUTROPHILS # BLD AUTO: 6.94 K/UL (ref 1.82–7.42)
NEUTROPHILS NFR BLD: 67.7 % (ref 44–72)
NRBC # BLD AUTO: 0 K/UL
NRBC BLD-RTO: 0 /100 WBC
NT-PROBNP SERPL IA-MCNC: 4563 PG/ML (ref 0–125)
PLATELET # BLD AUTO: 124 K/UL (ref 164–446)
PMV BLD AUTO: 10.1 FL (ref 9–12.9)
POTASSIUM SERPL-SCNC: 3.9 MMOL/L (ref 3.6–5.5)
PROT SERPL-MCNC: 6.9 G/DL (ref 6–8.2)
PROTHROMBIN TIME: 13.5 SEC (ref 12–14.6)
RBC # BLD AUTO: 5.38 M/UL (ref 4.7–6.1)
SODIUM SERPL-SCNC: 135 MMOL/L (ref 135–145)
TROPONIN T SERPL-MCNC: 24 NG/L (ref 6–19)
WBC # BLD AUTO: 10.2 K/UL (ref 4.8–10.8)

## 2020-08-27 PROCEDURE — 96375 TX/PRO/DX INJ NEW DRUG ADDON: CPT

## 2020-08-27 PROCEDURE — 80053 COMPREHEN METABOLIC PANEL: CPT

## 2020-08-27 PROCEDURE — 93005 ELECTROCARDIOGRAM TRACING: CPT | Performed by: HOSPITALIST

## 2020-08-27 PROCEDURE — 93005 ELECTROCARDIOGRAM TRACING: CPT | Performed by: EMERGENCY MEDICINE

## 2020-08-27 PROCEDURE — 99223 1ST HOSP IP/OBS HIGH 75: CPT | Performed by: HOSPITALIST

## 2020-08-27 PROCEDURE — U0003 INFECTIOUS AGENT DETECTION BY NUCLEIC ACID (DNA OR RNA); SEVERE ACUTE RESPIRATORY SYNDROME CORONAVIRUS 2 (SARS-COV-2) (CORONAVIRUS DISEASE [COVID-19]), AMPLIFIED PROBE TECHNIQUE, MAKING USE OF HIGH THROUGHPUT TECHNOLOGIES AS DESCRIBED BY CMS-2020-01-R: HCPCS

## 2020-08-27 PROCEDURE — 99285 EMERGENCY DEPT VISIT HI MDM: CPT

## 2020-08-27 PROCEDURE — 84484 ASSAY OF TROPONIN QUANT: CPT

## 2020-08-27 PROCEDURE — 700111 HCHG RX REV CODE 636 W/ 250 OVERRIDE (IP): Performed by: EMERGENCY MEDICINE

## 2020-08-27 PROCEDURE — 93010 ELECTROCARDIOGRAM REPORT: CPT | Performed by: INTERNAL MEDICINE

## 2020-08-27 PROCEDURE — 96374 THER/PROPH/DIAG INJ IV PUSH: CPT

## 2020-08-27 PROCEDURE — 82962 GLUCOSE BLOOD TEST: CPT

## 2020-08-27 PROCEDURE — 85025 COMPLETE CBC W/AUTO DIFF WBC: CPT

## 2020-08-27 PROCEDURE — A9270 NON-COVERED ITEM OR SERVICE: HCPCS | Performed by: HOSPITALIST

## 2020-08-27 PROCEDURE — 700111 HCHG RX REV CODE 636 W/ 250 OVERRIDE (IP): Performed by: HOSPITALIST

## 2020-08-27 PROCEDURE — 770020 HCHG ROOM/CARE - TELE (206)

## 2020-08-27 PROCEDURE — 85730 THROMBOPLASTIN TIME PARTIAL: CPT

## 2020-08-27 PROCEDURE — C9803 HOPD COVID-19 SPEC COLLECT: HCPCS | Performed by: HOSPITALIST

## 2020-08-27 PROCEDURE — 83880 ASSAY OF NATRIURETIC PEPTIDE: CPT

## 2020-08-27 PROCEDURE — 94760 N-INVAS EAR/PLS OXIMETRY 1: CPT

## 2020-08-27 PROCEDURE — 71045 X-RAY EXAM CHEST 1 VIEW: CPT

## 2020-08-27 PROCEDURE — 85610 PROTHROMBIN TIME: CPT

## 2020-08-27 PROCEDURE — 700102 HCHG RX REV CODE 250 W/ 637 OVERRIDE(OP): Performed by: HOSPITALIST

## 2020-08-27 RX ORDER — ACETAMINOPHEN 325 MG/1
650 TABLET ORAL EVERY 6 HOURS PRN
Status: DISCONTINUED | OUTPATIENT
Start: 2020-08-27 | End: 2020-08-31

## 2020-08-27 RX ORDER — ADENOSINE 3 MG/ML
6 INJECTION, SOLUTION INTRAVENOUS ONCE
Status: COMPLETED | OUTPATIENT
Start: 2020-08-27 | End: 2020-08-27

## 2020-08-27 RX ORDER — ADENOSINE 3 MG/ML
12 INJECTION, SOLUTION INTRAVENOUS ONCE
Status: COMPLETED | OUTPATIENT
Start: 2020-08-27 | End: 2020-08-27

## 2020-08-27 RX ORDER — FUROSEMIDE 10 MG/ML
20 INJECTION INTRAMUSCULAR; INTRAVENOUS
Status: DISCONTINUED | OUTPATIENT
Start: 2020-08-27 | End: 2020-08-30

## 2020-08-27 RX ORDER — ONDANSETRON 2 MG/ML
4 INJECTION INTRAMUSCULAR; INTRAVENOUS EVERY 4 HOURS PRN
Status: DISCONTINUED | OUTPATIENT
Start: 2020-08-27 | End: 2020-08-31

## 2020-08-27 RX ORDER — AMOXICILLIN AND CLAVULANATE POTASSIUM 875; 125 MG/1; MG/1
1 TABLET, FILM COATED ORAL 2 TIMES DAILY
Status: ON HOLD | COMMUNITY
Start: 2020-08-11 | End: 2020-09-08

## 2020-08-27 RX ORDER — BISACODYL 10 MG
10 SUPPOSITORY, RECTAL RECTAL
Status: DISCONTINUED | OUTPATIENT
Start: 2020-08-27 | End: 2020-08-31

## 2020-08-27 RX ORDER — LOSARTAN POTASSIUM 50 MG/1
100 TABLET ORAL
Status: DISCONTINUED | OUTPATIENT
Start: 2020-08-27 | End: 2020-08-30

## 2020-08-27 RX ORDER — LOSARTAN POTASSIUM 100 MG/1
100 TABLET ORAL EVERY MORNING
COMMUNITY
End: 2021-06-11

## 2020-08-27 RX ORDER — ROSUVASTATIN CALCIUM 10 MG/1
20 TABLET, COATED ORAL EVERY EVENING
Status: DISCONTINUED | OUTPATIENT
Start: 2020-08-27 | End: 2020-09-08 | Stop reason: HOSPADM

## 2020-08-27 RX ORDER — INSULIN DEGLUDEC INJECTION 100 U/ML
30 INJECTION, SOLUTION SUBCUTANEOUS
COMMUNITY
End: 2021-08-11

## 2020-08-27 RX ORDER — ROSUVASTATIN CALCIUM 20 MG/1
20 TABLET, COATED ORAL EVERY EVENING
COMMUNITY
End: 2021-02-17

## 2020-08-27 RX ORDER — ONDANSETRON 4 MG/1
4 TABLET, ORALLY DISINTEGRATING ORAL EVERY 4 HOURS PRN
Status: DISCONTINUED | OUTPATIENT
Start: 2020-08-27 | End: 2020-08-31

## 2020-08-27 RX ORDER — AMOXICILLIN 250 MG
2 CAPSULE ORAL 2 TIMES DAILY
Status: DISCONTINUED | OUTPATIENT
Start: 2020-08-27 | End: 2020-08-31

## 2020-08-27 RX ORDER — METOPROLOL SUCCINATE 50 MG/1
50 TABLET, EXTENDED RELEASE ORAL 2 TIMES DAILY
Status: DISCONTINUED | OUTPATIENT
Start: 2020-08-27 | End: 2020-08-28

## 2020-08-27 RX ORDER — DEXTROSE MONOHYDRATE 25 G/50ML
50 INJECTION, SOLUTION INTRAVENOUS
Status: DISCONTINUED | OUTPATIENT
Start: 2020-08-27 | End: 2020-08-29

## 2020-08-27 RX ORDER — METOPROLOL SUCCINATE 50 MG/1
50 TABLET, EXTENDED RELEASE ORAL 2 TIMES DAILY
Status: ON HOLD | COMMUNITY
End: 2020-09-08

## 2020-08-27 RX ORDER — EMPAGLIFLOZIN 25 MG/1
25 TABLET, FILM COATED ORAL EVERY MORNING
COMMUNITY

## 2020-08-27 RX ORDER — ENALAPRILAT 1.25 MG/ML
1.25 INJECTION INTRAVENOUS EVERY 6 HOURS PRN
Status: DISCONTINUED | OUTPATIENT
Start: 2020-08-27 | End: 2020-08-31

## 2020-08-27 RX ORDER — DOXYCYCLINE HYCLATE 100 MG
100 TABLET ORAL 2 TIMES DAILY
Status: ON HOLD | COMMUNITY
Start: 2020-08-11 | End: 2020-09-08

## 2020-08-27 RX ORDER — FUROSEMIDE 20 MG/1
20 TABLET ORAL DAILY
COMMUNITY
End: 2020-09-15 | Stop reason: SDUPTHER

## 2020-08-27 RX ORDER — POLYETHYLENE GLYCOL 3350 17 G/17G
1 POWDER, FOR SOLUTION ORAL
Status: DISCONTINUED | OUTPATIENT
Start: 2020-08-27 | End: 2020-08-31

## 2020-08-27 RX ADMIN — ROSUVASTATIN CALCIUM 20 MG: 20 TABLET, FILM COATED ORAL at 20:27

## 2020-08-27 RX ADMIN — LOSARTAN POTASSIUM 100 MG: 50 TABLET ORAL at 22:42

## 2020-08-27 RX ADMIN — DOCUSATE SODIUM 50 MG AND SENNOSIDES 8.6 MG 2 TABLET: 8.6; 5 TABLET, FILM COATED ORAL at 20:27

## 2020-08-27 RX ADMIN — ADENOSINE 6 MG: 3 INJECTION, SOLUTION INTRAVENOUS at 17:20

## 2020-08-27 RX ADMIN — FUROSEMIDE 20 MG: 10 INJECTION, SOLUTION INTRAMUSCULAR; INTRAVENOUS at 20:27

## 2020-08-27 RX ADMIN — ADENOSINE 12 MG: 3 INJECTION, SOLUTION INTRAVENOUS at 17:24

## 2020-08-27 RX ADMIN — METOPROLOL SUCCINATE 50 MG: 50 TABLET, EXTENDED RELEASE ORAL at 20:27

## 2020-08-27 ASSESSMENT — FIBROSIS 4 INDEX
FIB4 SCORE: 1.93
FIB4 SCORE: 1.95

## 2020-08-27 ASSESSMENT — ENCOUNTER SYMPTOMS
STRIDOR: 0
BACK PAIN: 0
SHORTNESS OF BREATH: 1
ABDOMINAL PAIN: 0
SENSORY CHANGE: 0
FEVER: 0
DIZZINESS: 0
SPUTUM PRODUCTION: 0
PALPITATIONS: 1
SPEECH CHANGE: 0
CONSTIPATION: 1
BLOOD IN STOOL: 0
INSOMNIA: 1
CHILLS: 0
NAUSEA: 0
NERVOUS/ANXIOUS: 0
HEADACHES: 0
COUGH: 1
DIARRHEA: 0
EYE DISCHARGE: 0

## 2020-08-27 NOTE — TELEPHONE ENCOUNTER
Received a call from patient's primary care physician, Dr. Khoury.  Patient has had an increase in shortness of breath and lower extremity edema.  He presented to Dr. Khoury's office and received a chest xray that showed worsening pulmonary infiltrates/poss pneumonia.  Was given shot of abx and IV furosemide and on the way home worsened.  Returned to PCP and 911 system was activated.  Dr. Khoury called office to update Dr. Casas that he would be sending patient to the ER at [facility].  Dr. Casas is aware.  Our team will keep an eye on his progress.

## 2020-08-28 ENCOUNTER — APPOINTMENT (OUTPATIENT)
Dept: RADIOLOGY | Facility: MEDICAL CENTER | Age: 65
DRG: 219 | End: 2020-08-28
Attending: CLINICAL NURSE SPECIALIST
Payer: MEDICARE

## 2020-08-28 LAB
ANION GAP SERPL CALC-SCNC: 14 MMOL/L (ref 7–16)
ANION GAP SERPL CALC-SCNC: 15 MMOL/L (ref 7–16)
ANISOCYTOSIS BLD QL SMEAR: ABNORMAL
BASOPHILS # BLD AUTO: 0.4 % (ref 0–1.8)
BASOPHILS # BLD: 0.03 K/UL (ref 0–0.12)
BUN SERPL-MCNC: 17 MG/DL (ref 8–22)
BUN SERPL-MCNC: 20 MG/DL (ref 8–22)
CALCIUM SERPL-MCNC: 8.8 MG/DL (ref 8.5–10.5)
CALCIUM SERPL-MCNC: 9.2 MG/DL (ref 8.5–10.5)
CHLORIDE SERPL-SCNC: 95 MMOL/L (ref 96–112)
CHLORIDE SERPL-SCNC: 98 MMOL/L (ref 96–112)
CO2 SERPL-SCNC: 21 MMOL/L (ref 20–33)
CO2 SERPL-SCNC: 24 MMOL/L (ref 20–33)
COMMENT 1642: NORMAL
CREAT SERPL-MCNC: 1.04 MG/DL (ref 0.5–1.4)
CREAT SERPL-MCNC: 1.08 MG/DL (ref 0.5–1.4)
EOSINOPHIL # BLD AUTO: 0.08 K/UL (ref 0–0.51)
EOSINOPHIL NFR BLD: 1 % (ref 0–6.9)
ERYTHROCYTE [DISTWIDTH] IN BLOOD BY AUTOMATED COUNT: 42.5 FL (ref 35.9–50)
GLUCOSE BLD-MCNC: 163 MG/DL (ref 65–99)
GLUCOSE BLD-MCNC: 189 MG/DL (ref 65–99)
GLUCOSE BLD-MCNC: 216 MG/DL (ref 65–99)
GLUCOSE BLD-MCNC: 295 MG/DL (ref 65–99)
GLUCOSE SERPL-MCNC: 179 MG/DL (ref 65–99)
GLUCOSE SERPL-MCNC: 238 MG/DL (ref 65–99)
HCT VFR BLD AUTO: 37.4 % (ref 42–52)
HGB BLD-MCNC: 11.2 G/DL (ref 14–18)
HYPOCHROMIA BLD QL SMEAR: ABNORMAL
IMM GRANULOCYTES # BLD AUTO: 0.04 K/UL (ref 0–0.11)
IMM GRANULOCYTES NFR BLD AUTO: 0.5 % (ref 0–0.9)
LYMPHOCYTES # BLD AUTO: 1.91 K/UL (ref 1–4.8)
LYMPHOCYTES NFR BLD: 23.2 % (ref 22–41)
MAGNESIUM SERPL-MCNC: 2.4 MG/DL (ref 1.5–2.5)
MCH RBC QN AUTO: 22.6 PG (ref 27–33)
MCHC RBC AUTO-ENTMCNC: 29.9 G/DL (ref 33.7–35.3)
MCV RBC AUTO: 75.4 FL (ref 81.4–97.8)
MICROCYTES BLD QL SMEAR: ABNORMAL
MONOCYTES # BLD AUTO: 1.01 K/UL (ref 0–0.85)
MONOCYTES NFR BLD AUTO: 12.2 % (ref 0–13.4)
MORPHOLOGY BLD-IMP: NORMAL
NEUTROPHILS # BLD AUTO: 5.18 K/UL (ref 1.82–7.42)
NEUTROPHILS NFR BLD: 62.7 % (ref 44–72)
NRBC # BLD AUTO: 0 K/UL
NRBC BLD-RTO: 0 /100 WBC
OVALOCYTES BLD QL SMEAR: NORMAL
PLATELET # BLD AUTO: 112 K/UL (ref 164–446)
PLATELET BLD QL SMEAR: NORMAL
PMV BLD AUTO: 10.5 FL (ref 9–12.9)
POIKILOCYTOSIS BLD QL SMEAR: NORMAL
POTASSIUM SERPL-SCNC: 3.9 MMOL/L (ref 3.6–5.5)
POTASSIUM SERPL-SCNC: 4.1 MMOL/L (ref 3.6–5.5)
RBC # BLD AUTO: 4.96 M/UL (ref 4.7–6.1)
RBC BLD AUTO: PRESENT
SARS-COV-2 RNA RESP QL NAA+PROBE: NOTDETECTED
SODIUM SERPL-SCNC: 133 MMOL/L (ref 135–145)
SODIUM SERPL-SCNC: 134 MMOL/L (ref 135–145)
SPECIMEN SOURCE: NORMAL
TROPONIN T SERPL-MCNC: 64 NG/L (ref 6–19)
WBC # BLD AUTO: 8.3 K/UL (ref 4.8–10.8)

## 2020-08-28 PROCEDURE — 700102 HCHG RX REV CODE 250 W/ 637 OVERRIDE(OP): Performed by: INTERNAL MEDICINE

## 2020-08-28 PROCEDURE — 700111 HCHG RX REV CODE 636 W/ 250 OVERRIDE (IP): Performed by: HOSPITALIST

## 2020-08-28 PROCEDURE — 93880 EXTRACRANIAL BILAT STUDY: CPT

## 2020-08-28 PROCEDURE — 36415 COLL VENOUS BLD VENIPUNCTURE: CPT

## 2020-08-28 PROCEDURE — 770020 HCHG ROOM/CARE - TELE (206)

## 2020-08-28 PROCEDURE — 93041 RHYTHM ECG TRACING: CPT | Performed by: INTERNAL MEDICINE

## 2020-08-28 PROCEDURE — A9270 NON-COVERED ITEM OR SERVICE: HCPCS | Performed by: HOSPITALIST

## 2020-08-28 PROCEDURE — 93005 ELECTROCARDIOGRAM TRACING: CPT | Performed by: INTERNAL MEDICINE

## 2020-08-28 PROCEDURE — A9270 NON-COVERED ITEM OR SERVICE: HCPCS | Performed by: INTERNAL MEDICINE

## 2020-08-28 PROCEDURE — 93970 EXTREMITY STUDY: CPT

## 2020-08-28 PROCEDURE — 99232 SBSQ HOSP IP/OBS MODERATE 35: CPT | Performed by: INTERNAL MEDICINE

## 2020-08-28 PROCEDURE — 82962 GLUCOSE BLOOD TEST: CPT | Mod: 91

## 2020-08-28 PROCEDURE — 80048 BASIC METABOLIC PNL TOTAL CA: CPT

## 2020-08-28 PROCEDURE — 93880 EXTRACRANIAL BILAT STUDY: CPT | Mod: 26 | Performed by: INTERNAL MEDICINE

## 2020-08-28 PROCEDURE — 93970 EXTREMITY STUDY: CPT | Mod: 26 | Performed by: INTERNAL MEDICINE

## 2020-08-28 PROCEDURE — 85025 COMPLETE CBC W/AUTO DIFF WBC: CPT

## 2020-08-28 PROCEDURE — 84484 ASSAY OF TROPONIN QUANT: CPT

## 2020-08-28 PROCEDURE — 700102 HCHG RX REV CODE 250 W/ 637 OVERRIDE(OP): Performed by: HOSPITALIST

## 2020-08-28 PROCEDURE — 83735 ASSAY OF MAGNESIUM: CPT

## 2020-08-28 PROCEDURE — 99223 1ST HOSP IP/OBS HIGH 75: CPT | Performed by: INTERNAL MEDICINE

## 2020-08-28 RX ORDER — INSULIN GLARGINE 100 [IU]/ML
26 INJECTION, SOLUTION SUBCUTANEOUS EVERY EVENING
Status: DISCONTINUED | OUTPATIENT
Start: 2020-08-28 | End: 2020-08-29

## 2020-08-28 RX ORDER — ADENOSINE 3 MG/ML
INJECTION, SOLUTION INTRAVENOUS
Status: ACTIVE
Start: 2020-08-28 | End: 2020-08-29

## 2020-08-28 RX ORDER — ADENOSINE 3 MG/ML
6 INJECTION, SOLUTION INTRAVENOUS ONCE
Status: ACTIVE | OUTPATIENT
Start: 2020-08-28 | End: 2020-08-29

## 2020-08-28 RX ORDER — METOPROLOL SUCCINATE 25 MG/1
25 TABLET, EXTENDED RELEASE ORAL ONCE
Status: COMPLETED | OUTPATIENT
Start: 2020-08-28 | End: 2020-08-28

## 2020-08-28 RX ADMIN — LOSARTAN POTASSIUM 100 MG: 50 TABLET ORAL at 17:29

## 2020-08-28 RX ADMIN — METOPROLOL SUCCINATE 25 MG: 25 TABLET, EXTENDED RELEASE ORAL at 23:18

## 2020-08-28 RX ADMIN — ROSUVASTATIN CALCIUM 20 MG: 20 TABLET, FILM COATED ORAL at 17:29

## 2020-08-28 RX ADMIN — DOCUSATE SODIUM 50 MG AND SENNOSIDES 8.6 MG 2 TABLET: 8.6; 5 TABLET, FILM COATED ORAL at 05:49

## 2020-08-28 RX ADMIN — ENOXAPARIN SODIUM 40 MG: 40 INJECTION SUBCUTANEOUS at 05:49

## 2020-08-28 RX ADMIN — INSULIN HUMAN 2 UNITS: 100 INJECTION, SOLUTION PARENTERAL at 08:24

## 2020-08-28 RX ADMIN — DOCUSATE SODIUM 50 MG AND SENNOSIDES 8.6 MG 2 TABLET: 8.6; 5 TABLET, FILM COATED ORAL at 17:29

## 2020-08-28 RX ADMIN — INSULIN HUMAN 2 UNITS: 100 INJECTION, SOLUTION PARENTERAL at 21:04

## 2020-08-28 RX ADMIN — INSULIN HUMAN 3 UNITS: 100 INJECTION, SOLUTION PARENTERAL at 17:42

## 2020-08-28 RX ADMIN — MAGNESIUM HYDROXIDE 30 ML: 400 SUSPENSION ORAL at 13:48

## 2020-08-28 RX ADMIN — FUROSEMIDE 20 MG: 10 INJECTION, SOLUTION INTRAMUSCULAR; INTRAVENOUS at 05:49

## 2020-08-28 RX ADMIN — FUROSEMIDE 20 MG: 10 INJECTION, SOLUTION INTRAMUSCULAR; INTRAVENOUS at 17:29

## 2020-08-28 RX ADMIN — INSULIN HUMAN 5 UNITS: 100 INJECTION, SOLUTION PARENTERAL at 12:35

## 2020-08-28 RX ADMIN — ASPIRIN 81 MG: 81 TABLET, COATED ORAL at 05:49

## 2020-08-28 RX ADMIN — INSULIN GLARGINE 26 UNITS: 100 INJECTION, SOLUTION SUBCUTANEOUS at 17:42

## 2020-08-28 RX ADMIN — METOPROLOL SUCCINATE 50 MG: 50 TABLET, EXTENDED RELEASE ORAL at 17:28

## 2020-08-28 ASSESSMENT — ENCOUNTER SYMPTOMS
SHORTNESS OF BREATH: 1
BACK PAIN: 0
SENSORY CHANGE: 0
SPUTUM PRODUCTION: 0
CHILLS: 0
COUGH: 1
DIZZINESS: 0
PALPITATIONS: 1
HEADACHES: 0
NERVOUS/ANXIOUS: 0
FEVER: 0
STRIDOR: 0
EYE DISCHARGE: 0
INSOMNIA: 1
SPEECH CHANGE: 0
ABDOMINAL PAIN: 0
BLOOD IN STOOL: 0
DIARRHEA: 0
NAUSEA: 0

## 2020-08-28 ASSESSMENT — LIFESTYLE VARIABLES
TOTAL SCORE: 0
DOES PATIENT WANT TO STOP DRINKING: CANNOT ASSESS
AVERAGE NUMBER OF DAYS PER WEEK YOU HAVE A DRINK CONTAINING ALCOHOL: 0
HOW MANY TIMES IN THE PAST YEAR HAVE YOU HAD 5 OR MORE DRINKS IN A DAY: 0
CONSUMPTION TOTAL: NEGATIVE
HAVE YOU EVER FELT YOU SHOULD CUT DOWN ON YOUR DRINKING: NO
TOTAL SCORE: 0
EVER HAD A DRINK FIRST THING IN THE MORNING TO STEADY YOUR NERVES TO GET RID OF A HANGOVER: NO
HAVE PEOPLE ANNOYED YOU BY CRITICIZING YOUR DRINKING: NO
EVER FELT BAD OR GUILTY ABOUT YOUR DRINKING: NO
TOTAL SCORE: 0
ON A TYPICAL DAY WHEN YOU DRINK ALCOHOL HOW MANY DRINKS DO YOU HAVE: 0
EVER_SMOKED: NEVER
ALCOHOL_USE: NO

## 2020-08-28 ASSESSMENT — COGNITIVE AND FUNCTIONAL STATUS - GENERAL
SUGGESTED CMS G CODE MODIFIER DAILY ACTIVITY: CI
SUGGESTED CMS G CODE MODIFIER MOBILITY: CI
CLIMB 3 TO 5 STEPS WITH RAILING: A LITTLE
MOBILITY SCORE: 23
DAILY ACTIVITIY SCORE: 23
DRESSING REGULAR LOWER BODY CLOTHING: A LITTLE

## 2020-08-28 ASSESSMENT — FIBROSIS 4 INDEX
FIB4 SCORE: 2.16
FIB4 SCORE: 1.95
FIB4 SCORE: 1.95

## 2020-08-28 NOTE — ED NOTES
Pharmacy Medication Reconciliation    Med rec updated and complete per pt at bedside with medication list. Reviewed list with pt and returned to pt family at bedside  Ok per Pt to discuss medications with visitor/s present  Allergies have been verified and updated  Pt home pharmacy:[facility]       Pt reports that he finished a 10 day course of Augmentin &  Vibramycin that was started on 08/11/2020

## 2020-08-28 NOTE — ASSESSMENT & PLAN NOTE
Patient is supposed to have aortic valve replacement by Dr. James  Cardiology has been consulted and cardiology has consulted cardiothoracic surgery appreciate rec.

## 2020-08-28 NOTE — ED NOTES
Pt aox4, skin pale warm and dry, airway patent, rr even and unlabored, nad noted. No new complaints. Pt requesting food, DAVIN Ragsdale aware. Pt transports on cardiac monitor to floor with DAVIN Ragsdale.

## 2020-08-28 NOTE — ASSESSMENT & PLAN NOTE
Had another episode of svt overnight requiring another 12 mg of adenosine   Monitor on Telemetry  Continue metoprolol  Cardiology following yakov harper.

## 2020-08-28 NOTE — ASSESSMENT & PLAN NOTE
Prior stents  Is been on baby aspirin 81 mg  States he stopped his anticoagulation after 5 months due to issues bleeding with epistaxis as well as bloody gums.  Medical management of coronary artery disease

## 2020-08-28 NOTE — CARE PLAN
Problem: Safety  Goal: Will remain free from injury  Outcome: PROGRESSING AS EXPECTED     Pt educated on safety precautions and precautions in place. Treaded socks on, bed locked and in lowest position, belongings and call light within reach.   Problem: Knowledge Deficit  Goal: Knowledge of disease process/condition, treatment plan, diagnostic tests, and medications will improve  Outcome: PROGRESSING AS EXPECTED     Patient educated on disease process, treatment plan, medications, and plan of care for today. Patient verbalized understanding and no additional questions at this time.

## 2020-08-28 NOTE — ED TRIAGE NOTES
Pt BIB EMS c/o chest pain and SVT. Pt has hx of SVT. Pt was recently admitted for r/o PNA. Pt was given lasix 20 mg by Dr. Khoury d/t audible crackles. Pt A&O x 4.

## 2020-08-28 NOTE — PROGRESS NOTES
Received report from Tom IJN. Pt arrived to unit at 2140 via bed escorted by Tom JIN. Assessment complete. VSS. No signs of distress noted at this time. Tele monitor in place. Monitor room notified. Pt c/o pain 0/10. Fall precautions and appropriate signs in place. Pt oriented to unit routine, call light/phone system within reach. Personal belongings within reach. RN extension number provided. Pt educated regarding fall precautions. Bed alarm is on. Pt denies any further needs at this time.

## 2020-08-28 NOTE — PROGRESS NOTES
Will plan surgery for Monday or Wednesday.  Would like glucose better controlled.  Optimize diuresing.

## 2020-08-28 NOTE — PROGRESS NOTES
64-year-old male with history of coronary artery disease plan for CABG surgery next week however he came with shortness of breath and found pleural effusion possible pneumonia/heart failure, the patient will be evaluated and admitted by Dr. Sahu.

## 2020-08-28 NOTE — ASSESSMENT & PLAN NOTE
Cont on IV Lasix  Can continue with metoprolol 50 mg twice daily and losartan 100 mg with parameters  Monitor strict I's and O's  Recent echocardiogram in the past few weeks  Cardiology following appreciate rec.

## 2020-08-28 NOTE — ED PROVIDER NOTES
ED Provider Note    Scribed for Honorio Park M.D. by Thomas Herrera. 8/27/2020  5:13 PM    Primary care provider: Don Khoury D.O.  Means of arrival: EMS  History obtained from: Patient  History limited by: None    CHIEF COMPLAINT  Chief Complaint   Patient presents with   • Supraventricular Tachycardia (SVT)   PPE Note: I personally donned full PPE for all patient encounters during this visit, including wearing an N95 respirator mask, gloves, and eye protection. Scribe remained outside the patient's room and did not have any contact with the patient for the duration of patient encounter.        THUY Millan is a 64 y.o. male with a history who presents to the Emergency Department via EMS with constant, worsening moderate chest pain and SVT onset earlier today. Per EMS, he has been treated for pneumonia for the past two weeks; today he went in for a check up and was given a shot of antibiotics and Lasix. On his way home, he began feeling chest pains and returned to his PCP's office, where they called EMS. The patient reports additional symptoms of worsening shortness of breath, lower extremity edema, and weakness, and denies fevers. No alleviating or exacerbating factors were noted. He states that his weakness has been coming on for the past couple of weeks and that the swelling in his legs worsened in the past two days. The patient's cardiologist is . He is scheduled for a valve replacement with 1 vessel CABG next week.    REVIEW OF SYSTEMS  Pertinent positives include chest pain, shortness of breath, lower extremity edema, and weakness. Pertinent negatives include no fevers.  All other systems reviewed and negative.    PAST MEDICAL HISTORY   has a past medical history of Aortic stenosis (10/20/2011), Arrhythmia, Breath shortness, Carotid bruit (12/9/2009), Cataract, Chronic right shoulder pain, Diabetes, Fatty liver (1/17/2011), Heart attack (HCC) (12/2018), History of cardiac  catheterization (7/13/2009), History of echocardiogram (10/20/2011), HTN (hypertension) (10/12/2012), Hypertension, Memory loss (10/20/2011), Murmur (7/7/2009), Obesity (8/23/2011), Palpitations (10/20/2011), PSVT (paroxysmal supraventricular tachycardia) ([facility]) (2/14/2012), S/P coronary artery stent placement (1996), S/P coronary artery stent placement (1998), S/P coronary artery stent placement (2003), Sciatica (8/23/2011), and Uncontrolled diabetes mellitus ([facility]) (11/29/2010).    SURGICAL HISTORY   has a past surgical history that includes cataract extraction with iol (Bilateral); carotid stent angiography (1996); and tree by laparoscopy (N/A, 10/25/2015).    SOCIAL HISTORY  Social History     Tobacco Use   • Smoking status: Never Smoker   • Smokeless tobacco: Never Used   Substance Use Topics   • Alcohol use: No     Alcohol/week: 0.0 oz   • Drug use: No      Social History     Substance and Sexual Activity   Drug Use No       FAMILY HISTORY  Family History   Problem Relation Age of Onset   • Heart Disease Mother    • Heart Disease Father        CURRENT MEDICATIONS  Current Outpatient Medications   Medication Instructions   • aspirin 81 mg, Oral, DAILY   • Empagliflozin (JARDIANCE) 25 MG Tab 1 Tab, Oral, EVERY DAY   • furosemide (LASIX) 20 mg, Oral, DAILY   • glipiZIDE SR (GLUCOTROL) 10 mg, Oral, EVERY MORNING   • losartan (COZAAR) 100 MG Tab TAKE 1 TABLET BY MOUTH EVERY DAY   • metformin (GLUCOPHAGE) 1,000 mg, Oral, 2 TIMES DAILY   • metoprolol SR (TOPROL XL) 50 mg, Oral, 2 TIMES DAILY   • rosuvastatin (CRESTOR) 20 mg, Oral, EVERY DAY     ALLERGIES  Allergies   Allergen Reactions   • Bydureon [Exenatide] Hives     hives   • Cycloset [Bromocriptine Mesylate] Rash     Rash     • Morphine Vomiting and Nausea       PHYSICAL EXAM  VITAL SIGNS: /86   Pulse (!) 109   Temp (!) 35.8 °C (96.4 °F) (Temporal)   Resp 18   Wt 96.6 kg (213 lb)   SpO2 100%   BMI 30.56 kg/m²     Constitutional: Well developed, Well  nourished, Moderate to severe distress, Non-toxic appearance.   HENT: Normocephalic, Atraumatic, Bilateral external ears normal, Oropharynx moist, No oral exudates.   Eyes: PERRLA, EOMI, Conjunctiva normal, No discharge.   Neck: No tenderness, Supple, No stridor.   Lymphatic: No lymphadenopathy noted.   Cardiovascular: Tachycardic heart rate, Normal rhythm.   Thorax & Lungs: Crackles to bilateral bases with decreased breath sounds on the right, No respiratory distress, No wheezing  Abdomen: Soft, No tenderness, No masses, No pulsatile masses.   Skin: Warm, Dry, No erythema, No rash.   Extremities:, Diffuse LE edema, No cyanosis.   Musculoskeletal: No tenderness to palpation or major deformities noted.  Intact distal pulses  Neurologic: Awake, alert. Moves all extremities spontaneously.  Psychiatric: Affect normal, Judgment normal, Mood normal.       LABS  Results for orders placed or performed during the hospital encounter of 08/27/20   CBC w/ Differential   Result Value Ref Range    WBC 10.2 4.8 - 10.8 K/uL    RBC 5.38 4.70 - 6.10 M/uL    Hemoglobin 12.0 (L) 14.0 - 18.0 g/dL    Hematocrit 40.0 (L) 42.0 - 52.0 %    MCV 74.3 (L) 81.4 - 97.8 fL    MCH 22.3 (L) 27.0 - 33.0 pg    MCHC 30.0 (L) 33.7 - 35.3 g/dL    RDW 42.0 35.9 - 50.0 fL    Platelet Count 124 (L) 164 - 446 K/uL    MPV 10.1 9.0 - 12.9 fL    Neutrophils-Polys 67.70 44.00 - 72.00 %    Lymphocytes 17.00 (L) 22.00 - 41.00 %    Monocytes 14.00 (H) 0.00 - 13.40 %    Eosinophils 0.70 0.00 - 6.90 %    Basophils 0.40 0.00 - 1.80 %    Immature Granulocytes 0.20 0.00 - 0.90 %    Nucleated RBC 0.00 /100 WBC    Neutrophils (Absolute) 6.94 1.82 - 7.42 K/uL    Lymphs (Absolute) 1.74 1.00 - 4.80 K/uL    Monos (Absolute) 1.43 (H) 0.00 - 0.85 K/uL    Eos (Absolute) 0.07 0.00 - 0.51 K/uL    Baso (Absolute) 0.04 0.00 - 0.12 K/uL    Immature Granulocytes (abs) 0.02 0.00 - 0.11 K/uL    NRBC (Absolute) 0.00 K/uL   Complete Metabolic Panel (CMP)   Result Value Ref Range     Sodium 135 135 - 145 mmol/L    Potassium 3.9 3.6 - 5.5 mmol/L    Chloride 96 96 - 112 mmol/L    Co2 22 20 - 33 mmol/L    Anion Gap 17.0 (H) 7.0 - 16.0    Glucose 209 (H) 65 - 99 mg/dL    Bun 18 8 - 22 mg/dL    Creatinine 1.17 0.50 - 1.40 mg/dL    Calcium 9.1 8.5 - 10.5 mg/dL    AST(SGOT) 22 12 - 45 U/L    ALT(SGPT) 35 2 - 50 U/L    Alkaline Phosphatase 59 30 - 99 U/L    Total Bilirubin 0.4 0.1 - 1.5 mg/dL    Albumin 4.2 3.2 - 4.9 g/dL    Total Protein 6.9 6.0 - 8.2 g/dL    Globulin 2.7 1.9 - 3.5 g/dL    A-G Ratio 1.6 g/dL   Troponin STAT   Result Value Ref Range    Troponin T 24 (H) 6 - 19 ng/L   proBrain Natriuretic Peptide, NT   Result Value Ref Range    NT-proBNP 4563 (H) 0 - 125 pg/mL   APTT   Result Value Ref Range    APTT 27.7 24.7 - 36.0 sec   PT/INR   Result Value Ref Range    PT 13.5 12.0 - 14.6 sec    INR 1.00 0.87 - 1.13   ESTIMATED GFR   Result Value Ref Range    GFR If African American >60 >60 mL/min/1.73 m 2    GFR If Non African American >60 >60 mL/min/1.73 m 2   Routine (COVID/SARS COV-2 In-House PCR up to 24 hours)    Specimen: Nasopharyngeal; Respirate   Result Value Ref Range    COVID Order Status Received    EKG (NOW)   Result Value Ref Range    Report       [facility] Emergency Dept.    Test Date:  2020  Pt Name:    IRLANDA MURPHY                Department: ER  MRN:        [id number]                      Room:       RD 11  Gender:     Male                         Technician: [id number]  :        1955                   Requested By:GOMEZ MIKE  Order #:    [id number]                    Reading MD: GOMEZ MIKE MD    Measurements  Intervals                                Axis  Rate:       172                          P:          0  TN:                                      QRS:        -29  QRSD:       92                           T:          136  QT:         304  QTc:        515    Interpretive Statements  SUPRAVENTRICULAR TACHYCARDIA  LVH WITH SECONDARY  REPOLARIZATION ABNORMALITY  Compared to ECG 2020 01:14:11  Sinus rhythm no longer present  ST (T wave) deviation no longer present  Electronically Signed On 2020 18:34:26 PDT by GOMEZ MIKE MD     EKG   Result Value Ref Range    Report       [facility] Emergency Dept.    Test Date:  2020  Pt Name:    IRLANDA MURPHY                Department: ER  MRN:        [id number]                      Room:       RD 11  Gender:     Male                         Technician: [id number]  :        1955                   Requested By:GOMEZ MIKE  Order #:    [id number]                    Reading MD: GOMEZ MIKE MD    Measurements  Intervals                                Axis  Rate:       112                          P:          76  MT:         180                          QRS:        -36  QRSD:       96                           T:          134  QT:         332  QTc:        453    Interpretive Statements  SINUS TACHYCARDIA  PROBABLE LEFT ATRIAL ABNORMALITY  LEFT AXIS DEVIATION  LVH WITH SECONDARY REPOLARIZATION ABNORMALITY  ANTERIOR INFARCT, AGE INDETERMINATE  Compared to ECG 2020 17:02:03  Left-axis deviation now present  Myocardial infarct finding now present  Supraventricular tachycardia no l onger present  Electronically Signed On 2020 18:34:23 PDT by GOMEZ MIKE MD          RADIOLOGY  DX-CHEST-PORTABLE (1 VIEW)   Final Result      New moderate diffuse hazy opacification is highly concerning for cardiogenic edema        The radiologist's interpretation of all radiological studies have been reviewed by me.      COURSE & MEDICAL DECISION MAKING  Pertinent Labs & Imaging studies reviewed. (See chart for details)    I reviewed the patient's medical records which showed the patient has severe aortic stenosis. His scans were indicative of pleural infusion and pneumonia. He was at his PCP's office and was given a shot of Abx and Lasix; while driving home, he  began experiencing chest pains. He went back to the office, where staff called EMS. He is scheduled for a 1 vessel CABG valve replacement next week. He has a history of PSVT.     5:13 PM - Patient seen and examined at bedside. Patient will be treated with Adenosine 6 mg. Ordered DX-chest, CBC w/diff, CMP, Troponin STAT, BNP, APTT, PT/INR, and EKG  to evaluate his symptoms. The differential diagnoses include but are not limited to: SVT, A fib with RBR, Pulmonary edema, CHF.     5:23 PM - I ordered adenosine 12 mg to treat    6:30 PM I discussed the patient's case and the above findings with Dr. Moe (Hospitalist) who agreed to evaluate for hospitalization.     Decision Making:  Patient is coming in with supraventricular tachycardia, give the patient 2 doses of adenosine, 1 at 6 mg and 1 at 12 mg with cardioversion.  Patient is fluid overloaded with pulmonary edema peripheral edema, the patient will need to be admitted to the hospital for diuresis.  Discussed the case with the hospitalist for hospitalization.        DISPOSITION:  Patient will be hospitalized by Dr. Moe in guarded condition.    FINAL IMPRESSION  1. SVT (supraventricular tachycardia) (HCC)    2. Acute pulmonary edema (HCC)    3. Peripheral edema    4.  Chemical cardioversion performed by myself     IThomas (Scribe), am scribing for, and in the presence of, Honorio Park M.D..    Electronically signed by: Thomas Herrera (Scribe), 8/27/2020    Honorio MEDELLIN M.D. personally performed the services described in this documentation, as scribed by Thomas Herrera in my presence, and it is both accurate and complete.    C    The note accurately reflects work and decisions made by me.  Honorio Park M.D.  8/27/2020  9:35 PM

## 2020-08-28 NOTE — CONSULTS
Reason for Consult:  Asked by Dr Moe to see this patient with  Severe aortic stenosis  Patient's PCP: Don Khoury D.O.    CC:   Chief Complaint   Patient presents with   • Supraventricular Tachycardia (SVT)       HPI: 64-year-old male patient with known history of coronary artery disease with single-vessel LAD disease, severe aortic stenosis, uncontrolled diabetes mellitus, hypertension, obesity presented to the hospital with supraventricular tachycardia.  He was having ongoing symptoms of shortness of breath, leg swelling, orthopnea for last several days.  Today he he has chest pain, so brought to the emergency room.  His initial EKG showed supraventricular tachycardia, he received adenosine 2 doses then converted to sinus rhythm.  He was admitted to the hospital for congestive heart failure, further observation.    Medications / Drug list prior to admission:  No current facility-administered medications on file prior to encounter.      Current Outpatient Medications on File Prior to Encounter   Medication Sig Dispense Refill   • furosemide (LASIX) 20 MG Tab Take 20 mg by mouth every day.     • insulin lispro (HUMALOG) 100 UNIT/ML Inject 12 Units as instructed 3 times a day before meals.     • Insulin Degludec (TRESIBA) 100 UNIT/ML Solution Inject 26 Units as instructed every bedtime.     • aspirin EC (ECOTRIN) 81 MG Tablet Delayed Response Take 81 mg by mouth every evening.     • Empagliflozin (JARDIANCE) 25 MG Tab Take 25 mg by mouth every morning.     • losartan (COZAAR) 100 MG Tab Take 100 mg by mouth every morning.     • metoprolol SR (TOPROL XL) 50 MG TABLET SR 24 HR Take 50 mg by mouth 2 Times a Day.     • rosuvastatin (CRESTOR) 20 MG Tab Take 20 mg by mouth every evening.     • amoxicillin-clavulanate (AUGMENTIN) 875-125 MG Tab Take 1 Tab by mouth 2 times a day. 10 day course     • doxycycline (VIBRAMYCIN) 100 MG Tab Take 100 mg by mouth 2 times a day. 10 day course         Current list of administered  Medications:    Current Facility-Administered Medications:   •  aspirin EC (ECOTRIN) tablet 81 mg, 81 mg, Oral, DAILY, Junaid Moe D.O.  •  losartan (COZAAR) tablet 100 mg, 100 mg, Oral, QDAY, PEDRITO VenegasOJosé Antonio, 100 mg at 08/27/20 2242  •  metoprolol SR (TOPROL XL) tablet 50 mg, 50 mg, Oral, BID, PEDRITO VenegasOJosé Antonio, 50 mg at 08/27/20 2027  •  rosuvastatin (CRESTOR) tablet 20 mg, 20 mg, Oral, Q EVENING, PEDRITO VenegasOJosé Antonio, 20 mg at 08/27/20 2027  •  senna-docusate (PERICOLACE or SENOKOT S) 8.6-50 MG per tablet 2 Tab, 2 Tab, Oral, BID, 2 Tab at 08/27/20 2027 **AND** polyethylene glycol/lytes (MIRALAX) PACKET 1 Packet, 1 Packet, Oral, QDAY PRN **AND** magnesium hydroxide (MILK OF MAGNESIA) suspension 30 mL, 30 mL, Oral, QDAY PRN **AND** bisacodyl (DULCOLAX) suppository 10 mg, 10 mg, Rectal, QDAY PRN, PEDRITO VenegasO.  •  enoxaparin (LOVENOX) inj 40 mg, 40 mg, Subcutaneous, DAILY, PEDRITO VenegasO.  •  acetaminophen (TYLENOL) tablet 650 mg, 650 mg, Oral, Q6HRS PRN, PEDRITO VenegasO.  •  enalaprilat (VASOTEC) injection 1.25 mg, 1.25 mg, Intravenous, Q6HRS PRN, PEDRITO VenegasO.  •  ondansetron (ZOFRAN) syringe/vial injection 4 mg, 4 mg, Intravenous, Q4HRS PRN, PEDRITO VenegasO.  •  ondansetron (ZOFRAN ODT) dispertab 4 mg, 4 mg, Oral, Q4HRS PRN, PEDRITO VenegasO.  •  insulin regular (HumuLIN R,NovoLIN R) injection, 2-9 Units, Subcutaneous, 4X/DAY ACHS, Stopped at 08/27/20 2100 **AND** POC Blood Glucose, , , Q AC AND BEDTIME(S) **AND** NOTIFY MD and PharmD, , , Once **AND** glucose 4 g chewable tablet 16 g, 16 g, Oral, Q15 MIN PRN **AND** dextrose 50% (D50W) injection 50 mL, 50 mL, Intravenous, Q15 MIN PRN, PEDRITO VenegasO.  •  furosemide (LASIX) injection 20 mg, 20 mg, Intravenous, BID DIURETIC, Junaid Moe D.O., 20 mg at 08/27/20 2027    Past Medical History:   Diagnosis Date   • Aortic stenosis 10/20/2011   • Arrhythmia    • Breath shortness    • Carotid bruit 12/9/2009   • Cataract      cataract extraction with IOL   • Chronic right shoulder pain    • Diabetes     oral medication   • Fatty liver 1/17/2011   • Heart attack (HCC) 12/2018   • History of cardiac catheterization 7/13/2009   • History of echocardiogram 10/20/2011   • HTN (hypertension) 10/12/2012   • Hypertension    • Memory loss 10/20/2011   • Murmur 7/7/2009   • Obesity 8/23/2011   • Palpitations 10/20/2011   • PSVT (paroxysmal supraventricular tachycardia) (HCC) 2/14/2012   • S/P coronary artery stent placement 1996    coronary stent implantation , [facility]   • S/P coronary artery stent placement 1998    coronary stent implantation; LAD   • S/P coronary artery stent placement 2003    cardiac catheterization; patent stents; [place]   • Sciatica 8/23/2011   • Uncontrolled diabetes mellitus (HCC) 11/29/2010       Past Surgical History:   Procedure Laterality Date   • PO BY LAPAROSCOPY N/A 10/25/2015    Procedure: PO BY LAPAROSCOPY-with grams ;  Surgeon: Ronnie Bowman M.D.;  Location: SURGERY [facility];  Service:    • CAROTID STENT ANGIOGRAPHY  1996   • CATARACT EXTRACTION WITH IOL Bilateral        Family History   Problem Relation Age of Onset   • Heart Disease Mother    • Heart Disease Father        Social History     Socioeconomic History   • Marital status:      Spouse name: Not on file   • Number of children: Not on file   • Years of education: Not on file   • Highest education level: Not on file   Occupational History   • Not on file   Social Needs   • Financial resource strain: Not on file   • Food insecurity     Worry: Not on file     Inability: Not on file   • Transportation needs     Medical: Not on file     Non-medical: Not on file   Tobacco Use   • Smoking status: Never Smoker   • Smokeless tobacco: Never Used   Substance and Sexual Activity   • Alcohol use: No     Alcohol/week: 0.0 oz   • Drug use: No   • Sexual activity: Yes     Partners: Female   Lifestyle   • Physical activity  "    Days per week: Not on file     Minutes per session: Not on file   • Stress: Not on file   Relationships   • Social connections     Talks on phone: Not on file     Gets together: Not on file     Attends Mandaeism service: Not on file     Active member of club or organization: Not on file     Attends meetings of clubs or organizations: Not on file     Relationship status: Not on file   • Intimate partner violence     Fear of current or ex partner: Not on file     Emotionally abused: Not on file     Physically abused: Not on file     Forced sexual activity: Not on file   Other Topics Concern   •  Service No   • Blood Transfusions No   • Caffeine Concern No   • Occupational Exposure No   • Hobby Hazards No   • Sleep Concern No   • Stress Concern No   • Weight Concern No   • Special Diet No   • Back Care Yes     Comment: BELT SUPPORT   • Exercise Yes   • Bike Helmet No   • Seat Belt Yes   • Self-Exams No   Social History Narrative   • Not on file       ALLERGIES:  Allergies   Allergen Reactions   • Bydureon [Exenatide] Hives     hives   • Cycloset [Bromocriptine Mesylate] Rash     Rash     • Morphine Vomiting and Nausea       Review of systems:  Positive for shortness of breath, orthopnea, lower extremity edema, fatigue, constipation  A detailed review of symptoms was reviewed with patient. This is reviewed in H&P and PMH. ALL OTHERS reviewed and negative    Physical exam:  Patient Vitals for the past 24 hrs:   BP Temp Temp src Pulse Resp SpO2 Height Weight   08/28/20 0059 137/72 36.3 °C (97.4 °F) Temporal 89 18 96 % 1.803 m (5' 11\") 99.5 kg (219 lb 5.7 oz)   08/28/20 0035 -- -- -- -- -- -- -- 99.5 kg (219 lb 5.7 oz)   08/27/20 2351 110/69 36.9 °C (98.5 °F) Temporal 84 18 96 % -- --   08/27/20 2242 131/72 -- -- -- -- -- -- --   08/27/20 2140 137/72 36.3 °C (97.4 °F) Temporal 89 18 94 % -- 99.5 kg (219 lb 5.7 oz)   08/27/20 2101 141/91 -- -- 88 20 99 % -- --   08/27/20 2051 137/83 -- -- 94 18 97 % -- -- "   20 140/83 -- -- 93 -- 98 % -- --   20 141/83 -- -- 91 16 98 % -- --   20 135/73 -- -- 91 (!) 22 98 % -- --   20 149/84 -- -- 95 (!) 23 97 % -- --   20 149/83 -- -- 100 -- 95 % -- --   20 156/70 -- -- 94 -- 95 % -- --   20 145/82 -- -- 97 (!) 21 97 % -- --   20 194 135/75 -- -- 98 -- 95 % -- --   20 193 147/84 -- -- (!) 104 20 97 % -- --   20 192 146/83 -- -- (!) 104 18 98 % -- --   20 1906 139/89 -- -- (!) 105 -- 96 % -- --   20 1856 142/76 -- -- (!) 106 17 95 % -- --   20 1821 140/79 -- -- (!) 113 20 96 % -- --   20 1806 134/85 -- -- (!) 106 (!) 24 98 % -- --   20 1751 134/76 -- -- (!) 111 20 98 % -- --   20 1728 -- -- -- (!) 109 18 100 % -- --   20 1726 136/86 -- -- (!) 114 19 100 % -- --   20 1723 -- -- -- -- -- -- -- 96.6 kg (213 lb)   20 -- (!) 35.8 °C (96.4 °F) Temporal -- -- -- -- --   20 171 121/85 -- -- (!) 174 (!) 27 96 % -- --   20 1710 -- -- -- (!) 171 (!) 39 96 % -- --     General: No acute distress.   EYES: no jaundice  HEENT: OP clear   Neck: No bruits No JVD.   CVS:   RRR. S1 + S2.  4/6 systolic murmur aortic area   resp: Basal rales  Abdomen: Soft, NT, ND,  Skin: Grossly nothing acute no obvious rashes  Neurological: Alert, Moves all extremities, no cranial nerve defects on limited exam  Extremities: Pulse 2+ in b/l LE. 1+ edema. No cyanosis.       Data:  Laboratory studies:  Recent Results (from the past 24 hour(s))   EKG (NOW)    Collection Time: 20  5:02 PM   Result Value Ref Range    Report       [facility] Emergency Dept.    Test Date:  2020  Pt Name:    IRLANDA MURPHY                Department: ER  MRN:        [id number]                      Room:       [facility]  Gender:     Male                         Technician: [id number]  :        1955                   Requested By:GOMEZ Montelongo  #:    [id number]                    Reading MD: GOMEZ MIKE MD    Measurements  Intervals                                Axis  Rate:       172                          P:          0  AK:                                      QRS:        -29  QRSD:       92                           T:          136  QT:         304  QTc:        515    Interpretive Statements  SUPRAVENTRICULAR TACHYCARDIA  LVH WITH SECONDARY REPOLARIZATION ABNORMALITY  Compared to ECG 08/04/2020 01:14:11  Sinus rhythm no longer present  ST (T wave) deviation no longer present  Electronically Signed On 8- 18:34:26 PDT by GOMEZ MIKE MD     CBC w/ Differential    Collection Time: 08/27/20  5:14 PM   Result Value Ref Range    WBC 10.2 4.8 - 10.8 K/uL    RBC 5.38 4.70 - 6.10 M/uL    Hemoglobin 12.0 (L) 14.0 - 18.0 g/dL    Hematocrit 40.0 (L) 42.0 - 52.0 %    MCV 74.3 (L) 81.4 - 97.8 fL    MCH 22.3 (L) 27.0 - 33.0 pg    MCHC 30.0 (L) 33.7 - 35.3 g/dL    RDW 42.0 35.9 - 50.0 fL    Platelet Count 124 (L) 164 - 446 K/uL    MPV 10.1 9.0 - 12.9 fL    Neutrophils-Polys 67.70 44.00 - 72.00 %    Lymphocytes 17.00 (L) 22.00 - 41.00 %    Monocytes 14.00 (H) 0.00 - 13.40 %    Eosinophils 0.70 0.00 - 6.90 %    Basophils 0.40 0.00 - 1.80 %    Immature Granulocytes 0.20 0.00 - 0.90 %    Nucleated RBC 0.00 /100 WBC    Neutrophils (Absolute) 6.94 1.82 - 7.42 K/uL    Lymphs (Absolute) 1.74 1.00 - 4.80 K/uL    Monos (Absolute) 1.43 (H) 0.00 - 0.85 K/uL    Eos (Absolute) 0.07 0.00 - 0.51 K/uL    Baso (Absolute) 0.04 0.00 - 0.12 K/uL    Immature Granulocytes (abs) 0.02 0.00 - 0.11 K/uL    NRBC (Absolute) 0.00 K/uL   Complete Metabolic Panel (CMP)    Collection Time: 08/27/20  5:14 PM   Result Value Ref Range    Sodium 135 135 - 145 mmol/L    Potassium 3.9 3.6 - 5.5 mmol/L    Chloride 96 96 - 112 mmol/L    Co2 22 20 - 33 mmol/L    Anion Gap 17.0 (H) 7.0 - 16.0    Glucose 209 (H) 65 - 99 mg/dL    Bun 18 8 - 22 mg/dL    Creatinine 1.17 0.50 - 1.40 mg/dL     Calcium 9.1 8.5 - 10.5 mg/dL    AST(SGOT) 22 12 - 45 U/L    ALT(SGPT) 35 2 - 50 U/L    Alkaline Phosphatase 59 30 - 99 U/L    Total Bilirubin 0.4 0.1 - 1.5 mg/dL    Albumin 4.2 3.2 - 4.9 g/dL    Total Protein 6.9 6.0 - 8.2 g/dL    Globulin 2.7 1.9 - 3.5 g/dL    A-G Ratio 1.6 g/dL   Troponin STAT    Collection Time: 20  5:14 PM   Result Value Ref Range    Troponin T 24 (H) 6 - 19 ng/L   proBrain Natriuretic Peptide, NT    Collection Time: 20  5:14 PM   Result Value Ref Range    NT-proBNP 4563 (H) 0 - 125 pg/mL   APTT    Collection Time: 20  5:14 PM   Result Value Ref Range    APTT 27.7 24.7 - 36.0 sec   PT/INR    Collection Time: 20  5:14 PM   Result Value Ref Range    PT 13.5 12.0 - 14.6 sec    INR 1.00 0.87 - 1.13   ESTIMATED GFR    Collection Time: 20  5:14 PM   Result Value Ref Range    GFR If African American >60 >60 mL/min/1.73 m 2    GFR If Non African American >60 >60 mL/min/1.73 m 2   EKG    Collection Time: 20  5:37 PM   Result Value Ref Range    Report       [facility] Emergency Dept.    Test Date:  2020  Pt Name:    IRLANDA MURPHY                Department: ER  MRN:        [id number]                      Room:        11  Gender:     Male                         Technician: [id number]  :        1955                   Requested By:GOMEZ MIKE  Order #:    [id number]                    Reading MD: GOMEZ MIKE MD    Measurements  Intervals                                Axis  Rate:       112                          P:          76  CT:         180                          QRS:        -36  QRSD:       96                           T:          134  QT:         332  QTc:        453    Interpretive Statements  SINUS TACHYCARDIA  PROBABLE LEFT ATRIAL ABNORMALITY  LEFT AXIS DEVIATION  LVH WITH SECONDARY REPOLARIZATION ABNORMALITY  ANTERIOR INFARCT, AGE INDETERMINATE  Compared to ECG 2020 17:02:03  Left-axis deviation now  present  Myocardial infarct finding now present  Supraventricular tachycardia no l onger present  Electronically Signed On 2020 18:34:23 PDT by GOMEZ MIKE MD     Routine (COVID/SARS COV-2 In-House PCR up to 24 hours)    Collection Time: 20  8:58 PM    Specimen: Nasopharyngeal; Respirate   Result Value Ref Range    COVID Order Status Received    SARS-CoV-2, PCR (In-House)    Collection Time: 20  8:58 PM   Result Value Ref Range    SARS-CoV-2 Source NP Swab    ACCU-CHEK GLUCOSE    Collection Time: 20 10:36 PM   Result Value Ref Range    Glucose - Accu-Ck 140 (H) 65 - 99 mg/dL   EKG    Collection Time: 20 11:03 PM   Result Value Ref Range    Report       [facility]    Test Date:  2020  Pt Name:    IRLANDA MURPHY                Department: 171  MRN:        [id number]                      Room:       [facility]  Gender:     Male                         Technician: [facility]  :        1955                   Requested By:ERIN ZAMORA  Order #:    [id number]                    Reading MD: Ben Olivia MD    Measurements  Intervals                                Axis  Rate:       82                           P:          47  WA:         197                          QRS:        -14  QRSD:       98                           T:          169  QT:         379  QTc:        443    Interpretive Statements  SINUS RHYTHM  PROBABLE ANTEROSEPTAL INFARCT, RECENT  LATERAL LEADS ARE ALSO INVOLVED  Electronically Signed On 2020 23:15:38 PDT by Ben Olivia MD         Imaging:  DX-CHEST-PORTABLE (1 VIEW)   Final Result      New moderate diffuse hazy opacification is highly concerning for cardiogenic edema          EKG : personally reviewed by me sinus rhythm, ST depressions anterolateral wall suggestive of ischemia, subtle ST elevations V1 V2, grossly unchanged from prior EKG    All pertinent features of laboratory and imaging reviewed including primary images where  applicable    Assessment / Plan:  64-year-old male patient with  1.  Critical aortic stenosis  2.  Supraventricular tachycardia  3.  Known coronary artery disease, LAD stenosis  4.  Dyslipidemia  5.  Diabetes mellitus  6.  Hypertension    His chest x-ray consistent with pulmonary edema, congestive heart failure.  Recommend IV diuresis.  Continue aspirin and statin metoprolol and losartan.  We will consult with CT surgery to arrange CABG less AVR sooner than later.      It is my pleasure to participate in the care of Mr. Millan.  Please do not hesitate to contact me with questions or concerns.    Ben Olivia M.D.    8/28/2020

## 2020-08-28 NOTE — PROGRESS NOTES
Hospital Medicine Daily Progress Note    Date of Service  8/28/2020    Chief Complaint  64 y.o. male admitted 8/27/2020 with sob and SVT     Hospital Course    This is a  64 y.o. male with uncontrolled diabetes, severe aortic stenosis with cardiothoracic evaluating for near future valve replacement, hypertension, congestive heart failure with systolic dysfunction, obesity, hypertension, coronary artery disease with prior stenting. He presented to ER on 8/27/20  due to ongoing issues of shortness of breath, leg swelling, inability to lay flat at night.  Upon arrival here in the emergency room he was noted to be in supraventricular tachycardia and was given adenosine with resolve down to sinus tachycardia. Pt was found to have fluid overload with bilateral leg edema which she states is been increasing over the last 2 weeks. Pt also supposed follow-up with Dr. James cardiothoracic surgeon for aortic valve and open heart surgery in the next week. Pt admitted for further treatment and cardiology has been consulted        Interval Problem Update  Pt seen and examined, afebrile, still with some SOB, denies any CP, no nausea or vomiting  Cardiology following appreciate rec.      Consultants/Specialty  Cardiology   Cardiothoracic surgery     Code Status  Full Code    Disposition  TBD    Review of Systems  Review of Systems   Constitutional: Positive for malaise/fatigue. Negative for chills and fever.   HENT: Negative for ear discharge and ear pain.    Eyes: Negative for discharge.   Respiratory: Positive for cough and shortness of breath. Negative for sputum production and stridor.    Cardiovascular: Positive for palpitations and leg swelling. Negative for chest pain.   Gastrointestinal: Negative for abdominal pain, blood in stool, diarrhea, melena and nausea.   Genitourinary: Negative for dysuria and hematuria.   Musculoskeletal: Negative for back pain and joint pain.   Skin: Negative for rash.   Neurological: Negative  for dizziness, sensory change, speech change and headaches.   Psychiatric/Behavioral: The patient has insomnia (due to shortness of breath and having to sleep upright). The patient is not nervous/anxious.    All other systems reviewed and are negative.       Physical Exam  Temp:  [35.8 °C (96.4 °F)-36.9 °C (98.5 °F)] 36.1 °C (97 °F)  Pulse:  [] 88  Resp:  [16-39] 20  BP: (103-156)/(62-91) 111/66  SpO2:  [91 %-100 %] 96 %    Physical Exam  Vitals signs and nursing note reviewed.   Constitutional:       Appearance: Normal appearance. He is obese. He is not diaphoretic.   HENT:      Head: Normocephalic and atraumatic.      Nose: Nose normal.      Mouth/Throat:      Mouth: Mucous membranes are moist.      Pharynx: No oropharyngeal exudate.   Eyes:      General: No scleral icterus.        Right eye: No discharge.         Left eye: No discharge.      Extraocular Movements: Extraocular movements intact.      Conjunctiva/sclera: Conjunctivae normal.      Pupils: Pupils are equal, round, and reactive to light.   Neck:      Musculoskeletal: No muscular tenderness.      Vascular: No carotid bruit.   Cardiovascular:      Rate and Rhythm: Regular rhythm. Tachycardia present.      Pulses:           Radial pulses are 2+ on the right side and 2+ on the left side.        Dorsalis pedis pulses are 2+ on the right side and 2+ on the left side.      Heart sounds: Murmur present.   Pulmonary:      Effort: Pulmonary effort is normal. No respiratory distress.      Breath sounds: Examination of the right-lower field reveals decreased breath sounds. Examination of the left-lower field reveals decreased breath sounds. Decreased breath sounds present. No wheezing or rales.   Abdominal:      General: Bowel sounds are normal. There is no distension.      Palpations: Abdomen is soft.      Tenderness: There is no guarding or rebound.   Musculoskeletal:         General: No swelling or tenderness.      Right lower leg: Edema present.       Left lower leg: Edema present.   Lymphadenopathy:      Cervical: No cervical adenopathy.   Skin:     Coloration: Skin is pale. Skin is not jaundiced.   Neurological:      General: No focal deficit present.      Mental Status: He is alert and oriented to person, place, and time. Mental status is at baseline.      Cranial Nerves: No cranial nerve deficit.   Psychiatric:         Mood and Affect: Mood normal.         Behavior: Behavior normal.         Fluids    Intake/Output Summary (Last 24 hours) at 8/28/2020 1339  Last data filed at 8/28/2020 0900  Gross per 24 hour   Intake 490 ml   Output 400 ml   Net 90 ml       Laboratory  Recent Labs     08/27/20  1714 08/28/20  0136   WBC 10.2 8.3   RBC 5.38 4.96   HEMOGLOBIN 12.0* 11.2*   HEMATOCRIT 40.0* 37.4*   MCV 74.3* 75.4*   MCH 22.3* 22.6*   MCHC 30.0* 29.9*   RDW 42.0 42.5   PLATELETCT 124* 112*   MPV 10.1 10.5     Recent Labs     08/27/20  1714 08/28/20  0136   SODIUM 135 133*   POTASSIUM 3.9 4.1   CHLORIDE 96 98   CO2 22 21   GLUCOSE 209* 238*   BUN 18 17   CREATININE 1.17 1.04   CALCIUM 9.1 8.8     Recent Labs     08/27/20 1714   APTT 27.7   INR 1.00               Imaging  DX-CHEST-PORTABLE (1 VIEW)   Final Result      New moderate diffuse hazy opacification is highly concerning for cardiogenic edema      US-CAROTID DOPPLER BILAT    (Results Pending)   US-VEIN MAPPING LOWER EXTREMITY BILAT    (Results Pending)        Assessment/Plan  * Acute on chronic systolic heart failure (HCC)  Assessment & Plan  Cont on IV Lasix  Can continue with metoprolol 50 mg twice daily and losartan 100 mg with parameters  Monitor strict I's and O's  Recent echocardiogram in the past few weeks  Cardiology following appreciate rec.     Severe aortic stenosis- (present on admission)  Assessment & Plan  Patient is supposed to have aortic valve replacement by Dr. James  Cardiology has been consulted and cardiology has consulted cardiothoracic surgery appreciate rec.     SVT  (supraventricular tachycardia) (HCC)- (present on admission)  Assessment & Plan  Resolved with 12mcg of adenosine in ER  Monitor on Telemetry  Continue metoprolol  Cardiology following appreciate rec.     S/P drug eluting coronary stent placement- (present on admission)  Assessment & Plan  Prior stents  Is been on baby aspirin 81 mg  States he stopped his anticoagulation after 5 months due to issues bleeding with epistaxis as well as bloody gums.  Medical management of coronary artery disease    Essential hypertension- (present on admission)  Assessment & Plan  Monitor vitals  Continue with losartan and metoprolol    Uncontrolled diabetes mellitus (HCC)- (present on admission)  Assessment & Plan  Follows outpatient with Dr Schmitz, Endocrine in [place]  Monitor accuchecks and cover with SSI and also his glargine   Last A1c:10.3 in past 3 weeks    Obesity (BMI 30-39.9)- (present on admission)  Assessment & Plan  Current Body mass index is 30.56 kg/m².  This may be slightly higher secondary to the patient's leg edema         VTE prophylaxis: enoxaparin

## 2020-08-28 NOTE — PROGRESS NOTES
No evidence of skin breakdown. Skin on ears dry and flaky. RUE skin dry and flaky. All skin intact.

## 2020-08-28 NOTE — H&P
Hospital Medicine History & Physical Note    Date of Service  8/27/2020    Primary Care Physician  Don Khoury D.O.    Consultants  [facility]    Code Status  Full Code    Chief Complaint  Chief Complaint   Patient presents with   • Supraventricular Tachycardia (SVT)       History of Presenting Illness  This is a 64 y.o. male with uncontrolled diabetes, severe aortic stenosis with cardiothoracic evaluating for near future valve replacement, hypertension, congestive heart failure with systolic dysfunction, obesity, hypertension, coronary artery disease with prior stenting.  He presents today due to ongoing issues of shortness of breath, leg swelling, inability to lay flat at night.  Upon arrival here in the emergency room he was noted to be in supraventricular tachycardia and was given adenosine with resolve down to sinus tachycardia.  Patient is vastly volume overloaded with bilateral leg edema which she states is been increasing over the last 2 weeks.  The patient states he is supposed to have follow-up with Dr. James cardiothoracic surgeon for aortic valve and open heart surgery in the next week.  The patient is also complaining of some epistaxis which is been an ongoing issue for him.  He is alert he is able to talk in full sentences.  He is oriented x3.  He denies any chest pain.  His son, Spencer, is at bedside.  I have alerted the patient he will be admitted to the telemetry floor with close monitoring of his heart rhythm and further diuresis for his congestive heart failure.  I have consulted cardiology for assistance.    Review of Systems  Review of Systems   Constitutional: Positive for malaise/fatigue. Negative for chills and fever.   HENT: Positive for nosebleeds (right nare).    Eyes: Negative for discharge.   Respiratory: Positive for cough and shortness of breath. Negative for sputum production and stridor.    Cardiovascular: Positive for palpitations and leg swelling. Negative for chest  pain.   Gastrointestinal: Positive for constipation. Negative for abdominal pain, blood in stool, diarrhea, melena and nausea.   Genitourinary: Negative for dysuria and hematuria.   Musculoskeletal: Negative for back pain and joint pain.   Skin: Negative for rash.   Neurological: Negative for dizziness, sensory change, speech change and headaches.   Psychiatric/Behavioral: The patient has insomnia (due to shortness of breath and having to sleep upright). The patient is not nervous/anxious.        Past Medical History   has a past medical history of Aortic stenosis (10/20/2011), Arrhythmia, Breath shortness, Carotid bruit (12/9/2009), Cataract, Chronic right shoulder pain, Diabetes, Fatty liver (1/17/2011), Heart attack ([facility]) (12/2018), History of cardiac catheterization (7/13/2009), History of echocardiogram (10/20/2011), HTN (hypertension) (10/12/2012), Hypertension, Memory loss (10/20/2011), Murmur (7/7/2009), Obesity (8/23/2011), Palpitations (10/20/2011), PSVT (paroxysmal supraventricular tachycardia) ([facility]) (2/14/2012), S/P coronary artery stent placement (1996), S/P coronary artery stent placement (1998), S/P coronary artery stent placement (2003), Sciatica (8/23/2011), and Uncontrolled diabetes mellitus ([facility]) (11/29/2010).    Surgical History   has a past surgical history that includes cataract extraction with iol (Bilateral); carotid stent angiography (1996); and tree by laparoscopy (N/A, 10/25/2015).     Family History  family history includes Heart Disease in his father and mother.     Social History   reports that he has never smoked. He has never used smokeless tobacco. He reports that he does not drink alcohol or use drugs. .  Has 5 children.  Owns multiple businesses    Allergies  Allergies   Allergen Reactions   • Bydureon [Exenatide] Hives     hives   • Cycloset [Bromocriptine Mesylate] Unspecified     Pt states he can't remember what happens to him.   • Morphine Vomiting        Medications  Prior to Admission Medications   Prescriptions Last Dose Informant Patient Reported? Taking?   Empagliflozin (JARDIANCE) 25 MG Tab  Patient No No   Sig: Take 1 Tab by mouth every day.   Insulin Degludec (TRESIBA) 100 UNIT/ML Solution   Yes Yes   Sig: Inject  as instructed.   aspirin EC 81 MG EC tablet  Patient No No   Sig: Take 1 Tab by mouth every day.   furosemide (LASIX) 20 MG Tab   Yes Yes   Sig: Take 20 mg by mouth every day.   glipiZIDE SR (GLUCOTROL) 10 MG TABLET SR 24 HR  Patient No No   Sig: Take 1 Tab by mouth every morning.   insulin lispro (HUMALOG) 100 UNIT/ML   Yes Yes   Sig: Inject 12 Units as instructed 3 times a day before meals.   losartan (COZAAR) 100 MG Tab  Patient No No   Sig: TAKE 1 TABLET BY MOUTH EVERY DAY   metformin (GLUCOPHAGE) 1000 MG tablet  Patient No No   Sig: Take 1 Tab by mouth 2 times a day.   metoprolol SR (TOPROL XL) 50 MG TABLET SR 24 HR   No No   Sig: Take 1 Tab by mouth 2 Times a Day.   rosuvastatin (CRESTOR) 20 MG Tab  Patient No No   Sig: Take 1 Tab by mouth every day.      Facility-Administered Medications: None       Physical Exam  Temp:  [35.8 °C (96.4 °F)] 35.8 °C (96.4 °F)  Pulse:  [106-174] 106  Resp:  [17-39] 17  BP: (121-142)/(76-86) 142/76  SpO2:  [95 %-100 %] 95 %    Physical Exam  Vitals signs reviewed.   Constitutional:       Appearance: Normal appearance. He is obese. He is not diaphoretic.   HENT:      Head: Normocephalic and atraumatic.      Nose: Nose normal.      Mouth/Throat:      Mouth: Mucous membranes are moist.      Pharynx: No oropharyngeal exudate.   Eyes:      General: No scleral icterus.        Right eye: No discharge.         Left eye: No discharge.      Extraocular Movements: Extraocular movements intact.      Conjunctiva/sclera: Conjunctivae normal.      Pupils: Pupils are equal, round, and reactive to light.   Neck:      Musculoskeletal: No muscular tenderness.      Vascular: No carotid bruit.   Cardiovascular:      Rate and  Rhythm: Regular rhythm. Tachycardia present.      Pulses:           Radial pulses are 2+ on the right side and 2+ on the left side.        Dorsalis pedis pulses are 2+ on the right side and 2+ on the left side.      Heart sounds: Murmur present.   Pulmonary:      Effort: Pulmonary effort is normal. No respiratory distress.      Breath sounds: Examination of the right-lower field reveals decreased breath sounds. Examination of the left-lower field reveals decreased breath sounds. Decreased breath sounds present. No wheezing or rales.   Abdominal:      General: Bowel sounds are normal. There is no distension.      Palpations: Abdomen is soft.   Musculoskeletal:         General: No swelling or tenderness.      Right lower leg: Edema present.      Left lower leg: Edema present.   Lymphadenopathy:      Cervical: No cervical adenopathy.   Skin:     Coloration: Skin is pale. Skin is not jaundiced.   Neurological:      General: No focal deficit present.      Mental Status: He is alert and oriented to person, place, and time. Mental status is at baseline.      Cranial Nerves: No cranial nerve deficit.   Psychiatric:         Mood and Affect: Mood normal.         Behavior: Behavior normal.         Laboratory:  Recent Labs     08/27/20  1714   WBC 10.2   RBC 5.38   HEMOGLOBIN 12.0*   HEMATOCRIT 40.0*   MCV 74.3*   MCH 22.3*   MCHC 30.0*   RDW 42.0   PLATELETCT 124*   MPV 10.1     Recent Labs     08/27/20  1714   SODIUM 135   POTASSIUM 3.9   CHLORIDE 96   CO2 22   GLUCOSE 209*   BUN 18   CREATININE 1.17   CALCIUM 9.1     Recent Labs     08/27/20  1714   ALTSGPT 35   ASTSGOT 22   ALKPHOSPHAT 59   TBILIRUBIN 0.4   GLUCOSE 209*     Recent Labs     08/27/20  1714   APTT 27.7   INR 1.00     Recent Labs     08/27/20  1714   NTPROBNP 4563*         Recent Labs     08/27/20  1714   TROPONINT 24*       Imaging:  DX-CHEST-PORTABLE (1 VIEW)   Final Result      New moderate diffuse hazy opacification is highly concerning for cardiogenic  edema            Assessment/Plan:  I anticipate this patient will require at least two midnights for appropriate medical management, necessitating inpatient admission.    * Acute on chronic systolic heart failure (HCC)  Assessment & Plan  IV Lasix  Can continue with metoprolol 50 mg twice daily and losartan 100 mg with parameters  Monitor strict I's and O's  Recent echocardiogram in the past few weeks  I have requested cardiology's assistance given the patient's critical aortic stenosis and recent supraventricular tachycardia in the emergency room    Severe aortic stenosis- (present on admission)  Assessment & Plan  Patient is supposed to have aortic valve replacement by Dr. James  Patient does have acute exacerbation of CHF he will need to be tuned up prior to surgery  Patient complains of ongoing epistaxis this is concern if he is to be on any anticoagulation status post valve replacement.  We may need to have ENT evaluation for cautery  Watch diuresis, afterload, blood pressure, and heart rate given his severe aortic stenosis    SVT (supraventricular tachycardia) (HCC)- (present on admission)  Assessment & Plan  Resolved with 12mcg of adenosine in ER  Monitor on Telemetry  C/O AS and CHF as exacerbating factors  Continue metoprolol  I have consulted Dr Olivia, Cardiology    S/P drug eluting coronary stent placement- (present on admission)  Assessment & Plan  Prior stents  Is been on baby aspirin 81 mg  States he stopped his anticoagulation after 5 months due to issues bleeding with epistaxis as well as bloody gums.  Medical management of coronary artery disease    Essential hypertension- (present on admission)  Assessment & Plan  Monitor vitals  Continue with losartan and metoprolol    Uncontrolled diabetes mellitus (HCC)- (present on admission)  Assessment & Plan  Follows outpatient with Dr Schmitz, Endocrine in [place]  Monitor accuchecks and cover with [facility]  Hold outpatient meds for now.  Last A1c:10.3  in past 3 weeks    Obesity (BMI 30-39.9)- (present on admission)  Assessment & Plan  Current Body mass index is 30.56 kg/m².  This may be slightly higher secondary to the patient's leg edema

## 2020-08-28 NOTE — ASSESSMENT & PLAN NOTE
Current Body mass index is 30.56 kg/m².  This may be slightly higher secondary to the patient's leg edema

## 2020-08-29 LAB
ANION GAP SERPL CALC-SCNC: 13 MMOL/L (ref 7–16)
BUN SERPL-MCNC: 20 MG/DL (ref 8–22)
CALCIUM SERPL-MCNC: 8.9 MG/DL (ref 8.5–10.5)
CHLORIDE SERPL-SCNC: 94 MMOL/L (ref 96–112)
CO2 SERPL-SCNC: 24 MMOL/L (ref 20–33)
CREAT SERPL-MCNC: 0.91 MG/DL (ref 0.5–1.4)
EKG IMPRESSION: NORMAL
ERYTHROCYTE [DISTWIDTH] IN BLOOD BY AUTOMATED COUNT: 39 FL (ref 35.9–50)
GLUCOSE BLD-MCNC: 105 MG/DL (ref 65–99)
GLUCOSE BLD-MCNC: 175 MG/DL (ref 65–99)
GLUCOSE BLD-MCNC: 205 MG/DL (ref 65–99)
GLUCOSE BLD-MCNC: 258 MG/DL (ref 65–99)
GLUCOSE SERPL-MCNC: 219 MG/DL (ref 65–99)
HCT VFR BLD AUTO: 36.7 % (ref 42–52)
HGB BLD-MCNC: 11.6 G/DL (ref 14–18)
MCH RBC QN AUTO: 22.7 PG (ref 27–33)
MCHC RBC AUTO-ENTMCNC: 31.6 G/DL (ref 33.7–35.3)
MCV RBC AUTO: 71.7 FL (ref 81.4–97.8)
PLATELET # BLD AUTO: 124 K/UL (ref 164–446)
PMV BLD AUTO: 10.1 FL (ref 9–12.9)
POTASSIUM SERPL-SCNC: 3.6 MMOL/L (ref 3.6–5.5)
RBC # BLD AUTO: 5.12 M/UL (ref 4.7–6.1)
SODIUM SERPL-SCNC: 131 MMOL/L (ref 135–145)
WBC # BLD AUTO: 7.4 K/UL (ref 4.8–10.8)

## 2020-08-29 PROCEDURE — 700102 HCHG RX REV CODE 250 W/ 637 OVERRIDE(OP): Performed by: HOSPITALIST

## 2020-08-29 PROCEDURE — A9270 NON-COVERED ITEM OR SERVICE: HCPCS | Performed by: INTERNAL MEDICINE

## 2020-08-29 PROCEDURE — 82962 GLUCOSE BLOOD TEST: CPT | Mod: 91

## 2020-08-29 PROCEDURE — 85027 COMPLETE CBC AUTOMATED: CPT

## 2020-08-29 PROCEDURE — 93010 ELECTROCARDIOGRAM REPORT: CPT | Mod: 76 | Performed by: INTERNAL MEDICINE

## 2020-08-29 PROCEDURE — 700111 HCHG RX REV CODE 636 W/ 250 OVERRIDE (IP): Performed by: INTERNAL MEDICINE

## 2020-08-29 PROCEDURE — 80048 BASIC METABOLIC PNL TOTAL CA: CPT

## 2020-08-29 PROCEDURE — 36415 COLL VENOUS BLD VENIPUNCTURE: CPT

## 2020-08-29 PROCEDURE — 93005 ELECTROCARDIOGRAM TRACING: CPT | Performed by: INTERNAL MEDICINE

## 2020-08-29 PROCEDURE — A9270 NON-COVERED ITEM OR SERVICE: HCPCS | Performed by: HOSPITALIST

## 2020-08-29 PROCEDURE — 700102 HCHG RX REV CODE 250 W/ 637 OVERRIDE(OP): Performed by: INTERNAL MEDICINE

## 2020-08-29 PROCEDURE — 99232 SBSQ HOSP IP/OBS MODERATE 35: CPT | Performed by: INTERNAL MEDICINE

## 2020-08-29 PROCEDURE — 770020 HCHG ROOM/CARE - TELE (206)

## 2020-08-29 PROCEDURE — 700111 HCHG RX REV CODE 636 W/ 250 OVERRIDE (IP): Performed by: HOSPITALIST

## 2020-08-29 RX ORDER — CALCIUM CARBONATE 500 MG/1
500 TABLET, CHEWABLE ORAL DAILY
Status: DISCONTINUED | OUTPATIENT
Start: 2020-08-29 | End: 2020-08-31

## 2020-08-29 RX ORDER — DEXTROSE MONOHYDRATE 25 G/50ML
50 INJECTION, SOLUTION INTRAVENOUS
Status: DISCONTINUED | OUTPATIENT
Start: 2020-08-29 | End: 2020-08-31

## 2020-08-29 RX ORDER — ADENOSINE 3 MG/ML
INJECTION, SOLUTION INTRAVENOUS
Status: ACTIVE
Start: 2020-08-29 | End: 2020-08-29

## 2020-08-29 RX ORDER — INSULIN GLARGINE 100 [IU]/ML
26 INJECTION, SOLUTION SUBCUTANEOUS EVERY EVENING
Status: DISCONTINUED | OUTPATIENT
Start: 2020-08-29 | End: 2020-08-31

## 2020-08-29 RX ORDER — ADENOSINE 3 MG/ML
12 INJECTION, SOLUTION INTRAVENOUS ONCE
Status: COMPLETED | OUTPATIENT
Start: 2020-08-29 | End: 2020-08-29

## 2020-08-29 RX ADMIN — INSULIN GLARGINE 26 UNITS: 100 INJECTION, SOLUTION SUBCUTANEOUS at 17:45

## 2020-08-29 RX ADMIN — ADENOSINE 12 MG: 3 INJECTION, SOLUTION INTRAVENOUS at 03:25

## 2020-08-29 RX ADMIN — ASPIRIN 81 MG: 81 TABLET, COATED ORAL at 06:15

## 2020-08-29 RX ADMIN — INSULIN LISPRO 5 UNITS: 100 INJECTION, SOLUTION INTRAVENOUS; SUBCUTANEOUS at 12:57

## 2020-08-29 RX ADMIN — ANTACID TABLETS 500 MG: 500 TABLET, CHEWABLE ORAL at 12:43

## 2020-08-29 RX ADMIN — DOCUSATE SODIUM 50 MG AND SENNOSIDES 8.6 MG 2 TABLET: 8.6; 5 TABLET, FILM COATED ORAL at 17:48

## 2020-08-29 RX ADMIN — ROSUVASTATIN CALCIUM 20 MG: 20 TABLET, FILM COATED ORAL at 17:48

## 2020-08-29 RX ADMIN — ENOXAPARIN SODIUM 40 MG: 40 INJECTION SUBCUTANEOUS at 06:15

## 2020-08-29 RX ADMIN — INSULIN LISPRO 10 UNITS: 100 INJECTION, SOLUTION INTRAVENOUS; SUBCUTANEOUS at 12:57

## 2020-08-29 RX ADMIN — METOPROLOL SUCCINATE 75 MG: 50 TABLET, EXTENDED RELEASE ORAL at 07:49

## 2020-08-29 RX ADMIN — INSULIN LISPRO 2 UNITS: 100 INJECTION, SOLUTION INTRAVENOUS; SUBCUTANEOUS at 21:33

## 2020-08-29 RX ADMIN — FUROSEMIDE 20 MG: 10 INJECTION, SOLUTION INTRAMUSCULAR; INTRAVENOUS at 06:15

## 2020-08-29 RX ADMIN — METOPROLOL SUCCINATE 75 MG: 50 TABLET, EXTENDED RELEASE ORAL at 17:52

## 2020-08-29 ASSESSMENT — ENCOUNTER SYMPTOMS
STRIDOR: 0
BLOOD IN STOOL: 0
SPEECH CHANGE: 0
SENSORY CHANGE: 0
BACK PAIN: 0
NAUSEA: 0
SHORTNESS OF BREATH: 1
INSOMNIA: 1
NERVOUS/ANXIOUS: 0
SPUTUM PRODUCTION: 0
ABDOMINAL PAIN: 0
COUGH: 1
EYE DISCHARGE: 0
DIARRHEA: 0
PALPITATIONS: 1
CHILLS: 0
FEVER: 0
DIZZINESS: 0
HEADACHES: 0

## 2020-08-29 ASSESSMENT — FIBROSIS 4 INDEX: FIB4 SCORE: 1.95

## 2020-08-29 NOTE — CARE PLAN
Problem: Knowledge Deficit  Goal: Knowledge of disease process/condition, treatment plan, diagnostic tests, and medications will improve  Outcome: PROGRESSING AS EXPECTED     Problem: Fluid Volume:  Goal: Will maintain balanced intake and output  Outcome: PROGRESSING AS EXPECTED     Problem: Respiratory:  Goal: Respiratory status will improve  Outcome: PROGRESSING AS EXPECTED

## 2020-08-29 NOTE — PROGRESS NOTES
Hospital Medicine Daily Progress Note    Date of Service  8/29/2020    Chief Complaint  64 y.o. male admitted 8/27/2020 with sob and SVT     Hospital Course    This is a  64 y.o. male with uncontrolled diabetes, severe aortic stenosis with cardiothoracic evaluating for near future valve replacement, hypertension, congestive heart failure with systolic dysfunction, obesity, hypertension, coronary artery disease with prior stenting. He presented to ER on 8/27/20  due to ongoing issues of shortness of breath, leg swelling, inability to lay flat at night.  Upon arrival here in the emergency room he was noted to be in supraventricular tachycardia and was given adenosine with resolve down to sinus tachycardia. Pt was found to have fluid overload with bilateral leg edema which she states is been increasing over the last 2 weeks. Pt also supposed follow-up with Dr. James cardiothoracic surgeon for aortic valve and open heart surgery in the next week. Pt admitted for further treatment and cardiology has been consulted        Interval Problem Update  Afebrile, still with some SOB, denies any CP, no nausea or vomiting. had another episode of SVT overnight requiring adenosine  Cardiology following appreciate rec.      Consultants/Specialty  Cardiology   Cardiothoracic surgery     Code Status  Full Code    Disposition  TBD    Review of Systems  Review of Systems   Constitutional: Positive for malaise/fatigue. Negative for chills and fever.   HENT: Negative for ear discharge and ear pain.    Eyes: Negative for discharge.   Respiratory: Positive for cough and shortness of breath. Negative for sputum production and stridor.    Cardiovascular: Positive for palpitations and leg swelling. Negative for chest pain.   Gastrointestinal: Negative for abdominal pain, blood in stool, diarrhea, melena and nausea.   Genitourinary: Negative for dysuria and hematuria.   Musculoskeletal: Negative for back pain and joint pain.   Skin: Negative  for rash.   Neurological: Negative for dizziness, sensory change, speech change and headaches.   Psychiatric/Behavioral: The patient has insomnia (due to shortness of breath and having to sleep upright). The patient is not nervous/anxious.    All other systems reviewed and are negative.       Physical Exam  Temp:  [36.1 °C (97 °F)-37.1 °C (98.8 °F)] 36.1 °C (97 °F)  Pulse:  [] 79  Resp:  [16-26] 18  BP: ()/(54-90) 113/60  SpO2:  [93 %-97 %] 93 %    Physical Exam  Vitals signs and nursing note reviewed.   Constitutional:       Appearance: Normal appearance. He is obese. He is not diaphoretic.   HENT:      Head: Normocephalic and atraumatic.      Nose: Nose normal.      Mouth/Throat:      Mouth: Mucous membranes are moist.      Pharynx: No oropharyngeal exudate.   Eyes:      General: No scleral icterus.        Right eye: No discharge.         Left eye: No discharge.      Extraocular Movements: Extraocular movements intact.      Conjunctiva/sclera: Conjunctivae normal.      Pupils: Pupils are equal, round, and reactive to light.   Neck:      Musculoskeletal: No muscular tenderness.      Vascular: No carotid bruit.   Cardiovascular:      Rate and Rhythm: Regular rhythm. Tachycardia present.      Pulses:           Radial pulses are 2+ on the right side and 2+ on the left side.        Dorsalis pedis pulses are 2+ on the right side and 2+ on the left side.      Heart sounds: Murmur present.   Pulmonary:      Effort: Pulmonary effort is normal. No respiratory distress.      Breath sounds: Examination of the right-lower field reveals decreased breath sounds. Examination of the left-lower field reveals decreased breath sounds. Decreased breath sounds present. No wheezing or rales.   Abdominal:      General: Bowel sounds are normal. There is no distension.      Palpations: Abdomen is soft.      Tenderness: There is no guarding or rebound.   Musculoskeletal:         General: No swelling or tenderness.      Right  lower leg: Edema present.      Left lower leg: Edema present.   Lymphadenopathy:      Cervical: No cervical adenopathy.   Skin:     Coloration: Skin is pale. Skin is not jaundiced.   Neurological:      General: No focal deficit present.      Mental Status: He is alert and oriented to person, place, and time. Mental status is at baseline.      Cranial Nerves: No cranial nerve deficit.   Psychiatric:         Mood and Affect: Mood normal.         Behavior: Behavior normal.         Fluids    Intake/Output Summary (Last 24 hours) at 8/29/2020 1217  Last data filed at 8/29/2020 1000  Gross per 24 hour   Intake 750 ml   Output 500 ml   Net 250 ml       Laboratory  Recent Labs     08/27/20  1714 08/28/20  0136 08/29/20  0520   WBC 10.2 8.3 7.4   RBC 5.38 4.96 5.12   HEMOGLOBIN 12.0* 11.2* 11.6*   HEMATOCRIT 40.0* 37.4* 36.7*   MCV 74.3* 75.4* 71.7*   MCH 22.3* 22.6* 22.7*   MCHC 30.0* 29.9* 31.6*   RDW 42.0 42.5 39.0   PLATELETCT 124* 112* 124*   MPV 10.1 10.5 10.1     Recent Labs     08/28/20  0136 08/28/20  2322 08/29/20  0520   SODIUM 133* 134* 131*   POTASSIUM 4.1 3.9 3.6   CHLORIDE 98 95* 94*   CO2 21 24 24   GLUCOSE 238* 179* 219*   BUN 17 20 20   CREATININE 1.04 1.08 0.91   CALCIUM 8.8 9.2 8.9     Recent Labs     08/27/20  1714   APTT 27.7   INR 1.00               Imaging  US-VEIN MAPPING LOWER EXTREMITY BILAT   Final Result      US-CAROTID DOPPLER BILAT   Final Result      DX-CHEST-PORTABLE (1 VIEW)   Final Result      New moderate diffuse hazy opacification is highly concerning for cardiogenic edema           Assessment/Plan  * Acute on chronic systolic heart failure (HCC)  Assessment & Plan  Cont on IV Lasix  Can continue with metoprolol 50 mg twice daily and losartan 100 mg with parameters  Monitor strict I's and O's  Recent echocardiogram in the past few weeks  Cardiology following appreciate rec.     Severe aortic stenosis- (present on admission)  Assessment & Plan  Patient is supposed to have aortic valve  replacement by Dr. James  Cardiology has been consulted and cardiology has consulted cardiothoracic surgery appreciate rec.     SVT (supraventricular tachycardia) (HCC)- (present on admission)  Assessment & Plan  Had another episode of svt overnight requiring another 12 mg of adenosine   Monitor on Telemetry  Continue metoprolol  Cardiology following appreciate rec.     S/P drug eluting coronary stent placement- (present on admission)  Assessment & Plan  Prior stents  Is been on baby aspirin 81 mg  States he stopped his anticoagulation after 5 months due to issues bleeding with epistaxis as well as bloody gums.  Medical management of coronary artery disease    Essential hypertension- (present on admission)  Assessment & Plan  Monitor vitals  Continue with losartan and metoprolol    Uncontrolled diabetes mellitus (HCC)- (present on admission)  Assessment & Plan  Follows outpatient with Dr Schmitz, Endocrine in [place]  Monitor accuchecks and cover with SSI and also his glargine   Last A1c:10.3 in past 3 weeks    Obesity (BMI 30-39.9)- (present on admission)  Assessment & Plan  Current Body mass index is 30.56 kg/m².  This may be slightly higher secondary to the patient's leg edema       VTE prophylaxis: enoxaparin

## 2020-08-29 NOTE — HEART FAILURE PROGRAM
Acutely decompensated HF in a known HF patient.    Please see below for measures that must be addressed prior to discharge.     Daily Nurse: please begin to fill out the HF checklist (pink sheet in hard chart) and use it to guide your daily care.    Discharge Nurse: please ensure completeness of the HF checklist (pink sheet in hard chart) and have it co-signed by the charge RN before the patient leaves the hospital.    Thank you, Vashti, Cardiovascular Nurse Navigator, RN, [facility] [phone]    HF Measures:  1. Documentation of LV systolic function (echo or cath) PTA, during this hospitalization, or plan to assess post discharge or reason for not assessing documented  2. Documentation of fluid intake and urine output every nursing shift  3. 2 hour post diuretic assessment documented 2 hours after diuretic given  4. HF Patient Education using the Living Well With Heart Failure Booklet and Symptom Tracker documented every nursing shift  5. Nutrition consult for diet education  6. Daily weights (one weight documented every 24 hours) on a standing scale unless standing is contraindicated in which case bed scale can be used - have patient write weight on symptom tracker  7. For LVEF less than or equal to 40%, ACE-I, ARNI or ARB prescribed at discharge   8. For LVEF less than or equal to 40%, an Evidence Based Beta Blocker (bisoprolol, carvedilol, toprol xl) must be prescribed at discharge  9. For LVEF less than or equal to 35% aldosterone blockade prescribed at discharge  10. The combination of hydralazine and isosorbide dinitrate is recommended to reduce morbidity and mortality for patients self-described  Americans with NYHA class III-IV HFrEF (EF 40% or less), receiving optimal therapy with ACE inhibitors and beta blockers, unless contraindicated (Class I, [facility]: A).  11. If a HF patient is diabetic or is newly diagnosed with DM: prescribed diabetes treatment at discharge in the form of glycemic control (diet or  anti-hyperglycemic medication) or f/u appointment for diabetes management scheduled at discharge.  12. If a HF patient has diabetes: prescribed lipid lowering medication at discharge  13. Documented smoking cessation advice or counseling  14. If a HF patient has a-fib: anticoagulation is prescribed upon discharge or contraindication is documented  15. Screening for and administering immunizations as long as no contraindications: Pneumonia (regardless of age) and Influenza  16. Written discharge instructions include:  ? Daily weights  ? Record weight on tracker  ? Bring tracker to appointments  ? Call MD for weight gain of 3lb /day or 5lb/week  ? HF medication teaching  ? Low sodium diet  ? Follow up appointment within seven calendar days of d/c must include: date, time and location  ? Activity  ? Worsening symptoms    What if any of the above HF measures are contraindicated?  ? Request that the discharging provider document the medication/intervention and the contraindication specifically in a progress note  ? For example: “no CHF meds due to hypotension” is not enough. It needs to say: “No ACE-I, ARNI, ARB due to hypotension”; “No Beta Blockade due to bradycardia”…

## 2020-08-29 NOTE — PROGRESS NOTES
Nurse paged to bedside Rapid initiated at 0317 Patient HR back up 150-160s patient complaining of chest pain SOB. CIC Rapid and CIC charge to bedside  to bedside orders to push 12 mg Adenosine.  with cardiology paged and updated. Patient converted back to Sinus rhythm 80-90 0328.

## 2020-08-29 NOTE — PROGRESS NOTES
Change in patient condition due to: Cardiac  Rapid Response called at: 2250  Physician Dr. Farias and Dr. Nobles notified at 2252    See Code Blue timeline for rapid response events. Patient Remained on Unit

## 2020-08-29 NOTE — PROGRESS NOTES
Change in patient condition due to: Cardiac  Rapid Response called at: 0320  Physician Dr. Farias and Dr. Quevedo notified at 0320    See Code Blue timeline for rapid response events. Patient Remained on Unit

## 2020-08-29 NOTE — CODE DOCUMENTATION
Paged cardiology with no return page, patient having unstable SVT with chest pain and related SOB.  to bedside new order for 12 adenosine. After adenosine pushed patient converted back to SR and chest pain relived.  Cardiology called back and updated ok with keeping patient on T8 and monitoring for SVT

## 2020-08-29 NOTE — CODE DOCUMENTATION
EKG complete. Vagal maneuvers attempted without success. Primary RN to speak with MD. RN instructed to page cardiology. Plans to give adenosine. Code cart at bedside. Pt being place on pads.

## 2020-08-29 NOTE — PROGRESS NOTES
Monitor Summary:    SR 84-92  ,180  Second Degree Type 1 & 2  Rare PAC & PVC, Occasional PAC  .20/.08/.34

## 2020-08-30 LAB
ABO + RH BLD: NORMAL
ABO GROUP BLD: NORMAL
ALBUMIN SERPL BCP-MCNC: 4 G/DL (ref 3.2–4.9)
ALBUMIN/GLOB SERPL: 1.3 G/DL
ALP SERPL-CCNC: 57 U/L (ref 30–99)
ALT SERPL-CCNC: 37 U/L (ref 2–50)
ANION GAP SERPL CALC-SCNC: 11 MMOL/L (ref 7–16)
ANION GAP SERPL CALC-SCNC: 13 MMOL/L (ref 7–16)
APPEARANCE UR: CLEAR
APTT PPP: 28.7 SEC (ref 24.7–36)
AST SERPL-CCNC: 27 U/L (ref 12–45)
BARCODED ABORH UBTYP: 6200
BARCODED PRD CODE UBPRD: NORMAL
BARCODED UNIT NUM UBUNT: NORMAL
BASOPHILS # BLD AUTO: 0.4 % (ref 0–1.8)
BASOPHILS # BLD: 0.03 K/UL (ref 0–0.12)
BILIRUB SERPL-MCNC: 0.3 MG/DL (ref 0.1–1.5)
BILIRUB UR QL STRIP.AUTO: NEGATIVE
BLD GP AB SCN SERPL QL: NORMAL
BUN SERPL-MCNC: 19 MG/DL (ref 8–22)
BUN SERPL-MCNC: 19 MG/DL (ref 8–22)
CALCIUM SERPL-MCNC: 9.1 MG/DL (ref 8.5–10.5)
CALCIUM SERPL-MCNC: 9.3 MG/DL (ref 8.5–10.5)
CHLORIDE SERPL-SCNC: 98 MMOL/L (ref 96–112)
CHLORIDE SERPL-SCNC: 99 MMOL/L (ref 96–112)
CO2 SERPL-SCNC: 25 MMOL/L (ref 20–33)
CO2 SERPL-SCNC: 26 MMOL/L (ref 20–33)
COLOR UR: YELLOW
COMPONENT R 8504R: NORMAL
CREAT SERPL-MCNC: 1.11 MG/DL (ref 0.5–1.4)
CREAT SERPL-MCNC: 1.16 MG/DL (ref 0.5–1.4)
EOSINOPHIL # BLD AUTO: 0.31 K/UL (ref 0–0.51)
EOSINOPHIL NFR BLD: 3.7 % (ref 0–6.9)
ERYTHROCYTE [DISTWIDTH] IN BLOOD BY AUTOMATED COUNT: 38.9 FL (ref 35.9–50)
ERYTHROCYTE [DISTWIDTH] IN BLOOD BY AUTOMATED COUNT: 39.8 FL (ref 35.9–50)
EST. AVERAGE GLUCOSE BLD GHB EST-MCNC: 249 MG/DL
GLOBULIN SER CALC-MCNC: 3.1 G/DL (ref 1.9–3.5)
GLUCOSE BLD-MCNC: 112 MG/DL (ref 65–99)
GLUCOSE BLD-MCNC: 182 MG/DL (ref 65–99)
GLUCOSE BLD-MCNC: 215 MG/DL (ref 65–99)
GLUCOSE BLD-MCNC: 262 MG/DL (ref 65–99)
GLUCOSE SERPL-MCNC: 219 MG/DL (ref 65–99)
GLUCOSE SERPL-MCNC: 240 MG/DL (ref 65–99)
GLUCOSE UR STRIP.AUTO-MCNC: 500 MG/DL
HBA1C MFR BLD: 10.3 % (ref 0–5.6)
HCT VFR BLD AUTO: 38.5 % (ref 42–52)
HCT VFR BLD AUTO: 40 % (ref 42–52)
HGB BLD-MCNC: 11.6 G/DL (ref 14–18)
HGB BLD-MCNC: 12.5 G/DL (ref 14–18)
IMM GRANULOCYTES # BLD AUTO: 0.01 K/UL (ref 0–0.11)
IMM GRANULOCYTES NFR BLD AUTO: 0.1 % (ref 0–0.9)
INR PPP: 0.95 (ref 0.87–1.13)
KETONES UR STRIP.AUTO-MCNC: NEGATIVE MG/DL
LEUKOCYTE ESTERASE UR QL STRIP.AUTO: NEGATIVE
LYMPHOCYTES # BLD AUTO: 2.24 K/UL (ref 1–4.8)
LYMPHOCYTES NFR BLD: 26.9 % (ref 22–41)
MAGNESIUM SERPL-MCNC: 2.2 MG/DL (ref 1.5–2.5)
MCH RBC QN AUTO: 22.3 PG (ref 27–33)
MCH RBC QN AUTO: 22.7 PG (ref 27–33)
MCHC RBC AUTO-ENTMCNC: 30.1 G/DL (ref 33.7–35.3)
MCHC RBC AUTO-ENTMCNC: 31.3 G/DL (ref 33.7–35.3)
MCV RBC AUTO: 72.6 FL (ref 81.4–97.8)
MCV RBC AUTO: 73.9 FL (ref 81.4–97.8)
MICRO URNS: ABNORMAL
MONOCYTES # BLD AUTO: 1 K/UL (ref 0–0.85)
MONOCYTES NFR BLD AUTO: 12 % (ref 0–13.4)
NEUTROPHILS # BLD AUTO: 4.73 K/UL (ref 1.82–7.42)
NEUTROPHILS NFR BLD: 56.9 % (ref 44–72)
NITRITE UR QL STRIP.AUTO: NEGATIVE
NRBC # BLD AUTO: 0 K/UL
NRBC BLD-RTO: 0 /100 WBC
PH UR STRIP.AUTO: 5 [PH] (ref 5–8)
PLATELET # BLD AUTO: 124 K/UL (ref 164–446)
PLATELET # BLD AUTO: 145 K/UL (ref 164–446)
PMV BLD AUTO: 9.5 FL (ref 9–12.9)
PMV BLD AUTO: 9.8 FL (ref 9–12.9)
POTASSIUM SERPL-SCNC: 3.9 MMOL/L (ref 3.6–5.5)
POTASSIUM SERPL-SCNC: 4 MMOL/L (ref 3.6–5.5)
PRODUCT TYPE UPROD: NORMAL
PROT SERPL-MCNC: 7.1 G/DL (ref 6–8.2)
PROT UR QL STRIP: NEGATIVE MG/DL
PROTHROMBIN TIME: 13 SEC (ref 12–14.6)
RBC # BLD AUTO: 5.21 M/UL (ref 4.7–6.1)
RBC # BLD AUTO: 5.51 M/UL (ref 4.7–6.1)
RBC UR QL AUTO: NEGATIVE
RH BLD: NORMAL
SODIUM SERPL-SCNC: 136 MMOL/L (ref 135–145)
SODIUM SERPL-SCNC: 136 MMOL/L (ref 135–145)
SP GR UR STRIP.AUTO: 1.03
UNIT STATUS USTAT: NORMAL
UROBILINOGEN UR STRIP.AUTO-MCNC: 0.2 MG/DL
WBC # BLD AUTO: 6.6 K/UL (ref 4.8–10.8)
WBC # BLD AUTO: 8.3 K/UL (ref 4.8–10.8)

## 2020-08-30 PROCEDURE — 85027 COMPLETE CBC AUTOMATED: CPT

## 2020-08-30 PROCEDURE — 85610 PROTHROMBIN TIME: CPT

## 2020-08-30 PROCEDURE — 81003 URINALYSIS AUTO W/O SCOPE: CPT

## 2020-08-30 PROCEDURE — 700102 HCHG RX REV CODE 250 W/ 637 OVERRIDE(OP): Performed by: INTERNAL MEDICINE

## 2020-08-30 PROCEDURE — 99232 SBSQ HOSP IP/OBS MODERATE 35: CPT | Performed by: INTERNAL MEDICINE

## 2020-08-30 PROCEDURE — 86850 RBC ANTIBODY SCREEN: CPT

## 2020-08-30 PROCEDURE — 85025 COMPLETE CBC W/AUTO DIFF WBC: CPT

## 2020-08-30 PROCEDURE — A9270 NON-COVERED ITEM OR SERVICE: HCPCS | Performed by: INTERNAL MEDICINE

## 2020-08-30 PROCEDURE — 80048 BASIC METABOLIC PNL TOTAL CA: CPT

## 2020-08-30 PROCEDURE — 80053 COMPREHEN METABOLIC PANEL: CPT

## 2020-08-30 PROCEDURE — 83036 HEMOGLOBIN GLYCOSYLATED A1C: CPT

## 2020-08-30 PROCEDURE — A9270 NON-COVERED ITEM OR SERVICE: HCPCS | Performed by: CLINICAL NURSE SPECIALIST

## 2020-08-30 PROCEDURE — 700111 HCHG RX REV CODE 636 W/ 250 OVERRIDE (IP): Performed by: HOSPITALIST

## 2020-08-30 PROCEDURE — 700102 HCHG RX REV CODE 250 W/ 637 OVERRIDE(OP): Performed by: CLINICAL NURSE SPECIALIST

## 2020-08-30 PROCEDURE — A9270 NON-COVERED ITEM OR SERVICE: HCPCS | Performed by: HOSPITALIST

## 2020-08-30 PROCEDURE — 770020 HCHG ROOM/CARE - TELE (206)

## 2020-08-30 PROCEDURE — 86901 BLOOD TYPING SEROLOGIC RH(D): CPT

## 2020-08-30 PROCEDURE — 86900 BLOOD TYPING SEROLOGIC ABO: CPT

## 2020-08-30 PROCEDURE — 83735 ASSAY OF MAGNESIUM: CPT

## 2020-08-30 PROCEDURE — 85730 THROMBOPLASTIN TIME PARTIAL: CPT

## 2020-08-30 PROCEDURE — 700102 HCHG RX REV CODE 250 W/ 637 OVERRIDE(OP): Performed by: HOSPITALIST

## 2020-08-30 PROCEDURE — 82962 GLUCOSE BLOOD TEST: CPT

## 2020-08-30 RX ORDER — ACETAMINOPHEN 500 MG
1000 TABLET ORAL ONCE
Status: COMPLETED | OUTPATIENT
Start: 2020-08-30 | End: 2020-08-30

## 2020-08-30 RX ORDER — GABAPENTIN 300 MG/1
300 CAPSULE ORAL ONCE
Status: COMPLETED | OUTPATIENT
Start: 2020-08-30 | End: 2020-08-30

## 2020-08-30 RX ADMIN — INSULIN LISPRO 2 UNITS: 100 INJECTION, SOLUTION INTRAVENOUS; SUBCUTANEOUS at 12:30

## 2020-08-30 RX ADMIN — ROSUVASTATIN CALCIUM 20 MG: 20 TABLET, FILM COATED ORAL at 18:19

## 2020-08-30 RX ADMIN — MAGNESIUM HYDROXIDE 30 ML: 400 SUSPENSION ORAL at 18:21

## 2020-08-30 RX ADMIN — INSULIN LISPRO 5 UNITS: 100 INJECTION, SOLUTION INTRAVENOUS; SUBCUTANEOUS at 22:02

## 2020-08-30 RX ADMIN — INSULIN LISPRO 10 UNITS: 100 INJECTION, SOLUTION INTRAVENOUS; SUBCUTANEOUS at 12:31

## 2020-08-30 RX ADMIN — DOCUSATE SODIUM 50 MG AND SENNOSIDES 8.6 MG 2 TABLET: 8.6; 5 TABLET, FILM COATED ORAL at 06:30

## 2020-08-30 RX ADMIN — ENOXAPARIN SODIUM 40 MG: 40 INJECTION SUBCUTANEOUS at 06:29

## 2020-08-30 RX ADMIN — ACETAMINOPHEN 650 MG: 325 TABLET, FILM COATED ORAL at 09:52

## 2020-08-30 RX ADMIN — INSULIN LISPRO 10 UNITS: 100 INJECTION, SOLUTION INTRAVENOUS; SUBCUTANEOUS at 18:11

## 2020-08-30 RX ADMIN — METOPROLOL SUCCINATE 75 MG: 50 TABLET, EXTENDED RELEASE ORAL at 08:20

## 2020-08-30 RX ADMIN — INSULIN LISPRO 3 UNITS: 100 INJECTION, SOLUTION INTRAVENOUS; SUBCUTANEOUS at 08:31

## 2020-08-30 RX ADMIN — GABAPENTIN 300 MG: 300 CAPSULE ORAL at 22:02

## 2020-08-30 RX ADMIN — ACETAMINOPHEN 1000 MG: 500 TABLET ORAL at 22:02

## 2020-08-30 RX ADMIN — INSULIN LISPRO 10 UNITS: 100 INJECTION, SOLUTION INTRAVENOUS; SUBCUTANEOUS at 08:32

## 2020-08-30 RX ADMIN — ASPIRIN 81 MG: 81 TABLET, COATED ORAL at 06:29

## 2020-08-30 RX ADMIN — INSULIN GLARGINE 26 UNITS: 100 INJECTION, SOLUTION SUBCUTANEOUS at 18:11

## 2020-08-30 RX ADMIN — METOPROLOL SUCCINATE 75 MG: 50 TABLET, EXTENDED RELEASE ORAL at 18:19

## 2020-08-30 ASSESSMENT — FIBROSIS 4 INDEX
FIB4 SCORE: 1.95
FIB4 SCORE: 1.67

## 2020-08-30 ASSESSMENT — ENCOUNTER SYMPTOMS
PALPITATIONS: 1
SPUTUM PRODUCTION: 0
SHORTNESS OF BREATH: 1
DIARRHEA: 0
NAUSEA: 0
SENSORY CHANGE: 0
NERVOUS/ANXIOUS: 0
SHORTNESS OF BREATH: 0
SPEECH CHANGE: 0
EYE DISCHARGE: 0
HEADACHES: 0
INSOMNIA: 1
DIZZINESS: 0
BLOOD IN STOOL: 0
FEVER: 0
COUGH: 1
CHILLS: 0
ABDOMINAL PAIN: 0
BACK PAIN: 0
STRIDOR: 0
PALPITATIONS: 0
CHEST TIGHTNESS: 0

## 2020-08-30 NOTE — PROGRESS NOTES
Hospital Medicine Daily Progress Note    Date of Service  8/30/2020    Chief Complaint  64 y.o. male admitted 8/27/2020 with sob and SVT     Hospital Course    This is a  64 y.o. male with uncontrolled diabetes, severe aortic stenosis with cardiothoracic evaluating for near future valve replacement, hypertension, congestive heart failure with systolic dysfunction, obesity, hypertension, coronary artery disease with prior stenting. He presented to ER on 8/27/20  due to ongoing issues of shortness of breath, leg swelling, inability to lay flat at night.  Upon arrival here in the emergency room he was noted to be in supraventricular tachycardia and was given adenosine with resolve down to sinus tachycardia. Pt was found to have fluid overload with bilateral leg edema which she states is been increasing over the last 2 weeks. Pt also supposed follow-up with Dr. James cardiothoracic surgeon for aortic valve and open heart surgery in the next week. Pt admitted for further treatment and cardiology has been consulted        Interval Problem Update  Pt seen and examined afebrile denies any CP, no nausea or vomiting.  Cardiology and cardiothoracic surgery following   Possible surgery tomorrow     Consultants/Specialty  Cardiology   Cardiothoracic surgery     Code Status  Full Code    Disposition  TBD    Review of Systems  Review of Systems   Constitutional: Positive for malaise/fatigue. Negative for chills and fever.   HENT: Negative for ear discharge and ear pain.    Eyes: Negative for discharge.   Respiratory: Positive for cough and shortness of breath. Negative for sputum production and stridor.    Cardiovascular: Positive for palpitations and leg swelling. Negative for chest pain.   Gastrointestinal: Negative for abdominal pain, blood in stool, diarrhea, melena and nausea.   Genitourinary: Negative for dysuria and hematuria.   Musculoskeletal: Negative for back pain and joint pain.   Skin: Negative for rash.    Neurological: Negative for dizziness, sensory change, speech change and headaches.   Psychiatric/Behavioral: The patient has insomnia (due to shortness of breath and having to sleep upright). The patient is not nervous/anxious.    All other systems reviewed and are negative.       Physical Exam  Temp:  [35.9 °C (96.7 °F)-37.1 °C (98.7 °F)] 36.4 °C (97.6 °F)  Pulse:  [73-78] 75  Resp:  [15-18] 18  BP: (104-124)/(62-71) 114/68  SpO2:  [93 %-100 %] 93 %    Physical Exam  Vitals signs and nursing note reviewed.   Constitutional:       Appearance: Normal appearance. He is obese. He is not diaphoretic.   HENT:      Head: Normocephalic and atraumatic.      Nose: Nose normal.      Mouth/Throat:      Mouth: Mucous membranes are moist.      Pharynx: No oropharyngeal exudate.   Eyes:      General: No scleral icterus.        Right eye: No discharge.         Left eye: No discharge.      Extraocular Movements: Extraocular movements intact.      Conjunctiva/sclera: Conjunctivae normal.      Pupils: Pupils are equal, round, and reactive to light.   Neck:      Musculoskeletal: No muscular tenderness.      Vascular: No carotid bruit.   Cardiovascular:      Rate and Rhythm: Regular rhythm. Tachycardia present.      Pulses:           Radial pulses are 2+ on the right side and 2+ on the left side.        Dorsalis pedis pulses are 2+ on the right side and 2+ on the left side.      Heart sounds: Murmur present.   Pulmonary:      Effort: Pulmonary effort is normal. No respiratory distress.      Breath sounds: Examination of the right-lower field reveals decreased breath sounds. Examination of the left-lower field reveals decreased breath sounds. Decreased breath sounds present. No wheezing or rales.   Abdominal:      General: Bowel sounds are normal. There is no distension.      Palpations: Abdomen is soft.      Tenderness: There is no guarding or rebound.   Musculoskeletal:         General: No swelling or tenderness.      Right lower  leg: Edema present.      Left lower leg: Edema present.   Lymphadenopathy:      Cervical: No cervical adenopathy.   Skin:     Coloration: Skin is pale. Skin is not jaundiced.   Neurological:      General: No focal deficit present.      Mental Status: He is alert and oriented to person, place, and time. Mental status is at baseline.      Cranial Nerves: No cranial nerve deficit.   Psychiatric:         Mood and Affect: Mood normal.         Behavior: Behavior normal.         Fluids    Intake/Output Summary (Last 24 hours) at 8/30/2020 1258  Last data filed at 8/30/2020 0641  Gross per 24 hour   Intake 500 ml   Output 900 ml   Net -400 ml       Laboratory  Recent Labs     08/28/20  0136 08/29/20  0520 08/30/20  0542   WBC 8.3 7.4 6.6   RBC 4.96 5.12 5.21   HEMOGLOBIN 11.2* 11.6* 11.6*   HEMATOCRIT 37.4* 36.7* 38.5*   MCV 75.4* 71.7* 73.9*   MCH 22.6* 22.7* 22.3*   MCHC 29.9* 31.6* 30.1*   RDW 42.5 39.0 39.8   PLATELETCT 112* 124* 124*   MPV 10.5 10.1 9.8     Recent Labs     08/28/20  2322 08/29/20  0520 08/30/20  0542   SODIUM 134* 131* 136   POTASSIUM 3.9 3.6 3.9   CHLORIDE 95* 94* 99   CO2 24 24 26   GLUCOSE 179* 219* 240*   BUN 20 20 19   CREATININE 1.08 0.91 1.16   CALCIUM 9.2 8.9 9.1     Recent Labs     08/27/20  1714   APTT 27.7   INR 1.00               Imaging  US-VEIN MAPPING LOWER EXTREMITY BILAT   Final Result      US-CAROTID DOPPLER BILAT   Final Result      DX-CHEST-PORTABLE (1 VIEW)   Final Result      New moderate diffuse hazy opacification is highly concerning for cardiogenic edema           Assessment/Plan  * Acute on chronic systolic heart failure (HCC)  Assessment & Plan  Cont on IV Lasix  Can continue with metoprolol 50 mg twice daily and losartan 100 mg with parameters  Monitor strict I's and O's  Recent echocardiogram in the past few weeks  Cardiology following appreciate rec.     Severe aortic stenosis- (present on admission)  Assessment & Plan  Patient is supposed to have aortic valve replacement  by Dr. James  Cardiology has been consulted and cardiology has consulted cardiothoracic surgery appreciate rec.     SVT (supraventricular tachycardia) (HCC)- (present on admission)  Assessment & Plan  Had another episode of svt overnight requiring another 12 mg of adenosine   Monitor on Telemetry  Continue metoprolol  Cardiology following appreciate rec.     S/P drug eluting coronary stent placement- (present on admission)  Assessment & Plan  Prior stents  Is been on baby aspirin 81 mg  States he stopped his anticoagulation after 5 months due to issues bleeding with epistaxis as well as bloody gums.  Medical management of coronary artery disease    Essential hypertension- (present on admission)  Assessment & Plan  Monitor vitals  Continue with losartan and metoprolol    Uncontrolled diabetes mellitus (HCC)- (present on admission)  Assessment & Plan  Follows outpatient with Dr Schmitz, Endocrine in [place]  Monitor accuchecks and cover with SSI and also his glargine   Last A1c:10.3 in past 3 weeks    Obesity (BMI 30-39.9)- (present on admission)  Assessment & Plan  Current Body mass index is 30.56 kg/m².  This may be slightly higher secondary to the patient's leg edema       VTE prophylaxis: enoxaparin

## 2020-08-30 NOTE — PROGRESS NOTES
Cardiology Follow Up Progress Note    Date of Service  8/30/2020    Attending Physician  Dorys Casas M.D.    THUY Millan is a 64-year-old male patient with known history of coronary artery disease with single-vessel LAD disease, severe aortic stenosis, uncontrolled diabetes mellitus, hypertension, obesity presented to the hospital with supraventricular tachycardia.    Interim Events  8/30: No recurrent SVT.  No symptoms of heart failure.  Ambulating without any symptoms.    Review of Systems  Review of Systems   Respiratory: Negative for chest tightness and shortness of breath.    Cardiovascular: Negative for chest pain and palpitations.   Gastrointestinal: Negative for abdominal pain and nausea.   Neurological: Negative for dizziness and headaches.       Vital signs in last 24 hours  Temp:  [35.9 °C (96.7 °F)-37.1 °C (98.7 °F)] 36.3 °C (97.3 °F)  Pulse:  [73-79] 77  Resp:  [15-18] 18  BP: (104-124)/(60-71) 119/69  SpO2:  [93 %-100 %] 97 %    Physical Exam  Physical Exam  Constitutional:       General: He is not in acute distress.  Cardiovascular:      Rate and Rhythm: Normal rate and regular rhythm.      Heart sounds: S1 normal and S2 normal. Murmur present. No friction rub. No gallop.    Pulmonary:      Effort: Pulmonary effort is normal.      Breath sounds: Normal breath sounds. No wheezing, rhonchi or rales.   Musculoskeletal:      Right lower leg: No edema.      Left lower leg: No edema.   Skin:     General: Skin is warm and dry.   Neurological:      Mental Status: He is alert and oriented to person, place, and time.   Psychiatric:         Behavior: Behavior normal.         Lab Review  Lab Results   Component Value Date/Time    WBC 6.6 08/30/2020 05:42 AM    RBC 5.21 08/30/2020 05:42 AM    HEMOGLOBIN 11.6 (L) 08/30/2020 05:42 AM    HEMATOCRIT 38.5 (L) 08/30/2020 05:42 AM    MCV 73.9 (L) 08/30/2020 05:42 AM    MCH 22.3 (L) 08/30/2020 05:42 AM    MCHC 30.1 (L) 08/30/2020 05:42 AM    MPV 9.8  08/30/2020 05:42 AM      Lab Results   Component Value Date/Time    SODIUM 136 08/30/2020 05:42 AM    POTASSIUM 3.9 08/30/2020 05:42 AM    CHLORIDE 99 08/30/2020 05:42 AM    CO2 26 08/30/2020 05:42 AM    GLUCOSE 240 (H) 08/30/2020 05:42 AM    BUN 19 08/30/2020 05:42 AM    CREATININE 1.16 08/30/2020 05:42 AM    CREATININE 1.0 02/18/2009 09:00 AM      Lab Results   Component Value Date/Time    ASTSGOT 22 08/27/2020 05:14 PM    ALTSGPT 35 08/27/2020 05:14 PM     Lab Results   Component Value Date/Time    CHOLSTRLTOT 111 08/04/2020 12:17 AM    LDL 43 08/04/2020 12:17 AM    HDL 46 08/04/2020 12:17 AM    TRIGLYCERIDE 108 08/04/2020 12:17 AM    TROPONINT 64 (H) 08/28/2020 01:36 AM       Recent Labs     08/27/20  1714   NTPROBNP 4563*       Cardiac Imaging and Procedures Review  Rhythm:  My personal interpretation of the rhythm dated 8/30 is normal sinus rhythm    Echocardiogram: 8/4/2020  Mildly reduced left ventricular systolic function.  Left ventricular ejection fraction is visually estimated to be 50-54%.  Moderate concentric left ventricular hypertrophy.  Critical aortic stenosis.  Mild mitral regurgitation.  Unable to estimate pulmonary artery pressure due to an inadequate tricuspid regurgitant jet.  Compared to the images of the study done 06/24/2020 there has been a   decrease in left ventricular ejection fraction and increase in aortic   valve gradient.    Imaging  Chest X-Ray: 08/27/2020  Pulmonary edema    Assessment/Plan  1.  Recurrent PSVT.  2.  Aortic stenosis, severe, symptomatic  3.  CHF secondary to aortic stenosis improved.  4.  CAD with prior PCIs  5.  Diabetes mellitus.  6.  Dyslipidemia.    Recommendation Discussion  1.  Discontinue Lasix.  2.  Discontinue losartan due to low blood pressure.  3.  Continue telemetry monitoring.  4.  CT surgery to evaluate timing of AVR and LAD bypass.    Thank you for allowing me to participate in the care of this patient.    Please contact me with any  questions.    Ganga Chavez M.D.   Cardiologist, [facility]  [phone]

## 2020-08-31 ENCOUNTER — ANESTHESIA (OUTPATIENT)
Dept: SURGERY | Facility: MEDICAL CENTER | Age: 65
DRG: 219 | End: 2020-08-31
Payer: MEDICARE

## 2020-08-31 ENCOUNTER — ANESTHESIA EVENT (OUTPATIENT)
Dept: SURGERY | Facility: MEDICAL CENTER | Age: 65
DRG: 219 | End: 2020-08-31
Payer: MEDICARE

## 2020-08-31 ENCOUNTER — APPOINTMENT (OUTPATIENT)
Dept: RADIOLOGY | Facility: MEDICAL CENTER | Age: 65
DRG: 219 | End: 2020-08-31
Attending: THORACIC SURGERY (CARDIOTHORACIC VASCULAR SURGERY)
Payer: MEDICARE

## 2020-08-31 ENCOUNTER — APPOINTMENT (OUTPATIENT)
Dept: CARDIOLOGY | Facility: MEDICAL CENTER | Age: 65
DRG: 219 | End: 2020-08-31
Attending: ANESTHESIOLOGY
Payer: MEDICARE

## 2020-08-31 LAB
ACT BLD: 103 SEC (ref 74–137)
ACT BLD: 384 SEC (ref 74–137)
ACT BLD: 532 SEC (ref 74–137)
ACT BLD: 654 SEC (ref 74–137)
ACT BLD: 852 SEC (ref 74–137)
ACTION RANGE TRIGGERED IACRT: NO
ACTION RANGE TRIGGERED IACRT: YES
ACTION RANGE TRIGGERED IACRT: YES
ANION GAP SERPL CALC-SCNC: 8 MMOL/L (ref 7–16)
APTT PPP: 31.7 SEC (ref 24.7–36)
BARCODED ABORH UBTYP: 9500
BARCODED PRD CODE UBPRD: NORMAL
BARCODED UNIT NUM UBUNT: NORMAL
BASE EXCESS BLDA CALC-SCNC: -2 MMOL/L (ref -4–3)
BASE EXCESS BLDA CALC-SCNC: -2 MMOL/L (ref -4–3)
BASE EXCESS BLDA CALC-SCNC: -5 MMOL/L (ref -4–3)
BASE EXCESS BLDA CALC-SCNC: -6 MMOL/L (ref -4–3)
BASE EXCESS BLDA CALC-SCNC: 2 MMOL/L (ref -4–3)
BASE EXCESS BLDA CALC-SCNC: 3 MMOL/L (ref -4–3)
BASE EXCESS BLDA CALC-SCNC: 4 MMOL/L (ref -4–3)
BASE EXCESS BLDV CALC-SCNC: 2 MMOL/L (ref -4–3)
BODY TEMPERATURE: ABNORMAL DEGREES
BUN SERPL-MCNC: 19 MG/DL (ref 8–22)
CA-I BLD ISE-SCNC: 1.03 MMOL/L (ref 1.1–1.3)
CA-I BLD ISE-SCNC: 1.05 MMOL/L (ref 1.1–1.3)
CA-I BLD ISE-SCNC: 1.08 MMOL/L (ref 1.1–1.3)
CA-I BLD ISE-SCNC: 1.09 MMOL/L (ref 1.1–1.3)
CA-I BLD ISE-SCNC: 1.09 MMOL/L (ref 1.1–1.3)
CA-I BLD ISE-SCNC: 1.13 MMOL/L (ref 1.1–1.3)
CALCIUM SERPL-MCNC: 8.8 MG/DL (ref 8.5–10.5)
CHLORIDE SERPL-SCNC: 99 MMOL/L (ref 96–112)
CO2 BLDA-SCNC: 21 MMOL/L (ref 20–33)
CO2 BLDA-SCNC: 21 MMOL/L (ref 20–33)
CO2 BLDA-SCNC: 22 MMOL/L (ref 20–33)
CO2 BLDA-SCNC: 22 MMOL/L (ref 20–33)
CO2 BLDA-SCNC: 24 MMOL/L (ref 20–33)
CO2 BLDA-SCNC: 25 MMOL/L (ref 20–33)
CO2 BLDA-SCNC: 27 MMOL/L (ref 20–33)
CO2 BLDA-SCNC: 28 MMOL/L (ref 20–33)
CO2 BLDA-SCNC: 30 MMOL/L (ref 20–33)
CO2 BLDV-SCNC: 27 MMOL/L (ref 20–33)
CO2 SERPL-SCNC: 29 MMOL/L (ref 20–33)
COMPONENT P 8504P: NORMAL
CREAT SERPL-MCNC: 0.95 MG/DL (ref 0.5–1.4)
EKG IMPRESSION: NORMAL
ERYTHROCYTE [DISTWIDTH] IN BLOOD BY AUTOMATED COUNT: 40.9 FL (ref 35.9–50)
GLUCOSE BLD-MCNC: 108 MG/DL (ref 65–99)
GLUCOSE BLD-MCNC: 112 MG/DL (ref 65–99)
GLUCOSE BLD-MCNC: 114 MG/DL (ref 65–99)
GLUCOSE BLD-MCNC: 136 MG/DL (ref 65–99)
GLUCOSE BLD-MCNC: 150 MG/DL (ref 65–99)
GLUCOSE BLD-MCNC: 152 MG/DL (ref 65–99)
GLUCOSE BLD-MCNC: 157 MG/DL (ref 65–99)
GLUCOSE BLD-MCNC: 175 MG/DL (ref 65–99)
GLUCOSE BLD-MCNC: 192 MG/DL (ref 65–99)
GLUCOSE BLD-MCNC: 202 MG/DL (ref 65–99)
GLUCOSE BLD-MCNC: 213 MG/DL (ref 65–99)
GLUCOSE BLD-MCNC: 220 MG/DL (ref 65–99)
GLUCOSE BLD-MCNC: 226 MG/DL (ref 65–99)
GLUCOSE SERPL-MCNC: 174 MG/DL (ref 65–99)
HCO3 BLDA-SCNC: 20.1 MMOL/L (ref 17–25)
HCO3 BLDA-SCNC: 20.3 MMOL/L (ref 17–25)
HCO3 BLDA-SCNC: 20.4 MMOL/L (ref 17–25)
HCO3 BLDA-SCNC: 20.4 MMOL/L (ref 17–25)
HCO3 BLDA-SCNC: 23 MMOL/L (ref 17–25)
HCO3 BLDA-SCNC: 23.8 MMOL/L (ref 17–25)
HCO3 BLDA-SCNC: 26.1 MMOL/L (ref 17–25)
HCO3 BLDA-SCNC: 27.1 MMOL/L (ref 17–25)
HCO3 BLDA-SCNC: 28.5 MMOL/L (ref 17–25)
HCO3 BLDV-SCNC: 26.4 MMOL/L (ref 24–28)
HCT VFR BLD AUTO: 26.9 % (ref 42–52)
HCT VFR BLD AUTO: 38 % (ref 42–52)
HCT VFR BLD CALC: 24 % (ref 42–52)
HCT VFR BLD CALC: 26 % (ref 42–52)
HCT VFR BLD CALC: 27 % (ref 42–52)
HCT VFR BLD CALC: 30 % (ref 42–52)
HGB BLD-MCNC: 10.2 G/DL (ref 14–18)
HGB BLD-MCNC: 11.2 G/DL (ref 14–18)
HGB BLD-MCNC: 8.1 G/DL (ref 14–18)
HGB BLD-MCNC: 8.2 G/DL (ref 14–18)
HGB BLD-MCNC: 8.8 G/DL (ref 14–18)
HGB BLD-MCNC: 9.2 G/DL (ref 14–18)
HOROWITZ INDEX BLDA+IHG-RTO: 151 MM[HG]
HOROWITZ INDEX BLDA+IHG-RTO: 175 MM[HG]
HOROWITZ INDEX BLDA+IHG-RTO: 203 MM[HG]
HOROWITZ INDEX BLDA+IHG-RTO: 203 MM[HG]
HOROWITZ INDEX BLDA+IHG-RTO: 216 MM[HG]
HOROWITZ INDEX BLDA+IHG-RTO: 367 MM[HG]
HOROWITZ INDEX BLDA+IHG-RTO: 426 MM[HG]
HOROWITZ INDEX BLDA+IHG-RTO: 77 MM[HG]
HOROWITZ INDEX BLDA+IHG-RTO: 98 MM[HG]
HOROWITZ INDEX BLDV+IHG-RTO: 53 MM[HG]
INR PPP: 1.5 (ref 0.87–1.13)
INST. QUALIFIED PATIENT IIQPT: YES
MAGNESIUM SERPL-MCNC: 2.4 MG/DL (ref 1.5–2.5)
MCH RBC QN AUTO: 22.2 PG (ref 27–33)
MCHC RBC AUTO-ENTMCNC: 29.5 G/DL (ref 33.7–35.3)
MCV RBC AUTO: 75.4 FL (ref 81.4–97.8)
O2/TOTAL GAS SETTING VFR VENT: 100 %
O2/TOTAL GAS SETTING VFR VENT: 40 %
O2/TOTAL GAS SETTING VFR VENT: 40 %
O2/TOTAL GAS SETTING VFR VENT: 60 %
O2/TOTAL GAS SETTING VFR VENT: 80 %
O2/TOTAL GAS SETTING VFR VENT: 80 %
O2/TOTAL GAS SETTING VFR VENT: 90 %
PATHOLOGY CONSULT NOTE: NORMAL
PCO2 BLDA: 36.2 MMHG (ref 26–37)
PCO2 BLDA: 36.4 MMHG (ref 26–37)
PCO2 BLDA: 38.1 MMHG (ref 26–37)
PCO2 BLDA: 39.5 MMHG (ref 26–37)
PCO2 BLDA: 40.1 MMHG (ref 26–37)
PCO2 BLDA: 40.2 MMHG (ref 26–37)
PCO2 BLDA: 41.3 MMHG (ref 26–37)
PCO2 BLDA: 41.9 MMHG (ref 26–37)
PCO2 BLDA: 43 MMHG (ref 26–37)
PCO2 BLDV: 37.6 MMHG (ref 41–51)
PCO2 TEMP ADJ BLDA: 33.9 MMHG (ref 26–37)
PCO2 TEMP ADJ BLDA: 36.4 MMHG (ref 26–37)
PCO2 TEMP ADJ BLDA: 36.8 MMHG (ref 26–37)
PCO2 TEMP ADJ BLDA: 39.5 MMHG (ref 26–37)
PCO2 TEMP ADJ BLDA: 40.1 MMHG (ref 26–37)
PCO2 TEMP ADJ BLDA: 40.1 MMHG (ref 26–37)
PCO2 TEMP ADJ BLDA: 40.2 MMHG (ref 26–37)
PCO2 TEMP ADJ BLDA: 41.5 MMHG (ref 26–37)
PCO2 TEMP ADJ BLDA: 43 MMHG (ref 26–37)
PCO2 TEMP ADJ BLDV: 37.6 MMHG (ref 41–51)
PH BLDA: 7.29 [PH] (ref 7.4–7.5)
PH BLDA: 7.34 [PH] (ref 7.4–7.5)
PH BLDA: 7.35 [PH] (ref 7.4–7.5)
PH BLDA: 7.36 [PH] (ref 7.4–7.5)
PH BLDA: 7.43 [PH] (ref 7.4–7.5)
PH BLDA: 7.44 [PH] (ref 7.4–7.5)
PH BLDA: 7.46 [PH] (ref 7.4–7.5)
PH BLDV: 7.45 [PH] (ref 7.31–7.45)
PH TEMP ADJ BLDA: 7.3 [PH] (ref 7.4–7.5)
PH TEMP ADJ BLDA: 7.35 [PH] (ref 7.4–7.5)
PH TEMP ADJ BLDA: 7.35 [PH] (ref 7.4–7.5)
PH TEMP ADJ BLDA: 7.36 [PH] (ref 7.4–7.5)
PH TEMP ADJ BLDA: 7.36 [PH] (ref 7.4–7.5)
PH TEMP ADJ BLDA: 7.37 [PH] (ref 7.4–7.5)
PH TEMP ADJ BLDA: 7.43 [PH] (ref 7.4–7.5)
PH TEMP ADJ BLDA: 7.44 [PH] (ref 7.4–7.5)
PH TEMP ADJ BLDA: 7.46 [PH] (ref 7.4–7.5)
PH TEMP ADJ BLDV: 7.45 [PH] (ref 7.31–7.45)
PLATELET # BLD AUTO: 119 K/UL (ref 164–446)
PLATELET # BLD AUTO: 141 K/UL (ref 164–446)
PMV BLD AUTO: 10.1 FL (ref 9–12.9)
PO2 BLDA: 105 MMHG (ref 64–87)
PO2 BLDA: 151 MMHG (ref 64–87)
PO2 BLDA: 216 MMHG (ref 64–87)
PO2 BLDA: 330 MMHG (ref 64–87)
PO2 BLDA: 341 MMHG (ref 64–87)
PO2 BLDA: 77 MMHG (ref 64–87)
PO2 BLDA: 81 MMHG (ref 64–87)
PO2 BLDA: 81 MMHG (ref 64–87)
PO2 BLDA: 98 MMHG (ref 64–87)
PO2 BLDV: 42 MMHG (ref 25–40)
PO2 TEMP ADJ BLDA: 100 MMHG (ref 64–87)
PO2 TEMP ADJ BLDA: 142 MMHG (ref 64–87)
PO2 TEMP ADJ BLDA: 216 MMHG (ref 64–87)
PO2 TEMP ADJ BLDA: 330 MMHG (ref 64–87)
PO2 TEMP ADJ BLDA: 341 MMHG (ref 64–87)
PO2 TEMP ADJ BLDA: 73 MMHG (ref 64–87)
PO2 TEMP ADJ BLDA: 80 MMHG (ref 64–87)
PO2 TEMP ADJ BLDA: 81 MMHG (ref 64–87)
PO2 TEMP ADJ BLDA: 98 MMHG (ref 64–87)
PO2 TEMP ADJ BLDV: 42 MMHG (ref 25–40)
POTASSIUM BLD-SCNC: 2.9 MMOL/L (ref 3.6–5.5)
POTASSIUM BLD-SCNC: 3.3 MMOL/L (ref 3.6–5.5)
POTASSIUM BLD-SCNC: 3.5 MMOL/L (ref 3.6–5.5)
POTASSIUM BLD-SCNC: 3.6 MMOL/L (ref 3.6–5.5)
POTASSIUM BLD-SCNC: 4.4 MMOL/L (ref 3.6–5.5)
POTASSIUM BLD-SCNC: 4.6 MMOL/L (ref 3.6–5.5)
POTASSIUM SERPL-SCNC: 3.5 MMOL/L (ref 3.6–5.5)
POTASSIUM SERPL-SCNC: 3.9 MMOL/L (ref 3.6–5.5)
PRODUCT TYPE UPROD: NORMAL
PROTHROMBIN TIME: 18.6 SEC (ref 12–14.6)
RBC # BLD AUTO: 5.04 M/UL (ref 4.7–6.1)
SAO2 % BLDA: 100 % (ref 93–99)
SAO2 % BLDA: 95 % (ref 93–99)
SAO2 % BLDA: 97 % (ref 93–99)
SAO2 % BLDA: 98 % (ref 93–99)
SAO2 % BLDA: 99 % (ref 93–99)
SAO2 % BLDV: 80 %
SODIUM BLD-SCNC: 136 MMOL/L (ref 135–145)
SODIUM BLD-SCNC: 137 MMOL/L (ref 135–145)
SODIUM BLD-SCNC: 137 MMOL/L (ref 135–145)
SODIUM BLD-SCNC: 138 MMOL/L (ref 135–145)
SODIUM BLD-SCNC: 139 MMOL/L (ref 135–145)
SODIUM BLD-SCNC: 139 MMOL/L (ref 135–145)
SODIUM SERPL-SCNC: 136 MMOL/L (ref 135–145)
SPECIMEN DRAWN FROM PATIENT: ABNORMAL
UNIT STATUS USTAT: NORMAL
WBC # BLD AUTO: 5.9 K/UL (ref 4.8–10.8)

## 2020-08-31 PROCEDURE — 700111 HCHG RX REV CODE 636 W/ 250 OVERRIDE (IP): Performed by: ANESTHESIOLOGY

## 2020-08-31 PROCEDURE — 71045 X-RAY EXAM CHEST 1 VIEW: CPT

## 2020-08-31 PROCEDURE — 84132 ASSAY OF SERUM POTASSIUM: CPT

## 2020-08-31 PROCEDURE — 700102 HCHG RX REV CODE 250 W/ 637 OVERRIDE(OP): Performed by: NURSE PRACTITIONER

## 2020-08-31 PROCEDURE — 85730 THROMBOPLASTIN TIME PARTIAL: CPT

## 2020-08-31 PROCEDURE — C9248 INJ, CLEVIDIPINE BUTYRATE: HCPCS | Performed by: ANESTHESIOLOGY

## 2020-08-31 PROCEDURE — 700105 HCHG RX REV CODE 258: Performed by: CLINICAL NURSE SPECIALIST

## 2020-08-31 PROCEDURE — 700102 HCHG RX REV CODE 250 W/ 637 OVERRIDE(OP): Performed by: HOSPITALIST

## 2020-08-31 PROCEDURE — 500890 HCHG PACK, OPEN HEART: Performed by: THORACIC SURGERY (CARDIOTHORACIC VASCULAR SURGERY)

## 2020-08-31 PROCEDURE — 33533 CABG ARTERIAL SINGLE: CPT | Performed by: THORACIC SURGERY (CARDIOTHORACIC VASCULAR SURGERY)

## 2020-08-31 PROCEDURE — 85610 PROTHROMBIN TIME: CPT

## 2020-08-31 PROCEDURE — 700111 HCHG RX REV CODE 636 W/ 250 OVERRIDE (IP): Performed by: NURSE PRACTITIONER

## 2020-08-31 PROCEDURE — 160031 HCHG SURGERY MINUTES - 1ST 30 MINS LEVEL 5: Performed by: THORACIC SURGERY (CARDIOTHORACIC VASCULAR SURGERY)

## 2020-08-31 PROCEDURE — 160042 HCHG SURGERY MINUTES - EA ADDL 1 MIN LEVEL 5: Performed by: THORACIC SURGERY (CARDIOTHORACIC VASCULAR SURGERY)

## 2020-08-31 PROCEDURE — 93312 ECHO TRANSESOPHAGEAL: CPT

## 2020-08-31 PROCEDURE — 02HN0MZ INSERTION OF CARDIAC LEAD INTO PERICARDIUM, OPEN APPROACH: ICD-10-PCS | Performed by: THORACIC SURGERY (CARDIOTHORACIC VASCULAR SURGERY)

## 2020-08-31 PROCEDURE — C1898 LEAD, PMKR, OTHER THAN TRANS: HCPCS | Performed by: THORACIC SURGERY (CARDIOTHORACIC VASCULAR SURGERY)

## 2020-08-31 PROCEDURE — C1725 CATH, TRANSLUMIN NON-LASER: HCPCS | Performed by: THORACIC SURGERY (CARDIOTHORACIC VASCULAR SURGERY)

## 2020-08-31 PROCEDURE — 500896 HCHG PACK, VASCULAR (FOR ROOM 10): Performed by: THORACIC SURGERY (CARDIOTHORACIC VASCULAR SURGERY)

## 2020-08-31 PROCEDURE — A9270 NON-COVERED ITEM OR SERVICE: HCPCS | Performed by: CLINICAL NURSE SPECIALIST

## 2020-08-31 PROCEDURE — 500002 HCHG ADHESIVE, DERMABOND: Performed by: THORACIC SURGERY (CARDIOTHORACIC VASCULAR SURGERY)

## 2020-08-31 PROCEDURE — 83735 ASSAY OF MAGNESIUM: CPT

## 2020-08-31 PROCEDURE — 160009 HCHG ANES TIME/MIN: Performed by: THORACIC SURGERY (CARDIOTHORACIC VASCULAR SURGERY)

## 2020-08-31 PROCEDURE — 502000 HCHG MISC OR IMPLANTS RC 0278: Performed by: THORACIC SURGERY (CARDIOTHORACIC VASCULAR SURGERY)

## 2020-08-31 PROCEDURE — 501506 HCHG SUTURE GUIDE, VALVE REPLACEMENT: Performed by: THORACIC SURGERY (CARDIOTHORACIC VASCULAR SURGERY)

## 2020-08-31 PROCEDURE — B24BZZ4 ULTRASONOGRAPHY OF HEART WITH AORTA, TRANSESOPHAGEAL: ICD-10-PCS | Performed by: ANESTHESIOLOGY

## 2020-08-31 PROCEDURE — C1729 CATH, DRAINAGE: HCPCS | Performed by: THORACIC SURGERY (CARDIOTHORACIC VASCULAR SURGERY)

## 2020-08-31 PROCEDURE — 700105 HCHG RX REV CODE 258: Performed by: ANESTHESIOLOGY

## 2020-08-31 PROCEDURE — 501745 HCHG WIRE, SURGICAL STEEL: Performed by: THORACIC SURGERY (CARDIOTHORACIC VASCULAR SURGERY)

## 2020-08-31 PROCEDURE — 93005 ELECTROCARDIOGRAM TRACING: CPT | Performed by: NURSE PRACTITIONER

## 2020-08-31 PROCEDURE — 30233R1 TRANSFUSION OF NONAUTOLOGOUS PLATELETS INTO PERIPHERAL VEIN, PERCUTANEOUS APPROACH: ICD-10-PCS | Performed by: INTERNAL MEDICINE

## 2020-08-31 PROCEDURE — 700101 HCHG RX REV CODE 250: Performed by: CLINICAL NURSE SPECIALIST

## 2020-08-31 PROCEDURE — 85027 COMPLETE CBC AUTOMATED: CPT

## 2020-08-31 PROCEDURE — 84295 ASSAY OF SERUM SODIUM: CPT

## 2020-08-31 PROCEDURE — 160048 HCHG OR STATISTICAL LEVEL 1-5: Performed by: THORACIC SURGERY (CARDIOTHORACIC VASCULAR SURGERY)

## 2020-08-31 PROCEDURE — 82330 ASSAY OF CALCIUM: CPT

## 2020-08-31 PROCEDURE — 94002 VENT MGMT INPAT INIT DAY: CPT

## 2020-08-31 PROCEDURE — C1894 INTRO/SHEATH, NON-LASER: HCPCS | Performed by: THORACIC SURGERY (CARDIOTHORACIC VASCULAR SURGERY)

## 2020-08-31 PROCEDURE — 33405 REPLACEMENT AORTIC VALVE OPN: CPT | Mod: AS | Performed by: NURSE PRACTITIONER

## 2020-08-31 PROCEDURE — 770022 HCHG ROOM/CARE - ICU (200)

## 2020-08-31 PROCEDURE — 33533 CABG ARTERIAL SINGLE: CPT | Mod: AS | Performed by: NURSE PRACTITIONER

## 2020-08-31 PROCEDURE — 85049 AUTOMATED PLATELET COUNT: CPT

## 2020-08-31 PROCEDURE — 85014 HEMATOCRIT: CPT

## 2020-08-31 PROCEDURE — 700105 HCHG RX REV CODE 258: Performed by: THORACIC SURGERY (CARDIOTHORACIC VASCULAR SURGERY)

## 2020-08-31 PROCEDURE — 94770 HCHG CO2 EXPIRED GAS DETERMINATION: CPT

## 2020-08-31 PROCEDURE — 02RF08Z REPLACEMENT OF AORTIC VALVE WITH ZOOPLASTIC TISSUE, OPEN APPROACH: ICD-10-PCS | Performed by: THORACIC SURGERY (CARDIOTHORACIC VASCULAR SURGERY)

## 2020-08-31 PROCEDURE — 94150 VITAL CAPACITY TEST: CPT

## 2020-08-31 PROCEDURE — 700102 HCHG RX REV CODE 250 W/ 637 OVERRIDE(OP): Performed by: CLINICAL NURSE SPECIALIST

## 2020-08-31 PROCEDURE — 503001 HCHG PERFUSION: Performed by: THORACIC SURGERY (CARDIOTHORACIC VASCULAR SURGERY)

## 2020-08-31 PROCEDURE — 80048 BASIC METABOLIC PNL TOTAL CA: CPT

## 2020-08-31 PROCEDURE — P9047 ALBUMIN (HUMAN), 25%, 50ML: HCPCS | Mod: JG

## 2020-08-31 PROCEDURE — 99291 CRITICAL CARE FIRST HOUR: CPT | Performed by: INTERNAL MEDICINE

## 2020-08-31 PROCEDURE — 700101 HCHG RX REV CODE 250

## 2020-08-31 PROCEDURE — A9270 NON-COVERED ITEM OR SERVICE: HCPCS | Performed by: NURSE PRACTITIONER

## 2020-08-31 PROCEDURE — 82803 BLOOD GASES ANY COMBINATION: CPT

## 2020-08-31 PROCEDURE — 02100Z9 BYPASS CORONARY ARTERY, ONE ARTERY FROM LEFT INTERNAL MAMMARY, OPEN APPROACH: ICD-10-PCS | Performed by: THORACIC SURGERY (CARDIOTHORACIC VASCULAR SURGERY)

## 2020-08-31 PROCEDURE — 93010 ELECTROCARDIOGRAM REPORT: CPT | Performed by: INTERNAL MEDICINE

## 2020-08-31 PROCEDURE — 700105 HCHG RX REV CODE 258: Performed by: NURSE PRACTITIONER

## 2020-08-31 PROCEDURE — 700101 HCHG RX REV CODE 250: Performed by: ANESTHESIOLOGY

## 2020-08-31 PROCEDURE — 700102 HCHG RX REV CODE 250 W/ 637 OVERRIDE(OP): Performed by: THORACIC SURGERY (CARDIOTHORACIC VASCULAR SURGERY)

## 2020-08-31 PROCEDURE — 85347 COAGULATION TIME ACTIVATED: CPT

## 2020-08-31 PROCEDURE — 3E043XZ INTRODUCTION OF VASOPRESSOR INTO CENTRAL VEIN, PERCUTANEOUS APPROACH: ICD-10-PCS | Performed by: THORACIC SURGERY (CARDIOTHORACIC VASCULAR SURGERY)

## 2020-08-31 PROCEDURE — C1751 CATH, INF, PER/CENT/MIDLINE: HCPCS | Performed by: THORACIC SURGERY (CARDIOTHORACIC VASCULAR SURGERY)

## 2020-08-31 PROCEDURE — 700111 HCHG RX REV CODE 636 W/ 250 OVERRIDE (IP)

## 2020-08-31 PROCEDURE — 02HQ32Z INSERTION OF MONITORING DEVICE INTO RIGHT PULMONARY ARTERY, PERCUTANEOUS APPROACH: ICD-10-PCS | Performed by: THORACIC SURGERY (CARDIOTHORACIC VASCULAR SURGERY)

## 2020-08-31 PROCEDURE — 36430 TRANSFUSION BLD/BLD COMPNT: CPT

## 2020-08-31 PROCEDURE — 700101 HCHG RX REV CODE 250: Performed by: NURSE PRACTITIONER

## 2020-08-31 PROCEDURE — 85018 HEMOGLOBIN: CPT

## 2020-08-31 PROCEDURE — 5A1223Z PERFORMANCE OF CARDIAC PACING, CONTINUOUS: ICD-10-PCS | Performed by: THORACIC SURGERY (CARDIOTHORACIC VASCULAR SURGERY)

## 2020-08-31 PROCEDURE — 82962 GLUCOSE BLOOD TEST: CPT | Mod: 91

## 2020-08-31 PROCEDURE — 503050 HCHG COR-KNOT QUICK LOADS 6PACK: Performed by: THORACIC SURGERY (CARDIOTHORACIC VASCULAR SURGERY)

## 2020-08-31 PROCEDURE — 500734 HCHG INSERT, STEALTH: Performed by: THORACIC SURGERY (CARDIOTHORACIC VASCULAR SURGERY)

## 2020-08-31 PROCEDURE — 88311 DECALCIFY TISSUE: CPT

## 2020-08-31 PROCEDURE — P9034 PLATELETS, PHERESIS: HCPCS

## 2020-08-31 PROCEDURE — 700111 HCHG RX REV CODE 636 W/ 250 OVERRIDE (IP): Performed by: THORACIC SURGERY (CARDIOTHORACIC VASCULAR SURGERY)

## 2020-08-31 PROCEDURE — 88305 TISSUE EXAM BY PATHOLOGIST: CPT

## 2020-08-31 PROCEDURE — 5A1221Z PERFORMANCE OF CARDIAC OUTPUT, CONTINUOUS: ICD-10-PCS | Performed by: THORACIC SURGERY (CARDIOTHORACIC VASCULAR SURGERY)

## 2020-08-31 PROCEDURE — 503000 HCHG SUTURE, OHS: Performed by: THORACIC SURGERY (CARDIOTHORACIC VASCULAR SURGERY)

## 2020-08-31 PROCEDURE — 82947 ASSAY GLUCOSE BLOOD QUANT: CPT | Mod: 91

## 2020-08-31 PROCEDURE — A9270 NON-COVERED ITEM OR SERVICE: HCPCS | Performed by: HOSPITALIST

## 2020-08-31 PROCEDURE — 33405 REPLACEMENT AORTIC VALVE OPN: CPT | Performed by: THORACIC SURGERY (CARDIOTHORACIC VASCULAR SURGERY)

## 2020-08-31 DEVICE — VALVE INSPIRIS AORTIC 23MM: Type: IMPLANTABLE DEVICE | Status: FUNCTIONAL

## 2020-08-31 RX ORDER — OXYCODONE HYDROCHLORIDE 10 MG/1
10 TABLET ORAL
Status: DISCONTINUED | OUTPATIENT
Start: 2020-08-31 | End: 2020-09-08 | Stop reason: HOSPADM

## 2020-08-31 RX ORDER — AMIODARONE HYDROCHLORIDE 150 MG/3ML
INJECTION, SOLUTION INTRAVENOUS PRN
Status: DISCONTINUED | OUTPATIENT
Start: 2020-08-31 | End: 2020-08-31 | Stop reason: SURG

## 2020-08-31 RX ORDER — TRAMADOL HYDROCHLORIDE 50 MG/1
50 TABLET ORAL EVERY 4 HOURS PRN
Status: DISCONTINUED | OUTPATIENT
Start: 2020-08-31 | End: 2020-09-08 | Stop reason: HOSPADM

## 2020-08-31 RX ORDER — EPINEPHRINE HCL IN 0.9 % NACL 4MG/250ML
0-.2 PLASTIC BAG, INJECTION (ML) INTRAVENOUS CONTINUOUS
Status: DISCONTINUED | OUTPATIENT
Start: 2020-08-31 | End: 2020-09-01

## 2020-08-31 RX ORDER — MILRINONE LACTATE 1 MG/ML
INJECTION, SOLUTION INTRAVENOUS PRN
Status: DISCONTINUED | OUTPATIENT
Start: 2020-08-31 | End: 2020-08-31 | Stop reason: SURG

## 2020-08-31 RX ORDER — EPINEPHRINE HCL IN 0.9 % NACL 4MG/250ML
0-.2 PLASTIC BAG, INJECTION (ML) INTRAVENOUS CONTINUOUS
Status: DISCONTINUED | OUTPATIENT
Start: 2020-08-31 | End: 2020-08-31

## 2020-08-31 RX ORDER — SODIUM CHLORIDE, SODIUM LACTATE, POTASSIUM CHLORIDE, CALCIUM CHLORIDE 600; 310; 30; 20 MG/100ML; MG/100ML; MG/100ML; MG/100ML
INJECTION, SOLUTION INTRAVENOUS
Status: DISCONTINUED | OUTPATIENT
Start: 2020-08-31 | End: 2020-08-31 | Stop reason: SURG

## 2020-08-31 RX ORDER — DEXMEDETOMIDINE HYDROCHLORIDE 4 UG/ML
0-1.5 INJECTION, SOLUTION INTRAVENOUS CONTINUOUS
Status: DISCONTINUED | OUTPATIENT
Start: 2020-08-31 | End: 2020-08-31

## 2020-08-31 RX ORDER — ONDANSETRON 2 MG/ML
8 INJECTION INTRAMUSCULAR; INTRAVENOUS EVERY 6 HOURS PRN
Status: DISCONTINUED | OUTPATIENT
Start: 2020-08-31 | End: 2020-09-08 | Stop reason: HOSPADM

## 2020-08-31 RX ORDER — ACETAMINOPHEN 500 MG
1000 TABLET ORAL EVERY 6 HOURS
Status: DISPENSED | OUTPATIENT
Start: 2020-08-31 | End: 2020-09-05

## 2020-08-31 RX ORDER — BISACODYL 10 MG
10 SUPPOSITORY, RECTAL RECTAL
Status: DISCONTINUED | OUTPATIENT
Start: 2020-08-31 | End: 2020-09-07

## 2020-08-31 RX ORDER — HEPARIN SODIUM,PORCINE 1000/ML
VIAL (ML) INJECTION PRN
Status: DISCONTINUED | OUTPATIENT
Start: 2020-08-31 | End: 2020-08-31 | Stop reason: SURG

## 2020-08-31 RX ORDER — ALUMINA, MAGNESIA, AND SIMETHICONE 2400; 2400; 240 MG/30ML; MG/30ML; MG/30ML
30 SUSPENSION ORAL EVERY 4 HOURS PRN
Status: DISCONTINUED | OUTPATIENT
Start: 2020-08-31 | End: 2020-09-08 | Stop reason: HOSPADM

## 2020-08-31 RX ORDER — POTASSIUM CHLORIDE 14.9 MG/ML
20 INJECTION INTRAVENOUS ONCE
Status: COMPLETED | OUTPATIENT
Start: 2020-08-31 | End: 2020-08-31

## 2020-08-31 RX ORDER — CEFAZOLIN SODIUM 1 G/3ML
INJECTION, POWDER, FOR SOLUTION INTRAMUSCULAR; INTRAVENOUS PRN
Status: DISCONTINUED | OUTPATIENT
Start: 2020-08-31 | End: 2020-08-31 | Stop reason: SURG

## 2020-08-31 RX ORDER — SODIUM CHLORIDE, SODIUM GLUCONATE, SODIUM ACETATE, POTASSIUM CHLORIDE AND MAGNESIUM CHLORIDE 526; 502; 368; 37; 30 MG/100ML; MG/100ML; MG/100ML; MG/100ML; MG/100ML
INJECTION, SOLUTION INTRAVENOUS
Status: DISCONTINUED | OUTPATIENT
Start: 2020-08-31 | End: 2020-08-31 | Stop reason: SURG

## 2020-08-31 RX ORDER — MORPHINE SULFATE 4 MG/ML
4 INJECTION, SOLUTION INTRAMUSCULAR; INTRAVENOUS
Status: DISCONTINUED | OUTPATIENT
Start: 2020-08-31 | End: 2020-09-02

## 2020-08-31 RX ORDER — PROCHLORPERAZINE EDISYLATE 5 MG/ML
10 INJECTION INTRAMUSCULAR; INTRAVENOUS EVERY 6 HOURS PRN
Status: DISCONTINUED | OUTPATIENT
Start: 2020-08-31 | End: 2020-09-08 | Stop reason: HOSPADM

## 2020-08-31 RX ORDER — POLYETHYLENE GLYCOL 3350 17 G/17G
1 POWDER, FOR SOLUTION ORAL DAILY
Status: DISCONTINUED | OUTPATIENT
Start: 2020-09-01 | End: 2020-09-07

## 2020-08-31 RX ORDER — PROTAMINE SULFATE 10 MG/ML
INJECTION, SOLUTION INTRAVENOUS PRN
Status: DISCONTINUED | OUTPATIENT
Start: 2020-08-31 | End: 2020-08-31 | Stop reason: SURG

## 2020-08-31 RX ORDER — MIDAZOLAM HYDROCHLORIDE 1 MG/ML
2 INJECTION INTRAMUSCULAR; INTRAVENOUS
Status: DISCONTINUED | OUTPATIENT
Start: 2020-08-31 | End: 2020-09-02

## 2020-08-31 RX ORDER — PROMETHAZINE HYDROCHLORIDE 25 MG/1
25 SUPPOSITORY RECTAL EVERY 6 HOURS PRN
Status: DISCONTINUED | OUTPATIENT
Start: 2020-08-31 | End: 2020-09-08 | Stop reason: HOSPADM

## 2020-08-31 RX ORDER — GABAPENTIN 100 MG/1
100 CAPSULE ORAL 2 TIMES DAILY
Status: DISCONTINUED | OUTPATIENT
Start: 2020-09-06 | End: 2020-09-04

## 2020-08-31 RX ORDER — PHENYLEPHRINE HYDROCHLORIDE 10 MG/ML
INJECTION, SOLUTION INTRAMUSCULAR; INTRAVENOUS; SUBCUTANEOUS PRN
Status: DISCONTINUED | OUTPATIENT
Start: 2020-08-31 | End: 2020-08-31 | Stop reason: SURG

## 2020-08-31 RX ORDER — AMOXICILLIN 250 MG
2 CAPSULE ORAL 2 TIMES DAILY
Status: DISCONTINUED | OUTPATIENT
Start: 2020-08-31 | End: 2020-09-07

## 2020-08-31 RX ORDER — ACETAMINOPHEN 650 MG/1
650 SUPPOSITORY RECTAL EVERY 4 HOURS PRN
Status: DISCONTINUED | OUTPATIENT
Start: 2020-08-31 | End: 2020-09-08 | Stop reason: HOSPADM

## 2020-08-31 RX ORDER — INSULIN GLARGINE 100 [IU]/ML
26 INJECTION, SOLUTION SUBCUTANEOUS EVERY EVENING
Status: DISCONTINUED | OUTPATIENT
Start: 2020-09-02 | End: 2020-08-31

## 2020-08-31 RX ORDER — GABAPENTIN 300 MG/1
300 CAPSULE ORAL 2 TIMES DAILY
Status: DISCONTINUED | OUTPATIENT
Start: 2020-08-31 | End: 2020-09-04

## 2020-08-31 RX ORDER — DIPHENHYDRAMINE HCL 25 MG
25 TABLET ORAL
Status: DISCONTINUED | OUTPATIENT
Start: 2020-08-31 | End: 2020-09-08 | Stop reason: HOSPADM

## 2020-08-31 RX ORDER — METHADONE HYDROCHLORIDE 10 MG/ML
INJECTION, SOLUTION INTRAMUSCULAR; INTRAVENOUS; SUBCUTANEOUS PRN
Status: DISCONTINUED | OUTPATIENT
Start: 2020-08-31 | End: 2020-08-31 | Stop reason: SURG

## 2020-08-31 RX ORDER — DEXTROSE MONOHYDRATE 25 G/50ML
50 INJECTION, SOLUTION INTRAVENOUS PRN
Status: DISCONTINUED | OUTPATIENT
Start: 2020-08-31 | End: 2020-09-01

## 2020-08-31 RX ORDER — NITROGLYCERIN 20 MG/100ML
0-100 INJECTION INTRAVENOUS CONTINUOUS
Status: DISCONTINUED | OUTPATIENT
Start: 2020-08-31 | End: 2020-09-01

## 2020-08-31 RX ORDER — ACETAMINOPHEN 325 MG/1
650 TABLET ORAL EVERY 4 HOURS PRN
Status: DISCONTINUED | OUTPATIENT
Start: 2020-08-31 | End: 2020-09-08 | Stop reason: HOSPADM

## 2020-08-31 RX ORDER — SODIUM CHLORIDE 9 MG/ML
INJECTION, SOLUTION INTRAVENOUS
Status: DISCONTINUED | OUTPATIENT
Start: 2020-08-31 | End: 2020-08-31 | Stop reason: SURG

## 2020-08-31 RX ORDER — DEXMEDETOMIDINE HYDROCHLORIDE 4 UG/ML
0-1.5 INJECTION, SOLUTION INTRAVENOUS CONTINUOUS
Status: DISCONTINUED | OUTPATIENT
Start: 2020-08-31 | End: 2020-09-01

## 2020-08-31 RX ORDER — OXYCODONE HYDROCHLORIDE 5 MG/1
5 TABLET ORAL
Status: DISCONTINUED | OUTPATIENT
Start: 2020-08-31 | End: 2020-09-08 | Stop reason: HOSPADM

## 2020-08-31 RX ORDER — SODIUM CHLORIDE 9 MG/ML
INJECTION, SOLUTION INTRAVENOUS CONTINUOUS
Status: DISCONTINUED | OUTPATIENT
Start: 2020-08-31 | End: 2020-09-06

## 2020-08-31 RX ORDER — POTASSIUM CHLORIDE 7.45 MG/ML
10 INJECTION INTRAVENOUS ONCE
Status: COMPLETED | OUTPATIENT
Start: 2020-08-31 | End: 2020-08-31

## 2020-08-31 RX ORDER — NOREPINEPHRINE BITARTRATE 0.03 MG/ML
0-30 INJECTION, SOLUTION INTRAVENOUS CONTINUOUS
Status: DISCONTINUED | OUTPATIENT
Start: 2020-08-31 | End: 2020-08-31

## 2020-08-31 RX ORDER — SODIUM CHLORIDE, SODIUM GLUCONATE, SODIUM ACETATE, POTASSIUM CHLORIDE AND MAGNESIUM CHLORIDE 526; 502; 368; 37; 30 MG/100ML; MG/100ML; MG/100ML; MG/100ML; MG/100ML
INJECTION, SOLUTION INTRAVENOUS PRN
Status: DISCONTINUED | OUTPATIENT
Start: 2020-08-31 | End: 2020-09-08 | Stop reason: HOSPADM

## 2020-08-31 RX ORDER — PAPAVERINE HYDROCHLORIDE 30 MG/ML
INJECTION INTRAMUSCULAR; INTRAVENOUS
Status: DISCONTINUED | OUTPATIENT
Start: 2020-08-31 | End: 2020-08-31 | Stop reason: HOSPADM

## 2020-08-31 RX ORDER — CLOPIDOGREL BISULFATE 75 MG/1
75 TABLET ORAL DAILY
Status: DISCONTINUED | OUTPATIENT
Start: 2020-09-01 | End: 2020-09-08 | Stop reason: HOSPADM

## 2020-08-31 RX ORDER — MAGNESIUM SULFATE 1 G/100ML
1 INJECTION INTRAVENOUS DAILY
Status: COMPLETED | OUTPATIENT
Start: 2020-08-31 | End: 2020-09-02

## 2020-08-31 RX ADMIN — PROPOFOL 200 MG: 10 INJECTION, EMULSION INTRAVENOUS at 07:54

## 2020-08-31 RX ADMIN — VANCOMYCIN HYDROCHLORIDE 1 G: 1 INJECTION, POWDER, LYOPHILIZED, FOR SOLUTION INTRAVENOUS at 08:53

## 2020-08-31 RX ADMIN — ACETAMINOPHEN 1000 MG: 500 TABLET ORAL at 17:11

## 2020-08-31 RX ADMIN — CEFAZOLIN 2 G: 330 INJECTION, POWDER, FOR SOLUTION INTRAMUSCULAR; INTRAVENOUS at 08:41

## 2020-08-31 RX ADMIN — ROSUVASTATIN CALCIUM 20 MG: 20 TABLET, FILM COATED ORAL at 17:12

## 2020-08-31 RX ADMIN — MUPIROCIN 1 APPLICATION: 20 OINTMENT TOPICAL at 05:53

## 2020-08-31 RX ADMIN — OXYCODONE HYDROCHLORIDE 5 MG: 5 TABLET ORAL at 17:11

## 2020-08-31 RX ADMIN — ONDANSETRON 8 MG: 2 INJECTION INTRAMUSCULAR; INTRAVENOUS at 17:33

## 2020-08-31 RX ADMIN — METOPROLOL TARTRATE 12.5 MG: 25 TABLET, FILM COATED ORAL at 05:53

## 2020-08-31 RX ADMIN — SODIUM BICARBONATE 50 MEQ: 84 INJECTION, SOLUTION INTRAVENOUS at 15:51

## 2020-08-31 RX ADMIN — INSULIN LISPRO 10 UNITS: 100 INJECTION, SOLUTION INTRAVENOUS; SUBCUTANEOUS at 05:56

## 2020-08-31 RX ADMIN — MILRINONE LACTATE 4.77 MG: 1 INJECTION, SOLUTION INTRAVENOUS at 10:57

## 2020-08-31 RX ADMIN — HEPARIN SODIUM 38000 UNITS: 1000 INJECTION, SOLUTION INTRAVENOUS; SUBCUTANEOUS at 08:56

## 2020-08-31 RX ADMIN — FENTANYL CITRATE 250 MCG: 50 INJECTION, SOLUTION INTRAMUSCULAR; INTRAVENOUS at 07:54

## 2020-08-31 RX ADMIN — SODIUM CHLORIDE, POTASSIUM CHLORIDE, SODIUM LACTATE AND CALCIUM CHLORIDE: 600; 310; 30; 20 INJECTION, SOLUTION INTRAVENOUS at 08:40

## 2020-08-31 RX ADMIN — PHENYLEPHRINE HYDROCHLORIDE 10 MCG/MIN: 10 INJECTION INTRAVENOUS at 16:05

## 2020-08-31 RX ADMIN — FENTANYL CITRATE 500 MCG: 50 INJECTION, SOLUTION INTRAMUSCULAR; INTRAVENOUS at 08:33

## 2020-08-31 RX ADMIN — MIDAZOLAM 2 MG: 1 INJECTION INTRAMUSCULAR; INTRAVENOUS at 07:37

## 2020-08-31 RX ADMIN — POTASSIUM CHLORIDE 10 MEQ: 7.46 INJECTION, SOLUTION INTRAVENOUS at 13:39

## 2020-08-31 RX ADMIN — AMINOCAPROIC ACID 1 G/HR: 250 INJECTION, SOLUTION INTRAVENOUS at 09:06

## 2020-08-31 RX ADMIN — MUPIROCIN 1 APPLICATION: 20 OINTMENT TOPICAL at 17:13

## 2020-08-31 RX ADMIN — ROCURONIUM BROMIDE 50 MG: 10 INJECTION, SOLUTION INTRAVENOUS at 11:33

## 2020-08-31 RX ADMIN — CALCIUM CHLORIDE 1000 MG: 100 INJECTION, SOLUTION INTRAVENOUS at 12:52

## 2020-08-31 RX ADMIN — VANCOMYCIN HYDROCHLORIDE 1.5 G: 500 INJECTION, POWDER, LYOPHILIZED, FOR SOLUTION INTRAVENOUS at 21:37

## 2020-08-31 RX ADMIN — SODIUM CHLORIDE: 9 INJECTION, SOLUTION INTRAVENOUS at 12:00

## 2020-08-31 RX ADMIN — INSULIN LISPRO 2 UNITS: 100 INJECTION, SOLUTION INTRAVENOUS; SUBCUTANEOUS at 05:59

## 2020-08-31 RX ADMIN — SODIUM CHLORIDE, SODIUM GLUCONATE, SODIUM ACETATE, POTASSIUM CHLORIDE AND MAGNESIUM CHLORIDE: 526; 502; 368; 37; 30 INJECTION, SOLUTION INTRAVENOUS at 08:40

## 2020-08-31 RX ADMIN — ROCURONIUM BROMIDE 50 MG: 10 INJECTION, SOLUTION INTRAVENOUS at 07:58

## 2020-08-31 RX ADMIN — SODIUM CHLORIDE, SODIUM GLUCONATE, SODIUM ACETATE, POTASSIUM CHLORIDE AND MAGNESIUM CHLORIDE 1000 ML: 526; 502; 368; 37; 30 INJECTION, SOLUTION INTRAVENOUS at 12:53

## 2020-08-31 RX ADMIN — POTASSIUM CHLORIDE 20 MEQ: 14.9 INJECTION, SOLUTION INTRAVENOUS at 18:19

## 2020-08-31 RX ADMIN — NOREPINEPHRINE BITARTRATE 2 MCG/MIN: 1 INJECTION, SOLUTION, CONCENTRATE INTRAVENOUS at 11:09

## 2020-08-31 RX ADMIN — ROCURONIUM BROMIDE 50 MG: 10 INJECTION, SOLUTION INTRAVENOUS at 09:31

## 2020-08-31 RX ADMIN — FENTANYL CITRATE 250 MCG: 50 INJECTION, SOLUTION INTRAMUSCULAR; INTRAVENOUS at 11:02

## 2020-08-31 RX ADMIN — EPINEPHRINE 0.04 MCG/KG/MIN: 1 INJECTION INTRAMUSCULAR; INTRAVENOUS; SUBCUTANEOUS at 10:56

## 2020-08-31 RX ADMIN — SODIUM CHLORIDE: 9 INJECTION, SOLUTION INTRAVENOUS at 08:40

## 2020-08-31 RX ADMIN — CLEVIPIDINE 200 MCG: 0.5 EMULSION INTRAVENOUS at 11:15

## 2020-08-31 RX ADMIN — TRAMADOL HYDROCHLORIDE 50 MG: 50 TABLET, FILM COATED ORAL at 18:07

## 2020-08-31 RX ADMIN — PHENYLEPHRINE HYDROCHLORIDE 100 MCG: 10 INJECTION INTRAVENOUS at 09:25

## 2020-08-31 RX ADMIN — DOCUSATE SODIUM 50 MG AND SENNOSIDES 8.6 MG 2 TABLET: 8.6; 5 TABLET, FILM COATED ORAL at 18:00

## 2020-08-31 RX ADMIN — CALCIUM CHLORIDE 1000 MG: 100 INJECTION, SOLUTION INTRAVENOUS at 12:49

## 2020-08-31 RX ADMIN — POTASSIUM CHLORIDE 20 MEQ: 14.9 INJECTION, SOLUTION INTRAVENOUS at 12:35

## 2020-08-31 RX ADMIN — FENTANYL CITRATE 50 MCG: 50 INJECTION INTRAMUSCULAR; INTRAVENOUS at 19:17

## 2020-08-31 RX ADMIN — MIDAZOLAM 2 MG: 1 INJECTION INTRAMUSCULAR; INTRAVENOUS at 09:30

## 2020-08-31 RX ADMIN — MAGNESIUM SULFATE 1 G: 1 INJECTION INTRAVENOUS at 12:22

## 2020-08-31 RX ADMIN — PROTAMINE SULFATE 450 MG: 10 INJECTION, SOLUTION INTRAVENOUS at 11:11

## 2020-08-31 RX ADMIN — INSULIN HUMAN 5 UNITS: 100 INJECTION, SOLUTION PARENTERAL at 12:37

## 2020-08-31 RX ADMIN — SODIUM CHLORIDE 3 UNITS/HR: 9 INJECTION, SOLUTION INTRAVENOUS at 12:45

## 2020-08-31 RX ADMIN — METHADONE HYDROCHLORIDE 10 MG: 10 INJECTION, SOLUTION INTRAMUSCULAR; INTRAVENOUS; SUBCUTANEOUS at 11:47

## 2020-08-31 RX ADMIN — Medication 100 MG: at 07:55

## 2020-08-31 RX ADMIN — AMIODARONE HYDROCHLORIDE 150 MG: 50 INJECTION, SOLUTION INTRAVENOUS at 11:02

## 2020-08-31 ASSESSMENT — COPD QUESTIONNAIRES
HAVE YOU SMOKED AT LEAST 100 CIGARETTES IN YOUR ENTIRE LIFE: NO/DON'T KNOW
COPD SCREENING SCORE: 3
DO YOU EVER COUGH UP ANY MUCUS OR PHLEGM?: NO/ONLY WITH OCCASIONAL COLDS OR INFECTIONS
DURING THE PAST 4 WEEKS HOW MUCH DID YOU FEEL SHORT OF BREATH: SOME OF THE TIME

## 2020-08-31 ASSESSMENT — PULMONARY FUNCTION TESTS: FVC: 1.5

## 2020-08-31 ASSESSMENT — PAIN SCALES - GENERAL: PAIN_LEVEL: 0

## 2020-08-31 NOTE — PROGRESS NOTES
Pt was admitted for shortness of breath and treated for heart failure. His surgery originally scheduled for September 2, Aortic valve replacement and CABG x ,1 will be moved up to today. No interval changes to his H&P.

## 2020-08-31 NOTE — CARE PLAN
Problem: Pre Op  Goal: Optimal preparation for CABG/Heart Valve surgery  Outcome: PROGRESSING AS EXPECTED  Intervention: Pre Op education to patient/significant other. Provide patient [facility] Patient Guideline for Cardiac Surgery (See Pt. Ed.)  Note: Pre-op education completed with patient. Questions answered, baseline IS 3300.

## 2020-08-31 NOTE — CARE PLAN
Problem: Safety  Goal: Will remain free from falls  Outcome: PROGRESSING AS EXPECTED     Problem: Infection  Goal: Will remain free from infection  Outcome: PROGRESSING AS EXPECTED     Problem: Knowledge Deficit  Goal: Knowledge of disease process/condition, treatment plan, diagnostic tests, and medications will improve  Outcome: PROGRESSING AS EXPECTED

## 2020-08-31 NOTE — PROGRESS NOTES
Pt went early today for CABG with cardiothoracic surgery and transferring to ICU after surgery.  Cardiothoracic team taking over the care for now.  Critical care physician aware about the pt.  Please consult hospitalist service once pt out of ICU if needed  Thank you

## 2020-08-31 NOTE — ANESTHESIA POSTPROCEDURE EVALUATION
Patient: Sharad Millan    Procedure Summary     Date: 08/31/20 [facility]    Anesthesia Start: 0734 Anesthesia Stop: 1203    Procedures:       CABG, WITH ENDOSCOPIC VEIN PROCUREMENT X1 (N/A Chest)      REPLACEMENT, AORTIC VALVE (N/A Chest)      ECHOCARDIOGRAM, TRANSESOPHAGEAL (N/A Mouth) Diagnosis: (Critical aortic stenosis, coronary artery disease, LAD stenosis)    Surgeon: Dorys Casas M.D. Responsible Provider: Anthony Noyola M.D.    Anesthesia Type: general ASA Status: 4          Final Anesthesia Type: general  Last vitals  BP   Blood Pressure : 118/64    Temp   36.2 °C (97.1 °F)    Pulse   Pulse: 69   Resp   18    SpO2   96 %      Anesthesia Post Evaluation    Patient location during evaluation: ICU  Patient participation: complete - patient participated  Level of consciousness: sleepy but conscious  Pain score: 0    Airway patency: patent  Anesthetic complications: no  Cardiovascular status: adequate and hemodynamically stable  Respiratory status: acceptable  Hydration status: acceptable    PONV: none           Nurse Pain Score: 0 (NPRS)

## 2020-08-31 NOTE — CONSULTS
Critical Care Consultation    Date of consult: 8/31/2020    Referring Physician  Dorys Casas M.D.    Reason for Consultation  Postoperative critical care management after aortic valve replacement and CABG x1    History of Presenting Illness  64 y.o. male who presented 8/27/2020 with increasing dyspnea, leg swelling and orthopnea.  He was found to be in SVT in the ED and converted to sinus rhythm after 2 doses of adenosine.  He has a history of uncontrolled diabetes mellitus, hypertension, obesity as well as known single-vessel coronary disease with LAD stenosis and a severe aortic stenosis.  Today he underwent CABG x1 with a LIMA to LAD and aortic valve replacement with 23 mm Inspiris  David pericardial valve.  I took direct handoff from cardiac anesthesia at bedside.  Patient came off-pump well.  He is on norepinephrine as well as milrinone currently.  He is sedate postoperatively and has had dexmedetomidine started.  Preop EF was 35% with significant improvement to 55% post valve replacement.  Arterial blood gas showed pH 7.35, PCO2 43, PO2 of 98.    Code Status  Full Code    Review of Systems  Review of Systems   Unable to perform ROS: Acuity of condition       Past Medical History   has a past medical history of Aortic stenosis (10/20/2011), Arrhythmia, Breath shortness, Carotid bruit (12/9/2009), Cataract, Chronic right shoulder pain, Diabetes, Fatty liver (1/17/2011), Heart attack ([facility]) (12/2018), History of cardiac catheterization (7/13/2009), History of echocardiogram (10/20/2011), HTN (hypertension) (10/12/2012), Hypertension, Memory loss (10/20/2011), Murmur (7/7/2009), Obesity (8/23/2011), Palpitations (10/20/2011), PSVT (paroxysmal supraventricular tachycardia) ([facility]) (2/14/2012), S/P coronary artery stent placement (1996), S/P coronary artery stent placement (1998), S/P coronary artery stent placement (2003), Sciatica (8/23/2011), and Uncontrolled diabetes mellitus ([facility]) (11/29/2010).    Surgical  History   has a past surgical history that includes cataract extraction with iol (Bilateral); carotid stent angiography (1996); and tree by laparoscopy (N/A, 10/25/2015).    Family History  family history includes Heart Disease in his father and mother.    Social History   reports that he has never smoked. He has never used smokeless tobacco. He reports that he does not drink alcohol or use drugs.    Medications  Home Medications     Reviewed by Geeta Ballard R.N. (Registered Nurse) on 08/31/20 at 0741  Med List Status: Complete   Medication Last Dose Status   aminocaproic acid (AMICAR) 5 g in  mL Infusion  Active   aminocaproic acid (AMICAR) 9.53 g in  mL IV Bolus  Active   amoxicillin-clavulanate (AUGMENTIN) 875-125 MG Tab 8/20/2020 Active   aspirin EC (ECOTRIN) 81 MG Tablet Delayed Response 8/26/2020 Active   dexmedetomidine (PRECEDEX) 400 mcg/100mL NS premix infusion  Active   doxycycline (VIBRAMYCIN) 100 MG Tab 8/20/2020 Active   Empagliflozin (JARDIANCE) 25 MG Tab 8/27/2020 Active   EPINEPHrine (Adrenalin) infusion 4 mg/250 mL (premix)  Active   furosemide (LASIX) 20 MG Tab 8/27/2020 Active   Insulin Degludec (TRESIBA) 100 UNIT/ML Solution 8/26/2020 Active   insulin glargine (Lantus) injection  Active   insulin lispro (HUMALOG) 100 UNIT/ML 8/27/2020 Active   insulin regular human (HUMULIN/NOVOLIN R) 62.5 Units in  mL infusion per protocol  Active   K+ Scale: Goal of 4.5  Active   losartan (COZAAR) 100 MG Tab 8/27/2020 Active   metoprolol SR (TOPROL XL) 50 MG TABLET SR 24 HR 8/27/2020 Active   midazolam (VERSED) injection  Active   norepinephrine (Levophed) infusion 8 mg/250 mL (premix)  Active   rosuvastatin (CRESTOR) 20 MG Tab 8/26/2020 Active   rosuvastatin (CRESTOR) tablet 20 mg  Active   vancomycin 1500 mg/250mL NS IVPB premix  Active              Current Facility-Administered Medications   Medication Dose Route Frequency Provider Last Rate Last Dose   • Respiratory Therapy  Consult   Nebulization Continuous RT Becca Palma A.P.N.       • NS infusion   Intravenous Continuous Becca Palma A.P.N.       • calcium CHLORIDE 1,000 mg in  mL IVPB  1,000 mg Intravenous Once PRN Becca Palma A.P.N.       • magnesium sulfate in D5W IVPB premix 1 g  1 g Intravenous DAILY GURU Rondon.P.N.   1 g at 08/31/20 1222   • K+ Scale: Goal of 4.5  1 Each Intravenous Q6HRS GURU Rondon.P.N.   1 Each at 08/31/20 1200   • [START ON 9/1/2020] aspirin EC (ECOTRIN) tablet 81 mg  81 mg Oral DAILY Becca Palma A.P.N.       • clevidipine (CLEVIPREX) IV emulsion  1-21 mg/hr Intravenous Continuous Becca Palma A.P.N.       • nitroglycerin 50 mg in D5W 250 ml infusion  0-100 mcg/min Intravenous Continuous Becca Palma A.P.N.       • Pharmacy Consult Request ...Pain Management Review 1 Each  1 Each Other PHARMACY TO DOSE GURU Rondon.P.N.       • acetaminophen (TYLENOL) tablet 1,000 mg  1,000 mg Oral Q6HRS Becca Palma A.P.N.       • gabapentin (NEURONTIN) capsule 300 mg  300 mg Oral BID Becca Palma A.P.N.        Followed by   • [START ON 9/6/2020] gabapentin (NEURONTIN) capsule 100 mg  100 mg Oral BID Becca Palma A.P.N.       • oxyCODONE immediate-release (ROXICODONE) tablet 5 mg  5 mg Oral Q3HRS PRN Becca Palma A.P.N.       • oxyCODONE immediate release (ROXICODONE) tablet 10 mg  10 mg Oral Q3HRS PRN Becca Palma A.P.N.       • tramadol (ULTRAM) 50 MG tablet 50 mg  50 mg Oral Q4HRS PRN Becca Palma A.P.N.       • midazolam (VERSED) injection 2 mg  2 mg Intravenous Q HOUR PRN Becca Palma A.P.N.       • dexmedetomidine (PRECEDEX) 400 mcg/100mL NS premix infusion  0-1.5 mcg/kg/hr Intravenous Continuous Becca Palma, A.P.N.       • sodium bicarbonate 8.4 % injection 50 mEq  50 mEq Intravenous Q HOUR PRN Becca Palma, A.P.N.       • morphine (pf) 4 MG/ML injection 4 mg  4 mg Intravenous Q HOUR PRN Becca Palma, A.P.N.       •  ondansetron (ZOFRAN) syringe/vial injection 8 mg  8 mg Intravenous Q6HRS PRN Becca Palma, A.P.N.        Or   • prochlorperazine (COMPAZINE) injection 10 mg  10 mg Intravenous Q6HRS PRN Becca Palma, A.P.N.        Or   • promethazine (PHENERGAN) suppository 25 mg  25 mg Rectal Q6HRS PRN Becca Palma, A.P.N.       • acetaminophen (TYLENOL) tablet 650 mg  650 mg Oral Q4HRS PRN Becca Palma, A.P.N.        Or   • acetaminophen (TYLENOL) suppository 650 mg  650 mg Rectal Q4HRS PRN Becca Palma, A.P.N.       • senna-docusate (PERICOLACE or SENOKOT S) 8.6-50 MG per tablet 2 Tab  2 Tab Oral BID Becca Palma A.P.N.        And   • [START ON 9/1/2020] polyethylene glycol/lytes (MIRALAX) PACKET 1 Packet  1 Packet Oral DAILY Becca Palma, A.P.N.        And   • [START ON 9/2/2020] magnesium hydroxide (MILK OF MAGNESIA) suspension 30 mL  30 mL Oral DAILY Becca Palma, A.P.N.        And   • bisacodyl (DULCOLAX) suppository 10 mg  10 mg Rectal QDAY PRN Becca Palma, A.P.N.       • mag hydrox-al hydrox-simeth (MAALOX PLUS ES or MYLANTA DS) suspension 30 mL  30 mL Oral Q4HRS PRN Becca aPlma, A.P.N.       • diphenhydrAMINE (BENADRYL) tablet/capsule 25 mg  25 mg Oral HS PRN - MR X 1 Becca Palma, A.P.N.       • electrolyte-A (PLASMALYTE-A) infusion   Intravenous PRN Becca Palma, A.P.N.       • vancomycin (VANCOCIN) 1.5 g in  mL IVPB  1.5 g Intravenous Once Becca Palam, A.P.N.       • mupirocin (BACTROBAN) 2 % ointment 1 Application  1 Application Topical BID Becca Palma, A.P.N.       • [START ON 9/1/2020] metoprolol (LOPRESSOR) tablet 12.5 mg  12.5 mg Oral BID MARKUS RondonPJOHNNA        Followed by   • [START ON 9/2/2020] metoprolol (LOPRESSOR) tablet 25 mg  25 mg Oral BID GURU Rondon.P.N.       • [START ON 9/1/2020] clopidogrel (PLAVIX) tablet 75 mg  75 mg Oral DAILY GURU Rondon.P.NJosé Antonio       • fentaNYL (SUBLIMAZE) injection 50 mcg  50 mcg Intravenous Q3HRS PRN Becca  JAVON Palma, GALINA       • potassium chloride in water (KCL) ivpb **Administer in ICU only** 20 mEq  20 mEq Intravenous Once Dorys Casas M.D.        Followed by   • potassium chloride (KCL) ivpb 10 mEq  10 mEq Intravenous Once Dorys Casas M.D.       • insulin regular human (HUMULIN/NOVOLIN R) 62.5 Units in  mL infusion per protocol  1-6 Units/hr Intravenous Continuous Dorys Casas M.D.        And   • insulin regular (HumuLIN R,NovoLIN R) injection  0-14 Units Intravenous Once Dorys Casas M.D.        And   • insulin regular (HumuLIN R,NovoLIN R) injection  0-10 Units Intravenous PRN Dorys Casas M.D.        And   • dextrose 50% (D50W) injection 50 mL  50 mL Intravenous PRN Dorys Casas M.D.       • insulin lispro (HumaLOG) injection  0-20 Units Subcutaneous PRN Dorys Casas M.D.       • EPINEPHrine (Adrenalin) infusion 4 mg/250 mL (premix)  0-0.2 mcg/kg/min Intravenous Continuous Dorys Casas M.D.       • calcium CHLORIDE 1,000 mg in D5W 100 mL IVPB  1,000 mg Intravenous Once Dorys Casas M.D.       • rosuvastatin (CRESTOR) tablet 20 mg  20 mg Oral Q EVENING Junaid Moe D.O.   20 mg at 08/30/20 1819       Allergies  Allergies   Allergen Reactions   • Bydureon [Exenatide] Hives     hives   • Cycloset [Bromocriptine Mesylate] Rash     Rash     • Morphine Vomiting and Nausea       Vital Signs last 24 hours  Temp:  [35.9 °C (96.6 °F)-36.4 °C (97.5 °F)] 36.3 °C (97.3 °F)  Pulse:  [69-96] 94  Resp:  [7-18] 7  BP: (102-145)/(50-84) 112/50  SpO2:  [92 %-98 %] 98 %    Physical Exam  Physical Exam  Vitals signs and nursing note reviewed.   Constitutional:       Comments: Sedate in the immediate postoperative period   HENT:      Head: Normocephalic and atraumatic.      Mouth/Throat:      Comments: ETT in place  Eyes:      Pupils: Pupils are equal, round, and reactive to light.   Neck:      Musculoskeletal: Neck supple. No neck rigidity.   Cardiovascular:      Rate and Rhythm: Normal rate  and regular rhythm.      Pulses: Normal pulses.      Comments: Median sternotomy incision dressed, substernal chest tubes in place  Pulmonary:      Comments: Full ventilator support, diminished breath sounds, no wheezing  Abdominal:      General: There is no distension.      Palpations: Abdomen is soft.      Tenderness: There is no abdominal tenderness.   Musculoskeletal:         General: No swelling or deformity.   Skin:     General: Skin is warm and dry.      Capillary Refill: Capillary refill takes 2 to 3 seconds.   Neurological:      General: No focal deficit present.      Comments: Sedate, not currently following, withdrawals         Fluids    Intake/Output Summary (Last 24 hours) at 8/31/2020 1235  Last data filed at 8/31/2020 1201  Gross per 24 hour   Intake 2420 ml   Output 860 ml   Net 1560 ml       Laboratory  Recent Results (from the past 48 hour(s))   ACCU-CHEK GLUCOSE    Collection Time: 08/29/20 12:54 PM   Result Value Ref Range    Glucose - Accu-Ck 258 (H) 65 - 99 mg/dL   ACCU-CHEK GLUCOSE    Collection Time: 08/29/20  5:37 PM   Result Value Ref Range    Glucose - Accu-Ck 105 (H) 65 - 99 mg/dL   ACCU-CHEK GLUCOSE    Collection Time: 08/29/20  9:31 PM   Result Value Ref Range    Glucose - Accu-Ck 175 (H) 65 - 99 mg/dL   CBC WITHOUT DIFFERENTIAL    Collection Time: 08/30/20  5:42 AM   Result Value Ref Range    WBC 6.6 4.8 - 10.8 K/uL    RBC 5.21 4.70 - 6.10 M/uL    Hemoglobin 11.6 (L) 14.0 - 18.0 g/dL    Hematocrit 38.5 (L) 42.0 - 52.0 %    MCV 73.9 (L) 81.4 - 97.8 fL    MCH 22.3 (L) 27.0 - 33.0 pg    MCHC 30.1 (L) 33.7 - 35.3 g/dL    RDW 39.8 35.9 - 50.0 fL    Platelet Count 124 (L) 164 - 446 K/uL    MPV 9.8 9.0 - 12.9 fL   Basic Metabolic Panel    Collection Time: 08/30/20  5:42 AM   Result Value Ref Range    Sodium 136 135 - 145 mmol/L    Potassium 3.9 3.6 - 5.5 mmol/L    Chloride 99 96 - 112 mmol/L    Co2 26 20 - 33 mmol/L    Glucose 240 (H) 65 - 99 mg/dL    Bun 19 8 - 22 mg/dL    Creatinine 1.16  0.50 - 1.40 mg/dL    Calcium 9.1 8.5 - 10.5 mg/dL    Anion Gap 11.0 7.0 - 16.0   MAGNESIUM    Collection Time: 08/30/20  5:42 AM   Result Value Ref Range    Magnesium 2.2 1.5 - 2.5 mg/dL   ESTIMATED GFR    Collection Time: 08/30/20  5:42 AM   Result Value Ref Range    GFR If African American >60 >60 mL/min/1.73 m 2    GFR If Non African American >60 >60 mL/min/1.73 m 2   COD (Adult)    Collection Time: 08/30/20  5:42 AM   Result Value Ref Range    ABO Grouping Only A     Rh Grouping Only POS     Antibody Screen-Cod NEG    ACCU-CHEK GLUCOSE    Collection Time: 08/30/20  7:36 AM   Result Value Ref Range    Glucose - Accu-Ck 215 (H) 65 - 99 mg/dL   ACCU-CHEK GLUCOSE    Collection Time: 08/30/20 12:26 PM   Result Value Ref Range    Glucose - Accu-Ck 182 (H) 65 - 99 mg/dL   ACCU-CHEK GLUCOSE    Collection Time: 08/30/20  5:54 PM   Result Value Ref Range    Glucose - Accu-Ck 112 (H) 65 - 99 mg/dL   Comp Metabolic Panel (CMP)    Collection Time: 08/30/20  9:00 PM   Result Value Ref Range    Sodium 136 135 - 145 mmol/L    Potassium 4.0 3.6 - 5.5 mmol/L    Chloride 98 96 - 112 mmol/L    Co2 25 20 - 33 mmol/L    Anion Gap 13.0 7.0 - 16.0    Glucose 219 (H) 65 - 99 mg/dL    Bun 19 8 - 22 mg/dL    Creatinine 1.11 0.50 - 1.40 mg/dL    Calcium 9.3 8.5 - 10.5 mg/dL    AST(SGOT) 27 12 - 45 U/L    ALT(SGPT) 37 2 - 50 U/L    Alkaline Phosphatase 57 30 - 99 U/L    Total Bilirubin 0.3 0.1 - 1.5 mg/dL    Albumin 4.0 3.2 - 4.9 g/dL    Total Protein 7.1 6.0 - 8.2 g/dL    Globulin 3.1 1.9 - 3.5 g/dL    A-G Ratio 1.3 g/dL   CBC with Differential    Collection Time: 08/30/20  9:00 PM   Result Value Ref Range    WBC 8.3 4.8 - 10.8 K/uL    RBC 5.51 4.70 - 6.10 M/uL    Hemoglobin 12.5 (L) 14.0 - 18.0 g/dL    Hematocrit 40.0 (L) 42.0 - 52.0 %    MCV 72.6 (L) 81.4 - 97.8 fL    MCH 22.7 (L) 27.0 - 33.0 pg    MCHC 31.3 (L) 33.7 - 35.3 g/dL    RDW 38.9 35.9 - 50.0 fL    Platelet Count 145 (L) 164 - 446 K/uL    MPV 9.5 9.0 - 12.9 fL     Neutrophils-Polys 56.90 44.00 - 72.00 %    Lymphocytes 26.90 22.00 - 41.00 %    Monocytes 12.00 0.00 - 13.40 %    Eosinophils 3.70 0.00 - 6.90 %    Basophils 0.40 0.00 - 1.80 %    Immature Granulocytes 0.10 0.00 - 0.90 %    Nucleated RBC 0.00 /100 WBC    Neutrophils (Absolute) 4.73 1.82 - 7.42 K/uL    Lymphs (Absolute) 2.24 1.00 - 4.80 K/uL    Monos (Absolute) 1.00 (H) 0.00 - 0.85 K/uL    Eos (Absolute) 0.31 0.00 - 0.51 K/uL    Baso (Absolute) 0.03 0.00 - 0.12 K/uL    Immature Granulocytes (abs) 0.01 0.00 - 0.11 K/uL    NRBC (Absolute) 0.00 K/uL   Hemoglobin  A1c    Collection Time: 08/30/20  9:00 PM   Result Value Ref Range    Glycohemoglobin 10.3 (H) 0.0 - 5.6 %    Est Avg Glucose 249 mg/dL   Prothrombin time (INR)    Collection Time: 08/30/20  9:00 PM   Result Value Ref Range    PT 13.0 12.0 - 14.6 sec    INR 0.95 0.87 - 1.13   APTT (PTT)    Collection Time: 08/30/20  9:00 PM   Result Value Ref Range    APTT 28.7 24.7 - 36.0 sec   ABO Rh Confirm    Collection Time: 08/30/20  9:00 PM   Result Value Ref Range    ABO Rh Confirm A POS    ESTIMATED GFR    Collection Time: 08/30/20  9:00 PM   Result Value Ref Range    GFR If African American >60 >60 mL/min/1.73 m 2    GFR If Non African American >60 >60 mL/min/1.73 m 2   ACCU-CHEK GLUCOSE    Collection Time: 08/30/20  9:53 PM   Result Value Ref Range    Glucose - Accu-Ck 262 (H) 65 - 99 mg/dL   Urinalysis    Collection Time: 08/30/20 10:30 PM    Specimen: Urine, Clean Catch   Result Value Ref Range    Color Yellow     Character Clear     Specific Gravity 1.032 <1.035    Ph 5.0 5.0 - 8.0    Glucose 500 (A) Negative mg/dL    Ketones Negative Negative mg/dL    Protein Negative Negative mg/dL    Bilirubin Negative Negative    Urobilinogen, Urine 0.2 Negative    Nitrite Negative Negative    Leukocyte Esterase Negative Negative    Occult Blood Negative Negative    Micro Urine Req see below    ACCU-CHEK GLUCOSE    Collection Time: 08/31/20  5:55 AM   Result Value Ref Range     Glucose - Accu-Ck 157 (H) 65 - 99 mg/dL   CBC WITHOUT DIFFERENTIAL    Collection Time: 08/31/20  6:38 AM   Result Value Ref Range    WBC 5.9 4.8 - 10.8 K/uL    RBC 5.04 4.70 - 6.10 M/uL    Hemoglobin 11.2 (L) 14.0 - 18.0 g/dL    Hematocrit 38.0 (L) 42.0 - 52.0 %    MCV 75.4 (L) 81.4 - 97.8 fL    MCH 22.2 (L) 27.0 - 33.0 pg    MCHC 29.5 (L) 33.7 - 35.3 g/dL    RDW 40.9 35.9 - 50.0 fL    Platelet Count 119 (L) 164 - 446 K/uL    MPV 10.1 9.0 - 12.9 fL   Basic Metabolic Panel    Collection Time: 08/31/20  6:38 AM   Result Value Ref Range    Sodium 136 135 - 145 mmol/L    Potassium 3.9 3.6 - 5.5 mmol/L    Chloride 99 96 - 112 mmol/L    Co2 29 20 - 33 mmol/L    Glucose 174 (H) 65 - 99 mg/dL    Bun 19 8 - 22 mg/dL    Creatinine 0.95 0.50 - 1.40 mg/dL    Calcium 8.8 8.5 - 10.5 mg/dL    Anion Gap 8.0 7.0 - 16.0   ESTIMATED GFR    Collection Time: 08/31/20  6:38 AM   Result Value Ref Range    GFR If African American >60 >60 mL/min/1.73 m 2    GFR If Non African American >60 >60 mL/min/1.73 m 2   POC ACT (resulted by nursing)    Collection Time: 08/31/20  9:06 AM   Result Value Ref Range    Istat Activated Clotting Time 852 (H) 74 - 137 sec   ISTAT ARTERIAL BLOOD GAS    Collection Time: 08/31/20  9:07 AM   Result Value Ref Range    Ph 7.428 7.400 - 7.500    Pco2 39.5 (H) 26.0 - 37.0 mmHg    Po2 216 (H) 64 - 87 mmHg    Tco2 27 20 - 33 mmol/L    S02 100 (H) 93 - 99 %    Hco3 26.1 (H) 17.0 - 25.0 mmol/L    BE 2 -4 - 3 mmol/L    Body Temp 37.0 C degrees    O2 Therapy 100 %    iPF Ratio 216     Ph Temp Cassandra 7.428 7.400 - 7.500    Pco2 Temp Co 39.5 (H) 26.0 - 37.0 mmHg    Po2 Temp Cor 216 (H) 64 - 87 mmHg    Specimen Arterial     Action Range Triggered NO     Inst. Qualified Patient YES    ISTAT GLUCOSE    Collection Time: 08/31/20  9:07 AM   Result Value Ref Range    Istat Glucose 112 (H) 65 - 99 mg/dL   ISTAT SODIUM    Collection Time: 08/31/20  9:07 AM   Result Value Ref Range    Istat Sodium 138 135 - 145 mmol/L   ISTAT  POTASSIUM    Collection Time: 08/31/20  9:07 AM   Result Value Ref Range    Istat Potassium 3.6 3.6 - 5.5 mmol/L   ISTAT IONIZED CA    Collection Time: 08/31/20  9:07 AM   Result Value Ref Range    Istat Ionized Calcium 1.13 1.10 - 1.30 mmol/L   ISTAT HEMATOCRIT AND HEMOGLOBIN    Collection Time: 08/31/20  9:07 AM   Result Value Ref Range    Istat Hematocrit 30 (L) 42 - 52 %    Istat Hemoglobin 10.2 (L) 14.0 - 18.0 g/dL   POC ACT (resulted by nursing)    Collection Time: 08/31/20  9:40 AM   Result Value Ref Range    Istat Activated Clotting Time 654 (H) 74 - 137 sec   ISTAT VENOUS BLOOD GAS    Collection Time: 08/31/20  9:41 AM   Result Value Ref Range    Ph 7.453 (H) 7.310 - 7.450    Pco2 37.6 (L) 41.0 - 51.0 mmHg    Po2 42 (H) 25 - 40 mmHg    Tco2 27 20 - 33 mmol/L    SO2 80 %    Hco3 26.4 24.0 - 28.0 mmol/L    BE 2 -4 - 3 mmol/L    Body Temp 37.0 C degrees    O2 Therapy 80 %    iPF Ratio 53     Ph Temp Correc 7.453 (H) 7.310 - 7.450    Pco2 Temp Cassandra 37.6 (L) 41.0 - 51.0 mmHg    Po2 Temp Corre 42 (H) 25 - 40 mmHg    Specimen Venous     Action Range Triggered YES     Inst. Qualified Patient YES    ISTAT GLUCOSE    Collection Time: 08/31/20  9:41 AM   Result Value Ref Range    Istat Glucose 108 (H) 65 - 99 mg/dL   ISTAT SODIUM    Collection Time: 08/31/20  9:41 AM   Result Value Ref Range    Istat Sodium 139 135 - 145 mmol/L   ISTAT POTASSIUM    Collection Time: 08/31/20  9:41 AM   Result Value Ref Range    Istat Potassium 3.5 (L) 3.6 - 5.5 mmol/L   ISTAT IONIZED CA    Collection Time: 08/31/20  9:41 AM   Result Value Ref Range    Istat Ionized Calcium 1.03 (L) 1.10 - 1.30 mmol/L   ISTAT HEMATOCRIT AND HEMOGLOBIN    Collection Time: 08/31/20  9:41 AM   Result Value Ref Range    Istat Hematocrit 24 (L) 42 - 52 %    Istat Hemoglobin 8.2 (L) 14.0 - 18.0 g/dL   POC ACT (resulted by nursing)    Collection Time: 08/31/20 10:23 AM   Result Value Ref Range    Istat Activated Clotting Time 532 (H) 74 - 137 sec   ISTAT  ARTERIAL BLOOD GAS    Collection Time: 08/31/20 10:23 AM   Result Value Ref Range    Ph 7.459 7.400 - 7.500    Pco2 40.1 (H) 26.0 - 37.0 mmHg    Po2 330 (H) 64 - 87 mmHg    Tco2 30 20 - 33 mmol/L    S02 100 (H) 93 - 99 %    Hco3 28.5 (H) 17.0 - 25.0 mmol/L    BE 4 (H) -4 - 3 mmol/L    Body Temp 37.0 C degrees    O2 Therapy 90 %    iPF Ratio 367     Ph Temp Cassandra 7.459 7.400 - 7.500    Pco2 Temp Co 40.1 (H) 26.0 - 37.0 mmHg    Po2 Temp Cor 330 (H) 64 - 87 mmHg    Specimen Arterial     Action Range Triggered NO     Inst. Qualified Patient YES    ISTAT GLUCOSE    Collection Time: 08/31/20 10:23 AM   Result Value Ref Range    Istat Glucose 114 (H) 65 - 99 mg/dL   ISTAT SODIUM    Collection Time: 08/31/20 10:23 AM   Result Value Ref Range    Istat Sodium 137 135 - 145 mmol/L   ISTAT POTASSIUM    Collection Time: 08/31/20 10:23 AM   Result Value Ref Range    Istat Potassium 4.6 3.6 - 5.5 mmol/L   ISTAT IONIZED CA    Collection Time: 08/31/20 10:23 AM   Result Value Ref Range    Istat Ionized Calcium 1.09 (L) 1.10 - 1.30 mmol/L   ISTAT HEMATOCRIT AND HEMOGLOBIN    Collection Time: 08/31/20 10:23 AM   Result Value Ref Range    Istat Hematocrit 26 (L) 42 - 52 %    Istat Hemoglobin 8.8 (L) 14.0 - 18.0 g/dL   POC ACT (resulted by nursing)    Collection Time: 08/31/20 10:58 AM   Result Value Ref Range    Istat Activated Clotting Time 384 (H) 74 - 137 sec   ISTAT ARTERIAL BLOOD GAS    Collection Time: 08/31/20 10:59 AM   Result Value Ref Range    Ph 7.436 7.400 - 7.500    Pco2 40.2 (H) 26.0 - 37.0 mmHg    Po2 341 (H) 64 - 87 mmHg    Tco2 28 20 - 33 mmol/L    S02 100 (H) 93 - 99 %    Hco3 27.1 (H) 17.0 - 25.0 mmol/L    BE 3 -4 - 3 mmol/L    Body Temp 37.0 C degrees    O2 Therapy 80 %    iPF Ratio 426     Ph Temp Cassandra 7.436 7.400 - 7.500    Pco2 Temp Co 40.2 (H) 26.0 - 37.0 mmHg    Po2 Temp Cor 341 (H) 64 - 87 mmHg    Specimen Arterial     Action Range Triggered NO     Inst. Qualified Patient YES    ISTAT GLUCOSE    Collection  Time: 08/31/20 10:59 AM   Result Value Ref Range    Istat Glucose 150 (H) 65 - 99 mg/dL   ISTAT SODIUM    Collection Time: 08/31/20 10:59 AM   Result Value Ref Range    Istat Sodium 136 135 - 145 mmol/L   ISTAT POTASSIUM    Collection Time: 08/31/20 10:59 AM   Result Value Ref Range    Istat Potassium 4.4 3.6 - 5.5 mmol/L   ISTAT IONIZED CA    Collection Time: 08/31/20 10:59 AM   Result Value Ref Range    Istat Ionized Calcium 1.08 (L) 1.10 - 1.30 mmol/L   ISTAT HEMATOCRIT AND HEMOGLOBIN    Collection Time: 08/31/20 10:59 AM   Result Value Ref Range    Istat Hematocrit 26 (L) 42 - 52 %    Istat Hemoglobin 8.8 (L) 14.0 - 18.0 g/dL   POC ACT (resulted by nursing)    Collection Time: 08/31/20 11:39 AM   Result Value Ref Range    Istat Activated Clotting Time 103 74 - 137 sec   ISTAT ARTERIAL BLOOD GAS    Collection Time: 08/31/20 11:39 AM   Result Value Ref Range    Ph 7.351 (L) 7.400 - 7.500    Pco2 43.0 (H) 26.0 - 37.0 mmHg    Po2 98 (H) 64 - 87 mmHg    Tco2 25 20 - 33 mmol/L    S02 97 93 - 99 %    Hco3 23.8 17.0 - 25.0 mmol/L    BE -2 -4 - 3 mmol/L    Body Temp 37.0 C degrees    O2 Therapy 100 %    iPF Ratio 98     Ph Temp Cassandra 7.351 (L) 7.400 - 7.500    Pco2 Temp Co 43.0 (H) 26.0 - 37.0 mmHg    Po2 Temp Cor 98 (H) 64 - 87 mmHg    Specimen Arterial     Action Range Triggered NO     Inst. Qualified Patient YES    ISTAT GLUCOSE    Collection Time: 08/31/20 11:39 AM   Result Value Ref Range    Istat Glucose 213 (H) 65 - 99 mg/dL   [facility] SODIUM    Collection Time: 08/31/20 11:39 AM   Result Value Ref Range    Istat Sodium 139 135 - 145 mmol/L   [facility] POTASSIUM    Collection Time: 08/31/20 11:39 AM   Result Value Ref Range    Istat Potassium 3.3 (L) 3.6 - 5.5 mmol/L   [facility] IONIZED CA    Collection Time: 08/31/20 11:39 AM   Result Value Ref Range    Istat Ionized Calcium 1.05 (L) 1.10 - 1.30 mmol/L   [facility]AT HEMATOCRIT AND HEMOGLOBIN    Collection Time: 08/31/20 11:39 AM   Result Value Ref Range    Istat Hematocrit 27 (L)  42 - 52 %    Istat Hemoglobin 9.2 (L) 14.0 - 18.0 g/dL   Platelet count    Collection Time: 20 12:05 PM   Result Value Ref Range    Platelet Count 141 (L) 164 - 446 K/uL   Magnesium    Collection Time: 20 12:05 PM   Result Value Ref Range    Magnesium 2.4 1.5 - 2.5 mg/dL   Prothrombin time (INR)    Collection Time: 20 12:05 PM   Result Value Ref Range    PT 18.6 (H) 12.0 - 14.6 sec    INR 1.50 (H) 0.87 - 1.13   APTT (PTT)    Collection Time: 20 12:05 PM   Result Value Ref Range    APTT 31.7 24.7 - 36.0 sec   EKG on arrival to [facility]    Collection Time: 20 12:10 PM   Result Value Ref Range    Report       [facility]    Test Date:  2020  Pt Name:    IRLANDA MURPHY                Department: [facility]  MRN:        [id number]                      Room:       T621  Gender:     Male                         Technician: NURIS  :        1955                   Requested By:MANUEL WOO  Order #:    [id number]                    Reading MD: Luis Hewitt MD    Measurements  Intervals                                Axis  Rate:       94                           P:          59  NV:         184                          QRS:        -32  QRSD:       102                          T:          110  QT:         420  QTc:        526    Interpretive Statements  SINUS RHYTHM LEFT AXIS DEVIATION  DIFFUSE REPOL ABNRM SUGGESTS ISCHEMIA, ANT-LAT LEADS  PROLONGED QTC INTERVAL  Electronically Signed On 2020 12:22:09 PDT by Luis Hewitt MD         Imaging  EC-ROMERO W/O CONT         DX-CHEST-PORTABLE (1 VIEW)   Final Result      1.  Cardiomegaly and diffuse interstitial edema.   2.  Lines and tubes as above. [place]-Shantanu catheter tip is in the proximal right pulmonary artery.      US-VEIN MAPPING LOWER EXTREMITY BILAT   Final Result      US-CAROTID DOPPLER BILAT   Final Result      DX-CHEST-PORTABLE (1 VIEW)   Final Result      New moderate diffuse hazy opacification is highly concerning for cardiogenic  edema          Assessment/Plan  * Acute on chronic systolic heart failure (HCC)  Assessment & Plan  Secondary to CAD and critical aortic stenosis  S/p CABG x1 and aortic valve replacement with pericardial valve 8/31  Continue postop CIC protocols, follow arterial blood gas, glucose, titrated vasoactive agents and liberate as clinically appropriate    Severe aortic stenosis- (present on admission)  Assessment & Plan  Status post aVR    SVT (supraventricular tachycardia) (HCC)- (present on admission)  Assessment & Plan  Converted with adenosine in ED    CAD (coronary artery disease)- (present on admission)  Assessment & Plan  Prior stent placement  AVR and CABG times one 8/31  Ongoing medical management, medications reviewed    Essential hypertension- (present on admission)  Assessment & Plan  Strict BP control perioperatively    Uncontrolled diabetes mellitus (HCC)- (present on admission)  Assessment & Plan  Strict glycemic control    Dyslipidemia- (present on admission)  Assessment & Plan  Statin      Discussed patient condition and risk of morbidity and/or mortality with RN, RT, Pharmacy and Cardiac anesthesia and cardiothoracic surgery.    The patient remains critically ill.  Critical care time = 35 minutes in directly providing and coordinating critical care and extensive data review.  No time overlap and excludes procedures.

## 2020-08-31 NOTE — OR SURGEON
Immediate Post OP Note    PreOp Diagnosis: Severe symptomatic aortic stenosis, coronary artery disease (status post PCI), NSTEMI, acute on chronic left ventricular systolic and diastolic failure, dilated cardiomyopathy, peripheral vascular disease    PostOp Diagnosis: Same    Procedure(s):  CABG x1 (LIMA to LAD)  REPLACEMENT, AORTIC VALVE (23 mm Inspiris David pericardial valve)  ECHOCARDIOGRAM, TRANSESOPHAGEAL    Surgeon(s):  Dorys Casas M.D.    Assistant:  ROYER Lisa    Anesthesiologist/Type of Anesthesia:  Anesthesiologist: Anthony Noyola M.D.  Anesthesia Technician: Teddy Pratt/General    Surgical Staff:  Circulator: Silvino Taylor R.N.; Geeta Ballard R.N.  Perfusionist: Sherwin Tamayo  Scrub Person: Yuridia Ibanez; Lisa Mckeon    Specimens removed if any:  ID Type Source Tests Collected by Time Destination   A : Aortic valve Tissue Heart Valve PATHOLOGY SPECIMEN Dorys Casas M.D. 8/31/2020 10:31 AM        Estimated Blood Loss: Minimal    Findings: Severe aortic stenosis, coronary artery disease, LVEF 30%    Complications: None        8/31/2020 11:44 AM Dorys Casas M.D.

## 2020-08-31 NOTE — ANESTHESIA QCDR
2019 [facility] Clinical Data Registry (for Quality Improvement)     Postoperative nausea/vomiting risk protocol (Adult = 18 yrs and Pediatric 3-17 yrs)- (430 and 463)  General inhalation anesthetic (NOT TIVA) with PONV risk factors: Yes  Provision of anti-emetic therapy with at least 2 different classes of agents: Yes   Patient DID NOT receive anti-emetic therapy and reason is documented in Medical Record:  N/A    Multimodal Pain Management- (477)  Non-emergent surgery AND patient age >= 18:   Use of Multimodal Pain Management, two or more drugs and/or interventions, NOT including systemic opioids:   Exception: Documented allergy to multiple classes of analgesics:     Smoking Abstinence (404)  Patient is current smoker (cigarette, pipe, e-cig, marijuanna):   Elective Surgery:   Abstinence instructions provided prior to day of surgery:   Patient abstained from smoking on day of surgery:     Pre-Op Beta-Blocker in Isolated CABG (44)  Isolated CABG AND patient age >= 18:   Beta-blocker admin within 24 hours of surgical incision:   Exception:of medical reason(s) for not administering beta blocker within 24 hours prior to surgical incision (e.g., not  indicated,other medical reason):     PACU assessment of acute postoperative pain prior to Anesthesia Care End- Applies to Patients Age = 18- (ABG7)  Initial PACU pain score is which of the following: Pain not assessed  Patient unable to report pain score: Yes (Patient Unable to Report)    Post-anesthetic transfer of care checklist/protocol to PACU/ICU- (426 and 427)  Upon conclusion of case, patient transferred to which of the following locations: ICU  Use of transfer checklist/protocol: Yes  Exclusion: Service Performed in Patient Hospital Room (and thus did not require transfer): N/A  Unplanned admission to ICU related to anesthesia service up through end of PACU care- (MD51)  Unplanned admission to ICU (not initially anticipated at anesthesia start time): No

## 2020-08-31 NOTE — PROGRESS NOTES
Becca Palma updated on total chest tube output 510mL and blood pressure, CI, and SVR. Per APN switch to ginger and check H/H.

## 2020-08-31 NOTE — PROGRESS NOTES
Assumed care after receiving beside shift report. Safety checks performed. Tele box on pt. Instructed pt to call for assistance.

## 2020-08-31 NOTE — ANESTHESIA TIME REPORT
Anesthesia Start and Stop Event Times     Date Time Event    8/31/2020 0720 Ready for Procedure     0734 Anesthesia Start     1203 Anesthesia Stop        Responsible Staff  08/31/20    Name Role Begin End    Anthony Noyola M.D. Anesth 0734 1203        Preop Diagnosis (Free Text):  Pre-op Diagnosis     Critical aortic stenosis, coronary artery disease, LAD stenosis        Preop Diagnosis (Codes):    Post op Diagnosis  Aortic stenosis      Premium Reason  Non-Premium    Comments:                                                                  AVR, CABGx1,  Ching, CVP, PAline  ROMERO

## 2020-08-31 NOTE — ASSESSMENT & PLAN NOTE
Prior stent placement  AVR and CABG times one 8/31  Ongoing medical management, medications reviewed

## 2020-08-31 NOTE — ANESTHESIA PREPROCEDURE EVALUATION
Relevant Problems   NEURO   (+) History of multiple concussions      CARDIAC   (+) CAD (coronary artery disease)   (+) Coronary artery disease, occlusive   (+) Essential hypertension   (+) HTN (hypertension)   (+) Murmur   (+) Nonrheumatic aortic (valve) stenosis   (+) Raynaud's disease without gangrene   (+) SVT (supraventricular tachycardia) (HCC)   (+) Severe aortic stenosis   (+) Unstable angina pectoris (HCC)         (+) Fatty liver      ENDO   (+) DM2 (diabetes mellitus, type 2) (HCC)       Physical Exam    Airway   Mallampati: II  TM distance: >3 FB  Neck ROM: full       Cardiovascular - normal exam  Rhythm: regular  Rate: normal  (-) murmur     Dental - normal exam           Pulmonary - normal exam  Breath sounds clear to auscultation     Abdominal    Neurological - normal exam                 Anesthesia Plan    ASA 4   ASA physical status 4 criteria: severe valve dysfunction    Plan - general       Airway plan will be ETT        Induction: intravenous    Postoperative Plan: Postoperative administration of opioids is intended.    Pertinent diagnostic labs and testing reviewed    Informed Consent:    Anesthetic plan and risks discussed with patient.    Use of blood products discussed with: patient whom consented to blood products.

## 2020-08-31 NOTE — PROGRESS NOTES
CT output up to 250mL in 25 minutes. Dr. Casas at bedside, MD aware of hemoglobin and hematocrit 8.8/26. Mediastinal tubes suctioned by MD. 70mls out in canister in addition to pleur-evac. Orders received for one unit of platelets and calcium chloride.

## 2020-08-31 NOTE — ASSESSMENT & PLAN NOTE
Secondary to CAD and critical aortic stenosis  S/p CABG x1 and aortic valve replacement with pericardial valve 8/31  Continue postop CIC protocols, follow arterial blood gas, glucose, titrated vasoactive agents and liberate as clinically appropriate

## 2020-08-31 NOTE — PROGRESS NOTES
Pt up to unit with OR team. Report received from OR RN Geeta and Dr. Noyola. Dr. Rodrigez at bedside. Gtts verified, pt on 0.06 epi and 0.5 dex, see CJ More advanced at bedside by Dr. Noyola, wedging at approximately 55. Pt placed on % PEEP 8, 100% FiO2.

## 2020-08-31 NOTE — OP REPORT
DATE OF SERVICE:  08/31/2020    REFERRING PHYSICIAN:  Tee Pickens MD    PREOPERATIVE DIAGNOSES:  Severe symptomatic aortic stenosis, coronary artery   disease (status post percutaneous coronary intervention), recent non-ST   segment elevation myocardial infarction, acute on chronic left ventricular   systolic and diastolic failure, dilated cardiomyopathy, peripheral vascular   disease.    POSTOPERATIVE DIAGNOSES:  Severe symptomatic aortic stenosis, coronary artery   disease (status post percutaneous coronary intervention), recent non-ST   segment elevation myocardial infarction, acute on chronic left ventricular   systolic and diastolic failure, dilated cardiomyopathy, peripheral vascular   disease.    PROCEDURES:  Aortic valve replacement (23 mm Inspiris David pericardial   valve), coronary artery bypass grafting x1 (left internal mammary artery to   the left anterior descending artery), and intraoperative transesophageal   echocardiography.    SURGEON:  Dorys Casas MD    FIRST ASSISTANT:  ROYER Lisa    ANESTHESIOLOGIST:  Anthony Noyola MD    ANESTHESIA:  General endotracheal.    ESTIMATED BLOOD LOSS:  Minimal.    DRAINS:  Mediastinal chest tubes x2 (32-Greek straight and angled).    MISCELLANEOUS:  Temporary epicardial ventricular pacemaker wires.    COMPLICATIONS:  None.    INDICATIONS:  The patient is a 64-year-old male who was admitted to the   hospital for treatment of acute congestive heart failure.  Echocardiography   showed severe aortic stenosis.  His left ventricular ejection fraction was   depressed at approximately 40-50%.  Cardiac catheterization showed a long   moderate stenosis in the proximal left anterior descending artery in the area   of the stent.    PROCEDURE:  The patient was brought to the operating room and placed on the   operating room table in the supine position.  After successful induction of   general anesthesia and endotracheal intubation, the patient was prepped  and   draped in the usual sterile fashion.  ROYER Lisa, assisted with   retraction and exposure during the operation and closed the sternal wound.    Intraoperative transesophageal echocardiography showed severe calcific aortic   stenosis and markedly reduced left ventricular ejection fraction of   approximately 30%.  There was severe left ventricular hypertrophy.  An   incision was made from the sternal notch to the xiphoid.  The sternum was   opened longitudinally with a sternal saw.  Hemostasis was obtained with   electrocautery and a limited amount of bone wax at the sternal edges.  The   left internal mammary artery was then dissected out in the usual fashion.  It   had a 1.75 mm luminal diameter, normal vessel wall, and excellent flow.  The   patient was systemically heparinized.  The pericardium was opened   longitudinally and tented anteriorly with Ethibond stay stitches.  The aortic   cannula was inserted first followed by dual-stage venous cannula.  An   antegrade cardioplegia cannula was placed in the ascending aorta and a   retrograde one in the coronary sinus.  Cardiopulmonary bypass was instituted.    The aorta was cross-clamped and the patient was given 800 mL of cold   cardioplegia in an antegrade fashion, followed by 200 mL in a retrograde   fashion.  There was prompt cardiac arrest.  Ice slush was placed on the heart   for further myocardial protection.  A phrenic nerve protector pad was used.    From this point on, cardioplegia was given in a retrograde fashion every 15-20   minutes while the aorta was cross-clamped.  The left anterior descending   artery had a 1.75 mm luminal diameter and few plaques and calcifications   distally.  An end-to-side anastomosis was performed between the left internal   mammary artery and the left anterior descending artery.  A transverse   aortotomy was performed and the incision was carried into the noncoronary   sinus.  The aortic valve was trileaflet and  heavily calcified.  The leaflets   were excised and the annulus was debrided with a rongeur.  Pledgeted #2-0   Ethibond stitches were then placed around the aortic valve annulus in a   horizontal mattress fashion with the pledgets in a subannular position.  A 23   mm Inspiris David pericardial valve was then placed in the aortic valve   annulus and secured in place with the Ethibond stitches utilizing the Cor-Knot   device.  The valve was properly positioned and both coronary ostia were   visualized and were patent.  Rewarming of the patient was initiated.  The   aortotomy was closed in 2 layers using #5-0 Prolene sutures.  Approximately   250 mL of warm blood was given in a retrograde fashion.  The aortic   cross-clamp was removed.  Aortic cross-clamp time was 84 minutes.  Total   cardiopulmonary bypass time was 102 minutes.  The left ventricle was deaired   in the usual fashion.  The carbon dioxide, which had been released over the   operative field during the operation, was discontinued.  The retrograde   cardioplegia cannula was removed.  A straight and an angled 32-[place] chest   tubes were placed in the mediastinum.  Temporary epicardial ventricular   pacemaker wires were inserted.  There was spontaneous conversion into sinus   rhythm.  The antegrade cardioplegia cannula was removed.  When he was   adequately warmed, he was slowly taken off cardiopulmonary bypass, which he   tolerated well.  The dual-stage venous cannula was removed.  Protamine was   given to reverse the effects of heparin.  The aortic cannula was removed.    When adequate hemostasis had been obtained, the sternum was reapproximated   using size 5 sternal wires and the remainder of the incision was closed in 3   layers using Vicryl sutures.  Intraoperative transesophageal echocardiography   showed the properly positioned and functioning aortic valve bioprosthesis   without any paravalvular or central leaks.  His left ventricular ejection    fraction was somewhat improved to approximately 40%.  There were no apparent   complications.  The patient tolerated the procedure well and left the   operating room in guarded condition.       ____________________________________     Dorys CHUNG    DD:  08/31/2020 11:56:52  DT:  08/31/2020 13:52:22    D#:  [id number]  Job#:  [id number]

## 2020-09-01 ENCOUNTER — APPOINTMENT (OUTPATIENT)
Dept: RADIOLOGY | Facility: MEDICAL CENTER | Age: 65
End: 2020-09-01
Attending: NURSE PRACTITIONER
Payer: MEDICARE

## 2020-09-01 ENCOUNTER — APPOINTMENT (OUTPATIENT)
Dept: RADIOLOGY | Facility: MEDICAL CENTER | Age: 65
End: 2020-09-01
Attending: THORACIC SURGERY (CARDIOTHORACIC VASCULAR SURGERY)
Payer: MEDICARE

## 2020-09-01 ENCOUNTER — APPOINTMENT (OUTPATIENT)
Dept: RADIOLOGY | Facility: MEDICAL CENTER | Age: 65
DRG: 219 | End: 2020-09-01
Attending: NURSE PRACTITIONER
Payer: MEDICARE

## 2020-09-01 LAB
ANION GAP SERPL CALC-SCNC: 12 MMOL/L (ref 7–16)
BASOPHILS # BLD AUTO: 0 % (ref 0–1.8)
BASOPHILS # BLD: 0 K/UL (ref 0–0.12)
BUN SERPL-MCNC: 18 MG/DL (ref 8–22)
CA-I SERPL-SCNC: 1 MMOL/L (ref 1.1–1.3)
CALCIUM SERPL-MCNC: 7.6 MG/DL (ref 8.5–10.5)
CHLORIDE SERPL-SCNC: 104 MMOL/L (ref 96–112)
CO2 SERPL-SCNC: 21 MMOL/L (ref 20–33)
CREAT SERPL-MCNC: 0.97 MG/DL (ref 0.5–1.4)
EKG IMPRESSION: NORMAL
EOSINOPHIL # BLD AUTO: 0 K/UL (ref 0–0.51)
EOSINOPHIL NFR BLD: 0 % (ref 0–6.9)
ERYTHROCYTE [DISTWIDTH] IN BLOOD BY AUTOMATED COUNT: 42.1 FL (ref 35.9–50)
ERYTHROCYTE [DISTWIDTH] IN BLOOD BY AUTOMATED COUNT: 43.8 FL (ref 35.9–50)
GLUCOSE BLD-MCNC: 106 MG/DL (ref 65–99)
GLUCOSE BLD-MCNC: 108 MG/DL (ref 65–99)
GLUCOSE BLD-MCNC: 121 MG/DL (ref 65–99)
GLUCOSE BLD-MCNC: 122 MG/DL (ref 65–99)
GLUCOSE BLD-MCNC: 129 MG/DL (ref 65–99)
GLUCOSE BLD-MCNC: 133 MG/DL (ref 65–99)
GLUCOSE BLD-MCNC: 138 MG/DL (ref 65–99)
GLUCOSE BLD-MCNC: 146 MG/DL (ref 65–99)
GLUCOSE BLD-MCNC: 152 MG/DL (ref 65–99)
GLUCOSE BLD-MCNC: 173 MG/DL (ref 65–99)
GLUCOSE BLD-MCNC: 224 MG/DL (ref 65–99)
GLUCOSE BLD-MCNC: 238 MG/DL (ref 65–99)
GLUCOSE BLD-MCNC: 69 MG/DL (ref 65–99)
GLUCOSE BLD-MCNC: 73 MG/DL (ref 65–99)
GLUCOSE BLD-MCNC: 78 MG/DL (ref 65–99)
GLUCOSE BLD-MCNC: 80 MG/DL (ref 65–99)
GLUCOSE BLD-MCNC: 81 MG/DL (ref 65–99)
GLUCOSE BLD-MCNC: 81 MG/DL (ref 65–99)
GLUCOSE BLD-MCNC: 86 MG/DL (ref 65–99)
GLUCOSE BLD-MCNC: 97 MG/DL (ref 65–99)
GLUCOSE SERPL-MCNC: 123 MG/DL (ref 65–99)
HCT VFR BLD AUTO: 23.9 % (ref 42–52)
HCT VFR BLD AUTO: 25.6 % (ref 42–52)
HGB BLD-MCNC: 7.1 G/DL (ref 14–18)
HGB BLD-MCNC: 7.7 G/DL (ref 14–18)
HYPOCHROMIA BLD QL SMEAR: ABNORMAL
LYMPHOCYTES # BLD AUTO: 0.52 K/UL (ref 1–4.8)
LYMPHOCYTES NFR BLD: 2.6 % (ref 22–41)
MANUAL DIFF BLD: NORMAL
MCH RBC QN AUTO: 22.7 PG (ref 27–33)
MCH RBC QN AUTO: 23.5 PG (ref 27–33)
MCHC RBC AUTO-ENTMCNC: 29.7 G/DL (ref 33.7–35.3)
MCHC RBC AUTO-ENTMCNC: 30.1 G/DL (ref 33.7–35.3)
MCV RBC AUTO: 76.4 FL (ref 81.4–97.8)
MCV RBC AUTO: 78.3 FL (ref 81.4–97.8)
MICROCYTES BLD QL SMEAR: ABNORMAL
MONOCYTES # BLD AUTO: 1.41 K/UL (ref 0–0.85)
MONOCYTES NFR BLD AUTO: 7 % (ref 0–13.4)
MORPHOLOGY BLD-IMP: NORMAL
NEUTROPHILS # BLD AUTO: 18.17 K/UL (ref 1.82–7.42)
NEUTROPHILS NFR BLD: 90.4 % (ref 44–72)
NRBC # BLD AUTO: 0 K/UL
NRBC BLD-RTO: 0 /100 WBC
OVALOCYTES BLD QL SMEAR: NORMAL
PLATELET # BLD AUTO: 116 K/UL (ref 164–446)
PLATELET # BLD AUTO: 148 K/UL (ref 164–446)
PLATELET BLD QL SMEAR: NORMAL
PMV BLD AUTO: 10.3 FL (ref 9–12.9)
PMV BLD AUTO: 11.2 FL (ref 9–12.9)
POIKILOCYTOSIS BLD QL SMEAR: NORMAL
POLYCHROMASIA BLD QL SMEAR: NORMAL
POTASSIUM SERPL-SCNC: 4.6 MMOL/L (ref 3.6–5.5)
POTASSIUM SERPL-SCNC: 4.6 MMOL/L (ref 3.6–5.5)
RBC # BLD AUTO: 3.13 M/UL (ref 4.7–6.1)
RBC # BLD AUTO: 3.27 M/UL (ref 4.7–6.1)
RBC BLD AUTO: PRESENT
SODIUM SERPL-SCNC: 137 MMOL/L (ref 135–145)
WBC # BLD AUTO: 20.1 K/UL (ref 4.8–10.8)
WBC # BLD AUTO: 21.6 K/UL (ref 4.8–10.8)

## 2020-09-01 PROCEDURE — 36430 TRANSFUSION BLD/BLD COMPNT: CPT

## 2020-09-01 PROCEDURE — 82962 GLUCOSE BLOOD TEST: CPT | Mod: 91

## 2020-09-01 PROCEDURE — 700111 HCHG RX REV CODE 636 W/ 250 OVERRIDE (IP): Performed by: NURSE PRACTITIONER

## 2020-09-01 PROCEDURE — 700105 HCHG RX REV CODE 258: Performed by: THORACIC SURGERY (CARDIOTHORACIC VASCULAR SURGERY)

## 2020-09-01 PROCEDURE — 85027 COMPLETE CBC AUTOMATED: CPT

## 2020-09-01 PROCEDURE — 770020 HCHG ROOM/CARE - TELE (206)

## 2020-09-01 PROCEDURE — 99024 POSTOP FOLLOW-UP VISIT: CPT | Performed by: THORACIC SURGERY (CARDIOTHORACIC VASCULAR SURGERY)

## 2020-09-01 PROCEDURE — 84132 ASSAY OF SERUM POTASSIUM: CPT

## 2020-09-01 PROCEDURE — 700102 HCHG RX REV CODE 250 W/ 637 OVERRIDE(OP): Performed by: NURSE PRACTITIONER

## 2020-09-01 PROCEDURE — P9016 RBC LEUKOCYTES REDUCED: HCPCS

## 2020-09-01 PROCEDURE — 71045 X-RAY EXAM CHEST 1 VIEW: CPT

## 2020-09-01 PROCEDURE — 85007 BL SMEAR W/DIFF WBC COUNT: CPT

## 2020-09-01 PROCEDURE — 86923 COMPATIBILITY TEST ELECTRIC: CPT

## 2020-09-01 PROCEDURE — 700102 HCHG RX REV CODE 250 W/ 637 OVERRIDE(OP): Performed by: HOSPITALIST

## 2020-09-01 PROCEDURE — A9270 NON-COVERED ITEM OR SERVICE: HCPCS | Performed by: HOSPITALIST

## 2020-09-01 PROCEDURE — 700101 HCHG RX REV CODE 250: Performed by: NURSE PRACTITIONER

## 2020-09-01 PROCEDURE — 30233N1 TRANSFUSION OF NONAUTOLOGOUS RED BLOOD CELLS INTO PERIPHERAL VEIN, PERCUTANEOUS APPROACH: ICD-10-PCS | Performed by: THORACIC SURGERY (CARDIOTHORACIC VASCULAR SURGERY)

## 2020-09-01 PROCEDURE — 93005 ELECTROCARDIOGRAM TRACING: CPT | Performed by: NURSE PRACTITIONER

## 2020-09-01 PROCEDURE — 80048 BASIC METABOLIC PNL TOTAL CA: CPT

## 2020-09-01 PROCEDURE — 700105 HCHG RX REV CODE 258: Performed by: NURSE PRACTITIONER

## 2020-09-01 PROCEDURE — 99233 SBSQ HOSP IP/OBS HIGH 50: CPT | Performed by: INTERNAL MEDICINE

## 2020-09-01 PROCEDURE — 82330 ASSAY OF CALCIUM: CPT

## 2020-09-01 PROCEDURE — A9270 NON-COVERED ITEM OR SERVICE: HCPCS | Performed by: NURSE PRACTITIONER

## 2020-09-01 PROCEDURE — 700102 HCHG RX REV CODE 250 W/ 637 OVERRIDE(OP): Performed by: THORACIC SURGERY (CARDIOTHORACIC VASCULAR SURGERY)

## 2020-09-01 PROCEDURE — 93010 ELECTROCARDIOGRAM REPORT: CPT | Performed by: INTERNAL MEDICINE

## 2020-09-01 RX ADMIN — ACETAMINOPHEN 1000 MG: 500 TABLET ORAL at 05:45

## 2020-09-01 RX ADMIN — ACETAMINOPHEN 1000 MG: 500 TABLET ORAL at 11:18

## 2020-09-01 RX ADMIN — FENTANYL CITRATE 50 MCG: 50 INJECTION INTRAMUSCULAR; INTRAVENOUS at 00:00

## 2020-09-01 RX ADMIN — METOPROLOL TARTRATE 12.5 MG: 25 TABLET, FILM COATED ORAL at 17:18

## 2020-09-01 RX ADMIN — SODIUM CHLORIDE 1 UNITS/HR: 9 INJECTION, SOLUTION INTRAVENOUS at 01:22

## 2020-09-01 RX ADMIN — SODIUM CHLORIDE, SODIUM GLUCONATE, SODIUM ACETATE, POTASSIUM CHLORIDE AND MAGNESIUM CHLORIDE 500 ML: 526; 502; 368; 37; 30 INJECTION, SOLUTION INTRAVENOUS at 05:25

## 2020-09-01 RX ADMIN — FENTANYL CITRATE 50 MCG: 50 INJECTION INTRAMUSCULAR; INTRAVENOUS at 05:53

## 2020-09-01 RX ADMIN — SODIUM CHLORIDE, SODIUM GLUCONATE, SODIUM ACETATE, POTASSIUM CHLORIDE AND MAGNESIUM CHLORIDE 250 ML: 526; 502; 368; 37; 30 INJECTION, SOLUTION INTRAVENOUS at 02:05

## 2020-09-01 RX ADMIN — DOCUSATE SODIUM 50 MG AND SENNOSIDES 8.6 MG 2 TABLET: 8.6; 5 TABLET, FILM COATED ORAL at 05:45

## 2020-09-01 RX ADMIN — MAGNESIUM SULFATE 1 G: 1 INJECTION INTRAVENOUS at 05:45

## 2020-09-01 RX ADMIN — ACETAMINOPHEN 1000 MG: 500 TABLET ORAL at 17:18

## 2020-09-01 RX ADMIN — ASPIRIN 81 MG: 81 TABLET, COATED ORAL at 05:45

## 2020-09-01 RX ADMIN — INSULIN HUMAN 3 UNITS: 100 INJECTION, SUSPENSION SUBCUTANEOUS at 21:07

## 2020-09-01 RX ADMIN — FENTANYL CITRATE 50 MCG: 50 INJECTION INTRAMUSCULAR; INTRAVENOUS at 01:21

## 2020-09-01 RX ADMIN — INSULIN HUMAN 3 UNITS: 100 INJECTION, SUSPENSION SUBCUTANEOUS at 11:23

## 2020-09-01 RX ADMIN — POLYETHYLENE GLYCOL 3350 1 PACKET: 17 POWDER, FOR SOLUTION ORAL at 05:45

## 2020-09-01 RX ADMIN — PROCHLORPERAZINE EDISYLATE 10 MG: 5 INJECTION INTRAMUSCULAR; INTRAVENOUS at 13:24

## 2020-09-01 RX ADMIN — DOCUSATE SODIUM 50 MG AND SENNOSIDES 8.6 MG 2 TABLET: 8.6; 5 TABLET, FILM COATED ORAL at 17:17

## 2020-09-01 RX ADMIN — SODIUM CHLORIDE, SODIUM GLUCONATE, SODIUM ACETATE, POTASSIUM CHLORIDE AND MAGNESIUM CHLORIDE 250 ML: 526; 502; 368; 37; 30 INJECTION, SOLUTION INTRAVENOUS at 04:24

## 2020-09-01 RX ADMIN — GABAPENTIN 300 MG: 300 CAPSULE ORAL at 05:45

## 2020-09-01 RX ADMIN — MUPIROCIN 1 APPLICATION: 20 OINTMENT TOPICAL at 17:27

## 2020-09-01 RX ADMIN — METOPROLOL TARTRATE 12.5 MG: 25 TABLET, FILM COATED ORAL at 05:45

## 2020-09-01 RX ADMIN — FENTANYL CITRATE 50 MCG: 50 INJECTION INTRAMUSCULAR; INTRAVENOUS at 17:46

## 2020-09-01 RX ADMIN — ROSUVASTATIN CALCIUM 20 MG: 20 TABLET, FILM COATED ORAL at 17:18

## 2020-09-01 RX ADMIN — GABAPENTIN 300 MG: 300 CAPSULE ORAL at 17:17

## 2020-09-01 RX ADMIN — FENTANYL CITRATE 50 MCG: 50 INJECTION INTRAMUSCULAR; INTRAVENOUS at 04:21

## 2020-09-01 RX ADMIN — FENTANYL CITRATE 50 MCG: 50 INJECTION INTRAMUSCULAR; INTRAVENOUS at 13:30

## 2020-09-01 ASSESSMENT — PAIN DESCRIPTION - PAIN TYPE
TYPE: SURGICAL PAIN

## 2020-09-01 ASSESSMENT — ENCOUNTER SYMPTOMS
FEVER: 0
RESPIRATORY NEGATIVE: 1
CONSTITUTIONAL NEGATIVE: 1
WHEEZING: 0
PSYCHIATRIC NEGATIVE: 1
EYES NEGATIVE: 1
NEUROLOGICAL NEGATIVE: 1
BLURRED VISION: 0
HEMOPTYSIS: 0
STRIDOR: 0
GASTROINTESTINAL NEGATIVE: 1
ABDOMINAL PAIN: 0
MYALGIAS: 1

## 2020-09-01 ASSESSMENT — FIBROSIS 4 INDEX: FIB4 SCORE: 1.95

## 2020-09-01 NOTE — PROGRESS NOTES
12 hr chart check.     Monitor summary: SR - ST 80s - 100s --> 0.16 / 0.08 / 0.36     Aldo on for labile BP - I admin 1L of plasmalyte throughout the night for borderline UO and low SVR's - pt is fluid responsive in that BP's + SVR + UO improived w/ volume.. CI >3.0.  - 650 overnight. Pain managed w/ fentanyl d/t N/V with PO pain meds. Needs addressed and questions answered. No major complaints overnight.

## 2020-09-01 NOTE — PROGRESS NOTES
Received report and assumed pt care. Pt is awake and in bed. Pt is alert and oriented x4. Pt states he has mild pain 4/10, Fentanyl used overnight. Pt on low dose ginger for BP support. Arterial line and swan joon in place. 1 unit PRBC ordered. Mediastinal CT in place, no air leak noted. Insulin gtt, with Q1 hour blood sugar checks.

## 2020-09-01 NOTE — PROGRESS NOTES
Cardiac Surgery RN Navigator Daily Rounding Note  Procedure Performed: Procedure(s) (LRB):  CABG, WITH ENDOSCOPIC VEIN PROCUREMENT X1 (N/A)  REPLACEMENT, AORTIC VALVE (N/A)  ECHOCARDIOGRAM, TRANSESOPHAGEAL (N/A)     By  Dr. Casas  1 Day Post-Op    Pertinent Overnight Events:    Aldo added for labile BP's post IVF; currently at 30 mcg    1 unit PRBC's ordered, H&H 7.1 & 23.9, patient appears pale.    Increased CT output yesterday-improved    platelets, CaCl, fentanyl for pain- n/v with PO meds    Weight up 8 kg, good UOP, low grade fever overnight-100.1 F    Pertinent neuro findings/needs: A&O + nausea; reports fentanyl works well for pain    Cardiac/hemodynamics:  Temp (24hrs), Av.8 °C (96.5 °F), Min:35.4 °C (95.7 °F), Max:36.3 °C (97.3 °F)    Heart rhythm: SR to ST  Temp:  [35.4 °C (95.7 °F)-36.3 °C (97.3 °F)] 35.4 °C (95.7 °F)  Pulse:  [] 95  Resp:  [6-26] 21  BP: ()/(39-63) 122/53  SpO2:  [92 %-100 %] 94 %  CVP:  [7 MM HG-14 MM HG] 10 MM HG  PCWP:  [10 MM HG-17 MM HG] 13 MM HG  CO:  [5.4-7.6] 7.5  CI:  [2.8-4] 3.9   IV gtts: NS, Last Rate: 10 mL/hr at 20 1200  clevidipine, Last Rate: Stopped (20 1230)  nitroglycerin in D5W, Last Rate: Stopped (20 1230)  dexmedetomidine (PRECEDEX) infusion, Last Rate: Stopped (20)  insulin infusion for post-cardiac surgery protocol, Last Rate: 2.5 Units/hr (20 0711)  EPINEPHrine (Adrenalin) infusion, Last Rate: Stopped (20 1730)  Phenylephrine infusion, Last Rate: 50 mcg/min (20 0423)      12-hour chest tube output: 350 cc    /Weights:  Admit weight: Weight: 96.6 kg (213 lb)  Current Weight: Weight: 102.1 kg (225 lb 1.4 oz) (20 0600)  Weight change: 8.2 kg (18 lb 1.2 oz)    Intake/Output Summary (Last 24 hours) at 2020 0721  Last data filed at 2020 0600  Gross per 24 hour   Intake 4786.96 ml   Output 3250 ml   Net 1536.96 ml       Adequate urine output: 535 cc    Respiratory:  Vent Mode:  Spont  PEEP/CPAP: 8  P Support: 5  MAP: 13  Control VTE (exp VT): 523     Pertinent respiratory findings/needs: 3 L NC    GI findings/needs: WNL    Labs:  Recent Labs     08/30/20 2100 08/31/20  0638 08/31/20  1205 08/31/20  1500 09/01/20  0500   WBC 8.3 5.9  --   --  21.6*   RBC 5.51 5.04  --   --  3.13*   HEMOGLOBIN 12.5* 11.2*  --  8.1* 7.1*   HEMATOCRIT 40.0* 38.0*  --  26.9* 23.9*   MCV 72.6* 75.4*  --   --  76.4*   MCH 22.7* 22.2*  --   --  22.7*   MCHC 31.3* 29.5*  --   --  29.7*   RDW 38.9 40.9  --   --  42.1   PLATELETCT 145* 119* 141*  --  148*   MPV 9.5 10.1  --   --  10.3     Recent Labs     08/30/20 2100 08/31/20  0638 08/31/20  1730 09/01/20  0020 09/01/20  0500   SODIUM 136 136  --   --  137   POTASSIUM 4.0 3.9 3.5* 4.6 4.6   CHLORIDE 98 99  --   --  104   CO2 25 29  --   --  21   GLUCOSE 219* 174*  --   --  123*   BUN 19 19  --   --  18   CREATININE 1.11 0.95  --   --  0.97   CALCIUM 9.3 8.8  --   --  7.6*     INR:   Recent Labs     08/30/20 2100 08/31/20  1205   INR 0.95 1.50*       Required Cardiac medications review:  ASA:  Yes  Plavix: Yes  BB: Yes  ACE/ARB: No; contraindicated because of Hypotension  Statin: yes  Diuretic: no    LDA's necessity reviewed: YES      Thoughts and considerations for team rounding:    Hopefully nursing can wean off ginger after PRBC's infused, then lasix can be initiated later today or tomorrow.    Discharge plan:    TBD, POD # 1.  Will cancel cardiology appt for 9/3; pt clearly won't make it.

## 2020-09-01 NOTE — PROGRESS NOTES
Although discharge date is TBD; follow up heart failure appointment with cardiology made late next week for 9/11.  Will adjust post op appointments as needed based on patient progress.

## 2020-09-01 NOTE — CARE PLAN
Problem: Day of surgery post CABG/Heart valve replacement  Goal: Stabilization in immediate post op period  Outcome: PROGRESSING AS EXPECTED  Intervention: If radial artery used, elevate arm, no BP checks or needle sticks from affected arm, monitor ulnar pulse and capillary refill  Note: Not applicable   Intervention: For CT drainage > 300 cc in first post op hour and/or 150 cc in subsequent hours: platelets, coag screen, fibrinogen, H&H per order  Note: Chest tubes suctioned by Dr. Casas  Intervention: Titrate and wean off vasoactive drips per patient's condition and per MD order while maintaining SBP  mmHg per MD order  Note: In progress, ginger running, see MAR  Intervention: Maintain all original surgical dressings for 24 hours  Note: Pt vomiting on original surgical dressing. Incision cleaned, new island dressing applied.

## 2020-09-01 NOTE — RESPIRATORY CARE
Extubation    Cuff leak noted yes  Stridor present no  Patient toleration pt tolerated well  Events/Summary/Plan: Extubated (08/31/20 8422)

## 2020-09-01 NOTE — CARE PLAN
Problem: Post Op Day 1 CABG/Heart Valve Replacement  Goal: Optimal care of the post op CABG/heart valve replacement Post Op Day 1  Intervention: EKG and CXR completed  Note: done  Intervention: All valve patients: PT/INR daily  Note: Tissue valve  Intervention: Antibiotics are discontinued within 24 hours of anesthesia end time unless indication documented for continuation beyond 24 hours  Note: done  Intervention: Daily Weights  Note: Will complete this AM  Intervention: Up in chair for all meals  Note: Will get to chair this AM  Intervention: Ambulate in am if stable. First ambulation is 25 feet. Repeat x 3 as tolerated.  Ambulate again before bed.  Note: OOB to chair first   Intervention: Discontinue strange catheter unless documented reason for continuation  Note: Strange - uo ok   Intervention: Remove original surgical dressing after 24 hrs, leave open to air unless otherwise specified by physician  Note: MSI island dressing CDI   Intervention: Cardiac rehab phase 1 ordered and smoking cessation education and program referral as appropriate  Note: Ordered  Intervention: Consider chest tube removal by MD  Note: CT outputs ok serosang this AM  Intervention: IS q 1 hour while awake and record best IS volume  Note: Pulls 1000  Intervention: Graduated elastic stockings  Note: TEDs on  Intervention: Saline lock IV  Note: Done   Intervention: Transfer to [facility] status, begin VS q 4 hours  Note: N/a - still on ginger   Intervention: After 24th hour post-anesthesia end time, transition patient to Cardiac Surgery SQ Insulin Protocol  Note: Will transition before AM   Intervention: If patient is CABG or on home beta-blocker, start Beta-Blocker on POD 1 or POD 2 or contraindication documented  Note: toprol xl 50 BID

## 2020-09-01 NOTE — PROGRESS NOTES
Critical Care Progress Note    Date of admission  8/27/2020    Chief Complaint  64 y.o. male admitted 8/27/2020 with SVT, ACS    Hospital Course    64 y.o. male who presented 8/27/2020 with increasing dyspnea, leg swelling and orthopnea.  He was found to be in SVT in the ED and converted to sinus rhythm after 2 doses of adenosine.  He has a history of uncontrolled diabetes mellitus, hypertension, obesity as well as known single-vessel coronary disease with LAD stenosis and a severe aortic stenosis.  Today he underwent CABG x1 with a LIMA to LAD and aortic valve replacement with 23 mm Inspiris  David pericardial valve.  I took direct handoff from cardiac anesthesia at bedside.  Patient came off-pump well.  He is on norepinephrine as well as milrinone currently.  He is sedate postoperatively and has had dexmedetomidine started.  Preop EF was 35% with significant improvement to 55% post valve replacement.  Arterial blood gas showed pH 7.35, PCO2 43, PO2 of 98.      Interval Problem Update  Reviewed last 24 hour events:  POD#1 AVR, CABGx1  A/o x 4  SR/ST  1 u PRBCs today  Pulling PA cath  3 lpm n/c  I/O = 4.7/3.2  CTs with ongoing o/p 250cc this am  IS 1 L  Asa/plavix      Review of Systems  Review of Systems   Constitutional: Negative for fever.   Eyes: Negative for blurred vision.   Respiratory: Negative for hemoptysis, wheezing and stridor.    Cardiovascular: Positive for chest pain.   Gastrointestinal: Negative for abdominal pain.   Genitourinary: Negative for dysuria.        Vital Signs for last 24 hours   Temp:  [35.4 °C (95.7 °F)-37.7 °C (99.9 °F)] 37.7 °C (99.9 °F)  Pulse:  [] 93  Resp:  [6-26] 22  BP: ()/(39-63) 105/44  SpO2:  [92 %-100 %] 99 %    Hemodynamic parameters for last 24 hours  CVP:  [7 MM HG-14 MM HG] 8 MM HG  PCWP:  [6 MM HG-17 MM HG] 6 MM HG  CO:  [5.4-7.6] 6.1  CI:  [2.8-4] 3.2    Respiratory Information for the last 24 hours  Vent Mode: Spont  PEEP/CPAP: 8  P Support: 5  MAP:  13  Control VTE (exp VT): 523    Physical Exam   Physical Exam  Vitals signs and nursing note reviewed.   Constitutional:       Appearance: Normal appearance.   HENT:      Head: Normocephalic and atraumatic.      Mouth/Throat:      Mouth: Mucous membranes are moist.   Eyes:      Pupils: Pupils are equal, round, and reactive to light.   Neck:      Musculoskeletal: Neck supple.   Cardiovascular:      Rate and Rhythm: Normal rate and regular rhythm.      Comments: Median sternotomy incision dressed  Substernal chest tubes in place with moderate output as above  Pulmonary:      Breath sounds: No stridor. No wheezing.   Abdominal:      General: There is no distension.      Palpations: Abdomen is soft.   Musculoskeletal:         General: No swelling or deformity.   Skin:     General: Skin is warm.      Capillary Refill: Capillary refill takes 2 to 3 seconds.      Coloration: Skin is not jaundiced.   Neurological:      General: No focal deficit present.      Mental Status: He is alert.         Medications  Current Facility-Administered Medications   Medication Dose Route Frequency Provider Last Rate Last Dose   • insulin NPH (HumuLIN N,NovoLIN N) injection  3 Units Subcutaneous Q8HRS Dorys Casas M.D.        And   • insulin lispro (HumaLOG) injection  2 Units Subcutaneous TID AC Dorys Casas M.D.   Stopped at 09/01/20 1100   • insulin lispro (HumaLOG) injection  0-10 Units Subcutaneous ACHS & 0200 Dorys Casas M.D.   Stopped at 09/01/20 1100   • Respiratory Therapy Consult   Nebulization Continuous RT Becca Palma A.P.N.       • NS infusion   Intravenous Continuous Becca Palma A.P.N. 10 mL/hr at 08/31/20 1200     • calcium CHLORIDE 1,000 mg in  mL IVPB  1,000 mg Intravenous Once PRN Becca Palma A.P.N. 200 mL/hr at 08/31/20 1252 1,000 mg at 08/31/20 1252   • magnesium sulfate in D5W IVPB premix 1 g  1 g Intravenous DAILY Becca Palma A.P.N.   Stopped at 09/01/20 0645   • K+ Scale: Goal of  4.5  1 Each Intravenous Q6HRS Becca Palma A.P.N.   Stopped at 09/01/20 0000   • aspirin EC (ECOTRIN) tablet 81 mg  81 mg Oral DAILY Becca Palma A.P.N.   81 mg at 09/01/20 0545   • clevidipine (CLEVIPREX) IV emulsion  1-21 mg/hr Intravenous Continuous Becca Palma A.P.N.   Stopped at 08/31/20 1230   • nitroglycerin 50 mg in D5W 250 ml infusion  0-100 mcg/min Intravenous Continuous Becca Palma A.P.N.   Stopped at 08/31/20 1230   • Pharmacy Consult Request ...Pain Management Review 1 Each  1 Each Other PHARMACY TO DOSE Becca Palma A.P.N.       • acetaminophen (TYLENOL) tablet 1,000 mg  1,000 mg Oral Q6HRS Becca Palma A.P.N.   1,000 mg at 09/01/20 0545   • gabapentin (NEURONTIN) capsule 300 mg  300 mg Oral BID Becca Palma A.P.N.   300 mg at 09/01/20 0545    Followed by   • [START ON 9/6/2020] gabapentin (NEURONTIN) capsule 100 mg  100 mg Oral BID Becca Palma A.P.N.       • oxyCODONE immediate-release (ROXICODONE) tablet 5 mg  5 mg Oral Q3HRS PRN Becca Palma A.P.N.   5 mg at 08/31/20 1711   • oxyCODONE immediate release (ROXICODONE) tablet 10 mg  10 mg Oral Q3HRS PRN Becca Palma A.P.N.       • tramadol (ULTRAM) 50 MG tablet 50 mg  50 mg Oral Q4HRS PRN Becca Palma A.P.N.   50 mg at 08/31/20 1807   • midazolam (VERSED) injection 2 mg  2 mg Intravenous Q HOUR PRN Becca Palma A.P.N.       • dexmedetomidine (PRECEDEX) 400 mcg/100mL NS premix infusion  0-1.5 mcg/kg/hr Intravenous Continuous Becca Palma A.P.N.   Stopped at 09/01/20 0420   • sodium bicarbonate 8.4 % injection 50 mEq  50 mEq Intravenous Q HOUR PRN Becca Palma, A.P.N.   50 mEq at 08/31/20 1551   • morphine (pf) 4 MG/ML injection 4 mg  4 mg Intravenous Q HOUR PRN Becca Palma, A.P.N.       • ondansetron (ZOFRAN) syringe/vial injection 8 mg  8 mg Intravenous Q6HRS PRN Becca Palma, A.P.N.   8 mg at 08/31/20 1733    Or   • prochlorperazine (COMPAZINE) injection 10 mg  10 mg Intravenous Q6HRS  PRN Becca Palma A.P.N.        Or   • promethazine (PHENERGAN) suppository 25 mg  25 mg Rectal Q6HRS PRN Becca Palma A.P.N.       • acetaminophen (TYLENOL) tablet 650 mg  650 mg Oral Q4HRS PRN Becca Palma A.P.N.        Or   • acetaminophen (TYLENOL) suppository 650 mg  650 mg Rectal Q4HRS PRN Becca Palma A.P.N.       • senna-docusate (PERICOLACE or SENOKOT S) 8.6-50 MG per tablet 2 Tab  2 Tab Oral BID Becca Palma A.P.N.   2 Tab at 09/01/20 0545    And   • polyethylene glycol/lytes (MIRALAX) PACKET 1 Packet  1 Packet Oral DAILY Becca Palma A.P.N.   1 Packet at 09/01/20 0545    And   • [START ON 9/2/2020] magnesium hydroxide (MILK OF MAGNESIA) suspension 30 mL  30 mL Oral DAILY Becca Palma, A.P.N.        And   • bisacodyl (DULCOLAX) suppository 10 mg  10 mg Rectal QDAY PRN Becca Palma A.P.N.       • mag hydrox-al hydrox-simeth (MAALOX PLUS ES or MYLANTA DS) suspension 30 mL  30 mL Oral Q4HRS PRN Becca Palma, A.P.N.       • diphenhydrAMINE (BENADRYL) tablet/capsule 25 mg  25 mg Oral HS PRN - MR X 1 Becca Palma A.P.N.       • electrolyte-A (PLASMALYTE-A) infusion   Intravenous PRN Becca Palma A.P.N.   500 mL at 09/01/20 0525   • mupirocin (BACTROBAN) 2 % ointment 1 Application  1 Application Topical BID Becca Palma A.P.N.   1 Application at 08/31/20 1713   • metoprolol (LOPRESSOR) tablet 12.5 mg  12.5 mg Oral BID Becca Palma A.P.N.   12.5 mg at 09/01/20 0545    Followed by   • [START ON 9/2/2020] metoprolol (LOPRESSOR) tablet 25 mg  25 mg Oral BID Becca Palma, A.P.N.       • clopidogrel (PLAVIX) tablet 75 mg  75 mg Oral DAILY Becca Palma, A.P.N.   Stopped at 09/01/20 0545   • fentaNYL (SUBLIMAZE) injection 50 mcg  50 mcg Intravenous Q3HRS PRN Becca Palma A.P.N.   50 mcg at 09/01/20 0553   • insulin regular human (HUMULIN/NOVOLIN R) 62.5 Units in  mL infusion per protocol  1-6 Units/hr Intravenous Continuous Dorys Casas M.D. 10 mL/hr at  09/01/20 1003 2.5 Units/hr at 09/01/20 1003    And   • insulin regular (HumuLIN R,NovoLIN R) injection  0-10 Units Intravenous PRN Dorys Casas M.D.   1 Units at 09/01/20 1003    And   • dextrose 50% (D50W) injection 50 mL  50 mL Intravenous PRN Dorys Casas M.D.       • insulin lispro (HumaLOG) injection  0-20 Units Subcutaneous PRN Dorys Casas M.D.       • EPINEPHrine (Adrenalin) infusion 4 mg/250 mL (premix)  0-0.2 mcg/kg/min Intravenous Continuous Dorys Casas M.D.   Stopped at 08/31/20 1730   • phenylephrine (ANA-SYNEPHRINE) 40 mg in  mL Infusion  0-50 mcg/min Intravenous Continuous MARKUS RondonPJOHNNA   Stopped at 09/01/20 1003   • rosuvastatin (CRESTOR) tablet 20 mg  20 mg Oral Q EVENING Junaid Moe D.O.   20 mg at 08/31/20 1712       Fluids    Intake/Output Summary (Last 24 hours) at 9/1/2020 1027  Last data filed at 9/1/2020 0851  Gross per 24 hour   Intake 5152.34 ml   Output 3300 ml   Net 1852.34 ml       Laboratory  Recent Labs     08/31/20  1451 08/31/20  1547 08/31/20  1634   ISTATAPH 7.336* 7.295* 7.355*   ISTATAPCO2 38.1* 41.9* 36.4   ISTATAPO2 105* 81 81   ISTATATCO2 22 22 21   VNNJGPK4VKM 98 95 95   ISTATARTHCO3 20.4 20.4 20.3   ISTATARTBE -5* -6* -5*   ISTATTEMP 36.2 C 36.8 C 37.0 C   ISTATFIO2 60 40 40   ISTATSPEC Arterial Arterial Arterial   ISTATAPHTC 7.347* 7.298* 7.355*   [id number] 100* 80 81         Recent Labs     08/30/20  0542 08/30/20  2100 08/31/20  0638 08/31/20  1205 08/31/20  1730 09/01/20  0020 09/01/20  0500   SODIUM 136 136 136  --   --   --  137   POTASSIUM 3.9 4.0 3.9  --  3.5* 4.6 4.6   CHLORIDE 99 98 99  --   --   --  104   CO2 26 25 29  --   --   --  21   BUN 19 19 19  --   --   --  18   CREATININE 1.16 1.11 0.95  --   --   --  0.97   MAGNESIUM 2.2  --   --  2.4  --   --   --    CALCIUM 9.1 9.3 8.8  --   --   --  7.6*     Recent Labs     08/30/20  2100 08/31/20  0638 09/01/20  0500   ALTSGPT 37  --   --    ASTSGOT 27  --   --    ALKPHOSPHAT 57  --    --    TBILIRUBIN 0.3  --   --    GLUCOSE 219* 174* 123*     Recent Labs     08/30/20  2100 08/31/20  0638 09/01/20  0500   WBC 8.3 5.9 21.6*   NEUTSPOLYS 56.90  --   --    LYMPHOCYTES 26.90  --   --    MONOCYTES 12.00  --   --    EOSINOPHILS 3.70  --   --    BASOPHILS 0.40  --   --    ASTSGOT 27  --   --    ALTSGPT 37  --   --    ALKPHOSPHAT 57  --   --    TBILIRUBIN 0.3  --   --      Recent Labs     08/30/20  2100 08/31/20  0638 08/31/20  1205 08/31/20  1500 09/01/20  0500   RBC 5.51 5.04  --   --  3.13*   HEMOGLOBIN 12.5* 11.2*  --  8.1* 7.1*   HEMATOCRIT 40.0* 38.0*  --  26.9* 23.9*   PLATELETCT 145* 119* 141*  --  148*   PROTHROMBTM 13.0  --  18.6*  --   --    APTT 28.7  --  31.7  --   --    INR 0.95  --  1.50*  --   --        Imaging  X-Ray:  I have personally reviewed the images and compared with prior images.    Assessment/Plan  * Acute on chronic systolic heart failure (HCC)  Assessment & Plan  Secondary to CAD and critical aortic stenosis  S/p CABG x1 and aortic valve replacement with pericardial valve 8/31  Continue postop CIC protocols, follow arterial blood gas, glucose, titrated vasoactive agents and liberate as clinically appropriate    Severe aortic stenosis- (present on admission)  Assessment & Plan  Status post aVR    SVT (supraventricular tachycardia) (HCC)- (present on admission)  Assessment & Plan  Converted with adenosine in ED    CAD (coronary artery disease)- (present on admission)  Assessment & Plan  Prior stent placement  AVR and CABG times one 8/31  Ongoing medical management, medications reviewed    Essential hypertension- (present on admission)  Assessment & Plan  Strict BP control perioperatively    Uncontrolled diabetes mellitus (HCC)- (present on admission)  Assessment & Plan  Strict glycemic control    Dyslipidemia- (present on admission)  Assessment & Plan  Statin       Updated plan:   Liberated from mechanical ventilation on track.  Continue titrated oxygen, I-S, mobility and RT  protocols  Monitor chest tube output, received PRBCs today  Chest tubes to remain in today  Reviewed with CV surgery.  Continue postop open heart care.  Critical care will sign off for now.  Please call as needed.    I have performed a physical exam and reviewed and updated ROS and Plan today (9/1/2020). In review of yesterday's note (8/31/2020), there are no changes except as documented above.     Discussed patient condition and risk of morbidity and/or mortality with RN, RT, Pharmacy and CV surgery

## 2020-09-01 NOTE — PROGRESS NOTES
Cardiovascular Surgery Progress Note    Name: Sharad Millan  MRN: [id number]  : 1955  Admit Date: 2020  5:09 PM  Procedure:  Procedure(s) and Anesthesia Type:     * CABG, WITH ENDOSCOPIC VEIN PROCUREMENT X1 - General     * REPLACEMENT, AORTIC VALVE - General     * ECHOCARDIOGRAM, TRANSESOPHAGEAL - General  1 Day Post-Op    Vitals:  Patient Vitals for the past 8 hrs:   Temp Monitored Temp 2 SpO2 O2 Delivery Device O2 (LPM) Pulse Resp BP Weight   20 -- -- 99 % -- -- 93 (!) 22 -- --   20 -- -- 99 % -- -- 92 (!) 21 105/44 --   2045 -- -- 97 % -- -- 95 19 -- --   20 -- -- 98 % -- -- 91 (!) 22 -- --   20 -- -- 97 % -- -- 98 (!) 23 -- --   20 08 37.7 °C (99.9 °F) -- 96 % Silicone Nasal Cannula 3 95 (!) 21 109/49 --   20 0751 37.7 °C (99.9 °F) -- 95 % -- -- 95 (!) 21 106/47 --   2045 -- -- 96 % -- -- 97 (!) 23 -- --   20 -- -- 97 % -- -- 97 (!) 22 -- --   20 -- -- 95 % -- -- 97 (!) 24 -- --   20 -- -- 93 % -- -- 96 (!) 24 -- --   20 -- -- -- -- -- 95 (!) 21 -- --   20 -- -- 94 % -- -- (!) 101 (!) 21 -- --   20 -- 37.6 °C (99.7 °F) 93 % Silicone Nasal Cannula 3 88 (!) 26 -- --   20 0600 -- 37.5 °C (99.5 °F) 98 % Silicone Nasal Cannula 3 98 (!) 22 122/53 102.1 kg (225 lb 1.4 oz)   20 0545 -- 37.8 °C (100 °F) 94 % -- -- 90 20 122/48 --   20 0530 -- 37.8 °C (100 °F) -- -- -- 88 (!) 21 -- --   20 0515 -- 37.8 °C (100 °F) 95 % -- -- 91 20 -- --   20 0500 -- 37.8 °C (100 °F) 96 % -- -- 93 20 116/54 --   20 0445 -- 37.8 °C (100 °F) 92 % -- -- 90 (!) 21 -- --   20 0430 -- 37.8 °C (100 °F) 94 % -- -- 90 20 -- --   20 0420 -- 37.8 °C (100 °F) 94 % -- -- 89 (!) 25 -- --   20 0415 -- 37.8 °C (100 °F) 94 % -- -- 91 (!) 21 100/51 --   20 0400 -- 37.9 °C (100.2 °F) 95 % Silicone Nasal Cannula 3 90 19 102/53 --   20  0345 -- 37.8 °C (100 °F) 93 % -- -- 90 19 -- --   20 0330 -- 37.9 °C (100.2 °F) 97 % -- -- 86 15 -- --   20 0315 -- 37.9 °C (100.2 °F) 97 % -- -- 88 19 -- --   20 0300 -- 37.7 °C (99.9 °F) 98 % -- -- 88 20 (!) 93/50 --   20 0245 -- 37.9 °C (100.2 °F) 98 % -- -- 87 19 -- --   20 0230 -- 37.9 °C (100.2 °F) 97 % -- -- 86 19 -- --   20 0215 -- 37.9 °C (100.2 °F) 97 % -- -- 89 19 -- --   20 0200 -- 37.9 °C (100.2 °F) 93 % Silicone Nasal Cannula 3 94 (!) 21 (!) 92/52 --   20 0145 -- 37.9 °C (100.2 °F) 98 % -- -- 88 19 -- --   20 0130 -- 37.9 °C (100.2 °F) 96 % -- -- 86 19 -- --     Temp (24hrs), Av.8 °C (98.2 °F), Min:35.4 °C (95.7 °F), Max:37.7 °C (99.9 °F)      Respiratory:  Vent Mode: Spont, PEEP/CPAP: 8, PIP: 23, MAP: 13 Respiration: (!) 22, Pulse Oximetry: 99 %     Chest Tube Drains:  1060 cc    Fluids:    Intake/Output Summary (Last 24 hours) at 2020 0920  Last data filed at 2020 0851  Gross per 24 hour   Intake 5152.34 ml   Output 3300 ml   Net 1852.34 ml     Admit weight: Weight: 96.6 kg (213 lb)  Current weight: Weight: 102.1 kg (225 lb 1.4 oz) (20 0600)    Labs:  Recent Labs     08/30/20  2100 20  0638 20  1205 20  1500 20  0500   WBC 8.3 5.9  --   --  21.6*   RBC 5.51 5.04  --   --  3.13*   HEMOGLOBIN 12.5* 11.2*  --  8.1* 7.1*   HEMATOCRIT 40.0* 38.0*  --  26.9* 23.9*   MCV 72.6* 75.4*  --   --  76.4*   MCH 22.7* 22.2*  --   --  22.7*   MCHC 31.3* 29.5*  --   --  29.7*   RDW 38.9 40.9  --   --  42.1   PLATELETCT 145* 119* 141*  --  148*   MPV 9.5 10.1  --   --  10.3     Recent Labs     20  2100   NEUTSPOLYS 56.90   LYMPHOCYTES 26.90   MONOCYTES 12.00   EOSINOPHILS 3.70   BASOPHILS 0.40     Recent Labs     20  2100 20  0638 20  1730 20  0020 20  0500   SODIUM 136 136  --   --  137   POTASSIUM 4.0 3.9 3.5* 4.6 4.6   CHLORIDE 98 99  --   --  104   CO2 25 29  --   --  21   GLUCOSE  219* 174*  --   --  123*   BUN 19 19  --   --  18   CREATININE 1.11 0.95  --   --  0.97   CALCIUM 9.3 8.8  --   --  7.6*     Recent Labs     08/30/20  2100 08/31/20  1205   APTT 28.7 31.7   INR 0.95 1.50*       Medications:  • insulin NPH  3 Units      And   • insulin lispro  2 Units     • insulin lispro  0-10 Units     • magnesium sulfate  1 g Stopped (09/01/20 0645)   • K+ Scale: Goal of 4.5  1 Each     • aspirin EC  81 mg     • Pharmacy Consult Request  1 Each     • acetaminophen  1,000 mg     • gabapentin  300 mg      Followed by   • [START ON 9/6/2020] gabapentin  100 mg     • senna-docusate  2 Tab      And   • polyethylene glycol/lytes  1 Packet      And   • [START ON 9/2/2020] magnesium hydroxide  30 mL     • mupirocin  1 Application     • metoprolol  12.5 mg      Followed by   • [START ON 9/2/2020] metoprolol  25 mg     • clopidogrel  75 mg     • rosuvastatin  20 mg          Ordered Medications:    ASA - Yes    Plavix - Yes    Post-operative Beta Blockers - Yes    Ace Inhibitor - No; contraindicated because of Hypotension    Statin - Yes          Central Line:  Type of line: [place]  Date of insertion: 8/31/2020  Date of removal: see nurses note    Exam:   Review of Systems   Constitutional: Negative.    HENT: Negative.    Eyes: Negative.    Respiratory: Negative.    Cardiovascular: Positive for leg swelling.   Gastrointestinal: Negative.    Genitourinary: Negative.    Musculoskeletal: Positive for myalgias.   Skin: Negative.    Neurological: Negative.    Psychiatric/Behavioral: Negative.        Physical Exam  Vitals signs and nursing note reviewed.   Constitutional:       Appearance: He is well-developed.   HENT:      Head: Normocephalic.   Eyes:      Pupils: Pupils are equal, round, and reactive to light.   Neck:      Musculoskeletal: Normal range of motion.   Cardiovascular:      Rate and Rhythm: Normal rate and regular rhythm.   Pulmonary:      Effort: Pulmonary effort is normal.   Abdominal:      General:  Bowel sounds are normal.      Palpations: Abdomen is soft.   Musculoskeletal: Normal range of motion.   Skin:     General: Skin is warm and dry.   Neurological:      Mental Status: He is alert and oriented to person, place, and time.         Quality Measures:   Quality-Core Measures   Reviewed items::  Radiology images reviewed, EKG reviewed, Labs reviewed and Medications reviewed  Magana catheter::  No Magana  Central line in place:  Need for access  DVT prophylaxis pharmacological::  Enoxaparin (Lovenox) and Warfarin (Coumadin)  DVT prophylaxis - mechanical:  Not indicated at this time, ambulatory  Ulcer Prophylaxis::  Yes    Assessment/Plan:  POD 1 Extubated, HDS, ginger drip, chest tube output moderate and negative for air leak.  Neuro grossly intact.  Hct low.  Plan:  Wean ginger as tolerated.  1 unit PRBC.  Leave in CT.  May dc swan.    Active Hospital Problems    Diagnosis   • Acute on chronic systolic heart failure (HCC) [I50.23]     Priority: High   • Severe aortic stenosis [I35.0]     Priority: High   • SVT (supraventricular tachycardia) ([facility]) [I47.1]     Priority: High   • CAD (coronary artery disease) [I25.10]     Priority: High   • S/P drug eluting coronary stent placement [Z95.5]     Priority: Medium     CHARBEL to LAD 1/8/20     • Essential hypertension [I10]     Priority: Medium   • Uncontrolled diabetes mellitus (HCC) [E11.65]     Priority: Medium   • Dyslipidemia [E78.5]     Priority: Medium          • Obesity (BMI 30-39.9) [E66.9]     Priority: Low

## 2020-09-02 LAB
ANION GAP SERPL CALC-SCNC: 14 MMOL/L (ref 7–16)
BUN SERPL-MCNC: 32 MG/DL (ref 8–22)
CALCIUM SERPL-MCNC: 8 MG/DL (ref 8.5–10.5)
CHLORIDE SERPL-SCNC: 101 MMOL/L (ref 96–112)
CO2 SERPL-SCNC: 21 MMOL/L (ref 20–33)
CREAT SERPL-MCNC: 1.33 MG/DL (ref 0.5–1.4)
ERYTHROCYTE [DISTWIDTH] IN BLOOD BY AUTOMATED COUNT: 45.1 FL (ref 35.9–50)
GLUCOSE BLD-MCNC: 233 MG/DL (ref 65–99)
GLUCOSE BLD-MCNC: 248 MG/DL (ref 65–99)
GLUCOSE BLD-MCNC: 297 MG/DL (ref 65–99)
GLUCOSE BLD-MCNC: 310 MG/DL (ref 65–99)
GLUCOSE BLD-MCNC: 333 MG/DL (ref 65–99)
GLUCOSE SERPL-MCNC: 306 MG/DL (ref 65–99)
HCT VFR BLD AUTO: 25.3 % (ref 42–52)
HGB BLD-MCNC: 7.4 G/DL (ref 14–18)
MAGNESIUM SERPL-MCNC: 2.6 MG/DL (ref 1.5–2.5)
MCH RBC QN AUTO: 23.2 PG (ref 27–33)
MCHC RBC AUTO-ENTMCNC: 29.2 G/DL (ref 33.7–35.3)
MCV RBC AUTO: 79.3 FL (ref 81.4–97.8)
PLATELET # BLD AUTO: 129 K/UL (ref 164–446)
PMV BLD AUTO: 10.7 FL (ref 9–12.9)
POTASSIUM SERPL-SCNC: 4.6 MMOL/L (ref 3.6–5.5)
POTASSIUM SERPL-SCNC: 4.9 MMOL/L (ref 3.6–5.5)
RBC # BLD AUTO: 3.19 M/UL (ref 4.7–6.1)
SODIUM SERPL-SCNC: 136 MMOL/L (ref 135–145)
WBC # BLD AUTO: 25.1 K/UL (ref 4.8–10.8)

## 2020-09-02 PROCEDURE — A9270 NON-COVERED ITEM OR SERVICE: HCPCS | Performed by: CLINICAL NURSE SPECIALIST

## 2020-09-02 PROCEDURE — 80048 BASIC METABOLIC PNL TOTAL CA: CPT

## 2020-09-02 PROCEDURE — 700102 HCHG RX REV CODE 250 W/ 637 OVERRIDE(OP): Performed by: NURSE PRACTITIONER

## 2020-09-02 PROCEDURE — 97166 OT EVAL MOD COMPLEX 45 MIN: CPT

## 2020-09-02 PROCEDURE — 700111 HCHG RX REV CODE 636 W/ 250 OVERRIDE (IP): Performed by: NURSE PRACTITIONER

## 2020-09-02 PROCEDURE — A9270 NON-COVERED ITEM OR SERVICE: HCPCS | Performed by: NURSE PRACTITIONER

## 2020-09-02 PROCEDURE — 700111 HCHG RX REV CODE 636 W/ 250 OVERRIDE (IP): Performed by: CLINICAL NURSE SPECIALIST

## 2020-09-02 PROCEDURE — 700102 HCHG RX REV CODE 250 W/ 637 OVERRIDE(OP): Performed by: CLINICAL NURSE SPECIALIST

## 2020-09-02 PROCEDURE — 99024 POSTOP FOLLOW-UP VISIT: CPT | Performed by: THORACIC SURGERY (CARDIOTHORACIC VASCULAR SURGERY)

## 2020-09-02 PROCEDURE — 84132 ASSAY OF SERUM POTASSIUM: CPT

## 2020-09-02 PROCEDURE — 770020 HCHG ROOM/CARE - TELE (206)

## 2020-09-02 PROCEDURE — 85027 COMPLETE CBC AUTOMATED: CPT

## 2020-09-02 PROCEDURE — 700111 HCHG RX REV CODE 636 W/ 250 OVERRIDE (IP)

## 2020-09-02 PROCEDURE — A9270 NON-COVERED ITEM OR SERVICE: HCPCS | Performed by: HOSPITALIST

## 2020-09-02 PROCEDURE — 83735 ASSAY OF MAGNESIUM: CPT

## 2020-09-02 PROCEDURE — 93005 ELECTROCARDIOGRAM TRACING: CPT | Performed by: CLINICAL NURSE SPECIALIST

## 2020-09-02 PROCEDURE — 82962 GLUCOSE BLOOD TEST: CPT | Mod: 91

## 2020-09-02 PROCEDURE — 97161 PT EVAL LOW COMPLEX 20 MIN: CPT

## 2020-09-02 PROCEDURE — 700102 HCHG RX REV CODE 250 W/ 637 OVERRIDE(OP): Performed by: HOSPITALIST

## 2020-09-02 PROCEDURE — 92610 EVALUATE SWALLOWING FUNCTION: CPT

## 2020-09-02 PROCEDURE — 700101 HCHG RX REV CODE 250: Performed by: NURSE PRACTITIONER

## 2020-09-02 RX ORDER — INSULIN GLARGINE 100 [IU]/ML
10 INJECTION, SOLUTION SUBCUTANEOUS
Status: DISCONTINUED | OUTPATIENT
Start: 2020-09-02 | End: 2020-09-03

## 2020-09-02 RX ORDER — POTASSIUM CHLORIDE 20 MEQ/1
20 TABLET, EXTENDED RELEASE ORAL 2 TIMES DAILY
Status: DISCONTINUED | OUTPATIENT
Start: 2020-09-02 | End: 2020-09-08 | Stop reason: HOSPADM

## 2020-09-02 RX ORDER — FUROSEMIDE 10 MG/ML
40 INJECTION INTRAMUSCULAR; INTRAVENOUS 2 TIMES DAILY
Status: DISCONTINUED | OUTPATIENT
Start: 2020-09-02 | End: 2020-09-08 | Stop reason: HOSPADM

## 2020-09-02 RX ORDER — AMIODARONE HYDROCHLORIDE 200 MG/1
400 TABLET ORAL 2 TIMES DAILY
Status: DISCONTINUED | OUTPATIENT
Start: 2020-09-03 | End: 2020-09-07

## 2020-09-02 RX ADMIN — INSULIN HUMAN 3 UNITS: 100 INJECTION, SUSPENSION SUBCUTANEOUS at 08:41

## 2020-09-02 RX ADMIN — DOCUSATE SODIUM 50 MG AND SENNOSIDES 8.6 MG 1 TABLET: 8.6; 5 TABLET, FILM COATED ORAL at 17:34

## 2020-09-02 RX ADMIN — POTASSIUM CHLORIDE 20 MEQ: 1500 TABLET, EXTENDED RELEASE ORAL at 10:15

## 2020-09-02 RX ADMIN — FUROSEMIDE 40 MG: 10 INJECTION, SOLUTION INTRAMUSCULAR; INTRAVENOUS at 17:33

## 2020-09-02 RX ADMIN — POTASSIUM CHLORIDE 20 MEQ: 1500 TABLET, EXTENDED RELEASE ORAL at 17:33

## 2020-09-02 RX ADMIN — ACETAMINOPHEN 1000 MG: 500 TABLET ORAL at 04:12

## 2020-09-02 RX ADMIN — FUROSEMIDE 40 MG: 10 INJECTION, SOLUTION INTRAMUSCULAR; INTRAVENOUS at 10:15

## 2020-09-02 RX ADMIN — DOCUSATE SODIUM 50 MG AND SENNOSIDES 8.6 MG 2 TABLET: 8.6; 5 TABLET, FILM COATED ORAL at 04:12

## 2020-09-02 RX ADMIN — MAGNESIUM SULFATE 1 G: 1 INJECTION INTRAVENOUS at 04:12

## 2020-09-02 RX ADMIN — AMIODARONE HYDROCHLORIDE 150 MG: 1.5 INJECTION, SOLUTION INTRAVENOUS at 20:30

## 2020-09-02 RX ADMIN — ROSUVASTATIN CALCIUM 20 MG: 20 TABLET, FILM COATED ORAL at 17:32

## 2020-09-02 RX ADMIN — MAGNESIUM HYDROXIDE 30 ML: 2400 SUSPENSION ORAL at 10:15

## 2020-09-02 RX ADMIN — POLYETHYLENE GLYCOL 3350 1 PACKET: 17 POWDER, FOR SOLUTION ORAL at 04:14

## 2020-09-02 RX ADMIN — ACETAMINOPHEN 1000 MG: 500 TABLET ORAL at 00:00

## 2020-09-02 RX ADMIN — METOPROLOL TARTRATE 25 MG: 25 TABLET, FILM COATED ORAL at 17:33

## 2020-09-02 RX ADMIN — AMIODARONE HYDROCHLORIDE 1 MG/MIN: 1.8 INJECTION, SOLUTION INTRAVENOUS at 20:40

## 2020-09-02 RX ADMIN — ACETAMINOPHEN 1000 MG: 500 TABLET ORAL at 17:32

## 2020-09-02 RX ADMIN — GABAPENTIN 300 MG: 300 CAPSULE ORAL at 17:34

## 2020-09-02 RX ADMIN — METOPROLOL TARTRATE 25 MG: 25 TABLET, FILM COATED ORAL at 04:13

## 2020-09-02 RX ADMIN — GABAPENTIN 300 MG: 300 CAPSULE ORAL at 04:13

## 2020-09-02 RX ADMIN — ASPIRIN 81 MG: 81 TABLET, COATED ORAL at 04:13

## 2020-09-02 RX ADMIN — OXYCODONE HYDROCHLORIDE 10 MG: 10 TABLET ORAL at 04:13

## 2020-09-02 RX ADMIN — CLOPIDOGREL BISULFATE 75 MG: 75 TABLET ORAL at 04:13

## 2020-09-02 RX ADMIN — ACETAMINOPHEN 1000 MG: 500 TABLET ORAL at 12:32

## 2020-09-02 RX ADMIN — INSULIN GLARGINE 10 UNITS: 100 INJECTION, SOLUTION SUBCUTANEOUS at 12:36

## 2020-09-02 RX ADMIN — MUPIROCIN 1 APPLICATION: 20 OINTMENT TOPICAL at 17:56

## 2020-09-02 ASSESSMENT — ENCOUNTER SYMPTOMS
PSYCHIATRIC NEGATIVE: 1
GASTROINTESTINAL NEGATIVE: 1
CONSTITUTIONAL NEGATIVE: 1
EYES NEGATIVE: 1
RESPIRATORY NEGATIVE: 1
NEUROLOGICAL NEGATIVE: 1
MYALGIAS: 1

## 2020-09-02 ASSESSMENT — COGNITIVE AND FUNCTIONAL STATUS - GENERAL
DRESSING REGULAR UPPER BODY CLOTHING: A LITTLE
EATING MEALS: A LITTLE
TOILETING: A LOT
MOVING FROM LYING ON BACK TO SITTING ON SIDE OF FLAT BED: UNABLE
DAILY ACTIVITIY SCORE: 15
WALKING IN HOSPITAL ROOM: A LITTLE
HELP NEEDED FOR BATHING: A LOT
MOBILITY SCORE: 11
SUGGESTED CMS G CODE MODIFIER MOBILITY: CL
CLIMB 3 TO 5 STEPS WITH RAILING: A LOT
MOVING TO AND FROM BED TO CHAIR: UNABLE
DRESSING REGULAR LOWER BODY CLOTHING: A LOT
TURNING FROM BACK TO SIDE WHILE IN FLAT BAD: UNABLE
PERSONAL GROOMING: A LITTLE
SUGGESTED CMS G CODE MODIFIER DAILY ACTIVITY: CK
STANDING UP FROM CHAIR USING ARMS: A LITTLE

## 2020-09-02 ASSESSMENT — PAIN DESCRIPTION - PAIN TYPE
TYPE: SURGICAL PAIN
TYPE: SURGICAL PAIN

## 2020-09-02 ASSESSMENT — GAIT ASSESSMENTS: GAIT LEVEL OF ASSIST: UNABLE TO PARTICIPATE

## 2020-09-02 ASSESSMENT — ACTIVITIES OF DAILY LIVING (ADL): TOILETING: INDEPENDENT

## 2020-09-02 ASSESSMENT — FIBROSIS 4 INDEX: FIB4 SCORE: 2.49

## 2020-09-02 NOTE — CARE PLAN
Problem: Post op day 2 CABG/Heart Valve Replacement  Goal: Optimal care of the post op CABG/heart valve replacement post op day 2  Intervention: FSBS: when 2 consecutive BS < 130 after post op day 2, discontinue FSBS unless patient is insulin dependent diabetic  Note: Insulin SQ still needed for hyperglycemia   Intervention: Daily Weights  Note: Completed - weight up   Intervention: Up in chair for all meals  Note: Up in chair in AM  Intervention: Ambulate, increasing the distance each time x 3 and before bed  Note: completed  Intervention: Stand at sink and wash up with assistance.  Clean incisions twice daily with soap and water.  Note: Completed   Intervention: IS q 1 hour while awake and record best IS volume  Note: IS pulls 1200  Intervention: Consider pacer wire removal by MD  Note: V wires remain - poss d/c today   Intervention: Consider removal of strange and chest tube if not already done  Note: Strange and CT should be d/c today

## 2020-09-02 NOTE — CARE PLAN
Problem: Post Op Day 1 CABG/Heart Valve Replacement  Goal: Optimal care of the post op CABG/heart valve replacement Post Op Day 1  Intervention: Up in chair for all meals  Note: Pt sat up to chair from 0600 to 1400.  Intervention: Ambulate in am if stable. First ambulation is 25 feet. Repeat x 3 as tolerated.  Ambulate again before bed.  Note: Pt ambulated 25ft x 2 person assist with FWW  Intervention: Discontinue strange catheter unless documented reason for continuation  Note: Strange in place for I/Os, will reassess tomorrow.   Intervention: Remove original surgical dressing after 24 hrs, leave open to air unless otherwise specified by physician  Note: Midline sternal incision ARMANDO. No drainage.   Intervention: Consider chest tube removal by MD  Note: CT remain in place, will reassess tomorrow. Output today 110  Intervention: IS q 1 hour while awake and record best IS volume  Note: IS 1000  Intervention: Graduated elastic stockings  Note: DESMOND hose in place

## 2020-09-02 NOTE — PROGRESS NOTES
POD #2 after AVR, CABG x1.  Critical care will sign off at this time.  Please call with any critical care needs.

## 2020-09-02 NOTE — PROGRESS NOTES
Cardiovascular Surgery Progress Note    Name: Sharad Millan  MRN: [id number]  : 1955  Admit Date: 2020  5:09 PM  Procedure:  Procedure(s) and Anesthesia Type:     * CABG, WITH ENDOSCOPIC VEIN PROCUREMENT X1 - General     * REPLACEMENT, AORTIC VALVE - General     * ECHOCARDIOGRAM, TRANSESOPHAGEAL - General  2 Day Post-Op    Vitals:  Patient Vitals for the past 8 hrs:   Monitored Temp 2 SpO2 O2 Delivery Device O2 (LPM) Pulse Resp BP   20 0906 -- -- Silicone Nasal Cannula 2 -- -- --   20 0800 -- 99 % Silicone Nasal Cannula 2 86 19 114/48   20 0500 -- 97 % Silicone Nasal Cannula 2 -- -- --   20 0400 38.2 °C (100.8 °F) 93 % Silicone Nasal Cannula 3 99 (!) 25 129/45     Temp (24hrs), Av.5 °C (99.5 °F), Min:37.5 °C (99.5 °F), Max:37.5 °C (99.5 °F)      Respiratory:    Respiration: 19, Pulse Oximetry: 99 %     Chest Tube Drains:  1060 cc    Fluids:    Intake/Output Summary (Last 24 hours) at 2020 0954  Last data filed at 2020 0400  Gross per 24 hour   Intake 116.02 ml   Output 935 ml   Net -818.98 ml     Admit weight: Weight: 96.6 kg (213 lb)  Current weight: Weight: 102.3 kg (225 lb 8.5 oz) (20 0000)    Labs:  Recent Labs     20  0500 20  1330 20  0400   WBC 21.6* 20.1* 25.1*   RBC 3.13* 3.27* 3.19*   HEMOGLOBIN 7.1* 7.7* 7.4*   HEMATOCRIT 23.9* 25.6* 25.3*   MCV 76.4* 78.3* 79.3*   MCH 22.7* 23.5* 23.2*   MCHC 29.7* 30.1* 29.2*   RDW 42.1 43.8 45.1   PLATELETCT 148* 116* 129*   MPV 10.3 11.2 10.7     Recent Labs     20  2100 20  1330   NEUTSPOLYS 56.90 90.40*   LYMPHOCYTES 26.90 2.60*   MONOCYTES 12.00 7.00   EOSINOPHILS 3.70 0.00   BASOPHILS 0.40 0.00   RBCMORPHOLO  --  Present     Recent Labs     20  0638  20  0020 20  0500 20  0400   SODIUM 136  --   --  137 136   POTASSIUM 3.9   < > 4.6 4.6 4.9   CHLORIDE 99  --   --  104 101   CO2 29  --   --  21 21   GLUCOSE 174*  --   --  123* 306*   BUN 19  --   --  18  32*   CREATININE 0.95  --   --  0.97 1.33   CALCIUM 8.8  --   --  7.6* 8.0*    < > = values in this interval not displayed.     Recent Labs     08/30/20  2100 08/31/20  1205   APTT 28.7 31.7   INR 0.95 1.50*       Medications:  • insulin glargine  10 Units     • insulin lispro  2 Units     • insulin lispro  0-10 Units     • aspirin EC  81 mg     • Pharmacy Consult Request  1 Each     • acetaminophen  1,000 mg     • gabapentin  300 mg      Followed by   • [START ON 9/6/2020] gabapentin  100 mg     • senna-docusate  2 Tab      And   • polyethylene glycol/lytes  1 Packet      And   • magnesium hydroxide  30 mL     • mupirocin  1 Application     • metoprolol  25 mg     • clopidogrel  75 mg     • rosuvastatin  20 mg          Ordered Medications:    ASA - Yes    Plavix - Yes    Post-operative Beta Blockers - Yes    Ace Inhibitor - No; contraindicated because of Hypotension    Statin - Yes          Central Line:  Type of line: [place]  Date of insertion: 8/31/2020  Date of removal: see nurses note    Exam:   Review of Systems   Constitutional: Negative.    HENT: Negative.    Eyes: Negative.    Respiratory: Negative.    Cardiovascular: Positive for leg swelling.   Gastrointestinal: Negative.    Genitourinary: Negative.    Musculoskeletal: Positive for myalgias.   Skin: Negative.    Neurological: Negative.    Psychiatric/Behavioral: Negative.        Physical Exam  Vitals signs and nursing note reviewed.   Constitutional:       Appearance: He is well-developed.   HENT:      Head: Normocephalic.   Eyes:      Pupils: Pupils are equal, round, and reactive to light.   Neck:      Musculoskeletal: Normal range of motion.   Cardiovascular:      Rate and Rhythm: Normal rate and regular rhythm.   Pulmonary:      Effort: Pulmonary effort is normal.   Abdominal:      General: Bowel sounds are normal.      Palpations: Abdomen is soft.   Musculoskeletal: Normal range of motion.   Skin:     General: Skin is warm and dry.   Neurological:       Mental Status: He is alert and oriented to person, place, and time.         Quality Measures:   Quality-Core Measures   Reviewed items::  Radiology images reviewed, EKG reviewed, Labs reviewed and Medications reviewed  Strange catheter::  No Strange  Central line in place:  Need for access  DVT prophylaxis pharmacological::  Enoxaparin (Lovenox) and Warfarin (Coumadin)  DVT prophylaxis - mechanical:  Not indicated at this time, ambulatory  Ulcer Prophylaxis::  Yes    Assessment/Plan:  POD 1 Extubated, HDS, ginger drip, chest tube output moderate and negative for air leak.  Neuro grossly intact.  Hct low.  Plan:  Wean ginger as tolerated.  1 unit PRBC.  Leave in CT.  May dc swan.  POD 2  HDS, SR, neuro intact, wounds CDI, abdomen S/NT, fluid balance positive, wt up.  Chest tube output minimal overnight.  Glucose high.  Plan:  Dc chest tubes, start lantus, dc strange.  Diurese.  IS/ambulate. CPM.    Active Hospital Problems    Diagnosis   • Acute on chronic systolic heart failure (HCC) [I50.23]     Priority: High   • Severe aortic stenosis [I35.0]     Priority: High   • SVT (supraventricular tachycardia) ([facility]) [I47.1]     Priority: High   • CAD (coronary artery disease) [I25.10]     Priority: High   • S/P drug eluting coronary stent placement [Z95.5]     Priority: Medium     CHARBEL to LAD 1/8/20     • Essential hypertension [I10]     Priority: Medium   • Uncontrolled diabetes mellitus ([facility]) [E11.65]     Priority: Medium   • Dyslipidemia [E78.5]     Priority: Medium          • Obesity (BMI 30-39.9) [E66.9]     Priority: Low

## 2020-09-02 NOTE — PROGRESS NOTES
Cardiac Surgery RN Navigator Daily Rounding Note  Procedure Performed: Procedure(s) (LRB):  CABG, WITH ENDOSCOPIC VEIN PROCUREMENT X1 (N/A)  REPLACEMENT, AORTIC VALVE (N/A)  ECHOCARDIOGRAM, TRANSESOPHAGEAL (N/A)     By  Dr. Casas  2 Days Post-Op    Pertinent Overnight Events:    Weight unchanged; marginal UOP overnight, H&H drifting back down again post RBC transfusion, creatinine WNL but elevated from yesterday    Glucose 306 this morning (on sub q insulin coverage), white count elevated, temp climbing; was 100.8 F    Pertinent neuro findings/needs: A&O, oxy 10 mg for pain; reports no nausea    Cardiac/hemodynamics:  Temp (24hrs), Av.6 °C (99.7 °F), Min:37.5 °C (99.5 °F), Max:37.7 °C (99.9 °F)    Heart rhythm: SR to ST  Temp:  [37.5 °C (99.5 °F)-37.7 °C (99.9 °F)] 37.5 °C (99.5 °F)  Pulse:  [88-99] 99  Resp:  [18-26] 25  BP: ()/(38-58) 129/45  SpO2:  [92 %-99 %] 97 %  CVP:  [8 MM HG] 8 MM HG  PCWP:  [6 MM HG] 6 MM HG  CO:  [6.1] 6.1  CI:  [3.2] 3.2     12-hour chest tube output: 150 cc    /Weights:  Admit weight: Weight: 96.6 kg (213 lb)  Current Weight: Weight: 102.3 kg (225 lb 8.5 oz) (20 0000)  Weight change: 0.2 kg (7.1 oz)    Intake/Output Summary (Last 24 hours) at 2020 0729  Last data filed at 2020 0400  Gross per 24 hour   Intake 481.4 ml   Output 985 ml   Net -503.6 ml       Adequate urine output: 425 cc yes    Respiratory:        Pertinent respiratory findings/needs: 2 L NC    GI findings/needs: WNL    Labs:  Recent Labs     20  0500 20  1330 20  0400   WBC 21.6* 20.1* 25.1*   RBC 3.13* 3.27* 3.19*   HEMOGLOBIN 7.1* 7.7* 7.4*   HEMATOCRIT 23.9* 25.6* 25.3*   MCV 76.4* 78.3* 79.3*   MCH 22.7* 23.5* 23.2*   MCHC 29.7* 30.1* 29.2*   RDW 42.1 43.8 45.1   PLATELETCT 148* 116* 129*   MPV 10.3 11.2 10.7     Recent Labs     20  0638  20  0020 20  0500 20  0400   SODIUM 136  --   --  137 136   POTASSIUM 3.9   < > 4.6 4.6 4.9   CHLORIDE 99  --    --  104 101   CO2 29  --   --  21 21   GLUCOSE 174*  --   --  123* 306*   BUN 19  --   --  18 32*   CREATININE 0.95  --   --  0.97 1.33   CALCIUM 8.8  --   --  7.6* 8.0*    < > = values in this interval not displayed.     INR:   Recent Labs     08/30/20  2100 08/31/20  1205   INR 0.95 1.50*       Required Cardiac medications review:  ASA:  Yes  Plavix: Yes  BB: Yes  ACE/ARB: No; contraindicated because of Other assess after diuresis initiated?  Statin: yes  Diuretic: no    LDA's necessity reviewed: YES      Thoughts and considerations for team rounding:    Start lasix? BP's could tolerate    Consider adding ACE/ARB for heart failure requirements if his BP tolerates diuresis today    Add H&H check to later today as he was drifting down again?    Discharge plan:    Post op appointments in place

## 2020-09-02 NOTE — THERAPY
Speech Language Pathology   Initial Assessment     Patient Name: Sharad Millan  AGE:  64 y.o., SEX:  male  Medical Record #: [id number]  Today's Date: 9/2/2020     Precautions  Precautions: Fall Risk, Chest Tube  Comments: Chest tube R    Assessment    Patient is 64 y.o. male with a diagnosis of SOB, supraventricular tachycardia, bilateral LE edema, uncontrolled DM.  Additional factors influencing patient status/progress: acute congestive heart failure, MI, aortic valve replacement 8/31, chest 9/1; pulmonary edema and/or infiltrates stable, +cardiomegaly.  Chest tube in place.  Patient seen and is AAOx4 with R chest tube in place.  Pt is not making attempts to move extremities against gravity but is able to move x 4 with request.  Not attempting self feeding at this time and reports distaste for food items presented with full 'regular' textured tray.  Oral motor exam is functional for speech and swallow, and voice is also functional at the conversational level.  Bites and sips of puree, thick and thin liquids (via spoon, cup, straw), soft solids, dry solids were tolerated without s/s of aspiration, with timely swallow and no s/s of aspiration.  Pt presenting with mild oral stage dysphagia; declining/unable to aide in self feeding and with poor endurance/appetite.  Limited SLP follow-up required.    Plan    Recommend ADA regular diet; pt okay for preferred foods from home if that would improve oral intake.  Assisted feeding currently needed due to weak hand to mouth.  Recommend Speech Therapy 2 times per week until therapy goals are met for the following treatments:  Dysphagia Training and Patient / Family / Caregiver Education.    Discharge Recommendations: (P) Anticipate that the patient will have no further speech therapy needs after discharge from the hospital     Objective       09/02/20 0906   Prior Level Of Function   Comments per H&P, pt with baseline 'memory issues'   Oral Motor Eval    Is Patient Able to  Complete Oral Motor Eval Yes, Within Normal Limits   Laryngeal Function   Max Phonation Time (Seconds) fxnl   Oral Food Presentation   Regular (7) Minimal   Self Feeding Needs Total Assistance   Dysphagia Strategies / Recommendations   Strategies / Interventions Recommended (Yes / No) Yes   Compensatory Strategies Assistance Needed for Meal Tray Set-up;Head of Bed 90 Degrees During Eating / Drinking;Strict 1:1 Feeding;Liquids Via Straw;Liquids Via Cup;Reduce Bolus Size to 1 Teaspoon;Alternate Solids / Liquids  (weak hand-to-mouth, did not attempt self-fdg saying 'can't')   Diet / Liquid Recommendation Regular (7)   Medication Administration  Whole with Liquid Wash   Therapy Interventions Dysphagia Therapy By Speech Language Pathologist  (oral stage dysphagia, impaired self-feeding)   Dysphagia Rating   Nutritional Liquid Intake Rating Scale Non thickened beverages   Nutritional Food Intake Rating Scale Total oral diet with no restrictions  (may benefit from soft food choices to aide endurance)   Patient / Family Goals   Patient / Family Goal #1 'to rest'   Goal #1 Outcome Progressing as expected   Short Term Goals   Short Term Goal # 1 Pt will consume a regular textured diet without s/s of dysphagia with assisted feeding due to weak hand-to-mouth    Short Term Goal # 2 Pt will independently consume a regular diet without s/s of dysphagia   Short Term Goal # 3 Pt will eat/drink 75% or better of provided meals/snacks with min cues   Problem List   Problem List Dysphagia  (oral stage; not self feeding or eating enough to meet needs)   Interdisciplinary Plan of Care Collaboration   Collaboration Comments limited f/u due to poor endurance, reduced self-fdg, poor appetite   Session Information   Date / Session Number 1, 9/2/20 (1/2-9/8 CW)   Priority 2  (2x.Post aortic valve replacement.Reg diet;ck self fdg/intake)

## 2020-09-02 NOTE — THERAPY
Physical Therapy   Initial Evaluation     Patient Name: Sharad Millan  Age:  64 y.o., Sex:  male  Medical Record #: [id number]  Today's Date: 9/2/2020     Precautions: (P) Fall Risk, Swallow Precautions ( See Comments)    Assessment  Patient is 64 y.o. male with a diagnosis of SVT, pt is POD 2 CABG x1, AVR.  Pt somewhat lethargic this afternoon, reports not getting good sleep the previous 2 nights due to bed being broken. Pt required cues to keep eyes open during therapy session and required Min - Mod A to complete mobility. Pt able to stand well at EOB with Min A and FWW, tolerated standing marching x10 reps but noted pt to be more pale and fatiguing. + orthostatics noted between sitting and standing BP. Further ambulation or transfer to chair deferred at this time. PT will continue to follow while in house  Plan    Recommend Physical Therapy 3 times per week until therapy goals are met for the following treatments:  Bed Mobility, Equipment, Gait Training, Neuro Re-Education / Balance, Self Care/Home Evaluation, Stair Training, Therapeutic Activities and Therapeutic Exercises    DC Equipment Recommendations: (P) Unable to determine at this time  Discharge Recommendations: (P) (TBD, dependent on progress with continued mobility)         09/02/20 1459   Precautions   Precautions Fall Risk;Swallow Precautions ( See Comments)   Vitals   O2 Delivery Device Silicone Nasal Cannula   Pain 0 - 10 Group   Therapist Pain Assessment Nurse Notified  (post op pain, not rated)   Prior Living Situation   Prior Services Home-Independent   Housing / Facility 1 Story House   Steps Into Home 0   Steps In Home 0   Equipment Owned None   Lives with - Patient's Self Care Capacity Adult Children;Spouse  (Son and his family also live with them)   Comments pt reports he was in the process of building a new family home prior to this event   Prior Level of Functional Mobility   Bed Mobility Independent   Transfer Status Independent    Ambulation Independent   Distance Ambulation (Feet)   (community)   Assistive Devices Used None   Stairs Independent   Cognition    Level of Consciousness Lethargic   Comments agreeable to work with PT, cues to keep eyes open during session. unable to provide much OHS education   Active ROM Lower Body    Active ROM Lower Body  WDL   Strength Lower Body   Lower Body Strength  X   Comments generalized weakness B LE   Balance Assessment   Sitting Balance (Static) Good   Sitting Balance (Dynamic) Fair +   Standing Balance (Static) Fair -   Standing Balance (Dynamic) Poor +   Weight Shift Sitting Fair   Weight Shift Standing Fair   Comments w/ FWW   Gait Analysis   Gait Level Of Assist Unable to Participate   Comments performed standing marching at EOB, limited by BP and pt's lethargy   Bed Mobility    Supine to Sit Moderate Assist   Sit to Supine Moderate Assist  (to B LE)   Scooting Minimal Assist   Rolling Moderate Assist to Rt.   Comments utilized log roll   Functional Mobility   Sit to Stand Minimal Assist   Bed, Chair, Wheelchair Transfer Unable to Participate   Patient / Family Goals    Patient / Family Goal #1 to return home   Short Term Goals    Short Term Goal # 1 pt will perform supine <> sit with maintenance of sternal precautions with Min A in 6 visits   Short Term Goal # 2 pt will perform sit <> stand and functional transfers with LRAD and SPV to improve functional independence in 6 visits   Short Term Goal # 3 pt will ambulate > 200 ft with LRAD and SPV to access community in 6 visits   Anticipated Discharge Equipment and Recommendations   DC Equipment Recommendations Unable to determine at this time   Discharge Recommendations   (TBD, dependent on progress with continued mobility)

## 2020-09-02 NOTE — PROGRESS NOTES
Patient has met the following  - patient has been out of bed, dangled or turned from side to side    - Chest tube drainage should be <30cc/hour unless otherwise approved by physician.   - Place chest drainage unit to water-seal:  absence of fluctuations in water seal-chamber or presence of air leaks.       Verified order with MD/APRN. Explained the procedure to the patient. Patient in semi fowlers position. CT clamped, sutures removed, CT removed upon expiration. Dressing applied, no complaint of pain or excessive drainage. Primary RN to order PRN chest x-ray if patient develops dyspnea or hypoxia.

## 2020-09-02 NOTE — THERAPY
Occupational Therapy   Initial Evaluation     Patient Name: Sharad Millan  Age:  64 y.o., Sex:  male  Medical Record #: [id number]  Today's Date: 9/2/2020     Precautions  Precautions: Fall Risk, Sternal and cardiac precautions, Swallow Precautions ( See Comments)  Comments:     Assessment  Patient is 64 y.o. presents to skilled OT services following CABG x1, AVR. Pt currently demonstrates impaired activity tolerance, c/o dizziness, pt diastolic pressure in low 40's throughout the session, impaired balance, impaired knowledge of post op precautions and ADL modifications. Pt currently required mod a for ADLs and min a for STS. Pt will benefit from acute skilled OT services while in house and post acute placement recommended at this time.   Plan    Recommend Occupational Therapy 4 times per week until therapy goals are met for the following treatments:  Adaptive Equipment, Neuro Re-Education / Balance, Self Care/Activities of Daily Living, Therapeutic Activities and Therapeutic Exercises.    DC Equipment Recommendations: Unable to determine at this time  Discharge Recommendations: Recommend post-acute placement for additional occupational therapy services prior to discharge home        Objective       09/02/20 1434   Prior Living Situation   Prior Services None;Home-Independent   Housing / Facility 1 Story House   Steps Into Home 0   Bathroom Set up Walk In Shower;Grab Bars   Equipment Owned Grab Bar(s) In Tub / Shower   Lives with - Patient's Self Care Capacity Spouse;Adult Children  (son and his family)   Comments I with ADLs/IADLs baseline. Reports spouse can assist as needed with IADLs and will also have support from son and his family   Prior Level of ADL Function   Self Feeding Independent   Grooming / Hygiene Independent   Bathing Independent   Dressing Independent   Toileting Independent   Prior Level of IADL Function   Medication Management Independent   Laundry Independent   Kitchen Mobility Independent    Finances Independent   Home Management Independent   Shopping Independent   Prior Level Of Mobility Independent Without Device in Community   Driving / Transportation Driving Independent   Cognition    Cognition / Consciousness X   Level of Consciousness Alert   Attention Impaired   Comments A&O, but decreased attention, slightly lethargic, following 1-step commands   Active ROM Upper Body   Active ROM Upper Body  X   Comments RUE ~90-95 degree of flexion(basline), LUE functional but limited by weakness and mild edema    Balance Assessment   Sitting Balance (Static) Fair   Sitting Balance (Dynamic) Fair   Standing Balance (Static) Fair -   Standing Balance (Dynamic) Poor +   Weight Shift Sitting Fair   Weight Shift Standing Fair   Comments w/FWW, limted by c/o dizziness and diastolic pressure in 40's   Bed Mobility    Supine to Sit Moderate Assist  (to bring trunk upright)   Sit to Supine Moderate Assist   Scooting Minimal Assist  (seated eob)   Rolling Moderate Assist to Rt.   Comments slightly raised hob and cues to facilitate log rolling   ADL Assessment   Eating Minimal Assist   Grooming Moderate Assist;Minimal Assist;Seated   Bathing   (NT)   Upper Body Dressing Minimal Assist   Lower Body Dressing Maximal Assist  (socks)   Toileting   (NT)   Functional Mobility   Sit to Stand Minimal Assist   Bed, Chair, Wheelchair Transfer Unable to Participate   Toilet Transfers Unable to Participate   Comments w/FWW   Short Term Goals   Short Term Goal # 1 Pt will perform LB dressing with min a using AE as trained/needed   Short Term Goal # 2 Pt will perform functional t/f's with min a   Short Term Goal # 3 Pt will perform toileting task with supervision   Short Term Goal # 4 Pt will tolerate oob ADL session [id number] with stable BP   Anticipated Discharge Equipment and Recommendations   DC Equipment Recommendations Unable to determine at this time

## 2020-09-02 NOTE — PROGRESS NOTES
12 hr chart check.     Monitor summary: NSR 80s - 110s --> 0.20 / 0.08 / 0.32    Pt vss overnight off pressors. Pain managed w/ 10 oxy and comfortable. UO improving. CT output ok - serosang. Needs addressed and questions answered. No complaints overnight.

## 2020-09-02 NOTE — HEART FAILURE PROGRAM
Post op day 2 CABG & AVR. The below appointment is appropriate should patient discharge as would be expected.

## 2020-09-03 ENCOUNTER — TELEPHONE (OUTPATIENT)
Dept: CARDIOTHORACIC SURGERY | Facility: MEDICAL CENTER | Age: 65
End: 2020-09-03

## 2020-09-03 ENCOUNTER — APPOINTMENT (OUTPATIENT)
Dept: RADIOLOGY | Facility: MEDICAL CENTER | Age: 65
DRG: 219 | End: 2020-09-03
Attending: CLINICAL NURSE SPECIALIST
Payer: MEDICARE

## 2020-09-03 LAB
ABO GROUP BLD: NORMAL
ANION GAP SERPL CALC-SCNC: 11 MMOL/L (ref 7–16)
BARCODED ABORH UBTYP: 6200
BARCODED ABORH UBTYP: 6200
BARCODED PRD CODE UBPRD: NORMAL
BARCODED PRD CODE UBPRD: NORMAL
BARCODED UNIT NUM UBUNT: NORMAL
BARCODED UNIT NUM UBUNT: NORMAL
BLD GP AB SCN SERPL QL: NORMAL
BUN SERPL-MCNC: 42 MG/DL (ref 8–22)
CALCIUM SERPL-MCNC: 7.8 MG/DL (ref 8.5–10.5)
CHLORIDE SERPL-SCNC: 97 MMOL/L (ref 96–112)
CO2 SERPL-SCNC: 23 MMOL/L (ref 20–33)
COMPONENT R 8504R: NORMAL
COMPONENT R 8504R: NORMAL
CREAT SERPL-MCNC: 1.29 MG/DL (ref 0.5–1.4)
EKG IMPRESSION: NORMAL
ERYTHROCYTE [DISTWIDTH] IN BLOOD BY AUTOMATED COUNT: 45.7 FL (ref 35.9–50)
ERYTHROCYTE [DISTWIDTH] IN BLOOD BY AUTOMATED COUNT: 45.9 FL (ref 35.9–50)
GLUCOSE BLD-MCNC: 235 MG/DL (ref 65–99)
GLUCOSE BLD-MCNC: 309 MG/DL (ref 65–99)
GLUCOSE BLD-MCNC: 317 MG/DL (ref 65–99)
GLUCOSE BLD-MCNC: 330 MG/DL (ref 65–99)
GLUCOSE BLD-MCNC: 337 MG/DL (ref 65–99)
GLUCOSE SERPL-MCNC: 388 MG/DL (ref 65–99)
HCT VFR BLD AUTO: 22.4 % (ref 42–52)
HCT VFR BLD AUTO: 23 % (ref 42–52)
HCT VFR BLD AUTO: 25.1 % (ref 42–52)
HGB BLD-MCNC: 6.6 G/DL (ref 14–18)
HGB BLD-MCNC: 7 G/DL (ref 14–18)
HGB BLD-MCNC: 7.8 G/DL (ref 14–18)
MCH RBC QN AUTO: 23.5 PG (ref 27–33)
MCH RBC QN AUTO: 24.1 PG (ref 27–33)
MCHC RBC AUTO-ENTMCNC: 29.5 G/DL (ref 33.7–35.3)
MCHC RBC AUTO-ENTMCNC: 30.4 G/DL (ref 33.7–35.3)
MCV RBC AUTO: 79.3 FL (ref 81.4–97.8)
MCV RBC AUTO: 79.7 FL (ref 81.4–97.8)
PLATELET # BLD AUTO: 116 K/UL (ref 164–446)
PLATELET # BLD AUTO: 151 K/UL (ref 164–446)
PMV BLD AUTO: 10.3 FL (ref 9–12.9)
PMV BLD AUTO: 11.1 FL (ref 9–12.9)
POTASSIUM SERPL-SCNC: 4.8 MMOL/L (ref 3.6–5.5)
PRODUCT TYPE UPROD: NORMAL
PRODUCT TYPE UPROD: NORMAL
RBC # BLD AUTO: 2.81 M/UL (ref 4.7–6.1)
RBC # BLD AUTO: 2.9 M/UL (ref 4.7–6.1)
RH BLD: NORMAL
SODIUM SERPL-SCNC: 131 MMOL/L (ref 135–145)
UNIT STATUS USTAT: NORMAL
UNIT STATUS USTAT: NORMAL
WBC # BLD AUTO: 18.6 K/UL (ref 4.8–10.8)
WBC # BLD AUTO: 21.8 K/UL (ref 4.8–10.8)

## 2020-09-03 PROCEDURE — 700102 HCHG RX REV CODE 250 W/ 637 OVERRIDE(OP): Performed by: NURSE PRACTITIONER

## 2020-09-03 PROCEDURE — 82962 GLUCOSE BLOOD TEST: CPT | Mod: 91

## 2020-09-03 PROCEDURE — 85018 HEMOGLOBIN: CPT

## 2020-09-03 PROCEDURE — P9016 RBC LEUKOCYTES REDUCED: HCPCS

## 2020-09-03 PROCEDURE — 93010 ELECTROCARDIOGRAM REPORT: CPT | Performed by: INTERNAL MEDICINE

## 2020-09-03 PROCEDURE — 36430 TRANSFUSION BLD/BLD COMPNT: CPT

## 2020-09-03 PROCEDURE — 86900 BLOOD TYPING SEROLOGIC ABO: CPT

## 2020-09-03 PROCEDURE — 700102 HCHG RX REV CODE 250 W/ 637 OVERRIDE(OP): Performed by: CLINICAL NURSE SPECIALIST

## 2020-09-03 PROCEDURE — 700102 HCHG RX REV CODE 250 W/ 637 OVERRIDE(OP): Performed by: HOSPITALIST

## 2020-09-03 PROCEDURE — 97535 SELF CARE MNGMENT TRAINING: CPT

## 2020-09-03 PROCEDURE — 85014 HEMATOCRIT: CPT

## 2020-09-03 PROCEDURE — 700111 HCHG RX REV CODE 636 W/ 250 OVERRIDE (IP): Performed by: CLINICAL NURSE SPECIALIST

## 2020-09-03 PROCEDURE — 80048 BASIC METABOLIC PNL TOTAL CA: CPT

## 2020-09-03 PROCEDURE — 700111 HCHG RX REV CODE 636 W/ 250 OVERRIDE (IP): Performed by: NURSE PRACTITIONER

## 2020-09-03 PROCEDURE — A9270 NON-COVERED ITEM OR SERVICE: HCPCS | Performed by: HOSPITALIST

## 2020-09-03 PROCEDURE — 86850 RBC ANTIBODY SCREEN: CPT

## 2020-09-03 PROCEDURE — 85027 COMPLETE CBC AUTOMATED: CPT

## 2020-09-03 PROCEDURE — 86901 BLOOD TYPING SEROLOGIC RH(D): CPT

## 2020-09-03 PROCEDURE — 770020 HCHG ROOM/CARE - TELE (206)

## 2020-09-03 PROCEDURE — 4410569 US-THORACENTESIS PUNCTURE

## 2020-09-03 PROCEDURE — 71046 X-RAY EXAM CHEST 2 VIEWS: CPT

## 2020-09-03 PROCEDURE — A9270 NON-COVERED ITEM OR SERVICE: HCPCS | Performed by: NURSE PRACTITIONER

## 2020-09-03 PROCEDURE — A9270 NON-COVERED ITEM OR SERVICE: HCPCS | Performed by: CLINICAL NURSE SPECIALIST

## 2020-09-03 PROCEDURE — 99024 POSTOP FOLLOW-UP VISIT: CPT | Performed by: THORACIC SURGERY (CARDIOTHORACIC VASCULAR SURGERY)

## 2020-09-03 PROCEDURE — 0W9B3ZZ DRAINAGE OF LEFT PLEURAL CAVITY, PERCUTANEOUS APPROACH: ICD-10-PCS | Performed by: RADIOLOGY

## 2020-09-03 PROCEDURE — 86923 COMPATIBILITY TEST ELECTRIC: CPT

## 2020-09-03 PROCEDURE — 71045 X-RAY EXAM CHEST 1 VIEW: CPT

## 2020-09-03 RX ORDER — SODIUM CHLORIDE 9 MG/ML
INJECTION, SOLUTION INTRAVENOUS
Status: ACTIVE
Start: 2020-09-03 | End: 2020-09-03

## 2020-09-03 RX ORDER — OMEPRAZOLE 20 MG/1
20 CAPSULE, DELAYED RELEASE ORAL DAILY
Status: DISCONTINUED | OUTPATIENT
Start: 2020-09-03 | End: 2020-09-08 | Stop reason: HOSPADM

## 2020-09-03 RX ORDER — DEXTROSE MONOHYDRATE 25 G/50ML
50 INJECTION, SOLUTION INTRAVENOUS
Status: DISCONTINUED | OUTPATIENT
Start: 2020-09-03 | End: 2020-09-08 | Stop reason: HOSPADM

## 2020-09-03 RX ORDER — INSULIN GLARGINE 100 [IU]/ML
0.2 INJECTION, SOLUTION SUBCUTANEOUS
Status: DISCONTINUED | OUTPATIENT
Start: 2020-09-04 | End: 2020-09-08 | Stop reason: HOSPADM

## 2020-09-03 RX ORDER — INSULIN GLARGINE 100 [IU]/ML
10 INJECTION, SOLUTION SUBCUTANEOUS ONCE
Status: COMPLETED | OUTPATIENT
Start: 2020-09-03 | End: 2020-09-03

## 2020-09-03 RX ORDER — INSULIN GLARGINE 100 [IU]/ML
20 INJECTION, SOLUTION SUBCUTANEOUS
Status: DISCONTINUED | OUTPATIENT
Start: 2020-09-04 | End: 2020-09-03

## 2020-09-03 RX ADMIN — FUROSEMIDE 40 MG: 10 INJECTION, SOLUTION INTRAMUSCULAR; INTRAVENOUS at 06:11

## 2020-09-03 RX ADMIN — ROSUVASTATIN CALCIUM 20 MG: 20 TABLET, FILM COATED ORAL at 17:57

## 2020-09-03 RX ADMIN — AMIODARONE HYDROCHLORIDE 0.5 MG/MIN: 1.8 INJECTION, SOLUTION INTRAVENOUS at 03:11

## 2020-09-03 RX ADMIN — ACETAMINOPHEN 1000 MG: 500 TABLET ORAL at 06:11

## 2020-09-03 RX ADMIN — GABAPENTIN 300 MG: 300 CAPSULE ORAL at 06:11

## 2020-09-03 RX ADMIN — INSULIN GLARGINE 10 UNITS: 100 INJECTION, SOLUTION SUBCUTANEOUS at 12:44

## 2020-09-03 RX ADMIN — AMIODARONE HYDROCHLORIDE 400 MG: 200 TABLET ORAL at 20:54

## 2020-09-03 RX ADMIN — GABAPENTIN 300 MG: 300 CAPSULE ORAL at 17:57

## 2020-09-03 RX ADMIN — POTASSIUM CHLORIDE 20 MEQ: 1500 TABLET, EXTENDED RELEASE ORAL at 17:57

## 2020-09-03 RX ADMIN — AMIODARONE HYDROCHLORIDE 0.5 MG/MIN: 1.8 INJECTION, SOLUTION INTRAVENOUS at 12:59

## 2020-09-03 RX ADMIN — METOPROLOL TARTRATE 25 MG: 25 TABLET, FILM COATED ORAL at 06:11

## 2020-09-03 RX ADMIN — OMEPRAZOLE 20 MG: 20 CAPSULE, DELAYED RELEASE ORAL at 10:33

## 2020-09-03 RX ADMIN — ONDANSETRON 8 MG: 2 INJECTION INTRAMUSCULAR; INTRAVENOUS at 01:32

## 2020-09-03 RX ADMIN — METOPROLOL TARTRATE 25 MG: 25 TABLET, FILM COATED ORAL at 17:57

## 2020-09-03 RX ADMIN — POTASSIUM CHLORIDE 20 MEQ: 1500 TABLET, EXTENDED RELEASE ORAL at 06:11

## 2020-09-03 RX ADMIN — MUPIROCIN 1 APPLICATION: 20 OINTMENT TOPICAL at 06:12

## 2020-09-03 RX ADMIN — MAGNESIUM HYDROXIDE 30 ML: 2400 SUSPENSION ORAL at 06:11

## 2020-09-03 RX ADMIN — ACETAMINOPHEN 650 MG: 325 TABLET, FILM COATED ORAL at 10:33

## 2020-09-03 RX ADMIN — MUPIROCIN 1 APPLICATION: 20 OINTMENT TOPICAL at 18:03

## 2020-09-03 RX ADMIN — ONDANSETRON 8 MG: 2 INJECTION INTRAMUSCULAR; INTRAVENOUS at 21:47

## 2020-09-03 RX ADMIN — AMIODARONE HYDROCHLORIDE 400 MG: 200 TABLET ORAL at 08:14

## 2020-09-03 RX ADMIN — DOCUSATE SODIUM 50 MG AND SENNOSIDES 8.6 MG 2 TABLET: 8.6; 5 TABLET, FILM COATED ORAL at 17:55

## 2020-09-03 RX ADMIN — FUROSEMIDE 40 MG: 10 INJECTION, SOLUTION INTRAMUSCULAR; INTRAVENOUS at 17:57

## 2020-09-03 RX ADMIN — ACETAMINOPHEN 1000 MG: 500 TABLET ORAL at 12:49

## 2020-09-03 RX ADMIN — ACETAMINOPHEN 1000 MG: 500 TABLET ORAL at 17:57

## 2020-09-03 RX ADMIN — INSULIN GLARGINE 10 UNITS: 100 INJECTION, SOLUTION SUBCUTANEOUS at 06:28

## 2020-09-03 ASSESSMENT — PAIN DESCRIPTION - PAIN TYPE
TYPE: SURGICAL PAIN
TYPE: ACUTE PAIN;SURGICAL PAIN
TYPE: SURGICAL PAIN
TYPE: ACUTE PAIN;SURGICAL PAIN
TYPE: SURGICAL PAIN
TYPE: SURGICAL PAIN

## 2020-09-03 ASSESSMENT — ENCOUNTER SYMPTOMS
CONSTITUTIONAL NEGATIVE: 1
PSYCHIATRIC NEGATIVE: 1
NEUROLOGICAL NEGATIVE: 1
GASTROINTESTINAL NEGATIVE: 1
RESPIRATORY NEGATIVE: 1
EYES NEGATIVE: 1
MYALGIAS: 1

## 2020-09-03 ASSESSMENT — FIBROSIS 4 INDEX
FIB4 SCORE: 2.49
FIB4 SCORE: 2.49

## 2020-09-03 NOTE — TELEPHONE ENCOUNTER
Called patient's son do give update.  Son concerned about him getting a unit of blood.  I explained that on POD 3 it's normal to have a drop in HCT.  I put him on prilosec and occult blood testing.  I explained his fatigue was normal and would take several weeks to recover.  I then called his wife at his son's request and explained the same issues.

## 2020-09-03 NOTE — PROGRESS NOTES
"  Cardiac Surgery RN Navigator Daily Rounding Note  Procedure Performed: Procedure(s) (LRB):  CABG, WITH ENDOSCOPIC VEIN PROCUREMENT X1 (N/A)  REPLACEMENT, AORTIC VALVE (N/A)  ECHOCARDIOGRAM, TRANSESOPHAGEAL (N/A)     By  Dr. Casas  3 Days Post-Op    Pertinent Overnight Events:    Weight unchanged; poor UOP this am but incontinent episodes overnight, CT removed yesterday; bleeding through dressing, patient states feeling weak and tired.    A fib overnight; amio protocol initiated; now in SR    2 view CXR ordered-to be done at 11 am    Intra op ROMERO read; pre op EF 30%; post op EF 50%.    Pertinent neuro findings/needs: patient fatigued and \"weak\"    Cardiac/hemodynamics:  Temp (24hrs), Av.4 °C (97.6 °F), Min:36 °C (96.8 °F), Max:36.8 °C (98.2 °F)    Heart rhythm: SR  Temp:  [36 °C (96.8 °F)-36.8 °C (98.2 °F)] 36.1 °C (97 °F)  Pulse:  [] 84  Resp:  [14-26] 16  BP: (110-134)/(43-76) 122/76  SpO2:  [86 %-99 %] 98 %      IV gtts: amiodarone infusion, Last Rate: 0.5 mg/min (20 0311)  NS, Last Rate: Stopped (20 1300)      /Weights:  Admit weight: Weight: 96.6 kg (213 lb)  Current Weight: Weight: 102.1 kg (225 lb 1.4 oz) (20 0100)  Weight change: -0.2 kg (-7.1 oz)    Intake/Output Summary (Last 24 hours) at 9/3/2020 0730  Last data filed at 9/3/2020 0615  Gross per 24 hour   Intake 540 ml   Output 1300 ml   Net -760 ml       Adequate urine output: 200 cc    Respiratory:        Pertinent respiratory findings/needs: 2 L NC    GI findings/needs: no BM yet    Labs:  Recent Labs     20  0400 20  0210 20  0347 20  0652   WBC 25.1* 21.8* 18.6*  --    RBC 3.19* 2.90* 2.81*  --    HEMOGLOBIN 7.4* 7.0* 6.6* 7.8*   HEMATOCRIT 25.3* 23.0* 22.4* 25.1*   MCV 79.3* 79.3* 79.7*  --    MCH 23.2* 24.1* 23.5*  --    MCHC 29.2* 30.4* 29.5*  --    RDW 45.1 45.9 45.7  --    PLATELETCT 129* 151* 116*  --    MPV 10.7 11.1 10.3  --      Recent Labs     20  0500 20  0400 " 09/02/20  1950 09/03/20  0347   SODIUM 137 136  --  131*   POTASSIUM 4.6 4.9 4.6 4.8   CHLORIDE 104 101  --  97   CO2 21 21  --  23   GLUCOSE 123* 306*  --  388*   BUN 18 32*  --  42*   CREATININE 0.97 1.33  --  1.29   CALCIUM 7.6* 8.0*  --  7.8*     INR:   Recent Labs     08/30/20  2100 08/31/20  1205   INR 0.95 1.50*       Required Cardiac medications review:  ASA:  Yes  Plavix: Yes  BB: Yes  ACE/ARB: No; contraindicated because of Other needs for heart failure guidelines & BP supports initiation  Statin: yes  Diuretic: yes    LDA's necessity reviewed: YES    Thoughts and considerations for team rounding:    Consider TEG to determine appropriate blood products for future if pt continues to bleed? Consider ordering Q 12 H&H to trend?    Consider going back to insulin gtt-150 protocol.  Patient BG over 300 for 2 1/2 days, concerning for HHNK, patient on NPH 10 units Q am, and fairly aggressive sliding scale AC/HS.    Takes losartan 100 mg at home-consider restarting at reduced dose to start? BP's can tolerate, 120's to 140's overnight.    Consider ordering fluid restriction, patient weight not going down, still + for LE edema, and already on 40 mg IV lasix.    Discharge plan:    appts in place for discharge

## 2020-09-03 NOTE — PROGRESS NOTES
Cardiovascular Surgery Progress Note    Name: Sharad Millan  MRN: [id number]  : 1955  Admit Date: 2020  5:09 PM  Procedure:  Procedure(s) and Anesthesia Type:     * CABG, WITH ENDOSCOPIC VEIN PROCUREMENT X1 - General     * REPLACEMENT, AORTIC VALVE - General     * ECHOCARDIOGRAM, TRANSESOPHAGEAL - General  3 Day Post-Op    Vitals:  Patient Vitals for the past 8 hrs:   Temp SpO2 Pulse Resp BP   20 0700 -- 98 % 84 20 131/64   20 0605 36.1 °C (97 °F) 98 % 84 16 122/76   20 0600 -- 98 % 84 18 128/57   20 0550 36.2 °C (97.2 °F) 99 % 82 14 110/76   20 0535 36 °C (96.8 °F) 99 % 86 16 129/51   20 0500 -- 93 % 83 (!) 22 --   20 0400 -- 98 % 82 (!) 25 --   20 0300 -- 96 % 84 (!) 21 136/57     Temp (24hrs), Av.4 °C (97.5 °F), Min:36 °C (96.8 °F), Max:36.8 °C (98.2 °F)      Respiratory:    Respiration: 20, Pulse Oximetry: 98 %     Chest Tube Drains:  1060 cc    Fluids:    Intake/Output Summary (Last 24 hours) at 9/3/2020 1022  Last data filed at 9/3/2020 0615  Gross per 24 hour   Intake 420 ml   Output 1100 ml   Net -680 ml     Admit weight: Weight: 96.6 kg (213 lb)  Current weight: Weight: 102.1 kg (225 lb 1.4 oz) (20 0100)    Labs:  Recent Labs     20  0400 20  0210 20  0347 20  0652   WBC 25.1* 21.8* 18.6*  --    RBC 3.19* 2.90* 2.81*  --    HEMOGLOBIN 7.4* 7.0* 6.6* 7.8*   HEMATOCRIT 25.3* 23.0* 22.4* 25.1*   MCV 79.3* 79.3* 79.7*  --    MCH 23.2* 24.1* 23.5*  --    MCHC 29.2* 30.4* 29.5*  --    RDW 45.1 45.9 45.7  --    PLATELETCT 129* 151* 116*  --    MPV 10.7 11.1 10.3  --      Recent Labs     20  1330   NEUTSPOLYS 90.40*   LYMPHOCYTES 2.60*   MONOCYTES 7.00   EOSINOPHILS 0.00   BASOPHILS 0.00   RBCMORPHOLO Present     Recent Labs     20  0500 20  0400 20  1950 20  0347   SODIUM 137 136  --  131*   POTASSIUM 4.6 4.9 4.6 4.8   CHLORIDE 104 101  --  97   CO2 21 21  --  23   GLUCOSE 123* 306*  --   388*   BUN 18 32*  --  42*   CREATININE 0.97 1.33  --  1.29   CALCIUM 7.6* 8.0*  --  7.8*     Recent Labs     08/31/20  1205   APTT 31.7   INR 1.50*       Medications:  • NS       • omeprazole  20 mg     • [START ON 9/4/2020] insulin glargine  20 Units     • furosemide  40 mg     • potassium chloride SA  20 mEq     • amiodarone  400 mg     • insulin lispro  2 Units     • insulin lispro  0-10 Units     • aspirin EC  81 mg     • Pharmacy Consult Request  1 Each     • acetaminophen  1,000 mg     • gabapentin  300 mg      Followed by   • [START ON 9/6/2020] gabapentin  100 mg     • senna-docusate  2 Tab      And   • polyethylene glycol/lytes  1 Packet      And   • magnesium hydroxide  30 mL     • mupirocin  1 Application     • metoprolol  25 mg     • clopidogrel  75 mg     • rosuvastatin  20 mg          Ordered Medications:    ASA - Yes    Plavix - Yes    Post-operative Beta Blockers - Yes    Ace Inhibitor - No; contraindicated because of Hypotension    Statin - Yes          Central Line:  Type of line: Laura  Date of insertion: 8/31/2020  Date of removal: see nurses note    Exam:   Review of Systems   Constitutional: Negative.    HENT: Negative.    Eyes: Negative.    Respiratory: Negative.    Cardiovascular: Positive for leg swelling.   Gastrointestinal: Negative.    Genitourinary: Negative.    Musculoskeletal: Positive for myalgias.   Skin: Negative.    Neurological: Negative.    Psychiatric/Behavioral: Negative.        Physical Exam  Vitals signs and nursing note reviewed.   Constitutional:       Appearance: He is well-developed.   HENT:      Head: Normocephalic.   Eyes:      Pupils: Pupils are equal, round, and reactive to light.   Neck:      Musculoskeletal: Normal range of motion.   Cardiovascular:      Rate and Rhythm: Normal rate and regular rhythm.   Pulmonary:      Effort: Pulmonary effort is normal.   Abdominal:      General: Bowel sounds are normal.      Palpations: Abdomen is soft.   Musculoskeletal: Normal  range of motion.   Skin:     General: Skin is warm and dry.   Neurological:      Mental Status: He is alert and oriented to person, place, and time.         Quality Measures:   Quality-Core Measures   Reviewed items::  Radiology images reviewed, EKG reviewed, Labs reviewed and Medications reviewed  Strange catheter::  No Strange  Central line in place:  Need for access  DVT prophylaxis pharmacological::  Enoxaparin (Lovenox) and Warfarin (Coumadin)  DVT prophylaxis - mechanical:  Not indicated at this time, ambulatory  Ulcer Prophylaxis::  Yes    Assessment/Plan:  POD 1 Extubated, HDS, ginger drip, chest tube output moderate and negative for air leak.  Neuro grossly intact.  Hct low.  Plan:  Wean ginger as tolerated.  1 unit PRBC.  Leave in CT.  May dc swan.  POD 2  HDS, SR, neuro intact, wounds CDI, abdomen S/NT, fluid balance positive, wt up.  Chest tube output minimal overnight.  Glucose high.  Plan:  Dc chest tubes, start lantus, dc strange.  Diurese.  IS/ambulate. CPM.  POD 3  HDS, SR was afib last night now SR on amiodarone protocol, neuro intact, wounds CDI, abdomen S.NT, fluid balance trending negative, wt stable.  Glucose still high.  Hct down last night.  Plan:  Received 1 unit PRBC, start prilosec, increase lantus, guiac all stools, Statin, IS/ambulate. CPM.    Active Hospital Problems    Diagnosis   • Acute on chronic systolic heart failure (HCC) [I50.23]     Priority: High   • Severe aortic stenosis [I35.0]     Priority: High   • SVT (supraventricular tachycardia) ([facility]) [I47.1]     Priority: High   • CAD (coronary artery disease) [I25.10]     Priority: High   • S/P drug eluting coronary stent placement [Z95.5]     Priority: Medium     CHARBEL to LAD 1/8/20     • Essential hypertension [I10]     Priority: Medium   • Uncontrolled diabetes mellitus (HCC) [E11.65]     Priority: Medium   • Dyslipidemia [E78.5]     Priority: Medium          • Obesity (BMI 30-39.9) [E66.9]     Priority: Low

## 2020-09-03 NOTE — CARE PLAN
Problem: Post op day 2 CABG/Heart Valve Replacement  Goal: Optimal care of the post op CABG/heart valve replacement post op day 2  9/2/2020 1842 by Booker Villafana R.N.  Outcome: PROGRESSING SLOWER THAN EXPECTED  9/2/2020 1841 by Booker Villafana R.N.  Outcome: PROGRESSING SLOWER THAN EXPECTED     Q 1 hour IS  Got to chair x 1 around 1830 - tolerated for 15 minutes  Voided post strange removal  Dressing c/d/I to chest tube removal area

## 2020-09-03 NOTE — THERAPY
"Missed Therapy     Patient Name: Sharad Millan  Age:  64 y.o., Sex:  male  Medical Record #: [id number]  Today's Date: 9/3/2020    Discussed missed therapy with RN       09/03/20 2439   Cognition    Cognition / Consciousness X   Level of Consciousness Lethargic   Comments Pt reports feeling exhausted, \"hung over from anesthesia\", wanting to sleep. Unable to review cardiac education due to pt too fatigued.    Interdisciplinary Plan of Care Collaboration   IDT Collaboration with  Nursing   Collaboration Comments Pt refusing therapy due to too tired today. Pt having trouble holding medication that nsg gives. Pt wanting to sleep. Pt unable to focus on cardiac education. Ed done about progression and how ed can be reviewed later. Pt reports generous help at home from spouse and adult son and his family. PT will attempt tomorrow.      "

## 2020-09-03 NOTE — PROGRESS NOTES
[facility], due to the patient being in AFib, and [place] [place] returned the page promptly. Described the patient's current condition including his HR of 120-150, and B/P. Requested Amiodarone; [facility] [place] placed orders for Amiodarone protocol with bolus. All orders read back, and entered in EPIC.

## 2020-09-03 NOTE — PROGRESS NOTES
We went to pt's room to perform a therapeutic thoracentesis. The procedure was explained to pt and Dr Villareal consented the pt. The left side of pt's chest was prepped and catheter was inserted. 50cc were removed from the pt's chest and disposed of. Pt tolerated procedure well and was in stable condition when we left the room. Vitals were monitored by floor nurse .

## 2020-09-03 NOTE — CARE PLAN
Assumed day shift care at start of shift  +fatigue/somnolence throughout shift  Denied pain this shift, pain with IS use and coughing however no request for pain medication this shift - patient reassessed frequently and sleeping with unlabored breathing  Vss, pulse ox >95% on 2lpm via nasal cannula, monitored on telemetry, afebrile  Chest tubes removed by CIC RN (refer to note) - no change in oxygen saturation post chest tube removal (output 40ml)  IS up 1500ml  Magana removed at 1500 - dtv by 2100  Iv lasix (new order) admin this shift x 2   Poor po intake - tolerating liquids - protein milkshake added to meals and 100% consumption  PT/OT worked with patient today - got to edge of bed and stood - +dizziness - patient returned to bed  No needs at this time, wc    Fall precautions/hourly rounding maintained, call light within reach and functioning, all items within reach.  Patient encouraged to call for assistance, poc reviewed with patient, ?'s/concerns answered.       Problem: Safety  Goal: Will remain free from injury  Outcome: PROGRESSING AS EXPECTED  Goal: Will remain free from falls  Outcome: PROGRESSING AS EXPECTED     Problem: Respiratory:  Goal: Respiratory status will improve  Outcome: PROGRESSING AS EXPECTED     Problem: Pain Management  Goal: Pain level will decrease to patient's comfort goal  Outcome: PROGRESSING AS EXPECTED

## 2020-09-03 NOTE — CARE PLAN
Problem: Post Op Day 3 CABG/Heart Valve replacement  Goal: Optimal care of the post op CABG/Heart Valve replacement post op day 3  Intervention: Daily Weights  Note: Complete  Intervention: Up in chair for all meals  Note: Complete  Intervention: Ambulate, increasing the distance each time x 3 and before bed  Note: Complete  Intervention: IS q 1 hour while awake and record best IS volume  Note: Complete  Intervention: Consider removal of strange, chest tube and pacer wire if not already done  Note: Complete  Intervention: Case management and  for discharge needs  Note: Complete  Intervention: Discharge planning (See discharge barriers/planning problem on care plan)  Note: Complete

## 2020-09-03 NOTE — PROGRESS NOTES
[facility], due to the patient hgb dropping, and [place] returned the page promptly. Described the patient's current condition including that his current hgb was 6.6 down from 7.0 two hours prior; that his chest tube wounds were observed with increased bleeding. Requested 1 unit PRBCs; [place] [place] placed orders for 1 unit PRBCs with H/H after. All orders read back, and entered in EPIC.

## 2020-09-03 NOTE — PROGRESS NOTES
Selin NP at bedside assessing pt. Wires removed. New orders: Increase lantus, start pt on prilosec, obtain stool sample for occult blood test, and repeat H&H in the am.   Orders read back and confirmed.

## 2020-09-04 LAB
ANION GAP SERPL CALC-SCNC: 8 MMOL/L (ref 7–16)
BUN SERPL-MCNC: 44 MG/DL (ref 8–22)
CALCIUM SERPL-MCNC: 8 MG/DL (ref 8.5–10.5)
CHLORIDE SERPL-SCNC: 96 MMOL/L (ref 96–112)
CO2 SERPL-SCNC: 27 MMOL/L (ref 20–33)
CREAT SERPL-MCNC: 1.21 MG/DL (ref 0.5–1.4)
ERYTHROCYTE [DISTWIDTH] IN BLOOD BY AUTOMATED COUNT: 47.7 FL (ref 35.9–50)
GLUCOSE BLD-MCNC: 171 MG/DL (ref 65–99)
GLUCOSE BLD-MCNC: 181 MG/DL (ref 65–99)
GLUCOSE BLD-MCNC: 198 MG/DL (ref 65–99)
GLUCOSE BLD-MCNC: 226 MG/DL (ref 65–99)
GLUCOSE BLD-MCNC: 239 MG/DL (ref 65–99)
GLUCOSE BLD-MCNC: 248 MG/DL (ref 65–99)
GLUCOSE SERPL-MCNC: 197 MG/DL (ref 65–99)
HCT VFR BLD AUTO: 25.6 % (ref 42–52)
HGB BLD-MCNC: 7.8 G/DL (ref 14–18)
MCH RBC QN AUTO: 24.4 PG (ref 27–33)
MCHC RBC AUTO-ENTMCNC: 30.5 G/DL (ref 33.7–35.3)
MCV RBC AUTO: 80 FL (ref 81.4–97.8)
PLATELET # BLD AUTO: 141 K/UL (ref 164–446)
PMV BLD AUTO: 10.8 FL (ref 9–12.9)
POTASSIUM SERPL-SCNC: 4.5 MMOL/L (ref 3.6–5.5)
RBC # BLD AUTO: 3.2 M/UL (ref 4.7–6.1)
SODIUM SERPL-SCNC: 131 MMOL/L (ref 135–145)
WBC # BLD AUTO: 14.2 K/UL (ref 4.8–10.8)

## 2020-09-04 PROCEDURE — A9270 NON-COVERED ITEM OR SERVICE: HCPCS | Performed by: CLINICAL NURSE SPECIALIST

## 2020-09-04 PROCEDURE — 700111 HCHG RX REV CODE 636 W/ 250 OVERRIDE (IP): Performed by: CLINICAL NURSE SPECIALIST

## 2020-09-04 PROCEDURE — 770020 HCHG ROOM/CARE - TELE (206)

## 2020-09-04 PROCEDURE — 80048 BASIC METABOLIC PNL TOTAL CA: CPT

## 2020-09-04 PROCEDURE — A9270 NON-COVERED ITEM OR SERVICE: HCPCS | Performed by: NURSE PRACTITIONER

## 2020-09-04 PROCEDURE — 99024 POSTOP FOLLOW-UP VISIT: CPT | Performed by: THORACIC SURGERY (CARDIOTHORACIC VASCULAR SURGERY)

## 2020-09-04 PROCEDURE — 700102 HCHG RX REV CODE 250 W/ 637 OVERRIDE(OP): Performed by: CLINICAL NURSE SPECIALIST

## 2020-09-04 PROCEDURE — 700111 HCHG RX REV CODE 636 W/ 250 OVERRIDE (IP): Performed by: NURSE PRACTITIONER

## 2020-09-04 PROCEDURE — 700102 HCHG RX REV CODE 250 W/ 637 OVERRIDE(OP): Performed by: HOSPITALIST

## 2020-09-04 PROCEDURE — A9270 NON-COVERED ITEM OR SERVICE: HCPCS | Performed by: HOSPITALIST

## 2020-09-04 PROCEDURE — 97535 SELF CARE MNGMENT TRAINING: CPT

## 2020-09-04 PROCEDURE — 82962 GLUCOSE BLOOD TEST: CPT

## 2020-09-04 PROCEDURE — 85027 COMPLETE CBC AUTOMATED: CPT

## 2020-09-04 PROCEDURE — 700102 HCHG RX REV CODE 250 W/ 637 OVERRIDE(OP): Performed by: NURSE PRACTITIONER

## 2020-09-04 RX ORDER — LOSARTAN POTASSIUM 25 MG/1
50 TABLET ORAL
Status: DISCONTINUED | OUTPATIENT
Start: 2020-09-05 | End: 2020-09-08 | Stop reason: HOSPADM

## 2020-09-04 RX ORDER — LOSARTAN POTASSIUM 25 MG/1
25 TABLET ORAL
Status: DISCONTINUED | OUTPATIENT
Start: 2020-09-04 | End: 2020-09-04

## 2020-09-04 RX ADMIN — TRAMADOL HYDROCHLORIDE 50 MG: 50 TABLET, FILM COATED ORAL at 02:58

## 2020-09-04 RX ADMIN — ONDANSETRON 8 MG: 2 INJECTION INTRAMUSCULAR; INTRAVENOUS at 17:14

## 2020-09-04 RX ADMIN — FUROSEMIDE 40 MG: 10 INJECTION, SOLUTION INTRAMUSCULAR; INTRAVENOUS at 06:05

## 2020-09-04 RX ADMIN — OMEPRAZOLE 20 MG: 20 CAPSULE, DELAYED RELEASE ORAL at 06:04

## 2020-09-04 RX ADMIN — GABAPENTIN 300 MG: 300 CAPSULE ORAL at 06:04

## 2020-09-04 RX ADMIN — CLOPIDOGREL BISULFATE 75 MG: 75 TABLET ORAL at 06:05

## 2020-09-04 RX ADMIN — ASPIRIN 81 MG: 81 TABLET, COATED ORAL at 06:05

## 2020-09-04 RX ADMIN — METOPROLOL TARTRATE 25 MG: 25 TABLET, FILM COATED ORAL at 06:06

## 2020-09-04 RX ADMIN — INSULIN GLARGINE 20 UNITS: 100 INJECTION, SOLUTION SUBCUTANEOUS at 18:02

## 2020-09-04 RX ADMIN — DOCUSATE SODIUM 50 MG AND SENNOSIDES 8.6 MG 2 TABLET: 8.6; 5 TABLET, FILM COATED ORAL at 06:05

## 2020-09-04 RX ADMIN — POTASSIUM CHLORIDE 20 MEQ: 1500 TABLET, EXTENDED RELEASE ORAL at 06:04

## 2020-09-04 RX ADMIN — LOSARTAN POTASSIUM 25 MG: 25 TABLET, FILM COATED ORAL at 10:35

## 2020-09-04 RX ADMIN — AMIODARONE HYDROCHLORIDE 400 MG: 200 TABLET ORAL at 21:15

## 2020-09-04 RX ADMIN — PROCHLORPERAZINE EDISYLATE 10 MG: 5 INJECTION INTRAMUSCULAR; INTRAVENOUS at 20:48

## 2020-09-04 RX ADMIN — AMIODARONE HYDROCHLORIDE 400 MG: 200 TABLET ORAL at 08:24

## 2020-09-04 RX ADMIN — ONDANSETRON 8 MG: 2 INJECTION INTRAMUSCULAR; INTRAVENOUS at 06:49

## 2020-09-04 RX ADMIN — MUPIROCIN 1 APPLICATION: 20 OINTMENT TOPICAL at 17:20

## 2020-09-04 RX ADMIN — ACETAMINOPHEN 1000 MG: 500 TABLET ORAL at 21:20

## 2020-09-04 RX ADMIN — METOPROLOL TARTRATE 25 MG: 25 TABLET, FILM COATED ORAL at 21:14

## 2020-09-04 RX ADMIN — ROSUVASTATIN CALCIUM 20 MG: 20 TABLET, FILM COATED ORAL at 21:18

## 2020-09-04 RX ADMIN — DOCUSATE SODIUM 50 MG AND SENNOSIDES 8.6 MG 2 TABLET: 8.6; 5 TABLET, FILM COATED ORAL at 21:17

## 2020-09-04 RX ADMIN — ACETAMINOPHEN 1000 MG: 500 TABLET ORAL at 12:19

## 2020-09-04 RX ADMIN — ACETAMINOPHEN 1000 MG: 500 TABLET ORAL at 06:04

## 2020-09-04 RX ADMIN — FUROSEMIDE 40 MG: 10 INJECTION, SOLUTION INTRAMUSCULAR; INTRAVENOUS at 17:21

## 2020-09-04 ASSESSMENT — COGNITIVE AND FUNCTIONAL STATUS - GENERAL
DRESSING REGULAR UPPER BODY CLOTHING: A LITTLE
DRESSING REGULAR LOWER BODY CLOTHING: A LOT
TOILETING: A LOT
DAILY ACTIVITIY SCORE: 15
EATING MEALS: A LITTLE
PERSONAL GROOMING: A LITTLE
HELP NEEDED FOR BATHING: A LOT
SUGGESTED CMS G CODE MODIFIER DAILY ACTIVITY: CK

## 2020-09-04 ASSESSMENT — ENCOUNTER SYMPTOMS
NEUROLOGICAL NEGATIVE: 1
RESPIRATORY NEGATIVE: 1
EYES NEGATIVE: 1
MYALGIAS: 1
PSYCHIATRIC NEGATIVE: 1
CONSTITUTIONAL NEGATIVE: 1
GASTROINTESTINAL NEGATIVE: 1

## 2020-09-04 ASSESSMENT — FIBROSIS 4 INDEX: FIB4 SCORE: 2.05

## 2020-09-04 NOTE — CARE PLAN
Problem: Post Op Day 4 CABG/Heart Valve Replacement  Goal: Optimal care of the Post Op CABG/Heart Valve replacement Post Op Day 4  Intervention: Daily Weights  Note: Input in EPIC  Intervention: Shower daily and clean incisions twice daily with soap and water  Note: Tbd on day shift  Intervention: Up in chair for all meals  Note: Pt up to chair this AM  Intervention: Ambulate, increasing the distance each time x 3 and before bed  Note: Pt ambulated furthest distance yet this AM  Intervention: IS q 1 hour while awake and record best IS volume  Note: Complete, 500, RT notified and PEP initiated  Intervention: Consider removal of strange, chest tube and pacer wire if not already done  Note: Pacer wires remain intact  Intervention: Discharge Education  Note: TBD

## 2020-09-04 NOTE — DISCHARGE PLANNING
Care Transition Team Discharge Planning    Anticipated Discharge Disposition: d/c home w/ HH    Action: Lsw received order for HH.    Lsw met w/ pt at bedside and explained HH. Pt requested Lsw call his spouse for choice.    Lsw called Mrs. Millie Millan @ [phone]. She received a scan and emailed copy of the choice form to make a decision. She will call Lsw w/ her top three choices.    Pt's physical address where HH will visit is:  [address] [place] [address]    It is [place] miles past [place].       Barriers to Discharge: TBD    Plan: f/u w/ medical team, [facility],

## 2020-09-04 NOTE — THERAPY
"Occupational Therapy  Daily Treatment     Patient Name: Sharad Millan  Age:  64 y.o., Sex:  male  Medical Record #: [id number]  Today's Date: 9/4/2020     Precautions  Precautions: Fall Risk, Swallow Precautions ( See Comments), Sternal Precautions (See Comments)  Comments: EF 40%    Assessment    Pt continues to need significant assistance for seated ADLs and has limited activity tolerance 2/2 nausea and weakness. Pt required several rest breaks during activity. Pt able to ambulate to bathroom with FWW and Al. Pt had one LOB in bathroom requiring physical assist from therapist to maintain standing position. Pt sat and completed toileting, UE dressing, LE dressing, sponge bath, and grooming/hygiene. At end of session, pt vomited with nursing staff present to assist. Pt assisted back to bed.    Plan    Continue current treatment plan.    DC Equipment Recommendations: Unable to determine at this time  Discharge Recommendations: Recommend post-acute placement for additional occupational therapy services prior to discharge home    Subjective    Pt alert, pleasant, and cooperative this session. Pt states, \"I feel really nauseous.\" Pt vomiting at end of session. Nursing staff present to assist.      Objective       09/04/20 1219   Vitals   Vitals Comments Up to 4L per nursing during ambulation   Cognition    Comments Improved mentation/alertness this session, pleasant, cooperative, receptive to education   Active ROM Upper Body   Comments BUE tremoring at baseline   Other Treatments   Other Treatments Provided Empathetic listening, pt frustrated with CLOF   Balance   Comments standing with FWW, one LOB in BR requiring physical assist from therapist   Bed Mobility    Sit to Supine Moderate Assist   Comments via log roll   Activities of Daily Living   Grooming Minimal Assist;Seated  (with hair, able to complete oral care)   Bathing Moderate Assist  (seated sponge bath for assistance with LE)   Upper Body Dressing " Minimal Assist  (gown)   Lower Body Dressing Maximal Assist  (socks)   Toileting Supervision  (urination)   Comments all ADLs in seated position   Functional Mobility   Sit to Stand Moderate Assist   Toilet Transfers Moderate Assist   Mobility in room and bathroom with Al using FWW   Comments improved mobility this session   Activity Tolerance   Comments limited by nausea, fatigue, lightheaded, weakness.    Patient / Family Goals   Patient / Family Goal #1 To return home   Short Term Goals   Short Term Goal # 1 Pt will perform LB dressing with min a using AE as trained/needed   Goal Outcome # 1 Goal not met   Short Term Goal # 2 Pt will perform functional t/f's with min a   Goal Outcome # 2 Progressing slower than expected   Short Term Goal # 3 Pt will perform toileting task with supervision   Goal Outcome # 3 Progressing as expected   Short Term Goal # 4 Pt will tolerate oob ADL session [id number] with stable BP   Goal Outcome # 4 Progressing as expected

## 2020-09-04 NOTE — CARE PLAN
Problem: Post Op Day 3 CABG/Heart Valve replacement  Goal: Optimal care of the post op CABG/Heart Valve replacement post op day 3  Intervention: Shower daily and clean incisions twice daily with soap and water  Note: Done

## 2020-09-04 NOTE — THERAPY
PT treatment attempted. Pt just attempting to eat some lunch, has been nauseous. Will reattempt PT treatment as able    January Jaimes, PT, DPT Phone: [phone]

## 2020-09-04 NOTE — CARE PLAN
Problem: Post Op Day 4 CABG/Heart Valve Replacement  Goal: Optimal care of the Post Op CABG/Heart Valve replacement Post Op Day 4  Intervention: Daily Weights  Note: Done   Intervention: Shower daily and clean incisions twice daily with soap and water  Note: Done   Intervention: Up in chair for all meals  Note: Done   Intervention: Ambulate, increasing the distance each time x 3 and before bed  Note: Done   Intervention: IS q 1 hour while awake and record best IS volume  Note: Done   Intervention: Consider removal of strange, chest tube and pacer wire if not already done  Note: N/A   Intervention: Discharge Education  Note: Done

## 2020-09-04 NOTE — DISCHARGE PLANNING
Received Choice form at 1350  Agency/Facility Name: [facility]  Referral sent per Choice form at 7814    1510- Per Margot at [facility]- pt accepted. Someone can him on Tuesday.

## 2020-09-04 NOTE — PROGRESS NOTES
Cardiac Surgery RN Navigator Daily Rounding Note  Procedure Performed: Procedure(s) (LRB):  CABG, WITH ENDOSCOPIC VEIN PROCUREMENT X1 (N/A)  REPLACEMENT, AORTIC VALVE (N/A)  ECHOCARDIOGRAM, TRANSESOPHAGEAL (N/A)     By  Dr. Casas  4 Days Post-Op    Pertinent Overnight Events:    PW removed, lantus dose increased, PPI added, occult stool ordered-no BM yet    Left sided thoracentesis with 50 cc removed, weight up 4 kg, good UOP overnight    White count down, H&H low at 7.8 but unchanged x 12 hours, glucose improved    Patient refusing stool softener's-educated on importance    After long discussion with patient, pain is very prohibitive to IS use and C&DB, patient states being lethargic and fatigued, cannot maintain eye contact or keep eyes open during conversation, poor effort of IS, 500 cc best effort.  Strongly educated patient to eat food so narcotics can be taken for pain and help with nausea.  One tramadol at 0300, no oxy in 48 hours.    Pertinent neuro findings/needs: A&O, + for nausea, tramadol for pain, poor oral intake-pt encouraged    Cardiac/hemodynamics:  Temp (24hrs), Av.1 °C (97 °F), Min:35.8 °C (96.5 °F), Max:36.2 °C (97.2 °F)    Heart rhythm: NSR  Temp:  [35.8 °C (96.5 °F)-36.2 °C (97.2 °F)] 36.2 °C (97.1 °F)  Pulse:  [51-87] 72  Resp:  [16-31] 23  BP: (104-141)/(37-70) 123/54  SpO2:  [94 %-100 %] 94 %        /Weights:  Admit weight: Weight: 96.6 kg (213 lb)  Current Weight: Weight: 104.6 kg (230 lb 9.6 oz) (20 0600)  Weight change: -1.3 kg (-2 lb 13.9 oz)    Intake/Output Summary (Last 24 hours) at 2020 0731  Last data filed at 2020 0600  Gross per 24 hour   Intake 1030.63 ml   Output 1680 ml   Net -649.37 ml       Adequate urine output: YES 1400    Respiratory:        Pertinent respiratory findings/needs: 1 L % sats-wean down    GI findings/needs: WNL-no BM yet    Labs:  Recent Labs     20  0210 20  0347 20  0652 20  0325   WBC 21.8* 18.6*  --   14.2*   RBC 2.90* 2.81*  --  3.20*   HEMOGLOBIN 7.0* 6.6* 7.8* 7.8*   HEMATOCRIT 23.0* 22.4* 25.1* 25.6*   MCV 79.3* 79.7*  --  80.0*   MCH 24.1* 23.5*  --  24.4*   MCHC 30.4* 29.5*  --  30.5*   RDW 45.9 45.7  --  47.7   PLATELETCT 151* 116*  --  141*   MPV 11.1 10.3  --  10.8     Recent Labs     09/02/20  0400 09/02/20  1950 09/03/20  0347 09/04/20  0325   SODIUM 136  --  131* 131*   POTASSIUM 4.9 4.6 4.8 4.5   CHLORIDE 101  --  97 96   CO2 21  --  23 27   GLUCOSE 306*  --  388* 197*   BUN 32*  --  42* 44*   CREATININE 1.33  --  1.29 1.21   CALCIUM 8.0*  --  7.8* 8.0*     INR:   Recent Labs     08/31/20  1205   INR 1.50*       Required Cardiac medications review:  ASA:  Yes  Plavix: Yes  BB: Yes  ACE/ARB: No; contraindicated because of Other needs to be added prior to discharge for HF guidelines  Statin: yes  Diuretic: yes    LDA's necessity reviewed: YES    Thoughts and considerations for team rounding:    BP's 1-teens to 120's-consider adding ACE, will fall out on HF guidelines if not ordered on discharge, on losartan at home.    Consider discontinuing gabapentin, minimal narcotic use, + for somnolence    Occult stool pending-educated nursing to escalate bowel protocol    Discharge plan:    Post op appointments in place

## 2020-09-04 NOTE — FACE TO FACE
Face to Face Supporting Documentation - Home Health    The encounter with this patient was in whole or in part the primary reason for home health admission.    Date of encounter:   Patient:                    MRN:                       YOB: 2021  Sharad Millan  [id number]  1955     Home health to see patient for:  Skilled Nursing care for assessment, interventions & education    Skilled need for:  Surgical Aftercare vital signs, wound checks, medication monitoring, heart failure monitoring    Skilled nursing interventions to include:  Wound Care    Homebound status evidenced by:  Needs the assistance of another person in order to leave the home. Leaving home requires a considerable and taxing effort. There is a normal inability to leave the home.    Community Physician to provide follow up care: Don Khoury D.O.     Optional Interventions? No      I certify the face to face encounter for this home health care referral meets the CMS requirements and the encounter/clinical assessment with the patient was, in whole, or in part, for the medical condition(s) listed above, which is the primary reason for home health care. Based on my clinical findings: the service(s) are medically necessary, support the need for home health care, and the homebound criteria are met.  I certify that this patient has had a face to face encounter by myself.  SUJATHA Bates. - NPI: [id number]

## 2020-09-04 NOTE — PROGRESS NOTES
Cardiovascular Surgery Progress Note    Name: Sharad Millan  MRN: [id number]  : 1955  Admit Date: 2020  5:09 PM  Procedure:  Procedure(s) and Anesthesia Type:     * CABG, WITH ENDOSCOPIC VEIN PROCUREMENT X1 - General     * REPLACEMENT, AORTIC VALVE - General     * ECHOCARDIOGRAM, TRANSESOPHAGEAL - General  4 Day Post-Op    Vitals:  Patient Vitals for the past 8 hrs:   Temp SpO2 Pulse Resp BP Weight   20 0600 -- -- -- -- -- 104.6 kg (230 lb 9.6 oz)   20 0400 36.2 °C (97.1 °F) 94 % 72 (!) 23 123/54 --     Temp (24hrs), Av.1 °C (97 °F), Min:35.8 °C (96.5 °F), Max:36.2 °C (97.2 °F)      Respiratory:    Respiration: (!) 23, Pulse Oximetry: 94 %     Chest Tube Drains:  1060 cc    Fluids:    Intake/Output Summary (Last 24 hours) at 2020 0856  Last data filed at 2020 0600  Gross per 24 hour   Intake 641.97 ml   Output 1400 ml   Net -758.03 ml     Admit weight: Weight: 96.6 kg (213 lb)  Current weight: Weight: 104.6 kg (230 lb 9.6 oz) (20 0600)    Labs:  Recent Labs     20  0210 20  0347 20  0652 20  0325   WBC 21.8* 18.6*  --  14.2*   RBC 2.90* 2.81*  --  3.20*   HEMOGLOBIN 7.0* 6.6* 7.8* 7.8*   HEMATOCRIT 23.0* 22.4* 25.1* 25.6*   MCV 79.3* 79.7*  --  80.0*   MCH 24.1* 23.5*  --  24.4*   MCHC 30.4* 29.5*  --  30.5*   RDW 45.9 45.7  --  47.7   PLATELETCT 151* 116*  --  141*   MPV 11.1 10.3  --  10.8     Recent Labs     20  1330   NEUTSPOLYS 90.40*   LYMPHOCYTES 2.60*   MONOCYTES 7.00   EOSINOPHILS 0.00   BASOPHILS 0.00   RBCMORPHOLO Present     Recent Labs     20  0400 20  1950 20  0347 20  0325   SODIUM 136  --  131* 131*   POTASSIUM 4.9 4.6 4.8 4.5   CHLORIDE 101  --  97 96   CO2 21  --  23 27   GLUCOSE 306*  --  388* 197*   BUN 32*  --  42* 44*   CREATININE 1.33  --  1.29 1.21   CALCIUM 8.0*  --  7.8* 8.0*           Medications:  • omeprazole  20 mg     • insulin glargine  0.2 Units/kg/day      And   • insulin lispro  0.2  Units/kg/day      And   • insulin lispro  3-14 Units     • furosemide  40 mg     • potassium chloride SA  20 mEq     • amiodarone  400 mg     • aspirin EC  81 mg     • Pharmacy Consult Request  1 Each     • acetaminophen  1,000 mg     • senna-docusate  2 Tab      And   • polyethylene glycol/lytes  1 Packet      And   • magnesium hydroxide  30 mL     • mupirocin  1 Application     • metoprolol  25 mg     • clopidogrel  75 mg     • rosuvastatin  20 mg          Ordered Medications:    ASA - Yes    Plavix - Yes    Post-operative Beta Blockers - Yes    Ace Inhibitor - No; contraindicated because of Hypotension    Statin - Yes          Central Line:  Type of line: [place]  Date of insertion: 8/31/2020  Date of removal: see nurses note    Exam:   Review of Systems   Constitutional: Negative.    HENT: Negative.    Eyes: Negative.    Respiratory: Negative.    Cardiovascular: Positive for leg swelling.   Gastrointestinal: Negative.    Genitourinary: Negative.    Musculoskeletal: Positive for myalgias.   Skin: Negative.    Neurological: Negative.    Psychiatric/Behavioral: Negative.        Physical Exam  Vitals signs and nursing note reviewed.   Constitutional:       Appearance: He is well-developed.   HENT:      Head: Normocephalic.   Eyes:      Pupils: Pupils are equal, round, and reactive to light.   Neck:      Musculoskeletal: Normal range of motion.   Cardiovascular:      Rate and Rhythm: Normal rate and regular rhythm.   Pulmonary:      Effort: Pulmonary effort is normal.   Abdominal:      General: Bowel sounds are normal.      Palpations: Abdomen is soft.   Musculoskeletal: Normal range of motion.   Skin:     General: Skin is warm and dry.   Neurological:      Mental Status: He is alert and oriented to person, place, and time.         Quality Measures:   Quality-Core Measures   Reviewed items::  Radiology images reviewed, EKG reviewed, Labs reviewed and Medications reviewed  Magana catheter::  No Magana  Central line in  place:  Need for access  DVT prophylaxis pharmacological::  Enoxaparin (Lovenox) and Warfarin (Coumadin)  DVT prophylaxis - mechanical:  Not indicated at this time, ambulatory  Ulcer Prophylaxis::  Yes    Assessment/Plan:  POD 1 Extubated, HDS, ginger drip, chest tube output moderate and negative for air leak.  Neuro grossly intact.  Hct low.  Plan:  Wean ginger as tolerated.  1 unit PRBC.  Leave in CT.  May dc swan.  POD 2  HDS, SR, neuro intact, wounds CDI, abdomen S/NT, fluid balance positive, wt up.  Chest tube output minimal overnight.  Glucose high.  Plan:  Dc chest tubes, start lantus, dc strange.  Diurese.  IS/ambulate. CPM.  POD 3  HDS, SR was afib last night now SR on amiodarone protocol, neuro intact, wounds CDI, abdomen S.NT, fluid balance trending negative, wt stable.  Glucose still high.  Hct down last night.  Plan:  Received 1 unit PRBC, start prilosec, increase lantus, guiac all stools, Statin, IS/ambulate. CPM.  POD 4  HDS, SR, neuro intact, wounds CDI, abdomen S/NT, fluid balance negative, wt up.  Still feeling tired.  Plan:  Continue lasix, elevate legs, home health , start arb.  IS/ambulate. CPM.  DC gabapentin.    Active Hospital Problems    Diagnosis   • Acute on chronic systolic heart failure (HCC) [I50.23]     Priority: High   • Severe aortic stenosis [I35.0]     Priority: High   • SVT (supraventricular tachycardia) (HCC) [I47.1]     Priority: High   • CAD (coronary artery disease) [I25.10]     Priority: High   • S/P drug eluting coronary stent placement [Z95.5]     Priority: Medium     CHARBEL to LAD 1/8/20     • Essential hypertension [I10]     Priority: Medium   • Uncontrolled diabetes mellitus (HCC) [E11.65]     Priority: Medium   • Dyslipidemia [E78.5]     Priority: Medium          • Obesity (BMI 30-39.9) [E66.9]     Priority: Low

## 2020-09-04 NOTE — DISCHARGE PLANNING
Care Transition Team Discharge Planning    Anticipated Discharge Disposition: d/c home w/ HH    Action: LSw acquired Hh choice from pt's spouse: 1-[facility], 2-[facility], and 3-[facility].    Lsw completed choice and faxed to [facility]. She will send referral to [facility] and contact liaison for [facility].    Spouse believes pt may d/c on Sunday. Spouse reports there are many people at home who can support pt if needed.     Barriers to Discharge: TBD    Plan: f/u w/ medical team, [facility], spouse

## 2020-09-04 NOTE — DISCHARGE PLANNING
Care Transition Team Discharge Planning    Anticipated Discharge Disposition: d/c home w/ HH    Action: Lsw received hand written HH order from Ramesh Muir TERRELL RN.    Lsw called pt's spouse, and she will look at the choices a little further and provide the answer to Lsw.      Barriers to Discharge: choice, referral, acceptance    Plan: f/u w/ spouse, [facility], medical team

## 2020-09-05 ENCOUNTER — APPOINTMENT (OUTPATIENT)
Dept: RADIOLOGY | Facility: MEDICAL CENTER | Age: 65
DRG: 219 | End: 2020-09-05
Attending: PHYSICIAN ASSISTANT
Payer: MEDICARE

## 2020-09-05 LAB
ANION GAP SERPL CALC-SCNC: 10 MMOL/L (ref 7–16)
BUN SERPL-MCNC: 47 MG/DL (ref 8–22)
CALCIUM SERPL-MCNC: 7.8 MG/DL (ref 8.5–10.5)
CHLORIDE SERPL-SCNC: 95 MMOL/L (ref 96–112)
CO2 SERPL-SCNC: 27 MMOL/L (ref 20–33)
CREAT SERPL-MCNC: 1.01 MG/DL (ref 0.5–1.4)
ERYTHROCYTE [DISTWIDTH] IN BLOOD BY AUTOMATED COUNT: 50.3 FL (ref 35.9–50)
GLUCOSE BLD-MCNC: 221 MG/DL (ref 65–99)
GLUCOSE BLD-MCNC: 256 MG/DL (ref 65–99)
GLUCOSE BLD-MCNC: 278 MG/DL (ref 65–99)
GLUCOSE SERPL-MCNC: 214 MG/DL (ref 65–99)
HCT VFR BLD AUTO: 24.5 % (ref 42–52)
HEMOCCULT STL QL: NEGATIVE
HGB BLD-MCNC: 7.4 G/DL (ref 14–18)
MCH RBC QN AUTO: 24.2 PG (ref 27–33)
MCHC RBC AUTO-ENTMCNC: 30.2 G/DL (ref 33.7–35.3)
MCV RBC AUTO: 80.1 FL (ref 81.4–97.8)
PLATELET # BLD AUTO: 129 K/UL (ref 164–446)
PMV BLD AUTO: 10.9 FL (ref 9–12.9)
POTASSIUM SERPL-SCNC: 4.3 MMOL/L (ref 3.6–5.5)
RBC # BLD AUTO: 3.06 M/UL (ref 4.7–6.1)
SODIUM SERPL-SCNC: 132 MMOL/L (ref 135–145)
WBC # BLD AUTO: 10.1 K/UL (ref 4.8–10.8)

## 2020-09-05 PROCEDURE — 700102 HCHG RX REV CODE 250 W/ 637 OVERRIDE(OP): Performed by: PHYSICIAN ASSISTANT

## 2020-09-05 PROCEDURE — 82962 GLUCOSE BLOOD TEST: CPT | Mod: 91

## 2020-09-05 PROCEDURE — 86923 COMPATIBILITY TEST ELECTRIC: CPT

## 2020-09-05 PROCEDURE — 700102 HCHG RX REV CODE 250 W/ 637 OVERRIDE(OP): Performed by: NURSE PRACTITIONER

## 2020-09-05 PROCEDURE — 74018 RADEX ABDOMEN 1 VIEW: CPT

## 2020-09-05 PROCEDURE — 99024 POSTOP FOLLOW-UP VISIT: CPT | Performed by: THORACIC SURGERY (CARDIOTHORACIC VASCULAR SURGERY)

## 2020-09-05 PROCEDURE — A9270 NON-COVERED ITEM OR SERVICE: HCPCS | Performed by: CLINICAL NURSE SPECIALIST

## 2020-09-05 PROCEDURE — 700111 HCHG RX REV CODE 636 W/ 250 OVERRIDE (IP): Performed by: NURSE PRACTITIONER

## 2020-09-05 PROCEDURE — 80048 BASIC METABOLIC PNL TOTAL CA: CPT

## 2020-09-05 PROCEDURE — A9270 NON-COVERED ITEM OR SERVICE: HCPCS | Performed by: HOSPITALIST

## 2020-09-05 PROCEDURE — P9016 RBC LEUKOCYTES REDUCED: HCPCS

## 2020-09-05 PROCEDURE — 82272 OCCULT BLD FECES 1-3 TESTS: CPT

## 2020-09-05 PROCEDURE — 36430 TRANSFUSION BLD/BLD COMPNT: CPT

## 2020-09-05 PROCEDURE — 700102 HCHG RX REV CODE 250 W/ 637 OVERRIDE(OP): Performed by: HOSPITALIST

## 2020-09-05 PROCEDURE — 94669 MECHANICAL CHEST WALL OSCILL: CPT

## 2020-09-05 PROCEDURE — 700102 HCHG RX REV CODE 250 W/ 637 OVERRIDE(OP): Performed by: CLINICAL NURSE SPECIALIST

## 2020-09-05 PROCEDURE — 94760 N-INVAS EAR/PLS OXIMETRY 1: CPT

## 2020-09-05 PROCEDURE — 700101 HCHG RX REV CODE 250: Performed by: NURSE PRACTITIONER

## 2020-09-05 PROCEDURE — 770020 HCHG ROOM/CARE - TELE (206)

## 2020-09-05 PROCEDURE — A9270 NON-COVERED ITEM OR SERVICE: HCPCS | Performed by: NURSE PRACTITIONER

## 2020-09-05 PROCEDURE — 700111 HCHG RX REV CODE 636 W/ 250 OVERRIDE (IP): Performed by: CLINICAL NURSE SPECIALIST

## 2020-09-05 PROCEDURE — 85027 COMPLETE CBC AUTOMATED: CPT

## 2020-09-05 PROCEDURE — A9270 NON-COVERED ITEM OR SERVICE: HCPCS | Performed by: PHYSICIAN ASSISTANT

## 2020-09-05 PROCEDURE — 71046 X-RAY EXAM CHEST 2 VIEWS: CPT

## 2020-09-05 RX ORDER — GUAIFENESIN 600 MG/1
600 TABLET, EXTENDED RELEASE ORAL EVERY 12 HOURS
Status: DISCONTINUED | OUTPATIENT
Start: 2020-09-06 | End: 2020-09-06

## 2020-09-05 RX ORDER — SODIUM CHLORIDE 9 MG/ML
INJECTION, SOLUTION INTRAVENOUS
Status: ACTIVE
Start: 2020-09-05 | End: 2020-09-06

## 2020-09-05 RX ADMIN — ACETAMINOPHEN 1000 MG: 500 TABLET ORAL at 12:16

## 2020-09-05 RX ADMIN — ASPIRIN 81 MG: 81 TABLET, COATED ORAL at 08:20

## 2020-09-05 RX ADMIN — ACETAMINOPHEN 650 MG: 325 TABLET, FILM COATED ORAL at 21:16

## 2020-09-05 RX ADMIN — INSULIN GLARGINE 20 UNITS: 100 INJECTION, SOLUTION SUBCUTANEOUS at 17:12

## 2020-09-05 RX ADMIN — CLOPIDOGREL BISULFATE 75 MG: 75 TABLET ORAL at 08:20

## 2020-09-05 RX ADMIN — MUPIROCIN 1 APPLICATION: 20 OINTMENT TOPICAL at 08:21

## 2020-09-05 RX ADMIN — POTASSIUM CHLORIDE 20 MEQ: 1500 TABLET, EXTENDED RELEASE ORAL at 17:03

## 2020-09-05 RX ADMIN — ROSUVASTATIN CALCIUM 20 MG: 20 TABLET, FILM COATED ORAL at 17:03

## 2020-09-05 RX ADMIN — FUROSEMIDE 40 MG: 10 INJECTION, SOLUTION INTRAMUSCULAR; INTRAVENOUS at 17:04

## 2020-09-05 RX ADMIN — Medication 1 EACH: at 13:31

## 2020-09-05 RX ADMIN — OMEPRAZOLE 20 MG: 20 CAPSULE, DELAYED RELEASE ORAL at 08:20

## 2020-09-05 RX ADMIN — AMIODARONE HYDROCHLORIDE 400 MG: 200 TABLET ORAL at 20:38

## 2020-09-05 RX ADMIN — METOPROLOL TARTRATE 25 MG: 25 TABLET, FILM COATED ORAL at 08:21

## 2020-09-05 RX ADMIN — PROCHLORPERAZINE EDISYLATE 10 MG: 5 INJECTION INTRAMUSCULAR; INTRAVENOUS at 05:11

## 2020-09-05 RX ADMIN — DOCUSATE SODIUM 50 MG AND SENNOSIDES 8.6 MG 2 TABLET: 8.6; 5 TABLET, FILM COATED ORAL at 17:02

## 2020-09-05 RX ADMIN — FUROSEMIDE 40 MG: 10 INJECTION, SOLUTION INTRAMUSCULAR; INTRAVENOUS at 08:21

## 2020-09-05 RX ADMIN — DOCUSATE SODIUM 50 MG AND SENNOSIDES 8.6 MG 2 TABLET: 8.6; 5 TABLET, FILM COATED ORAL at 08:21

## 2020-09-05 RX ADMIN — LOSARTAN POTASSIUM 50 MG: 50 TABLET, FILM COATED ORAL at 08:21

## 2020-09-05 RX ADMIN — AMIODARONE HYDROCHLORIDE 400 MG: 200 TABLET ORAL at 08:24

## 2020-09-05 ASSESSMENT — ENCOUNTER SYMPTOMS
HEMOPTYSIS: 0
SPEECH CHANGE: 0
TREMORS: 0
GASTROINTESTINAL NEGATIVE: 1
EYES NEGATIVE: 1
PND: 0
BRUISES/BLEEDS EASILY: 0
RESPIRATORY NEGATIVE: 1
ABDOMINAL PAIN: 0
MEMORY LOSS: 0
BLOOD IN STOOL: 0
FOCAL WEAKNESS: 0
NEUROLOGICAL NEGATIVE: 1
HEADACHES: 0
CHILLS: 0
PHOTOPHOBIA: 0
NAUSEA: 0
EYE PAIN: 0
CONSTITUTIONAL NEGATIVE: 1
POLYDIPSIA: 0
WEIGHT LOSS: 0
VOMITING: 0
MYALGIAS: 1
DIZZINESS: 0
COUGH: 0
PSYCHIATRIC NEGATIVE: 1
FEVER: 0
HALLUCINATIONS: 0
ORTHOPNEA: 0
DOUBLE VISION: 0
SHORTNESS OF BREATH: 0
SEIZURES: 0
PALPITATIONS: 0

## 2020-09-05 ASSESSMENT — PAIN DESCRIPTION - PAIN TYPE
TYPE: SURGICAL PAIN

## 2020-09-05 ASSESSMENT — FIBROSIS 4 INDEX: FIB4 SCORE: 2.05

## 2020-09-05 NOTE — PROGRESS NOTES
Patient with continued complaints of nausea. Fleet enema overnight unsuccessful. Pink Lady ordered and given. Attempted to manually disimpact stool.     1400: Patient up to toilet. Loose and watery BM noted. Will continue to give bowel regimen as tolerated.

## 2020-09-05 NOTE — CARE PLAN
Problem: Post Op Day 4 CABG/Heart Valve Replacement  Goal: Optimal care of the Post Op CABG/Heart Valve replacement Post Op Day 4  Outcome: PROGRESSING AS EXPECTED  Intervention: Ambulate, increasing the distance each time x 3 and before bed  9/4/2020 2338 by Lina Rothman R.N.  Note: Patient declined ambulation due to nausea. Edge of bed and standing complete

## 2020-09-05 NOTE — PROGRESS NOTES
Cardiovascular Surgery Progress Note    Name: Sharad Millan  MRN: [id number]  : 1955  Admit Date: 2020  5:09 PM  Procedure:  Procedure(s) and Anesthesia Type:     * CABG, WITH ENDOSCOPIC VEIN PROCUREMENT X1 - General     * REPLACEMENT, AORTIC VALVE - General     * ECHOCARDIOGRAM, TRANSESOPHAGEAL - General  5 Day Post-Op    Vitals:  Patient Vitals for the past 8 hrs:   Temp SpO2 O2 Delivery Device O2 (LPM) Pulse Resp BP Weight   20 0835 -- 95 % Silicone Nasal Cannula 2 73 18 -- --   20 0600 (!) 35.7 °C (96.2 °F) 94 % Silicone Nasal Cannula 2 -- 18 145/42 --   20 0529 -- -- -- -- -- -- -- 104.4 kg (230 lb 2.6 oz)     Temp (24hrs), Av.8 °C (96.5 °F), Min:35.7 °C (96.2 °F), Max:35.9 °C (96.6 °F)      Respiratory:    Respiration: 18, Pulse Oximetry: 95 %     Chest Tube Drains:  1060 cc    Fluids:    Intake/Output Summary (Last 24 hours) at 2020 1043  Last data filed at 2020 0500  Gross per 24 hour   Intake 400 ml   Output 660 ml   Net -260 ml     Admit weight: Weight: 96.6 kg (213 lb)  Current weight: Weight: 104.4 kg (230 lb 2.6 oz) (20 05)    Labs:  Recent Labs     20  0347 20  0652 20  0325 20  0528   WBC 18.6*  --  14.2* 10.1   RBC 2.81*  --  3.20* 3.06*   HEMOGLOBIN 6.6* 7.8* 7.8* 7.4*   HEMATOCRIT 22.4* 25.1* 25.6* 24.5*   MCV 79.7*  --  80.0* 80.1*   MCH 23.5*  --  24.4* 24.2*   MCHC 29.5*  --  30.5* 30.2*   RDW 45.7  --  47.7 50.3*   PLATELETCT 116*  --  141* 129*   MPV 10.3  --  10.8 10.9         Recent Labs     20  0347 20  0325 20  0528   SODIUM 131* 131* 132*   POTASSIUM 4.8 4.5 4.3   CHLORIDE 97 96 95*   CO2 23 27 27   GLUCOSE 388* 197* 214*   BUN 42* 44* 47*   CREATININE 1.29 1.21 1.01   CALCIUM 7.8* 8.0* 7.8*           Medications:  • losartan  50 mg     • omeprazole  20 mg     • insulin glargine  0.2 Units/kg/day      And   • insulin lispro  0.2 Units/kg/day      And   • insulin lispro  3-14 Units     •  furosemide  40 mg     • potassium chloride SA  20 mEq     • amiodarone  400 mg     • aspirin EC  81 mg     • Pharmacy Consult Request  1 Each     • acetaminophen  1,000 mg     • senna-docusate  2 Tab      And   • polyethylene glycol/lytes  1 Packet      And   • magnesium hydroxide  30 mL     • mupirocin  1 Application     • metoprolol  25 mg     • clopidogrel  75 mg     • rosuvastatin  20 mg          Ordered Medications:    ASA - Yes    Plavix - Yes    Post-operative Beta Blockers - Yes    Ace Inhibitor - on ARB    Statin - Yes          Central Line:  Type of line: [place]  Date of insertion: 8/31/2020  Date of removal: see nurses note    Exam:   Review of Systems   Constitutional: Negative.  Negative for chills, fever and weight loss.   HENT: Negative.  Negative for ear pain, nosebleeds and tinnitus.    Eyes: Negative.  Negative for double vision, photophobia and pain.   Respiratory: Negative.  Negative for cough, hemoptysis and shortness of breath.    Cardiovascular: Positive for leg swelling. Negative for chest pain, palpitations, orthopnea and PND.   Gastrointestinal: Negative.  Negative for abdominal pain, blood in stool, nausea and vomiting.   Genitourinary: Negative.  Negative for frequency, hematuria and urgency.   Musculoskeletal: Positive for joint pain and myalgias.   Skin: Negative.  Negative for rash.   Neurological: Negative.  Negative for dizziness, tremors, speech change, focal weakness, seizures and headaches.   Endo/Heme/Allergies: Negative for polydipsia. Does not bruise/bleed easily.   Psychiatric/Behavioral: Negative.  Negative for hallucinations and memory loss.       Physical Exam  Vitals signs and nursing note reviewed.   Constitutional:       Appearance: He is well-developed. He is ill-appearing.   HENT:      Head: Normocephalic.   Eyes:      Pupils: Pupils are equal, round, and reactive to light.   Neck:      Musculoskeletal: Normal range of motion.   Cardiovascular:      Rate and Rhythm:  Normal rate and regular rhythm.   Pulmonary:      Effort: Pulmonary effort is normal.   Chest:      Chest wall: Tenderness present.   Abdominal:      General: Bowel sounds are normal.      Palpations: Abdomen is soft.   Musculoskeletal: Normal range of motion.      Right lower leg: Edema present.      Left lower leg: Edema present.   Skin:     General: Skin is warm and dry.      Coloration: Skin is pale.   Neurological:      Mental Status: He is alert and oriented to person, place, and time.         Quality Measures:   Quality-Core Measures   Reviewed items::  Radiology images reviewed, EKG reviewed, Labs reviewed and Medications reviewed  Strange catheter::  No Strange  Central line in place:  Need for access  DVT prophylaxis - mechanical:  Not indicated at this time, ambulatory  Ulcer Prophylaxis::  Yes    Assessment/Plan:  POD 1 Extubated, HDS, ginger drip, chest tube output moderate and negative for air leak.  Neuro grossly intact.  Hct low.  Plan:  Wean ginger as tolerated.  1 unit PRBC.  Leave in CT.  May dc swan.  POD 2  HDS, SR, neuro intact, wounds CDI, abdomen S/NT, fluid balance positive, wt up.  Chest tube output minimal overnight.  Glucose high.  Plan:  Dc chest tubes, start lantus, dc strange.  Diurese.  IS/ambulate. CPM.  POD 3  HDS, SR was afib last night now SR on amiodarone protocol, neuro intact, wounds CDI, abdomen S.NT, fluid balance trending negative, wt stable.  Glucose still high.  Hct down last night.  Plan:  Received 1 unit PRBC, start prilosec, increase lantus, guiac all stools, Statin, IS/ambulate. CPM.  POD 4  HDS, SR, neuro intact, wounds CDI, abdomen S/NT, fluid balance negative, wt up.  Still feeling tired.  Plan:  Continue lasix, elevate legs, home health , start arb.  IS/ambulate. CPM.  DC gabapentin.  POD 5:  HDS.  Hct low.  NSR.  Wounds CDI.  Small bowel movement, still distended.  Tired.  Nauseated.  Plan:  1 unit PRBC.  Continue diuresis.  Repeat chest xray.  Enema today.  Routine compazine  to help with appetite.  Wean narcotics.  Guiac negative.  Can increase Losartan to home dose of 100mg if BP elevated.     Active Hospital Problems    Diagnosis   • Acute on chronic systolic heart failure (HCC) [I50.23]     Priority: High   • Severe aortic stenosis [I35.0]     Priority: High   • SVT (supraventricular tachycardia) ([facility]) [I47.1]     Priority: High   • CAD (coronary artery disease) [I25.10]     Priority: High   • S/P drug eluting coronary stent placement [Z95.5]     Priority: Medium     CHARBEL to LAD 1/8/20     • Essential hypertension [I10]     Priority: Medium   • Uncontrolled diabetes mellitus (HCC) [E11.65]     Priority: Medium   • Dyslipidemia [E78.5]     Priority: Medium          • Obesity (BMI 30-39.9) [E66.9]     Priority: Low

## 2020-09-05 NOTE — CARE PLAN
Problem: Post Op Day 4 CABG/Heart Valve Replacement  Goal: Optimal care of the Post Op CABG/Heart Valve replacement Post Op Day 4  Outcome: PROGRESSING AS EXPECTED  Intervention: Up in chair for all meals  Note: Completed.   Intervention: Ambulate, increasing the distance each time x 3 and before bed  Note: Ambulates as tolerated. Encouraged and education provided. PT/OT ordered. Pending IRF acceptance   Intervention: IS q 1 hour while awake and record best IS volume  Note: 750, baseline 1500. Encouraged   Intervention: Consider removal of strange, chest tube and pacer wire if not already done  Note: All devices previously removed   Intervention: Discharge Education  Note: Ongoing   Intervention: Add special instructions for cardiac surgery discharge instructions - [facility] to after visit summary and review with patient  Note: Ongoing

## 2020-09-05 NOTE — CARE PLAN
Problem: Hyperinflation:  Goal: Prevent or improve atelectasis  Outcome: PROGRESSING SLOWER THAN EXPECTED       Respiratory Update    Treatment modality: PEP/IS  Frequency: QID    Pt tolerating current treatments well with no adverse reactions.

## 2020-09-05 NOTE — PROGRESS NOTES
Report received. Pt care assumed. Assessment performed. Pt AOx4. Pt laying supine in bed. Pt denies pain and no signs of distress. Bed in low, locked position. Pt educated on calling to ambulate. Call light within reach. Treaded socks on pt.  Hourly rounding in place. Patient is extremely nauseous and gets light headed with ambulation. Ambulation encouraged per [facility] checklist but patient declined.

## 2020-09-05 NOTE — CARE PLAN
Problem: Hyperinflation:  Goal: Prevent or improve atelectasis  Outcome: PROGRESSING SLOWER THAN EXPECTED   PEP QID  Therapy held due to nausea.  Patient achieving 750 ml on IS. Receiving 2 LPM via n/c

## 2020-09-06 LAB
ANION GAP SERPL CALC-SCNC: 11 MMOL/L (ref 7–16)
BUN SERPL-MCNC: 41 MG/DL (ref 8–22)
CALCIUM SERPL-MCNC: 8 MG/DL (ref 8.5–10.5)
CHLORIDE SERPL-SCNC: 91 MMOL/L (ref 96–112)
CO2 SERPL-SCNC: 27 MMOL/L (ref 20–33)
CREAT SERPL-MCNC: 1.02 MG/DL (ref 0.5–1.4)
ERYTHROCYTE [DISTWIDTH] IN BLOOD BY AUTOMATED COUNT: 48.4 FL (ref 35.9–50)
GLUCOSE BLD-MCNC: 203 MG/DL (ref 65–99)
GLUCOSE BLD-MCNC: 215 MG/DL (ref 65–99)
GLUCOSE BLD-MCNC: 252 MG/DL (ref 65–99)
GLUCOSE BLD-MCNC: 354 MG/DL (ref 65–99)
GLUCOSE BLD-MCNC: 392 MG/DL (ref 65–99)
GLUCOSE SERPL-MCNC: 217 MG/DL (ref 65–99)
HCT VFR BLD AUTO: 27.8 % (ref 42–52)
HGB BLD-MCNC: 8.8 G/DL (ref 14–18)
MCH RBC QN AUTO: 25 PG (ref 27–33)
MCHC RBC AUTO-ENTMCNC: 31.7 G/DL (ref 33.7–35.3)
MCV RBC AUTO: 79 FL (ref 81.4–97.8)
PLATELET # BLD AUTO: 169 K/UL (ref 164–446)
PMV BLD AUTO: 10.5 FL (ref 9–12.9)
POTASSIUM SERPL-SCNC: 4.3 MMOL/L (ref 3.6–5.5)
RBC # BLD AUTO: 3.52 M/UL (ref 4.7–6.1)
SODIUM SERPL-SCNC: 129 MMOL/L (ref 135–145)
WBC # BLD AUTO: 11.8 K/UL (ref 4.8–10.8)

## 2020-09-06 PROCEDURE — 700102 HCHG RX REV CODE 250 W/ 637 OVERRIDE(OP): Performed by: HOSPITALIST

## 2020-09-06 PROCEDURE — 99024 POSTOP FOLLOW-UP VISIT: CPT | Performed by: THORACIC SURGERY (CARDIOTHORACIC VASCULAR SURGERY)

## 2020-09-06 PROCEDURE — A9270 NON-COVERED ITEM OR SERVICE: HCPCS | Performed by: CLINICAL NURSE SPECIALIST

## 2020-09-06 PROCEDURE — 80048 BASIC METABOLIC PNL TOTAL CA: CPT

## 2020-09-06 PROCEDURE — 97116 GAIT TRAINING THERAPY: CPT

## 2020-09-06 PROCEDURE — 94669 MECHANICAL CHEST WALL OSCILL: CPT

## 2020-09-06 PROCEDURE — A9270 NON-COVERED ITEM OR SERVICE: HCPCS | Performed by: NURSE PRACTITIONER

## 2020-09-06 PROCEDURE — 700111 HCHG RX REV CODE 636 W/ 250 OVERRIDE (IP): Performed by: CLINICAL NURSE SPECIALIST

## 2020-09-06 PROCEDURE — 94760 N-INVAS EAR/PLS OXIMETRY 1: CPT

## 2020-09-06 PROCEDURE — 700102 HCHG RX REV CODE 250 W/ 637 OVERRIDE(OP): Performed by: CLINICAL NURSE SPECIALIST

## 2020-09-06 PROCEDURE — A9270 NON-COVERED ITEM OR SERVICE: HCPCS | Performed by: PHYSICIAN ASSISTANT

## 2020-09-06 PROCEDURE — 97530 THERAPEUTIC ACTIVITIES: CPT

## 2020-09-06 PROCEDURE — A9270 NON-COVERED ITEM OR SERVICE: HCPCS | Performed by: HOSPITALIST

## 2020-09-06 PROCEDURE — 770020 HCHG ROOM/CARE - TELE (206)

## 2020-09-06 PROCEDURE — 82962 GLUCOSE BLOOD TEST: CPT | Mod: 91

## 2020-09-06 PROCEDURE — 700102 HCHG RX REV CODE 250 W/ 637 OVERRIDE(OP): Performed by: NURSE PRACTITIONER

## 2020-09-06 PROCEDURE — 85027 COMPLETE CBC AUTOMATED: CPT

## 2020-09-06 PROCEDURE — 700102 HCHG RX REV CODE 250 W/ 637 OVERRIDE(OP): Performed by: PHYSICIAN ASSISTANT

## 2020-09-06 PROCEDURE — 700101 HCHG RX REV CODE 250: Performed by: NURSE PRACTITIONER

## 2020-09-06 PROCEDURE — 700111 HCHG RX REV CODE 636 W/ 250 OVERRIDE (IP): Performed by: NURSE PRACTITIONER

## 2020-09-06 RX ORDER — GUAIFENESIN 600 MG/1
600 TABLET, EXTENDED RELEASE ORAL 2 TIMES DAILY PRN
Status: DISCONTINUED | OUTPATIENT
Start: 2020-09-06 | End: 2020-09-08 | Stop reason: HOSPADM

## 2020-09-06 RX ORDER — ACETAMINOPHEN 500 MG
1000 TABLET ORAL EVERY 6 HOURS
Status: DISCONTINUED | OUTPATIENT
Start: 2020-09-06 | End: 2020-09-08 | Stop reason: HOSPADM

## 2020-09-06 RX ADMIN — METOPROLOL TARTRATE 25 MG: 25 TABLET, FILM COATED ORAL at 04:47

## 2020-09-06 RX ADMIN — ROSUVASTATIN CALCIUM 20 MG: 20 TABLET, FILM COATED ORAL at 17:32

## 2020-09-06 RX ADMIN — LOSARTAN POTASSIUM 50 MG: 50 TABLET, FILM COATED ORAL at 04:46

## 2020-09-06 RX ADMIN — AMIODARONE HYDROCHLORIDE 400 MG: 200 TABLET ORAL at 08:09

## 2020-09-06 RX ADMIN — TRAMADOL HYDROCHLORIDE 50 MG: 50 TABLET, FILM COATED ORAL at 22:52

## 2020-09-06 RX ADMIN — POTASSIUM CHLORIDE 20 MEQ: 1500 TABLET, EXTENDED RELEASE ORAL at 17:32

## 2020-09-06 RX ADMIN — ACETAMINOPHEN 1000 MG: 500 TABLET ORAL at 22:52

## 2020-09-06 RX ADMIN — GUAIFENESIN 600 MG: 600 TABLET, EXTENDED RELEASE ORAL at 04:47

## 2020-09-06 RX ADMIN — CLOPIDOGREL BISULFATE 75 MG: 75 TABLET ORAL at 04:47

## 2020-09-06 RX ADMIN — TRAMADOL HYDROCHLORIDE 50 MG: 50 TABLET, FILM COATED ORAL at 11:42

## 2020-09-06 RX ADMIN — POLYETHYLENE GLYCOL 3350 1 PACKET: 17 POWDER, FOR SOLUTION ORAL at 04:47

## 2020-09-06 RX ADMIN — TRAMADOL HYDROCHLORIDE 50 MG: 50 TABLET, FILM COATED ORAL at 05:16

## 2020-09-06 RX ADMIN — ACETAMINOPHEN 1000 MG: 500 TABLET ORAL at 17:31

## 2020-09-06 RX ADMIN — PROCHLORPERAZINE EDISYLATE 10 MG: 5 INJECTION INTRAMUSCULAR; INTRAVENOUS at 09:50

## 2020-09-06 RX ADMIN — FUROSEMIDE 40 MG: 10 INJECTION, SOLUTION INTRAMUSCULAR; INTRAVENOUS at 17:31

## 2020-09-06 RX ADMIN — OMEPRAZOLE 20 MG: 20 CAPSULE, DELAYED RELEASE ORAL at 04:47

## 2020-09-06 RX ADMIN — MAGNESIUM HYDROXIDE 30 ML: 2400 SUSPENSION ORAL at 04:47

## 2020-09-06 RX ADMIN — POTASSIUM CHLORIDE 20 MEQ: 1500 TABLET, EXTENDED RELEASE ORAL at 04:47

## 2020-09-06 RX ADMIN — ACETAMINOPHEN 1000 MG: 500 TABLET ORAL at 11:42

## 2020-09-06 RX ADMIN — ASPIRIN 81 MG: 81 TABLET, COATED ORAL at 04:47

## 2020-09-06 RX ADMIN — DOCUSATE SODIUM 50 MG AND SENNOSIDES 8.6 MG 2 TABLET: 8.6; 5 TABLET, FILM COATED ORAL at 04:47

## 2020-09-06 RX ADMIN — FUROSEMIDE 40 MG: 10 INJECTION, SOLUTION INTRAMUSCULAR; INTRAVENOUS at 04:47

## 2020-09-06 RX ADMIN — METOPROLOL TARTRATE 25 MG: 25 TABLET, FILM COATED ORAL at 17:32

## 2020-09-06 RX ADMIN — ONDANSETRON 8 MG: 2 INJECTION INTRAMUSCULAR; INTRAVENOUS at 04:53

## 2020-09-06 RX ADMIN — INSULIN GLARGINE 20 UNITS: 100 INJECTION, SOLUTION SUBCUTANEOUS at 18:29

## 2020-09-06 ASSESSMENT — ENCOUNTER SYMPTOMS
BRUISES/BLEEDS EASILY: 0
COUGH: 0
RESPIRATORY NEGATIVE: 1
NEUROLOGICAL NEGATIVE: 1
CONSTITUTIONAL NEGATIVE: 1
SPEECH CHANGE: 0
SHORTNESS OF BREATH: 0
POLYDIPSIA: 0
DOUBLE VISION: 0
PND: 0
MEMORY LOSS: 0
TREMORS: 0
MYALGIAS: 1
EYES NEGATIVE: 1
CHILLS: 0
DIZZINESS: 0
FOCAL WEAKNESS: 0
HEMOPTYSIS: 0
NAUSEA: 1
WEIGHT LOSS: 0
HALLUCINATIONS: 0
BLOOD IN STOOL: 0
PHOTOPHOBIA: 0
PSYCHIATRIC NEGATIVE: 1
SEIZURES: 0
FEVER: 0
VOMITING: 0
HEADACHES: 0
ORTHOPNEA: 0
PALPITATIONS: 0
EYE PAIN: 0
ABDOMINAL PAIN: 0

## 2020-09-06 ASSESSMENT — PAIN DESCRIPTION - PAIN TYPE
TYPE: SURGICAL PAIN

## 2020-09-06 ASSESSMENT — COGNITIVE AND FUNCTIONAL STATUS - GENERAL
TURNING FROM BACK TO SIDE WHILE IN FLAT BAD: A LOT
WALKING IN HOSPITAL ROOM: A LITTLE
STANDING UP FROM CHAIR USING ARMS: A LITTLE
MOVING FROM LYING ON BACK TO SITTING ON SIDE OF FLAT BED: A LOT
MOBILITY SCORE: 14
MOVING TO AND FROM BED TO CHAIR: UNABLE
CLIMB 3 TO 5 STEPS WITH RAILING: A LITTLE
SUGGESTED CMS G CODE MODIFIER MOBILITY: CL

## 2020-09-06 ASSESSMENT — GAIT ASSESSMENTS
DISTANCE (FEET): 170
GAIT LEVEL OF ASSIST: MINIMAL ASSIST
ASSISTIVE DEVICE: FRONT WHEEL WALKER
DEVIATION: BRADYKINETIC

## 2020-09-06 NOTE — CARE PLAN
Problem: Hyperinflation:  Goal: Prevent or improve atelectasis  Outcome: NOT MET   PEP QID  Patient achieving 750 ml  Patient receiving 2 LPM

## 2020-09-06 NOTE — PROGRESS NOTES
Cardiovascular Surgery Progress Note    Name: Sharad Millan  MRN: [id number]  : 1955  Admit Date: 2020  5:09 PM  Procedure:  Procedure(s) and Anesthesia Type:     * CABG, WITH ENDOSCOPIC VEIN PROCUREMENT X1 - General     * REPLACEMENT, AORTIC VALVE - General     * ECHOCARDIOGRAM, TRANSESOPHAGEAL - General  6 Day Post-Op    Vitals:  Patient Vitals for the past 8 hrs:   Temp SpO2 O2 Delivery Device O2 (LPM) Pulse Resp BP   20 0812 -- 93 % Silicone Nasal Cannula 2 63 20 --   20 0800 (!) 35.7 °C (96.3 °F) -- Silicone Nasal Cannula 2 -- -- 128/51   20 0600 35.9 °C (96.7 °F) 94 % Nasal Cannula 1 78 (!) 22 113/73     Temp (24hrs), Av.8 °C (96.5 °F), Min:35.5 °C (95.9 °F), Max:36.1 °C (97 °F)      Respiratory:    Respiration: 20, Pulse Oximetry: 93 %     Chest Tube Drains:  1060 cc    Fluids:    Intake/Output Summary (Last 24 hours) at 2020 0937  Last data filed at 2020 0600  Gross per 24 hour   Intake 950 ml   Output 1475 ml   Net -525 ml     Admit weight: Weight: 96.6 kg (213 lb)  Current weight: Weight: 104.4 kg (230 lb 2.6 oz) (20)    Labs:  Recent Labs     20  05   WBC 14.2* 10.1 11.8*   RBC 3.20* 3.06* 3.52*   HEMOGLOBIN 7.8* 7.4* 8.8*   HEMATOCRIT 25.6* 24.5* 27.8*   MCV 80.0* 80.1* 79.0*   MCH 24.4* 24.2* 25.0*   MCHC 30.5* 30.2* 31.7*   RDW 47.7 50.3* 48.4   PLATELETCT 141* 129* 169   MPV 10.8 10.9 10.5         Recent Labs     2020  05   SODIUM 131* 132* 129*   POTASSIUM 4.5 4.3 4.3   CHLORIDE 96 95* 91*   CO2 27 27 27   GLUCOSE 197* 214* 217*   BUN 44* 47* 41*   CREATININE 1.21 1.01 1.02   CALCIUM 8.0* 7.8* 8.0*           Medications:  • losartan  50 mg     • omeprazole  20 mg     • insulin glargine  0.2 Units/kg/day      And   • insulin lispro  0.2 Units/kg/day      And   • insulin lispro  3-14 Units     • furosemide  40 mg     • potassium chloride SA  20 mEq     • amiodarone  400 mg      • aspirin EC  81 mg     • Pharmacy Consult Request  1 Each     • senna-docusate  2 Tab      And   • polyethylene glycol/lytes  1 Packet      And   • magnesium hydroxide  30 mL     • metoprolol  25 mg     • clopidogrel  75 mg     • rosuvastatin  20 mg          Ordered Medications:    ASA - Yes    Plavix - Yes    Post-operative Beta Blockers - Yes    Ace Inhibitor - on ARB    Statin - Yes          Central Line:  Type of line: [place]  Date of insertion: 8/31/2020  Date of removal: see nurses note    Exam:   Review of Systems   Constitutional: Negative.  Negative for chills, fever and weight loss.   HENT: Negative.  Negative for ear pain, nosebleeds and tinnitus.    Eyes: Negative.  Negative for double vision, photophobia and pain.   Respiratory: Negative.  Negative for cough, hemoptysis and shortness of breath.    Cardiovascular: Positive for leg swelling. Negative for chest pain, palpitations, orthopnea and PND.   Gastrointestinal: Positive for nausea. Negative for abdominal pain, blood in stool and vomiting.   Genitourinary: Negative.  Negative for frequency, hematuria and urgency.   Musculoskeletal: Positive for joint pain and myalgias.   Skin: Negative.  Negative for rash.   Neurological: Negative.  Negative for dizziness, tremors, speech change, focal weakness, seizures and headaches.   Endo/Heme/Allergies: Negative for polydipsia. Does not bruise/bleed easily.   Psychiatric/Behavioral: Negative.  Negative for hallucinations and memory loss.       Physical Exam  Vitals signs and nursing note reviewed.   Constitutional:       Appearance: He is well-developed. He is ill-appearing.   HENT:      Head: Normocephalic.   Eyes:      Pupils: Pupils are equal, round, and reactive to light.   Neck:      Musculoskeletal: Normal range of motion.   Cardiovascular:      Rate and Rhythm: Normal rate and regular rhythm.   Pulmonary:      Effort: Pulmonary effort is normal.   Chest:      Chest wall: Tenderness present.    Abdominal:      General: Bowel sounds are normal.      Palpations: Abdomen is soft.   Musculoskeletal: Normal range of motion.      Right lower leg: Edema present.      Left lower leg: Edema present.   Skin:     General: Skin is warm and dry.      Coloration: Skin is pale.   Neurological:      Mental Status: He is alert and oriented to person, place, and time.         Quality Measures:   Quality-Core Measures   Reviewed items::  Radiology images reviewed, EKG reviewed, Labs reviewed and Medications reviewed  Strange catheter::  No Strange  Central line in place:  Need for access  DVT prophylaxis - mechanical:  Not indicated at this time, ambulatory  Ulcer Prophylaxis::  Yes    Assessment/Plan:  POD 1 Extubated, HDS, ginger drip, chest tube output moderate and negative for air leak.  Neuro grossly intact.  Hct low.  Plan:  Wean ginger as tolerated.  1 unit PRBC.  Leave in CT.  May dc swan.  POD 2  HDS, SR, neuro intact, wounds CDI, abdomen S/NT, fluid balance positive, wt up.  Chest tube output minimal overnight.  Glucose high.  Plan:  Dc chest tubes, start lantus, dc strange.  Diurese.  IS/ambulate. CPM.  POD 3  HDS, SR was afib last night now SR on amiodarone protocol, neuro intact, wounds CDI, abdomen S.NT, fluid balance trending negative, wt stable.  Glucose still high.  Hct down last night.  Plan:  Received 1 unit PRBC, start prilosec, increase lantus, guiac all stools, Statin, IS/ambulate. CPM.  POD 4  HDS, SR, neuro intact, wounds CDI, abdomen S/NT, fluid balance negative, wt up.  Still feeling tired.  Plan:  Continue lasix, elevate legs, home health , start arb.  IS/ambulate. CPM.  DC gabapentin.  POD 5:  HDS.  Hct low.  NSR.  Wounds CDI.  Small bowel movement, still distended.  Tired.  Nauseated.  Plan:  1 unit PRBC.  Continue diuresis.  Repeat chest xray.  Enema today.  Routine compazine to help with appetite.  Wean narcotics.  Guiac negative.  Can increase Losartan to home dose of 100mg if BP elevated.   POD 6:   HDS.  H&H improved.  Wounds CDI.  Several bowel movements yesterday.  Nausea/fatigue.  Plan:  Routine compazine.  Continue diuresis.  Patient needs to be up and out of bed several times/day.  PT/OT.  Encourage IS.  Will initiate referral to rehab.     Active Hospital Problems    Diagnosis   • Acute on chronic systolic heart failure ([facility]) [I50.23]     Priority: High   • Severe aortic stenosis [I35.0]     Priority: High   • SVT (supraventricular tachycardia) ([facility]) [I47.1]     Priority: High   • CAD (coronary artery disease) [I25.10]     Priority: High   • S/P drug eluting coronary stent placement [Z95.5]     Priority: Medium     CHARBEL to LAD 1/8/20     • Essential hypertension [I10]     Priority: Medium   • Uncontrolled diabetes mellitus ([facility]) [E11.65]     Priority: Medium   • Dyslipidemia [E78.5]     Priority: Medium          • Obesity (BMI 30-39.9) [E66.9]     Priority: Low

## 2020-09-06 NOTE — DISCHARGE PLANNING
[facility] Transitional Care Coordination     Referral from: Hiram HENDERSON   Facesheet indicates: Medicare   Potential Rehab Diagnosis: Cardiac debility     Chart review indicates patient has on going medical management and anticipate therapy needs to possibly meet inpatient rehab facility criteria with the goal of returning to community.    D/C support:Per face sheet spouse.      Physiatry consultation pending  per protocol.        Waiting on additional information to determine appropriateness for acute inpatient rehabilitation. Will continue to follow. Please have therapy PT/OT update as medically appropriate.     Thank you for the referral.

## 2020-09-07 LAB
ANION GAP SERPL CALC-SCNC: 8 MMOL/L (ref 7–16)
BUN SERPL-MCNC: 37 MG/DL (ref 8–22)
CALCIUM SERPL-MCNC: 8.2 MG/DL (ref 8.5–10.5)
CHLORIDE SERPL-SCNC: 93 MMOL/L (ref 96–112)
CO2 SERPL-SCNC: 29 MMOL/L (ref 20–33)
CREAT SERPL-MCNC: 1.04 MG/DL (ref 0.5–1.4)
ERYTHROCYTE [DISTWIDTH] IN BLOOD BY AUTOMATED COUNT: 50.3 FL (ref 35.9–50)
GLUCOSE BLD-MCNC: 128 MG/DL (ref 65–99)
GLUCOSE BLD-MCNC: 174 MG/DL (ref 65–99)
GLUCOSE BLD-MCNC: 216 MG/DL (ref 65–99)
GLUCOSE BLD-MCNC: 248 MG/DL (ref 65–99)
GLUCOSE BLD-MCNC: 252 MG/DL (ref 65–99)
GLUCOSE BLD-MCNC: 259 MG/DL (ref 65–99)
GLUCOSE BLD-MCNC: 280 MG/DL (ref 65–99)
GLUCOSE SERPL-MCNC: 141 MG/DL (ref 65–99)
HCT VFR BLD AUTO: 28 % (ref 42–52)
HGB BLD-MCNC: 8.4 G/DL (ref 14–18)
MCH RBC QN AUTO: 24.2 PG (ref 27–33)
MCHC RBC AUTO-ENTMCNC: 30 G/DL (ref 33.7–35.3)
MCV RBC AUTO: 80.7 FL (ref 81.4–97.8)
PLATELET # BLD AUTO: 175 K/UL (ref 164–446)
PMV BLD AUTO: 10.7 FL (ref 9–12.9)
POTASSIUM SERPL-SCNC: 4.1 MMOL/L (ref 3.6–5.5)
RBC # BLD AUTO: 3.47 M/UL (ref 4.7–6.1)
SODIUM SERPL-SCNC: 130 MMOL/L (ref 135–145)
WBC # BLD AUTO: 11.5 K/UL (ref 4.8–10.8)

## 2020-09-07 PROCEDURE — 82962 GLUCOSE BLOOD TEST: CPT | Mod: 91

## 2020-09-07 PROCEDURE — 770020 HCHG ROOM/CARE - TELE (206)

## 2020-09-07 PROCEDURE — A9270 NON-COVERED ITEM OR SERVICE: HCPCS | Performed by: PHYSICIAN ASSISTANT

## 2020-09-07 PROCEDURE — A9270 NON-COVERED ITEM OR SERVICE: HCPCS | Performed by: CLINICAL NURSE SPECIALIST

## 2020-09-07 PROCEDURE — 80048 BASIC METABOLIC PNL TOTAL CA: CPT

## 2020-09-07 PROCEDURE — 700111 HCHG RX REV CODE 636 W/ 250 OVERRIDE (IP): Performed by: CLINICAL NURSE SPECIALIST

## 2020-09-07 PROCEDURE — 700102 HCHG RX REV CODE 250 W/ 637 OVERRIDE(OP): Performed by: NURSE PRACTITIONER

## 2020-09-07 PROCEDURE — 85027 COMPLETE CBC AUTOMATED: CPT

## 2020-09-07 PROCEDURE — A9270 NON-COVERED ITEM OR SERVICE: HCPCS | Performed by: NURSE PRACTITIONER

## 2020-09-07 PROCEDURE — 700101 HCHG RX REV CODE 250: Performed by: PHYSICIAN ASSISTANT

## 2020-09-07 PROCEDURE — 700102 HCHG RX REV CODE 250 W/ 637 OVERRIDE(OP): Performed by: PHYSICIAN ASSISTANT

## 2020-09-07 PROCEDURE — 700111 HCHG RX REV CODE 636 W/ 250 OVERRIDE (IP): Performed by: NURSE PRACTITIONER

## 2020-09-07 PROCEDURE — 94760 N-INVAS EAR/PLS OXIMETRY 1: CPT

## 2020-09-07 PROCEDURE — 700102 HCHG RX REV CODE 250 W/ 637 OVERRIDE(OP): Performed by: HOSPITALIST

## 2020-09-07 PROCEDURE — 700102 HCHG RX REV CODE 250 W/ 637 OVERRIDE(OP): Performed by: CLINICAL NURSE SPECIALIST

## 2020-09-07 PROCEDURE — A9270 NON-COVERED ITEM OR SERVICE: HCPCS | Performed by: HOSPITALIST

## 2020-09-07 PROCEDURE — 97535 SELF CARE MNGMENT TRAINING: CPT

## 2020-09-07 PROCEDURE — 94669 MECHANICAL CHEST WALL OSCILL: CPT

## 2020-09-07 RX ORDER — AMOXICILLIN 250 MG
2 CAPSULE ORAL 2 TIMES DAILY
Status: DISCONTINUED | OUTPATIENT
Start: 2020-09-08 | End: 2020-09-08 | Stop reason: HOSPADM

## 2020-09-07 RX ORDER — BISACODYL 10 MG
10 SUPPOSITORY, RECTAL RECTAL
Status: DISCONTINUED | OUTPATIENT
Start: 2020-09-07 | End: 2020-09-08 | Stop reason: HOSPADM

## 2020-09-07 RX ORDER — POLYETHYLENE GLYCOL 3350 17 G/17G
1 POWDER, FOR SOLUTION ORAL NIGHTLY
Status: DISCONTINUED | OUTPATIENT
Start: 2020-09-07 | End: 2020-09-08 | Stop reason: HOSPADM

## 2020-09-07 RX ORDER — AMIODARONE HYDROCHLORIDE 200 MG/1
200 TABLET ORAL 2 TIMES DAILY
Status: DISCONTINUED | OUTPATIENT
Start: 2020-09-07 | End: 2020-09-08 | Stop reason: HOSPADM

## 2020-09-07 RX ADMIN — FUROSEMIDE 40 MG: 10 INJECTION, SOLUTION INTRAMUSCULAR; INTRAVENOUS at 17:07

## 2020-09-07 RX ADMIN — PROCHLORPERAZINE EDISYLATE 10 MG: 5 INJECTION INTRAMUSCULAR; INTRAVENOUS at 05:13

## 2020-09-07 RX ADMIN — INSULIN GLARGINE 20 UNITS: 100 INJECTION, SOLUTION SUBCUTANEOUS at 17:12

## 2020-09-07 RX ADMIN — METOPROLOL TARTRATE 25 MG: 25 TABLET, FILM COATED ORAL at 05:30

## 2020-09-07 RX ADMIN — DOCUSATE SODIUM 50 MG AND SENNOSIDES 8.6 MG 2 TABLET: 8.6; 5 TABLET, FILM COATED ORAL at 17:07

## 2020-09-07 RX ADMIN — OMEPRAZOLE 20 MG: 20 CAPSULE, DELAYED RELEASE ORAL at 05:29

## 2020-09-07 RX ADMIN — METOPROLOL TARTRATE 12.5 MG: 25 TABLET, FILM COATED ORAL at 17:05

## 2020-09-07 RX ADMIN — ASPIRIN 81 MG: 81 TABLET, COATED ORAL at 05:30

## 2020-09-07 RX ADMIN — DOCUSATE SODIUM 50 MG AND SENNOSIDES 8.6 MG 2 TABLET: 8.6; 5 TABLET, FILM COATED ORAL at 05:30

## 2020-09-07 RX ADMIN — MAGNESIUM HYDROXIDE 30 ML: 400 SUSPENSION ORAL at 20:26

## 2020-09-07 RX ADMIN — ROSUVASTATIN CALCIUM 20 MG: 20 TABLET, FILM COATED ORAL at 17:07

## 2020-09-07 RX ADMIN — ACETAMINOPHEN 1000 MG: 500 TABLET ORAL at 23:10

## 2020-09-07 RX ADMIN — GUAIFENESIN 600 MG: 600 TABLET, EXTENDED RELEASE ORAL at 21:06

## 2020-09-07 RX ADMIN — ACETAMINOPHEN 1000 MG: 500 TABLET ORAL at 17:05

## 2020-09-07 RX ADMIN — POTASSIUM CHLORIDE 20 MEQ: 1500 TABLET, EXTENDED RELEASE ORAL at 05:29

## 2020-09-07 RX ADMIN — LOSARTAN POTASSIUM 50 MG: 50 TABLET, FILM COATED ORAL at 05:29

## 2020-09-07 RX ADMIN — POLYETHYLENE GLYCOL 3350 1 PACKET: 17 POWDER, FOR SOLUTION ORAL at 20:26

## 2020-09-07 RX ADMIN — ACETAMINOPHEN 1000 MG: 500 TABLET ORAL at 11:50

## 2020-09-07 RX ADMIN — CLOPIDOGREL BISULFATE 75 MG: 75 TABLET ORAL at 05:29

## 2020-09-07 RX ADMIN — FUROSEMIDE 40 MG: 10 INJECTION, SOLUTION INTRAMUSCULAR; INTRAVENOUS at 05:30

## 2020-09-07 RX ADMIN — ACETAMINOPHEN 1000 MG: 500 TABLET ORAL at 05:29

## 2020-09-07 RX ADMIN — AMIODARONE HYDROCHLORIDE 200 MG: 200 TABLET ORAL at 17:06

## 2020-09-07 RX ADMIN — TRAMADOL HYDROCHLORIDE 50 MG: 50 TABLET, FILM COATED ORAL at 11:51

## 2020-09-07 RX ADMIN — POTASSIUM CHLORIDE 20 MEQ: 1500 TABLET, EXTENDED RELEASE ORAL at 17:04

## 2020-09-07 RX ADMIN — AMIODARONE HYDROCHLORIDE 200 MG: 200 TABLET ORAL at 07:10

## 2020-09-07 RX ADMIN — TRAMADOL HYDROCHLORIDE 50 MG: 50 TABLET, FILM COATED ORAL at 23:10

## 2020-09-07 ASSESSMENT — COGNITIVE AND FUNCTIONAL STATUS - GENERAL
SUGGESTED CMS G CODE MODIFIER DAILY ACTIVITY: CK
HELP NEEDED FOR BATHING: A LOT
TOILETING: A LITTLE
DRESSING REGULAR LOWER BODY CLOTHING: A LOT
DAILY ACTIVITIY SCORE: 18
DRESSING REGULAR UPPER BODY CLOTHING: A LITTLE

## 2020-09-07 ASSESSMENT — ENCOUNTER SYMPTOMS
NEUROLOGICAL NEGATIVE: 1
MEMORY LOSS: 0
HEADACHES: 0
SEIZURES: 0
DIZZINESS: 0
EYE PAIN: 0
CONSTITUTIONAL NEGATIVE: 1
MYALGIAS: 1
VOMITING: 0
SPEECH CHANGE: 0
PND: 0
PHOTOPHOBIA: 0
HEMOPTYSIS: 0
PSYCHIATRIC NEGATIVE: 1
HALLUCINATIONS: 0
NAUSEA: 1
EYES NEGATIVE: 1
DOUBLE VISION: 0
ABDOMINAL PAIN: 0
BLOOD IN STOOL: 0
WEIGHT LOSS: 0
ORTHOPNEA: 0
COUGH: 0
FEVER: 0
FOCAL WEAKNESS: 0
SHORTNESS OF BREATH: 0
BRUISES/BLEEDS EASILY: 0
PALPITATIONS: 0
CHILLS: 0
TREMORS: 0
POLYDIPSIA: 0
RESPIRATORY NEGATIVE: 1

## 2020-09-07 ASSESSMENT — PAIN DESCRIPTION - PAIN TYPE
TYPE: SURGICAL PAIN

## 2020-09-07 ASSESSMENT — FIBROSIS 4 INDEX: FIB4 SCORE: 1.65

## 2020-09-07 NOTE — FACE TO FACE
Face to Face Note  -  Durable Medical Equipment    Lore Gandhi P.A.-C. - NPI: [id number]  I certify that this patient is under my care and that they have had a durable medical equipment(DME)face to face encounter by myself that meets the physician DME face-to-face encounter requirements with this patient on:    Date of encounter:   Patient:                    MRN:                       YOB: 2021  Sharadcali [facility]  [id number]  1955     The encounter with the patient was in whole, or in part, for the following medical condition, which is the primary reason for durable medical equipment:  Post-Op Surgery    I certify that, based on my findings, the following durable medical equipment is medically necessary:  Walkers.    HOME O2 Saturation Measurements:(Values must be present for Home Oxygen orders)  Room air sat at rest: 88  Room air sat with amb: 86  With liters of O2: 1, O2 sat at rest with O2: 92  With Liters of O2: 1, O2 sat with amb with O2 : 92  Is the patient mobile?: Yes    My Clinical findings support the need for the above equipment due to:  Hypoxia    Supporting Symptoms: shortness of breath with exertion.     ------------------------------------------------------------------------------------------------------------------    Face to Face Supporting Documentation - Home Health    The encounter with this patient was in whole or in part the primary reason for home health admission.    Date of encounter:   Patient:                    MRN:                       YOB: 2021  Sharad [facility]  [id number]  1955     Home health to see patient for:  Skilled Nursing care for assessment, interventions & education    Skilled need for:  Surgical Aftercare post op aortic valve replacement    Skilled nursing interventions to include:  Wound Care    Homebound evidenced status by:  Need the aid of supportive devices such as crutches, canes, wheelchairs or walkers.  Leaving home must require a considerable and taxing effort. There must exist a normal inability to leave the home.    Community Physician to provide follow up care: Don Khoury D.O.     Optional Interventions    Wound information & treatment:    Home Infusion Therapy orders:    Line/Drain/Airway:    I certify the face to face encounter for this home care referral meets the CMS requirements and the encounter/clinical assessment with the patient was, in whole, or in part, for the medical condition(s) listed above, which is the primary reason for home health care. Based on my clinical findings: the service(s) are medically necessary, support the need for home health care, and the homebound criteria are met.  I certify that this patient has had a face to face encounter by myself.  Lore Gandhi P.A.-C. - NPI: [id number]    *Debility, frailty and advanced age in the absence of an acute deterioration or exacerbation of a condition do not qualify a patient for home health.

## 2020-09-07 NOTE — THERAPY
Occupational Therapy  Daily Treatment     Patient Name: Sharad Millan  Age:  64 y.o., Sex:  male  Medical Record #: [id number]  Today's Date: 9/7/2020     Precautions  Precautions: Fall Risk, Swallow Precautions ( See Comments), Cardiac Precautions (See Comments), Sternal Precautions (See Comments)  Comments: EF 40%    Assessment    Pt demonstrated improved standing balance, activity tolerance, and functional mobility this session, however requires extra time and standing rest breaks during mobility. Pt able to ambulate household distances with FWW, with no LOB. Pt able to complete standing grooming/hygiene with Al due to bilateral shoulder issues, and Al for standing toileting for balance. Pt declined being taught how to use reacher and sock aid for LE dressing as he states his son will help him get dressed everyday. Recommended pt obtain shower chair for safety with showering. Pt verbalized understanding and was provided with a handout for where to obtain shower chair.    Plan    Continue current treatment plan.    DC Equipment Recommendations: Tub / Shower Seat, Other (Comments)(sock aid)  Discharge Recommendations: Recommend home health for continued occupational therapy services.     Subjective    Pt alert and cooperative during OT tx session. Pt perseverative on how tired he is from walk with nursing earlier in the morning.      Objective       09/07/20 1114   Vitals   O2 (LPM) 1.5   O2 Delivery Device Silicone Nasal Cannula   Vitals Comments 2L during ambulation   Cognition    Comments Perseverative on walk with nursing earlier in the day and how fatigued he is now   Other Treatments   Other Treatments Provided Education on recommended DME for home safety. Pt provided with a handout. Attempted education on use of reacher/sock aid but pt refused education, stating son would help him get dressed.   Balance   Comments standing with FWW, no overt LOB   Bed Mobility    Sit to Supine Minimal Assist    Comments via log roll   Activities of Daily Living   Grooming Supervision;Standing   Lower Body Dressing Maximal Assist   Toileting Minimal Assist  (standing toileting for balance)   Comments improved balance/activity tolerance during standing ADLs, declined education on reacher/sock aid   Functional Mobility   Sit to Stand Minimal Assist   Toilet Transfers Minimal Assist   Mobility in room/bathroom/hallway with spv with FWW   Comments improved mobility    Activity Tolerance   Comments requires standing rest breaks during mobility   Patient / Family Goals   Patient / Family Goal #1 To return home   Short Term Goals   Short Term Goal # 1 Pt will perform LB dressing with min a using AE as trained/needed   Goal Outcome # 1 Progressing slower than expected   Short Term Goal # 2 Pt will perform functional t/f's with min a   Goal Outcome # 2 Goal met   Short Term Goal # 3 Pt will perform toileting task with supervision   Goal Outcome # 3 Progressing as expected   Short Term Goal # 4 Pt will tolerate oob ADL session [id number] with stable BP   Goal Outcome # 4 Goal met

## 2020-09-07 NOTE — DISCHARGE PLANNING
Follow up for rehab chart reviewed plan to transition to home with son and outpatient follow up. No physiatry consult ordered per protocol.

## 2020-09-07 NOTE — PROGRESS NOTES
Cardiovascular Surgery Progress Note    Name: Sharad Millan  MRN: [id number]  : 1955  Admit Date: 2020  5:09 PM  Procedure:  Procedure(s) and Anesthesia Type:     * CABG, WITH ENDOSCOPIC VEIN PROCUREMENT X1 - General     * REPLACEMENT, AORTIC VALVE - General     * ECHOCARDIOGRAM, TRANSESOPHAGEAL - General  7 Day Post-Op    Vitals:  Patient Vitals for the past 8 hrs:   Temp SpO2 O2 Delivery Device O2 (LPM) Pulse Resp BP   20 2346 35.9 °C (96.6 °F) 94 % Silicone Nasal Cannula 1 61 (!) 22 142/49   20 2300 -- (!) 87 % Room air w/o2 available 0 61 20 --     Temp (24hrs), Av.8 °C (96.5 °F), Min:35.7 °C (96.3 °F), Max:35.9 °C (96.7 °F)      Respiratory:    Respiration: (!) 22, Pulse Oximetry: 94 %     Chest Tube Drains:  1060 cc    Fluids:    Intake/Output Summary (Last 24 hours) at 2020 0639  Last data filed at 2020 2300  Gross per 24 hour   Intake 460 ml   Output 1100 ml   Net -640 ml     Admit weight: Weight: 96.6 kg (213 lb)  Current weight: Weight: 104.4 kg (230 lb 2.6 oz) (20 05)    Labs:  Recent Labs     20  0530 20  0300   WBC 10.1 11.8* 11.5*   RBC 3.06* 3.52* 3.47*   HEMOGLOBIN 7.4* 8.8* 8.4*   HEMATOCRIT 24.5* 27.8* 28.0*   MCV 80.1* 79.0* 80.7*   MCH 24.2* 25.0* 24.2*   MCHC 30.2* 31.7* 30.0*   RDW 50.3* 48.4 50.3*   PLATELETCT 129* 169 175   MPV 10.9 10.5 10.7         Recent Labs     2028 20  0530 20  0300   SODIUM 132* 129* 130*   POTASSIUM 4.3 4.3 4.1   CHLORIDE 95* 91* 93*   CO2 27 27 29   GLUCOSE 214* 217* 141*   BUN 47* 41* 37*   CREATININE 1.01 1.02 1.04   CALCIUM 7.8* 8.0* 8.2*           Medications:  • amiodarone  200 mg     • metoprolol  12.5 mg     • acetaminophen  1,000 mg     • losartan  50 mg     • omeprazole  20 mg     • insulin glargine  0.2 Units/kg/day      And   • insulin lispro  0.2 Units/kg/day      And   • insulin lispro  3-14 Units     • furosemide  40 mg     • potassium chloride SA  20 mEq      • aspirin EC  81 mg     • Pharmacy Consult Request  1 Each     • senna-docusate  2 Tab      And   • polyethylene glycol/lytes  1 Packet      And   • magnesium hydroxide  30 mL     • clopidogrel  75 mg     • rosuvastatin  20 mg          Ordered Medications:    ASA - Yes    Plavix - Yes    Post-operative Beta Blockers - Yes    Ace Inhibitor - on ARB    Statin - Yes          Central Line:  Type of line: [place]  Date of insertion: 8/31/2020  Date of removal: see nurses note    Exam:   Review of Systems   Constitutional: Negative.  Negative for chills, fever and weight loss.   HENT: Negative.  Negative for ear pain, nosebleeds and tinnitus.    Eyes: Negative.  Negative for double vision, photophobia and pain.   Respiratory: Negative.  Negative for cough, hemoptysis and shortness of breath.    Cardiovascular: Positive for leg swelling. Negative for chest pain, palpitations, orthopnea and PND.   Gastrointestinal: Positive for nausea. Negative for abdominal pain, blood in stool and vomiting.   Genitourinary: Negative.  Negative for frequency, hematuria and urgency.   Musculoskeletal: Positive for joint pain and myalgias.   Skin: Negative.  Negative for rash.   Neurological: Negative.  Negative for dizziness, tremors, speech change, focal weakness, seizures and headaches.   Endo/Heme/Allergies: Negative for polydipsia. Does not bruise/bleed easily.   Psychiatric/Behavioral: Negative.  Negative for hallucinations and memory loss.       Physical Exam  Vitals signs and nursing note reviewed.   Constitutional:       Appearance: He is well-developed. He is ill-appearing.   HENT:      Head: Normocephalic.   Eyes:      Pupils: Pupils are equal, round, and reactive to light.   Neck:      Musculoskeletal: Normal range of motion.   Cardiovascular:      Rate and Rhythm: Normal rate and regular rhythm.   Pulmonary:      Effort: Pulmonary effort is normal.   Chest:      Chest wall: Tenderness present.   Abdominal:      General: Bowel  sounds are normal.      Palpations: Abdomen is soft.   Musculoskeletal: Normal range of motion.      Right lower leg: Edema present.      Left lower leg: Edema present.   Skin:     General: Skin is warm and dry.      Coloration: Skin is pale.   Neurological:      Mental Status: He is alert and oriented to person, place, and time.         Quality Measures:   Quality-Core Measures   Reviewed items::  Radiology images reviewed, EKG reviewed, Labs reviewed and Medications reviewed  Strange catheter::  No Strange  Central line in place:  Need for access  DVT prophylaxis - mechanical:  Not indicated at this time, ambulatory  Ulcer Prophylaxis::  Yes    Assessment/Plan:  POD 1 Extubated, HDS, ginger drip, chest tube output moderate and negative for air leak.  Neuro grossly intact.  Hct low.  Plan:  Wean ginger as tolerated.  1 unit PRBC.  Leave in CT.  May dc swan.  POD 2  HDS, SR, neuro intact, wounds CDI, abdomen S/NT, fluid balance positive, wt up.  Chest tube output minimal overnight.  Glucose high.  Plan:  Dc chest tubes, start lantus, dc strange.  Diurese.  IS/ambulate. CPM.  POD 3  HDS, SR was afib last night now SR on amiodarone protocol, neuro intact, wounds CDI, abdomen S.NT, fluid balance trending negative, wt stable.  Glucose still high.  Hct down last night.  Plan:  Received 1 unit PRBC, start prilosec, increase lantus, guiac all stools, Statin, IS/ambulate. CPM.  POD 4  HDS, SR, neuro intact, wounds CDI, abdomen S/NT, fluid balance negative, wt up.  Still feeling tired.  Plan:  Continue lasix, elevate legs, home health , start arb.  IS/ambulate. CPM.  DC gabapentin.  POD 5:  HDS.  Hct low.  NSR.  Wounds CDI.  Small bowel movement, still distended.  Tired.  Nauseated.  Plan:  1 unit PRBC.  Continue diuresis.  Repeat chest xray.  Enema today.  Routine compazine to help with appetite.  Wean narcotics.  Guiac negative.  Can increase Losartan to home dose of 100mg if BP elevated.   POD 6:  HDS.  H&H improved.  Wounds CDI.   Several bowel movements yesterday.  Nausea/fatigue.  Plan:  Routine compazine.  Continue diuresis.  Patient needs to be up and out of bed several times/day.  PT/OT.  Encourage IS.  Will initiate referral to rehab.   POD 7:  HDS.  Bradycardia overnight.  Hct stable.  Weight up.  Remains on 1 L oxygen.  PT states ok to go home w son   Pt fatigued.  Plan:  Continue diuresis. Will put in face to face for home oxygen.  Up and out of bed today and encourage aggressive pulmonary hygiene.  Decrease Amiodarone, decrease bblocker. Poss home w son tomorrow.    Active Hospital Problems    Diagnosis   • Acute on chronic systolic heart failure ([facility]) [I50.23]     Priority: High   • Severe aortic stenosis [I35.0]     Priority: High   • SVT (supraventricular tachycardia) ([facility]) [I47.1]     Priority: High   • CAD (coronary artery disease) [I25.10]     Priority: High   • S/P drug eluting coronary stent placement [Z95.5]     Priority: Medium     CHARBEL to LAD 1/8/20     • Essential hypertension [I10]     Priority: Medium   • Uncontrolled diabetes mellitus ([facility]) [E11.65]     Priority: Medium   • Dyslipidemia [E78.5]     Priority: Medium          • Obesity (BMI 30-39.9) [E66.9]     Priority: Low

## 2020-09-07 NOTE — THERAPY
Physical Therapy   Daily Treatment     Patient Name: Sharad Millan  Age:  64 y.o., Sex:  male  Medical Record #: [id number]  Today's Date: 9/6/2020     Precautions: Fall Risk, Swallow Precautions, Sternal Precautions, Cardiac Precautions    Assessment  Pt progressing with therapy. Pt was able to ambulate with FWW and min A for safety. No LOB, a few brief standing rest breaks, some incr SOB. Pt demonstrated ability to maintain balance while standing at sink to wash hands without overt fatigue; however he wished to perform ADLs at sink but then became too fatigued. Demonstrates good understanding of appropriate activity tolerance and progression. Son present during session, son reports pt will be staying with him and his family and they will be able to assist. With continued mobilization with nursing and therapy, anticipate pt will progress to be able to return home with home health therapy and outpatient cardiac rehab when appropriate. Will follow.     Plan  Continue current treatment plan.  DC Equipment Recommendations: Front-Wheel Walker (if does not own)  Discharge Recommendations: Recommend home health for continued physical therapy services (if has 24/7 supervision and assist)       09/06/20 1616   Cognition    Comments pt reports feeling fatigued d/t not eating well as he does not like the food. pt receptive to education and motivated to participate. son present during tx.    Other Treatments   Other Treatments Provided pt able to recall sternal precautions and talk test   Gait Analysis   Gait Level Of Assist Minimal Assist   Assistive Device Front Wheel Walker   Distance (Feet) 170   Deviation Bradykinetic   Weight Bearing Status no restrictions   Comments a few brief standing rest breaks, no LOB. incr SOB   Bed Mobility    Supine to Sit NT, up in recliner pre/post   Functional Mobility   Sit to Stand Minimal Assist (for safety)   Bed, Chair, Wheelchair Transfer Minimal Assist   Toilet Transfers Minimal  Assist   Comments maintains sternal precautions   Short Term Goals    Short Term Goal # 1 pt will perform supine <> sit with maintenance of sternal precautions with Min A in 6 visits   Goal Outcome # 1 not assessed   Short Term Goal # 2 pt will perform sit <> stand and functional transfers with LRAD and SPV to improve functional independence in 6 visits   Goal Outcome # 2 Progressing as expected   Short Term Goal # 3 pt will ambulate > 200 ft with LRAD and SPV to access community in 6 visits   Goal Outcome # 3 Progressing as expected   Education Group   Additional Comments discussed with son ability to assist at home. pt and son wish to return home

## 2020-09-07 NOTE — CARE PLAN
Problem: Post Op Day 4 CABG/Heart Valve Replacement  Goal: Optimal care of the Post Op CABG/Heart Valve replacement Post Op Day 4  Intervention: Up in chair for all meals  Note: Patient up in chair for all meals.  Intervention: Ambulate, increasing the distance each time x 3 and before bed  Note: Farthest patient walked was from room T621 to T618.  Intervention: IS q 1 hour while awake and record best IS volume  Note: Best  mL.  Intervention: Consider removal of strange, chest tube and pacer wire if not already done  Note: N/A

## 2020-09-08 VITALS
WEIGHT: 232.37 LBS | OXYGEN SATURATION: 96 % | TEMPERATURE: 97.3 F | BODY MASS INDEX: 32.53 KG/M2 | HEIGHT: 71 IN | SYSTOLIC BLOOD PRESSURE: 136 MMHG | RESPIRATION RATE: 19 BRPM | DIASTOLIC BLOOD PRESSURE: 50 MMHG | HEART RATE: 62 BPM

## 2020-09-08 LAB
ANION GAP SERPL CALC-SCNC: 11 MMOL/L (ref 7–16)
BUN SERPL-MCNC: 30 MG/DL (ref 8–22)
CALCIUM SERPL-MCNC: 8.3 MG/DL (ref 8.5–10.5)
CHLORIDE SERPL-SCNC: 87 MMOL/L (ref 96–112)
CO2 SERPL-SCNC: 27 MMOL/L (ref 20–33)
CREAT SERPL-MCNC: 0.86 MG/DL (ref 0.5–1.4)
ERYTHROCYTE [DISTWIDTH] IN BLOOD BY AUTOMATED COUNT: 51 FL (ref 35.9–50)
GLUCOSE BLD-MCNC: 229 MG/DL (ref 65–99)
GLUCOSE BLD-MCNC: 234 MG/DL (ref 65–99)
GLUCOSE SERPL-MCNC: 194 MG/DL (ref 65–99)
HCT VFR BLD AUTO: 29.6 % (ref 42–52)
HGB BLD-MCNC: 8.9 G/DL (ref 14–18)
MCH RBC QN AUTO: 24.1 PG (ref 27–33)
MCHC RBC AUTO-ENTMCNC: 30.1 G/DL (ref 33.7–35.3)
MCV RBC AUTO: 80 FL (ref 81.4–97.8)
PLATELET # BLD AUTO: 213 K/UL (ref 164–446)
PMV BLD AUTO: 10.5 FL (ref 9–12.9)
POTASSIUM SERPL-SCNC: 4.3 MMOL/L (ref 3.6–5.5)
RBC # BLD AUTO: 3.7 M/UL (ref 4.7–6.1)
SODIUM SERPL-SCNC: 125 MMOL/L (ref 135–145)
WBC # BLD AUTO: 11.5 K/UL (ref 4.8–10.8)

## 2020-09-08 PROCEDURE — 306312 ANTI-EMBOLISM STOCKINGS XXXLG LNG: Performed by: PHYSICIAN ASSISTANT

## 2020-09-08 PROCEDURE — 85027 COMPLETE CBC AUTOMATED: CPT

## 2020-09-08 PROCEDURE — 80048 BASIC METABOLIC PNL TOTAL CA: CPT

## 2020-09-08 PROCEDURE — 700111 HCHG RX REV CODE 636 W/ 250 OVERRIDE (IP): Performed by: NURSE PRACTITIONER

## 2020-09-08 PROCEDURE — 99024 POSTOP FOLLOW-UP VISIT: CPT | Performed by: THORACIC SURGERY (CARDIOTHORACIC VASCULAR SURGERY)

## 2020-09-08 PROCEDURE — 700102 HCHG RX REV CODE 250 W/ 637 OVERRIDE(OP): Performed by: CLINICAL NURSE SPECIALIST

## 2020-09-08 PROCEDURE — A9270 NON-COVERED ITEM OR SERVICE: HCPCS | Performed by: CLINICAL NURSE SPECIALIST

## 2020-09-08 PROCEDURE — A9270 NON-COVERED ITEM OR SERVICE: HCPCS | Performed by: PHYSICIAN ASSISTANT

## 2020-09-08 PROCEDURE — 82962 GLUCOSE BLOOD TEST: CPT | Mod: 91

## 2020-09-08 PROCEDURE — 700102 HCHG RX REV CODE 250 W/ 637 OVERRIDE(OP): Performed by: NURSE PRACTITIONER

## 2020-09-08 PROCEDURE — 700111 HCHG RX REV CODE 636 W/ 250 OVERRIDE (IP): Performed by: CLINICAL NURSE SPECIALIST

## 2020-09-08 PROCEDURE — 700102 HCHG RX REV CODE 250 W/ 637 OVERRIDE(OP): Performed by: PHYSICIAN ASSISTANT

## 2020-09-08 PROCEDURE — A9270 NON-COVERED ITEM OR SERVICE: HCPCS | Performed by: NURSE PRACTITIONER

## 2020-09-08 RX ORDER — POTASSIUM CHLORIDE 750 MG/1
10 TABLET, EXTENDED RELEASE ORAL DAILY
Qty: 30 TAB | Refills: 2 | Status: SHIPPED | OUTPATIENT
Start: 2020-09-08 | End: 2020-09-15

## 2020-09-08 RX ORDER — ACETAMINOPHEN 500 MG
500-1000 TABLET ORAL EVERY 6 HOURS
Qty: 30 TAB | Refills: 0 | COMMUNITY
Start: 2020-09-08 | End: 2021-06-16

## 2020-09-08 RX ORDER — CLOPIDOGREL BISULFATE 75 MG/1
75 TABLET ORAL DAILY
Qty: 30 TAB | Refills: 2 | Status: SHIPPED | OUTPATIENT
Start: 2020-09-08 | End: 2020-12-16

## 2020-09-08 RX ORDER — OMEPRAZOLE 20 MG/1
20 CAPSULE, DELAYED RELEASE ORAL DAILY
Qty: 30 CAP | Refills: 2 | Status: SHIPPED | OUTPATIENT
Start: 2020-09-08 | End: 2020-09-15

## 2020-09-08 RX ORDER — TRAMADOL HYDROCHLORIDE 50 MG/1
50 TABLET ORAL EVERY 8 HOURS PRN
Qty: 1 TAB | Refills: 0 | Status: SHIPPED | OUTPATIENT
Start: 2020-09-08 | End: 2021-06-16

## 2020-09-08 RX ORDER — GUAIFENESIN 600 MG/1
600 TABLET, EXTENDED RELEASE ORAL 2 TIMES DAILY PRN
Qty: 60 TAB | Refills: 0 | Status: SHIPPED | OUTPATIENT
Start: 2020-09-08 | End: 2020-09-15

## 2020-09-08 RX ORDER — ONDANSETRON 4 MG/1
4 TABLET, FILM COATED ORAL EVERY 4 HOURS PRN
Qty: 45 TAB | Refills: 0 | Status: SHIPPED | OUTPATIENT
Start: 2020-09-08 | End: 2020-09-15

## 2020-09-08 RX ORDER — AMIODARONE HYDROCHLORIDE 200 MG/1
200 TABLET ORAL DAILY
Qty: 30 TAB | Refills: 2 | Status: SHIPPED | OUTPATIENT
Start: 2020-09-08 | End: 2020-12-16 | Stop reason: SDUPTHER

## 2020-09-08 RX ADMIN — TRAMADOL HYDROCHLORIDE 50 MG: 50 TABLET, FILM COATED ORAL at 10:31

## 2020-09-08 RX ADMIN — AMIODARONE HYDROCHLORIDE 200 MG: 200 TABLET ORAL at 09:00

## 2020-09-08 RX ADMIN — ACETAMINOPHEN 1000 MG: 500 TABLET ORAL at 12:38

## 2020-09-08 RX ADMIN — OMEPRAZOLE 20 MG: 20 CAPSULE, DELAYED RELEASE ORAL at 09:07

## 2020-09-08 RX ADMIN — CLOPIDOGREL BISULFATE 75 MG: 75 TABLET ORAL at 09:03

## 2020-09-08 RX ADMIN — LOSARTAN POTASSIUM 50 MG: 50 TABLET, FILM COATED ORAL at 09:02

## 2020-09-08 RX ADMIN — DOCUSATE SODIUM 50 MG AND SENNOSIDES 8.6 MG 2 TABLET: 8.6; 5 TABLET, FILM COATED ORAL at 08:59

## 2020-09-08 RX ADMIN — POTASSIUM CHLORIDE 20 MEQ: 1500 TABLET, EXTENDED RELEASE ORAL at 08:43

## 2020-09-08 RX ADMIN — ONDANSETRON 8 MG: 2 INJECTION INTRAMUSCULAR; INTRAVENOUS at 08:40

## 2020-09-08 RX ADMIN — ONDANSETRON 8 MG: 2 INJECTION INTRAMUSCULAR; INTRAVENOUS at 01:00

## 2020-09-08 RX ADMIN — PROCHLORPERAZINE EDISYLATE 10 MG: 5 INJECTION INTRAMUSCULAR; INTRAVENOUS at 05:10

## 2020-09-08 RX ADMIN — ASPIRIN 81 MG: 81 TABLET, COATED ORAL at 09:07

## 2020-09-08 RX ADMIN — METOPROLOL TARTRATE 12.5 MG: 25 TABLET, FILM COATED ORAL at 09:01

## 2020-09-08 RX ADMIN — ACETAMINOPHEN 1000 MG: 500 TABLET ORAL at 09:04

## 2020-09-08 RX ADMIN — FUROSEMIDE 40 MG: 10 INJECTION, SOLUTION INTRAMUSCULAR; INTRAVENOUS at 05:25

## 2020-09-08 ASSESSMENT — PAIN DESCRIPTION - PAIN TYPE
TYPE: SURGICAL PAIN

## 2020-09-08 ASSESSMENT — FIBROSIS 4 INDEX: FIB4 SCORE: 1.65

## 2020-09-08 NOTE — OR SURGEON
Immediate Post- Operative Note        PostOp Diagnosis: pleural effusion      Procedure(s): thoracentesis      Estimated Blood Loss: Less than 5 ml        Complications: None            9/8/2020     8:48 AM     Arely Villareal M.D.

## 2020-09-08 NOTE — CARE PLAN
Problem: Discharge Barriers/Planning  Goal: Patient's continuum of care needs will be met  Intervention: Assess potential discharge barriers on admission and throughout hospital stay  Note: Pt increased ambulation throughout day     Problem: Respiratory:  Goal: Respiratory status will improve  Intervention: Assess and monitor pulmonary status  Note: Patient on 1L NC     Problem: Post Op Day 4 CABG/Heart Valve Replacement  Goal: Optimal care of the Post Op CABG/Heart Valve replacement Post Op Day 4  Intervention: Up in chair for all meals  Note: Up in chair for all meals  Intervention: Ambulate, increasing the distance each time x 3 and before bed  Note: Ambulated 4 times today  Intervention: IS q 1 hour while awake and record best IS volume  Note: Best IS 1000  Intervention: Consider removal of strange, chest tube and pacer wire if not already done  Note: None in place

## 2020-09-08 NOTE — DISCHARGE PLANNING
Care Transition Team Discharge Planning    Anticipated Discharge Disposition: d/c home w/ HH, O2, has FWW @ home     Action: Lsw spoke to [facility] who will rewrite HH order as it has changed since last week.    Lsw acquired DMe O2 choice from pt's spouse via phone  [employer]    Lsw faxed choice to [facility].    Lsw provided copy of choice to spouse at bedside so she can f/u w/ O2 DMe co later for concentrator delivery.      Barriers to Discharge: receipt of portable o2 bottle    Plan: pt and spouse to d/c home today once receive o2 bottle at bedside

## 2020-09-08 NOTE — PROGRESS NOTES
Cardiac Surgery RN Navigator Daily Rounding Note  Procedure Performed: Procedure(s) (LRB):  CABG, WITH ENDOSCOPIC VEIN PROCUREMENT X1 (N/A)  REPLACEMENT, AORTIC VALVE (N/A)  ECHOCARDIOGRAM, TRANSESOPHAGEAL (N/A)     By  Dr. Casas  8 Days Post-Op    Pertinent Overnight Events:    + nausea & vomiting, weight unchanged, good UOP     Home O2 ordered for discharge, worsening hyponatremia    Pertinent neuro findings/needs: A&O    Cardiac/hemodynamics:  Temp (24hrs), Av.7 °C (98.1 °F), Min:36.2 °C (97.2 °F), Max:37.1 °C (98.8 °F)    Heart rhythm: SR  Temp:  [36.2 °C (97.2 °F)-37.1 °C (98.8 °F)] 36.2 °C (97.2 °F)  Pulse:  [53-83] 83  Resp:  [17-24] 19  BP: (124-147)/(41-55) 129/41  SpO2:  [86 %-98 %] 91 %        /Weights:  Admit weight: Weight: 96.6 kg (213 lb)  Current Weight: Weight: 105.4 kg (232 lb 5.8 oz) (20 0200)  Weight change: 0 kg (0 lb)    Intake/Output Summary (Last 24 hours) at 2020 0738  Last data filed at 2020 0500  Gross per 24 hour   Intake 550 ml   Output 2500 ml   Net -1950 ml       Adequate urine output: 1200 overnight    Respiratory:    Pertinent respiratory findings/needs: 1 L NC    GI findings/needs: + BM  small    Labs:  Recent Labs     20  0530 20  0300 20  0105   WBC 11.8* 11.5* 11.5*   RBC 3.52* 3.47* 3.70*   HEMOGLOBIN 8.8* 8.4* 8.9*   HEMATOCRIT 27.8* 28.0* 29.6*   MCV 79.0* 80.7* 80.0*   MCH 25.0* 24.2* 24.1*   MCHC 31.7* 30.0* 30.1*   RDW 48.4 50.3* 51.0*   PLATELETCT 169 175 213   MPV 10.5 10.7 10.5     Recent Labs     20  0530 20  0300 20  0105   SODIUM 129* 130* 125*   POTASSIUM 4.3 4.1 4.3   CHLORIDE 91* 93* 87*   CO2 27 29 27   GLUCOSE 217* 141* 194*   BUN 41* 37* 30*   CREATININE 1.02 1.04 0.86   CALCIUM 8.0* 8.2* 8.3*     INR:       Required Cardiac medications review:  ASA:  Yes  Plavix: Yes  BB: Yes  ACE/ARB: Yes  Statin: yes  Diuretic: yes    LDA's necessity reviewed: YES    Thoughts and considerations for team  rounding:    Possibly home with son today.  Patient feels ready to go home.  Awaiting DME and approval for O2.    Discharge plan:    Post op appointments in place,  Has cardiology appt w/in 7 days for HF guidelines.    Going home with son.

## 2020-09-08 NOTE — DISCHARGE PLANNING
Received Choice form at 0905  Agency/Facility Name: [facility]  Referral sent per Choice form at 0910    0910- [facility] sent updated HH orders to [facility]. Left vm for Margot at [facility].    0920- [facility] spoke with Lola at [facility], referral will soon be reviewed.    0950- Spoke To: Radha  Agency/Facility Name: [facility] 02  Plan or Request: Pt accepted on service, 02 will be delivered to bedside this afternoon.    1025- Spoke To: Margot  Agency[facility]Facility Name: [facility]   Plan or Request: Margot returned call, she received updated HH orders and is aware pt will d/c today.

## 2020-09-08 NOTE — CARE PLAN
Problem: Fluid Volume:  Goal: Will maintain balanced intake and output  Outcome: PROGRESSING AS EXPECTED  Note: Adequate urine output, lasix for diuresis, strict intake measurement      Problem: Pain Management  Goal: Pain level will decrease to patient's comfort goal  Outcome: PROGRESSING AS EXPECTED  Note: Patient effectively uses 1-10 rating scale to identify and describe pain, patient educated regarding nonpharmacological methods to relieve pain, patient requests pain medication when needed

## 2020-09-08 NOTE — DISCHARGE SUMMARY
DISCHARGE SUMMARY    ADMISSION DATE: 8/27/2020    DISCHARGE DATE: 09/08/20      CHIEF COMPLAINT ON ADMISSION: shortness of breath    ADMITTING DIAGNOSES: Severe symptomatic aortic stenosis, coronary artery disease (status post percutaneous coronary intervention), recent non-ST segment elevation myocardial infarction, acute on chronic left ventricular systolic and diastolic failure, dilated cardiomyopathy, peripheral vascular disease.    DISCHARGE DIAGNOSES: Severe symptomatic aortic stenosis, coronary artery disease (status post percutaneous coronary intervention), recent non-ST segment elevation myocardial infarction, acute on chronic left ventricular systolic and diastolic failure, dilated cardiomyopathy, peripheral vascular disease, postoperative atrial fibrillation.    PROCEDURES PERFORMED:  8/31/2020: Aortic valve replacement (23 mm Inspiris David pericardial valve), coronary artery bypass grafting x1 (left internal mammary artery to the left anterior descending artery), and intraoperative transesophageal echocardiography.    HISTORY OF PRESENT ILLNESS:  The patient is a 64-year-old male who was admitted to the hospital for treatment of acute congestive heart failure.  Echocardiography showed severe aortic stenosis.  His left ventricular ejection fraction was pressed at approximately 40-50%.  Cardiac catheterization showed a long moderate stenosis in the proximal left anterior descending artery in the area of the stent.    HOSPITAL COURSE:   The patient was extubated, his chest tubes and strange catheter were removed on post-op two. He progressed slowly. He was placed on diuretic therapy during his admission. At discharge his weight was 105 which was 6 kg above his baseline weight.  He was put on Lasix 20 mg PO daily only due to hyponatremia. BMP will be checked by home health on friday. He had postoperative atrial fibrillation and was placed on amiodarone protocol and is currently on 200 mg PO daily.  He  was placed on plavix for anticoagulation for 3 months.  He was tolerating meals and ambulating hallways with walker.  Pain is controlled on tylenol and tramadol.  He was discharged in stable condition on oxygen 2lpm.   He was enrolled in home health and home physical therapy.     RECENT LABS:     Lab Results   Component Value Date/Time    SODIUM 125 (L) 09/08/2020 01:05 AM    POTASSIUM 4.3 09/08/2020 01:05 AM    CHLORIDE 87 (L) 09/08/2020 01:05 AM    CO2 27 09/08/2020 01:05 AM    GLUCOSE 194 (H) 09/08/2020 01:05 AM    BUN 30 (H) 09/08/2020 01:05 AM    CREATININE 0.86 09/08/2020 01:05 AM    CREATININE 1.0 02/18/2009 09:00 AM      Lab Results   Component Value Date/Time    WBC 11.5 (H) 09/08/2020 01:05 AM    RBC 3.70 (L) 09/08/2020 01:05 AM    HEMOGLOBIN 8.9 (L) 09/08/2020 01:05 AM    HEMATOCRIT 29.6 (L) 09/08/2020 01:05 AM    MCV 80.0 (L) 09/08/2020 01:05 AM    MCH 24.1 (L) 09/08/2020 01:05 AM    MCHC 30.1 (L) 09/08/2020 01:05 AM    MPV 10.5 09/08/2020 01:05 AM    NEUTSPOLYS 90.40 (H) 09/01/2020 01:30 PM    LYMPHOCYTES 2.60 (L) 09/01/2020 01:30 PM    MONOCYTES 7.00 09/01/2020 01:30 PM    EOSINOPHILS 0.00 09/01/2020 01:30 PM    BASOPHILS 0.00 09/01/2020 01:30 PM    HYPOCHROMIA 1+ 09/01/2020 01:30 PM    ANISOCYTOSIS 1+ 08/28/2020 01:36 AM      Lab Results   Component Value Date/Time    PROTHROMBTM 18.6 (H) 08/31/2020 12:05 PM    INR 1.50 (H) 08/31/2020 12:05 PM        ALLERGIES:     Bydureon [exenatide], Cycloset [bromocriptine mesylate], and Morphine    DISCHARGE MEDICATIONS:      Medication List      START taking these medications      Instructions   acetaminophen 500 MG Tabs  Commonly known as: TYLENOL   Take 1-2 Tabs by mouth every 6 hours.  Dose: 500-1,000 mg     amiodarone 200 MG Tabs  Commonly known as: CORDARONE   Take 1 Tab by mouth every day.  Dose: 200 mg     clopidogrel 75 MG Tabs  Commonly known as: PLAVIX   Take 1 Tab by mouth every day.  Dose: 75 mg     guaiFENesin  MG Tb12  Commonly known as:  MUCINEX   Take 1 Tab by mouth 2 times a day as needed for Cough for up to 30 days.  Dose: 600 mg     metoprolol 25 MG Tabs  Commonly known as: LOPRESSOR   Take 0.5 Tabs by mouth 2 Times a Day.  Dose: 12.5 mg     omeprazole 20 MG delayed-release capsule  Commonly known as: PRILOSEC   Take 1 Cap by mouth every day.  Dose: 20 mg     ondansetron 4 MG Tabs tablet  Commonly known as: Zofran   Take 1 Tab by mouth every four hours as needed for Nausea/Vomiting for up to 30 days.  Dose: 4 mg     potassium chloride SA 10 MEQ Tbcr  Commonly known as: K-DUR   Take 1 Tab by mouth every day.  Dose: 10 mEq     tramadol 50 MG Tabs  Commonly known as: ULTRAM   Doctor's comments: Home medication, filled 8/20/2020  Take 1 Tab by mouth every 8 hours as needed (Moderate Pain (NRS Pain Scale 4-6; CPOT Pain Scale 3-5) if opiates not ordered or tolerated).  Dose: 50 mg        CONTINUE taking these medications      Instructions   aspirin EC 81 MG Tbec  Commonly known as: ECOTRIN   Take 81 mg by mouth every evening.  Dose: 81 mg     furosemide 20 MG Tabs  Commonly known as: LASIX   Take 20 mg by mouth every day.  Dose: 20 mg     insulin lispro 100 UNIT/ML  Commonly known as: HumaLOG   Inject 12 Units as instructed 3 times a day before meals.  Dose: 12 Units     Jardiance 25 MG Tabs  Generic drug: Empagliflozin   Take 25 mg by mouth every morning.  Dose: 25 mg     losartan 100 MG Tabs  Commonly known as: COZAAR   Take 100 mg by mouth every morning.  Dose: 100 mg     rosuvastatin 20 MG Tabs  Commonly known as: CRESTOR   Take 20 mg by mouth every evening.  Dose: 20 mg     Tresiba 100 UNIT/ML Soln  Generic drug: Insulin Degludec   Inject 26 Units as instructed every bedtime.  Dose: 26 Units        STOP taking these medications    amoxicillin-clavulanate 875-125 MG Tabs  Commonly known as: AUGMENTIN     doxycycline 100 MG Tabs  Commonly known as: VIBRAMYCIN     metoprolol SR 50 MG Tb24  Commonly known as: TOPROL XL            NARCOTIC PAIN  MEDICATIONS:   Patient has prescription for tramadol at home that was recently prescribed for shoulder pain. A new prescription was not given to the patient but counseling and education was given.       I certify that I have obtained and reviewed their medical history. I have also made a good fazal effort to obtain applicable records from other providers who have treated the patient .    I have conducted a physical exam and documented it. I have reviewed the patient's prescription history as maintained by the [place] Prescription Monitoring Program.     I have assessed the patient’s risk for abuse, dependency, and addiction using the validated Opioid Risk Tool.    Given the above, I believe the benefits of controlled substance therapy outweigh the risks. The reasons for prescribing controlled substances include non-narcotic, oral analgesic alternatives have been inadequate for pain control. Accordingly, I have discussed the risk and benefits, treatment plan, and alternative therapies with the patient.     Pt understands tramadol is a controlled substance which is potentially habit-forming and its use is regulated by the [place]. It must be submitted to the pharmacy within 5 days of the date written and can not be called in or faxed to the pharmacy. Refills are subject to terms of a medicine agreement. Any refill requires a new prescription that must be obtained from this office during regular office hours. We ask for 72 hours notice to get an appointment for a narcotic pain medication refill. This medicine can cause nausea, significant constipation, sedation, confusion.     DIET:   Cardiac diet    DISCHARGE INSTRUCTIONS DISCUSSED WITH THE PATIENT:      1. NO driving for 4 weeks after surgery. You may ride as a passenger.  2. NO lifting of any item over 10 lbs (e.g. gallon of milk) for 6 weeks after surgery.  3. DO walk as much as possible! Walk a minimum of once a day. Depending on your fatigue and comfort level, you may  walk as much as you wish. There is no maximum.  4. Other physical activities (sex, housework, gardening, etc.) are OK after 4 weeks   5. Continue using incentive spirometer for 2 weeks, especially if going home on oxygen.    Incision Care:  1. SHOWER ONLY - no baths. Clean incision daily with plain Ivory ® soap or any other dye or perfume free soap. Then pat incision dry with clean towel. Avoid creams or lotions on the incision(s).  a. If there is any increase in redness or swelling, or separation of the incision line, or thick drainage* from any of the incisions, call right away  * Clear, thin drainage is not abnormal especially from the leg incision and/or                         chest tube sites.  2. Continue to wear your DESMOND Stockings for 4 weeks. You may take off the stockings when in bed or when the legs are elevated.    Patient instructed to call [facility] cardiac surgery at [phone]  if any increased shortness of breath, uncontrolled pain, weight gain greater than 2 pounds in 1 day, SBP >140, HR <60 or redness swelling or drainage of incisions.      FOLLOW-UP:   Future Appointments   Date Time Provider Department Center   9/11/2020  9:00 AM EULA Castro CB None   10/5/2020  1:30 PM GALINA Bates CT None   11/9/2020 10:20 AM Ganga Chavez M.D. CB None

## 2020-09-08 NOTE — DISCHARGE PLANNING
Care Transition Team Discharge Planning    Anticipated Discharge Disposition: discharge home w/ HH, O2, and FWW    Action: Lsw spoke to [facility], BS RN, and pt's spouse.    [facility] reports O2 will deliver to bedside soon, and HH aware pt d/cing today.     Spouse aware as above and would like Lsw to refer to [facility] to acquire a FWW at bedside prior to d/c today.      Lsw completed form and faxed to [facility].     Barriers to Discharge: none    Plan: pt to d/c home today w/ O2, HH will f/u at home, and FWW delivered to bedside per [employer]

## 2020-09-08 NOTE — DISCHARGE INSTRUCTIONS
Discharge Instructions    Discharged to home by car with relative. Discharged via wheelchair, hospital escort: Yes.  Special equipment needed: Oxygen and Walker    Be sure to schedule a follow-up appointment with your primary care doctor or any specialists as instructed.     Discharge Plan:        I understand that a diet low in cholesterol, fat, and sodium is recommended for good health. Unless I have been given specific instructions below for another diet, I accept this instruction as my diet prescription.   Other diet: Cardiac Diabetic     Special Instructions: DIVISION OF CARDIAC SURGERY DISCHARGE INSTRUCTIONS    Activity:    1. NO driving for 4 weeks after surgery. You may ride as a passenger.  2. NO lifting more than 10 pounds for 6 weeks.  3. For the next 6 weeks, keep your elbows close to your body and move within a pain-free motion when lifting, pushing or pulling.  Do not stretch both arms backwards at the same time.    4. Walk at least 4 times per day, there is no maximum.  5. Other physical activities (sex, housework, gardening, etc.) are okay after 4 weeks.  6. Continue using incentive spirometer for 2 weeks.  If you are going home on oxygen and you were not on oxygen prior to surgery, keep using until you are oxygen free.  7. Weigh yourself daily.  Call your Cardiologist for a weight gain of 2 or more pounds within 48 hours.  8. Take all of your medications as prescribed    Incision Care:    1. SHOWER EVERYDAY-no baths. Make sure to clean your incision(s) twice daily with plain, perfume and dye-free soap.  Then pat incision(s) dry with clean towel. No creams or lotions on your incision(s).    2. If you are discharging with a Prevena bandage on your chest, you or your home health nurse may remove the bandage 7 days after surgery, or sooner if the battery runs out or the device alarms. When the battery runs out, the bandage will lose suction and it will puff up.  To remove, slowly roll down the edges of  the bandage. Throw away the entire bandage and the battery pack.  Keep the incision open to air and clean twice daily with soap and water.  3. If there is any increased redness or swelling, separation of the incision line, or thick drainage from any of your incisions, call the Cardiac Surgeons ([phone]).    4. Continue to wear your DESMOND Stockings for 4 weeks. You may take them off when you are in bed or when your legs are elevated.    General Instructions:    1. You have been referred to Cardiac Rehab.  You can start Cardiac Rehab 30 days after surgery.  If you do not have an appointment at the time of discharge call [phone] to schedule an appointment.  2. Your Primary Care Doctor typically handles home oxygen. Oxygen may be stopped when your oxygen level is consistently greater than 90.  Check with your Primary Care Doctor if you are unsure.  3. Take all of your medications (including pain medications) as prescribed.  Taking medications other than prescribed can result in serious injury.    For Patients Discharged with Narcotic Pain Medication:     1. If a refill is needed, understand that only 1 refill will be provided and you must come to the Cardiac Surgeons’ office for an appointment (72 hours’ notice is required to schedule and there are no weekend appointments).  2. If the pain medications you are discharged on are not working, you will need to bring your remaining prescription into the office in order to receive a new prescription.  3. If you were taking narcotics prior to your heart surgery, the Cardiac Surgeons will provide you with one prescription and additional medications will need to be provided by your pain management doctor.  4. Do not drink alcohol while taking narcotics.  5. Lost or stolen medications will not be refilled.  If medications are stolen, report to law enforcement.    Contact Cardiac Surgery at [phone] if you have any questions.    · Is patient discharged on Warfarin /  Coumadin? No     Special Instructions: HF Patient Discharge Instructions  · Monitor your weight daily, and maintain a weight chart, to track your weight changes.   · Activity as tolerated, unless your Doctor has ordered otherwise.  · Follow a low fat, low cholesterol, low salt diet unless instructed otherwise by your Doctor. Read the labels on the back of food products and track your intake of fat, cholesterol and salt.   · Fluid Restriction No. If a Fluid Restriction has been ordered by your Doctor, measure fluids with a measuring cup to ensure that you are not exceeding the restriction.   · No smoking.  · Oxygen Yes. If your Doctor has ordered that you wear Oxygen at home, it is important to wear it as ordered.  · Did you receive an explanation from staff on the importance of taking each of your medications and why it is necessary to keep taking them unless your doctor says to stop? Yes  · Were all of your questions answered about how to manage your heart failure and what to do if you have increased signs and symptoms after you go home? Yes  · Do you feel like your heart failure care team involved you in the care treatment plan and allowed you to make decisions regarding your care while in the hospital and addressed any discharge needs you might have? Yes    See the educational handout provided at discharge for more information on monitoring your daily weight, activity and diet. This also explains more about Heart Failure, symptoms of a flare-up and some of the tests that you have undergone.     Warning Signs of a Flare-Up include:  · Swelling in the ankles or lower legs.  · Shortness of breath, while at rest, or while doing normal activities.   · Shortness of breath at night when in bed, or coughing in bed.   · Requiring more pillows to sleep at night, or needing to sit up at night to sleep.  · Feeling weak, dizzy or fatigued.     When to call your Doctor:  · Call [facility] seven days a week from 8:00  a.m. to 8:00 p.m. for medical questions [phone].  · Call your Primary Care Physician or Cardiologist if:   1. You experience any pain radiating to your jaw or neck.  2. You have any difficulty breathing.  3. You experience weight gain of 3 lbs in a day or 5 lbs in a week.   4. You feel any palpitations or irregular heartbeats.  5. You become dizzy or lose consciousness.   If you have had an angiogram or had a pacemaker or AICD placed, and experience:  1. Bleeding, drainage or swelling at the surgical / puncture site.  2. Fever greater than 100.0 F  3. Shock from internal defibrillator.  4. Cool and / or numb extremities.    Depression / Suicide Risk    As you are discharged from this [facility] facility, it is important to learn how to keep safe from harming yourself.    Recognize the warning signs:  · Abrupt changes in personality, positive or negative- including increase in energy   · Giving away possessions  · Change in eating patterns- significant weight changes-  positive or negative  · Change in sleeping patterns- unable to sleep or sleeping all the time   · Unwillingness or inability to communicate  · Depression  · Unusual sadness, discouragement and loneliness  · Talk of wanting to die  · Neglect of personal appearance   · Rebelliousness- reckless behavior  · Withdrawal from people/activities they love  · Confusion- inability to concentrate     If you or a loved one observes any of these behaviors or has concerns about self-harm, here's what you can do:  · Talk about it- your feelings and reasons for harming yourself  · Remove any means that you might use to hurt yourself (examples: pills, rope, extension cords, firearm)  · Get professional help from the community (Mental Health, Substance Abuse, psychological counseling)  · Do not be alone:Call your Safe Contact- someone whom you trust who will be there for you.  · Call your local CRISIS HOTLINE 799-7221 or 202-604-8296  · Call your local  Children's Mobile Crisis Response Team [place] (980) 843-3939 or www.[employer]  · Call the toll free National Suicide Prevention Hotlines   · National Suicide Prevention Lifeline 841-997-HTAY (4053)  · National Hope Line Network 800-SUICIDE (131-9611)

## 2020-09-15 ENCOUNTER — OFFICE VISIT (OUTPATIENT)
Dept: CARDIOLOGY | Facility: MEDICAL CENTER | Age: 65
End: 2020-09-15
Payer: MEDICARE

## 2020-09-15 ENCOUNTER — TELEPHONE (OUTPATIENT)
Dept: CARDIOTHORACIC SURGERY | Facility: MEDICAL CENTER | Age: 65
End: 2020-09-15

## 2020-09-15 VITALS
WEIGHT: 223 LBS | BODY MASS INDEX: 33.03 KG/M2 | SYSTOLIC BLOOD PRESSURE: 154 MMHG | OXYGEN SATURATION: 96 % | HEART RATE: 78 BPM | DIASTOLIC BLOOD PRESSURE: 58 MMHG | HEIGHT: 69 IN

## 2020-09-15 DIAGNOSIS — Z95.2 S/P AVR (AORTIC VALVE REPLACEMENT): ICD-10-CM

## 2020-09-15 DIAGNOSIS — R06.02 SOB (SHORTNESS OF BREATH): ICD-10-CM

## 2020-09-15 DIAGNOSIS — I10 HTN (HYPERTENSION), MALIGNANT: ICD-10-CM

## 2020-09-15 DIAGNOSIS — J90 PLEURAL EFFUSION: ICD-10-CM

## 2020-09-15 DIAGNOSIS — Z95.5 S/P DRUG ELUTING CORONARY STENT PLACEMENT: ICD-10-CM

## 2020-09-15 DIAGNOSIS — I35.0 SEVERE AORTIC STENOSIS: ICD-10-CM

## 2020-09-15 DIAGNOSIS — Z79.899 HIGH RISK MEDICATION USE: ICD-10-CM

## 2020-09-15 DIAGNOSIS — E78.5 DYSLIPIDEMIA: Chronic | ICD-10-CM

## 2020-09-15 DIAGNOSIS — Z95.1 S/P CABG X 1: ICD-10-CM

## 2020-09-15 DIAGNOSIS — E11.00 TYPE 2 DIABETES MELLITUS WITH HYPEROSMOLARITY WITHOUT COMA, WITH LONG-TERM CURRENT USE OF INSULIN (HCC): Chronic | ICD-10-CM

## 2020-09-15 DIAGNOSIS — I47.10 SVT (SUPRAVENTRICULAR TACHYCARDIA) (HCC): ICD-10-CM

## 2020-09-15 DIAGNOSIS — I25.10 CORONARY ARTERY DISEASE INVOLVING NATIVE CORONARY ARTERY OF NATIVE HEART WITHOUT ANGINA PECTORIS: ICD-10-CM

## 2020-09-15 DIAGNOSIS — Z79.4 TYPE 2 DIABETES MELLITUS WITH HYPEROSMOLARITY WITHOUT COMA, WITH LONG-TERM CURRENT USE OF INSULIN (HCC): Chronic | ICD-10-CM

## 2020-09-15 LAB — EKG IMPRESSION: NORMAL

## 2020-09-15 PROCEDURE — 99214 OFFICE O/P EST MOD 30 MIN: CPT | Performed by: NURSE PRACTITIONER

## 2020-09-15 PROCEDURE — 93000 ELECTROCARDIOGRAM COMPLETE: CPT | Performed by: INTERNAL MEDICINE

## 2020-09-15 RX ORDER — POTASSIUM CHLORIDE 20 MEQ/1
20 TABLET, EXTENDED RELEASE ORAL 2 TIMES DAILY
Qty: 60 TAB | Refills: 11 | Status: SHIPPED | OUTPATIENT
Start: 2020-09-15 | End: 2020-10-01

## 2020-09-15 RX ORDER — POLYETHYLENE GLYCOL 3350 17 G/17G
17 POWDER, FOR SOLUTION ORAL DAILY
COMMUNITY
End: 2021-06-16

## 2020-09-15 RX ORDER — FUROSEMIDE 40 MG/1
40 TABLET ORAL 2 TIMES DAILY
Qty: 60 TAB | Refills: 11 | Status: SHIPPED | OUTPATIENT
Start: 2020-09-15 | End: 2020-10-01

## 2020-09-15 RX ORDER — SENNOSIDES A AND B 8.6 MG/1
8.6 TABLET, FILM COATED ORAL
COMMUNITY
End: 2021-06-16

## 2020-09-15 ASSESSMENT — ENCOUNTER SYMPTOMS
FEVER: 0
DIZZINESS: 0
PALPITATIONS: 0
MYALGIAS: 0
ORTHOPNEA: 0
SHORTNESS OF BREATH: 1
WEAKNESS: 1
COUGH: 0
ABDOMINAL PAIN: 0
CLAUDICATION: 0
PND: 0

## 2020-09-15 ASSESSMENT — FIBROSIS 4 INDEX: FIB4 SCORE: 1.35

## 2020-09-15 NOTE — PATIENT INSTRUCTIONS
Get Chest xray done  Increase metoprolol to 25 mg twice a day  Increase furosemide to 40 mg twice a day  Increase potassium to 20 mEq twice a day  Obtain a BMP in 1 week  Called the PUMP line, [phone](PUMP) with worsening shortness of breath, swelling or weight gain.  Follow up with Endocrinology

## 2020-09-15 NOTE — PROGRESS NOTES
Chief Complaint   Patient presents with   • Coronary Artery Disease     hospital follow up       Subjective:   Sharad Millan is a 64 y.o. male who presents today for follow-up after his recent aortic valve replacement and CABG with his son, Spencer.    He is a patient of Dr. Chavez.  He was last seen in clinic on 8/6/2020 with Dr. Aranda.  During that visit, patient was anticipating his aortic valve replacement and CABG after his hospitalization for worsening symptoms due to aortic stenosis and CAD.    Patient was hospitalized from 8/27/2020 through 9/8/2024 his AVR and CABG.  He had his procedure on 8/31/2020.  He had postoperative A. fib, had amiodarone started.    Patient comes in the clinic today reporting some problems with shortness of breath shortly after his discharge from the hospital, he states he was unable to get a hold of the surgeon's office.  Patient did attempt to call Dr. Chavez, but was unsuccessful.    Patient reports his breathing has improved some, he does continue to report shortness breath with exertion.  He also is reporting lower extremity edema, though slowly getting better, fatigue and weakness.  He denies chest pain aside from chest tightness, palpitations, orthopnea, PND or dizziness/lightheadedness.    He reports a decreased appetite, but is having good bowel movements as long as he is using stool softeners.  He is using his IS, but is only able to get up to about 1000 mL.    His weights have been decreasing from 235-237 down to 224 pounds.    He does try to get some exercise, he walks outside if the smoke is not bad but he does take labs in his living room.  He feels he is doing more each day.    He did see his PCP about a week ago and they recommended him to increase his diuretics to 20 mg twice a day along with potassium 10 mEq twice a day.    Patient reports his blood sugars have been improving as well, he did wake up one morning and his blood sugars were in the 70s.  He is  being followed by endocrinology in [place], Dr. Schmitz.    Additonally, patient has the following medical problems:    -CAD, history of a stent in 1996 and a CHARBEL in January 2020.  Current CABG x1 LIMA to LAD on 8/31/2020    -Aortic stenosis: AVR on 8/31/2020    -Uncontrolled diabetes: Followed by endocrinology, currently taking Jardiance, Tresiba, Humalog    -Hypertension    -Obesity      Past Medical History:   Diagnosis Date   • Aortic stenosis 10/20/2011   • Arrhythmia    • Breath shortness    • Carotid bruit 12/9/2009   • Cataract     cataract extraction with IOL   • Chronic right shoulder pain    • Diabetes     oral medication   • Fatty liver 1/17/2011   • Heart attack (HCC) 12/2018   • History of cardiac catheterization 7/13/2009   • History of echocardiogram 10/20/2011   • HTN (hypertension) 10/12/2012   • Hypertension    • Memory loss 10/20/2011   • Murmur 7/7/2009   • Obesity 8/23/2011   • Palpitations 10/20/2011   • PSVT (paroxysmal supraventricular tachycardia) ([facility]) 2/14/2012   • S/P coronary artery stent placement 1996    coronary stent implantation x3, [facility]   • S/P coronary artery stent placement 1998    coronary stent implantation; LAD   • S/P coronary artery stent placement 2003    cardiac catheterization; patent stents; [place]   • Sciatica 8/23/2011   • Uncontrolled diabetes mellitus (HCC) 11/29/2010     Past Surgical History:   Procedure Laterality Date   • MULTIPLE CORONARY ARTERY BYPASS ENDO VEIN HARVEST N/A 8/31/2020    Procedure: CABG, WITH ENDOSCOPIC VEIN PROCUREMENT X1;  Surgeon: Dorys Casas M.D.;  Location: SURGERY [facility];  Service: Cardiothoracic   • AORTIC VALVE REPLACEMENT N/A 8/31/2020    Procedure: REPLACEMENT, AORTIC VALVE;  Surgeon: Dorys Casas M.D.;  Location: SURGERY [facility];  Service: Cardiothoracic   • ROMERO N/A 8/31/2020    Procedure: ECHOCARDIOGRAM, TRANSESOPHAGEAL;  Surgeon: Dorys Casas M.D.;  Location: SURGERY [facility];  Service:  Cardiothoracic   • PO BY LAPAROSCOPY N/A 10/25/2015    Procedure: PO BY LAPAROSCOPY-with grams ;  Surgeon: Ronnie Bowman M.D.;  Location: SURGERY [facility] [place] [facility];  Service:    • CAROTID STENT ANGIOGRAPHY  1996   • CATARACT EXTRACTION WITH IOL Bilateral      Family History   Problem Relation Age of Onset   • Heart Disease Mother    • Heart Attack Mother    • Heart Disease Father      Social History     Socioeconomic History   • Marital status:      Spouse name: Not on file   • Number of children: Not on file   • Years of education: Not on file   • Highest education level: Not on file   Occupational History   • Not on file   Social Needs   • Financial resource strain: Not on file   • Food insecurity     Worry: Not on file     Inability: Not on file   • Transportation needs     Medical: Not on file     Non-medical: Not on file   Tobacco Use   • Smoking status: Never Smoker   • Smokeless tobacco: Never Used   Substance and Sexual Activity   • Alcohol use: No     Alcohol/week: 0.0 oz   • Drug use: No   • Sexual activity: Yes     Partners: Female   Lifestyle   • Physical activity     Days per week: Not on file     Minutes per session: Not on file   • Stress: Not on file   Relationships   • Social connections     Talks on phone: Not on file     Gets together: Not on file     Attends Buddhism service: Not on file     Active member of club or organization: Not on file     Attends meetings of clubs or organizations: Not on file     Relationship status: Not on file   • Intimate partner violence     Fear of current or ex partner: Not on file     Emotionally abused: Not on file     Physically abused: Not on file     Forced sexual activity: Not on file   Other Topics Concern   •  Service No   • Blood Transfusions No   • Caffeine Concern No   • Occupational Exposure No   • Hobby Hazards No   • Sleep Concern No   • Stress Concern No   • Weight Concern No   • Special Diet No   • Back Care Yes     Comment:  BELT SUPPORT   • Exercise Yes   • Bike Helmet No   • Seat Belt Yes   • Self-Exams No   Social History Narrative   • Not on file     Allergies   Allergen Reactions   • Bydureon [Exenatide] Hives     hives   • Cycloset [Bromocriptine Mesylate] Rash     Rash     • Morphine Vomiting and Nausea     Outpatient Encounter Medications as of 9/15/2020   Medication Sig Dispense Refill   • sennosides (SENOKOT) 8.6 MG Tab Take 8.6 mg by mouth. 2 tablets twice a day     • magnesium hydroxide (MILK OF MAGNESIA) 400 MG/5ML Suspension Take 30 mL by mouth every day.     • polyethylene glycol/lytes (MIRALAX) 17 g Pack Take 17 g by mouth every day.     • metoprolol (LOPRESSOR) 25 MG Tab Take 1 Tab by mouth 2 Times a Day. 60 Tab 11   • furosemide (LASIX) 40 MG Tab Take 1 Tab by mouth 2 times a day. 60 Tab 11   • potassium chloride SA (KDUR) 20 MEQ Tab CR Take 1 Tab by mouth 2 times a day. 60 Tab 11   • acetaminophen (TYLENOL) 500 MG Tab Take 1-2 Tabs by mouth every 6 hours. 30 Tab 0   • amiodarone (CORDARONE) 200 MG Tab Take 1 Tab by mouth every day. 30 Tab 2   • clopidogrel (PLAVIX) 75 MG Tab Take 1 Tab by mouth every day. 30 Tab 2   • tramadol (ULTRAM) 50 MG Tab Take 1 Tab by mouth every 8 hours as needed (Moderate Pain (NRS Pain Scale 4-6; CPOT Pain Scale 3-5) if opiates not ordered or tolerated). 1 Tab 0   • insulin lispro (HUMALOG) 100 UNIT/ML Inject 14 Units as instructed 3 times a day before meals.     • Insulin Degludec (TRESIBA) 100 UNIT/ML Solution Inject 30 Units as instructed every bedtime.     • aspirin EC (ECOTRIN) 81 MG Tablet Delayed Response Take 81 mg by mouth every evening.     • Empagliflozin (JARDIANCE) 25 MG Tab Take 25 mg by mouth every morning.     • losartan (COZAAR) 100 MG Tab Take 100 mg by mouth every morning.     • rosuvastatin (CRESTOR) 20 MG Tab Take 20 mg by mouth every evening.     • [DISCONTINUED] guaiFENesin ER (MUCINEX) 600 MG TABLET SR 12 HR Take 1 Tab by mouth 2 times a day as needed for Cough for  "up to 30 days. (Patient not taking: Reported on 9/15/2020) 60 Tab 0   • [DISCONTINUED] metoprolol (LOPRESSOR) 25 MG Tab Take 0.5 Tabs by mouth 2 Times a Day. 60 Tab 2   • [DISCONTINUED] omeprazole (PRILOSEC) 20 MG delayed-release capsule Take 1 Cap by mouth every day. (Patient not taking: Reported on 9/15/2020) 30 Cap 2   • [DISCONTINUED] potassium chloride SA (K-DUR) 10 MEQ Tab CR Take 1 Tab by mouth every day. 30 Tab 2   • [DISCONTINUED] ondansetron (ZOFRAN) 4 MG Tab tablet Take 1 Tab by mouth every four hours as needed for Nausea/Vomiting for up to 30 days. (Patient not taking: Reported on 9/15/2020) 45 Tab 0   • [DISCONTINUED] furosemide (LASIX) 20 MG Tab Take 20 mg by mouth every day.       No facility-administered encounter medications on file as of 9/15/2020.      Review of Systems   Constitutional: Positive for malaise/fatigue. Negative for fever.   Respiratory: Positive for shortness of breath (shrtness of breath). Negative for cough.    Cardiovascular: Positive for leg swelling (improving). Negative for chest pain (chest tightness), palpitations, orthopnea, claudication and PND.   Gastrointestinal: Negative for abdominal pain.   Musculoskeletal: Negative for myalgias.   Neurological: Positive for weakness. Negative for dizziness.   All other systems reviewed and are negative.       Objective:   /58 (BP Location: Left arm, Patient Position: Sitting)   Pulse 78   Ht 1.753 m (5' 9\")   Wt 101.2 kg (223 lb)   SpO2 96%   BMI 32.93 kg/m²     Physical Exam   Constitutional: He is oriented to person, place, and time. He appears well-developed and well-nourished.   HENT:   Head: Normocephalic and atraumatic.   Eyes: Pupils are equal, round, and reactive to light. EOM are normal.   Neck: Normal range of motion. Neck supple. JVD present.   Cardiovascular: Normal rate, regular rhythm and normal heart sounds.   Pulmonary/Chest: Effort normal. No respiratory distress. He has decreased breath sounds in the left " lower field. He has no wheezes. He has no rales.   Abdominal: Soft. Bowel sounds are normal.   Musculoskeletal:         General: Edema (bilateral 3+ LE edema to thighs) present.   Neurological: He is alert and oriented to person, place, and time.   Skin: Skin is warm and dry.   Psychiatric: He has a normal mood and affect. His behavior is normal.   Vitals reviewed.         Lab Results   Component Value Date/Time    CHOLSTRLTOT 111 08/04/2020 12:17 AM    LDL 43 08/04/2020 12:17 AM    HDL 46 08/04/2020 12:17 AM    TRIGLYCERIDE 108 08/04/2020 12:17 AM       Lab Results   Component Value Date/Time    SODIUM 125 (L) 09/08/2020 01:05 AM    POTASSIUM 4.3 09/08/2020 01:05 AM    CHLORIDE 87 (L) 09/08/2020 01:05 AM    CO2 27 09/08/2020 01:05 AM    GLUCOSE 194 (H) 09/08/2020 01:05 AM    BUN 30 (H) 09/08/2020 01:05 AM    CREATININE 0.86 09/08/2020 01:05 AM    CREATININE 1.0 02/18/2009 09:00 AM     Lab Results   Component Value Date/Time    ALKPHOSPHAT 57 08/30/2020 09:00 PM    ASTSGOT 27 08/30/2020 09:00 PM    ALTSGPT 37 08/30/2020 09:00 PM    TBILIRUBIN 0.3 08/30/2020 09:00 PM      Transthoracic Echo Report 10/23/2015  Normal left ventricular systolic function.  Left ventricular ejection fraction is visually estimated to be 60%.  Grade I diastolic dysfunction.  Mild mitral regurgitation.  Moderate aortic stenosis.  Vmax 3.47 m/s. Transvalvular gradients are - Peak: 48 mmHg, Mean: 30 mmHg.  Mild pulmonic insufficiency.      Transthoracic Echo Report 11/1/2017  Prior echo done 10/23/15.  Normal left ventricular chamber size.  Left ventricular ejection fraction is visually estimated to be 60%.  Grade I diastolic dysfunction.  Severe aortic stenosis.  Transvalvular gradients are - Peak: 72 mmHg, Mean: 50 mmHg.  Ascending aorta is borderline dilated with a diameter of 3.6 cm.  Compared to the report of the prior study done - the aortic stenosis is now severe.     Myocardial Perfusion Report 12/19/2017   NUCLEAR IMAGING  INTERPRETATION    No evidence of significant jeopardized viable myocardium or prior myocardial infarction.    Transient ischemic dilatation calculated at 1.17 visually correlated.    Normal left ventricular size, ejection fraction, and wall motion.    ECG INTERPRETATION    Negative stress ECG for ischemia.     Transthoracic Echo Report 12/26/2018  Normal left ventricular systolic function.  Left ventricular ejection fraction is visually estimated to be 65-70%.  Moderate left ventricular hypertrophy.  Severe aortic stenosis.      Coronary angiogram 01/08/2020   PROCEDURE:  Cardiac catheterization and percutaneous coronary intervention.  A.  Selective coronary angiography.  B.  iFR, proximal left anterior descending artery.  C.  Coronary stent implantation, proximal left anterior descending artery, 2.5x8 mm Coliln drug-eluting stent.  D.  Ascending aortography.  E.  Distal abdominal aortogram and bilateral iliofemoral angiography.  F.  Right femoral artery approach.  G.  Perclose.  H.  Conscious sedation supervision.     PREPROCEDURE DIAGNOSES:  1.  Aortic stenosis, severe, symptomatic.  2.  Previous left anterior descending artery stent, 1996.  3.  Diabetes mellitus.     POSTPROCEDURE DIAGNOSES:  1.  Left anterior descending artery 90% stenosis.  2.  iFR, left anterior descending artery, 0.39.  3.  Normal ascending aorta.  4.  Normal distal abdominal aorta and bilateral iliofemoral arteries.      Transthoracic Echo Report 1/31/2020  Normal left ventricular systolic function.  Left ventricular ejection fraction is visually estimated to be 70%.  Moderate concentric left ventricular hypertrophy.  Severe aortic stenosis.      Myocardial Perfusion Report 1/31/2020   NUCLEAR IMAGING INTERPRETATION   * Small, equivocal, fully reversible, mild severity defect limited to the apical inferior wall. SDS of 1 indicating very minimal ischemia if any.    * Nondiagnostic due to resting ECG.   * Normal left ventricular size, ejection  fraction, and wall motion.   ECG INTERPRETATION   Nondiagnostic due to resting ECG.    Transthoracic Echo Report 8/4/2020  Mildly reduced left ventricular systolic function.  Left ventricular ejection fraction is visually estimated to be 50-54%.  Moderate concentric left ventricular hypertrophy.  Critical aortic stenosis.  Mild mitral regurgitation.  Unable to estimate pulmonary artery pressure due to an inadequate   tricuspid regurgitant jet.  Compared to the images of the study done 06/24/2020 there has been a   decrease in left ventricular ejection fraction and increase in aortic   valve gradient.       Coronary angiogram 8/4/2020  Findings   Hemodynamics: Aorta: 98/54 mmHg     Coronary Anatomy              Left Main: Normal              LAD: Diffuse atherosclerosis and a long segment of stenosis from the proximal to mid vessel estimated at 60% stenosis. The distal vessel appears small and underfilled. The iFR is 0.34              LCx: 25% stenosis proximally and in the mid segment.               RCA: Dominant, Diffuse luminal irregularities no more than 30% stenosis                Technical Factors  1. Complications: None  2. Estimated Blood Loss: <50 cc  3. Specimens: None  4. Contrast Volume: 36 ml  5. Medications: Radial cocktail (Verapamil 2.5 mg, Nitroglycerin 100 mcg) Heparin 11,000 units  6. Radiation (air kerma): 187 mGy     IMPRESSIONS:  1. Severe aortic stenosis by non-invasive evaluation  2. Obstructive one vessel CAD- involving the proximal-mid LAD     Recommendations:  SAVR + LIMA       Transesophageal Echo Report 8/31/2020  Stenotic trileaflet aortic valve. PG=57 mean 35  Post # 23 AVR mean gradient 12 no cherry or  .   Transvalvular leaks .   TV normal.   Mitral valve mild MR E/A1.   EF=30% preop and 50 % post op post CABGx1 marked LVH 2.6 cm.     AVR and CABG x1 on 8/31/2020  POSTOPERATIVE DIAGNOSES:  Severe symptomatic aortic stenosis, coronary artery   disease (status post percutaneous  coronary intervention), recent non-ST   segment elevation myocardial infarction, acute on chronic left ventricular   systolic and diastolic failure, dilated cardiomyopathy, peripheral vascular   disease.     PROCEDURES:  Aortic valve replacement (23 mm Inspiris David pericardial   valve), coronary artery bypass grafting x1 (left internal mammary artery to   the left anterior descending artery), and intraoperative transesophageal   echocardiography.    Assessment:     1. SOB (shortness of breath)  DX-CHEST-2 VIEWS    Basic Metabolic Panel   2. Pleural effusion  DX-CHEST-2 VIEWS    Basic Metabolic Panel   3. SVT (supraventricular tachycardia) ([facility])  EKG    Basic Metabolic Panel   4. Severe aortic stenosis  Basic Metabolic Panel   5. S/P drug eluting coronary stent placement  Basic Metabolic Panel   6. Coronary artery disease involving native coronary artery of native heart without angina pectoris  Basic Metabolic Panel   7. High risk medication use  Basic Metabolic Panel   8. S/P CABG x 1     9. Type 2 diabetes mellitus with hyperosmolarity without coma, with long-term current use of insulin ([facility])     10. Dyslipidemia     11. HTN (hypertension), malignant     12. S/P AVR (aortic valve replacement)         Medical Decision Making:  Today's Assessment / Status / Plan:   1.  Aortic stenosis, s/p AVR on 8/31/2020:  -Patient continues to have some volume overload, does have left lower lobe diminished breath sounds  -Will have patient repeat a chest x-ray to follow-up on his pleural effusion  -Patient to increase furosemide to 40 mg twice a day  -Increase potassium to 20 mEq twice a day  -Obtain a BMP in 1 week, to follow hyponatremia and kidney function  -Does have a follow-up with CT surgery on 10/5/2020  -Recommend cardiac rehab once able to attend  -Encouraged more frequent I-S use  -Encouraged frequent small meals for nutrition  -Encourage patient to continue walking  -Patient given pump line number if he has  continued difficulties with shortness of breath, weight gain or swelling  -Reinforced s/sx of worsening symptoms with patient and weight monitoring. Pt verbalizes understanding. Pt to call office or RTC if present.     2.  CAD, history of stents, CABG x1 on 8/31/2020 (LIMA to LAD):  -Continue aspirin 81 mg daily  -Continue clopidogrel 75 mg daily  -Had a prior history of nosebleeds with Effient, denies any nosebleeds at this time or other bleeding issues  -Patient is on Jardiance for cardiac risk reduction and his diabetes  -Continue rosuvastatin 20 mg daily  -Patient is on metoprolol, increase to 25 mg twice a day for hypertension    3.  Hypertension: BP is elevated in clinic  -Home BP log shows increasing BP since discharge  -Patient was taking higher dose of metoprolol prior to his hospital visit  -Increase metoprolol back up to 25 mg twice a day  -Monitor and log Blood pressures at home. Call office or RTC if BP increasing or >180/100 or if symptoms of elevated blood pressure present. Reviewed s/sx of stroke and heart attack. Patient to go to ER or call 911 if present.     4.  Postoperative A. fib: EKG today shows sinus rhythm 81  -Will follow  -Metoprolol increased per above    5.  Dyslipidemia: Last LDL 43 on 8/4/2020  -Continue rosuvastatin 20 mg daily    6.  Diabetes:  -Encourage patient to follow-up with endocrinology soon to discuss low blood sugars  -Patient cutting back on his Humalog until he can get in, will continue his Tresiba    FU in clinic in 2-3 weeks with labs. Sooner if needed.    Patient verbalizes understanding and agrees with the plan of care.     PLEASE NOTE: This Note was created using voice recognition Software. I have made every reasonable attempt to correct obvious errors, but I expect that there are errors of grammar and possibly content that I did not discover before finalizing the note           absent

## 2020-09-18 ENCOUNTER — TELEPHONE (OUTPATIENT)
Dept: CARDIOTHORACIC SURGERY | Facility: MEDICAL CENTER | Age: 65
End: 2020-09-18

## 2020-09-18 ENCOUNTER — TELEPHONE (OUTPATIENT)
Dept: CARDIOLOGY | Facility: MEDICAL CENTER | Age: 65
End: 2020-09-18

## 2020-09-18 NOTE — TELEPHONE ENCOUNTER
Called patient for HF education, spoke with patient for 45 minutes about his diagnosis,  His medications and exercise and when to call, patient is weighing himself daily, trying to increase his activity daily and in small increments. Patient is trying to increase healthy foods to control his diabetes as well as his swelling.  Education Narrative  Reviewed anatomy and physiology of heart failure with patient. Went over heart failure worksheet and patient's individual HF diagnosis, EF, risk factors, general medication classes and indications, as well as personal goals.  Goals: Patient's primary goal is to be able to get around easier and be active again, patient is not used to being at home while recovering     Discussed daily weights, sodium restriction, worsening signs and symptoms to report to physician, heart medications, and importance of adherence to medication regimen. Emphasized recommendation from AHA/AAHFN to keep daily sodium intake between 1500mg-2000mg.      Reviewed dietary handouts, advanced care planning classes, and advance directive planning handout. Discussed dietary considerations and reviewed Seven Day Heart Healthy Meal Plan by [employer]     Invited patient and family members/friends to HF support group and encouraged patient to call Heart Failure clinic during normal business hours with any questions.  Heart Failure program card with number given to patient.           Patient states full understanding of all information given.

## 2020-09-18 NOTE — TELEPHONE ENCOUNTER
"Hospital follow up call:    Patient states they are    is using their IS; volume is improved. Volume was 750 cc and is now 1250 cc.    is walking everyday, distance is increasing from the hospital but the smoke has kept his walking inside the house.    is weighing themselves daily, weight is less than hospital weight.  Hospital discharge weight of 223 lbs provided, current weight 212 lbs.  Still has LE edema.    is taking their prescribed meds as directed.  They have no medication questions.    Has had a bowel movement since discharge, one yesterday.    Is showering every other day and is wearing an alternate compression boot, the hose were way too tight to get on and has not been wearing those.    States that post op pain is \"not bad\".  Narcotics are being utilized.    States incision looks \"clean\" and \"good\".    Follow up education that insomnia is common after surgery was provided to the them.  No additional questions.      Call time 14 minutes.      "

## 2020-09-19 ENCOUNTER — HOSPITAL ENCOUNTER (OUTPATIENT)
Dept: LAB | Facility: MEDICAL CENTER | Age: 65
End: 2020-09-19
Attending: NURSE PRACTITIONER
Payer: MEDICARE

## 2020-09-19 ENCOUNTER — HOSPITAL ENCOUNTER (OUTPATIENT)
Dept: RADIOLOGY | Facility: MEDICAL CENTER | Age: 65
End: 2020-09-19
Attending: NURSE PRACTITIONER
Payer: MEDICARE

## 2020-09-19 DIAGNOSIS — Z95.5 S/P DRUG ELUTING CORONARY STENT PLACEMENT: ICD-10-CM

## 2020-09-19 DIAGNOSIS — I47.10 SVT (SUPRAVENTRICULAR TACHYCARDIA) (HCC): ICD-10-CM

## 2020-09-19 DIAGNOSIS — Z79.899 HIGH RISK MEDICATION USE: ICD-10-CM

## 2020-09-19 DIAGNOSIS — R06.02 SOB (SHORTNESS OF BREATH): ICD-10-CM

## 2020-09-19 DIAGNOSIS — J90 PLEURAL EFFUSION: ICD-10-CM

## 2020-09-19 DIAGNOSIS — I25.10 CORONARY ARTERY DISEASE INVOLVING NATIVE CORONARY ARTERY OF NATIVE HEART WITHOUT ANGINA PECTORIS: ICD-10-CM

## 2020-09-19 DIAGNOSIS — I35.0 SEVERE AORTIC STENOSIS: ICD-10-CM

## 2020-09-19 LAB
ANION GAP SERPL CALC-SCNC: 13 MMOL/L (ref 7–16)
BUN SERPL-MCNC: 16 MG/DL (ref 8–22)
CALCIUM SERPL-MCNC: 9.2 MG/DL (ref 8.5–10.5)
CHLORIDE SERPL-SCNC: 92 MMOL/L (ref 96–112)
CO2 SERPL-SCNC: 27 MMOL/L (ref 20–33)
CREAT SERPL-MCNC: 1.1 MG/DL (ref 0.5–1.4)
GLUCOSE SERPL-MCNC: 156 MG/DL (ref 65–99)
POTASSIUM SERPL-SCNC: 4.7 MMOL/L (ref 3.6–5.5)
SODIUM SERPL-SCNC: 132 MMOL/L (ref 135–145)

## 2020-09-19 PROCEDURE — 36415 COLL VENOUS BLD VENIPUNCTURE: CPT

## 2020-09-19 PROCEDURE — 71046 X-RAY EXAM CHEST 2 VIEWS: CPT

## 2020-09-19 PROCEDURE — 80048 BASIC METABOLIC PNL TOTAL CA: CPT

## 2020-10-01 ENCOUNTER — OFFICE VISIT (OUTPATIENT)
Dept: CARDIOLOGY | Facility: MEDICAL CENTER | Age: 65
End: 2020-10-01
Payer: MEDICARE

## 2020-10-01 VITALS
WEIGHT: 208 LBS | BODY MASS INDEX: 30.81 KG/M2 | RESPIRATION RATE: 16 BRPM | DIASTOLIC BLOOD PRESSURE: 50 MMHG | SYSTOLIC BLOOD PRESSURE: 110 MMHG | HEART RATE: 76 BPM | OXYGEN SATURATION: 97 % | HEIGHT: 69 IN

## 2020-10-01 DIAGNOSIS — I25.10 CORONARY ARTERY DISEASE INVOLVING NATIVE CORONARY ARTERY OF NATIVE HEART WITHOUT ANGINA PECTORIS: ICD-10-CM

## 2020-10-01 DIAGNOSIS — E11.9 TYPE 2 DIABETES MELLITUS WITHOUT COMPLICATION, WITHOUT LONG-TERM CURRENT USE OF INSULIN (HCC): ICD-10-CM

## 2020-10-01 DIAGNOSIS — J90 PLEURAL EFFUSION: ICD-10-CM

## 2020-10-01 DIAGNOSIS — Z79.899 HIGH RISK MEDICATION USE: ICD-10-CM

## 2020-10-01 DIAGNOSIS — I35.0 SEVERE AORTIC STENOSIS: ICD-10-CM

## 2020-10-01 DIAGNOSIS — Z95.5 S/P DRUG ELUTING CORONARY STENT PLACEMENT: ICD-10-CM

## 2020-10-01 DIAGNOSIS — Z95.1 S/P CABG X 1: ICD-10-CM

## 2020-10-01 DIAGNOSIS — Z95.2 S/P AVR (AORTIC VALVE REPLACEMENT): ICD-10-CM

## 2020-10-01 DIAGNOSIS — E78.5 DYSLIPIDEMIA: ICD-10-CM

## 2020-10-01 DIAGNOSIS — I10 HTN (HYPERTENSION), MALIGNANT: ICD-10-CM

## 2020-10-01 PROCEDURE — 99214 OFFICE O/P EST MOD 30 MIN: CPT | Performed by: NURSE PRACTITIONER

## 2020-10-01 RX ORDER — FUROSEMIDE 40 MG/1
40 TABLET ORAL DAILY
Qty: 30 TAB | Refills: 11 | Status: SHIPPED | OUTPATIENT
Start: 2020-10-01 | End: 2021-12-18

## 2020-10-01 RX ORDER — POTASSIUM CHLORIDE 20 MEQ/1
20 TABLET, EXTENDED RELEASE ORAL DAILY
Qty: 30 TAB | Refills: 11 | Status: SHIPPED | OUTPATIENT
Start: 2020-10-01 | End: 2021-01-06

## 2020-10-01 ASSESSMENT — ENCOUNTER SYMPTOMS
SHORTNESS OF BREATH: 0
COUGH: 0
WEAKNESS: 1
NERVOUS/ANXIOUS: 1
PALPITATIONS: 0
ABDOMINAL PAIN: 0
INSOMNIA: 1
FEVER: 0
DIZZINESS: 1
PND: 0
MYALGIAS: 0
CLAUDICATION: 0
ORTHOPNEA: 0

## 2020-10-01 ASSESSMENT — FIBROSIS 4 INDEX: FIB4 SCORE: 1.35

## 2020-10-01 NOTE — PROGRESS NOTES
Chief Complaint   Patient presents with   • Aortic Stenosis     f/u appt        Subjective:   Sharad Millan is a 64 y.o. male who presents today for follow-up after his recent aortic valve replacement and CABG with his son, Spencer.    He is a patient of Dr. Chavez.  He was last seen in clinic on 9/15/2020.  During that visit, furosemide was increased to 40 mg twice a day with potassium 20 mEq twice a day for his swelling.  He was also sent for a chest x-ray.    Patient reports with increased diuretics, his swelling has improved and resolved and his weights have decreased.  He was weighing around 220 pounds and now is down to 200-203 pounds.  He does continue to reports chest swelling and tightness, along with some fatigue though improved.  He does walk about 20 minutes a day down his sidewalk and has used his elliptical for up to 8-9 minutes.  He does report fatigue after elliptical use.  He reports occasional episodes of dizziness, some continued weakness and anxiety along with some trouble sleeping at times.    He denies chest pains, palpitations, orthopnea, PND, edema or shortness of breath.    He does continue to report some decreased appetite, but has been having smaller meals and that seems to be adequate.  He has been noticing some lower blood sugars in the 90s in the mornings, does have a follow-up with his endocrinologist soon.    He is being followed by endocrinology in [place], Dr. Schmitz.    Additonally, patient has the following medical problems:    -Hospitalization from 8/27/2020 through 9/8/2020 for his AVR and CABG.  He had his procedure on 8/31/2020.  He had postoperative A. fib, had amiodarone started.    -CAD, history of a stent in 1996 and a CHARBEL in January 2020.  Current CABG x1 LIMA to LAD on 8/31/2020    -Aortic stenosis: AVR on 8/31/2020    -Uncontrolled diabetes: Followed by endocrinology, currently taking Jardiance, Tresiba, Humalog    -Hypertension    -Obesity      Past  Medical History:   Diagnosis Date   • Aortic stenosis 10/20/2011   • Arrhythmia    • Breath shortness    • Carotid bruit 12/9/2009   • Cataract     cataract extraction with IOL   • Chronic right shoulder pain    • Diabetes     oral medication   • Fatty liver 1/17/2011   • Heart attack (HCC) 12/2018   • History of cardiac catheterization 7/13/2009   • History of echocardiogram 10/20/2011   • HTN (hypertension) 10/12/2012   • Hypertension    • Memory loss 10/20/2011   • Murmur 7/7/2009   • Obesity 8/23/2011   • Palpitations 10/20/2011   • PSVT (paroxysmal supraventricular tachycardia) (HCC) 2/14/2012   • S/P coronary artery stent placement 1996    coronary stent implantation x3, [facility]   • S/P coronary artery stent placement 1998    coronary stent implantation; [facility]   • S/P coronary artery stent placement 2003    cardiac catheterization; patent stents; [place]   • Sciatica 8/23/2011   • Uncontrolled diabetes mellitus (HCC) 11/29/2010     Past Surgical History:   Procedure Laterality Date   • MULTIPLE CORONARY ARTERY BYPASS ENDO VEIN HARVEST N/A 8/31/2020    Procedure: CABG, WITH ENDOSCOPIC VEIN PROCUREMENT X1;  Surgeon: Dorys Casas M.D.;  Location: [facility];  Service: Cardiothoracic   • AORTIC VALVE REPLACEMENT N/A 8/31/2020    Procedure: REPLACEMENT, AORTIC VALVE;  Surgeon: Dorys Casas M.D.;  Location: [facility];  Service: Cardiothoracic   • ROMERO N/A 8/31/2020    Procedure: ECHOCARDIOGRAM, TRANSESOPHAGEAL;  Surgeon: Dorys Casas M.D.;  Location: [facility];  Service: Cardiothoracic   • PO BY LAPAROSCOPY N/A 10/25/2015    Procedure: PO BY LAPAROSCOPY-with grams ;  Surgeon: Ronnie Bowman M.D.;  Location: [facility];  Service:    • CAROTID STENT ANGIOGRAPHY  1996   • CATARACT EXTRACTION WITH IOL Bilateral      Family History   Problem Relation Age of Onset   • Heart Disease Mother    • Heart Attack Mother    • Heart Disease Father       Social History     Socioeconomic History   • Marital status:      Spouse name: Not on file   • Number of children: Not on file   • Years of education: Not on file   • Highest education level: Not on file   Occupational History   • Not on file   Social Needs   • Financial resource strain: Not on file   • Food insecurity     Worry: Not on file     Inability: Not on file   • Transportation needs     Medical: Not on file     Non-medical: Not on file   Tobacco Use   • Smoking status: Never Smoker   • Smokeless tobacco: Never Used   Substance and Sexual Activity   • Alcohol use: No     Alcohol/week: 0.0 oz   • Drug use: No   • Sexual activity: Yes     Partners: Female   Lifestyle   • Physical activity     Days per week: Not on file     Minutes per session: Not on file   • Stress: Not on file   Relationships   • Social connections     Talks on phone: Not on file     Gets together: Not on file     Attends Nondenominational service: Not on file     Active member of club or organization: Not on file     Attends meetings of clubs or organizations: Not on file     Relationship status: Not on file   • Intimate partner violence     Fear of current or ex partner: Not on file     Emotionally abused: Not on file     Physically abused: Not on file     Forced sexual activity: Not on file   Other Topics Concern   •  Service No   • Blood Transfusions No   • Caffeine Concern No   • Occupational Exposure No   • Hobby Hazards No   • Sleep Concern No   • Stress Concern No   • Weight Concern No   • Special Diet No   • Back Care Yes     Comment: BELT SUPPORT   • Exercise Yes   • Bike Helmet No   • Seat Belt Yes   • Self-Exams No   Social History Narrative   • Not on file     Allergies   Allergen Reactions   • Bydureon [Exenatide] Hives     hives   • Cycloset [Bromocriptine Mesylate] Rash     Rash     • Morphine Vomiting and Nausea     Outpatient Encounter Medications as of 10/1/2020   Medication Sig Dispense Refill   • furosemide  (LASIX) 40 MG Tab Take 1 Tab by mouth every day. 30 Tab 11   • potassium chloride SA (KDUR) 20 MEQ Tab CR Take 1 Tab by mouth every day. 30 Tab 11   • sennosides (SENOKOT) 8.6 MG Tab Take 8.6 mg by mouth. 2 tablets twice a day     • magnesium hydroxide (MILK OF MAGNESIA) 400 MG/5ML Suspension Take 30 mL by mouth every day.     • polyethylene glycol/lytes (MIRALAX) 17 g Pack Take 17 g by mouth every day.     • metoprolol (LOPRESSOR) 25 MG Tab Take 1 Tab by mouth 2 Times a Day. 60 Tab 11   • acetaminophen (TYLENOL) 500 MG Tab Take 1-2 Tabs by mouth every 6 hours. 30 Tab 0   • amiodarone (CORDARONE) 200 MG Tab Take 1 Tab by mouth every day. 30 Tab 2   • clopidogrel (PLAVIX) 75 MG Tab Take 1 Tab by mouth every day. 30 Tab 2   • tramadol (ULTRAM) 50 MG Tab Take 1 Tab by mouth every 8 hours as needed (Moderate Pain (NRS Pain Scale 4-6; CPOT Pain Scale 3-5) if opiates not ordered or tolerated). 1 Tab 0   • insulin lispro (HUMALOG) 100 UNIT/ML Inject 14 Units as instructed 3 times a day before meals.     • Insulin Degludec (TRESIBA) 100 UNIT/ML Solution Inject 30 Units as instructed every bedtime.     • aspirin EC (ECOTRIN) 81 MG Tablet Delayed Response Take 81 mg by mouth every evening.     • Empagliflozin (JARDIANCE) 25 MG Tab Take 25 mg by mouth every morning.     • losartan (COZAAR) 100 MG Tab Take 100 mg by mouth every morning.     • rosuvastatin (CRESTOR) 20 MG Tab Take 20 mg by mouth every evening.     • [DISCONTINUED] furosemide (LASIX) 40 MG Tab Take 1 Tab by mouth 2 times a day. 60 Tab 11   • [DISCONTINUED] potassium chloride SA (KDUR) 20 MEQ Tab CR Take 1 Tab by mouth 2 times a day. 60 Tab 11     No facility-administered encounter medications on file as of 10/1/2020.      Review of Systems   Constitutional: Positive for malaise/fatigue (slightly improved ). Negative for fever.   Respiratory: Negative for cough and shortness of breath.    Cardiovascular: Negative for chest pain (chest tightness), palpitations,  "orthopnea, claudication, leg swelling and PND.   Gastrointestinal: Negative for abdominal pain.   Musculoskeletal: Negative for myalgias.   Neurological: Positive for dizziness (occ) and weakness.   Psychiatric/Behavioral: The patient is nervous/anxious and has insomnia.    All other systems reviewed and are negative.       Objective:   /50 (BP Location: Right arm, Patient Position: Sitting, BP Cuff Size: Adult)   Pulse 76   Resp 16   Ht 1.753 m (5' 9\")   Wt 94.3 kg (208 lb)   SpO2 97%   BMI 30.72 kg/m²     Physical Exam   Constitutional: He is oriented to person, place, and time. He appears well-developed and well-nourished.   HENT:   Head: Normocephalic and atraumatic.   Eyes: Pupils are equal, round, and reactive to light. EOM are normal.   Neck: Normal range of motion. Neck supple. No JVD present.   Cardiovascular: Normal rate, regular rhythm and normal heart sounds.   Pulmonary/Chest: Effort normal. No respiratory distress. He has decreased breath sounds in the left lower field. He has no wheezes. He has no rales.   Abdominal: Soft. Bowel sounds are normal.   Musculoskeletal:         General: No edema.   Neurological: He is alert and oriented to person, place, and time.   Skin: Skin is warm and dry.   Psychiatric: He has a normal mood and affect. His behavior is normal.   Vitals reviewed.         Lab Results   Component Value Date/Time    CHOLSTRLTOT 111 08/04/2020 12:17 AM    LDL 43 08/04/2020 12:17 AM    HDL 46 08/04/2020 12:17 AM    TRIGLYCERIDE 108 08/04/2020 12:17 AM       Lab Results   Component Value Date/Time    SODIUM 132 (L) 09/19/2020 11:30 AM    POTASSIUM 4.7 09/19/2020 11:30 AM    CHLORIDE 92 (L) 09/19/2020 11:30 AM    CO2 27 09/19/2020 11:30 AM    GLUCOSE 156 (H) 09/19/2020 11:30 AM    BUN 16 09/19/2020 11:30 AM    CREATININE 1.10 09/19/2020 11:30 AM    CREATININE 1.0 02/18/2009 09:00 AM     Lab Results   Component Value Date/Time    ALKPHOSPHAT 57 08/30/2020 09:00 PM    ASTSGOT 27 " 08/30/2020 09:00 PM    ALTSGPT 37 08/30/2020 09:00 PM    TBILIRUBIN 0.3 08/30/2020 09:00 PM      Transthoracic Echo Report 10/23/2015  Normal left ventricular systolic function.  Left ventricular ejection fraction is visually estimated to be 60%.  Grade I diastolic dysfunction.  Mild mitral regurgitation.  Moderate aortic stenosis.  Vmax 3.47 m/s. Transvalvular gradients are - Peak: 48 mmHg, Mean: 30 mmHg.  Mild pulmonic insufficiency.      Transthoracic Echo Report 11/1/2017  Prior echo done 10/23/15.  Normal left ventricular chamber size.  Left ventricular ejection fraction is visually estimated to be 60%.  Grade I diastolic dysfunction.  Severe aortic stenosis.  Transvalvular gradients are - Peak: 72 mmHg, Mean: 50 mmHg.  Ascending aorta is borderline dilated with a diameter of 3.6 cm.  Compared to the report of the prior study done - the aortic stenosis is now severe.     Myocardial Perfusion Report 12/19/2017   NUCLEAR IMAGING INTERPRETATION    No evidence of significant jeopardized viable myocardium or prior myocardial infarction.    Transient ischemic dilatation calculated at 1.17 visually correlated.    Normal left ventricular size, ejection fraction, and wall motion.    ECG INTERPRETATION    Negative stress ECG for ischemia.     Transthoracic Echo Report 12/26/2018  Normal left ventricular systolic function.  Left ventricular ejection fraction is visually estimated to be 65-70%.  Moderate left ventricular hypertrophy.  Severe aortic stenosis.      Coronary angiogram 01/08/2020   PROCEDURE:  Cardiac catheterization and percutaneous coronary intervention.  A.  Selective coronary angiography.  B.  iFR, proximal left anterior descending artery.  C.  Coronary stent implantation, proximal left anterior descending artery, 2.5x8 mm [place] drug-eluting stent.  D.  Ascending aortography.  E.  Distal abdominal aortogram and bilateral iliofemoral angiography.  F.  Right femoral artery approach.  G.  Perclose.  H.   Conscious sedation supervision.     PREPROCEDURE DIAGNOSES:  1.  Aortic stenosis, severe, symptomatic.  2.  Previous left anterior descending artery stent, 1996.  3.  Diabetes mellitus.     POSTPROCEDURE DIAGNOSES:  1.  Left anterior descending artery 90% stenosis.  2.  iFR, left anterior descending artery, 0.39.  3.  Normal ascending aorta.  4.  Normal distal abdominal aorta and bilateral iliofemoral arteries.      Transthoracic Echo Report 1/31/2020  Normal left ventricular systolic function.  Left ventricular ejection fraction is visually estimated to be 70%.  Moderate concentric left ventricular hypertrophy.  Severe aortic stenosis.      Myocardial Perfusion Report 1/31/2020   NUCLEAR IMAGING INTERPRETATION   * Small, equivocal, fully reversible, mild severity defect limited to the apical inferior wall. SDS of 1 indicating very minimal ischemia if any.    * Nondiagnostic due to resting ECG.   * Normal left ventricular size, ejection fraction, and wall motion.   ECG INTERPRETATION   Nondiagnostic due to resting ECG.    Transthoracic Echo Report 8/4/2020  Mildly reduced left ventricular systolic function.  Left ventricular ejection fraction is visually estimated to be 50-54%.  Moderate concentric left ventricular hypertrophy.  Critical aortic stenosis.  Mild mitral regurgitation.  Unable to estimate pulmonary artery pressure due to an inadequate   tricuspid regurgitant jet.  Compared to the images of the study done 06/24/2020 there has been a   decrease in left ventricular ejection fraction and increase in aortic   valve gradient.       Coronary angiogram 8/4/2020  Findings   Hemodynamics: Aorta: 98/54 mmHg     Coronary Anatomy              Left Main: Normal              LAD: Diffuse atherosclerosis and a long segment of stenosis from the proximal to mid vessel estimated at 60% stenosis. The distal vessel appears small and underfilled. The iFR is 0.34              LCx: 25% stenosis proximally and in the mid  segment.               RCA: Dominant, Diffuse luminal irregularities no more than 30% stenosis                Technical Factors  1. Complications: None  2. Estimated Blood Loss: <50 cc  3. Specimens: None  4. Contrast Volume: 36 ml  5. Medications: Radial cocktail (Verapamil 2.5 mg, Nitroglycerin 100 mcg) Heparin 11,000 units  6. Radiation (air kerma): 187 mGy     IMPRESSIONS:  1. Severe aortic stenosis by non-invasive evaluation  2. Obstructive one vessel CAD- involving the proximal-mid LAD     Recommendations:  SAVR + LIMA       Transesophageal Echo Report 8/31/2020  Stenotic trileaflet aortic valve. PG=57 mean 35  Post # 23 AVR mean gradient 12 no cherry or  .   Transvalvular leaks .   TV normal.   Mitral valve mild MR E/A1.   EF=30% preop and 50 % post op post CABGx1 marked LVH 2.6 cm.     AVR and CABG x1 on 8/31/2020  POSTOPERATIVE DIAGNOSES:  Severe symptomatic aortic stenosis, coronary artery disease (status post percutaneous coronary intervention), recent non-ST segment elevation myocardial infarction, acute on chronic left ventricular systolic and diastolic failure, dilated cardiomyopathy, peripheral vascular disease.     PROCEDURES:  Aortic valve replacement (23 mm Inspiris David pericardial valve), coronary artery bypass grafting x1 (left internal mammary artery to the left anterior descending artery), and intraoperative transesophageal   Echocardiography.    Chest X ray 9/19/2020  FINDINGS:   There is a small left pleural effusion with overlying atelectasis/consolidation. Sternotomy wires and prosthetic cardiac valve are seen. There is no pneumothorax. Degenerative changes are seen in the spine. The cardiomediastinal silhouette is stable.     IMPRESSION:     Small left pleural effusion with overlying atelectasis/consolidation.    Assessment:     1. Severe aortic stenosis     2. S/P AVR (aortic valve replacement)     3. Coronary artery disease involving native coronary artery of native heart  without angina pectoris     4. S/P CABG x 1     5. S/P drug eluting coronary stent placement     6. Pleural effusion     7. HTN (hypertension), malignant     8. High risk medication use     9. Dyslipidemia     10. Type 2 diabetes mellitus without complication, without long-term current use of insulin (HCC)         Medical Decision Making:  Today's Assessment / Status / Plan:   1.  Aortic stenosis, s/p AVR on 8/31/2020:  -Patient appears to be Euvolemic  -Chest x-ray shows small left pleural effusion  -Decrease furosemide to 40 mg daily  -Decrease potassium to 20 mEq daily  -Does have a follow-up with CT surgery on 10/5/2020  -Recommend cardiac rehab once able to attend, patient encouraged to schedule once cleared by surgeon  -Encouraged more frequent I-S use  -Encouraged frequent small meals for nutrition  -Encourage patient to continue walking  -Patient given pump line number if he has continued difficulties with shortness of breath, weight gain or swelling  -Reinforced s/sx of worsening symptoms with patient and weight monitoring. Pt verbalizes understanding. Pt to call office or RTC if present.     2.  CAD, history of stents, CABG x1 on 8/31/2020 (LIMA to LAD):  -Continue aspirin 81 mg daily  -Continue clopidogrel 75 mg daily  -Had a prior history of nosebleeds with Effient, denies any nosebleeds at this time or other bleeding issues  -Patient is on Jardiance for cardiac risk reduction and his diabetes  -Continue rosuvastatin 20 mg daily  -Continue metoprolol 25 mg twice a day    3.  Hypertension: BP is stable  -Home BPs ranging from the 110s-120s, systolic  -Continue metoprolol 25 mg twice a day  -Monitor and log Blood pressures at home. Call office or RTC if BP increasing or >180/100 or if symptoms of elevated blood pressure present. Reviewed s/sx of stroke and heart attack. Patient to go to ER or call 911 if present.     4.  Postoperative A. fib: EKG today shows sinus rhythm 81  -Will follow  -Metoprolol per  above    5.  Dyslipidemia: Last LDL 43 on 8/4/2020  -Continue rosuvastatin 20 mg daily    6.  Diabetes:  -Encourage patient to follow-up with endocrinology soon to discuss low blood sugars  -Patient cutting back on his Humalog until he can get in, will continue his Tresiba    7.  Anxiety and insomnia:  -Encourage patient to discuss this with his PCP, patient may benefit with short-term treatment due to recent health changes.    FU in clinic in 1 month with Dr. Chavez. Sooner if needed.    Patient verbalizes understanding and agrees with the plan of care.     PLEASE NOTE: This Note was created using voice recognition Software. I have made every reasonable attempt to correct obvious errors, but I expect that there are errors of grammar and possibly content that I did not discover before finalizing the note

## 2020-10-04 NOTE — PROGRESS NOTES
"CHIEF COMPLAINT: Post-op visit     PROCEDURE: 8/31/2020: Aortic valve replacement (23 mm Inspiris David pericardial valve), coronary artery bypass grafting x1 (left internal mammary artery to the left anterior descending artery), and intraoperative transesophageal echocardiography.    HPI:  Mr. Millan returns today for post operative follow up.  He states his recovery has been \"slow but steady\".  When he was discharged from the hospital he states he could only do a 100 yard walk in his yard twice in the first week.  He is now able to do the same 100 yard walk 12 times in a day.  He continues to hu fatigue, tremors and some sleep disturbance.  The fatigue is improving.  He is working with his primary care regarding the sleep disturbance and tremors.  He is going to see his endocrinologist tomorrow for hypoglycemia episodes.  He has some residual pain/cold feeling in his right arm.  States it was previously injured and is recovering.  Denies chest pressure, increased dyspnea, weight gain, dizziness, syncope or near syncope.      /70 (BP Location: Left arm, Patient Position: Sitting, BP Cuff Size: Adult)   Pulse 78   Temp (!) 35.4 °C (95.7 °F) (Temporal)   Ht 1.753 m (5' 9\")   Wt 93.4 kg (206 lb)   SpO2 99%     PHYSICAL EXAM:  CARDIAC: S1, S2, no murmurs, gallops, rub  PULMONARY: CTA bilaterally  SKIN: no edema  INCISIONS: Sternum stable, wounds well healed    PLAN:  We discussed continued post operative sternal precautions as well as driving restrictions moving forward.      Continue plavix for 3 months or per your cardiologist's recommendations.    Overall, we are very pleased with his progress and have instructed him to follow-up with us in the future should he have any concerns related to surgery.  Otherwise, we will see him on a PRN basis. He will continue to follow-up with his cardiologist and primary care physician. He has been informed that any further prescription refills should be done " through primary care and/or cardiology.  He acknowledged understanding.      Thank you for allowing us to participate in the care of this very pleasant patient and please let us know if there is any way we may be of further assistance.

## 2020-10-05 ENCOUNTER — OFFICE VISIT (OUTPATIENT)
Dept: CARDIOTHORACIC SURGERY | Facility: MEDICAL CENTER | Age: 65
End: 2020-10-05
Payer: MEDICARE

## 2020-10-05 VITALS
TEMPERATURE: 95.7 F | WEIGHT: 206 LBS | DIASTOLIC BLOOD PRESSURE: 70 MMHG | HEIGHT: 69 IN | BODY MASS INDEX: 30.51 KG/M2 | OXYGEN SATURATION: 99 % | SYSTOLIC BLOOD PRESSURE: 122 MMHG | HEART RATE: 78 BPM

## 2020-10-05 DIAGNOSIS — Z98.890 POST-OPERATIVE STATE: ICD-10-CM

## 2020-10-05 PROCEDURE — 99024 POSTOP FOLLOW-UP VISIT: CPT | Performed by: PHYSICIAN ASSISTANT

## 2020-10-05 ASSESSMENT — FIBROSIS 4 INDEX: FIB4 SCORE: 1.35

## 2020-10-13 ENCOUNTER — TELEPHONE (OUTPATIENT)
Dept: CARDIOTHORACIC SURGERY | Facility: MEDICAL CENTER | Age: 65
End: 2020-10-13

## 2020-10-13 NOTE — TELEPHONE ENCOUNTER
Patient had a question regarding his heart rate.  It has been in the 70's for weeks and just for the last few days has been in the 90's consistently.  He has not changed his routine.    He states it feels consistent and regular.    Dr. Khoury gave him 2 fluid boluses via IV last week for dehydration; his lasix dose has been decreased from 40 mg BID to 20 mg Q day last week as well.    He was instructed to drink a little more fluid over the next few days; do not over do the fluids and see if that helps with the heart rate. If it does not; he needs to reach out to cardiology for medication management.    Call time 12 minutes.

## 2020-10-23 ENCOUNTER — TELEPHONE (OUTPATIENT)
Dept: CARDIOLOGY | Facility: MEDICAL CENTER | Age: 65
End: 2020-10-23

## 2020-10-23 NOTE — TELEPHONE ENCOUNTER
----- Message from Jocelyn aRi, Med Ass't sent at 10/23/2020 12:42 PM PDT -----  Regarding: Refill request  Leatha,  Patient is requesting refill of Tresiba last order through ER. Can you please help with Rx. Last office visit with LUIS does not mention.    Thank you  Jocelyn

## 2020-10-31 ENCOUNTER — OFFICE VISIT (OUTPATIENT)
Dept: URGENT CARE | Facility: PHYSICIAN GROUP | Age: 65
End: 2020-10-31
Payer: MEDICARE

## 2020-10-31 ENCOUNTER — HOSPITAL ENCOUNTER (OUTPATIENT)
Facility: MEDICAL CENTER | Age: 65
End: 2020-10-31
Attending: PHYSICIAN ASSISTANT
Payer: MEDICARE

## 2020-10-31 VITALS
SYSTOLIC BLOOD PRESSURE: 130 MMHG | DIASTOLIC BLOOD PRESSURE: 50 MMHG | HEART RATE: 78 BPM | OXYGEN SATURATION: 97 % | BODY MASS INDEX: 29.63 KG/M2 | HEIGHT: 70 IN | RESPIRATION RATE: 18 BRPM | TEMPERATURE: 97.5 F | WEIGHT: 207 LBS

## 2020-10-31 DIAGNOSIS — R30.0 DYSURIA: ICD-10-CM

## 2020-10-31 LAB
APPEARANCE UR: CLEAR
BILIRUB UR STRIP-MCNC: NORMAL MG/DL
COLOR UR AUTO: NORMAL
GLUCOSE UR STRIP.AUTO-MCNC: 500 MG/DL
KETONES UR STRIP.AUTO-MCNC: NORMAL MG/DL
LEUKOCYTE ESTERASE UR QL STRIP.AUTO: NORMAL
NITRITE UR QL STRIP.AUTO: NORMAL
PH UR STRIP.AUTO: 5.5 [PH] (ref 5–8)
PROT UR QL STRIP: NORMAL MG/DL
RBC UR QL AUTO: NORMAL
SP GR UR STRIP.AUTO: 1
UROBILINOGEN UR STRIP-MCNC: NORMAL MG/DL

## 2020-10-31 PROCEDURE — 99213 OFFICE O/P EST LOW 20 MIN: CPT | Performed by: PHYSICIAN ASSISTANT

## 2020-10-31 PROCEDURE — 87086 URINE CULTURE/COLONY COUNT: CPT

## 2020-10-31 PROCEDURE — 81002 URINALYSIS NONAUTO W/O SCOPE: CPT | Performed by: PHYSICIAN ASSISTANT

## 2020-10-31 ASSESSMENT — ENCOUNTER SYMPTOMS
NAUSEA: 0
CHILLS: 0
PALPITATIONS: 0
FEVER: 0
VOMITING: 0
SHORTNESS OF BREATH: 0
SORE THROAT: 0
DIZZINESS: 0
BLURRED VISION: 0
ABDOMINAL PAIN: 0
FLANK PAIN: 0

## 2020-10-31 ASSESSMENT — FIBROSIS 4 INDEX: FIB4 SCORE: 1.35

## 2020-10-31 NOTE — PROGRESS NOTES
Subjective:      Sharad Millan is a 64 y.o. male who presents with Dysuria (freq, x2 weeks)      Dysuria   This is a new problem. The current episode started 1 to 4 weeks ago (He has had this problem intermittently since he had a surgical procedure done 7 weeks ago but it is gotten a little more persistent over the past 2 weeks.). The problem occurs every urination. The problem has been gradually worsening. The quality of the pain is described as burning. The pain is mild. There has been no fever. Associated symptoms include frequency, hesitancy (Intermittently) and urgency. Pertinent negatives include no chills, discharge, flank pain, hematuria, nausea or vomiting. He has tried nothing for the symptoms. The treatment provided no relief.   Patient is on Jardiance and he was also on some heavy doses of Lasix within the past few months for fluid retention.  He thinks that these could be things that are contributing to his symptoms but he wanted to be evaluated for possible infectious cause as well.    Review of Systems   Constitutional: Negative for chills, fever and malaise/fatigue.   HENT: Negative for congestion and sore throat.    Eyes: Negative for blurred vision.   Respiratory: Negative for shortness of breath.    Cardiovascular: Negative for chest pain and palpitations.   Gastrointestinal: Negative for abdominal pain, nausea and vomiting.   Genitourinary: Positive for dysuria, frequency, hesitancy (Intermittently) and urgency. Negative for flank pain and hematuria.   Neurological: Negative for dizziness.       PMH:  has a past medical history of Aortic stenosis (10/20/2011), Arrhythmia, Breath shortness, Carotid bruit (12/9/2009), Cataract, Chronic right shoulder pain, Diabetes, Fatty liver (1/17/2011), Heart attack (HCC) (12/2018), History of cardiac catheterization (7/13/2009), History of echocardiogram (10/20/2011), HTN (hypertension) (10/12/2012), Hypertension, Memory loss (10/20/2011), Murmur  (7/7/2009), Obesity (8/23/2011), Palpitations (10/20/2011), PSVT (paroxysmal supraventricular tachycardia) ([facility]) (2/14/2012), S/P coronary artery stent placement (1996), S/P coronary artery stent placement (1998), S/P coronary artery stent placement (2003), Sciatica (8/23/2011), and Uncontrolled diabetes mellitus ([facility]) (11/29/2010).  MEDS:   Current Outpatient Medications:   •  furosemide (LASIX) 40 MG Tab, Take 1 Tab by mouth every day., Disp: 30 Tab, Rfl: 11  •  potassium chloride SA (KDUR) 20 MEQ Tab CR, Take 1 Tab by mouth every day., Disp: 30 Tab, Rfl: 11  •  sennosides (SENOKOT) 8.6 MG Tab, Take 8.6 mg by mouth. 2 tablets twice a day, Disp: , Rfl:   •  magnesium hydroxide (MILK OF MAGNESIA) 400 MG/5ML Suspension, Take 30 mL by mouth every day., Disp: , Rfl:   •  polyethylene glycol/lytes (MIRALAX) 17 g Pack, Take 17 g by mouth every day., Disp: , Rfl:   •  metoprolol (LOPRESSOR) 25 MG Tab, Take 1 Tab by mouth 2 Times a Day., Disp: 60 Tab, Rfl: 11  •  acetaminophen (TYLENOL) 500 MG Tab, Take 1-2 Tabs by mouth every 6 hours., Disp: 30 Tab, Rfl: 0  •  amiodarone (CORDARONE) 200 MG Tab, Take 1 Tab by mouth every day., Disp: 30 Tab, Rfl: 2  •  clopidogrel (PLAVIX) 75 MG Tab, Take 1 Tab by mouth every day., Disp: 30 Tab, Rfl: 2  •  tramadol (ULTRAM) 50 MG Tab, Take 1 Tab by mouth every 8 hours as needed (Moderate Pain (NRS Pain Scale 4-6; CPOT Pain Scale 3-5) if opiates not ordered or tolerated)., Disp: 1 Tab, Rfl: 0  •  insulin lispro (HUMALOG) 100 UNIT/ML, Inject 14 Units as instructed 3 times a day before meals., Disp: , Rfl:   •  Insulin Degludec (TRESIBA) 100 UNIT/ML Solution, Inject 30 Units as instructed every bedtime., Disp: , Rfl:   •  aspirin EC (ECOTRIN) 81 MG Tablet Delayed Response, Take 81 mg by mouth every evening., Disp: , Rfl:   •  Empagliflozin (JARDIANCE) 25 MG Tab, Take 25 mg by mouth every morning., Disp: , Rfl:   •  losartan (COZAAR) 100 MG Tab, Take 100 mg by mouth every morning., Disp: , Rfl:  "  •  rosuvastatin (CRESTOR) 20 MG Tab, Take 20 mg by mouth every evening., Disp: , Rfl:   ALLERGIES:   Allergies   Allergen Reactions   • Bydureon [Exenatide] Hives     hives   • Cycloset [Bromocriptine Mesylate] Rash     Rash     • Morphine Vomiting and Nausea     SURGHX:   Past Surgical History:   Procedure Laterality Date   • MULTIPLE CORONARY ARTERY BYPASS ENDO VEIN HARVEST N/A 8/31/2020    Procedure: CABG, WITH ENDOSCOPIC VEIN PROCUREMENT X1;  Surgeon: Dorys Casas M.D.;  Location: SURGERY [facility];  Service: Cardiothoracic   • AORTIC VALVE REPLACEMENT N/A 8/31/2020    Procedure: REPLACEMENT, AORTIC VALVE;  Surgeon: Dorys Casas M.D.;  Location: SURGERY [facility];  Service: Cardiothoracic   • ROMERO N/A 8/31/2020    Procedure: ECHOCARDIOGRAM, TRANSESOPHAGEAL;  Surgeon: Dorys Casas M.D.;  Location: SURGERY [facility];  Service: Cardiothoracic   • PO BY LAPAROSCOPY N/A 10/25/2015    Procedure: PO BY LAPAROSCOPY-with grams ;  Surgeon: Ronnie Bowman M.D.;  Location: SURGERY [facility];  Service:    • CAROTID STENT ANGIOGRAPHY  1996   • CATARACT EXTRACTION WITH IOL Bilateral      SOCHX:  reports that he has never smoked. He has never used smokeless tobacco. He reports that he does not drink alcohol or use drugs.  FH: Family history was reviewed, no pertinent findings to report     Objective:     /50   Pulse 78   Temp 36.4 °C (97.5 °F) (Temporal)   Resp 18   Ht 1.765 m (5' 9.5\")   Wt 93.9 kg (207 lb)   SpO2 97%   BMI 30.13 kg/m²      Physical Exam  Constitutional:       Appearance: Normal appearance. He is well-developed.   HENT:      Head: Normocephalic and atraumatic.      Right Ear: External ear normal.      Left Ear: External ear normal.   Eyes:      Conjunctiva/sclera: Conjunctivae normal.      Pupils: Pupils are equal, round, and reactive to light.   Cardiovascular:      Rate and Rhythm: Normal rate and regular rhythm.      Heart sounds: No murmur.   Pulmonary:      " Effort: Pulmonary effort is normal.      Breath sounds: Normal breath sounds.   Abdominal:      Palpations: Abdomen is soft.      Tenderness: There is no abdominal tenderness. There is no right CVA tenderness or left CVA tenderness.   Skin:     General: Skin is warm and dry.      Capillary Refill: Capillary refill takes less than 2 seconds.   Neurological:      Mental Status: He is alert and oriented to person, place, and time.   Psychiatric:         Behavior: Behavior normal.         Judgment: Judgment normal.           POCT Urinalysis  Lab Results   Component Value Date/Time    POCCOLOR Straw 10/31/2020 03:29 PM    POCAPPEAR Clear 10/31/2020 03:29 PM    POCLEUKEST Neg 10/31/2020 03:29 PM    POCNITRITE Neg 10/31/2020 03:29 PM    POCUROBILIGE Neg 10/31/2020 03:29 PM    POCPROTEIN Neg 10/31/2020 03:29 PM    POCURPH 5.5 10/31/2020 03:29 PM    POCBLOOD Neg 10/31/2020 03:29 PM    POCSPGRV 1.005 10/31/2020 03:29 PM    POCKETONES Neg 10/31/2020 03:29 PM    POCBILIRUBIN Neg 10/31/2020 03:29 PM    POCGLUCUA 500 10/31/2020 03:29 PM        Assessment/Plan:     1. Dysuria  - POCT Urinalysis  - URINE CULTURE(NEW); Future  Patient symptoms could be consistent with UTI but he is also having the symptoms intermittently since he had a surgical procedure 7-1/2 weeks ago. Symptoms could also be related to the fact that he is spilling glucose in his urine due to his diabetic medications and the urgency/frequency could be related to the Lasix. Discussed that we will send the urine for culture to verify whether there is any infectious process or not. If it comes back positive we'll treat with antibiotics. If comes back negative he will follow-up with his PCP for further evaluation. ER precautions discussed in the interim.              Differential Diagnosis, natural history, and supportive care discussed. Return to the Urgent Care or follow up with your PCP if symptoms fail to resolve, or for any new or worsening symptoms. Emergency  room precautions discussed. Patient and/or family appears understanding of information.

## 2020-11-02 DIAGNOSIS — R30.0 DYSURIA: ICD-10-CM

## 2020-11-04 LAB
BACTERIA UR CULT: NORMAL
SIGNIFICANT IND 70042: NORMAL
SITE SITE: NORMAL
SOURCE SOURCE: NORMAL

## 2020-11-06 ENCOUNTER — NON-PROVIDER VISIT (OUTPATIENT)
Dept: CARDIOLOGY | Facility: MEDICAL CENTER | Age: 65
End: 2020-11-06
Payer: MEDICARE

## 2020-11-06 DIAGNOSIS — Z95.2 HX OF AORTIC VALVE REPLACEMENT: ICD-10-CM

## 2020-11-06 DIAGNOSIS — Z95.1 S/P CABG X 1: Primary | ICD-10-CM

## 2020-11-06 LAB — EKG IMPRESSION: NORMAL

## 2020-11-06 PROCEDURE — G0422 INTENS CARDIAC REHAB W/EXERC: HCPCS | Performed by: INTERNAL MEDICINE

## 2020-11-06 PROCEDURE — G0423 INTENS CARDIAC REHAB NO EXER: HCPCS | Mod: 59 | Performed by: INTERNAL MEDICINE

## 2020-11-06 ASSESSMENT — PATIENT HEALTH QUESTIONNAIRE - PHQ9
7. TROUBLE CONCENTRATING ON THINGS, SUCH AS READING THE NEWSPAPER OR WATCHING TELEVISION: 0
SUM OF ALL RESPONSES TO PHQ9 QUESTIONS 1 AND 2: 0
3. TROUBLE FALLING OR STAYING ASLEEP OR SLEEPING TOO MUCH: 3
9. THOUGHTS THAT YOU WOULD BE BETTER OFF DEAD, OR OF HURTING YOURSELF: 0
SUM OF ALL RESPONSES TO PHQ QUESTIONS 1-9: 7
4. FEELING TIRED OR HAVING LITTLE ENERGY: 3
5. POOR APPETITE OR OVEREATING: 0
2. FEELING DOWN, DEPRESSED, IRRITABLE, OR HOPELESS: 0
8. MOVING OR SPEAKING SO SLOWLY THAT OTHER PEOPLE COULD HAVE NOTICED. OR THE OPPOSITE, BEING SO FIGETY OR RESTLESS THAT YOU HAVE BEEN MOVING AROUND A LOT MORE THAN USUAL: 1
1. LITTLE INTEREST OR PLEASURE IN DOING THINGS: 0
SUM OF ALL RESPONSES TO PHQ QUESTIONS 1-9: 7
6. FEELING BAD ABOUT YOURSELF - OR THAT YOU ARE A FAILURE OR HAVE LET YOURSELF OR YOUR FAMILY DOWN: 0

## 2020-11-06 NOTE — PROGRESS NOTES
Patient arrived for initial 1:1 Intensive Cardiac Rehabilitation Consultation and Education session with the Registered Nurse.  A total of 60 minutes was spent with the patient during which time a focused cardiovascular assessment was completed and musculoskeletal limitations were addressed in preparation to safely start the exercise portion of the program.  Education regarding the program was explained including exercise goals, precautions, and target heart rate during exercise.  Nutrition goals were reviewed and patient was introduced to the Pritikin Program.  Stress management goals were dicussed and patient concerns and questions were answered at this time. Patient arrived for education at 0800 and visit was concluded at 0900.

## 2020-11-06 NOTE — PROGRESS NOTES
Intensive Cardiac Rehabilitation (ICR) and Individual Treatment Plan (ITP) reviewed and signed.    Electronically Signed by:  Cedrick Palacios M.D., [facility]  11/6/2020  2:40 PM

## 2020-11-06 NOTE — PROGRESS NOTES
Individualized Treatment Plan   Cardiac Rehab Individual Treatment Plan  Initial/Reassessment/Discharge Assessment/ ReAssessment/ Discharge: Initial 20 Session #     1   MRN: [id number] Allergies: Bydureon [exenatide], Cycloset [bromocriptine mesylate], and Morphine   Patient Name: Sharad Millan : 1955 Risk Stratification: High    Diagnoses:   1. S/P CABG x 1    2. Hx of aortic valve replacement     Age: 64 y.o. Physician: Don Khoury D.O.    Date of Event: 20 Specialist: Scott   Risk Factors:  Hypertension, Hyperlipidemia, Diabetes, Family History, Obesity, Stress, Sedentary Lifestyle, Age, Male > 45   Exercise Nutrition Other Core Components/ Risk Factors Psychosocial   Stages of change Stages of change Stages of change Stages of change   Preparation Preparation Preparation Preparation   Fitness Test Lipids Learning Barriers Plan   DASI: (n/a) Available: Yes Learning Barriers: Vision, Literacy Psychosocial Plan: Yes   DIST: 987 Date: 20 Family Support Intervention   Max HR: 87 Total: 111 mg/dL Yes Behavioral Health Consult: Yes   RPE: 10 Tri mg/dL Lives: Spouse Physician Referral: No   SPO2: 97 % HDL: 46 mg/dL Other Risk Factors Plan Identifies Stressors: Yes   MET: 2.4 LDL: 43 mg/dL Other Risk Factors/Plan: Yes Drug Intervention: No   EF= 50(2020) Diabetes Tobacco Use Outcome Survey Tools   Ambulatory Status Diabetes: Yes Social History     Tobacco Use   Smoking Status Never Smoker   Smokeless Tobacco Never Used    FP QOL Overall Score: 23.09   Fall Risk Assessed: Yes HbA1C: 10.3 % Date: 20 Smoking Intervention PHQ-9: 7   Exercise Ambulatory Status Assist Devices: None Monitors BS at Home: Yes Smoking Cessation Referral: N/A     Intervention Frequency: twice daily, PRN Random BS: 235(Accucheck 2020) Ind. Education / Counseling: Yes Education   Intervention: Yes    MBSR Lectures/Videos: Yes   Exercise Prescription/ Exercise Plan       Mode: Treadmill,  "NuStep Weight Management Tobacco Adjunct: No Psychosocial Education: Coping Techniques, Positive Support System, S/S Depression, MBSR Lectures   Frequency: 2 days/week Weight: 97.5 kg (215 lb) Target Goal Target Goal   Duration: 30-45min Height: 177.8 cm (5' 10\") Complete Tobacco Cessation: Assess presence or absence of depression using a valid screening: Yes   Intensity: 11-13 RPE BMI (Calculated): 30.85 Complete Tobacco Cessation: N/A Use Stress Management: Yes   METS: 1.0-3.0 Goal weight: 210 Intervention Adverse Events: No   Progression: ^ increments of 1-3 to THR/RPE as tolerated Waist: 44.5 inch Healthy Heart Education: Class Schedule Given, Medications Reviewed, Patient Education Binder Provided, Risk Factors Discussed Unexpected Events: No      Goals     THR: THR: 20-30 BPM ^Resting HR Social History     Substance and Sexual Activity   Alcohol Use No   • Alcohol/week: 0.0 oz    Educate / Review and have understanding of cardiac disease prevention:    Angina with Exercise? Angina with Exercise: No Nutrition Plan Educate / Review and have understanding of cardiac disease prevention: Yes      Nutrition Plan: Yes Medication Compliance: Yes    Resistance Training? Resistance Training: Yes Intervention Hypertension Intervention      Dietician Consult/Class: Pending     Goals Nurse/Patient Discussion: Yes Weekly Lectures/ Videos/ Interactive Cooking School: Yes    Home Exercise: Yes Diabetes Ed Referral: No(Followed by Dr. Schmitz, [facility]) Hypertension Management    Mode: Walk, Other(eliptical) Discuss Maintenance /Wt Loss: Yes Resting BP: 148/60    Duration: 30+ minutes Attend Cooking School: Pending     Frequency: 5-7 days active Dietary Goal: Rate Your Plate >=58    Education Education    Yes Nutrition Education: S&S Hypo/Hyper glycemia, Relate Diabetes to CAD, Healthy Plant Based Eating, Sodium Reduction Cooking/ Lectures/ Cooking School/  RD 1:1, Fluid Restriction Prescribed     Equipment " "Orientation, Exercise Safety, S/S to Report, RPE Scale, Warm Up / Cool Down, Physically Active Target Goal    Target Goal LDL-C < 100 if trig. > 200:  No    Start Individual Exercise Rx Yes LDL-C < 70 for high risk patient:  Yes    BP < 140/90 or < 130/80 if DM or CKD Yes Non HDL-C Should be < 130:  Yes    Aerobic activity 30 + min / day 5 days / wk Yes HbA1C < 7%: Yes      BMI < 25: No      Initial Assessment  Heart Sounds: S1S2, RRR          Lung Sounds: Lower left lung sounds deminished. Clear lung sounds in remaining lobes.          Edema: 1+ pitting edema in lower left leg, 1= non-pitting edema in lower right leg. Swelling in hands and fingers. Has not taken Lasix in 1 week but is to take it PRN. Instructed to take it today as his weight has slowely increased, pt states, over the course of the week.      Right Peripheral Pulses:  Carotid: 1+  Radial: 1+  Dorsalis Pedis: 1+  Posterior Tibialis: 1+      Left Peripheral Pulses:  Carotid: 1+  Radial: 1+  Dorsalis Pedis: 1+  Posterior Tibialis: 1+       Incision: CDI, well healed. Patient is experiencing clicking, poppin, soreness in sternum and chest after lifting a 50lb box last weekend. Continue sternal precautions.       Color: pink    Frame Size: Large       Cognitive Assessment: A&Ox4    Fall Assessment:    Gait: steady  Balance: fair  Upper Body: limited ROM in right shoulder and pain. Good ROM in left shoulder.  Lower Body: Right knee has been scoped twice; still has pain but good ROM.   Recent Falls: none     Current Medications and Vaccinations: Reviewed     Patient Stated Goals: \"To sleep better. My sleep is horrible since my surgery. I can't turn off my brain from work.\"            Exercise     Current: Current : 30 minute evening walk daily  Goal: Goal: Increase endurance. Return to using home eliptical and rowing machine.   Progress: Progress: Motivated to start exercising in [facility].     Nutrition     Current: Current: Refined CHO, processed meats, 2% or " full-fat dairy daily, regular salad dressing, margarineice cream, pastries and snack foods, eats out >= 3x/wk.   Goal: Goal: Introduce Pritikin.  Progress: Progress: Would like to work towards better glucose control.      Hypertension     Current: Current: 148/60; BP elevated today  Goal: Goal: Sodium reduction, medication compliance, home BP monitoring, daily weights and lasix PRN  Progress: Progress: Willing to meet stated goals.      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 States he thinks about work 24/7. He states he is under constant stress. He goes through periods where he tries to carve out 15-60 minutes in a day to exercise or deep breathe as he knows the stress is not healthy, but he states work eventually takes over his life again. He had his first stent in his 40's.   Goal: Goal: Healthy Mindset lectures.   Progress: Progress: Willing to work towards keeping his stress manageable despite his commitments.      Other     Notes: Normal Sinus Rhytm, monitor x3. Check blood sugars pre/post exercise.

## 2020-11-09 ENCOUNTER — NON-PROVIDER VISIT (OUTPATIENT)
Dept: CARDIOLOGY | Facility: MEDICAL CENTER | Age: 65
End: 2020-11-09
Payer: MEDICARE

## 2020-11-09 ENCOUNTER — OFFICE VISIT (OUTPATIENT)
Dept: CARDIOLOGY | Facility: MEDICAL CENTER | Age: 65
End: 2020-11-09
Payer: MEDICARE

## 2020-11-09 VITALS
HEIGHT: 69 IN | DIASTOLIC BLOOD PRESSURE: 74 MMHG | WEIGHT: 216.8 LBS | HEART RATE: 75 BPM | SYSTOLIC BLOOD PRESSURE: 124 MMHG | BODY MASS INDEX: 32.11 KG/M2 | OXYGEN SATURATION: 98 %

## 2020-11-09 DIAGNOSIS — Z95.1 S/P CABG X 1: ICD-10-CM

## 2020-11-09 DIAGNOSIS — I25.10 CORONARY ARTERY DISEASE, OCCLUSIVE: ICD-10-CM

## 2020-11-09 DIAGNOSIS — Z95.5 S/P DRUG ELUTING CORONARY STENT PLACEMENT: ICD-10-CM

## 2020-11-09 DIAGNOSIS — I10 ESSENTIAL HYPERTENSION: ICD-10-CM

## 2020-11-09 DIAGNOSIS — E78.5 DYSLIPIDEMIA: Chronic | ICD-10-CM

## 2020-11-09 DIAGNOSIS — Z95.2 HX OF AORTIC VALVE REPLACEMENT: ICD-10-CM

## 2020-11-09 DIAGNOSIS — Z95.1 S/P CABG (CORONARY ARTERY BYPASS GRAFT): ICD-10-CM

## 2020-11-09 DIAGNOSIS — E11.9 TYPE 2 DIABETES MELLITUS WITHOUT COMPLICATION, WITHOUT LONG-TERM CURRENT USE OF INSULIN (HCC): ICD-10-CM

## 2020-11-09 DIAGNOSIS — Z95.2 S/P AVR (AORTIC VALVE REPLACEMENT): ICD-10-CM

## 2020-11-09 PROBLEM — I35.0 NONRHEUMATIC AORTIC (VALVE) STENOSIS: Status: RESOLVED | Noted: 2019-06-07 | Resolved: 2020-11-09

## 2020-11-09 PROBLEM — R07.2 PRECORDIAL PAIN: Status: RESOLVED | Noted: 2020-01-23 | Resolved: 2020-11-09

## 2020-11-09 LAB — EKG IMPRESSION: NORMAL

## 2020-11-09 PROCEDURE — 99214 OFFICE O/P EST MOD 30 MIN: CPT | Performed by: INTERNAL MEDICINE

## 2020-11-09 PROCEDURE — G0422 INTENS CARDIAC REHAB W/EXERC: HCPCS | Performed by: INTERNAL MEDICINE

## 2020-11-09 PROCEDURE — G0423 INTENS CARDIAC REHAB NO EXER: HCPCS | Mod: 59 | Performed by: INTERNAL MEDICINE

## 2020-11-09 RX ORDER — PEN NEEDLE, DIABETIC 32GX 5/32"
NEEDLE, DISPOSABLE MISCELLANEOUS
COMMUNITY
Start: 2020-11-08 | End: 2021-12-18

## 2020-11-09 RX ORDER — INSULIN LISPRO 100 [IU]/ML
INJECTION, SOLUTION INTRAVENOUS; SUBCUTANEOUS
COMMUNITY
Start: 2020-10-12 | End: 2021-08-11

## 2020-11-09 RX ORDER — FLASH GLUCOSE SENSOR
KIT MISCELLANEOUS
COMMUNITY
Start: 2020-10-20 | End: 2021-12-18

## 2020-11-09 RX ORDER — INSULIN GLARGINE 300 U/ML
30 INJECTION, SOLUTION SUBCUTANEOUS
COMMUNITY
Start: 2020-10-29 | End: 2021-12-18

## 2020-11-09 RX ORDER — POTASSIUM CHLORIDE 750 MG/1
10 TABLET, EXTENDED RELEASE ORAL PRN
COMMUNITY
Start: 2020-10-05 | End: 2021-12-18

## 2020-11-09 ASSESSMENT — ENCOUNTER SYMPTOMS
SHORTNESS OF BREATH: 0
COUGH: 0
PALPITATIONS: 0
MYALGIAS: 0
LOSS OF CONSCIOUSNESS: 0
DIZZINESS: 0

## 2020-11-09 ASSESSMENT — FIBROSIS 4 INDEX: FIB4 SCORE: 1.35

## 2020-11-09 NOTE — PROGRESS NOTES
"Chief Complaint   Patient presents with   • Coronary Artery Disease   • Dyslipidemia   • Palpitations       Subjective:   Sharad Millan is a 64 y.o. male who presents today for a followup evaluation of S/P AVR, CABG, CAD, coronary stent, palpitations, PSVT, hypertension, hyperlipidemia.    Last seen on 10/1/2020 by TITO Castro.    Since 10/1/2020 the patient has returned to work.  Frustrated.  He has a variety of symptoms since his CT surgery including numbness of his fingers/hands, feet/toes, itching of his middle back, frequent urination, insomnia, easy bruisability and significant fatigue all of which is occurred since his CABG/AVR in 9/2020.  Has just started cardiac rehab.  Using Lasix as needed, saw neurology for worsening tremors since CT surgery recommended alcohol or medication neither of which the patient chose.    Since 1/23/2020 the patient had initially done well after his stent but now has developed significant exertional angina pectoris with substernal burning aching chest discomfort and shortness of breath relieved with rest.  Unfortunately there was some type of scheduling mixup and confusion regarding appointment with [facility].  The patient was discouraged and frustrated and sought further assistance.  Also since going on Medicare he had difficulty finding a PCP and an endocrinologist.  Ultimately he is come under the care of Dr. Don Khoury at [facility] who has referred him to Dr. Zackery cShmitz, endocrinologist and Craig.  Additionally Dr. Khoury has spoke to him about going to [facility] or [facility] in [place], [place].    Since discharge 1/9/2020 the patient states that for the first 3 days he felt \"great\".  Then he developed a nonexertional stabbing localized chest pain and subsequently has felt generally tired and weak.  Has not heard from cardiothoracic surgery despite referral on 1/9/2020.  Has been referred to " endocrinology but has not yet contacted their office.  Still ruminating about his valve procedure.    Since 2/5/2019 patient states that he feels no different than he had before.  Despite this denial he does admit to mild discomfort and subtle shortness of breath when he is doing some fairly strenuous activity.  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  He also lost a contract with [employer].  As a result he has some financial stress.  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  Nonetheless, he now has more seriously looked in and research the aortic valve procedures for his aortic stenosis.    Since 10/1/2018 appointment the patient was hospitalized.  [place]  Hospitalized 12/12/2018 at [facility].  Presented with SVT and chest pain converted with adenosine.  NSTEMI.  Peak troponin 1.7.  MPI was normal with a EF of 56%.  Discharged on increased dose of metoprolol of 75 mg twice daily.  Since discharge no recurrent chest discomfort or SVT.  Denies exertional shortness of breath but admits to using inhaler for episodic shortness of breath.     Since 10/13/2017 appointment the patient's developed a upper respiratory infection being treated conservatively.  At the time of his last appointment and echocardiogram was done now shows severe aortic stenosis.  The patient has no new cardiac symptoms referenced to his aortic stenosis.  He reports 2 weeks ago while eating at a restaurant he had a left upper chest pain that lasted for one hour, resolved spo  Between 10/21/2015-10/27/2015 hospitalized at [facility].  Gallstone pancreatitis.  Laparoscopic cholecystectomy.  Echocardiogram showed moderate aortic stenosis.     On 12/3/2013 [facility] ER.  SVT.  Converted with a adenosine.  Toprol increased 100 mg daily.     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  He  developed chest pain.  He took a customers sublingual nitroglycerin with relief.  He went to [facility] ER.  EKG showed no changes.  Patient left after waiting an hour and a half to be seen by the doctor.    Past Medical History:   Diagnosis Date   • Aortic stenosis 10/20/2011   • Arrhythmia    • Breath shortness    • Carotid bruit 12/9/2009   • Cataract     cataract extraction with IOL   • Chronic right shoulder pain    • Diabetes     oral medication   • Fatty liver 1/17/2011   • Heart attack (HCC) 12/2018   • History of cardiac catheterization 7/13/2009   • History of echocardiogram 10/20/2011   • HTN (hypertension) 10/12/2012   • Hypertension    • Memory loss 10/20/2011   • Murmur 7/7/2009   • Obesity 8/23/2011   • Palpitations 10/20/2011   • PSVT (paroxysmal supraventricular tachycardia) ([facility]) 2/14/2012   • S/P coronary artery stent placement 1996    coronary stent implantation x3, [facility]   • S/P coronary artery stent placement 1998    coronary stent implantation; [facility]   • S/P coronary artery stent placement 2003    cardiac catheterization; patent stents; [place]   • Sciatica 8/23/2011   • Uncontrolled diabetes mellitus (HCC) 11/29/2010     Past Surgical History:   Procedure Laterality Date   • MULTIPLE CORONARY ARTERY BYPASS ENDO VEIN HARVEST N/A 8/31/2020    Procedure: CABG, WITH ENDOSCOPIC VEIN PROCUREMENT X1;  Surgeon: Dorys Casas M.D.;  Location: [facility];  Service: Cardiothoracic   • AORTIC VALVE REPLACEMENT N/A 8/31/2020    Procedure: REPLACEMENT, AORTIC VALVE;  Surgeon: Dorys Casas M.D.;  Location: [facility];  Service: Cardiothoracic   • ROMERO N/A 8/31/2020    Procedure: ECHOCARDIOGRAM, TRANSESOPHAGEAL;  Surgeon: Dorys Casas M.D.;  Location: [facility];  Service: Cardiothoracic   • PO BY LAPAROSCOPY N/A 10/25/2015    Procedure: PO BY LAPAROSCOPY-with grams ;  Surgeon: Ronnie Bowman M.D.;  Location: [facility] ORS;  Service:    •  CAROTID STENT ANGIOGRAPHY  1996   • CATARACT EXTRACTION WITH IOL Bilateral      Family History   Problem Relation Age of Onset   • Heart Disease Mother    • Heart Attack Mother    • Heart Disease Father      Social History     Socioeconomic History   • Marital status:      Spouse name: Not on file   • Number of children: Not on file   • Years of education: Not on file   • Highest education level: Not on file   Occupational History   • Not on file   Social Needs   • Financial resource strain: Not on file   • Food insecurity     Worry: Not on file     Inability: Not on file   • Transportation needs     Medical: Not on file     Non-medical: Not on file   Tobacco Use   • Smoking status: Never Smoker   • Smokeless tobacco: Never Used   Substance and Sexual Activity   • Alcohol use: No     Alcohol/week: 0.0 oz   • Drug use: No   • Sexual activity: Yes     Partners: Female   Lifestyle   • Physical activity     Days per week: Not on file     Minutes per session: Not on file   • Stress: Not on file   Relationships   • Social connections     Talks on phone: Not on file     Gets together: Not on file     Attends Nondenominational service: Not on file     Active member of club or organization: Not on file     Attends meetings of clubs or organizations: Not on file     Relationship status: Not on file   • Intimate partner violence     Fear of current or ex partner: Not on file     Emotionally abused: Not on file     Physically abused: Not on file     Forced sexual activity: Not on file   Other Topics Concern   •  Service No   • Blood Transfusions No   • Caffeine Concern No   • Occupational Exposure No   • Hobby Hazards No   • Sleep Concern No   • Stress Concern No   • Weight Concern No   • Special Diet No   • Back Care Yes     Comment: BELT SUPPORT   • Exercise Yes   • Bike Helmet No   • Seat Belt Yes   • Self-Exams No   Social History Narrative   • Not on file     Allergies   Allergen Reactions   • Bydureon [Exenatide]  Hives     hives   • Cycloset [Bromocriptine Mesylate] Rash     Rash     • Morphine Vomiting and Nausea     Outpatient Encounter Medications as of 11/9/2020   Medication Sig Dispense Refill   • HUMALOG KWIKPEN 100 UNIT/ML Solution Pen-injector injection PEN BREAKFAST   16 UNITS LUNCH   14 UNITS, DINNER   14 UNITS SUBCUTANEOUS 90 DAYS     • BD PEN NEEDLE AVANI U/F      • TOUJEO SOLOSTAR 300 UNIT/ML Solution Pen-injector 28 UNITS EVERY BEDTIME SUBCUTANEOUS FOR 90 DAYS     • Continuous Blood Gluc Sensor (FREESTYLE AZIZA 14 DAY SENSOR) [facility] USE TO CHECK BLOOD SUGAR FOR 28 DAYS     • furosemide (LASIX) 40 MG Tab Take 1 Tab by mouth every day. 30 Tab 11   • sennosides (SENOKOT) 8.6 MG Tab Take 8.6 mg by mouth. 2 tablets twice a day     • magnesium hydroxide (MILK OF MAGNESIA) 400 MG/5ML Suspension Take 30 mL by mouth every day.     • polyethylene glycol/lytes (MIRALAX) 17 g Pack Take 17 g by mouth every day.     • metoprolol (LOPRESSOR) 25 MG Tab Take 1 Tab by mouth 2 Times a Day. 60 Tab 11   • acetaminophen (TYLENOL) 500 MG Tab Take 1-2 Tabs by mouth every 6 hours. 30 Tab 0   • clopidogrel (PLAVIX) 75 MG Tab Take 1 Tab by mouth every day. 30 Tab 2   • tramadol (ULTRAM) 50 MG Tab Take 1 Tab by mouth every 8 hours as needed (Moderate Pain (NRS Pain Scale 4-6; CPOT Pain Scale 3-5) if opiates not ordered or tolerated). 1 Tab 0   • insulin lispro (HUMALOG) 100 UNIT/ML Inject 14 Units as instructed 3 times a day before meals.     • Insulin Degludec (TRESIBA) 100 UNIT/ML Solution Inject 30 Units as instructed every bedtime.     • aspirin EC (ECOTRIN) 81 MG Tablet Delayed Response Take 81 mg by mouth every evening.     • Empagliflozin (JARDIANCE) 25 MG Tab Take 25 mg by mouth every morning.     • losartan (COZAAR) 100 MG Tab Take 100 mg by mouth every morning.     • rosuvastatin (CRESTOR) 20 MG Tab Take 20 mg by mouth every evening.     • potassium chloride SA (K-DUR) 10 MEQ Tab CR Take 10 mEq by mouth every day.     • potassium  "chloride SA (KDUR) 20 MEQ Tab CR Take 1 Tab by mouth every day. 30 Tab 11   • amiodarone (CORDARONE) 200 MG Tab Take 1 Tab by mouth every day. 30 Tab 2     No facility-administered encounter medications on file as of 11/9/2020.      Review of Systems   Respiratory: Negative for cough and shortness of breath.    Cardiovascular: Negative for chest pain and palpitations.   Musculoskeletal: Negative for myalgias.   Neurological: Negative for dizziness and loss of consciousness.        Objective:   /74 (BP Location: Left arm, Patient Position: Sitting, BP Cuff Size: Adult)   Pulse 75   Ht 1.753 m (5' 9\")   Wt 98.3 kg (216 lb 12.8 oz)   SpO2 98%   BMI 32.02 kg/m²     Physical Exam   Constitutional: He is oriented to person, place, and time. He appears well-developed and well-nourished.   Neck: No JVD present.   Cardiovascular: Normal rate and regular rhythm.   No murmur heard.  Pulses:       Carotid pulses are on the right side with bruit and on the left side with bruit.  Pulmonary/Chest: Effort normal and breath sounds normal. No respiratory distress. He has no wheezes. He has no rales.   Musculoskeletal:         General: No edema.   Neurological: He is alert and oriented to person, place, and time.   Skin: Skin is warm and dry.   Psychiatric: He has a normal mood and affect. His behavior is normal.     CAROTID ULTRASOUND 11/01/2017  Mild bilateral internal carotid artery stenosis less than 50%.    08/08/2012 ECHOCARDIOGRAM  EF 55-60%. Moderate aortic stenosis. Peak aortic valve gradient 63 mmHg. Moderate LVH.      07/25/2013 ECHOCARDIOGRAM  EF 70-75%. Moderate aortic stenosis. Peak aortic valve gradient 59 mmHg. Moderate LVH.     10/23/2015 ECHOCARDIOGRAM  Normal left ventricular systolic function.  Left ventricular ejection fraction is visually estimated to be 60%.  Grade I diastolic dysfunction.  Mild mitral regurgitation.  Moderate aortic stenosis.  Vmax 3.47 m/s. Transvalvular gradients are - Peak: 48 " mmHg, Mean: 30   mmHg.  Mild pulmonic insufficiency.     11/01/2017 ECHOCARDIOGRAM  Normal left ventricular chamber size.  Left ventricular ejection fraction is visually estimated to be 60%.  Grade I diastolic dysfunction.  Severe aortic stenosis.  Transvalvular gradients are - Peak: 72 mmHg, Mean: 50 mmHg.  Ascending aorta is borderline dilated with a diameter of 3.6 cm.  Compared to the report of the prior study done - the aortic stenosis is   now severe.    12/26/2018 ECHOCARDIOGRAM    Normal left ventricular systolic function.  Left ventricular ejection fraction is visually estimated to be 65-70%.  Moderate left ventricular hypertrophy.  Severe aortic stenosis.    12/19/2017 MPI   No evidence of significant jeopardized viable myocardium or prior myocardial    infarction.   Transient ischemic dilatation calculated at 1.17 visually correlated.   Normal left ventricular size, ejection fraction, and wall motion.    12/12/2018 MPI  Normal perfusion.  EF 56%.    08/14/2012 Lab: Cholesterol 109. Triglycerides 121. HDL 36. LDL 49.  03/28/2013 Lab: Cholesterol 98. Triglycerides 110. HDL 30. LDL 46.     09/27/2013 EKG: Normal sinus rhythm. No acute changes.  11/20/2017 EKG: Normal sinus rhythm, rate 73. Minor lateral ST-T wave abnormalities. 1st degree AV block. Reviewed by myself.    CARDIAC CATHETERIZATION 01/08/2020  A.  Selective coronary angiography.  B.  iFR, proximal left anterior descending artery.  C.  Coronary stent implantation, proximal left anterior descending artery, 2.5x8 mm Collin drug-eluting stent.  D.  Ascending aortography.  E.  Distal abdominal aortogram and bilateral iliofemoral angiography.  F.  Right femoral artery approach.  G.  Perclose.  PREPROCEDURE DIAGNOSES:  1.  Aortic stenosis, severe, symptomatic.  2.  Previous left anterior descending artery stent, 1996.  3.  Diabetes mellitus.  POSTPROCEDURE DIAGNOSES:  1.  Left anterior descending artery 90% stenosis.  2.  iFR, left anterior descending  artery, 0.39.  3.  Normal ascending aorta.  4.  Normal distal abdominal aorta and bilateral iliofemoral arteries.    CARDIAC SURGERY 08/31/2020  PROCEDURES:  Aortic valve replacement (23 mm Inspiris David pericardial valve), coronary artery bypass grafting x1 (left internal mammary artery to the left anterior descending artery), and intraoperative transesophageal echocardiography.    MPI 01/31/2020   * Small, equivocal, fully reversible, mild severity defect limited to the    apical inferior wall. SDS of 1 indicating very minimal ischemia if any.    * Nondiagnostic due to resting ECG.   * Normal left ventricular size, ejection fraction, and wall motion.   ECG INTERPRETATION   Nondiagnostic due to resting ECG    Assessment:     1. S/P AVR (aortic valve replacement)     2. S/P CABG (coronary artery bypass graft)     3. Coronary artery disease, occlusive  Comp Metabolic Panel    CBC WITH DIFFERENTIAL    TSH+FREE T4    LIPID PANEL   4. S/P drug eluting coronary stent placement     5. Essential hypertension     6. Dyslipidemia     7. Type 2 diabetes mellitus without complication, without long-term current use of insulin ([facility])  HEMOGLOBIN A1C (Glycohemoglobin GHB Total/A1C with MBG Estimate)       Medical Decision Making:  Today's Assessment / Status / Plan:     Assessment  1.  S/P AVR/CABG 8/31/2020  2.  PAF postop MI: Amiodarone therapy 9/2020.  3.  Coronary stent implantation: Left anterior descending artery.  1996. 1998.  2020  4.  NSTEMI 12/12/2018.   5.  Hyperlipidemia  6.  Hypertension.   7.  Diabetes mellitus.  8.  Bilateral carotid bruits.  9.  Obesity.   10.  SVT. 12/3/2013. 10/22/2015 12/12/2018.    11.  Tremor.  12.  Peripheral neuropathy.    Recommendation and Discussion  1.  Stable from a cardiac standpoint concerning his AVR/CABG, hypertension and dyslipidemia.  2.  I addressed all of his above symptoms.  3.  Continue cardiac rehab with the anticipation of progressive improvement.  4.  Follow-up with his  PCP/endocrinologist for diabetes control.  5.  Comprehensive laboratory testing.  6.  Amiodarone surveillance.  7.  Echocardiogram post AVR.  8.  RTC 3 months.

## 2020-11-10 ENCOUNTER — TELEPHONE (OUTPATIENT)
Dept: CARDIOLOGY | Facility: MEDICAL CENTER | Age: 65
End: 2020-11-10

## 2020-11-10 NOTE — PROGRESS NOTES
Sharad Millan attended the Healthy Mindset lecture from [id number].    Patient received handouts and all questions were answered.

## 2020-11-10 NOTE — TELEPHONE ENCOUNTER
SW    Patient called to see if they can send back the oxygen tanks. Hey also have a questions about something being signed, but it is not clear, what needs to be signed. Please call the Pt back at [phone].    Thank you,  Tresa KNUTSON

## 2020-11-11 ENCOUNTER — NON-PROVIDER VISIT (OUTPATIENT)
Dept: CARDIOLOGY | Facility: MEDICAL CENTER | Age: 65
End: 2020-11-11
Payer: MEDICARE

## 2020-11-11 DIAGNOSIS — Z95.2 HX OF AORTIC VALVE REPLACEMENT: ICD-10-CM

## 2020-11-11 DIAGNOSIS — Z95.1 S/P CABG X 1: ICD-10-CM

## 2020-11-11 LAB — EKG IMPRESSION: NORMAL

## 2020-11-11 PROCEDURE — G0422 INTENS CARDIAC REHAB W/EXERC: HCPCS | Performed by: INTERNAL MEDICINE

## 2020-11-11 PROCEDURE — G0423 INTENS CARDIAC REHAB NO EXER: HCPCS | Mod: 59 | Performed by: INTERNAL MEDICINE

## 2020-11-12 ENCOUNTER — HOSPITAL ENCOUNTER (OUTPATIENT)
Dept: LAB | Facility: MEDICAL CENTER | Age: 65
End: 2020-11-12
Attending: INTERNAL MEDICINE
Payer: MEDICARE

## 2020-11-12 ENCOUNTER — TELEPHONE (OUTPATIENT)
Dept: CARDIOLOGY | Facility: MEDICAL CENTER | Age: 65
End: 2020-11-12

## 2020-11-12 DIAGNOSIS — I25.10 CORONARY ARTERY DISEASE, OCCLUSIVE: ICD-10-CM

## 2020-11-12 DIAGNOSIS — E11.9 TYPE 2 DIABETES MELLITUS WITHOUT COMPLICATION, WITHOUT LONG-TERM CURRENT USE OF INSULIN (HCC): ICD-10-CM

## 2020-11-12 LAB
ALBUMIN SERPL BCP-MCNC: 4.1 G/DL (ref 3.2–4.9)
ALBUMIN/GLOB SERPL: 1.5 G/DL
ALP SERPL-CCNC: 63 U/L (ref 30–99)
ALT SERPL-CCNC: 19 U/L (ref 2–50)
ANION GAP SERPL CALC-SCNC: 10 MMOL/L (ref 7–16)
ANION GAP SERPL CALC-SCNC: 10 MMOL/L (ref 7–16)
AST SERPL-CCNC: 14 U/L (ref 12–45)
BILIRUB SERPL-MCNC: 0.2 MG/DL (ref 0.1–1.5)
BUN SERPL-MCNC: 22 MG/DL (ref 8–22)
BUN SERPL-MCNC: 23 MG/DL (ref 8–22)
CALCIUM SERPL-MCNC: 9 MG/DL (ref 8.5–10.5)
CHLORIDE SERPL-SCNC: 97 MMOL/L (ref 96–112)
CHLORIDE SERPL-SCNC: 98 MMOL/L (ref 96–112)
CHOLEST SERPL-MCNC: 124 MG/DL (ref 100–199)
CO2 SERPL-SCNC: 26 MMOL/L (ref 20–33)
CO2 SERPL-SCNC: 27 MMOL/L (ref 20–33)
CREAT SERPL-MCNC: 1.19 MG/DL (ref 0.5–1.4)
CREAT SERPL-MCNC: 1.25 MG/DL (ref 0.5–1.4)
FASTING STATUS PATIENT QL REPORTED: NORMAL
GLOBULIN SER CALC-MCNC: 2.7 G/DL (ref 1.9–3.5)
GLUCOSE SERPL-MCNC: 218 MG/DL (ref 65–99)
HDLC SERPL-MCNC: 41 MG/DL
LDLC SERPL CALC-MCNC: 66 MG/DL
POTASSIUM SERPL-SCNC: 4 MMOL/L (ref 3.6–5.5)
POTASSIUM SERPL-SCNC: 4 MMOL/L (ref 3.6–5.5)
PROT SERPL-MCNC: 6.8 G/DL (ref 6–8.2)
SODIUM SERPL-SCNC: 134 MMOL/L (ref 135–145)
SODIUM SERPL-SCNC: 134 MMOL/L (ref 135–145)
TRIGL SERPL-MCNC: 84 MG/DL (ref 0–149)

## 2020-11-12 PROCEDURE — 80061 LIPID PANEL: CPT

## 2020-11-12 PROCEDURE — 83036 HEMOGLOBIN GLYCOSYLATED A1C: CPT | Mod: GA

## 2020-11-12 PROCEDURE — 82565 ASSAY OF CREATININE: CPT

## 2020-11-12 PROCEDURE — 85025 COMPLETE CBC W/AUTO DIFF WBC: CPT

## 2020-11-12 PROCEDURE — 80053 COMPREHEN METABOLIC PANEL: CPT

## 2020-11-12 PROCEDURE — 84439 ASSAY OF FREE THYROXINE: CPT

## 2020-11-12 PROCEDURE — 80051 ELECTROLYTE PANEL: CPT

## 2020-11-12 PROCEDURE — 36415 COLL VENOUS BLD VENIPUNCTURE: CPT

## 2020-11-12 PROCEDURE — 84443 ASSAY THYROID STIM HORMONE: CPT

## 2020-11-12 PROCEDURE — 84520 ASSAY OF UREA NITROGEN: CPT

## 2020-11-12 NOTE — TELEPHONE ENCOUNTER
GOLD    11/12/20 - Per [facility] [place] Report    Was wondering if he can return  oxygen machine or if he is suppose to  keep it     Please call pt at [phone]    Thank you

## 2020-11-12 NOTE — PROGRESS NOTES
Video watched: 26 Improve Performance. Length: 32.2 minutes.  Video and discussion took place from 4 to 4:50 pm.

## 2020-11-13 LAB
ANISOCYTOSIS BLD QL SMEAR: ABNORMAL
BASOPHILS # BLD AUTO: 0.5 % (ref 0–1.8)
BASOPHILS # BLD: 0.03 K/UL (ref 0–0.12)
BURR CELLS BLD QL SMEAR: NORMAL
COMMENT 1642: NORMAL
EOSINOPHIL # BLD AUTO: 0.11 K/UL (ref 0–0.51)
EOSINOPHIL NFR BLD: 1.8 % (ref 0–6.9)
ERYTHROCYTE [DISTWIDTH] IN BLOOD BY AUTOMATED COUNT: 44.9 FL (ref 35.9–50)
EST. AVERAGE GLUCOSE BLD GHB EST-MCNC: 212 MG/DL
HBA1C MFR BLD: 9 % (ref 0–5.6)
HCT VFR BLD AUTO: 38.6 % (ref 42–52)
HGB BLD-MCNC: 10.8 G/DL (ref 14–18)
IMM GRANULOCYTES # BLD AUTO: 0.02 K/UL (ref 0–0.11)
IMM GRANULOCYTES NFR BLD AUTO: 0.3 % (ref 0–0.9)
LYMPHOCYTES # BLD AUTO: 1.37 K/UL (ref 1–4.8)
LYMPHOCYTES NFR BLD: 22.7 % (ref 22–41)
MCH RBC QN AUTO: 21.3 PG (ref 27–33)
MCHC RBC AUTO-ENTMCNC: 28 G/DL (ref 33.7–35.3)
MCV RBC AUTO: 76.1 FL (ref 81.4–97.8)
MICROCYTES BLD QL SMEAR: ABNORMAL
MONOCYTES # BLD AUTO: 0.71 K/UL (ref 0–0.85)
MONOCYTES NFR BLD AUTO: 11.8 % (ref 0–13.4)
MORPHOLOGY BLD-IMP: NORMAL
NEUTROPHILS # BLD AUTO: 3.8 K/UL (ref 1.82–7.42)
NEUTROPHILS NFR BLD: 62.9 % (ref 44–72)
NRBC # BLD AUTO: 0 K/UL
NRBC BLD-RTO: 0 /100 WBC
OVALOCYTES BLD QL SMEAR: NORMAL
PLATELET # BLD AUTO: 164 K/UL (ref 164–446)
PLATELET BLD QL SMEAR: NORMAL
PMV BLD AUTO: 10 FL (ref 9–12.9)
POIKILOCYTOSIS BLD QL SMEAR: NORMAL
RBC # BLD AUTO: 5.07 M/UL (ref 4.7–6.1)
RBC BLD AUTO: PRESENT
T4 FREE SERPL-MCNC: 1.1 NG/DL (ref 0.93–1.7)
TSH SERPL DL<=0.005 MIU/L-ACNC: 3.09 UIU/ML (ref 0.38–5.33)
WBC # BLD AUTO: 6 K/UL (ref 4.8–10.8)

## 2020-11-16 ENCOUNTER — NON-PROVIDER VISIT (OUTPATIENT)
Dept: CARDIOLOGY | Facility: MEDICAL CENTER | Age: 65
End: 2020-11-16
Payer: MEDICARE

## 2020-11-16 DIAGNOSIS — Z95.1 S/P CABG X 1: ICD-10-CM

## 2020-11-16 DIAGNOSIS — Z95.2 HX OF AORTIC VALVE REPLACEMENT: ICD-10-CM

## 2020-11-16 LAB — EKG IMPRESSION: NORMAL

## 2020-11-16 PROCEDURE — G0423 INTENS CARDIAC REHAB NO EXER: HCPCS | Mod: 59 | Performed by: INTERNAL MEDICINE

## 2020-11-16 PROCEDURE — G0422 INTENS CARDIAC REHAB W/EXERC: HCPCS | Performed by: INTERNAL MEDICINE

## 2020-11-16 NOTE — TELEPHONE ENCOUNTER
Spoke with patient who states that he no longer uses oxygen after his recent CABG and is wondering if he should return tanks.    Advised patient that Dr. Chavez will be consulted but reply will be delayed because he is out of the office. Patient verbalized understanding and was appreciative for the return phone call.

## 2020-11-17 NOTE — PROGRESS NOTES
Sharad Millan attended the Exercise Workshop from [id number].    Topic was: Improving Performance.    Patient received handouts on the material covered and all questions were answered.

## 2020-11-18 ENCOUNTER — NON-PROVIDER VISIT (OUTPATIENT)
Dept: CARDIOLOGY | Facility: MEDICAL CENTER | Age: 65
End: 2020-11-18
Payer: MEDICARE

## 2020-11-18 DIAGNOSIS — Z95.2 HX OF AORTIC VALVE REPLACEMENT: ICD-10-CM

## 2020-11-18 DIAGNOSIS — Z95.1 S/P CABG X 1: ICD-10-CM

## 2020-11-18 LAB — EKG IMPRESSION: NORMAL

## 2020-11-18 PROCEDURE — G0422 INTENS CARDIAC REHAB W/EXERC: HCPCS | Performed by: INTERNAL MEDICINE

## 2020-11-18 PROCEDURE — G0423 INTENS CARDIAC REHAB NO EXER: HCPCS | Mod: 59 | Performed by: INTERNAL MEDICINE

## 2020-11-19 NOTE — PROGRESS NOTES
Video watched: 08 Dining Out Part 1. Length: 32.5 minutes with discussion following.  From 4 To 4:50 pm.

## 2020-11-23 ENCOUNTER — NON-PROVIDER VISIT (OUTPATIENT)
Dept: CARDIOLOGY | Facility: MEDICAL CENTER | Age: 65
End: 2020-11-23
Payer: MEDICARE

## 2020-11-23 DIAGNOSIS — Z95.2 HX OF AORTIC VALVE REPLACEMENT: ICD-10-CM

## 2020-11-23 DIAGNOSIS — Z95.1 S/P CABG X 1: ICD-10-CM

## 2020-11-23 LAB — EKG IMPRESSION: NORMAL

## 2020-11-23 PROCEDURE — G0423 INTENS CARDIAC REHAB NO EXER: HCPCS | Mod: 59 | Performed by: INTERNAL MEDICINE

## 2020-11-23 PROCEDURE — G0422 INTENS CARDIAC REHAB W/EXERC: HCPCS | Performed by: INTERNAL MEDICINE

## 2020-11-24 ENCOUNTER — HOSPITAL ENCOUNTER (OUTPATIENT)
Dept: RADIOLOGY | Facility: MEDICAL CENTER | Age: 65
End: 2020-11-24
Attending: INTERNAL MEDICINE
Payer: MEDICARE

## 2020-11-24 DIAGNOSIS — K59.01 SLOW TRANSIT CONSTIPATION: ICD-10-CM

## 2020-11-24 PROCEDURE — 74022 RADEX COMPL AQT ABD SERIES: CPT

## 2020-11-24 NOTE — PROGRESS NOTES
Sharad Millan attended the Nutrition Workshop from [id number].    Topic was: Menu’s and Dining Out.    Patient received handouts and all questions were answered.

## 2020-11-24 NOTE — PROGRESS NOTES
Individualized Treatment Plan   Cardiac Rehab Individual Treatment Plan  Initial/Reassessment/Discharge Assessment/ ReAssessment/ Discharge: 30 day 20 Session #     6   MRN: [id number] Allergies: Bydureon [exenatide], Cycloset [bromocriptine mesylate], and Morphine   Patient Name: Sharad Millan : 1955 Risk Stratification: High    Diagnoses:   1. S/P CABG x 1    2. Hx of aortic valve replacement     Age: 64 y.o. Physician: Don Khoury D.O.    Date of Event: 20 Specialist: Scott   Risk Factors:  Hypertension, Hyperlipidemia, Diabetes, Family History, Obesity, Stress, Sedentary Lifestyle, Age, Male > 45   Exercise Nutrition Other Core Components/ Risk Factors Psychosocial   Stages of change Stages of change Stages of change Stages of change   Preparation Preparation Preparation Preparation   Fitness Test Lipids Learning Barriers Psychosocial Plan   DASI: (n/a) Available: No new Learning Barriers: Vision, Literacy Psychosocial Plan: Yes   DIST:   Date: 20 Family Support Goals   Max HR:   Total: 111 mg/dL Yes Assess presence or absence of depression using a valid screening: Yes   Max BP:   Tri mg/dL Lives: Spouse Use Stress Management: Yes   RPE:   HDL: 46 mg/dL Other Risk Factors Plan Adverse Events: No   SPO2:         MET:   LDL: 43 mg/dL Other Risk Factors/Plan: Yes Unexpected Events: No   EF= 50(2020) Diabetes Tobacco Use Intervention   Ambulatory Status Diabetes: Yes Social History     Tobacco Use   Smoking Status Never Smoker   Smokeless Tobacco Never Used    Behavioral Health Consult: Yes   Fall Risk Assessed: Yes HbA1C: 10.3 % Date: 20 Goals Physician Referral: No   Exercise Ambulatory Status Assist Devices: None Monitors BS at Home: Yes Educate / Review and have understanding of cardiac disease prevention: Identifies Stressors: Yes   Exercise Plan Frequency: twice daily, PRN Random BS: 235(Accucheck 2020)  Drug Intervention: No     Weight Management   "Outcome Survey Tools   Goals Weight: 98.4 kg (217 lb) Educate / Review and have understanding of cardiac disease prevention: Yes FP QOL Overall Score: (assessed pre and post program)   Home Exercise: Yes Height: 177.8 cm (5' 10\") Medication Compliance: Yes PHQ-9: (assessed pre and post program)   Mode: Walk, Other(eliptical) BMI (Calculated): 31.14 Complete Tobacco Cessation: Education   Duration: 30+ minutes Goal weight: 210 Complete Tobacco Cessation: N/A MBSR Lectures/Videos: Yes   Frequency: 5-7 days active Waist: 44.5 inch Intervention Psychosocial Education: Coping Techniques, Positive Support System, S/S Depression, MBSR Lectures   Intervention Social History     Substance and Sexual Activity   Alcohol Use No   • Alcohol/week: 0.0 oz    Smoking Cessation Referral: N/A     Intervention: Yes Nutrition Plan Ind. Education / Counseling: Yes    Exercise Prescription Nutrition Plan: Yes Tobacco Adjunct: No    Mode: Treadmill, NuStep Nutrition Related Target Goals Healthy Heart Education: Class Schedule Given, Medications Reviewed, Patient Education Binder Provided, Risk Factors Discussed    Frequency: 2 days/week LDL-C <100 if trig. > 200: No Weekly Lectures/ Videos/ Interactive Cooking School: Yes    Duration: 30-45min LDL-C < 70 for high risk patient:  LDL-C<70 for high risk patients: Yes Education    Intensity: 11-13 RPE  Healthy Heart Education: Class Schedule Given, Medications Reviewed, Patient Education Binder Provided, Risk Factors Discussed    METS: 1.0-3.0 Non HDL-C Should be < 130:  Non HDL-C Should be < 130: Yes Hypertension Management   (Active Problem)    Progression: ^ increments of 1-3 to THR/RPE as tolerated      THR: THR: 20-30 BPM ^Resting HR HbA1C < 7%: Yes Resting BP: 149/67    Angina with Exercise?   Angina with Exercise: No   BMI < 25: No Hypertension Education      Dietary Goal: Rate Your Plate >=58     Resistance Training? Resistance Training: Yes Rate your Plate: Pre(48 ) Lectures/ Videos/ " Cooking School: Yes    Education Intervention Hypertension Goals    Yes Dietician Consult/Class: Pending Medication Adherence : Yes    Equipment Orientation, Exercise Safety, S/S to Report, RPE Scale, Warm Up / Cool Down, Physically Active Nurse/Patient Discussion: Yes Home Self Monitoring : Yes    Exercise “Target Goals” Diabetes Ed Referral: No(Followed by Dr. Schmitz, [facility])     Start Individual Exercise Rx Yes Discuss Maintenance /Wt Loss: Yes       Attend Cooking School: Pending     BP < 140/90 or < 130/80 if DM or CKD Yes Education       Nutrition Education: S&S Hypo/Hyper glycemia, Relate Diabetes to CAD, Healthy Plant Based Eating, Sodium Reduction Cooking/ Lectures/ Cooking School/  RD 1:1, Fluid Restriction Prescribed     Aerobic activity 30 + min / day 5 days / wk Yes           Exercise    Current : Sharad has been dealing with back pain this week and is pending an xray as he thinks he may have a fractured rib.  He is still walking 30 minutes daily as tolerated but some his walks this week have been shorter in duration.   Goal: Continue exercise as tolerated and get pain evaluated.  Continue showing up to ICR and work on increasing resistance.   Progress: Sharad is committed to continue with ICR to have a healthier lifestyle.      Nutrition     Current: Sharad is still early in the program and is learning about what dietary changes he should be making.  He is reducing sodium in his diet to start.   Goal: Continue to implement changes as he learns about heart heatlhy eating.   Progress: Working toward eating a heart healthy diet.      Hypertension     Current: Blood pressure is borderline hypertensive, he is going to continue exercising and making dietary changes for better control.   Goal: Sodium reduction, medication compliance, home BP monitoring, daily weights and lasix PRN  Progress: Willing to meet stated goals.      Stress     Current : Continues to have stress and looks forward to  taking some healthy mindset lectures throughout the program to help.   Goal: To utilize stress management techniques as learned through lectures and videos in the program.   Progress: Has a desire to work on stress management.      Other

## 2020-11-25 ENCOUNTER — NON-PROVIDER VISIT (OUTPATIENT)
Dept: CARDIOLOGY | Facility: MEDICAL CENTER | Age: 65
End: 2020-11-25
Payer: MEDICARE

## 2020-11-25 DIAGNOSIS — Z95.2 HX OF AORTIC VALVE REPLACEMENT: ICD-10-CM

## 2020-11-25 DIAGNOSIS — Z95.1 S/P CABG X 1: ICD-10-CM

## 2020-11-25 LAB — EKG IMPRESSION: NORMAL

## 2020-11-25 PROCEDURE — G0423 INTENS CARDIAC REHAB NO EXER: HCPCS | Mod: 59 | Performed by: INTERNAL MEDICINE

## 2020-11-25 PROCEDURE — G0422 INTENS CARDIAC REHAB W/EXERC: HCPCS | Performed by: INTERNAL MEDICINE

## 2020-11-26 NOTE — PROGRESS NOTES
"Nutrition Consult Note:    Virgen Keyes, RD, LD, CDE  Date of visit:     11/25/2020    Referring MD:    Dr. Orona   Referring diagnosis: CABG    Diagnosis code: Z95.1   Time:  4:05-4:50pm        Sharad Millan is here today for Intensive Cardiac Rehab. Today he had his one-on-one RD appointment.This program includes Nutrition education and counseling following the Pritikin guidelines, which promote whole foods-fruits, vegetables, beans/legumes, whole grains, lean animal protein and not fat dairy-while avoiding added salt, refined/salty/sugary/fatty processed foods, trans/saturated fat, added sugar, tropical oils and heavy use of added oils.      Past Medical History:   Type 2 DM, CAD, HTN, HLD, s/p CABG, h/o MI and stent     Anthropometrics:  Height: 5'9\"   Weight: 216lb   BMI:  32   Goal weight: pt states 210lb       Diet History:  Breakfast:  skips or has cereal special K or fruit and english muffin w jam   Lunch: chicken soft taco fast food. Diet coke. Or skips   Dinner: late dinner after work.   Snacks: Protein bar low carb, apple, ice cream   Beverages: water. Diet coke.       Keeping a food log and glucose log for his physician. Taking insulin but not happy about it. Agrees to take it short term but never wants to take it for longer. Reports taking Humalog with meals, regardless if he eats and takes a set dose rather than carb counting. Long acting Toujeo 28 units q HS but reports waking up with high blood sugars.     Pt admits to eating at night. He doesn't sleep well. Maybe 4 hours a night. Gets up at night and eats cereal. But states those mornings his blood sugars are lower.     Current Exercise Minutes:  60mins at [facility] x2 days per week   additional exercise not discussed today      Positive Changes Since Beginning the Program:   1. None. However he has been working on glucose control with his physician. Using a CGM to test blood sugar throughout the day. Is having highs and lows.   2. Is trying " to eat breakfast more consistently     Barriers to Change/Biggest Struggles:  1. Pt works long hours; owns 3 businesses. So time is a barrier   2. Wife doesn't cook and he gets home late so dinner is late   3. Inconsistent with insulin injections and doesn't understand appropriate use.      Nutrition Goals:  1. Eat 3 meals a day ; balancing carbs with protein   2. Consistent carbohydrate intake is important while taking insulin. Aim for 45-60g per meal. Take insulin as prescribed.   3. Check FSBS 2 hours post prandial to see peak after eating.   4. For breakfast have fruit with nuts or egg whites and sourdough toast - consistently. Do not skip breakfast    5. Try to meal plan and meal prep to have left overs. And to help cut back on fast food.        Educational Handouts Provided:  1. Pritikin Eating Plan   2. Plate method   3. Label Readings guidelines   4. Heart healthy snack ideas     Follow up PRN

## 2020-11-27 NOTE — ANESTHESIA PROCEDURE NOTES
Airway    Date/Time: 8/31/2020 7:54 AM  Performed by: Anthony Noyola M.D.  Authorized by: Anthony Noyola M.D.     Location:  OR  Urgency:  Elective  Indications for Airway Management:  Anesthesia      Spontaneous Ventilation: absent    Sedation Level:  Deep  Preoxygenated: Yes    Patient Position:  Sniffing  Final Airway Type:  Endotracheal airway  Final Endotracheal Airway:  ETT  Cuffed: Yes    Technique Used for Successful ETT Placement:  Direct laryngoscopy    Insertion Site:  Oral  Blade Type:  Man  Laryngoscope Blade/Videolaryngoscope Blade Size:  2  ETT Size (mm):  8.0  Measured from:  Teeth  ETT to Teeth (cm):  23  Placement Verified by: auscultation and capnometry    Cormack-Lehane Classification:  Grade I - full view of glottis  Number of Attempts at Approach:  1          
Arterial Line  Performed by: Anthony Noyola M.D.  Authorized by: Anthony Noyola M.D.     Start Time:  8/31/2020 7:53 AM  Localization: surface landmarks    Patient Location:  OR  Indication: continuous blood pressure monitoring        Catheter Size:  20 G  Seldinger Technique?: Yes    Site:  Radial artery  Line Secured:  Antimicrobial disc, tape and transparent dressing  Events: patient tolerated procedure well with no complications          
Central Venous Line  Performed by: Anthony Noyola M.D.  Authorized by: Anthony Noyola M.D.     Start Time:  8/31/2020 7:54 AM      provider hand hygiene performed prior to central venous catheter insertion, all 5 sterile barriers used (gloves, gown, cap, mask, large sterile drape) during central venous catheter insertion and skin prep agent completely dried prior to procedure    Patient Position:  Trendelenburg  Site:  Internal jugular  Prep:  Chlorhexidine  Catheter Size:  7 Fr  Number of Lumens:  Double lumen  target vein identified, needle advanced into vein and blood aspirated and guidewire advanced into vein    Seldinger Technique?: Yes    Ultrasound-Guided: ultrasound-guided  Image captured, interpreted and electronically stored.  Sterile Gel and Probe Cover Used for Ultrasound?: Yes    Intravenous Verification: verified by ultrasound, venous blood return and chest x-ray pending    all ports aspirated, all ports flushed easily, guidewire was removed intact, biopatch was applied, line was sutured in place and dressing was applied    Events: patient tolerated procedure well with no complications    PA Catheter Placed?: Yes    PA Catheter Type:  Non-oximetric  PA Catheter Size:  7  Laterality:  Right  Site:  Through introducer  Placement Confirmation: ROMERO, x-ray and waveform          
Normal for race

## 2020-12-02 ENCOUNTER — NON-PROVIDER VISIT (OUTPATIENT)
Dept: CARDIOLOGY | Facility: MEDICAL CENTER | Age: 65
End: 2020-12-02
Payer: MEDICARE

## 2020-12-02 DIAGNOSIS — Z95.2 HX OF AORTIC VALVE REPLACEMENT: ICD-10-CM

## 2020-12-02 DIAGNOSIS — Z95.1 S/P CABG X 1: ICD-10-CM

## 2020-12-02 LAB — EKG IMPRESSION: NORMAL

## 2020-12-02 PROCEDURE — G0422 INTENS CARDIAC REHAB W/EXERC: HCPCS | Performed by: INTERNAL MEDICINE

## 2020-12-02 PROCEDURE — G0423 INTENS CARDIAC REHAB NO EXER: HCPCS | Mod: 59 | Performed by: INTERNAL MEDICINE

## 2020-12-03 NOTE — PROGRESS NOTES
Video watched: 15 Biomechanical Limitations. Length: 35.3 minutes with discussion following.  From 4 To 4:50 pm.

## 2020-12-07 ENCOUNTER — NON-PROVIDER VISIT (OUTPATIENT)
Dept: CARDIOLOGY | Facility: MEDICAL CENTER | Age: 65
End: 2020-12-07
Payer: MEDICARE

## 2020-12-07 DIAGNOSIS — Z95.2 HX OF AORTIC VALVE REPLACEMENT: ICD-10-CM

## 2020-12-07 DIAGNOSIS — Z95.1 S/P CABG X 1: ICD-10-CM

## 2020-12-07 LAB — EKG IMPRESSION: NORMAL

## 2020-12-07 PROCEDURE — G0423 INTENS CARDIAC REHAB NO EXER: HCPCS | Mod: 59 | Performed by: INTERNAL MEDICINE

## 2020-12-07 PROCEDURE — G0422 INTENS CARDIAC REHAB W/EXERC: HCPCS | Performed by: INTERNAL MEDICINE

## 2020-12-08 NOTE — PROGRESS NOTES
Video watched: 25 Hypertension and Heart Disease. Length: 32.7 minutes.  Video and discussion took place from 4 to 4:50 pm.

## 2020-12-09 ENCOUNTER — NON-PROVIDER VISIT (OUTPATIENT)
Dept: CARDIOLOGY | Facility: MEDICAL CENTER | Age: 65
End: 2020-12-09
Payer: MEDICARE

## 2020-12-09 DIAGNOSIS — Z95.2 HX OF AORTIC VALVE REPLACEMENT: ICD-10-CM

## 2020-12-09 DIAGNOSIS — Z95.1 S/P CABG X 1: ICD-10-CM

## 2020-12-09 LAB — EKG IMPRESSION: NORMAL

## 2020-12-09 PROCEDURE — G0423 INTENS CARDIAC REHAB NO EXER: HCPCS | Mod: 59 | Performed by: INTERNAL MEDICINE

## 2020-12-09 PROCEDURE — G0422 INTENS CARDIAC REHAB W/EXERC: HCPCS | Performed by: INTERNAL MEDICINE

## 2020-12-10 NOTE — PROGRESS NOTES
Sharad Millan attended: Exercise Workshop from 4  to 4:50 pm.    The topic was: Biomechanical Limitations.    Patient received handouts regarding the specific exercise information

## 2020-12-14 ENCOUNTER — NON-PROVIDER VISIT (OUTPATIENT)
Dept: CARDIOLOGY | Facility: MEDICAL CENTER | Age: 65
End: 2020-12-14
Payer: MEDICARE

## 2020-12-14 DIAGNOSIS — Z95.2 HX OF AORTIC VALVE REPLACEMENT: ICD-10-CM

## 2020-12-14 DIAGNOSIS — Z95.1 S/P CABG X 1: ICD-10-CM

## 2020-12-14 LAB — EKG IMPRESSION: NORMAL

## 2020-12-14 PROCEDURE — G0422 INTENS CARDIAC REHAB W/EXERC: HCPCS | Performed by: INTERNAL MEDICINE

## 2020-12-14 PROCEDURE — G0423 INTENS CARDIAC REHAB NO EXER: HCPCS | Mod: 59 | Performed by: INTERNAL MEDICINE

## 2020-12-15 NOTE — PROGRESS NOTES
Sharad Millan attended the following workshop from 4 to 4:50 pm.  Workshop Title: Abraham.      Lecture was attended and patient questions addressed. The patient will continue workshops and nutrition education.

## 2020-12-15 NOTE — TELEPHONE ENCOUNTER
GOLD    Pt states he received Rx in the hospital during his open heart surgery. Pt states that he is almost out of these medications and is hoping Dr MALCOLM can refill through [facility] on [address]. Pt is asking for a call back to discuess which medications have to do with the heart because pt is unsure which ones he needs to continue taking from his procedure. Please call to advice [phone]    Thank you

## 2020-12-16 ENCOUNTER — NON-PROVIDER VISIT (OUTPATIENT)
Dept: CARDIOLOGY | Facility: MEDICAL CENTER | Age: 65
End: 2020-12-16
Payer: MEDICARE

## 2020-12-16 DIAGNOSIS — Z95.1 S/P CABG X 1: ICD-10-CM

## 2020-12-16 DIAGNOSIS — Z95.2 HX OF AORTIC VALVE REPLACEMENT: ICD-10-CM

## 2020-12-16 LAB — EKG IMPRESSION: NORMAL

## 2020-12-16 PROCEDURE — G0422 INTENS CARDIAC REHAB W/EXERC: HCPCS | Performed by: INTERNAL MEDICINE

## 2020-12-16 PROCEDURE — G0423 INTENS CARDIAC REHAB NO EXER: HCPCS | Mod: 59 | Performed by: INTERNAL MEDICINE

## 2020-12-16 RX ORDER — CLOPIDOGREL BISULFATE 75 MG/1
TABLET ORAL
Qty: 90 TAB | Refills: 3 | Status: SHIPPED | OUTPATIENT
Start: 2020-12-16 | End: 2021-06-16

## 2020-12-16 RX ORDER — AMIODARONE HYDROCHLORIDE 200 MG/1
200 TABLET ORAL DAILY
Qty: 90 TAB | Refills: 1 | Status: SHIPPED | OUTPATIENT
Start: 2020-12-16 | End: 2021-06-08

## 2020-12-16 NOTE — TELEPHONE ENCOUNTER
Called pt. He states he does not have questions about his medications, he just needs his cardiac medications that he was prescribed in Sept after his hospital stay to be refilled. Rx's sent.

## 2020-12-17 NOTE — PROGRESS NOTES
Individualized Treatment Plan   Cardiac Rehab Individual Treatment Plan  Initial/Reassessment/Discharge Assessment/ ReAssessment/ Discharge: (P) 60 day 20 Session #     (P) 12   MRN: [id number] Allergies: Bydureon [exenatide], Cycloset [bromocriptine mesylate], and Morphine   Patient Name: Sharad Millan : 1955 Risk Stratification: (P) High    Diagnoses:   1. S/P CABG x 1    2. Hx of aortic valve replacement     Age: 65 y.o. Physician: Don Khoury D.O.    Date of Event: (P) 20 Specialist: (P) Scott   Risk Factors:  (P) Hypertension, Hyperlipidemia, Diabetes, Family History, Obesity, Stress, Sedentary Lifestyle, Age, Male > 45   Exercise Nutrition Other Core Components/ Risk Factors Psychosocial   Stages of change Stages of change Stages of change Stages of change   (P) Preparation (P) Preparation (P) Preparation (P) Preparation   Fitness Test Lipids Learning Barriers Psychosocial Plan   DASI: (P) (n/a) Available: (P) No new Learning Barriers: (P) Vision, Literacy Psychosocial Plan: (P) Yes   DIST: (P) 987 Date: (P) 20 Family Support Goals   Max HR: (P) 87 Total: (P) 111 mg/dL (P) Yes Assess presence or absence of depression using a valid screening: (P) Yes   Max BP: Peak Ex BP: (P) 152/67 Trig: (P) 108 mg/dL Lives: (P) Spouse Use Stress Management: (P) Yes   RPE: (P) 10 HDL: (P) 46 mg/dL Other Risk Factors Plan Adverse Events: (P) No   SPO2: (P) 97 %       MET: (P) 2.4 LDL: (P) 43 mg/dL Other Risk Factors/Plan: (P) Yes Unexpected Events: (P) No   EF= (P) 50(2020) Diabetes Tobacco Use Intervention   Ambulatory Status Diabetes: (P) Yes Social History     Tobacco Use   Smoking Status Never Smoker   Smokeless Tobacco Never Used    Behavioral Health Consult: (P) Yes   Fall Risk Assessed: (P) Yes HbA1C: (P) 10.3 % Date: (P) 20 Goals Physician Referral: (P) No   Exercise Ambulatory Status Assist Devices: (P) None Monitors BS at Home: (P) Yes Educate / Review and have  "understanding of cardiac disease prevention: Identifies Stressors: (P) Yes   Exercise Plan Frequency: (P) twice daily, PRN Random BS: (P) 235(Accucheck 11/6/2020)  Drug Intervention: (P) No     Weight Management  Outcome Survey Tools   Goals Weight: (P) 98.9 kg (218 lb) Educate / Review and have understanding of cardiac disease prevention: (P) Yes FP QOL Overall Score: (P) (assessed pre and post program)   Home Exercise: (P) Yes Height: (P) 177.8 cm (5' 10\") Medication Compliance: (P) Yes PHQ-9: (P) (assessed pre and post program)   Mode: (P) Walk, Other(eliptical) BMI (Calculated): (P) 31.28 Complete Tobacco Cessation: Education   Duration: (P) 30+ minutes Goal weight: (P) 210 Complete Tobacco Cessation: (P) N/A MBSR Lectures/Videos: (P) Yes   Frequency: (P) 5-7 days active Waist: (P) 44.5 inch Intervention Psychosocial Education: (P) Coping Techniques, Positive Support System, S/S Depression, MBSR Lectures   Intervention Social History     Substance and Sexual Activity   Alcohol Use No   • Alcohol/week: 0.0 oz    Smoking Cessation Referral: (P) N/A     Intervention: (P) Yes Nutrition Plan Ind. Education / Counseling: (P) Yes    Exercise Prescription Nutrition Plan: (P) Yes Tobacco Adjunct: (P) No    Mode: (P) Treadmill, NuStep Nutrition Related Target Goals Healthy Heart Education: (P) Class Schedule Given, Medications Reviewed, Patient Education Binder Provided, Risk Factors Discussed    Frequency: (P) 2 days/week LDL-C <100 if trig. > 200: (P) No Weekly Lectures/ Videos/ Interactive Cooking School: (P) Yes    Duration: (P) 30-45min LDL-C < 70 for high risk patient:  LDL-C<70 for high risk patients: (P) Yes Education    Intensity: (P) 11-13 RPE  Healthy Heart Education: (P) Class Schedule Given, Medications Reviewed, Patient Education Binder Provided, Risk Factors Discussed    METS: (P) 1.0-3.0 Non HDL-C Should be < 130:  Non HDL-C Should be < 130: (P) Yes Hypertension Management   (Active Problem)  "   Progression: (P) ^ increments of 1-3 to THR/RPE as tolerated      THR: THR: (P) 20-30 BPM ^Resting HR HbA1C < 7%: (P) Yes Resting BP: (P) 139/56    Angina with Exercise?   Angina with Exercise: (P) No   BMI < 25: (P) No Hypertension Education      Dietary Goal: (P) Rate Your Plate >=58     Resistance Training? Resistance Training: (P) Yes Rate your Plate: (P) Pre(48 ) Lectures/ Videos/ Cooking School: (P) Yes    Education Intervention Hypertension Goals    (P) Yes Dietician Consult/Class: (P) Pending Medication Adherence : (P) Yes    (P) Equipment Orientation, Exercise Safety, S/S to Report, RPE Scale, Warm Up / Cool Down, Physically Active Nurse/Patient Discussion: (P) Yes Home Self Monitoring : (P) Yes    Exercise “Target Goals” Diabetes Ed Referral: (P) No(Followed by Dr. Schmitz, [facility])     Start Individual Exercise Rx (P) Yes Discuss Maintenance /Wt Loss: (P) Yes       Attend Cooking School: (P) Yes     BP < 140/90 or < 130/80 if DM or CKD (P) Yes Education       Nutrition Education: (P) S&S Hypo/Hyper glycemia, Relate Diabetes to CAD, Healthy Plant Based Eating, Sodium Reduction Cooking/ Lectures/ Cooking School/  RD 1:1, Fluid Restriction Prescribed     Aerobic activity 30 + min / day 5 days / wk (P) Yes        Exercise    Current : Sharad has been ellipticalling for 30 minutes on the days he isnt at [facility]. Sharad has been coming to [facility] 2x a week and completes a lifting program.  Goal:Sharad feels he has reached his exercise goals with injuries considered.  Progress: Sharad seems motivated to increse his times at workloads when at [facility].     Nutrition     Current: Sharad is making more heart healthy decisions when eating out. sharad has to eat out because he is so busy but now he chooses sandwiches and salads. Limits sodium and salad dressings.  Goal: Continue to implement changes as he learns about heart heatlhy eating.   Progress: Working toward eating a heart healthy diet.      Hypertension      Current: Blood pressure is borderline hypertensive, he is going to continue exercising and making dietary changes for better control.   Goal: Sodium reduction, medication compliance, home BP monitoring, daily weights and lasix PRN  Progress:  Willing to meet stated goals.      Stress     Current : Sharad has a stressful buisness but he understands what is important and doesnt allow it to affect his health.   Goal: To utilize stress management techniques as learned through lectures and videos in the program.   Progress:  Has a desire to work on stress management.      Other

## 2020-12-17 NOTE — PROGRESS NOTES
Video watched: 20 Targeting Your Nutrition Priorities. Length: 31.7 minutes.  Video and discussion took place from 4 to 4:50 pm.

## 2020-12-17 NOTE — PROGRESS NOTES
Intensive Cardiac Rehabilitation (ICR) and Individual Treatment Plan (ITP) reviewed and signed.      Electronically signed: Chirag Nobles MD PhD [facility]  Cardiologist [facility]

## 2020-12-30 ENCOUNTER — HOSPITAL ENCOUNTER (OUTPATIENT)
Dept: CARDIOLOGY | Facility: MEDICAL CENTER | Age: 65
End: 2020-12-30
Attending: INTERNAL MEDICINE
Payer: MEDICARE

## 2020-12-30 ENCOUNTER — TELEPHONE (OUTPATIENT)
Dept: CARDIOLOGY | Facility: MEDICAL CENTER | Age: 65
End: 2020-12-30

## 2020-12-30 ENCOUNTER — NON-PROVIDER VISIT (OUTPATIENT)
Dept: CARDIOLOGY | Facility: MEDICAL CENTER | Age: 65
End: 2020-12-30
Payer: MEDICARE

## 2020-12-30 DIAGNOSIS — Z95.1 S/P CABG X 1: ICD-10-CM

## 2020-12-30 DIAGNOSIS — Z95.2 HX OF AORTIC VALVE REPLACEMENT: ICD-10-CM

## 2020-12-30 DIAGNOSIS — Z95.2 S/P AVR (AORTIC VALVE REPLACEMENT): ICD-10-CM

## 2020-12-30 LAB
EKG IMPRESSION: NORMAL
LV EJECT FRACT  99904: 70
LV EJECT FRACT MOD 2C 99903: 36.17
LV EJECT FRACT MOD 4C 99902: 69.44
LV EJECT FRACT MOD BP 99901: 57.04

## 2020-12-30 PROCEDURE — 93306 TTE W/DOPPLER COMPLETE: CPT | Mod: 26 | Performed by: INTERNAL MEDICINE

## 2020-12-30 PROCEDURE — 93306 TTE W/DOPPLER COMPLETE: CPT

## 2020-12-30 PROCEDURE — G0422 INTENS CARDIAC REHAB W/EXERC: HCPCS | Performed by: INTERNAL MEDICINE

## 2020-12-30 PROCEDURE — G0423 INTENS CARDIAC REHAB NO EXER: HCPCS | Mod: 59 | Performed by: INTERNAL MEDICINE

## 2020-12-30 RX ORDER — AMOXICILLIN 500 MG/1
2000 TABLET, FILM COATED ORAL
Qty: 12 TAB | Refills: 0 | Status: SHIPPED | OUTPATIENT
Start: 2020-12-30 | End: 2021-12-18

## 2020-12-30 NOTE — LETTER
December 31, 2020        Sharad Millan    To whom it concerns:    As per ADA/AHA recommendations, Sharad Millan needs to take prophylactic Amoxicillin due to his Aortic Valve Repair/Replacement. This medication should be taken 30-60 minutes prior to any dental procedure.                      Thank you,      Virgilio Looney R.N.

## 2020-12-30 NOTE — TELEPHONE ENCOUNTER
GOLD    NM: Sharad Millan   PH: [phone]   PT NM: Sharad Millan   : 1955   REG DR: Dr Rivera   RE: Has to have some dental work done  and dentist is inquiring if he needs  to be on any meds before his dental  work on 1/15. fax [phone] Dr Reg Devi   DISP HIST: 2020 01:17P VW  p/'disp  2020 01:20P DLR checked msg    --------------------------------------  Message History  Account: [id number]  Taken:  Wed 30-Dec-2020  1:18p VW  Serial#: [id number]      Thank you,   Tresa KNUTSON

## 2020-12-30 NOTE — LETTER
December 31, 2020        Sharad Millan    To whom it concerns:    As per ADA/AHA recommendations, Dorie Millan needs to take prophylactic Amoxicillin due to his Aortic Valve Repair/Replacement. This medication should be taken 30-60 minutes prior to any dental procedure.                            Thank you,    Virgilio Looney R.N.

## 2020-12-30 NOTE — LETTER
December 31, 2020        Sharad [facility] [address] [address]  [place] [address]        Dear Sharad:    ***    If you have any questions or concerns, please don't hesitate to call.        Sincerely,        Tresa [facility]    Electronically Signed

## 2020-12-30 NOTE — TELEPHONE ENCOUNTER
Called pt at [phone] and LVM.   Called [phone] and s/w pt's wife, Millei. Explained that pt will need prophylactic abx prior to dental procedures to to his history of AVR. Amoxicillin Rx sent to [facility] per ADA/AHA recommendations. Instructed Millie that pt should take this 30-60 mins prior to dental procedure.

## 2020-12-31 NOTE — PROGRESS NOTES
"Nutrition Follow Up Note:    Virgen Keyes, RD, LD, CDE  Date of visit:      12/30/2020    Referring MD:    Dr. Orona   Referring diagnosis:  CABG    Diagnosis code:  Z95.1   Time:  4:02-4:50pm     Past Medical History:   Type 2 DM, CAD, HTN, HLD, s/p CABG, h/o MI and stent      Anthropometrics:  Height: 5'9\"   Weight: 216lb   BMI:  32   Goal weight: pt states 210lb    Diet History:  Breakfast: Skips or has protein bar or cereal   Lunch: skips or has fast food   Dinner: pizza or take out or wife cooks something at home. Not always healthy but is trying to do a protein with veggies   Snacks: cereal . Cookies this varies. Mostly snacks at night . Doesn't have time during the day   Beverages: water. Diet coke      Assessment:   Sharad continues to have the same struggles with eating consistent meals during the day. Skips lunch or has fast food which he knows he  Needs a healthier plan for. Today we discussed some ideas that may be better options for his lunch. Such as a meal prep service.   We also discussed benefits to eating carbs with protein for optimal glycemic control.   He also is waking up and eating snacks overnight.      Nutrition Goals:  1. Try a meal prep service such as [employer] [place] for lunch or consider a protein shake with a serving of fruit with it   2. Eat a 15g carb snack with protein before bed If dinner is early (6-6:30pm) to help with fasting glucose control  3.avoid eating overnight   4.Eat 3 meals a day. Include a balance of carb and protein together for optimal glycemic control     Follow up PRN           "

## 2020-12-31 NOTE — TELEPHONE ENCOUNTER
Pt called stating his dentist office needs a letter stating Pt needs to be on Amoxicillin before dental work.   [facility]  Ph# [phone]  Fax# [phone]

## 2021-01-04 DIAGNOSIS — I10 ESSENTIAL HYPERTENSION: ICD-10-CM

## 2021-01-06 ENCOUNTER — NON-PROVIDER VISIT (OUTPATIENT)
Dept: CARDIOLOGY | Facility: MEDICAL CENTER | Age: 66
End: 2021-01-06
Payer: MEDICARE

## 2021-01-06 DIAGNOSIS — Z95.2 HX OF AORTIC VALVE REPLACEMENT: ICD-10-CM

## 2021-01-06 DIAGNOSIS — Z95.1 S/P CABG X 1: ICD-10-CM

## 2021-01-06 LAB — EKG IMPRESSION: NORMAL

## 2021-01-06 PROCEDURE — G0422 INTENS CARDIAC REHAB W/EXERC: HCPCS | Performed by: INTERNAL MEDICINE

## 2021-01-06 PROCEDURE — G0423 INTENS CARDIAC REHAB NO EXER: HCPCS | Mod: 59 | Performed by: INTERNAL MEDICINE

## 2021-01-06 NOTE — PROGRESS NOTES
Video watched: 35 Introduction to Yoga, discussion to follow. Length: 33.8 minutes. From [id number].

## 2021-01-07 NOTE — PROGRESS NOTES
Individualized Treatment Plan   Cardiac Rehab Individual Treatment Plan  Initial/Reassessment/Discharge Assessment/ ReAssessment/ Discharge: 90 day 21 Session #     14   MRN: [id number] Allergies: Bydureon [exenatide], Cycloset [bromocriptine mesylate], and Morphine   Patient Name: Sharad Millan : 1955 Risk Stratification: High    Diagnoses:   1. S/P CABG x 1    2. Hx of aortic valve replacement     Age: 65 y.o. Physician: Don Khoury D.O.    Date of Event: 20 Specialist: Scott   Risk Factors:  Hypertension, Hyperlipidemia, Diabetes, Family History, Obesity, Stress, Sedentary Lifestyle, Age, Male > 45   Exercise Nutrition Other Core Components/ Risk Factors Psychosocial   Stages of change Stages of change Stages of change Stages of change   Preparation Preparation Preparation Preparation   Fitness Test Lipids Learning Barriers Psychosocial Plan   DASI: (n/a) Available: No new Learning Barriers: Vision, Literacy Psychosocial Plan: Yes   DIST: 987 Date: 20 Family Support Goals   Max HR: 87 Total: 111 mg/dL Yes Assess presence or absence of depression using a valid screening: Yes   Max BP: Peak Ex BP: 152/67 Tri mg/dL Lives: Spouse Use Stress Management: Yes   RPE: 10 HDL: 46 mg/dL Other Risk Factors Plan Adverse Events: No   SPO2: 97 %       MET: 2.4 LDL: 43 mg/dL Other Risk Factors/Plan: Yes Unexpected Events: No   EF= 50(2020) Diabetes Tobacco Use Intervention   Ambulatory Status Diabetes: Yes Social History     Tobacco Use   Smoking Status Never Smoker   Smokeless Tobacco Never Used    Behavioral Health Consult: Yes   Fall Risk Assessed: Yes HbA1C: 10.3 % Date: 20 Goals Physician Referral: No   Exercise Ambulatory Status Assist Devices: None Monitors BS at Home: Yes Educate / Review and have understanding of cardiac disease prevention: Identifies Stressors: Yes   Exercise Plan Frequency: twice daily, PRN Random BS: 235(Accucheck 2020)  Drug Intervention:  "No     Weight Management  Outcome Survey Tools   Goals Weight: 99.3 kg (219 lb) Educate / Review and have understanding of cardiac disease prevention: Yes FP QOL Overall Score: (assessed pre and post program)   Home Exercise: Yes Height: 177.8 cm (5' 10\") Medication Compliance: Yes PHQ-9: (assessed pre and post program)   Mode: Walk, Other(eliptical) BMI (Calculated): 31.42 Complete Tobacco Cessation: Education   Duration: 30+ minutes Goal weight: 210 Complete Tobacco Cessation: N/A MBSR Lectures/Videos: Yes   Frequency: 5-7 days active Waist: 44.5 inch Intervention Psychosocial Education: Coping Techniques, Positive Support System, S/S Depression, MBSR Lectures   Intervention Social History     Substance and Sexual Activity   Alcohol Use No   • Alcohol/week: 0.0 oz    Smoking Cessation Referral: N/A     Intervention: Yes Nutrition Plan Ind. Education / Counseling: Yes    Exercise Prescription Nutrition Plan: Yes Tobacco Adjunct: No    Mode: Treadmill, NuStep Nutrition Related Target Goals Healthy Heart Education: Class Schedule Given, Medications Reviewed, Patient Education Binder Provided, Risk Factors Discussed    Frequency: 2 days/week LDL-C <100 if trig. > 200: No Weekly Lectures/ Videos/ Interactive Cooking School: Yes    Duration: 30-45min LDL-C < 70 for high risk patient:  LDL-C<70 for high risk patients: Yes Education    Intensity: 11-13 RPE  Healthy Heart Education: Class Schedule Given, Medications Reviewed, Patient Education Binder Provided, Risk Factors Discussed    METS: 1.0-3.0 Non HDL-C Should be < 130:  Non HDL-C Should be < 130: Yes Hypertension Management   (Active Problem)    Progression: ^ increments of 1-3 to THR/RPE as tolerated      THR: THR: 20-30 BPM ^Resting HR HbA1C < 7%: Yes Resting BP: 139/59    Angina with Exercise?   Angina with Exercise: No   BMI < 25: No Hypertension Education      Dietary Goal: Rate Your Plate >=58     Resistance Training? Resistance Training: Yes Rate your Plate: " Pre(48 ) Lectures/ Videos/ Cooking School: Yes    Education Intervention Hypertension Goals    Yes Dietician Consult/Class: Yes Medication Adherence : Yes    Equipment Orientation, Exercise Safety, S/S to Report, RPE Scale, Warm Up / Cool Down, Physically Active Nurse/Patient Discussion: Yes Home Self Monitoring : Yes    Exercise “Target Goals” Diabetes Ed Referral: No(Followed by Dr. Schmitz, [facility])     Start Individual Exercise Rx Yes Discuss Maintenance /Wt Loss: Yes       Attend Cooking School: Yes     BP < 140/90 or < 130/80 if DM or CKD Yes Education       Nutrition Education: S&S Hypo/Hyper glycemia, Relate Diabetes to CAD, Healthy Plant Based Eating, Sodium Reduction Cooking/ Lectures/ Cooking School/  RD 1:1, Fluid Restriction Prescribed     Aerobic activity 30 + min / day 5 days / wk Yes        Exercise    Current : (P) 30 minutes f aerobics 4 days a week. 2 days of resistance training.  Goal: (P) Sharad wants to get stronger. He has lost feeling in   Progress: (P) Sharad is starting to use more weight at [facility].     Nutrition     Current: (P) Sharad is eating more fruits and vegetables.  Goal: (P) Still looking for better more healthy options that he can eat  while he is on the road and at work.  Progress: (P) is making small changes to his diet.    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  Goal: (P) Continue to  deal with stress in a healthy way.  Progress: (P) is learning new stress reduction techniques.

## 2021-01-11 ENCOUNTER — NON-PROVIDER VISIT (OUTPATIENT)
Dept: CARDIOLOGY | Facility: MEDICAL CENTER | Age: 66
End: 2021-01-11
Payer: MEDICARE

## 2021-01-11 DIAGNOSIS — Z95.2 HX OF AORTIC VALVE REPLACEMENT: ICD-10-CM

## 2021-01-11 DIAGNOSIS — Z95.1 S/P CABG X 1: ICD-10-CM

## 2021-01-11 LAB — EKG IMPRESSION: NORMAL

## 2021-01-11 PROCEDURE — G0422 INTENS CARDIAC REHAB W/EXERC: HCPCS | Performed by: INTERNAL MEDICINE

## 2021-01-11 PROCEDURE — G0423 INTENS CARDIAC REHAB NO EXER: HCPCS | Mod: 59 | Performed by: INTERNAL MEDICINE

## 2021-01-11 RX ORDER — POTASSIUM CHLORIDE 20 MEQ/1
TABLET, EXTENDED RELEASE ORAL
Qty: 90 TAB | Refills: 0 | Status: SHIPPED | OUTPATIENT
Start: 2021-01-11 | End: 2021-06-16

## 2021-01-12 NOTE — PROGRESS NOTES
Sharad Millan attended: Exercise Workshop from 4:00  to 4:50 pm.    The topic was: Balance training and fall prevention.    Patient received handouts regarding the specific exercise information

## 2021-01-13 ENCOUNTER — NON-PROVIDER VISIT (OUTPATIENT)
Dept: CARDIOLOGY | Facility: MEDICAL CENTER | Age: 66
End: 2021-01-13
Payer: MEDICARE

## 2021-01-13 DIAGNOSIS — Z95.2 HX OF AORTIC VALVE REPLACEMENT: ICD-10-CM

## 2021-01-13 DIAGNOSIS — Z95.1 S/P CABG X 1: ICD-10-CM

## 2021-01-13 LAB — EKG IMPRESSION: NORMAL

## 2021-01-13 PROCEDURE — G0423 INTENS CARDIAC REHAB NO EXER: HCPCS | Mod: 59 | Performed by: INTERNAL MEDICINE

## 2021-01-13 PROCEDURE — G0422 INTENS CARDIAC REHAB W/EXERC: HCPCS | Performed by: INTERNAL MEDICINE

## 2021-01-14 NOTE — PROGRESS NOTES
Video watched: 22 Biology of Weight Control. Length: 35.4 minutes with discussion following.  From 4:00 To 4:50 pm.     [Follow-Up: _____] : a [unfilled] follow-up visit

## 2021-01-18 ENCOUNTER — NON-PROVIDER VISIT (OUTPATIENT)
Dept: CARDIOLOGY | Facility: MEDICAL CENTER | Age: 66
End: 2021-01-18
Payer: MEDICARE

## 2021-01-18 DIAGNOSIS — Z95.2 HX OF AORTIC VALVE REPLACEMENT: ICD-10-CM

## 2021-01-18 DIAGNOSIS — Z95.1 S/P CABG X 1: ICD-10-CM

## 2021-01-18 LAB — EKG IMPRESSION: NORMAL

## 2021-01-18 PROCEDURE — G0422 INTENS CARDIAC REHAB W/EXERC: HCPCS | Performed by: INTERNAL MEDICINE

## 2021-01-18 PROCEDURE — G0423 INTENS CARDIAC REHAB NO EXER: HCPCS | Mod: 59 | Performed by: INTERNAL MEDICINE

## 2021-01-19 NOTE — PROGRESS NOTES
Sharad Millan attended: cooking school from 4 to 4:50 pm.  Today  prepared Citrus Quinoa Salad.    Patient received handouts and nutrition information regarding the specific recipes.

## 2021-01-20 ENCOUNTER — NON-PROVIDER VISIT (OUTPATIENT)
Dept: CARDIOLOGY | Facility: MEDICAL CENTER | Age: 66
End: 2021-01-20
Payer: MEDICARE

## 2021-01-20 DIAGNOSIS — Z95.2 HX OF AORTIC VALVE REPLACEMENT: ICD-10-CM

## 2021-01-20 DIAGNOSIS — Z95.1 S/P CABG X 1: ICD-10-CM

## 2021-01-20 LAB — EKG IMPRESSION: NORMAL

## 2021-01-20 PROCEDURE — G0423 INTENS CARDIAC REHAB NO EXER: HCPCS | Mod: 59 | Performed by: INTERNAL MEDICINE

## 2021-01-20 PROCEDURE — G0422 INTENS CARDIAC REHAB W/EXERC: HCPCS | Performed by: INTERNAL MEDICINE

## 2021-01-21 NOTE — PROGRESS NOTES
Video watched: 27 Fueling a Healthy Body with discussion to follow. Length: 31.5 minutes. [id number].

## 2021-01-25 ENCOUNTER — NON-PROVIDER VISIT (OUTPATIENT)
Dept: CARDIOLOGY | Facility: MEDICAL CENTER | Age: 66
End: 2021-01-25
Payer: MEDICARE

## 2021-01-25 DIAGNOSIS — Z95.2 HX OF AORTIC VALVE REPLACEMENT: ICD-10-CM

## 2021-01-25 DIAGNOSIS — Z95.1 S/P CABG X 1: ICD-10-CM

## 2021-01-25 LAB — EKG IMPRESSION: NORMAL

## 2021-01-25 PROCEDURE — G0422 INTENS CARDIAC REHAB W/EXERC: HCPCS | Performed by: INTERNAL MEDICINE

## 2021-01-25 PROCEDURE — G0423 INTENS CARDIAC REHAB NO EXER: HCPCS | Mod: 59 | Performed by: INTERNAL MEDICINE

## 2021-01-26 NOTE — PROGRESS NOTES
Sharad Millan attended the following workshop from 4 to 4:50 pm.  Workshop Title: Fueling a Healthy Body.      Lecture was attended and patient questions addressed. The patient will continue workshops and nutrition education.

## 2021-01-27 ENCOUNTER — NON-PROVIDER VISIT (OUTPATIENT)
Dept: CARDIOLOGY | Facility: MEDICAL CENTER | Age: 66
End: 2021-01-27
Payer: MEDICARE

## 2021-01-27 DIAGNOSIS — Z95.1 S/P CABG X 1: ICD-10-CM

## 2021-01-27 DIAGNOSIS — Z95.2 HX OF AORTIC VALVE REPLACEMENT: ICD-10-CM

## 2021-01-27 LAB — EKG IMPRESSION: NORMAL

## 2021-01-27 PROCEDURE — G0423 INTENS CARDIAC REHAB NO EXER: HCPCS | Mod: 59 | Performed by: INTERNAL MEDICINE

## 2021-01-27 PROCEDURE — G0422 INTENS CARDIAC REHAB W/EXERC: HCPCS | Performed by: INTERNAL MEDICINE

## 2021-01-28 NOTE — PROGRESS NOTES
Individualized Treatment Plan   Cardiac Rehab Individual Treatment Plan  Initial/Reassessment/Discharge Assessment/ ReAssessment/ Discharge: (P) 120 Day 21 Session #     (P) 20   MRN: [id number] Allergies: Bydureon [exenatide], Cycloset [bromocriptine mesylate], and Morphine   Patient Name: Sharad Millan : 1955 Risk Stratification: (P) High    Diagnoses:   1. S/P CABG x 1    2. Hx of aortic valve replacement     Age: 65 y.o. Physician: Don Khoury D.O.    Date of Event: (P) 20 Specialist: (P) Scott   Risk Factors:  (P) Hypertension, Hyperlipidemia, Diabetes, Family History, Obesity, Stress, Sedentary Lifestyle, Age, Male > 45   Exercise Nutrition Other Core Components/ Risk Factors Psychosocial   Stages of change Stages of change Stages of change Stages of change   (P) Preparation (P) Preparation (P) Preparation (P) Preparation   Fitness Test Lipids Learning Barriers Psychosocial Plan   DASI: (P) (n/a) Available: (P) No new Learning Barriers: (P) Vision, Literacy Psychosocial Plan: (P) Yes   DIST: (P) 987 Date: (P) 20 Family Support Goals   Max HR: (P) 87 Total: (P) 111 mg/dL (P) Yes Assess presence or absence of depression using a valid screening: (P) Yes   Max BP: Peak Ex BP: (P) 152/67 Trig: (P) 108 mg/dL Lives: (P) Spouse Use Stress Management: (P) Yes   RPE: (P) 10 HDL: (P) 46 mg/dL Other Risk Factors Plan Adverse Events: (P) No   SPO2: (P) 97 %       MET: (P) 2.4 LDL: (P) 43 mg/dL Other Risk Factors/Plan: (P) Yes Unexpected Events: (P) No   EF= (P) 50(2020) Diabetes Tobacco Use Intervention   Ambulatory Status Diabetes: (P) Yes Social History     Tobacco Use   Smoking Status Never Smoker   Smokeless Tobacco Never Used    Behavioral Health Consult: (P) Yes   Fall Risk Assessed: (P) Yes HbA1C: (P) 10.3 % Date: (P) 20 Goals Physician Referral: (P) No   Exercise Ambulatory Status Assist Devices: (P) None Monitors BS at Home: (P) Yes Educate / Review and have  "understanding of cardiac disease prevention: Identifies Stressors: (P) Yes   Exercise Plan Frequency: (P) twice daily, PRN Random BS: (P) 235(Accucheck 11/6/2020)  Drug Intervention: (P) No     Weight Management  Outcome Survey Tools   Goals Weight: (P) 99.8 kg (220 lb) Educate / Review and have understanding of cardiac disease prevention: (P) Yes FP QOL Overall Score: (P) (assessed pre and post program)   Home Exercise: (P) Yes Height: (P) 177.8 cm (5' 10\") Medication Compliance: (P) Yes PHQ-9: (P) (assessed pre and post program)   Mode: (P) Walk, Other(eliptical) BMI (Calculated): (P) 31.57 Complete Tobacco Cessation: Education   Duration: (P) 30+ minutes Goal weight: (P) 210 Complete Tobacco Cessation: (P) N/A MBSR Lectures/Videos: (P) Yes   Frequency: (P) 5-7 days active Waist: (P) 44.5 inch Intervention Psychosocial Education: (P) Coping Techniques, Positive Support System, S/S Depression, MBSR Lectures   Intervention Social History     Substance and Sexual Activity   Alcohol Use No   • Alcohol/week: 0.0 oz    Smoking Cessation Referral: (P) N/A     Intervention: (P) Yes Nutrition Plan Ind. Education / Counseling: (P) Yes    Exercise Prescription Nutrition Plan: (P) Yes Tobacco Adjunct: (P) No    Mode: (P) Treadmill, NuStep Nutrition Related Target Goals Healthy Heart Education: (P) Class Schedule Given, Medications Reviewed, Patient Education Binder Provided, Risk Factors Discussed    Frequency: (P) 2 days/week LDL-C <100 if trig. > 200: (P) No Weekly Lectures/ Videos/ Interactive Cooking School: (P) Yes    Duration: (P) 30-45min LDL-C < 70 for high risk patient:  LDL-C<70 for high risk patients: (P) Yes Education    Intensity: (P) 11-13 RPE  Healthy Heart Education: (P) Class Schedule Given, Medications Reviewed, Patient Education Binder Provided, Risk Factors Discussed    METS: (P) 1.0-3.0 Non HDL-C Should be < 130:  Non HDL-C Should be < 130: (P) Yes Hypertension Management   (Active Problem)  "   Progression: (P) ^ increments of 1-3 to THR/RPE as tolerated      THR: THR: (P) 20-30 BPM ^Resting HR HbA1C < 7%: (P) Yes Resting BP: (P) 134/61    Angina with Exercise?   Angina with Exercise: (P) No   BMI < 25: (P) No Hypertension Education      Dietary Goal: (P) Rate Your Plate >=58     Resistance Training? Resistance Training: (P) Yes Rate your Plate: (P) Pre(48 ) Lectures/ Videos/ Cooking School: (P) Yes    Education Intervention Hypertension Goals    (P) Yes Dietician Consult/Class: (P) Yes Medication Adherence : (P) Yes    (P) Equipment Orientation, Exercise Safety, S/S to Report, RPE Scale, Warm Up / Cool Down, Physically Active Nurse/Patient Discussion: (P) Yes Home Self Monitoring : (P) Yes    Exercise “Target Goals” Diabetes Ed Referral: (P) No(Followed by Dr. Schmitz, [facility])     Start Individual Exercise Rx (P) Yes Discuss Maintenance /Wt Loss: (P) Yes       Attend Cooking School: (P) Yes     BP < 140/90 or < 130/80 if DM or CKD (P) Yes Education       Nutrition Education: (P) S&S Hypo/Hyper glycemia, Relate Diabetes to CAD, Healthy Plant Based Eating, Sodium Reduction Cooking/ Lectures/ Cooking School/  RD 1:1, Fluid Restriction Prescribed     Aerobic activity 30 + min / day 5 days / wk (P) Yes        Exercise    Current : (P) Working out 4 to 5 days a week 30 minutes of aerobics and lifitng weights at [facility] 2 days a week.   Goal: (P) Is working on his upper body strength.  Progress: (P) Sharad's shoulder limits him from having full mobility and is unable to perform some exercises. He does what he can and doesn't do any over head lifting.     Nutrition     Current: (P) Sharad is concentrating on eating more vegetable and feels like he is starting to eat neo healthy meals.  Goal: (P) Sharad is looking for more fast healthy breakfast options.  Progress: (P) Starting to make heart healthy changes if he has time.     Stress     Current : (P) Sharad has a lot of stress from work but feels  like he manages it fairly well.  Goal: (P) reduce stress.  Progress: (P) Sharad gets away from his stress when he spends time with his grandchildren, he plans to spend more time with them.

## 2021-01-28 NOTE — PROGRESS NOTES
Video watched: 24 Metabolic Syndrome and Belly Fat. Length: 31.9 minutes.  Video and discussion took place from 4 to 4:40 pm.

## 2021-01-28 NOTE — PROGRESS NOTES
Intensive Cardiac Rehabilitation (ICR) and Individual Treatment Plan (ITP) reviewed and signed.   Yusuf Cochran M.D.

## 2021-02-01 ENCOUNTER — NON-PROVIDER VISIT (OUTPATIENT)
Dept: CARDIOLOGY | Facility: MEDICAL CENTER | Age: 66
End: 2021-02-01
Payer: MEDICARE

## 2021-02-01 DIAGNOSIS — Z95.1 S/P CABG X 1: ICD-10-CM

## 2021-02-01 DIAGNOSIS — Z95.2 HX OF AORTIC VALVE REPLACEMENT: ICD-10-CM

## 2021-02-01 LAB — EKG IMPRESSION: NORMAL

## 2021-02-01 PROCEDURE — G0423 INTENS CARDIAC REHAB NO EXER: HCPCS | Mod: 59 | Performed by: INTERNAL MEDICINE

## 2021-02-01 PROCEDURE — G0422 INTENS CARDIAC REHAB W/EXERC: HCPCS | Performed by: INTERNAL MEDICINE

## 2021-02-02 NOTE — PROGRESS NOTES
Sharad Millan attended: cooking school from 4 to 4:50 pm  Today  prepared Chocolate Mousse.    Patient received handouts and nutrition information regarding the specific recipes.

## 2021-02-03 ENCOUNTER — NON-PROVIDER VISIT (OUTPATIENT)
Dept: CARDIOLOGY | Facility: MEDICAL CENTER | Age: 66
End: 2021-02-03
Payer: MEDICARE

## 2021-02-03 DIAGNOSIS — Z95.2 HX OF AORTIC VALVE REPLACEMENT: ICD-10-CM

## 2021-02-03 DIAGNOSIS — Z95.1 S/P CABG X 1: ICD-10-CM

## 2021-02-03 LAB — EKG IMPRESSION: NORMAL

## 2021-02-03 PROCEDURE — G0423 INTENS CARDIAC REHAB NO EXER: HCPCS | Mod: 59 | Performed by: INTERNAL MEDICINE

## 2021-02-03 PROCEDURE — G0422 INTENS CARDIAC REHAB W/EXERC: HCPCS | Performed by: INTERNAL MEDICINE

## 2021-02-04 NOTE — PROGRESS NOTES
Video watched: 03 Move it. Length: 32.9 minutes.  Video and discussion took place from 4 to 4:45pm.

## 2021-02-08 ENCOUNTER — NON-PROVIDER VISIT (OUTPATIENT)
Dept: CARDIOLOGY | Facility: MEDICAL CENTER | Age: 66
End: 2021-02-08
Payer: MEDICARE

## 2021-02-08 DIAGNOSIS — Z95.1 S/P CABG X 1: ICD-10-CM

## 2021-02-08 DIAGNOSIS — Z95.2 HX OF AORTIC VALVE REPLACEMENT: ICD-10-CM

## 2021-02-08 LAB — EKG IMPRESSION: NORMAL

## 2021-02-08 PROCEDURE — G0423 INTENS CARDIAC REHAB NO EXER: HCPCS | Mod: 59 | Performed by: INTERNAL MEDICINE

## 2021-02-08 PROCEDURE — G0422 INTENS CARDIAC REHAB W/EXERC: HCPCS | Performed by: INTERNAL MEDICINE

## 2021-02-09 NOTE — PROGRESS NOTES
Sharad Millan attended: Exercise Workshop from 4  to 4:40 pm.    The topic was: Resistance and Core Strength.    Patient received handouts regarding the specific exercise information

## 2021-02-10 ENCOUNTER — OFFICE VISIT (OUTPATIENT)
Dept: CARDIOLOGY | Facility: MEDICAL CENTER | Age: 66
End: 2021-02-10
Payer: MEDICARE

## 2021-02-10 ENCOUNTER — NON-PROVIDER VISIT (OUTPATIENT)
Dept: CARDIOLOGY | Facility: MEDICAL CENTER | Age: 66
End: 2021-02-10
Payer: MEDICARE

## 2021-02-10 VITALS
HEIGHT: 69 IN | SYSTOLIC BLOOD PRESSURE: 126 MMHG | DIASTOLIC BLOOD PRESSURE: 74 MMHG | HEART RATE: 67 BPM | OXYGEN SATURATION: 99 % | WEIGHT: 225 LBS | RESPIRATION RATE: 16 BRPM | BODY MASS INDEX: 33.33 KG/M2

## 2021-02-10 DIAGNOSIS — I10 ESSENTIAL HYPERTENSION: ICD-10-CM

## 2021-02-10 DIAGNOSIS — Z95.2 S/P AVR (AORTIC VALVE REPLACEMENT): ICD-10-CM

## 2021-02-10 DIAGNOSIS — Z95.2 HX OF AORTIC VALVE REPLACEMENT: ICD-10-CM

## 2021-02-10 DIAGNOSIS — Z95.5 S/P DRUG ELUTING CORONARY STENT PLACEMENT: ICD-10-CM

## 2021-02-10 DIAGNOSIS — Z95.1 S/P CABG X 1: ICD-10-CM

## 2021-02-10 DIAGNOSIS — E78.5 DYSLIPIDEMIA: Chronic | ICD-10-CM

## 2021-02-10 DIAGNOSIS — Z95.1 S/P CABG (CORONARY ARTERY BYPASS GRAFT): ICD-10-CM

## 2021-02-10 PROBLEM — I35.0 SEVERE AORTIC STENOSIS: Status: RESOLVED | Noted: 2020-07-30 | Resolved: 2021-02-10

## 2021-02-10 PROBLEM — R07.2 PRECORDIAL PAIN: Status: RESOLVED | Noted: 2017-11-20 | Resolved: 2021-02-10

## 2021-02-10 PROBLEM — I50.23 ACUTE ON CHRONIC SYSTOLIC HEART FAILURE (HCC): Status: RESOLVED | Noted: 2020-08-27 | Resolved: 2021-02-10

## 2021-02-10 PROBLEM — R26.2 AMBULATORY DYSFUNCTION: Status: RESOLVED | Noted: 2020-01-23 | Resolved: 2021-02-10

## 2021-02-10 PROBLEM — I20.0 UNSTABLE ANGINA PECTORIS (HCC): Status: RESOLVED | Noted: 2020-07-30 | Resolved: 2021-02-10

## 2021-02-10 PROCEDURE — 99214 OFFICE O/P EST MOD 30 MIN: CPT | Performed by: INTERNAL MEDICINE

## 2021-02-10 PROCEDURE — G0423 INTENS CARDIAC REHAB NO EXER: HCPCS | Mod: 59 | Performed by: INTERNAL MEDICINE

## 2021-02-10 PROCEDURE — G0422 INTENS CARDIAC REHAB W/EXERC: HCPCS | Performed by: INTERNAL MEDICINE

## 2021-02-10 ASSESSMENT — ENCOUNTER SYMPTOMS
MYALGIAS: 0
SHORTNESS OF BREATH: 0
PALPITATIONS: 0
COUGH: 0
DIZZINESS: 0
LOSS OF CONSCIOUSNESS: 0

## 2021-02-10 ASSESSMENT — FIBROSIS 4 INDEX: FIB4 SCORE: 1.29

## 2021-02-10 NOTE — PROGRESS NOTES
"Chief Complaint   Patient presents with   • Dyslipidemia     F/V Dx: S/P AVR (aortic valve replacement)   • Coronary Artery Disease     F/V Dx: S/P CABG (coronary artery bypass graft)       Subjective:   Sharad Millan is a 64 y.o. male who presents today for a followup evaluation of S/P AVR, CABG, CAD, coronary stent, palpitations, PSVT, hypertension, hyperlipidemia.    Last seen on 11/9/2020.    Since 11/9/2020 the patient has returned to work full-time \"working as hard as ever\" which involves a fair amount of physical activity.  However is frustrated since his surgery he only feels about 65% recovered.  Also has developed new symptoms consistent with Raynaud's phenomenon in both his feet and hands.  Recent ABIs show patent upper extremity circulation and bilateral 50% femoral artery stenoses with good distal runoff.  Also has neuropathic symptoms that can be bothersome at night again new since his cardiac surgery.  Seeing Dr. Zackery Schmitz, endocrinologist in [facility] and Dr. Don Khoury.  Taking as needed Lasix for bipedal edema.    Since 10/1/2020 the patient has returned to work.  Frustrated.  He has a variety of symptoms since his CT surgery including numbness of his fingers/hands, feet/toes, itching of his middle back, frequent urination, insomnia, easy bruisability and significant fatigue all of which is occurred since his CABG/AVR in 9/2020.  Has just started cardiac rehab.  Using Lasix as needed, saw neurology for worsening tremors since CT surgery recommended alcohol or medication neither of which the patient chose.    Past Medical History:   Diagnosis Date   • Aortic stenosis 10/20/2011   • Arrhythmia    • Breath shortness    • Carotid bruit 12/9/2009   • Cataract     cataract extraction with IOL   • Chronic right shoulder pain    • Diabetes     oral medication   • Fatty liver 1/17/2011   • Heart attack (HCC) 12/2018   • History of cardiac catheterization 7/13/2009   • History of echocardiogram " 10/20/2011   • HTN (hypertension) 10/12/2012   • Hypertension    • Memory loss 10/20/2011   • Murmur 7/7/2009   • Obesity 8/23/2011   • Palpitations 10/20/2011   • PSVT (paroxysmal supraventricular tachycardia) [facility]) 2/14/2012   • S/P coronary artery stent placement 1996    coronary stent implantation x3, [facility]   • S/P coronary artery stent placement 1998    coronary stent implantation; [facility]   • S/P coronary artery stent placement 2003    cardiac catheterization; patent stents; [place]   • Sciatica 8/23/2011   • Uncontrolled diabetes mellitus (HCC) 11/29/2010     Past Surgical History:   Procedure Laterality Date   • MULTIPLE CORONARY ARTERY BYPASS ENDO VEIN HARVEST N/A 8/31/2020    Procedure: CABG, WITH ENDOSCOPIC VEIN PROCUREMENT X1;  Surgeon: Dorys Casas M.D.;  Location: SURGERY [facility];  Service: Cardiothoracic   • AORTIC VALVE REPLACEMENT N/A 8/31/2020    Procedure: REPLACEMENT, AORTIC VALVE;  Surgeon: Dorys Casas M.D.;  Location: SURGERY [facility];  Service: Cardiothoracic   • ROMERO N/A 8/31/2020    Procedure: ECHOCARDIOGRAM, TRANSESOPHAGEAL;  Surgeon: Dorys Casas M.D.;  Location: SURGERY [facility];  Service: Cardiothoracic   • PO BY LAPAROSCOPY N/A 10/25/2015    Procedure: PO BY LAPAROSCOPY-with grams ;  Surgeon: Ronnie Bowman M.D.;  Location: [facility];  Service:    • CAROTID STENT ANGIOGRAPHY  1996   • CATARACT EXTRACTION WITH IOL Bilateral      Family History   Problem Relation Age of Onset   • Heart Disease Mother    • Heart Attack Mother    • Heart Disease Father      Social History     Socioeconomic History   • Marital status:      Spouse name: Not on file   • Number of children: Not on file   • Years of education: Not on file   • Highest education level: Not on file   Occupational History   • Not on file   Tobacco Use   • Smoking status: Never Smoker   • Smokeless tobacco: Never Used   Substance and Sexual Activity   • Alcohol use: No      Alcohol/week: 0.0 oz   • Drug use: No   • Sexual activity: Yes     Partners: Female   Other Topics Concern   •  Service No   • Blood Transfusions No   • Caffeine Concern No   • Occupational Exposure No   • Hobby Hazards No   • Sleep Concern No   • Stress Concern No   • Weight Concern No   • Special Diet No   • Back Care Yes     Comment: BELT SUPPORT   • Exercise Yes   • Bike Helmet No   • Seat Belt Yes   • Self-Exams No   Social History Narrative   • Not on file     Social Determinants of Health     Financial Resource Strain:    • Difficulty of Paying Living Expenses:    Food Insecurity:    • Worried About Running Out of Food in the Last Year:    • Ran Out of Food in the Last Year:    Transportation Needs:    • Lack of Transportation (Medical):    • Lack of Transportation (Non-Medical):    Physical Activity:    • Days of Exercise per Week:    • Minutes of Exercise per Session:    Stress:    • Feeling of Stress :    Social Connections:    • Frequency of Communication with Friends and Family:    • Frequency of Social Gatherings with Friends and Family:    • Attends Baptism Services:    • Active Member of Clubs or Organizations:    • Attends Club or Organization Meetings:    • Marital Status:    Intimate Partner Violence:    • Fear of Current or Ex-Partner:    • Emotionally Abused:    • Physically Abused:    • Sexually Abused:      Allergies   Allergen Reactions   • Bydureon [Exenatide] Hives     hives   • Cycloset [Bromocriptine Mesylate] Rash     Rash     • Morphine Vomiting and Nausea     Outpatient Encounter Medications as of 2/10/2021   Medication Sig Dispense Refill   • Amoxicillin 500 MG Tab Take 4 Tabs by mouth one time as needed (Take 30-60 minutes prior to dental procedure) for up to 1 dose. 12 Tab 0   • metoprolol (LOPRESSOR) 25 MG Tab Take 1 Tab by mouth 2 Times a Day. 180 Tab 3   • potassium chloride SA (K-DUR) 10 MEQ Tab CR Take 10 mEq by mouth every day.     • HUMALOG KWIKPEN 100 UNIT/ML  Solution Pen-injector injection PEN BREAKFAST   16 UNITS LUNCH   14 UNITS, DINNER   14 UNITS SUBCUTANEOUS 90 DAYS     • BD PEN NEEDLE AVANI U/F      • Continuous Blood Gluc Sensor (FREESTYLE AZIZA 14 DAY SENSOR) [facility] USE TO CHECK BLOOD SUGAR FOR 28 DAYS     • furosemide (LASIX) 40 MG Tab Take 1 Tab by mouth every day. 30 Tab 11   • insulin lispro (HUMALOG) 100 UNIT/ML Inject 14 Units as instructed 3 times a day before meals.     • Insulin Degludec (TRESIBA) 100 UNIT/ML Solution Inject 30 Units as instructed every bedtime.     • aspirin EC (ECOTRIN) 81 MG Tablet Delayed Response Take 81 mg by mouth every evening.     • Empagliflozin (JARDIANCE) 25 MG Tab Take 25 mg by mouth every morning.     • losartan (COZAAR) 100 MG Tab Take 100 mg by mouth every morning.     • rosuvastatin (CRESTOR) 20 MG Tab Take 20 mg by mouth every evening.     • potassium chloride SA (KDUR) 20 MEQ Tab CR TAKE 1 TABLET BY MOUTH EVERY DAY (Patient not taking: Reported on 2/10/2021) 90 Tab 0   • clopidogrel (PLAVIX) 75 MG Tab TAKE 1 TABLET BY MOUTH EVERY DAY (Patient not taking: Reported on 2/10/2021) 90 Tab 3   • amiodarone (CORDARONE) 200 MG Tab Take 1 Tab by mouth every day. (Patient not taking: Reported on 2/10/2021) 90 Tab 1   • TOUJEO SOLOSTAR 300 UNIT/ML Solution Pen-injector 28 UNITS EVERY BEDTIME SUBCUTANEOUS FOR 90 DAYS     • sennosides (SENOKOT) 8.6 MG Tab Take 8.6 mg by mouth. 2 tablets twice a day     • magnesium hydroxide (MILK OF MAGNESIA) 400 MG/5ML Suspension Take 30 mL by mouth every day.     • polyethylene glycol/lytes (MIRALAX) 17 g Pack Take 17 g by mouth every day.     • acetaminophen (TYLENOL) 500 MG Tab Take 1-2 Tabs by mouth every 6 hours. 30 Tab 0   • tramadol (ULTRAM) 50 MG Tab Take 1 Tab by mouth every 8 hours as needed (Moderate Pain (NRS Pain Scale 4-6; CPOT Pain Scale 3-5) if opiates not ordered or tolerated). 1 Tab 0     No facility-administered encounter medications on file as of 2/10/2021.     Review of Systems  "  Respiratory: Negative for cough and shortness of breath.    Cardiovascular: Negative for chest pain and palpitations.   Musculoskeletal: Negative for myalgias.   Neurological: Negative for dizziness and loss of consciousness.        Objective:   /74 (BP Location: Left arm, Patient Position: Sitting, BP Cuff Size: Adult)   Pulse 67   Resp 16   Ht 1.753 m (5' 9\")   Wt 102 kg (225 lb)   SpO2 99%   BMI 33.23 kg/m²     Physical Exam   Constitutional: He is oriented to person, place, and time. He appears well-developed and well-nourished.   Neck: No JVD present.   Cardiovascular: Normal rate and regular rhythm.   No murmur heard.  Pulses:       Carotid pulses are on the right side with bruit and on the left side with bruit.  Pulmonary/Chest: Effort normal and breath sounds normal. No respiratory distress. He has no wheezes. He has no rales.   Sternal scar   Musculoskeletal:         General: No edema.   Neurological: He is alert and oriented to person, place, and time.   Skin: Skin is warm and dry.   Psychiatric: He has a normal mood and affect. His behavior is normal.     CAROTID ULTRASOUND 11/01/2017  Mild bilateral internal carotid artery stenosis less than 50%.    12/19/2017 MPI   No evidence of significant jeopardized viable myocardium or prior myocardial    infarction.   Transient ischemic dilatation calculated at 1.17 visually correlated.   Normal left ventricular size, ejection fraction, and wall motion.    12/12/2018 MPI  Normal perfusion.  EF 56%.    MPI 01/31/2020   * Small, equivocal, fully reversible, mild severity defect limited to the    apical inferior wall. SDS of 1 indicating very minimal ischemia if any.    * Nondiagnostic due to resting ECG.   * Normal left ventricular size, ejection fraction, and wall motion.   ECG INTERPRETATION   Nondiagnostic due to resting ECG    CARDIAC CATHETERIZATION 01/08/2020  A.  Selective coronary angiography.  B.  iFR, proximal left anterior descending " artery.  C.  Coronary stent implantation, proximal left anterior descending artery, 2.5x8 mm Buchanan drug-eluting stent.  D.  Ascending aortography.  E.  Distal abdominal aortogram and bilateral iliofemoral angiography.  F.  Right femoral artery approach.  G.  Perclose.  PREPROCEDURE DIAGNOSES:  1.  Aortic stenosis, severe, symptomatic.  2.  Previous left anterior descending artery stent, 1996.  3.  Diabetes mellitus.  POSTPROCEDURE DIAGNOSES:  1.  Left anterior descending artery 90% stenosis.  2.  iFR, left anterior descending artery, 0.39.  3.  Normal ascending aorta.  4.  Normal distal abdominal aorta and bilateral iliofemoral arteries.    CARDIAC SURGERY 08/31/2020  PROCEDURES:  Aortic valve replacement (23 mm Inspiris David pericardial valve), coronary artery bypass grafting x1 (left internal mammary artery to the left anterior descending artery), and intraoperative transesophageal echocardiography.    ECHOCARDIOGRAM 08/08/2012  EF 55-60%.  Moderate aortic stenosis. Peak aortic valve gradient 63 mmHg. Moderate left ventricular hypertrophy     ECHOCARDIOGRAM 07/25/2013  EF 70-75%.   Moderate aortic stenosis. Peak aortic valve gradient 59 mmHg. Moderate left ventricular hypertrophy     ECHOCARDIOGRAM 12/23/2015  Normal left ventricular systolic function.  Left ventricular ejection fraction is visually estimated to be 60%.  Grade I diastolic dysfunction.  Mild mitral regurgitation.  Moderate aortic stenosis.  Vmax 3.47 m/s. Transvalvular gradients are - Peak: 48 mmHg, Mean: 30   mmHg.  Mild pulmonic insufficiency.     ECHOCARDIOGRAM 11/01/2017  Normal left ventricular chamber size.  Left ventricular ejection fraction is visually estimated to be 60%.  Grade I diastolic dysfunction.  Severe aortic stenosis.  Transvalvular gradients are - Peak: 72 mmHg, Mean: 50 mmHg.  Ascending aorta is borderline dilated with a diameter of 3.6 cm.  Compared to the report of the prior study done - the aortic stenosis is   now  severe.    ECHOCARDIOGRAM 12/26/2018    Normal left ventricular systolic function.  Left ventricular ejection fraction is visually estimated to be 65-70%.  Moderate left ventricular hypertrophy.  Severe aortic stenosis.    ECHOCARDIOGRAM 12/30/2020  Normal left ventricular systolic function.  Left ventricular ejection fraction is visually estimated to be 70%.  Moderate concentric left ventricular hypertrophy.  Known bioprosthetic aortic valve that is functioning normally with   normal transvalvular gradients.  Normal right ventricular size and systolic function.  Unable to estimate pulmonary artery pressure due to an inadequate   tricuspid regurgitant jet.    Assessment:     No diagnosis found.    Medical Decision Making:  Today's Assessment / Status / Plan:     Assessment  1.  S/P AVR/CABG 8/31/2020  2.  PAF postop MI: Amiodarone therapy 9/2020.  3.  Coronary stent implantation: Left anterior descending artery.  1996. 1998.  2020  4.  NSTEMI 12/12/2018.   5.  Hyperlipidemia  6.  Hypertension.   7.  Diabetes mellitus.  8.  Bilateral carotid bruits.  9.  Obesity.   10.  SVT. 12/3/2013. 10/22/2015 12/12/2018.    11.  Tremor.  12.  Peripheral neuropathy.  13.?  Raynaud's phenomenon.    Recommendation and Discussion  1.  Clinically the patient is stable from a cardiac standpoint, having returned to work full-time.  2.  Hopefully over time has peripheral neuropathy, Raynaud's phenomenon and tremor improves.  3.  Continue current cardiac therapy.  4.   RTC 4 months.

## 2021-02-11 ENCOUNTER — HOSPITAL ENCOUNTER (OUTPATIENT)
Dept: LAB | Facility: MEDICAL CENTER | Age: 66
End: 2021-02-11
Attending: INTERNAL MEDICINE
Payer: MEDICARE

## 2021-02-11 LAB
ANION GAP SERPL CALC-SCNC: 12 MMOL/L (ref 7–16)
BUN SERPL-MCNC: 30 MG/DL (ref 8–22)
CHLORIDE SERPL-SCNC: 99 MMOL/L (ref 96–112)
CO2 SERPL-SCNC: 25 MMOL/L (ref 20–33)
CREAT SERPL-MCNC: 1.36 MG/DL (ref 0.5–1.4)
EKG IMPRESSION: NORMAL
EST. AVERAGE GLUCOSE BLD GHB EST-MCNC: 237 MG/DL
HBA1C MFR BLD: 9.9 % (ref 0–5.6)
POTASSIUM SERPL-SCNC: 4.1 MMOL/L (ref 3.6–5.5)
SODIUM SERPL-SCNC: 136 MMOL/L (ref 135–145)
VIT B12 SERPL-MCNC: 318 PG/ML (ref 211–911)

## 2021-02-11 PROCEDURE — 83036 HEMOGLOBIN GLYCOSYLATED A1C: CPT | Mod: GA

## 2021-02-11 PROCEDURE — 36415 COLL VENOUS BLD VENIPUNCTURE: CPT

## 2021-02-11 PROCEDURE — 84520 ASSAY OF UREA NITROGEN: CPT

## 2021-02-11 PROCEDURE — 82607 VITAMIN B-12: CPT

## 2021-02-11 PROCEDURE — 82565 ASSAY OF CREATININE: CPT

## 2021-02-11 PROCEDURE — 80051 ELECTROLYTE PANEL: CPT

## 2021-02-11 NOTE — PROGRESS NOTES
Video watched: 06 Label Reading Part 1. Length: 32.25 minutes, with discussion to follow.  [id number].

## 2021-02-14 DIAGNOSIS — E78.5 DYSLIPIDEMIA: ICD-10-CM

## 2021-02-15 ENCOUNTER — APPOINTMENT (OUTPATIENT)
Dept: CARDIOLOGY | Facility: MEDICAL CENTER | Age: 66
End: 2021-02-15
Payer: MEDICARE

## 2021-02-17 ENCOUNTER — NON-PROVIDER VISIT (OUTPATIENT)
Dept: CARDIOLOGY | Facility: MEDICAL CENTER | Age: 66
End: 2021-02-17
Payer: MEDICARE

## 2021-02-17 DIAGNOSIS — Z95.1 S/P CABG X 1: ICD-10-CM

## 2021-02-17 DIAGNOSIS — Z95.2 HX OF AORTIC VALVE REPLACEMENT: ICD-10-CM

## 2021-02-17 LAB — EKG IMPRESSION: NORMAL

## 2021-02-17 PROCEDURE — G0423 INTENS CARDIAC REHAB NO EXER: HCPCS | Mod: 59 | Performed by: INTERNAL MEDICINE

## 2021-02-17 PROCEDURE — G0422 INTENS CARDIAC REHAB W/EXERC: HCPCS | Performed by: INTERNAL MEDICINE

## 2021-02-18 NOTE — PROGRESS NOTES
Individualized Treatment Plan   Cardiac Rehab Individual Treatment Plan  Initial/Reassessment/Discharge Assessment/ ReAssessment/ Discharge: 150 Day 21 Session #     20   MRN: [id number] Allergies: Bydureon [exenatide], Cycloset [bromocriptine mesylate], and Morphine   Patient Name: Sharad Millan : 1955 Risk Stratification: High    Diagnoses:   1. S/P CABG x 1    2. Hx of aortic valve replacement     Age: 65 y.o. Physician: oDn Khoury D.O.    Date of Event: 20 Specialist: Scott   Risk Factors:  Hypertension, Hyperlipidemia, Diabetes, Family History, Obesity, Stress, Sedentary Lifestyle, Age, Male > 45   Exercise Nutrition Other Core Components/ Risk Factors Psychosocial   Stages of change Stages of change Stages of change Stages of change   Preparation Preparation Preparation Preparation   Fitness Test Lipids Learning Barriers Psychosocial Plan   DASI: (n/a) Available: No new Learning Barriers: Vision, Literacy Psychosocial Plan: Yes   DIST: 987 Date: 20 Family Support Goals   Max HR: 87 Total: 111 mg/dL Yes Assess presence or absence of depression using a valid screening: Yes   Max BP: Peak Ex BP: 152/67 Tri mg/dL Lives: Spouse Use Stress Management: Yes   RPE: 10 HDL: 46 mg/dL Other Risk Factors Plan Adverse Events: No   SPO2: 97 %       MET: 2.4 LDL: 43 mg/dL Other Risk Factors/Plan: Yes Unexpected Events: No   EF= 50(2020) Diabetes Tobacco Use Intervention   Ambulatory Status Diabetes: Yes Social History     Tobacco Use   Smoking Status Never Smoker   Smokeless Tobacco Never Used    Behavioral Health Consult: Yes   Fall Risk Assessed: Yes HbA1C: 10.3 % Date: 20 Goals Physician Referral: No   Exercise Ambulatory Status Assist Devices: None Monitors BS at Home: Yes Educate / Review and have understanding of cardiac disease prevention: Identifies Stressors: Yes   Exercise Plan Frequency: twice daily, PRN Random BS: 235(Accucheck 2020)  Drug Intervention:  "No     Weight Management  Outcome Survey Tools   Goals Weight: 99.8 kg (220 lb) Educate / Review and have understanding of cardiac disease prevention: Yes FP QOL Overall Score: (assessed pre and post program)   Home Exercise: Yes Height: 177.8 cm (5' 10\") Medication Compliance: Yes PHQ-9: (assessed pre and post program)   Mode: Walk, Other(eliptical) BMI (Calculated): 31.57 Complete Tobacco Cessation: Education   Duration: 30+ minutes Goal weight: 210 Complete Tobacco Cessation: N/A MBSR Lectures/Videos: Yes   Frequency: 5-7 days active Waist: 44.5 inch Intervention Psychosocial Education: Coping Techniques, Positive Support System, S/S Depression, MBSR Lectures   Intervention Social History     Substance and Sexual Activity   Alcohol Use No   • Alcohol/week: 0.0 oz    Smoking Cessation Referral: N/A     Intervention: Yes Nutrition Plan Ind. Education / Counseling: Yes    Exercise Prescription Nutrition Plan: Yes Tobacco Adjunct: No    Mode: Treadmill, NuStep Nutrition Related Target Goals Healthy Heart Education: Cooking School Attended, Dietician One-on-One Meeting Attended, Medications Reviewed, Patient Education Binder Provided, Risk Factors Discussed, Videos Viewed per [employer], Workshops Attended    Frequency: 2 days/week LDL-C <100 if trig. > 200: No Weekly Lectures/ Videos/ Interactive Cooking School: Yes    Duration: 30-45min LDL-C < 70 for high risk patient:  LDL-C<70 for high risk patients: Yes Education    Intensity: 11-13 RPE  Healthy Heart Education: Cooking School Attended, Dietician One-on-One Meeting Attended, Medications Reviewed, Patient Education Binder Provided, Risk Factors Discussed, Videos Viewed per Pritikin, Workshops Attended    METS: 1.0-3.0 Non HDL-C Should be < 130:  Non HDL-C Should be < 130: Yes Hypertension Management   (Active Problem)    Progression: ^ increments of 1-3 to THR/RPE as tolerated      THR: THR: 20-30 BPM ^Resting HR HbA1C < 7%: Yes Resting BP: 142/58    Angina with " "Exercise?   Angina with Exercise: No   BMI < 25: No Hypertension Education      Dietary Goal: Rate Your Plate >=58     Resistance Training? Resistance Training: Yes Rate your Plate: Pre(48 ) Lectures/ Videos/ Cooking School: Yes    Education Intervention Hypertension Goals    Yes Dietician Consult/Class: Yes Medication Adherence : Yes    Equipment Orientation, Exercise Safety, S/S to Report, RPE Scale, Warm Up / Cool Down, Physically Active Nurse/Patient Discussion: Yes Home Self Monitoring : Yes    Exercise “Target Goals” Diabetes Ed Referral: No(Followed by Dr. Schmitz, [facility])     Start Individual Exercise Rx Yes Discuss Maintenance /Wt Loss: Yes       Attend Cooking School: Yes     BP < 140/90 or < 130/80 if DM or CKD Yes Education       Nutrition Education: S&S Hypo/Hyper glycemia, Relate Diabetes to CAD, Healthy Plant Based Eating, Sodium Reduction Cooking/ Lectures/ Cooking School/  RD 1:1, Fluid Restriction Prescribed     Aerobic activity 30 + min / day 5 days / wk Yes               Exercise    Current : Sharad is using his Eliptical 3 days a week and coming to [facility] 2 days a week.  Goal: Sharad will add a weight lifting routine one day a week at home.  Progress: Sharad's shoulder limits him from having full mobility and is un able to perform some exercises. He does what he can and doesn't do any over head lifting.     Nutrition     Current: Sharad says that he has a healthy breakfast and dinner but says he struggles with having a healthy lunch because he goes to fast food.  Goal: Plan ahead to prepare healthy lunches and take it with him.  Progress: \"I had a hamburger for lunch today.\"     Hypertension     Current: Blood pressure is borderline hypertensive, he is going to continue exercising and making dietary changes for better control.   Goal: Sodium reduction, medication compliance, home BP monitoring, daily weights and lasix PRN  Progress: Willing to meet stated goals.      Stress   "   Current : Sharad has a lot of stress from work but feels like he manages it fairly well.  Goal: Focus on healthy food, 3 X day.  Exercise everyday.  Take time to deep breath.  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     Other     Notes: Sharad says that he has made healthy changes since starting the program.  He feels that his blood sugar readings are a little better.

## 2021-02-18 NOTE — PROGRESS NOTES
Intensive Cardiac Rehabilitation (ICR) and Individual Treatment Plan (ITP) reviewed and signed.    Electronically Signed by:  Cedrick Palacios M.D., [facility]  2/17/2021  5:33 PM

## 2021-02-18 NOTE — PROGRESS NOTES
Video watched: 31 Decoding Lab Results. Length: 32.7 minutes.  Video and discussion took place from 5:15 to 6 pm.

## 2021-02-19 RX ORDER — ROSUVASTATIN CALCIUM 20 MG/1
TABLET, COATED ORAL
Qty: 90 TABLET | Refills: 1 | Status: SHIPPED | OUTPATIENT
Start: 2021-02-19 | End: 2021-08-02

## 2021-02-22 ENCOUNTER — TELEPHONE (OUTPATIENT)
Dept: CARDIOLOGY | Facility: MEDICAL CENTER | Age: 66
End: 2021-02-22

## 2021-02-22 ENCOUNTER — NON-PROVIDER VISIT (OUTPATIENT)
Dept: CARDIOLOGY | Facility: MEDICAL CENTER | Age: 66
End: 2021-02-22
Payer: MEDICARE

## 2021-02-22 DIAGNOSIS — Z95.1 S/P CABG X 1: ICD-10-CM

## 2021-02-22 DIAGNOSIS — Z95.2 HX OF AORTIC VALVE REPLACEMENT: ICD-10-CM

## 2021-02-22 LAB — EKG IMPRESSION: NORMAL

## 2021-02-22 PROCEDURE — G0423 INTENS CARDIAC REHAB NO EXER: HCPCS | Mod: 59 | Performed by: INTERNAL MEDICINE

## 2021-02-22 PROCEDURE — G0422 INTENS CARDIAC REHAB W/EXERC: HCPCS | Performed by: INTERNAL MEDICINE

## 2021-02-23 NOTE — PROGRESS NOTES
Sharad Millan attended: cooking school from 4 to 4:50 pm.  Today  prepared Breakfast and Snacks.    Patient received handouts and nutrition information regarding the specific recipes.      Sharad exercised from 5 to 6 pm today.

## 2021-02-24 ENCOUNTER — NON-PROVIDER VISIT (OUTPATIENT)
Dept: CARDIOLOGY | Facility: MEDICAL CENTER | Age: 66
End: 2021-02-24
Payer: MEDICARE

## 2021-02-24 DIAGNOSIS — Z95.2 HX OF AORTIC VALVE REPLACEMENT: ICD-10-CM

## 2021-02-24 DIAGNOSIS — Z95.1 S/P CABG X 1: ICD-10-CM

## 2021-02-24 LAB — EKG IMPRESSION: NORMAL

## 2021-02-24 PROCEDURE — G0422 INTENS CARDIAC REHAB W/EXERC: HCPCS | Performed by: INTERNAL MEDICINE

## 2021-02-24 PROCEDURE — G0423 INTENS CARDIAC REHAB NO EXER: HCPCS | Mod: 59 | Performed by: INTERNAL MEDICINE

## 2021-02-25 NOTE — PROGRESS NOTES
.Video watched: 07 Healthy Minds/Bodies/Hearts. Length: 32.2 minutes.  Video and discussion took place from 5:15 to 6 pm.    Sharad exercised today from 4 to 5:10 pm.

## 2021-03-03 ENCOUNTER — NON-PROVIDER VISIT (OUTPATIENT)
Dept: CARDIOLOGY | Facility: MEDICAL CENTER | Age: 66
End: 2021-03-03
Payer: MEDICARE

## 2021-03-03 DIAGNOSIS — Z95.1 S/P CABG X 1: ICD-10-CM

## 2021-03-03 DIAGNOSIS — Z95.2 HX OF AORTIC VALVE REPLACEMENT: ICD-10-CM

## 2021-03-03 DIAGNOSIS — Z23 NEED FOR VACCINATION: ICD-10-CM

## 2021-03-03 LAB — EKG IMPRESSION: NORMAL

## 2021-03-03 PROCEDURE — G0422 INTENS CARDIAC REHAB W/EXERC: HCPCS | Performed by: INTERNAL MEDICINE

## 2021-03-03 PROCEDURE — G0423 INTENS CARDIAC REHAB NO EXER: HCPCS | Mod: 59 | Performed by: INTERNAL MEDICINE

## 2021-03-04 NOTE — PROGRESS NOTES
Video watched: 26 Improve Performance. Length: 32.2 minutes.  Video and discussion took place from 5:15 to 6 pm.    Sharad exercised from 4 to 5:00 pm.

## 2021-03-08 ENCOUNTER — NON-PROVIDER VISIT (OUTPATIENT)
Dept: CARDIOLOGY | Facility: MEDICAL CENTER | Age: 66
End: 2021-03-08
Payer: MEDICARE

## 2021-03-08 DIAGNOSIS — Z95.1 S/P CABG X 1: ICD-10-CM

## 2021-03-08 DIAGNOSIS — Z95.2 HX OF AORTIC VALVE REPLACEMENT: ICD-10-CM

## 2021-03-08 LAB — EKG IMPRESSION: NORMAL

## 2021-03-08 PROCEDURE — G0423 INTENS CARDIAC REHAB NO EXER: HCPCS | Mod: 59 | Performed by: INTERNAL MEDICINE

## 2021-03-08 PROCEDURE — G0422 INTENS CARDIAC REHAB W/EXERC: HCPCS | Performed by: INTERNAL MEDICINE

## 2021-03-09 NOTE — PROGRESS NOTES
Sharad Millan attended: Exercise Workshop from 4 to 5:15 pm.     The topic was: Improved Performance.    Patient received handouts regarding the specific exercise information    Sharad exercised from 5:15 to 6pm.

## 2021-03-10 ENCOUNTER — NON-PROVIDER VISIT (OUTPATIENT)
Dept: CARDIOLOGY | Facility: MEDICAL CENTER | Age: 66
End: 2021-03-10
Payer: MEDICARE

## 2021-03-10 DIAGNOSIS — Z95.1 S/P CABG X 1: ICD-10-CM

## 2021-03-10 DIAGNOSIS — Z95.2 HX OF AORTIC VALVE REPLACEMENT: ICD-10-CM

## 2021-03-10 LAB — EKG IMPRESSION: NORMAL

## 2021-03-10 PROCEDURE — G0422 INTENS CARDIAC REHAB W/EXERC: HCPCS | Performed by: INTERNAL MEDICINE

## 2021-03-10 PROCEDURE — G0423 INTENS CARDIAC REHAB NO EXER: HCPCS | Mod: 59 | Performed by: INTERNAL MEDICINE

## 2021-03-11 NOTE — PROGRESS NOTES
Individualized Treatment Plan   Cardiac Rehab Individual Treatment Plan  Initial/Reassessment/Discharge Assessment/ ReAssessment/ Discharge: Discharge 03/10/21 Session #     30   MRN: [id number] Allergies: Bydureon [exenatide], Cycloset [bromocriptine mesylate], and Morphine   Patient Name: Sharad Millan : 1955 Risk Stratification: High    Diagnoses:   1. S/P CABG x 1    2. Hx of aortic valve replacement     Age: 65 y.o. Physician: Don Khoury D.O.    Date of Event: 20 Specialist: Scott   Risk Factors:  Hypertension, Hyperlipidemia, Diabetes, Family History, Obesity, Stress, Sedentary Lifestyle, Age, Male > 45   Exercise Nutrition Other Core Components/ Risk Factors Psychosocial   Stages of change Stages of change Stages of change Stages of change   Preparation Preparation Preparation Preparation   Fitness Test Lipids Learning Barriers Psychosocial Plan   DASI: (n/a) Available: No new Learning Barriers: Vision, Literacy Psychosocial Plan: Yes   DIST: 1350 Date: 20 Family Support Goals   Max HR: 103 Total: 111 mg/dL Yes Assess presence or absence of depression using a valid screening: Yes   Max BP: Peak Ex BP: 172/63 Tri mg/dL Lives: Spouse Use Stress Management: Yes   RPE: 13 HDL: 46 mg/dL Other Risk Factors Plan Adverse Events: No   SPO2: 96 %       MET: 2.93 LDL: 43 mg/dL Other Risk Factors/Plan: Yes Unexpected Events: No   EF= 50(2020) Diabetes Tobacco Use Intervention   Ambulatory Status Diabetes: Yes Social History     Tobacco Use   Smoking Status Never Smoker   Smokeless Tobacco Never Used    Behavioral Health Consult: Yes   Fall Risk Assessed: Yes HbA1C: 10.3 % Date: 20 Goals Physician Referral: No   Exercise Ambulatory Status Assist Devices: None Monitors BS at Home: Yes Educate / Review and have understanding of cardiac disease prevention: Identifies Stressors: Yes   Exercise Plan Frequency: twice daily, PRN Random BS: 235(Accucheck 2020)  Drug  "Intervention: No     Weight Management  Outcome Survey Tools   Goals Weight: 105 kg (231 lb 6.4 oz) Educate / Review and have understanding of cardiac disease prevention: Yes FP QOL Overall Score: (assessed pre and post program)   Home Exercise: Yes Height: 177.8 cm (5' 10\") Medication Compliance: Yes PHQ-9: (assessed pre and post program)   Mode: Walk, Other(eliptical) BMI (Calculated): 33.2 Complete Tobacco Cessation: Education   Duration: 30+ minutes Goal weight: 210 Complete Tobacco Cessation: N/A MBSR Lectures/Videos: Yes   Frequency: 5-7 days active Waist: 44.5 inch Intervention Psychosocial Education: Coping Techniques, Positive Support System, S/S Depression, MBSR Lectures   Intervention Social History     Substance and Sexual Activity   Alcohol Use No   • Alcohol/week: 0.0 oz    Smoking Cessation Referral: N/A     Intervention: Yes Nutrition Plan Ind. Education / Counseling: Yes    Exercise Prescription Nutrition Plan: Yes Tobacco Adjunct: No    Mode: Treadmill, NuStep Nutrition Related Target Goals Healthy Heart Education: Cooking School Attended, Dietician One-on-One Meeting Attended, Medications Reviewed, Patient Education Binder Provided, Risk Factors Discussed, Videos Viewed per [employer], Workshops Attended    Frequency: 2 days/week LDL-C <100 if trig. > 200: No Weekly Lectures/ Videos/ Interactive Cooking School: Yes    Duration: 30-45min LDL-C < 70 for high risk patient:  LDL-C<70 for high risk patients: Yes Education    Intensity: 11-13 RPE  Healthy Heart Education: Cooking School Attended, Dietician One-on-One Meeting Attended, Medications Reviewed, Patient Education Binder Provided, Risk Factors Discussed, Videos Viewed per Pritikin, Workshops Attended    METS: 1.0-3.0 Non HDL-C Should be < 130:  Non HDL-C Should be < 130: Yes Hypertension Management   (Active Problem)    Progression: ^ increments of 1-3 to THR/RPE as tolerated      THR: THR: 20-30 BPM ^Resting HR HbA1C < 7%: Yes Resting BP: " 130/52    Angina with Exercise?   Angina with Exercise: No   BMI < 25: No Hypertension Education      Dietary Goal: Rate Your Plate >=58     Resistance Training? Resistance Training: Yes Rate your Plate: (NA) Lectures/ Videos/ Cooking School: Yes    Education Intervention Hypertension Goals    Yes Dietician Consult/Class: Yes Medication Adherence : Yes    Equipment Orientation, Exercise Safety, S/S to Report, RPE Scale, Warm Up / Cool Down, Physically Active Nurse/Patient Discussion: Yes Home Self Monitoring : Yes    Exercise “Target Goals” Diabetes Ed Referral: No(Followed by Dr. Schmitz, [facility])     Start Individual Exercise Rx Yes Discuss Maintenance /Wt Loss: Yes       Attend Cooking School: Yes     BP < 140/90 or < 130/80 if DM or CKD Yes Education       Nutrition Education: S&S Hypo/Hyper glycemia, Relate Diabetes to CAD, Healthy Plant Based Eating, Sodium Reduction Cooking/ Lectures/ Cooking School/  RD 1:1, Fluid Restriction Prescribed     Aerobic activity 30 + min / day 5 days / wk Yes        Exercise    Current : Working out 5 days a week for 30 minutes of aerobics and lifting weight 2 to 3 days a week.  Goal: Sharad is still trying to find a time for himself to workout regularly with his busy schedule.  Progress: Sharad is working out 5 days a week.     Nutrition     Current: Is eating breakfast more regularly having fruit and nuts in the morning. He still  eats out for lunch most days but is choosing much more healthy options.   Goal: Plan his meals in advance.  Progress: Still making changes to diet..      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  Goal: Sharad is starting to consider stepping back from his businesses to reduce some of his stress.  Progress: Sharad has realized that stress is a big health factor in his life.     Other     Notes: Sharad refused to do the exit sureys.

## 2021-03-11 NOTE — PROGRESS NOTES
Video watched: 08 Dining Out Part 1. Length: 32.5 minutes with discussion following from 5:15-6pm.     Sharad exercised from 4-5pm.

## 2021-03-30 ENCOUNTER — APPOINTMENT (OUTPATIENT)
Dept: RADIOLOGY | Facility: MEDICAL CENTER | Age: 66
End: 2021-03-30
Attending: EMERGENCY MEDICINE
Payer: OTHER MISCELLANEOUS

## 2021-03-30 ENCOUNTER — HOSPITAL ENCOUNTER (EMERGENCY)
Facility: MEDICAL CENTER | Age: 66
End: 2021-03-30
Attending: EMERGENCY MEDICINE
Payer: OTHER MISCELLANEOUS

## 2021-03-30 VITALS
SYSTOLIC BLOOD PRESSURE: 153 MMHG | WEIGHT: 220 LBS | RESPIRATION RATE: 12 BRPM | OXYGEN SATURATION: 98 % | DIASTOLIC BLOOD PRESSURE: 67 MMHG | TEMPERATURE: 97.9 F | HEIGHT: 70 IN | BODY MASS INDEX: 31.5 KG/M2 | HEART RATE: 79 BPM

## 2021-03-30 DIAGNOSIS — S42.002A CLOSED NONDISPLACED FRACTURE OF LEFT CLAVICLE, UNSPECIFIED PART OF CLAVICLE, INITIAL ENCOUNTER: ICD-10-CM
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
## 2021-03-30 LAB
ABO GROUP BLD: NORMAL
ALBUMIN SERPL BCP-MCNC: 4.3 G/DL (ref 3.2–4.9)
ALBUMIN/GLOB SERPL: 1.4 G/DL
ALP SERPL-CCNC: 69 U/L (ref 30–99)
ALT SERPL-CCNC: 27 U/L (ref 2–50)
ANION GAP SERPL CALC-SCNC: 16 MMOL/L (ref 7–16)
APTT PPP: 24.2 SEC (ref 24.7–36)
AST SERPL-CCNC: 15 U/L (ref 12–45)
BILIRUB SERPL-MCNC: 0.2 MG/DL (ref 0.1–1.5)
BLD GP AB SCN SERPL QL: NORMAL
BUN SERPL-MCNC: 29 MG/DL (ref 8–22)
CALCIUM SERPL-MCNC: 9.3 MG/DL (ref 8.5–10.5)
CHLORIDE SERPL-SCNC: 98 MMOL/L (ref 96–112)
CO2 SERPL-SCNC: 22 MMOL/L (ref 20–33)
CREAT SERPL-MCNC: 1.59 MG/DL (ref 0.5–1.4)
ERYTHROCYTE [DISTWIDTH] IN BLOOD BY AUTOMATED COUNT: 43.3 FL (ref 35.9–50)
ETHANOL BLD-MCNC: <10.1 MG/DL (ref 0–10)
GLOBULIN SER CALC-MCNC: 3.1 G/DL (ref 1.9–3.5)
GLUCOSE SERPL-MCNC: 234 MG/DL (ref 65–99)
HCT VFR BLD AUTO: 42.7 % (ref 42–52)
HGB BLD-MCNC: 12.7 G/DL (ref 14–18)
INR PPP: 0.98 (ref 0.87–1.13)
MCH RBC QN AUTO: 22 PG (ref 27–33)
MCHC RBC AUTO-ENTMCNC: 29.7 G/DL (ref 33.7–35.3)
MCV RBC AUTO: 74.1 FL (ref 81.4–97.8)
PLATELET # BLD AUTO: 166 K/UL (ref 164–446)
PMV BLD AUTO: 10.5 FL (ref 9–12.9)
POTASSIUM SERPL-SCNC: 3.8 MMOL/L (ref 3.6–5.5)
PROT SERPL-MCNC: 7.4 G/DL (ref 6–8.2)
PROTHROMBIN TIME: 13.3 SEC (ref 12–14.6)
RBC # BLD AUTO: 5.76 M/UL (ref 4.7–6.1)
RH BLD: NORMAL
SODIUM SERPL-SCNC: 136 MMOL/L (ref 135–145)
WBC # BLD AUTO: 9.6 K/UL (ref 4.8–10.8)

## 2021-03-30 PROCEDURE — 96374 THER/PROPH/DIAG INJ IV PUSH: CPT | Mod: XU

## 2021-03-30 PROCEDURE — 72128 CT CHEST SPINE W/O DYE: CPT

## 2021-03-30 PROCEDURE — 86850 RBC ANTIBODY SCREEN: CPT

## 2021-03-30 PROCEDURE — 99285 EMERGENCY DEPT VISIT HI MDM: CPT

## 2021-03-30 PROCEDURE — 72131 CT LUMBAR SPINE W/O DYE: CPT

## 2021-03-30 PROCEDURE — 86900 BLOOD TYPING SEROLOGIC ABO: CPT

## 2021-03-30 PROCEDURE — 700111 HCHG RX REV CODE 636 W/ 250 OVERRIDE (IP): Performed by: EMERGENCY MEDICINE

## 2021-03-30 PROCEDURE — 73030 X-RAY EXAM OF SHOULDER: CPT | Mod: LT

## 2021-03-30 PROCEDURE — 85730 THROMBOPLASTIN TIME PARTIAL: CPT

## 2021-03-30 PROCEDURE — 70450 CT HEAD/BRAIN W/O DYE: CPT | Mod: MH

## 2021-03-30 PROCEDURE — 96375 TX/PRO/DX INJ NEW DRUG ADDON: CPT | Mod: XU

## 2021-03-30 PROCEDURE — 700117 HCHG RX CONTRAST REV CODE 255: Performed by: EMERGENCY MEDICINE

## 2021-03-30 PROCEDURE — 80053 COMPREHEN METABOLIC PANEL: CPT

## 2021-03-30 PROCEDURE — 71045 X-RAY EXAM CHEST 1 VIEW: CPT

## 2021-03-30 PROCEDURE — 85027 COMPLETE CBC AUTOMATED: CPT

## 2021-03-30 PROCEDURE — 305948 HCHG GREEN TRAUMA ACT PRE-NOTIFY NO CC

## 2021-03-30 PROCEDURE — 71260 CT THORAX DX C+: CPT

## 2021-03-30 PROCEDURE — 72125 CT NECK SPINE W/O DYE: CPT

## 2021-03-30 PROCEDURE — 86901 BLOOD TYPING SEROLOGIC RH(D): CPT

## 2021-03-30 PROCEDURE — 82077 ASSAY SPEC XCP UR&BREATH IA: CPT

## 2021-03-30 PROCEDURE — 85610 PROTHROMBIN TIME: CPT

## 2021-03-30 RX ORDER — CYCLOBENZAPRINE HCL 10 MG
10 TABLET ORAL 3 TIMES DAILY PRN
Qty: 15 TABLET | Refills: 0 | Status: SHIPPED | OUTPATIENT
Start: 2021-03-30 | End: 2021-06-16

## 2021-03-30 RX ORDER — ONDANSETRON 2 MG/ML
4 INJECTION INTRAMUSCULAR; INTRAVENOUS ONCE
Status: COMPLETED | OUTPATIENT
Start: 2021-03-30 | End: 2021-03-30

## 2021-03-30 RX ORDER — IBUPROFEN 600 MG/1
600 TABLET ORAL EVERY 6 HOURS PRN
Qty: 15 TABLET | Refills: 0 | Status: SHIPPED | OUTPATIENT
Start: 2021-03-30 | End: 2021-06-16

## 2021-03-30 RX ADMIN — IOHEXOL 100 ML: 350 INJECTION, SOLUTION INTRAVENOUS at 21:22

## 2021-03-30 RX ADMIN — FENTANYL CITRATE 50 MCG: 50 INJECTION, SOLUTION INTRAMUSCULAR; INTRAVENOUS at 22:29

## 2021-03-30 RX ADMIN — ONDANSETRON 4 MG: 2 INJECTION INTRAMUSCULAR; INTRAVENOUS at 22:29

## 2021-03-30 NOTE — ASSESSMENT & PLAN NOTE
Follows outpatient with Dr Schmitz, Endocrine in [place]  Monitor accuchecks and cover with SSI and also his glargine   Last A1c:10.3 in past 3 weeks   wears glasses/Partially impaired: cannot see medication labels or newsprint, but can see obstacles in path, and the surrounding layout; can count fingers at arm's length

## 2021-03-31 NOTE — ED NOTES
Pt discharged home with son. Sling in place. All belongings with pt. AVS reviewed and understood, all questions answered
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
## 2021-05-14 ENCOUNTER — HOSPITAL ENCOUNTER (OUTPATIENT)
Dept: LAB | Facility: MEDICAL CENTER | Age: 66
End: 2021-05-14
Attending: INTERNAL MEDICINE
Payer: MEDICARE

## 2021-05-14 LAB
EST. AVERAGE GLUCOSE BLD GHB EST-MCNC: 229 MG/DL
HBA1C MFR BLD: 9.6 % (ref 4–5.6)

## 2021-05-14 PROCEDURE — 83036 HEMOGLOBIN GLYCOSYLATED A1C: CPT | Mod: GA

## 2021-05-14 PROCEDURE — 80051 ELECTROLYTE PANEL: CPT

## 2021-05-14 PROCEDURE — 84520 ASSAY OF UREA NITROGEN: CPT

## 2021-05-14 PROCEDURE — 36415 COLL VENOUS BLD VENIPUNCTURE: CPT

## 2021-05-14 PROCEDURE — 82565 ASSAY OF CREATININE: CPT

## 2021-05-15 LAB
ANION GAP SERPL CALC-SCNC: 10 MMOL/L (ref 7–16)
BUN SERPL-MCNC: 20 MG/DL (ref 8–22)
CHLORIDE SERPL-SCNC: 102 MMOL/L (ref 96–112)
CO2 SERPL-SCNC: 25 MMOL/L (ref 20–33)
CREAT SERPL-MCNC: 1.2 MG/DL (ref 0.5–1.4)
POTASSIUM SERPL-SCNC: 4.5 MMOL/L (ref 3.6–5.5)
SODIUM SERPL-SCNC: 137 MMOL/L (ref 135–145)

## 2021-06-07 PROBLEM — S42.032D: Status: ACTIVE | Noted: 2021-06-07

## 2021-06-08 DIAGNOSIS — I47.10 SVT (SUPRAVENTRICULAR TACHYCARDIA) (HCC): ICD-10-CM

## 2021-06-08 RX ORDER — AMIODARONE HYDROCHLORIDE 200 MG/1
TABLET ORAL
Qty: 90 TABLET | Refills: 3 | Status: SHIPPED | OUTPATIENT
Start: 2021-06-08 | End: 2021-06-17

## 2021-06-11 DIAGNOSIS — I10 ESSENTIAL HYPERTENSION: ICD-10-CM

## 2021-06-16 ENCOUNTER — HOSPITAL ENCOUNTER (OUTPATIENT)
Dept: RADIOLOGY | Facility: MEDICAL CENTER | Age: 66
End: 2021-06-16
Attending: INTERNAL MEDICINE
Payer: MEDICARE

## 2021-06-16 ENCOUNTER — OFFICE VISIT (OUTPATIENT)
Dept: CARDIOLOGY | Facility: MEDICAL CENTER | Age: 66
End: 2021-06-16
Payer: MEDICARE

## 2021-06-16 VITALS
OXYGEN SATURATION: 92 % | WEIGHT: 229.2 LBS | SYSTOLIC BLOOD PRESSURE: 130 MMHG | HEIGHT: 69 IN | DIASTOLIC BLOOD PRESSURE: 70 MMHG | HEART RATE: 75 BPM | BODY MASS INDEX: 33.95 KG/M2 | RESPIRATION RATE: 16 BRPM

## 2021-06-16 DIAGNOSIS — R07.89 OTHER CHEST PAIN: ICD-10-CM

## 2021-06-16 DIAGNOSIS — Z95.2 S/P AVR (AORTIC VALVE REPLACEMENT): ICD-10-CM

## 2021-06-16 DIAGNOSIS — I25.10 CORONARY ARTERY DISEASE, OCCLUSIVE: ICD-10-CM

## 2021-06-16 DIAGNOSIS — I10 ESSENTIAL HYPERTENSION: ICD-10-CM

## 2021-06-16 DIAGNOSIS — E78.5 DYSLIPIDEMIA: Chronic | ICD-10-CM

## 2021-06-16 DIAGNOSIS — Z95.5 S/P DRUG ELUTING CORONARY STENT PLACEMENT: ICD-10-CM

## 2021-06-16 DIAGNOSIS — Z95.1 S/P CABG (CORONARY ARTERY BYPASS GRAFT): ICD-10-CM

## 2021-06-16 LAB — EKG IMPRESSION: NORMAL

## 2021-06-16 PROCEDURE — 71046 X-RAY EXAM CHEST 2 VIEWS: CPT

## 2021-06-16 PROCEDURE — 99214 OFFICE O/P EST MOD 30 MIN: CPT | Performed by: INTERNAL MEDICINE

## 2021-06-16 PROCEDURE — 93000 ELECTROCARDIOGRAM COMPLETE: CPT | Performed by: INTERNAL MEDICINE

## 2021-06-16 RX ORDER — CEPHALEXIN 500 MG/1
CAPSULE ORAL
COMMUNITY
Start: 2021-05-14 | End: 2021-06-16

## 2021-06-16 RX ORDER — GLIPIZIDE 10 MG/1
TABLET, FILM COATED, EXTENDED RELEASE ORAL
COMMUNITY
End: 2021-06-16

## 2021-06-16 RX ORDER — HYDROCODONE BITARTRATE AND ACETAMINOPHEN 5; 325 MG/1; MG/1
TABLET ORAL
COMMUNITY
Start: 2021-06-10 | End: 2021-06-16

## 2021-06-16 RX ORDER — SPIRONOLACTONE 25 MG/1
25 TABLET ORAL
COMMUNITY
Start: 2021-04-16 | End: 2021-06-16

## 2021-06-16 RX ORDER — CLOPIDOGREL BISULFATE 75 MG/1
75 TABLET ORAL DAILY
COMMUNITY
End: 2021-12-18

## 2021-06-16 ASSESSMENT — ENCOUNTER SYMPTOMS
DIZZINESS: 0
COUGH: 0
PALPITATIONS: 0
SHORTNESS OF BREATH: 0
LOSS OF CONSCIOUSNESS: 0
MYALGIAS: 0

## 2021-06-16 ASSESSMENT — FIBROSIS 4 INDEX: FIB4 SCORE: 1.15

## 2021-06-16 NOTE — PROGRESS NOTES
Chief Complaint   Patient presents with   • Aortic Stenosis     Dx: S/P AVR (aortic valve replacement)   • Dyslipidemia     Dx: Dyslipidemia   • Supraventricular Tachycardia (SVT)     Dx: SVT (supraventricular tachycardia) ([facility])       Subjective:   Sharad Millan is a 65 y.o. male who presents today for a followup evaluation of S/P AVR, CABG, CAD, coronary stent, palpitations, PSVT, hypertension, hyperlipidemia.    2/10/2021 the patient has developed some nonexertional chest pressure.  Saw his PCP Dr. Khoury who did an EKG but the patient forgot to bring a copy.  Takes Lasix as needed.  Still working.  Still feels that he has not fully recovered from his surgery but is back to work full-time.  No palpitations, PND or orthopnea.    Seeing Dr. Zackery Schmitz, endocrinologist in [facility] and Dr. Don Khoury.     Past Medical History:   Diagnosis Date   • Aortic stenosis 10/20/2011   • Arrhythmia    • Breath shortness    • Carotid bruit 12/9/2009   • Cataract     cataract extraction with IOL   • Chronic right shoulder pain    • Diabetes     oral medication   • Fatty liver 1/17/2011   • Heart attack (HCC) 12/2018   • History of cardiac catheterization 7/13/2009   • History of echocardiogram 10/20/2011   • HTN (hypertension) 10/12/2012   • Hypertension    • Memory loss 10/20/2011   • Murmur 7/7/2009   • Obesity 8/23/2011   • Palpitations 10/20/2011   • PSVT (paroxysmal supraventricular tachycardia) (HCC) 2/14/2012   • S/P coronary artery stent placement 1996    coronary stent implantation , [facility]   • S/P coronary artery stent placement 1998    coronary stent implantation; [facility]   • S/P coronary artery stent placement 2003    cardiac catheterization; patent stents; [place]   • Sciatica 8/23/2011   • Uncontrolled diabetes mellitus (HCC) 11/29/2010     Past Surgical History:   Procedure Laterality Date   • MULTIPLE CORONARY ARTERY BYPASS ENDO VEIN HARVEST N/A 8/31/2020    Procedure: CABG, WITH  ENDOSCOPIC VEIN PROCUREMENT X1;  Surgeon: Dorys Casas M.D.;  Location: SURGERY [facility];  Service: Cardiothoracic   • AORTIC VALVE REPLACEMENT N/A 8/31/2020    Procedure: REPLACEMENT, AORTIC VALVE;  Surgeon: Dorys Casas M.D.;  Location: SURGERY [facility];  Service: Cardiothoracic   • ROMERO N/A 8/31/2020    Procedure: ECHOCARDIOGRAM, TRANSESOPHAGEAL;  Surgeon: Dorys Casas M.D.;  Location: SURGERY [facility];  Service: Cardiothoracic   • PO BY LAPAROSCOPY N/A 10/25/2015    Procedure: PO BY LAPAROSCOPY-with grams ;  Surgeon: Ronnie Bowman M.D.;  Location: SURGERY [facility] ORS;  Service:    • CAROTID STENT ANGIOGRAPHY  1996   • CATARACT EXTRACTION WITH IOL Bilateral      Family History   Problem Relation Age of Onset   • Heart Disease Mother    • Heart Attack Mother    • Heart Disease Father      Social History     Socioeconomic History   • Marital status:      Spouse name: Not on file   • Number of children: Not on file   • Years of education: Not on file   • Highest education level: Not on file   Occupational History   • Not on file   Tobacco Use   • Smoking status: Never Smoker   • Smokeless tobacco: Never Used   Vaping Use   • Vaping Use: Never used   Substance and Sexual Activity   • Alcohol use: Never   • Drug use: Never   • Sexual activity: Yes     Partners: Female   Other Topics Concern   •  Service No   • Blood Transfusions No   • Caffeine Concern No   • Occupational Exposure No   • Hobby Hazards No   • Sleep Concern No   • Stress Concern No   • Weight Concern No   • Special Diet No   • Back Care Yes     Comment: BELT SUPPORT   • Exercise Yes   • Bike Helmet No   • Seat Belt Yes   • Self-Exams No   Social History Narrative    ** Merged History Encounter **          Social Determinants of Health     Financial Resource Strain:    • Difficulty of Paying Living Expenses:    Food Insecurity:    • Worried About Running Out of Food in the Last Year:    • Ran Out of Food in  the Last Year:    Transportation Needs:    • Lack of Transportation (Medical):    • Lack of Transportation (Non-Medical):    Physical Activity:    • Days of Exercise per Week:    • Minutes of Exercise per Session:    Stress:    • Feeling of Stress :    Social Connections:    • Frequency of Communication with Friends and Family:    • Frequency of Social Gatherings with Friends and Family:    • Attends Confucianist Services:    • Active Member of Clubs or Organizations:    • Attends Club or Organization Meetings:    • Marital Status:    Intimate Partner Violence:    • Fear of Current or Ex-Partner:    • Emotionally Abused:    • Physically Abused:    • Sexually Abused:      Allergies   Allergen Reactions   • Bydureon [Exenatide] Hives     hives   • Cycloset [Bromocriptine Mesylate] Rash     Rash     • Morphine Vomiting and Nausea   • Morphine      Dizziness       Outpatient Encounter Medications as of 6/16/2021   Medication Sig Dispense Refill   • amiodarone (CORDARONE) 200 MG Tab TAKE 1 TABLET BY MOUTH EVERY DAY 90 tablet 3   • rosuvastatin (CRESTOR) 20 MG Tab TAKE 1 TABLET BY MOUTH EVERY DAY 90 tablet 1   • Amoxicillin 500 MG Tab Take 4 Tabs by mouth one time as needed (Take 30-60 minutes prior to dental procedure) for up to 1 dose. 12 Tab 0   • metoprolol (LOPRESSOR) 25 MG Tab Take 1 Tab by mouth 2 Times a Day. 180 Tab 3   • potassium chloride SA (K-DUR) 10 MEQ Tab CR Take 10 mEq by mouth every day.     • HUMALOG KWIKPEN 100 UNIT/ML Solution Pen-injector injection PEN BREAKFAST   16 UNITS LUNCH   14 UNITS, DINNER   14 UNITS SUBCUTANEOUS 90 DAYS     • BD PEN NEEDLE AVANI U/F      • TOUJEO SOLOSTAR 300 UNIT/ML Solution Pen-injector 28 UNITS EVERY BEDTIME SUBCUTANEOUS FOR 90 DAYS     • Continuous Blood Gluc Sensor (FREESTYLE AZIZA 14 DAY SENSOR) [facility] USE TO CHECK BLOOD SUGAR FOR 28 DAYS     • furosemide (LASIX) 40 MG Tab Take 1 Tab by mouth every day. (Patient taking differently: Take 20 mg by mouth every day.) 30 Tab 11    • insulin lispro (HUMALOG) 100 UNIT/ML Inject 14 Units as instructed 3 times a day before meals.     • Insulin Degludec (TRESIBA) 100 UNIT/ML Solution Inject 30 Units as instructed every bedtime.     • aspirin EC (ECOTRIN) 81 MG Tablet Delayed Response Take 81 mg by mouth every evening.     • Empagliflozin (JARDIANCE) 25 MG Tab Take 25 mg by mouth every morning.     • losartan (COZAAR) 100 MG Tab Take 100 mg by mouth every morning.     • metformin (GLUCOPHAGE) 1000 MG tablet  (Patient not taking: Reported on 6/16/2021)     • [DISCONTINUED] cephALEXin (KEFLEX) 500 MG Cap  (Patient not taking: Reported on 6/16/2021)     • [DISCONTINUED] glipiZIDE SR (GLIPIZIDE XL) 10 MG TABLET SR 24 HR 1 tab po bid (Patient not taking: Reported on 6/16/2021)     • [DISCONTINUED] HYDROcodone-acetaminophen (NORCO) 5-325 MG Tab per tablet  (Patient not taking: Reported on 6/16/2021)     • [DISCONTINUED] spironolactone (ALDACTONE) 25 MG Tab Take 25 mg by mouth every day. (Patient not taking: Reported on 6/16/2021)     • [DISCONTINUED] ibuprofen (MOTRIN) 600 MG Tab Take 1 tablet by mouth every 6 hours as needed. (Patient not taking: Reported on 6/16/2021) 15 tablet 0   • [DISCONTINUED] cyclobenzaprine (FLEXERIL) 10 mg Tab Take 1 tablet by mouth 3 times a day as needed. (Patient not taking: Reported on 6/16/2021) 15 tablet 0   • [DISCONTINUED] potassium chloride SA (KDUR) 20 MEQ Tab CR TAKE 1 TABLET BY MOUTH EVERY DAY (Patient not taking: Reported on 2/10/2021) 90 Tab 0   • [DISCONTINUED] clopidogrel (PLAVIX) 75 MG Tab TAKE 1 TABLET BY MOUTH EVERY DAY (Patient not taking: Reported on 2/10/2021) 90 Tab 3   • [DISCONTINUED] sennosides (SENOKOT) 8.6 MG Tab Take 8.6 mg by mouth. 2 tablets twice a day (Patient not taking: Reported on 6/16/2021)     • [DISCONTINUED] magnesium hydroxide (MILK OF MAGNESIA) 400 MG/5ML Suspension Take 30 mL by mouth every day. (Patient not taking: Reported on 6/16/2021)     • [DISCONTINUED] polyethylene  "glycol/lytes (MIRALAX) 17 g Pack Take 17 g by mouth every day. (Patient not taking: Reported on 6/16/2021)     • [DISCONTINUED] acetaminophen (TYLENOL) 500 MG Tab Take 1-2 Tabs by mouth every 6 hours. (Patient not taking: Reported on 6/16/2021) 30 Tab 0   • [DISCONTINUED] tramadol (ULTRAM) 50 MG Tab Take 1 Tab by mouth every 8 hours as needed (Moderate Pain (NRS Pain Scale 4-6; CPOT Pain Scale 3-5) if opiates not ordered or tolerated). (Patient not taking: Reported on 6/16/2021) 1 Tab 0     No facility-administered encounter medications on file as of 6/16/2021.     Review of Systems   Respiratory: Negative for cough and shortness of breath.    Cardiovascular: Positive for chest pain. Negative for palpitations.   Musculoskeletal: Negative for myalgias.   Neurological: Negative for dizziness and loss of consciousness.        Objective:   /70 (BP Location: Left arm, Patient Position: Sitting, BP Cuff Size: Adult)   Pulse 75   Resp 16   Ht 1.753 m (5' 9\")   Wt 104 kg (229 lb 3.2 oz)   SpO2 92%   BMI 33.85 kg/m²     Physical Exam   Constitutional: He is oriented to person, place, and time. He appears well-developed.   Eyes: Pupils are equal, round, and reactive to light. Conjunctivae are normal.   Neck: No JVD present.   Cardiovascular: Normal rate, regular rhythm and normal heart sounds.   Pulmonary/Chest: Effort normal and breath sounds normal. No accessory muscle usage. No respiratory distress. He has no wheezes. He has no rales.   Musculoskeletal:      Cervical back: Normal range of motion and neck supple.      Comments: Healed sternal scar.  No palpable evidence of dehiscence or discomfort.   Neurological: He is alert and oriented to person, place, and time.   Skin: Skin is warm and dry. No rash noted. Nails show no clubbing.   Psychiatric: His behavior is normal.     EKG 6/16/2021 sinus rhythm, rate sixty-eight.  First-degree AV block.  Nonspecific diffuse ST-T wave abnormalities.    Assessment:     1. " Other chest pain  EKG    DX-CHEST-2 VIEWS   2. Coronary artery disease, occlusive     3. S/P AVR (aortic valve replacement)     4. S/P CABG (coronary artery bypass graft)     5. S/P drug eluting coronary stent placement     6. Essential hypertension     7. Dyslipidemia         Medical Decision Making:  Today's Assessment / Status / Plan:   Assessment  1.  S/P AVR/CABG 8/31/2020  2.  PAF postop MI: Amiodarone therapy 9/2020.  3.  Coronary stent implantation: Left anterior descending artery.  1996. 1998.  2020  4.  NSTEMI 12/12/2018.   5.  Hyperlipidemia  6.  Hypertension.   7.  Diabetes mellitus.  8.  Obesity.   9.  SVT. 12/3/2013. 10/22/2015 12/12/2018.   10.  Tremor.  11.  Peripheral neuropathy.  12.?  Raynaud's phenomenon.  13.  Hand tremor.  Fourteen chest discomfort.    Recommendation and Discussion  1.  Unclear as to the cause of his nonexertional chest discomfort.  EKG shows no acute changes or new infarction pattern.  2.  Will DC amiodarone.  The patient monitors his blood pressure and pulse oximetry daily and will notify me for any heart rate or rhythm changes.  3.  Will get a CXR to evaluate his chest discomfort.  4.  Otherwise stable regarding his AVR, hypertension and dyslipidemia.  5.  RTC 6 months.

## 2021-06-16 NOTE — PROGRESS NOTES
Chief Complaint   Patient presents with   • Aortic Stenosis     Dx: S/P AVR (aortic valve replacement)   • Dyslipidemia     Dx: Dyslipidemia   • Supraventricular Tachycardia (SVT)     Dx: SVT (supraventricular tachycardia) (HCC)       Subjective:   Sharad Millan is a 65 y.o. male who presents today for a followup evaluation of S/P AVR, CABG, CAD, coronary stent, palpitations, PSVT, hypertension, hyperlipidemia.    2/10/2021 the patient has developed some nonexertional chest pressure.  Saw his PCP Dr. Khoury who did an EKG but patient forgot to bring a copy.  Takes Lasix as needed.  Still working.  Still feels that he has not fully recovered from his surgery.  Follows    Seeing Dr. Zackery Schmitz, endocrinologist in [facility] and Dr. Don Khoury.       Past Medical History:   Diagnosis Date   • Aortic stenosis 10/20/2011   • Arrhythmia    • Breath shortness    • Carotid bruit 12/9/2009   • Cataract     cataract extraction with IOL   • Chronic right shoulder pain    • Diabetes     oral medication   • Fatty liver 1/17/2011   • Heart attack (HCC) 12/2018   • History of cardiac catheterization 7/13/2009   • History of echocardiogram 10/20/2011   • HTN (hypertension) 10/12/2012   • Hypertension    • Memory loss 10/20/2011   • Murmur 7/7/2009   • Obesity 8/23/2011   • Palpitations 10/20/2011   • PSVT (paroxysmal supraventricular tachycardia) (HCC) 2/14/2012   • S/P coronary artery stent placement 1996    coronary stent implantation x3, [facility]   • S/P coronary artery stent placement 1998    coronary stent implantation; [facility]   • S/P coronary artery stent placement 2003    cardiac catheterization; patent stents; [place]   • Sciatica 8/23/2011   • Uncontrolled diabetes mellitus (HCC) 11/29/2010     Past Surgical History:   Procedure Laterality Date   • MULTIPLE CORONARY ARTERY BYPASS ENDO VEIN HARVEST N/A 8/31/2020    Procedure: CABG, WITH ENDOSCOPIC VEIN PROCUREMENT X1;  Surgeon: Dorys Casas M.D.;   Location: SURGERY [facility];  Service: Cardiothoracic   • AORTIC VALVE REPLACEMENT N/A 8/31/2020    Procedure: REPLACEMENT, AORTIC VALVE;  Surgeon: Dorys Casas M.D.;  Location: SURGERY [facility];  Service: Cardiothoracic   • ROMERO N/A 8/31/2020    Procedure: ECHOCARDIOGRAM, TRANSESOPHAGEAL;  Surgeon: Dorys Casas M.D.;  Location: SURGERY [facility];  Service: Cardiothoracic   • PO BY LAPAROSCOPY N/A 10/25/2015    Procedure: PO BY LAPAROSCOPY-with grams ;  Surgeon: Ronnie Bowman M.D.;  Location: SURGERY [facility];  Service:    • CAROTID STENT ANGIOGRAPHY  1996   • CATARACT EXTRACTION WITH IOL Bilateral      Family History   Problem Relation Age of Onset   • Heart Disease Mother    • Heart Attack Mother    • Heart Disease Father      Social History     Socioeconomic History   • Marital status:      Spouse name: Not on file   • Number of children: Not on file   • Years of education: Not on file   • Highest education level: Not on file   Occupational History   • Not on file   Tobacco Use   • Smoking status: Never Smoker   • Smokeless tobacco: Never Used   Vaping Use   • Vaping Use: Never used   Substance and Sexual Activity   • Alcohol use: Never   • Drug use: Never   • Sexual activity: Yes     Partners: Female   Other Topics Concern   •  Service No   • Blood Transfusions No   • Caffeine Concern No   • Occupational Exposure No   • Hobby Hazards No   • Sleep Concern No   • Stress Concern No   • Weight Concern No   • Special Diet No   • Back Care Yes     Comment: BELT SUPPORT   • Exercise Yes   • Bike Helmet No   • Seat Belt Yes   • Self-Exams No   Social History Narrative    ** Merged History Encounter **          Social Determinants of Health     Financial Resource Strain:    • Difficulty of Paying Living Expenses:    Food Insecurity:    • Worried About Running Out of Food in the Last Year:    • Ran Out of Food in the Last Year:    Transportation Needs:    • Lack of  Transportation (Medical):    • Lack of Transportation (Non-Medical):    Physical Activity:    • Days of Exercise per Week:    • Minutes of Exercise per Session:    Stress:    • Feeling of Stress :    Social Connections:    • Frequency of Communication with Friends and Family:    • Frequency of Social Gatherings with Friends and Family:    • Attends [employer] Services:    • Active Member of Clubs or Organizations:    • Attends Club or Organization Meetings:    • Marital Status:    Intimate Partner Violence:    • Fear of Current or Ex-Partner:    • Emotionally Abused:    • Physically Abused:    • Sexually Abused:      Allergies   Allergen Reactions   • Bydureon [Exenatide] Hives     hives   • Cycloset [Bromocriptine Mesylate] Rash     Rash     • Morphine Vomiting and Nausea   • Morphine      Dizziness       Outpatient Encounter Medications as of 6/16/2021   Medication Sig Dispense Refill   • amiodarone (CORDARONE) 200 MG Tab TAKE 1 TABLET BY MOUTH EVERY DAY 90 tablet 3   • rosuvastatin (CRESTOR) 20 MG Tab TAKE 1 TABLET BY MOUTH EVERY DAY 90 tablet 1   • Amoxicillin 500 MG Tab Take 4 Tabs by mouth one time as needed (Take 30-60 minutes prior to dental procedure) for up to 1 dose. 12 Tab 0   • metoprolol (LOPRESSOR) 25 MG Tab Take 1 Tab by mouth 2 Times a Day. 180 Tab 3   • potassium chloride SA (K-DUR) 10 MEQ Tab CR Take 10 mEq by mouth every day.     • HUMALOG KWIKPEN 100 UNIT/ML Solution Pen-injector injection PEN BREAKFAST   16 UNITS LUNCH   14 UNITS, DINNER   14 UNITS SUBCUTANEOUS 90 DAYS     • BD PEN NEEDLE AVANI U/F      • TOUJEO SOLOSTAR 300 UNIT/ML Solution Pen-injector 28 UNITS EVERY BEDTIME SUBCUTANEOUS FOR 90 DAYS     • Continuous Blood Gluc Sensor (FREESTYLE AZIZA 14 DAY SENSOR) [facility] USE TO CHECK BLOOD SUGAR FOR 28 DAYS     • furosemide (LASIX) 40 MG Tab Take 1 Tab by mouth every day. (Patient taking differently: Take 20 mg by mouth every day.) 30 Tab 11   • insulin lispro (HUMALOG) 100 UNIT/ML Inject 14  Units as instructed 3 times a day before meals.     • Insulin Degludec (TRESIBA) 100 UNIT/ML Solution Inject 30 Units as instructed every bedtime.     • aspirin EC (ECOTRIN) 81 MG Tablet Delayed Response Take 81 mg by mouth every evening.     • Empagliflozin (JARDIANCE) 25 MG Tab Take 25 mg by mouth every morning.     • losartan (COZAAR) 100 MG Tab Take 100 mg by mouth every morning.     • metformin (GLUCOPHAGE) 1000 MG tablet  (Patient not taking: Reported on 6/16/2021)     • [DISCONTINUED] cephALEXin (KEFLEX) 500 MG Cap  (Patient not taking: Reported on 6/16/2021)     • [DISCONTINUED] glipiZIDE SR (GLIPIZIDE XL) 10 MG TABLET SR 24 HR 1 tab po bid (Patient not taking: Reported on 6/16/2021)     • [DISCONTINUED] HYDROcodone-acetaminophen (NORCO) 5-325 MG Tab per tablet  (Patient not taking: Reported on 6/16/2021)     • [DISCONTINUED] spironolactone (ALDACTONE) 25 MG Tab Take 25 mg by mouth every day. (Patient not taking: Reported on 6/16/2021)     • [DISCONTINUED] ibuprofen (MOTRIN) 600 MG Tab Take 1 tablet by mouth every 6 hours as needed. (Patient not taking: Reported on 6/16/2021) 15 tablet 0   • [DISCONTINUED] cyclobenzaprine (FLEXERIL) 10 mg Tab Take 1 tablet by mouth 3 times a day as needed. (Patient not taking: Reported on 6/16/2021) 15 tablet 0   • [DISCONTINUED] potassium chloride SA (KDUR) 20 MEQ Tab CR TAKE 1 TABLET BY MOUTH EVERY DAY (Patient not taking: Reported on 2/10/2021) 90 Tab 0   • [DISCONTINUED] clopidogrel (PLAVIX) 75 MG Tab TAKE 1 TABLET BY MOUTH EVERY DAY (Patient not taking: Reported on 2/10/2021) 90 Tab 3   • [DISCONTINUED] sennosides (SENOKOT) 8.6 MG Tab Take 8.6 mg by mouth. 2 tablets twice a day (Patient not taking: Reported on 6/16/2021)     • [DISCONTINUED] magnesium hydroxide (MILK OF MAGNESIA) 400 MG/5ML Suspension Take 30 mL by mouth every day. (Patient not taking: Reported on 6/16/2021)     • [DISCONTINUED] polyethylene glycol/lytes (MIRALAX) 17 g Pack Take 17 g by mouth every day.  "(Patient not taking: Reported on 6/16/2021)     • [DISCONTINUED] acetaminophen (TYLENOL) 500 MG Tab Take 1-2 Tabs by mouth every 6 hours. (Patient not taking: Reported on 6/16/2021) 30 Tab 0   • [DISCONTINUED] tramadol (ULTRAM) 50 MG Tab Take 1 Tab by mouth every 8 hours as needed (Moderate Pain (NRS Pain Scale 4-6; CPOT Pain Scale 3-5) if opiates not ordered or tolerated). (Patient not taking: Reported on 6/16/2021) 1 Tab 0     No facility-administered encounter medications on file as of 6/16/2021.     Review of Systems   Respiratory: Negative for cough and shortness of breath.    Cardiovascular: Negative for chest pain and palpitations.   Musculoskeletal: Negative for myalgias.   Neurological: Negative for dizziness and loss of consciousness.        Objective:   /70 (BP Location: Left arm, Patient Position: Sitting, BP Cuff Size: Adult)   Pulse 75   Resp 16   Ht 1.753 m (5' 9\")   Wt 104 kg (229 lb 3.2 oz)   SpO2 92%   BMI 33.85 kg/m²     Physical Exam   Constitutional: He is oriented to person, place, and time. He appears well-developed.   Neck: No JVD present.   Cardiovascular: Normal rate and regular rhythm.   No murmur heard.  Pulses:       Carotid pulses are on the right side with bruit and on the left side with bruit.  Pulmonary/Chest: Effort normal and breath sounds normal. No respiratory distress. He has no wheezes. He has no rales.   Neurological: He is alert and oriented to person, place, and time.   Skin: Skin is warm and dry.   Psychiatric: His behavior is normal.     CAROTID ULTRASOUND 11/01/2017  Mild bilateral internal carotid artery stenosis less than 50%.    12/19/2017 MPI   No evidence of significant jeopardized viable myocardium or prior myocardial    infarction.   Transient ischemic dilatation calculated at 1.17 visually correlated.   Normal left ventricular size, ejection fraction, and wall motion.    12/12/2018 MPI  Normal perfusion.  EF 56%.    MPI 01/31/2020   * Small, equivocal, " fully reversible, mild severity defect limited to the    apical inferior wall. SDS of 1 indicating very minimal ischemia if any.    * Nondiagnostic due to resting ECG.   * Normal left ventricular size, ejection fraction, and wall motion.   ECG INTERPRETATION   Nondiagnostic due to resting ECG    CARDIAC CATHETERIZATION 01/08/2020  A.  Selective coronary angiography.  B.  iFR, proximal left anterior descending artery.  C.  Coronary stent implantation, proximal left anterior descending artery, 2.5x8 mm [place] drug-eluting stent.  D.  Ascending aortography.  E.  Distal abdominal aortogram and bilateral iliofemoral angiography.  F.  Right femoral artery approach.  G.  Perclose.  PREPROCEDURE DIAGNOSES:  1.  Aortic stenosis, severe, symptomatic.  2.  Previous left anterior descending artery stent, 1996.  3.  Diabetes mellitus.  POSTPROCEDURE DIAGNOSES:  1.  Left anterior descending artery 90% stenosis.  2.  iFR, left anterior descending artery, 0.39.  3.  Normal ascending aorta.  4.  Normal distal abdominal aorta and bilateral iliofemoral arteries.    CARDIAC SURGERY 08/31/2020  PROCEDURES:  Aortic valve replacement (23 mm Inspiris David pericardial valve), coronary artery bypass grafting x1 (left internal mammary artery to the left anterior descending artery), and intraoperative transesophageal echocardiography.    ECHOCARDIOGRAM 08/08/2012  EF 55-60%.  Moderate aortic stenosis. Peak aortic valve gradient 63 mmHg. Moderate left ventricular hypertrophy     ECHOCARDIOGRAM 07/25/2013  EF 70-75%.   Moderate aortic stenosis. Peak aortic valve gradient 59 mmHg. Moderate left ventricular hypertrophy     ECHOCARDIOGRAM 12/23/2015  Normal left ventricular systolic function.  Left ventricular ejection fraction is visually estimated to be 60%.  Grade I diastolic dysfunction.  Mild mitral regurgitation.  Moderate aortic stenosis.  Vmax 3.47 m/s. Transvalvular gradients are - Peak: 48 mmHg, Mean: 30   mmHg.  Mild pulmonic  insufficiency.     ECHOCARDIOGRAM 11/01/2017  Normal left ventricular chamber size.  Left ventricular ejection fraction is visually estimated to be 60%.  Grade I diastolic dysfunction.  Severe aortic stenosis.  Transvalvular gradients are - Peak: 72 mmHg, Mean: 50 mmHg.  Ascending aorta is borderline dilated with a diameter of 3.6 cm.  Compared to the report of the prior study done - the aortic stenosis is   now severe.    ECHOCARDIOGRAM 12/26/2018    Normal left ventricular systolic function.  Left ventricular ejection fraction is visually estimated to be 65-70%.  Moderate left ventricular hypertrophy.  Severe aortic stenosis.    ECHOCARDIOGRAM 12/30/2020  Normal left ventricular systolic function.  Left ventricular ejection fraction is visually estimated to be 70%.  Moderate concentric left ventricular hypertrophy.  Known bioprosthetic aortic valve that is functioning normally with   normal transvalvular gradients.  Normal right ventricular size and systolic function.  Unable to estimate pulmonary artery pressure due to an inadequate   tricuspid regurgitant jet.    Assessment:     1. Other chest pain  EKG       Medical Decision Making:  Today's Assessment / Status / Plan:     Assessment  1.  S/P AVR/CABG 8/31/2020  2.  PAF postop MI: Amiodarone therapy 9/2020.  3.  Coronary stent implantation: Left anterior descending artery.  1996. 1998.  2020  4.  NSTEMI 12/12/2018.   5.  Hyperlipidemia  6.  Hypertension.   7.  Diabetes mellitus.  8.  Obesity.   9.  SVT. 12/3/2013. 10/22/2015 12/12/2018.   10.  Tremor.  11.  Peripheral neuropathy.  12.?  Raynaud's phenomenon.  13.  Hand tremor.    Recommendation and Discussion  1.

## 2021-06-17 RX ORDER — LOSARTAN POTASSIUM 100 MG/1
TABLET ORAL
Qty: 90 TABLET | Refills: 3 | Status: SHIPPED | OUTPATIENT
Start: 2021-06-17 | End: 2022-02-22

## 2021-07-23 PROBLEM — S42.032G: Status: ACTIVE | Noted: 2021-06-07

## 2021-08-01 DIAGNOSIS — E78.5 DYSLIPIDEMIA: ICD-10-CM

## 2021-08-01 NOTE — LETTER
August 4, 2021        Sharad Jefferson [facility] [address] [id number]  [place] [address]        Dear Sharad:    We received a medication refill request, and it looks like you're due for an annual lab that coincides with the medication,in order to continue filling your medications safely. Please have lab drawn prior to your upcoming appointment 08/11/2021.     Let us know if you have any questions.    Thank you,  Crystal JIN           Electronically Signed

## 2021-08-02 ENCOUNTER — TELEPHONE (OUTPATIENT)
Dept: CARDIOLOGY | Facility: MEDICAL CENTER | Age: 66
End: 2021-08-02

## 2021-08-02 NOTE — TELEPHONE ENCOUNTER
Received call from Tresa  with PCP calling regarding patient worsening symptoms.     Spoke to PCP.  Dr. Khoury, stated he is with patient complaining of being really tired, witnessed dyspnea on exertion, slight murmur a hair louder this time than last time, since he had a valve replacement within the last year, asking that we have him seen and to notify SW.  Advised I would contact him to get him scheduled within the next week and notify SW as well.     Will call to set up appt in a little while, giving patient time to get home from PCP office.

## 2021-08-02 NOTE — TELEPHONE ENCOUNTER
Called and spoke to the patient, was able to schedule him with DB on 08/11/21, LUIS schedule did not work with his. He is out of town next couple days.  Answered all questions and concerns, appreciative of call.

## 2021-08-04 RX ORDER — ROSUVASTATIN CALCIUM 20 MG/1
TABLET, COATED ORAL
Qty: 90 TABLET | Refills: 3 | Status: SHIPPED | OUTPATIENT
Start: 2021-08-04

## 2021-08-04 NOTE — TELEPHONE ENCOUNTER
Pt due for a Lipid, Has an upcoming apt, Scheduled. Asking pt to get prior to apt. Letter and lab sent to pt and mychart updated

## 2021-08-10 ENCOUNTER — HOSPITAL ENCOUNTER (OUTPATIENT)
Dept: LAB | Facility: MEDICAL CENTER | Age: 66
End: 2021-08-10
Attending: INTERNAL MEDICINE
Payer: MEDICARE

## 2021-08-10 LAB
APPEARANCE UR: CLEAR
BASOPHILS # BLD AUTO: 0.5 % (ref 0–1.8)
BASOPHILS # BLD: 0.04 K/UL (ref 0–0.12)
BILIRUB UR QL STRIP.AUTO: NEGATIVE
COLOR UR: YELLOW
EOSINOPHIL # BLD AUTO: 0.08 K/UL (ref 0–0.51)
EOSINOPHIL NFR BLD: 1.1 % (ref 0–6.9)
ERYTHROCYTE [DISTWIDTH] IN BLOOD BY AUTOMATED COUNT: 43.2 FL (ref 35.9–50)
EST. AVERAGE GLUCOSE BLD GHB EST-MCNC: 217 MG/DL
GLUCOSE UR STRIP.AUTO-MCNC: >=1000 MG/DL
HBA1C MFR BLD: 9.2 % (ref 4–5.6)
HCT VFR BLD AUTO: 42.5 % (ref 42–52)
HGB BLD-MCNC: 13.1 G/DL (ref 14–18)
IMM GRANULOCYTES # BLD AUTO: 0.05 K/UL (ref 0–0.11)
IMM GRANULOCYTES NFR BLD AUTO: 0.7 % (ref 0–0.9)
KETONES UR STRIP.AUTO-MCNC: NEGATIVE MG/DL
LEUKOCYTE ESTERASE UR QL STRIP.AUTO: NEGATIVE
LYMPHOCYTES # BLD AUTO: 1.44 K/UL (ref 1–4.8)
LYMPHOCYTES NFR BLD: 19.6 % (ref 22–41)
MCH RBC QN AUTO: 23.6 PG (ref 27–33)
MCHC RBC AUTO-ENTMCNC: 30.8 G/DL (ref 33.7–35.3)
MCV RBC AUTO: 76.4 FL (ref 81.4–97.8)
MICRO URNS: ABNORMAL
MONOCYTES # BLD AUTO: 0.71 K/UL (ref 0–0.85)
MONOCYTES NFR BLD AUTO: 9.7 % (ref 0–13.4)
NEUTROPHILS # BLD AUTO: 5.03 K/UL (ref 1.82–7.42)
NEUTROPHILS NFR BLD: 68.4 % (ref 44–72)
NITRITE UR QL STRIP.AUTO: NEGATIVE
NRBC # BLD AUTO: 0 K/UL
NRBC BLD-RTO: 0 /100 WBC
PH UR STRIP.AUTO: 6 [PH] (ref 5–8)
PLATELET # BLD AUTO: 146 K/UL (ref 164–446)
PMV BLD AUTO: 10.7 FL (ref 9–12.9)
PROT UR QL STRIP: NEGATIVE MG/DL
RBC # BLD AUTO: 5.56 M/UL (ref 4.7–6.1)
RBC UR QL AUTO: NEGATIVE
SP GR UR STRIP.AUTO: 1.04
TSH SERPL DL<=0.005 MIU/L-ACNC: 4.97 UIU/ML (ref 0.38–5.33)
UROBILINOGEN UR STRIP.AUTO-MCNC: 0.2 MG/DL
WBC # BLD AUTO: 7.4 K/UL (ref 4.8–10.8)

## 2021-08-10 PROCEDURE — 81003 URINALYSIS AUTO W/O SCOPE: CPT

## 2021-08-10 PROCEDURE — 83036 HEMOGLOBIN GLYCOSYLATED A1C: CPT | Mod: GA

## 2021-08-10 PROCEDURE — 85025 COMPLETE CBC W/AUTO DIFF WBC: CPT

## 2021-08-10 PROCEDURE — 84443 ASSAY THYROID STIM HORMONE: CPT

## 2021-08-11 ENCOUNTER — OFFICE VISIT (OUTPATIENT)
Dept: CARDIOLOGY | Facility: MEDICAL CENTER | Age: 66
End: 2021-08-11
Payer: MEDICARE

## 2021-08-11 VITALS
HEIGHT: 69 IN | BODY MASS INDEX: 34.8 KG/M2 | OXYGEN SATURATION: 99 % | HEART RATE: 66 BPM | DIASTOLIC BLOOD PRESSURE: 72 MMHG | WEIGHT: 235 LBS | SYSTOLIC BLOOD PRESSURE: 164 MMHG

## 2021-08-11 DIAGNOSIS — E78.5 DYSLIPIDEMIA: Chronic | ICD-10-CM

## 2021-08-11 DIAGNOSIS — Z95.1 S/P CABG (CORONARY ARTERY BYPASS GRAFT): ICD-10-CM

## 2021-08-11 DIAGNOSIS — I10 ESSENTIAL HYPERTENSION: ICD-10-CM

## 2021-08-11 DIAGNOSIS — I47.10 SVT (SUPRAVENTRICULAR TACHYCARDIA) (HCC): ICD-10-CM

## 2021-08-11 DIAGNOSIS — Z95.5 S/P DRUG ELUTING CORONARY STENT PLACEMENT: ICD-10-CM

## 2021-08-11 DIAGNOSIS — Z95.2 S/P AVR (AORTIC VALVE REPLACEMENT): ICD-10-CM

## 2021-08-11 DIAGNOSIS — Z98.890 HISTORY OF CARDIAC CATHETERIZATION: Chronic | ICD-10-CM

## 2021-08-11 DIAGNOSIS — I25.10 CORONARY ARTERY DISEASE, OCCLUSIVE: ICD-10-CM

## 2021-08-11 PROCEDURE — 99214 OFFICE O/P EST MOD 30 MIN: CPT | Performed by: NURSE PRACTITIONER

## 2021-08-11 RX ORDER — HYDROCODONE BITARTRATE AND ACETAMINOPHEN 5; 325 MG/1; MG/1
TABLET ORAL
COMMUNITY
Start: 2021-07-28 | End: 2021-12-18

## 2021-08-11 RX ORDER — AMOXICILLIN 500 MG/1
CAPSULE ORAL
COMMUNITY
Start: 2021-06-10 | End: 2021-12-18

## 2021-08-11 RX ORDER — FUROSEMIDE 40 MG/1
40 TABLET ORAL PRN
COMMUNITY
End: 2021-08-11

## 2021-08-11 ASSESSMENT — ENCOUNTER SYMPTOMS
CHILLS: 0
CLAUDICATION: 0
FEVER: 0
SHORTNESS OF BREATH: 1
WHEEZING: 0
HEMOPTYSIS: 0
WEAKNESS: 1
VOMITING: 0
PND: 0
DIZZINESS: 0
ORTHOPNEA: 0
COUGH: 0
HEADACHES: 0
NAUSEA: 0
PALPITATIONS: 0

## 2021-08-11 ASSESSMENT — FIBROSIS 4 INDEX: FIB4 SCORE: 1.3

## 2021-08-11 NOTE — PROGRESS NOTES
Chief Complaint   Patient presents with   • Coronary Artery Disease     follow up PCP heard heart murmur       Subjective:   Sharad Millan is a 65 y.o. male who presents today for a followup evaluation of S/P AVR, CABG, CAD, coronary stent, palpitations, PSVT, hypertension, hyperlipidemia.  Patient was last seen 6/16/2021 by Dr. Chavez.    Since 6/16/2021, the patient was taken off of amiodarone (PSVT) and an x-ray was obtained showing no acute cardiopulmonary process.  He has not noticed any palpitations.  He continues to be quite fatigued, has exertional shortness of breath, and weakness.  This is how he felt prior to having his AVR and CABG.  Patient was referred back to cardiology due to a louder murmur.     2/10/2021 the patient has developed some nonexertional chest pressure.  Saw his PCP Dr. Khoury who did an EKG but the patient forgot to bring a copy.  Takes Lasix as needed.  Still working.  Still feels that he has not fully recovered from his surgery but is back to work full-time.  No palpitations, PND or orthopnea.     Seeing Dr. Zackery Schmitz, endocrinologist in [facility] and Dr. Don Khoury.     Past Medical History:   Diagnosis Date   • Aortic stenosis 10/20/2011   • Arrhythmia    • Breath shortness    • Carotid bruit 12/9/2009   • Cataract     cataract extraction with IOL   • Chronic right shoulder pain    • Diabetes     oral medication   • Fatty liver 1/17/2011   • Heart attack (HCC) 12/2018   • History of cardiac catheterization 7/13/2009   • History of echocardiogram 10/20/2011   • HTN (hypertension) 10/12/2012   • Hypertension    • Memory loss 10/20/2011   • Murmur 7/7/2009   • Obesity 8/23/2011   • Palpitations 10/20/2011   • PSVT (paroxysmal supraventricular tachycardia) (HCC) 2/14/2012   • S/P coronary artery stent placement 1996    coronary stent implantation , [facility]   • S/P coronary artery stent placement 1998    coronary stent implantation; [facility]   • S/P coronary  artery stent placement 2003    cardiac catheterization; patent stents; [place]   • Sciatica 8/23/2011   • Uncontrolled diabetes mellitus (HCC) 11/29/2010     Past Surgical History:   Procedure Laterality Date   • MULTIPLE CORONARY ARTERY BYPASS ENDO VEIN HARVEST N/A 8/31/2020    Procedure: CABG, WITH ENDOSCOPIC VEIN PROCUREMENT X1;  Surgeon: Dorys Casas M.D.;  Location: SURGERY [facility];  Service: Cardiothoracic   • AORTIC VALVE REPLACEMENT N/A 8/31/2020    Procedure: REPLACEMENT, AORTIC VALVE;  Surgeon: Dorys Casas M.D.;  Location: SURGERY [facility];  Service: Cardiothoracic   • ROMERO N/A 8/31/2020    Procedure: ECHOCARDIOGRAM, TRANSESOPHAGEAL;  Surgeon: Dorys Casas M.D.;  Location: SURGERY [facility];  Service: Cardiothoracic   • PO BY LAPAROSCOPY N/A 10/25/2015    Procedure: PO BY LAPAROSCOPY-with grams ;  Surgeon: Ronnie Bowman M.D.;  Location: SURGERY [facility];  Service:    • CAROTID STENT ANGIOGRAPHY  1996   • CATARACT EXTRACTION WITH IOL Bilateral      Family History   Problem Relation Age of Onset   • Heart Disease Mother    • Heart Attack Mother    • Heart Disease Father      Social History     Socioeconomic History   • Marital status:      Spouse name: Not on file   • Number of children: Not on file   • Years of education: Not on file   • Highest education level: Not on file   Occupational History   • Not on file   Tobacco Use   • Smoking status: Never Smoker   • Smokeless tobacco: Never Used   Vaping Use   • Vaping Use: Never used   Substance and Sexual Activity   • Alcohol use: Never   • Drug use: Never   • Sexual activity: Yes     Partners: Female   Other Topics Concern   •  Service No   • Blood Transfusions No   • Caffeine Concern No   • Occupational Exposure No   • Hobby Hazards No   • Sleep Concern No   • Stress Concern No   • Weight Concern No   • Special Diet No   • Back Care Yes     Comment: BELT SUPPORT   • Exercise Yes   • Bike Helmet No   •  Seat Belt Yes   • Self-Exams No   Social History Narrative    ** Merged History Encounter **          Social Determinants of Health     Financial Resource Strain:    • Difficulty of Paying Living Expenses:    Food Insecurity:    • Worried About Running Out of Food in the Last Year:    • Ran Out of Food in the Last Year:    Transportation Needs:    • Lack of Transportation (Medical):    • Lack of Transportation (Non-Medical):    Physical Activity:    • Days of Exercise per Week:    • Minutes of Exercise per Session:    Stress:    • Feeling of Stress :    Social Connections:    • Frequency of Communication with Friends and Family:    • Frequency of Social Gatherings with Friends and Family:    • Attends Hindu Services:    • Active Member of Clubs or Organizations:    • Attends Club or Organization Meetings:    • Marital Status:    Intimate Partner Violence:    • Fear of Current or Ex-Partner:    • Emotionally Abused:    • Physically Abused:    • Sexually Abused:      Allergies   Allergen Reactions   • Bydureon [Exenatide] Hives     hives   • Cycloset [Bromocriptine Mesylate] Rash     Rash     • Morphine Vomiting and Nausea   • Morphine      Dizziness       Outpatient Encounter Medications as of 8/11/2021   Medication Sig Dispense Refill   • furosemide (LASIX) 40 MG Tab Take 40 mg by mouth as needed. Prn leg swelling or shortness of breath     • rosuvastatin (CRESTOR) 20 MG Tab TAKE 1 TABLET BY MOUTH EVERY DAY 90 tablet 3   • losartan (COZAAR) 100 MG Tab TAKE 1 TABLET BY MOUTH EVERY DAY 90 tablet 3   • clopidogrel (PLAVIX) 75 MG Tab Take 75 mg by mouth every day.     • Amoxicillin 500 MG Tab Take 4 Tabs by mouth one time as needed (Take 30-60 minutes prior to dental procedure) for up to 1 dose. 12 Tab 0   • metoprolol (LOPRESSOR) 25 MG Tab Take 1 Tab by mouth 2 Times a Day. 180 Tab 3   • potassium chloride SA (K-DUR) 10 MEQ Tab CR Take 10 mEq by mouth as needed (prn when taking lasix).     • TOUJEO SOLOSTAR 300  "UNIT/ML Solution Pen-injector 30 Units.     • insulin lispro (HUMALOG) 100 UNIT/ML Inject 16 Units under the skin 3 times a day before meals.     • aspirin EC (ECOTRIN) 81 MG Tablet Delayed Response Take 81 mg by mouth every evening.     • Empagliflozin (JARDIANCE) 25 MG Tab Take 25 mg by mouth every morning.     • HUMALOG KWIKPEN 100 UNIT/ML Solution Pen-injector injection PEN BREAKFAST   16 UNITS LUNCH   14 UNITS, DINNER   14 UNITS SUBCUTANEOUS 90 DAYS (Patient not taking: Reported on 8/11/2021)     • BD PEN NEEDLE AVANI U/F      • Continuous Blood Gluc Sensor (FREESTYLE AZIZA 14 DAY SENSOR) [facility] USE TO CHECK BLOOD SUGAR FOR 28 DAYS     • furosemide (LASIX) 40 MG Tab Take 1 Tab by mouth every day. (Patient not taking: Reported on 8/11/2021) 30 Tab 11   • Insulin Degludec (TRESIBA) 100 UNIT/ML Solution Inject 30 Units as instructed every bedtime. (Patient not taking: Reported on 8/11/2021)       No facility-administered encounter medications on file as of 8/11/2021.     Review of Systems   Constitutional: Positive for malaise/fatigue. Negative for chills and fever.   Respiratory: Positive for shortness of breath. Negative for cough, hemoptysis and wheezing.    Cardiovascular: Negative for chest pain, palpitations, orthopnea, claudication, leg swelling and PND.   Gastrointestinal: Negative for nausea and vomiting.   Neurological: Positive for weakness. Negative for dizziness and headaches.   All other systems reviewed and are negative.       Objective:   BP (!) 164/72 (BP Location: Left arm, Patient Position: Sitting)   Pulse 66   Ht 1.753 m (5' 9\")   Wt 107 kg (235 lb)   SpO2 99%   BMI 34.70 kg/m²     Physical Exam   Constitutional: He appears well-developed.   Neck: No JVD present.   Cardiovascular: Normal rate and regular rhythm.   Murmur heard.   Systolic murmur is present with a grade of 2/6.  Pulses:       Radial pulses are 2+ on the right side and 2+ on the left side.   No edema   Pulmonary/Chest: Effort " normal and breath sounds normal.   Abdominal: Soft.   Musculoskeletal:      Cervical back: Neck supple.   Neurological:   CN II-XII grossly intact   Skin: Skin is warm and dry.   Psychiatric: His behavior is normal. Judgment and thought content normal.   Nursing note and vitals reviewed.    EKG 6/16/2021 sinus rhythm, rate sixty-eight.  First-degree AV block.  Nonspecific diffuse ST-T wave abnormalities    Echocardiogram 12/30/2020  CONCLUSIONS  Normal left ventricular systolic function.  Left ventricular ejection fraction is visually estimated to be 70%.  Moderate concentric left ventricular hypertrophy.  Known bioprosthetic aortic valve that is functioning normally with   normal transvalvular gradients.  Normal right ventricular size and systolic function.  Unable to estimate pulmonary artery pressure due to an inadequate   tricuspid regurgitant jet.    Assessment:     1. Coronary artery disease, occlusive     2. Dyslipidemia     3. History of cardiac catheterization     4. Essential hypertension     5. S/P CABG (coronary artery bypass graft)     6. S/P AVR (aortic valve replacement)     7. S/P drug eluting coronary stent placement     8. SVT (supraventricular tachycardia) ([facility])         Medical Decision Making:  Today's Assessment / Status / Plan:   1.  Echocardiogram for further evaluation of the aortic valve  2.  Continue current medications including ASA 81 mg, clopidogrel 75 mg, furosemide 40 mg as needed, losartan 100 mg daily, metoprolol 25 mg twice daily, and rosuvastatin 20 mg every evening.      Follow-up visit after echocardiogram.    Please note that this dictation was created using voice recognition software.  I have made every reasonable attempt to correct obvious errors, but it is possible there are errors of grammar or possibly content that I did not discover before finalizing the note.

## 2021-08-12 ENCOUNTER — TELEPHONE (OUTPATIENT)
Dept: CARDIOLOGY | Facility: MEDICAL CENTER | Age: 66
End: 2021-08-12

## 2021-08-12 NOTE — TELEPHONE ENCOUNTER
DB    Patient called and wants to know the name of the medication you are discontinuing. Please call to let him know.    Thank you,    Sue ARMIJO

## 2021-08-12 NOTE — TELEPHONE ENCOUNTER
After reviewing pt's chart, it does not appear that DB discontinued any of the pt's cardiac meds. LVM on pt's personal VM informing him of this.

## 2021-08-13 ENCOUNTER — HOSPITAL ENCOUNTER (OUTPATIENT)
Dept: LAB | Facility: MEDICAL CENTER | Age: 66
End: 2021-08-13
Attending: INTERNAL MEDICINE
Payer: MEDICARE

## 2021-08-13 DIAGNOSIS — E78.5 DYSLIPIDEMIA: ICD-10-CM

## 2021-08-13 LAB
ALBUMIN SERPL BCP-MCNC: 4.2 G/DL (ref 3.2–4.9)
ALBUMIN/GLOB SERPL: 1.4 G/DL
ALP SERPL-CCNC: 67 U/L (ref 30–99)
ALT SERPL-CCNC: 30 U/L (ref 2–50)
ANION GAP SERPL CALC-SCNC: 10 MMOL/L (ref 7–16)
AST SERPL-CCNC: 17 U/L (ref 12–45)
BILIRUB SERPL-MCNC: 0.4 MG/DL (ref 0.1–1.5)
BUN SERPL-MCNC: 23 MG/DL (ref 8–22)
CALCIUM SERPL-MCNC: 9 MG/DL (ref 8.5–10.5)
CHLORIDE SERPL-SCNC: 103 MMOL/L (ref 96–112)
CHOLEST SERPL-MCNC: 137 MG/DL (ref 100–199)
CHOLEST SERPL-MCNC: 141 MG/DL (ref 100–199)
CO2 SERPL-SCNC: 24 MMOL/L (ref 20–33)
CREAT SERPL-MCNC: 1.22 MG/DL (ref 0.5–1.4)
FASTING STATUS PATIENT QL REPORTED: NORMAL
GLOBULIN SER CALC-MCNC: 3.1 G/DL (ref 1.9–3.5)
GLUCOSE SERPL-MCNC: 221 MG/DL (ref 65–99)
HDLC SERPL-MCNC: 37 MG/DL
HDLC SERPL-MCNC: 38 MG/DL
LDLC SERPL CALC-MCNC: 73 MG/DL
LDLC SERPL CALC-MCNC: 76 MG/DL
MAGNESIUM SERPL-MCNC: 2.3 MG/DL (ref 1.5–2.5)
POTASSIUM SERPL-SCNC: 4.5 MMOL/L (ref 3.6–5.5)
PROT SERPL-MCNC: 7.3 G/DL (ref 6–8.2)
SODIUM SERPL-SCNC: 137 MMOL/L (ref 135–145)
TRIGL SERPL-MCNC: 133 MG/DL (ref 0–149)
TRIGL SERPL-MCNC: 133 MG/DL (ref 0–149)

## 2021-08-13 PROCEDURE — 80061 LIPID PANEL: CPT

## 2021-08-13 PROCEDURE — 80061 LIPID PANEL: CPT | Mod: 91

## 2021-08-13 PROCEDURE — 83735 ASSAY OF MAGNESIUM: CPT

## 2021-08-13 PROCEDURE — 80053 COMPREHEN METABOLIC PANEL: CPT

## 2021-09-08 ENCOUNTER — ANCILLARY PROCEDURE (OUTPATIENT)
Dept: CARDIOLOGY | Facility: IMAGING CENTER | Age: 66
End: 2021-09-08
Attending: NURSE PRACTITIONER
Payer: MEDICARE

## 2021-09-08 DIAGNOSIS — Z95.2 S/P AVR (AORTIC VALVE REPLACEMENT): ICD-10-CM

## 2021-09-08 DIAGNOSIS — Z95.1 S/P CABG (CORONARY ARTERY BYPASS GRAFT): ICD-10-CM

## 2021-09-08 LAB
LV EJECT FRACT  99904: 65
LV EJECT FRACT MOD 2C 99903: 71.36
LV EJECT FRACT MOD 4C 99902: 56.99
LV EJECT FRACT MOD BP 99901: 65.98

## 2021-09-08 PROCEDURE — 93306 TTE W/DOPPLER COMPLETE: CPT | Performed by: INTERNAL MEDICINE

## 2021-09-17 ENCOUNTER — TELEPHONE (OUTPATIENT)
Dept: CARDIOLOGY | Facility: MEDICAL CENTER | Age: 66
End: 2021-09-17

## 2021-09-17 NOTE — TELEPHONE ENCOUNTER
Spoke with pt who confirmed last time seeing cardiology was in 08/11/21 with Laura SOLARES. Per pt has not seen any other cardiology outside of [facility]. Per pt has not has any recent hospitalizations outside of [facility]. Confirmed with pt that recent lab work and cardiac testing is in pts chart.   Pt is aleida to see Laura SOLARES on 09/30/21 at 1530.   Appointment time and date confirmed with pt.

## 2021-09-20 PROBLEM — M25.512 ACUTE PAIN OF LEFT SHOULDER: Status: ACTIVE | Noted: 2021-09-20

## 2021-09-30 ENCOUNTER — OFFICE VISIT (OUTPATIENT)
Dept: CARDIOLOGY | Facility: MEDICAL CENTER | Age: 66
End: 2021-09-30
Payer: MEDICARE

## 2021-09-30 VITALS
BODY MASS INDEX: 34.14 KG/M2 | WEIGHT: 230.5 LBS | HEART RATE: 72 BPM | OXYGEN SATURATION: 98 % | SYSTOLIC BLOOD PRESSURE: 132 MMHG | HEIGHT: 69 IN | RESPIRATION RATE: 12 BRPM | DIASTOLIC BLOOD PRESSURE: 70 MMHG

## 2021-09-30 DIAGNOSIS — I10 ESSENTIAL HYPERTENSION: ICD-10-CM

## 2021-09-30 DIAGNOSIS — Z95.1 S/P CABG X 1: ICD-10-CM

## 2021-09-30 DIAGNOSIS — Z95.5 S/P DRUG ELUTING CORONARY STENT PLACEMENT: ICD-10-CM

## 2021-09-30 DIAGNOSIS — Z95.2 S/P AVR (AORTIC VALVE REPLACEMENT): ICD-10-CM

## 2021-09-30 DIAGNOSIS — I47.10 SVT (SUPRAVENTRICULAR TACHYCARDIA) (HCC): ICD-10-CM

## 2021-09-30 DIAGNOSIS — E78.5 DYSLIPIDEMIA: ICD-10-CM

## 2021-09-30 DIAGNOSIS — I25.10 CORONARY ARTERY DISEASE, OCCLUSIVE: ICD-10-CM

## 2021-09-30 PROCEDURE — 99214 OFFICE O/P EST MOD 30 MIN: CPT | Performed by: NURSE PRACTITIONER

## 2021-09-30 RX ORDER — AMIODARONE HYDROCHLORIDE 200 MG/1
200 TABLET ORAL
COMMUNITY
Start: 2021-09-07 | End: 2021-12-18

## 2021-09-30 ASSESSMENT — ENCOUNTER SYMPTOMS
ORTHOPNEA: 0
COUGH: 0
PND: 0
CLAUDICATION: 0
SHORTNESS OF BREATH: 1
TINGLING: 1
HEADACHES: 0
SPUTUM PRODUCTION: 0
FEVER: 0
NAUSEA: 0
HEMOPTYSIS: 0
DIZZINESS: 0
VOMITING: 0
WHEEZING: 0
PALPITATIONS: 0
CHILLS: 0

## 2021-09-30 ASSESSMENT — FIBROSIS 4 INDEX: FIB4 SCORE: 1.4

## 2021-09-30 NOTE — PROGRESS NOTES
Chief Complaint   Patient presents with   • Dyslipidemia   • Supraventricular Tachycardia (SVT)   • Coronary Artery Disease     F/V Dx: Coronary artery disease, occlusive       Subjective     Sharad Millan is a 65 y.o. male who presents today for a followup evaluation of S/P AVR, CABG, CAD, coronary stent, palpitations, PSVT, hypertension, hyperlipidemia.  Patient was last seen 6/16/2021 by Dr. Chavez and myself 8/11/2021.    Since 8/11/2021, the patient had repeat echocardiogram indicating EF 65%, mild concentric LVH, and known aortic valve replacement with normal function.  Sharad continues to be concerned about the fact that he really has not noticed an increase in energy since having surgery.  He has 2 friends that had open heart surgery with the same surgeon and they feel great.  He continues to have numbness and tingling in bilateral fingers along with significant tremors.  He reports the tremors are so bad that he often finds it difficult to eat or even drink water.  He has recently had a cortisone injection in his left shoulder and has noticed that the numbness and tingling in his left arm has resolved, unfortunately continues to have tremors.  He will be seeing Dr. Spivey in the near future with [facility] and they are working on referral to neurology.  He is previously seen Dr. Pearl and would prefer another neurologist.     Since 6/16/2021, the patient was taken off of amiodarone (PSVT) and an x-ray was obtained showing no acute cardiopulmonary process.  He has not noticed any palpitations.  He continues to be quite fatigued, has exertional shortness of breath, and weakness.  This is how he felt prior to having his AVR and CABG.  Patient was referred back to cardiology due to a louder murmur.     2/10/2021 the patient has developed some nonexertional chest pressure.  Saw his PCP Dr. Khoury who did an EKG but the patient forgot to bring a copy.  Takes Lasix as needed.  Still working.  Still feels that he  has not fully recovered from his surgery but is back to work full-time.  No palpitations, PND or orthopnea.     Seeing Dr. Zackery Schmitz, endocrinologist in [facility] and Dr. Don Khoury.     Past Medical History:   Diagnosis Date   • Aortic stenosis 10/20/2011   • Arrhythmia    • Breath shortness    • Carotid bruit 12/9/2009   • Cataract     cataract extraction with IOL   • Chronic right shoulder pain    • Diabetes     oral medication   • Fatty liver 1/17/2011   • Heart attack (HCC) 12/2018   • History of cardiac catheterization 7/13/2009   • History of echocardiogram 10/20/2011   • HTN (hypertension) 10/12/2012   • Hypertension    • Memory loss 10/20/2011   • Murmur 7/7/2009   • Obesity 8/23/2011   • Palpitations 10/20/2011   • PSVT (paroxysmal supraventricular tachycardia) ([facility]) 2/14/2012   • S/P coronary artery stent placement 1996    coronary stent implantation x3, [facility]   • S/P coronary artery stent placement 1998    coronary stent implantation; [facility]   • S/P coronary artery stent placement 2003    cardiac catheterization; patent stents; [place]   • Sciatica 8/23/2011   • Uncontrolled diabetes mellitus (HCC) 11/29/2010     Past Surgical History:   Procedure Laterality Date   • MULTIPLE CORONARY ARTERY BYPASS ENDO VEIN HARVEST N/A 8/31/2020    Procedure: CABG, WITH ENDOSCOPIC VEIN PROCUREMENT X1;  Surgeon: Dorys Casas M.D.;  Location: [facility];  Service: Cardiothoracic   • AORTIC VALVE REPLACEMENT N/A 8/31/2020    Procedure: REPLACEMENT, AORTIC VALVE;  Surgeon: Dorys Casas M.D.;  Location: [facility];  Service: Cardiothoracic   • ROMERO N/A 8/31/2020    Procedure: ECHOCARDIOGRAM, TRANSESOPHAGEAL;  Surgeon: Dorys Casas M.D.;  Location: [facility];  Service: Cardiothoracic   • PO BY LAPAROSCOPY N/A 10/25/2015    Procedure: PO BY LAPAROSCOPY-with grams ;  Surgeon: Ronnie Bowman M.D.;  Location: [facility];  Service:    • CAROTID STENT  ANGIOGRAPHY  1996   • CATARACT EXTRACTION WITH IOL Bilateral      Family History   Problem Relation Age of Onset   • Heart Disease Mother    • Heart Attack Mother    • Heart Disease Father      Social History     Socioeconomic History   • Marital status:      Spouse name: Not on file   • Number of children: Not on file   • Years of education: Not on file   • Highest education level: Not on file   Occupational History   • Not on file   Tobacco Use   • Smoking status: Never Smoker   • Smokeless tobacco: Never Used   Vaping Use   • Vaping Use: Never used   Substance and Sexual Activity   • Alcohol use: Never   • Drug use: Never   • Sexual activity: Yes     Partners: Female   Other Topics Concern   •  Service No   • Blood Transfusions No   • Caffeine Concern No   • Occupational Exposure No   • Hobby Hazards No   • Sleep Concern No   • Stress Concern No   • Weight Concern No   • Special Diet No   • Back Care Yes     Comment: BELT SUPPORT   • Exercise Yes   • Bike Helmet No   • Seat Belt Yes   • Self-Exams No   Social History Narrative    ** Merged History Encounter **          Social Determinants of Health     Financial Resource Strain:    • Difficulty of Paying Living Expenses:    Food Insecurity:    • Worried About Running Out of Food in the Last Year:    • Ran Out of Food in the Last Year:    Transportation Needs:    • Lack of Transportation (Medical):    • Lack of Transportation (Non-Medical):    Physical Activity:    • Days of Exercise per Week:    • Minutes of Exercise per Session:    Stress:    • Feeling of Stress :    Social Connections:    • Frequency of Communication with Friends and Family:    • Frequency of Social Gatherings with Friends and Family:    • Attends Mosque Services:    • Active Member of Clubs or Organizations:    • Attends Club or Organization Meetings:    • Marital Status:    Intimate Partner Violence:    • Fear of Current or Ex-Partner:    • Emotionally Abused:    •  Physically Abused:    • Sexually Abused:      Allergies   Allergen Reactions   • Bydureon [Exenatide] Hives     hives   • Cycloset [Bromocriptine Mesylate] Rash     Rash     • Morphine Vomiting and Nausea   • Morphine      Dizziness       Outpatient Encounter Medications as of 9/30/2021   Medication Sig Dispense Refill   • amiodarone (CORDARONE) 200 MG Tab Take 200 mg by mouth every day.     • metformin (GLUCOPHAGE) 1000 MG tablet TAKE 1 TABLET BY MOUTH TWICE A DAY     • rosuvastatin (CRESTOR) 20 MG Tab TAKE 1 TABLET BY MOUTH EVERY DAY 90 tablet 3   • losartan (COZAAR) 100 MG Tab TAKE 1 TABLET BY MOUTH EVERY DAY 90 tablet 3   • clopidogrel (PLAVIX) 75 MG Tab Take 75 mg by mouth every day.     • Amoxicillin 500 MG Tab Take 4 Tabs by mouth one time as needed (Take 30-60 minutes prior to dental procedure) for up to 1 dose. 12 Tab 0   • metoprolol (LOPRESSOR) 25 MG Tab Take 1 Tab by mouth 2 Times a Day. 180 Tab 3   • potassium chloride SA (K-DUR) 10 MEQ Tab CR Take 10 mEq by mouth as needed (prn when taking lasix).     • TOUJEO SOLOSTAR 300 UNIT/ML Solution Pen-injector 30 Units.     • furosemide (LASIX) 40 MG Tab Take 1 Tab by mouth every day. 30 Tab 11   • insulin lispro (HUMALOG) 100 UNIT/ML Inject 16 Units under the skin 3 times a day before meals.     • aspirin EC (ECOTRIN) 81 MG Tablet Delayed Response Take 81 mg by mouth every evening.     • Empagliflozin (JARDIANCE) 25 MG Tab Take 25 mg by mouth every morning.     • HYDROcodone-acetaminophen (NORCO) 5-325 MG Tab per tablet TAKE 1 TABLET BY MOUTH EVERY 8 HOURS AS NEEDED FOR PAIN   30DS S42.0025     • amoxicillin (AMOXIL) 500 MG Cap TAKE 4 CAPSULES BY MOUTH 1 HOUR PRIOR TO DENTAL APPOINTMENT (Patient not taking: Reported on 9/30/2021)     • BD PEN NEEDLE AVANI U/F      • Continuous Blood Gluc Sensor (FREESTYLE AZIZA 14 DAY SENSOR) [facility] USE TO CHECK BLOOD SUGAR FOR 28 DAYS       No facility-administered encounter medications on file as of 9/30/2021.     Review of  "Systems   Constitutional: Positive for malaise/fatigue. Negative for chills and fever.   Respiratory: Positive for shortness of breath. Negative for cough, hemoptysis, sputum production and wheezing.    Cardiovascular: Positive for leg swelling. Negative for chest pain, palpitations, orthopnea, claudication and PND.   Gastrointestinal: Negative for nausea and vomiting.   Musculoskeletal: Positive for joint pain.        Paresthesias   Neurological: Positive for tingling. Negative for dizziness and headaches.   All other systems reviewed and are negative.             Objective     /70 (BP Location: Left arm, Patient Position: Sitting, BP Cuff Size: Adult)   Pulse 72   Resp 12   Ht 1.753 m (5' 9\")   Wt 105 kg (230 lb 8 oz)   SpO2 98%   BMI 34.04 kg/m²     Physical Exam  Vitals and nursing note reviewed.   Constitutional:       Appearance: He is well-developed.   Neck:      Vascular: No JVD.   Cardiovascular:      Rate and Rhythm: Normal rate and regular rhythm.      Heart sounds: Normal heart sounds. No murmur heard.     Pulmonary:      Effort: Pulmonary effort is normal.      Breath sounds: Normal breath sounds.   Abdominal:      Palpations: Abdomen is soft.   Musculoskeletal:      Cervical back: Neck supple.   Skin:     General: Skin is warm and dry.   Neurological:      Comments: CN II-XII grossly intact   Psychiatric:         Behavior: Behavior normal.         Thought Content: Thought content normal.         Judgment: Judgment normal.       EKG 6/16/2021 sinus rhythm, rate sixty-eight.  First-degree AV block.  Nonspecific diffuse ST-T wave abnormalities     Echocardiogram 12/30/2020  CONCLUSIONS  Normal left ventricular systolic function.  Left ventricular ejection fraction is visually estimated to be 70%.  Moderate concentric left ventricular hypertrophy.  Known bioprosthetic aortic valve that is functioning normally with   normal transvalvular gradients.  Normal right ventricular size and systolic " function.  Unable to estimate pulmonary artery pressure due to an inadequate   tricuspid regurgitant jet.     Echocardiogram 9/8/2021  CONCLUSIONS  Left ventricular ejection fraction is visually estimated to be 65%.  Mild concentric left ventricular hypertrophy, 1.2 cm.  Known aortic valve replacement, functioning normally.     Compared to prior echo 12/30/2020, no significant change.    Assessment & Plan     1. Coronary artery disease, occlusive     2. S/P drug eluting coronary stent placement     3. S/P CABG x 1     4. S/P AVR (aortic valve replacement)     5. Dyslipidemia     6. SVT (supraventricular tachycardia) (HCC)     7. Essential hypertension         Medical Decision Making: Today's Assessment/Status/Plan:   Continue amiodarone 200 mg daily, ASA 81 mg daily, clopidogrel 75 mg daily, Jardiance, furosemide, losartan 100 mg daily, metoprolol 25 mg twice daily, KCl with furosemide, and rosuvastatin.    Amiodarone 4 PSVT so no anticoagulation.  TSH 4.97 on 8/10/2021, lipid panel indicates , , HDL 37, LDL 73.      Follow-up visit in 3 months with myself in 6 months with Dr Chavez.  I have discussed with Sharad the possibility of referral to neurology however I believe this is already in the works through [facility].  I am more than happy to make the referral if needed.    Please note that this dictation was created using voice recognition software.  I have made every reasonable attempt to correct obvious errors, but it is possible there are errors of grammar or possibly content that I did not discover before finalizing the note.

## 2021-11-18 ENCOUNTER — HOSPITAL ENCOUNTER (OUTPATIENT)
Dept: LAB | Facility: MEDICAL CENTER | Age: 66
End: 2021-11-18
Attending: INTERNAL MEDICINE
Payer: MEDICARE

## 2021-11-18 LAB
ALBUMIN SERPL BCP-MCNC: 4.1 G/DL (ref 3.2–4.9)
ALBUMIN/GLOB SERPL: 1.3 G/DL
ALP SERPL-CCNC: 56 U/L (ref 30–99)
ALT SERPL-CCNC: 21 U/L (ref 2–50)
ANION GAP SERPL CALC-SCNC: 11 MMOL/L (ref 7–16)
APPEARANCE UR: CLEAR
AST SERPL-CCNC: 15 U/L (ref 12–45)
BACTERIA #/AREA URNS HPF: NEGATIVE /HPF
BASOPHILS # BLD AUTO: 0.4 % (ref 0–1.8)
BASOPHILS # BLD: 0.05 K/UL (ref 0–0.12)
BILIRUB SERPL-MCNC: 0.3 MG/DL (ref 0.1–1.5)
BILIRUB UR QL STRIP.AUTO: NEGATIVE
BUN SERPL-MCNC: 23 MG/DL (ref 8–22)
CALCIUM SERPL-MCNC: 9.2 MG/DL (ref 8.5–10.5)
CHLORIDE SERPL-SCNC: 102 MMOL/L (ref 96–112)
CHOLEST SERPL-MCNC: 147 MG/DL (ref 100–199)
CO2 SERPL-SCNC: 26 MMOL/L (ref 20–33)
COLOR UR: YELLOW
CREAT SERPL-MCNC: 1.05 MG/DL (ref 0.5–1.4)
EOSINOPHIL # BLD AUTO: 0.05 K/UL (ref 0–0.51)
EOSINOPHIL NFR BLD: 0.4 % (ref 0–6.9)
EPI CELLS #/AREA URNS HPF: NEGATIVE /HPF
ERYTHROCYTE [DISTWIDTH] IN BLOOD BY AUTOMATED COUNT: 45.6 FL (ref 35.9–50)
EST. AVERAGE GLUCOSE BLD GHB EST-MCNC: 249 MG/DL
FASTING STATUS PATIENT QL REPORTED: NORMAL
GLOBULIN SER CALC-MCNC: 3.2 G/DL (ref 1.9–3.5)
GLUCOSE SERPL-MCNC: 180 MG/DL (ref 65–99)
GLUCOSE UR STRIP.AUTO-MCNC: >=1000 MG/DL
HBA1C MFR BLD: 10.3 % (ref 4–5.6)
HCT VFR BLD AUTO: 45.9 % (ref 42–52)
HDLC SERPL-MCNC: 47 MG/DL
HGB BLD-MCNC: 14 G/DL (ref 14–18)
HYALINE CASTS #/AREA URNS LPF: ABNORMAL /LPF
IMM GRANULOCYTES # BLD AUTO: 0.11 K/UL (ref 0–0.11)
IMM GRANULOCYTES NFR BLD AUTO: 0.9 % (ref 0–0.9)
KETONES UR STRIP.AUTO-MCNC: NEGATIVE MG/DL
LDLC SERPL CALC-MCNC: 77 MG/DL
LEUKOCYTE ESTERASE UR QL STRIP.AUTO: NEGATIVE
LYMPHOCYTES # BLD AUTO: 1.83 K/UL (ref 1–4.8)
LYMPHOCYTES NFR BLD: 15.7 % (ref 22–41)
MAGNESIUM SERPL-MCNC: 2.2 MG/DL (ref 1.5–2.5)
MCH RBC QN AUTO: 23.4 PG (ref 27–33)
MCHC RBC AUTO-ENTMCNC: 30.5 G/DL (ref 33.7–35.3)
MCV RBC AUTO: 76.8 FL (ref 81.4–97.8)
MICRO URNS: ABNORMAL
MONOCYTES # BLD AUTO: 0.95 K/UL (ref 0–0.85)
MONOCYTES NFR BLD AUTO: 8.1 % (ref 0–13.4)
NEUTROPHILS # BLD AUTO: 8.69 K/UL (ref 1.82–7.42)
NEUTROPHILS NFR BLD: 74.5 % (ref 44–72)
NITRITE UR QL STRIP.AUTO: NEGATIVE
NRBC # BLD AUTO: 0 K/UL
NRBC BLD-RTO: 0 /100 WBC
PH UR STRIP.AUTO: 5.5 [PH] (ref 5–8)
PLATELET # BLD AUTO: 164 K/UL (ref 164–446)
PMV BLD AUTO: 10.3 FL (ref 9–12.9)
POTASSIUM SERPL-SCNC: 4.6 MMOL/L (ref 3.6–5.5)
PROT SERPL-MCNC: 7.3 G/DL (ref 6–8.2)
PROT UR QL STRIP: 100 MG/DL
RBC # BLD AUTO: 5.98 M/UL (ref 4.7–6.1)
RBC # URNS HPF: ABNORMAL /HPF
RBC UR QL AUTO: NEGATIVE
SODIUM SERPL-SCNC: 139 MMOL/L (ref 135–145)
SP GR UR STRIP.AUTO: 1.04
TRIGL SERPL-MCNC: 113 MG/DL (ref 0–149)
TSH SERPL DL<=0.005 MIU/L-ACNC: 5.1 UIU/ML (ref 0.38–5.33)
UROBILINOGEN UR STRIP.AUTO-MCNC: 0.2 MG/DL
WBC # BLD AUTO: 11.7 K/UL (ref 4.8–10.8)
WBC #/AREA URNS HPF: ABNORMAL /HPF

## 2021-11-18 PROCEDURE — 83036 HEMOGLOBIN GLYCOSYLATED A1C: CPT | Mod: GA

## 2021-11-18 PROCEDURE — 80061 LIPID PANEL: CPT

## 2021-11-18 PROCEDURE — 85025 COMPLETE CBC W/AUTO DIFF WBC: CPT

## 2021-11-18 PROCEDURE — 81001 URINALYSIS AUTO W/SCOPE: CPT

## 2021-11-18 PROCEDURE — 84443 ASSAY THYROID STIM HORMONE: CPT

## 2021-11-18 PROCEDURE — 80053 COMPREHEN METABOLIC PANEL: CPT

## 2021-11-18 PROCEDURE — 83735 ASSAY OF MAGNESIUM: CPT

## 2021-11-18 PROCEDURE — 36415 COLL VENOUS BLD VENIPUNCTURE: CPT

## 2021-12-01 NOTE — TELEPHONE ENCOUNTER
F/U with Dr. Shane Ruiz. Was the patient seen in the last year in this department? Yes    Does patient have an active prescription for medications requested? No     Received Request Via: Pharmacy

## 2021-12-18 ENCOUNTER — HOSPITAL ENCOUNTER (EMERGENCY)
Facility: MEDICAL CENTER | Age: 66
End: 2021-12-18
Attending: EMERGENCY MEDICINE
Payer: MEDICARE

## 2021-12-18 ENCOUNTER — APPOINTMENT (OUTPATIENT)
Dept: RADIOLOGY | Facility: MEDICAL CENTER | Age: 66
End: 2021-12-18
Attending: EMERGENCY MEDICINE
Payer: MEDICARE

## 2021-12-18 VITALS
BODY MASS INDEX: 32.38 KG/M2 | DIASTOLIC BLOOD PRESSURE: 66 MMHG | TEMPERATURE: 97.4 F | WEIGHT: 226.19 LBS | OXYGEN SATURATION: 96 % | HEART RATE: 85 BPM | HEIGHT: 70 IN | RESPIRATION RATE: 15 BRPM | SYSTOLIC BLOOD PRESSURE: 128 MMHG

## 2021-12-18 DIAGNOSIS — I47.19 JUNCTIONAL TACHYCARDIA (HCC): ICD-10-CM

## 2021-12-18 DIAGNOSIS — I47.10 SVT (SUPRAVENTRICULAR TACHYCARDIA) (HCC): ICD-10-CM

## 2021-12-18 DIAGNOSIS — R10.13 EPIGASTRIC PAIN: ICD-10-CM

## 2021-12-18 LAB
ALBUMIN SERPL BCP-MCNC: 3.9 G/DL (ref 3.2–4.9)
ALBUMIN/GLOB SERPL: 1.3 G/DL
ALP SERPL-CCNC: 60 U/L (ref 30–99)
ALT SERPL-CCNC: 35 U/L (ref 2–50)
ANION GAP SERPL CALC-SCNC: 12 MMOL/L (ref 7–16)
AST SERPL-CCNC: 29 U/L (ref 12–45)
BASOPHILS # BLD AUTO: 0.4 % (ref 0–1.8)
BASOPHILS # BLD: 0.05 K/UL (ref 0–0.12)
BILIRUB SERPL-MCNC: 0.2 MG/DL (ref 0.1–1.5)
BUN SERPL-MCNC: 31 MG/DL (ref 8–22)
CALCIUM SERPL-MCNC: 8.7 MG/DL (ref 8.4–10.2)
CHLORIDE SERPL-SCNC: 98 MMOL/L (ref 96–112)
CO2 SERPL-SCNC: 21 MMOL/L (ref 20–33)
CREAT SERPL-MCNC: 1.2 MG/DL (ref 0.5–1.4)
EKG IMPRESSION: NORMAL
EOSINOPHIL # BLD AUTO: 0.04 K/UL (ref 0–0.51)
EOSINOPHIL NFR BLD: 0.3 % (ref 0–6.9)
ERYTHROCYTE [DISTWIDTH] IN BLOOD BY AUTOMATED COUNT: 46.2 FL (ref 35.9–50)
GLOBULIN SER CALC-MCNC: 3.1 G/DL (ref 1.9–3.5)
GLUCOSE SERPL-MCNC: 444 MG/DL (ref 65–99)
HCT VFR BLD AUTO: 45.7 % (ref 42–52)
HGB BLD-MCNC: 14.2 G/DL (ref 14–18)
IMM GRANULOCYTES # BLD AUTO: 0.12 K/UL (ref 0–0.11)
IMM GRANULOCYTES NFR BLD AUTO: 0.9 % (ref 0–0.9)
LIPASE SERPL-CCNC: 114 U/L (ref 7–58)
LYMPHOCYTES # BLD AUTO: 2.09 K/UL (ref 1–4.8)
LYMPHOCYTES NFR BLD: 14.8 % (ref 22–41)
MAGNESIUM SERPL-MCNC: 2.2 MG/DL (ref 1.5–2.5)
MCH RBC QN AUTO: 24.1 PG (ref 27–33)
MCHC RBC AUTO-ENTMCNC: 31.1 G/DL (ref 33.7–35.3)
MCV RBC AUTO: 77.6 FL (ref 81.4–97.8)
MONOCYTES # BLD AUTO: 1.35 K/UL (ref 0–0.85)
MONOCYTES NFR BLD AUTO: 9.6 % (ref 0–13.4)
NEUTROPHILS # BLD AUTO: 10.43 K/UL (ref 1.82–7.42)
NEUTROPHILS NFR BLD: 74 % (ref 44–72)
NRBC # BLD AUTO: 0 K/UL
NRBC BLD-RTO: 0 /100 WBC
PLATELET # BLD AUTO: 181 K/UL (ref 164–446)
PMV BLD AUTO: 10.7 FL (ref 9–12.9)
POTASSIUM SERPL-SCNC: 4.6 MMOL/L (ref 3.6–5.5)
PROT SERPL-MCNC: 7 G/DL (ref 6–8.2)
RBC # BLD AUTO: 5.89 M/UL (ref 4.7–6.1)
SODIUM SERPL-SCNC: 131 MMOL/L (ref 135–145)
TROPONIN T SERPL-MCNC: 48 NG/L (ref 6–19)
TROPONIN T SERPL-MCNC: 49 NG/L (ref 6–19)
WBC # BLD AUTO: 14.1 K/UL (ref 4.8–10.8)

## 2021-12-18 PROCEDURE — 700105 HCHG RX REV CODE 258: Performed by: EMERGENCY MEDICINE

## 2021-12-18 PROCEDURE — 83690 ASSAY OF LIPASE: CPT

## 2021-12-18 PROCEDURE — 93005 ELECTROCARDIOGRAM TRACING: CPT

## 2021-12-18 PROCEDURE — 93005 ELECTROCARDIOGRAM TRACING: CPT | Performed by: EMERGENCY MEDICINE

## 2021-12-18 PROCEDURE — 83735 ASSAY OF MAGNESIUM: CPT

## 2021-12-18 PROCEDURE — 84484 ASSAY OF TROPONIN QUANT: CPT | Mod: 91

## 2021-12-18 PROCEDURE — 36415 COLL VENOUS BLD VENIPUNCTURE: CPT

## 2021-12-18 PROCEDURE — 71045 X-RAY EXAM CHEST 1 VIEW: CPT

## 2021-12-18 PROCEDURE — 96374 THER/PROPH/DIAG INJ IV PUSH: CPT

## 2021-12-18 PROCEDURE — 85025 COMPLETE CBC W/AUTO DIFF WBC: CPT

## 2021-12-18 PROCEDURE — 99285 EMERGENCY DEPT VISIT HI MDM: CPT

## 2021-12-18 PROCEDURE — 99284 EMERGENCY DEPT VISIT MOD MDM: CPT | Performed by: INTERNAL MEDICINE

## 2021-12-18 PROCEDURE — 80053 COMPREHEN METABOLIC PANEL: CPT

## 2021-12-18 PROCEDURE — 700111 HCHG RX REV CODE 636 W/ 250 OVERRIDE (IP): Performed by: EMERGENCY MEDICINE

## 2021-12-18 RX ORDER — AMLODIPINE BESYLATE 2.5 MG/1
2.5 TABLET ORAL EVERY EVENING
COMMUNITY

## 2021-12-18 RX ORDER — SODIUM CHLORIDE, SODIUM LACTATE, POTASSIUM CHLORIDE, CALCIUM CHLORIDE 600; 310; 30; 20 MG/100ML; MG/100ML; MG/100ML; MG/100ML
1000 INJECTION, SOLUTION INTRAVENOUS ONCE
Status: COMPLETED | OUTPATIENT
Start: 2021-12-18 | End: 2021-12-18

## 2021-12-18 RX ORDER — METOPROLOL TARTRATE 1 MG/ML
5 INJECTION, SOLUTION INTRAVENOUS
Status: DISCONTINUED | OUTPATIENT
Start: 2021-12-18 | End: 2021-12-18 | Stop reason: HOSPADM

## 2021-12-18 RX ORDER — ADENOSINE 3 MG/ML
6 INJECTION, SOLUTION INTRAVENOUS ONCE
Status: COMPLETED | OUTPATIENT
Start: 2021-12-18 | End: 2021-12-18

## 2021-12-18 RX ADMIN — ADENOSINE 6 MG: 3 INJECTION, SOLUTION INTRAVENOUS at 18:23

## 2021-12-18 RX ADMIN — SODIUM CHLORIDE, POTASSIUM CHLORIDE, SODIUM LACTATE AND CALCIUM CHLORIDE 1000 ML: 600; 310; 30; 20 INJECTION, SOLUTION INTRAVENOUS at 16:30

## 2021-12-18 ASSESSMENT — FIBROSIS 4 INDEX: FIB4 SCORE: 1.34

## 2021-12-18 NOTE — ED NOTES
Med Rec completed per patient   Allergies reviewed  No ORAL antibiotics in last 30 days    Patient states that he STOPPED taking Amiodarone 200 mg daily and Plavix 75 mg daily about 2-3 months ago

## 2021-12-18 NOTE — ED PROVIDER NOTES
ED Provider Note    CHIEF COMPLAINT  Chief Complaint   Patient presents with   • Supraventricular Tachycardia (SVT)     HPI  Patient is a 67 yo male with extensive medical hx including CAD/HTN, prior stent, CABG x1, aortic valve replacement and dyslipidemia with occasional paroxysmal supraventricular tachycardia who presents emergency room for evaluation of palpitations.  He presents the emergency room for the symptoms as he felt like he was having palpitations that woke him up from sleep at approximately 1 AM this morning.  Typically this happens and he is able to self convert.  Unable to do so today and came into the emergency room for more assessment.  Since it started the patient denies chest pain or pressure sensations, he has had some epigastric discomfort with no nausea or vomiting episodes.  No bowel or bladder changes, no acute traumatic injuries of late.  Patient has been adherent to his medication regiment though he further endorses that he took himself off of amiodarone 2 months ago and has been on amlodipine in its place.    Last episode similar to this was approximately 2 weeks ago that resolved with self-administered Valsalva's this spontaneously resolved    PPE Note: I personally donned full PPE for all patient encounters during this visit, including being clean-shaven with an N95 respirator mask, gloves, and goggles.     Chart review from previous cardiology note on 9/30/2021 shows that that time the patient was continued on amiodarone 200 mg daily, baby aspirin daily, Plavix 75 daily, Jardiance and furosemide along with losartan and metoprolol twice daily and statin medication.  He is following up in several months time with Dr. Chavez.    REVIEW OF SYSTEMS  See HPI for further details. All other systems are negative.     PAST MEDICAL HISTORY   has a past medical history of Aortic stenosis (10/20/2011), Arrhythmia, Breath shortness, Carotid bruit (12/9/2009), Cataract, Chronic right shoulder  pain, Diabetes, Fatty liver (1/17/2011), Heart attack (HCC) (12/2018), History of cardiac catheterization (7/13/2009), History of echocardiogram (10/20/2011), HTN (hypertension) (10/12/2012), Hypertension, Memory loss (10/20/2011), Murmur (7/7/2009), Obesity (8/23/2011), Palpitations (10/20/2011), PSVT (paroxysmal supraventricular tachycardia) ([facility]) (2/14/2012), S/P coronary artery stent placement (1996), S/P coronary artery stent placement (1998), S/P coronary artery stent placement (2003), Sciatica (8/23/2011), and Uncontrolled diabetes mellitus ([facility]) (11/29/2010).    SOCIAL HISTORY  Social History     Tobacco Use   • Smoking status: Never Smoker   • Smokeless tobacco: Never Used   Vaping Use   • Vaping Use: Never used   Substance and Sexual Activity   • Alcohol use: Never   • Drug use: Never   • Sexual activity: Yes     Partners: Female       SURGICAL HISTORY   has a past surgical history that includes cataract extraction with iol (Bilateral); carotid stent angiography (1996); tree by laparoscopy (N/A, 10/25/2015); multiple coronary artery bypass endo vein harvest (N/A, 8/31/2020); aortic valve replacement (N/A, 8/31/2020); and gale (N/A, 8/31/2020).    CURRENT MEDICATIONS  Home Medications     Reviewed by Ford Oviedo (Pharmacy Tech) on 12/18/21 at 1554  Med List Status: Complete   Medication Last Dose Status   amLODIPine (NORVASC) 2.5 MG Tab 12/17/2021 Active   aspirin EC (ECOTRIN) 81 MG Tablet Delayed Response 12/17/2021 Active   Empagliflozin (JARDIANCE) 25 MG Tab 12/18/2021 Active   insulin lispro (HUMALOG) 100 UNIT/ML 12/17/2021 Active   losartan (COZAAR) 100 MG Tab 12/18/2021 Active   metformin (GLUCOPHAGE) 1000 MG tablet 12/17/2021 Active   metoprolol (LOPRESSOR) 25 MG Tab 12/18/2021 Active   rosuvastatin (CRESTOR) 20 MG Tab 12/17/2021 Active                ALLERGIES  Allergies   Allergen Reactions   • Bydureon [Exenatide] Hives     hives   • Cycloset [Bromocriptine Mesylate] Rash     Rash     •  "Morphine Vomiting and Nausea   • Morphine      Dizziness         PHYSICAL EXAM  VITAL SIGNS: /66   Pulse 85   Temp 36.3 °C (97.4 °F) (Temporal)   Resp 15   Ht 1.778 m (5' 10\")   Wt 103 kg (226 lb 3.1 oz)   SpO2 96%   BMI 32.46 kg/m²   Pulse ox interpretation: I interpret this pulse ox as normal.  Genl: M sitting in gurney comfortably, speaking clearly, appears slightly anxious butin no acute distress   Head: NC/AT   ENT: Mucous membranes moist, posterior pharynx clear, uvula midline, nares patent bilaterally   Eyes: Normal sclera, pupils equal round reactive to light  Neck: Supple, FROM, no LAD appreciated   Pulmonary: Lungs are clear to auscultation bilaterally  Chest: No TTP  CV:   Tachycardia, appears sinus.  no murmur appreciated, pulses 2+ in both upper and lower extremities,  Abdomen: soft, NT/ND; no rebound/guarding, no masses palpated, no HSM   : no CVA or suprapubic tenderness  Musculoskeletal: Pain free ROM of the neck. Moving upper and lower extremities and spontaneous in coordinated fashion  Neuro: A&Ox4 (person, place, time, situation), speech fluent, gait steady, no focal deficits appreciated, No cerebellar signs. Sensation is grossly intact in the distal upper and lower extremities.  5/5 strength in  and dorsiflexion/plantar flexion of the ankles  Psych: Patient has an appropriate affect and behavior  Skin: No rash or lesions.  No pallor or jaundice.  No cyanosis.  Warm and dry.     DIAGNOSTIC STUDIES / PROCEDURES    EKG  EKG#1: As interpreted by me at 1529: This is a tachycardia at a rate of 126.  There are very clear PEs noted in some leads however these do appear to be difficult to fully assess-no other acute interval abnormalities, and normal axis, nonspecific lateral ST depressions with no reciprocal changes.  Abnormal EKG compared to prior on 6/16/2021.    EKG #2 as interpreted by me at 1730: Appears to be a junctional tachycardia with very minimal appearing P waves, " persistent depressions in the ST segment noted in V5 and V6 that appear improved from prior EKG earlier today.  Normal axis, no QTC prolongation, no ST segment elevations.    EKG #3:   As interpreted by me at 18 RI appears prolonged at 211 ms, QTC is not prolonged at 398 ms, nonspecific slight ST depressions are noted in the lateral leads no reciprocal changes, no acute ST segment elevations and patient is EKG is substantially improved from priors earlier today.    LABS  Labs Reviewed   CBC WITH DIFFERENTIAL - Abnormal; Notable for the following components:       Result Value    WBC 14.1 (*)     MCV 77.6 (*)     MCH 24.1 (*)     MCHC 31.1 (*)     Neutrophils-Polys 74.00 (*)     Lymphocytes 14.80 (*)     Neutrophils (Absolute) 10.43 (*)     Monos (Absolute) 1.35 (*)     Immature Granulocytes (abs) 0.12 (*)     All other components within normal limits    Narrative:     Collected By:   COMP METABOLIC PANEL - Abnormal; Notable for the following components:    Sodium 131 (*)     Glucose 444 (*)     Bun 31 (*)     All other components within normal limits    Narrative:     Collected By:   TROPONIN - Abnormal; Notable for the following components:    Troponin T 49 (*)     All other components within normal limits    Narrative:     Collected By:   LIPASE - Abnormal; Notable for the following components:    Lipase 114 (*)     All other components within normal limits    Narrative:     Collected By:   TROPONIN - Abnormal; Notable for the following components:    Troponin T 48 (*)     All other components within normal limits    Narrative:     Collected By:   MAGNESIUM    Narrative:     Collected By:   ESTIMATED GFR    Narrative:     Collected By:     RADIOLOGY  DX-CHEST-PORTABLE (1 VIEW)   Final Result      No acute cardiopulmonary abnormality identified.        COURSE & MEDICAL DECISION MAKING  Pertinent Labs & Imaging studies reviewed. (See chart for details)    DDX:  Palpitations/arrhythmia, electrolyte derangement,  dehydration, kidney injury, subtherapeutic medication regimens, no ectopy or atypical ACS.    MDM  Number of Diagnoses or Management Options      Initial evaluation at 1530:  Patient presented to the emergency room for symptoms as described above.  The patient has significant cardiovascular risk factors and has had several episodes of what he describes as SVT in the past.  He is noted on the monitor to be at a rate of 130 and EKG initially obtained showed some lateral depressions along with questionable P waves and the possibility of a junctional tachycardia.  Lab work for the above differential was obtained.  Patient has a unmeasurable troponin though this seems similar to prior lab draws.  Delta troponin is decreasing, unlikely to be ACS in nature.  He has a slight elevation in lipase with no LFT elevations and is unlikely to be a hepatobiliary obstructive process.  White count is elevated at 14 and with no fevers, no other gross complaints or localizing tenderness or complaints of infectious symptoms I am inclined to believe that this is likely a demargination or stress response.  He remains afebrile while on the monitors though cannot fully exclude sepsis but I believe that his tachycardia is related to a pure cardiac etiology as opposed to infectious.    There is no other gross electrolyte abnormalities.  The patient has elevated glucose with reassuring anion gap and bicarb.  Discussed the case with cardiology who would recommend adenosine cardioversion for ascertaining the underlying cardiac rhythm.    Following cardioversion, the patient is feeling the same, he remains in a sinus rhythm at a rate 89 for some time.  I again discussed the patient with Dr. Ziegler of cardiology who at this point recommends referral to the electrophysiology clinic, patient be discharged safely home at this time.    Chemical cardioversion: Procedure  Mr. Millan wasconsented for above procedure and the adenosine administration,  patient was placed on cardiac monitor, pacer and shock pads in place, oxygen and further resus tools at bedside.  Pros and cons are discussed, pt is agreeable to proceeding.  6 mg of adenosine was given and continuous EKG was obtained.  Conversion witnessed, pt maintained sinus rhythm, EKG #3 obtained, as documented above. Tolerated the procedure any acute complications.    CRITICAL CARE:  I saw and evaluated this patient. I personally provided 38 minutes of critical care time to the patient excluding billable procedures and directly and personally provided the following treatment and critical care management:  Critical Care Interventions  Multispecialty coordination, Multiple bedside assessments, coordination of care with family and other historical sources and Fluid resuscitation    HYDRATION: Based on the patient's presentation of Tachycardia the patient was given IV fluids. IV Hydration was used because oral hydration was not adequate alone. Upon recheck following hydration, the patient was improved.    FINAL IMPRESSION  Visit Diagnoses     ICD-10-CM   1. Junctional tachycardia (HCC)  I47.1   2. Epigastric pain  R10.13   3. SVT (supraventricular tachycardia) (HCC)  I47.1     Electronically signed by: Teddy Kang M.D., 12/18/2021 3:36 PM

## 2021-12-18 NOTE — ED TRIAGE NOTES
Pt presents with son from home for SVT that woke pt from his sleep around 0100. Hx of this and typically able to self convert. Unable to do so today and directed to ER. Denies symptoms. EKG obtained and roomed immediately. Charge notified.    Has this patient been vaccinated for COVID no  If not, would they like to be vaccinated while in the ER if eligible?  no  Would the patient like to speak with the ERP about the possibility of receiving the COVID vaccine today before making a decision? no

## 2021-12-19 NOTE — ED NOTES
HR decreased to 94.  Now in NSR.  Pt tolerated procedure well.  Cardiac pads to remain in place, for now, per ERP.  Cardiologist due in department soon, per ERP.

## 2021-12-19 NOTE — ED NOTES
Pt A&Ox4, resp even & unlabored, speech clear.  States SVT started at 0100 today; tried to self-convert w/out success.  Denies CP, lightheadedness, dizziness.  Reports abd discomfort epigastric area w/ bloated feeling.  Last BM: twice today.  Denies pain, burning w/ urination.  Reports he started using eliptical machine beginning of November; intermittent muscle cramping in legs and feet at noc.  Last oral intake: breakfast roll at 1400, water PTA.

## 2021-12-19 NOTE — CONSULTS
Cardiology Consult Note    DOS: 12/18/2021    Consulting physician: Teddy Kang MD    Chief complaint/Reason for consult: SVT    HPI:  Pt is a 67 yo M. He has a history of SVT for many years he says. Previously has had admission to the ED where he has had to receive IV adenosine. More recently he underwent CABG and AVR for severe AS back in 9/2020. He says SVT was an issue post CABG. He was placed on amiodarone for suppression. He says amio made him extremely fatigued. He did not like staying on the antiarrhythmic, so much that he discontinued it himself. He had checked with another cardiologist in [place] and was told he could be off the amiodarone and had him on a BB. Woke up earlier this AM around 1 with palpitations. Found to be again in narrow complex tachycardia around 130 bpm. Received IV adenosine with conversion to sinus.    ROS (+ highlighted in red):  Constitutional: Fevers/chills/fatigue/weightloss  HEENT: Blurry vision/eye pain/sore throat/hearing loss  Respiratory: Shortness of breath/cough  Cardiovascular: Chest pain/palpitations/edema/orthopnea/syncope  GI: Nausea/vomitting/diarrhea  MSK: Arthralgias/myagias/muscle weakness  Skin: Rash/sores  Neurological: Numbness/tremors/vertigo  Endocrine: Excessive thirst/polyuria/cold intolerance/heat intolerance  Psych: Depression/anxiety    Past Medical History:   Diagnosis Date   • Aortic stenosis 10/20/2011   • Arrhythmia    • Breath shortness    • Carotid bruit 12/9/2009   • Cataract     cataract extraction with IOL   • Chronic right shoulder pain    • Diabetes     oral medication   • Fatty liver 1/17/2011   • Heart attack (HCC) 12/2018   • History of cardiac catheterization 7/13/2009   • History of echocardiogram 10/20/2011   • HTN (hypertension) 10/12/2012   • Hypertension    • Memory loss 10/20/2011   • Murmur 7/7/2009   • Obesity 8/23/2011   • Palpitations 10/20/2011   • PSVT (paroxysmal supraventricular tachycardia) (HCC) 2/14/2012   • S/P coronary  artery stent placement 1996    coronary stent implantation x3, [facility]   • S/P coronary artery stent placement 1998    coronary stent implantation; LAD   • S/P coronary artery stent placement 2003    cardiac catheterization; patent stents; [place]   • Sciatica 8/23/2011   • Uncontrolled diabetes mellitus (HCC) 11/29/2010       Past Surgical History:   Procedure Laterality Date   • MULTIPLE CORONARY ARTERY BYPASS ENDO VEIN HARVEST N/A 8/31/2020    Procedure: CABG, WITH ENDOSCOPIC VEIN PROCUREMENT X1;  Surgeon: Dorys Casas M.D.;  Location: SURGERY [facility];  Service: Cardiothoracic   • AORTIC VALVE REPLACEMENT N/A 8/31/2020    Procedure: REPLACEMENT, AORTIC VALVE;  Surgeon: Dorys Casas M.D.;  Location: SURGERY [facility];  Service: Cardiothoracic   • ROMERO N/A 8/31/2020    Procedure: ECHOCARDIOGRAM, TRANSESOPHAGEAL;  Surgeon: Dorys Casas M.D.;  Location: SURGERY [facility];  Service: Cardiothoracic   • PO BY LAPAROSCOPY N/A 10/25/2015    Procedure: PO BY LAPAROSCOPY-with grams ;  Surgeon: Ronnie Bowman M.D.;  Location: SURGERY [facility];  Service:    • CAROTID STENT ANGIOGRAPHY  1996   • CATARACT EXTRACTION WITH IOL Bilateral        Social History     Socioeconomic History   • Marital status:      Spouse name: Not on file   • Number of children: Not on file   • Years of education: Not on file   • Highest education level: Not on file   Occupational History   • Not on file   Tobacco Use   • Smoking status: Never Smoker   • Smokeless tobacco: Never Used   Vaping Use   • Vaping Use: Never used   Substance and Sexual Activity   • Alcohol use: Never   • Drug use: Never   • Sexual activity: Yes     Partners: Female   Other Topics Concern   •  Service No   • Blood Transfusions No   • Caffeine Concern No   • Occupational Exposure No   • Hobby Hazards No   • Sleep Concern No   • Stress Concern No   • Weight Concern No   • Special Diet No   • Back Care Yes      Comment: BELT SUPPORT   • Exercise Yes   • Bike Helmet No   • Seat Belt Yes   • Self-Exams No   Social History Narrative    ** Merged History Encounter **          Social Determinants of Health     Financial Resource Strain:    • Difficulty of Paying Living Expenses: Not on file   Food Insecurity:    • Worried About Running Out of Food in the Last Year: Not on file   • Ran Out of Food in the Last Year: Not on file   Transportation Needs:    • Lack of Transportation (Medical): Not on file   • Lack of Transportation (Non-Medical): Not on file   Physical Activity:    • Days of Exercise per Week: Not on file   • Minutes of Exercise per Session: Not on file   Stress:    • Feeling of Stress : Not on file   Social Connections:    • Frequency of Communication with Friends and Family: Not on file   • Frequency of Social Gatherings with Friends and Family: Not on file   • Attends Sabianism Services: Not on file   • Active Member of Clubs or Organizations: Not on file   • Attends Club or Organization Meetings: Not on file   • Marital Status: Not on file   Intimate Partner Violence:    • Fear of Current or Ex-Partner: Not on file   • Emotionally Abused: Not on file   • Physically Abused: Not on file   • Sexually Abused: Not on file   Housing Stability:    • Unable to Pay for Housing in the Last Year: Not on file   • Number of Places Lived in the Last Year: Not on file   • Unstable Housing in the Last Year: Not on file       Family History   Problem Relation Age of Onset   • Heart Disease Mother    • Heart Attack Mother    • Heart Disease Father        Allergies   Allergen Reactions   • Bydureon [Exenatide] Hives     hives   • Cycloset [Bromocriptine Mesylate] Rash     Rash     • Morphine Vomiting and Nausea   • Morphine      Dizziness         Current Facility-Administered Medications   Medication Dose Route Frequency Provider Last Rate Last Admin   • Metoprolol Tartrate (LOPRESSOR) injection 5 mg  5 mg Intravenous Q5 MIN PRN  Teddy Kang M.D.         Current Outpatient Medications   Medication Sig Dispense Refill   • amLODIPine (NORVASC) 2.5 MG Tab Take 2.5 mg by mouth every evening.     • metformin (GLUCOPHAGE) 1000 MG tablet Take 1,000 mg by mouth every evening.     • rosuvastatin (CRESTOR) 20 MG Tab TAKE 1 TABLET BY MOUTH EVERY DAY (Patient taking differently: Take 20 mg by mouth every day.) 90 tablet 3   • losartan (COZAAR) 100 MG Tab TAKE 1 TABLET BY MOUTH EVERY DAY (Patient taking differently: Take 100 mg by mouth every day.) 90 tablet 3   • metoprolol (LOPRESSOR) 25 MG Tab Take 1 Tab by mouth 2 Times a Day. 180 Tab 3   • insulin lispro (HUMALOG) 100 UNIT/ML Inject 20-25 Units under the skin 2 times a day.     • aspirin EC (ECOTRIN) 81 MG Tablet Delayed Response Take 81 mg by mouth every evening.     • Empagliflozin (JARDIANCE) 25 MG Tab Take 25 mg by mouth every morning.         Physical Exam:  Vitals:    12/18/21 1554 12/18/21 1624 12/18/21 1654 12/18/21 1830   BP: 130/64 127/69 125/70 131/62   Pulse: (!) 132 (!) 131 (!) 132 88   Resp: 19 (!) 21 15 (!) 24   Temp:       TempSrc:       SpO2: 97% 95% 94% 95%   Weight:       Height:         General appearance: NAD, conversant   Eyes: anicteric sclerae, moist conjunctivae; no lid-lag; PERRLA  HENT: Atraumatic; oropharynx clear with moist mucous membranes and no mucosal ulcerations; normal hard and soft palate  Neck: Trachea midline; FROM, supple, no thyromegaly or lymphadenopathy  Lungs: CTA, with normal respiratory effort and no intercostal retractions  CV: + Sternotomy, RRR, 3/6 systolic murmur, no JVD   Abdomen: Soft, non-tender; no masses or HSM  Extremities: No peripheral edema or extremity lymphadenopathy  Skin: Normal temperature, turgor and texture; no rash, ulcers or subcutaneous nodules  Psych: Appropriate affect, alert and oriented to person, place and time    Data:  Labs reviewed    Prior echo/stress results reviewed:  LVEF 65%, normal     CXR interpreted by  "me:  WNL    EKG interpreted by me:   Narrow complex tachycardia (unclear p waves)    Impression/Plan:  1) SVT  2) CAD s/p CABG  3) AS s/p AVR  4) Intolerance of amiodarone    -Adenosine sensitive with essentially \"no-RP\" on EKG, probable AV node mediated  -Slower tachy with long slow pathway conduction  -I discussed that given his difficulty with antiarrhythmics EP study and ablation if inducible is reasonable approach  -Risks/benefits/alternatives discussed and he is interested in proceeding with ablation  -He wants to discuss with his wife first  -I will arrange for follow-up with him    Benjamin Ziegler MD    "

## 2021-12-19 NOTE — ED NOTES
Resting quietly on gurney; LR infusing; son in room.  Pt requests Tylenol for HA; will notify ERP.

## 2021-12-20 ENCOUNTER — TELEPHONE (OUTPATIENT)
Dept: CARDIOLOGY | Facility: MEDICAL CENTER | Age: 66
End: 2021-12-20

## 2021-12-20 NOTE — TELEPHONE ENCOUNTER
----- Message from Benjamin Ziegler M.D. sent at 12/18/2021  7:34 PM PST -----  Can you schedule f/u with me for this patient to discuss SVT ablation? Thanks.    Benjamin    --------------------------------------------------------------    Called both contact numbers on file and reached wife on home phone, left a voice message on cell phone. Per wife, she will have pt call back to schedule an appt with Dr. Ziegler.

## 2021-12-22 NOTE — TELEPHONE ENCOUNTER
Pt called back and said he is having some cardiac tests done in January in [place] at [facility] and will call to schedule an appt with SS after he gets the tests done.    Thank you

## 2022-02-09 ENCOUNTER — HOSPITAL ENCOUNTER (OUTPATIENT)
Dept: LAB | Facility: MEDICAL CENTER | Age: 67
End: 2022-02-09
Attending: INTERNAL MEDICINE
Payer: MEDICARE

## 2022-02-09 LAB
ALBUMIN SERPL BCP-MCNC: 4.4 G/DL (ref 3.2–4.9)
ALBUMIN/GLOB SERPL: 1.6 G/DL
ALP SERPL-CCNC: 60 U/L (ref 30–99)
ALT SERPL-CCNC: 20 U/L (ref 2–50)
AMYLASE SERPL-CCNC: 76 U/L (ref 20–103)
ANION GAP SERPL CALC-SCNC: 13 MMOL/L (ref 7–16)
AST SERPL-CCNC: 24 U/L (ref 12–45)
BASOPHILS # BLD AUTO: 0.4 % (ref 0–1.8)
BASOPHILS # BLD: 0.04 K/UL (ref 0–0.12)
BILIRUB CONJ SERPL-MCNC: <0.2 MG/DL (ref 0.1–0.5)
BILIRUB INDIRECT SERPL-MCNC: NORMAL MG/DL (ref 0–1)
BILIRUB SERPL-MCNC: 0.3 MG/DL (ref 0.1–1.5)
BUN SERPL-MCNC: 25 MG/DL (ref 8–22)
CALCIUM SERPL-MCNC: 9.5 MG/DL (ref 8.5–10.5)
CHLORIDE SERPL-SCNC: 101 MMOL/L (ref 96–112)
CHOLEST SERPL-MCNC: 150 MG/DL (ref 100–199)
CO2 SERPL-SCNC: 25 MMOL/L (ref 20–33)
CREAT SERPL-MCNC: 1.01 MG/DL (ref 0.5–1.4)
EOSINOPHIL # BLD AUTO: 0.1 K/UL (ref 0–0.51)
EOSINOPHIL NFR BLD: 1 % (ref 0–6.9)
ERYTHROCYTE [DISTWIDTH] IN BLOOD BY AUTOMATED COUNT: 44.1 FL (ref 35.9–50)
EST. AVERAGE GLUCOSE BLD GHB EST-MCNC: 214 MG/DL
FASTING STATUS PATIENT QL REPORTED: NORMAL
GLOBULIN SER CALC-MCNC: 2.7 G/DL (ref 1.9–3.5)
GLUCOSE SERPL-MCNC: 129 MG/DL (ref 65–99)
HBA1C MFR BLD: 9.1 % (ref 4–5.6)
HCT VFR BLD AUTO: 42 % (ref 42–52)
HDLC SERPL-MCNC: 36 MG/DL
HGB BLD-MCNC: 13.4 G/DL (ref 14–18)
IMM GRANULOCYTES # BLD AUTO: 0.09 K/UL (ref 0–0.11)
IMM GRANULOCYTES NFR BLD AUTO: 0.9 % (ref 0–0.9)
LDLC SERPL CALC-MCNC: 83 MG/DL
LIPASE SERPL-CCNC: 48 U/L (ref 11–82)
LYMPHOCYTES # BLD AUTO: 1.52 K/UL (ref 1–4.8)
LYMPHOCYTES NFR BLD: 15.3 % (ref 22–41)
MAGNESIUM SERPL-MCNC: 2.1 MG/DL (ref 1.5–2.5)
MCH RBC QN AUTO: 25 PG (ref 27–33)
MCHC RBC AUTO-ENTMCNC: 31.9 G/DL (ref 33.7–35.3)
MCV RBC AUTO: 78.4 FL (ref 81.4–97.8)
MONOCYTES # BLD AUTO: 1.03 K/UL (ref 0–0.85)
MONOCYTES NFR BLD AUTO: 10.4 % (ref 0–13.4)
NEUTROPHILS # BLD AUTO: 7.13 K/UL (ref 1.82–7.42)
NEUTROPHILS NFR BLD: 72 % (ref 44–72)
NRBC # BLD AUTO: 0 K/UL
NRBC BLD-RTO: 0 /100 WBC
PLATELET # BLD AUTO: 157 K/UL (ref 164–446)
PMV BLD AUTO: 10.5 FL (ref 9–12.9)
POTASSIUM SERPL-SCNC: 4.2 MMOL/L (ref 3.6–5.5)
PROT SERPL-MCNC: 7.1 G/DL (ref 6–8.2)
RBC # BLD AUTO: 5.36 M/UL (ref 4.7–6.1)
SODIUM SERPL-SCNC: 139 MMOL/L (ref 135–145)
TRIGL SERPL-MCNC: 157 MG/DL (ref 0–149)
TSH SERPL DL<=0.005 MIU/L-ACNC: 4.16 UIU/ML (ref 0.38–5.33)
WBC # BLD AUTO: 9.9 K/UL (ref 4.8–10.8)

## 2022-02-09 PROCEDURE — 84443 ASSAY THYROID STIM HORMONE: CPT

## 2022-02-09 PROCEDURE — 82150 ASSAY OF AMYLASE: CPT

## 2022-02-09 PROCEDURE — 80053 COMPREHEN METABOLIC PANEL: CPT

## 2022-02-09 PROCEDURE — 85025 COMPLETE CBC W/AUTO DIFF WBC: CPT

## 2022-02-09 PROCEDURE — 83690 ASSAY OF LIPASE: CPT

## 2022-02-09 PROCEDURE — 82248 BILIRUBIN DIRECT: CPT

## 2022-02-09 PROCEDURE — 83036 HEMOGLOBIN GLYCOSYLATED A1C: CPT | Mod: GA

## 2022-02-09 PROCEDURE — 36415 COLL VENOUS BLD VENIPUNCTURE: CPT

## 2022-02-09 PROCEDURE — 80061 LIPID PANEL: CPT | Mod: GA

## 2022-02-09 PROCEDURE — 87086 URINE CULTURE/COLONY COUNT: CPT

## 2022-02-09 PROCEDURE — 83735 ASSAY OF MAGNESIUM: CPT | Mod: GA

## 2022-02-11 LAB
BACTERIA UR CULT: NORMAL
SIGNIFICANT IND 70042: NORMAL
SITE SITE: NORMAL
SOURCE SOURCE: NORMAL

## 2022-02-21 DIAGNOSIS — I10 ESSENTIAL HYPERTENSION: ICD-10-CM

## 2022-02-22 RX ORDER — LOSARTAN POTASSIUM 100 MG/1
TABLET ORAL
Qty: 90 TABLET | Refills: 2 | Status: SHIPPED | OUTPATIENT
Start: 2022-02-22 | End: 2022-04-15

## 2022-02-26 ENCOUNTER — OFFICE VISIT (OUTPATIENT)
Dept: URGENT CARE | Facility: PHYSICIAN GROUP | Age: 67
End: 2022-02-26
Payer: MEDICARE

## 2022-02-26 VITALS
HEART RATE: 70 BPM | TEMPERATURE: 97.5 F | SYSTOLIC BLOOD PRESSURE: 132 MMHG | DIASTOLIC BLOOD PRESSURE: 70 MMHG | HEIGHT: 70 IN | BODY MASS INDEX: 32.07 KG/M2 | RESPIRATION RATE: 18 BRPM | OXYGEN SATURATION: 97 % | WEIGHT: 224 LBS

## 2022-02-26 DIAGNOSIS — H93.13 TINNITUS, BILATERAL: ICD-10-CM

## 2022-02-26 DIAGNOSIS — R09.81 SINUS CONGESTION: ICD-10-CM

## 2022-02-26 DIAGNOSIS — H66.002 ACUTE SUPPURATIVE OTITIS MEDIA OF LEFT EAR WITHOUT SPONTANEOUS RUPTURE OF TYMPANIC MEMBRANE, RECURRENCE NOT SPECIFIED: ICD-10-CM

## 2022-02-26 PROCEDURE — 99213 OFFICE O/P EST LOW 20 MIN: CPT | Performed by: PHYSICIAN ASSISTANT

## 2022-02-26 RX ORDER — METHYLPREDNISOLONE 4 MG/1
TABLET ORAL
Qty: 21 TABLET | Refills: 0 | Status: SHIPPED | OUTPATIENT
Start: 2022-02-26 | End: 2023-11-30

## 2022-02-26 RX ORDER — PROPRANOLOL HYDROCHLORIDE 20 MG/1
20 TABLET ORAL 2 TIMES DAILY
COMMUNITY
End: 2023-11-30

## 2022-02-26 RX ORDER — AMIODARONE HYDROCHLORIDE 200 MG/1
200 TABLET ORAL DAILY
COMMUNITY
End: 2022-07-22

## 2022-02-26 RX ORDER — AMOXICILLIN AND CLAVULANATE POTASSIUM 875; 125 MG/1; MG/1
1 TABLET, FILM COATED ORAL 2 TIMES DAILY
Qty: 14 TABLET | Refills: 0 | Status: SHIPPED | OUTPATIENT
Start: 2022-02-26 | End: 2022-03-05

## 2022-02-26 ASSESSMENT — FIBROSIS 4 INDEX: FIB4 SCORE: 2.29

## 2022-02-26 NOTE — PROGRESS NOTES
Subjective     Sharad Millan is a 66 y.o. male who presents with Ringing in Ear (Pt has a spot on his L side of head that is in pain. He is experiencing ringing in ears he has some times where he feels dizzy. He states its been going on for a few weeks now.)    Medications:    • amiodarone Tabs  • amLODIPine Tabs  • aspirin EC Tbec  • insulin lispro  • Jardiance Tabs  • losartan Tabs  • metformin  • metoprolol tartrate Tabs  • propranolol Tabs  • rosuvastatin Tabs    Allergies: Bydureon [exenatide], Cycloset [bromocriptine mesylate], Morphine, and Morphine    Problem List: Sharad Millan does not have any pertinent problems on file.    Surgical History:  Past Surgical History:   Procedure Laterality Date   • MULTIPLE CORONARY ARTERY BYPASS ENDO VEIN HARVEST N/A 8/31/2020    Procedure: CABG, WITH ENDOSCOPIC VEIN PROCUREMENT X1;  Surgeon: Dorys Casas M.D.;  Location: [facility];  Service: Cardiothoracic   • AORTIC VALVE REPLACEMENT N/A 8/31/2020    Procedure: REPLACEMENT, AORTIC VALVE;  Surgeon: Dorys Casas M.D.;  Location: [facility];  Service: Cardiothoracic   • ROMERO N/A 8/31/2020    Procedure: ECHOCARDIOGRAM, TRANSESOPHAGEAL;  Surgeon: Dorys Casas M.D.;  Location: [facility];  Service: Cardiothoracic   • PO BY LAPAROSCOPY N/A 10/25/2015    Procedure: PO BY LAPAROSCOPY-with grams ;  Surgeon: Ronnie Bowman M.D.;  Location: [facility];  Service:    • CAROTID STENT ANGIOGRAPHY  1996   • CATARACT EXTRACTION WITH IOL Bilateral        Past Social Hx: Sharad Millan  reports that he has never smoked. He has never used smokeless tobacco. He reports that he does not drink alcohol and does not use drugs.     Past Family Hx:  Sharad Millan family history includes Heart Attack in his mother; Heart Disease in his father and mother.     Problem list, medications, and allergies reviewed by myself today in Epic.          PT presents with complaint of  "scalp tenderness near left ear for 3 weeks, with accompanying  intermittent dizziness, tinnitus and intermittent headache for the last few weeks.  PT has history of tinnitus, just getting worse in the last few weeks.  Pt has taken some otc tylenol for his headache but no other otc medications.    Otalgia   There is pain in the left ear. This is a new problem. The current episode started 1 to 4 weeks ago. The problem occurs constantly. The problem has been gradually worsening. There has been no fever. The pain is moderate. Associated symptoms include headaches, hearing loss and rhinorrhea. Pertinent negatives include no ear discharge, neck pain or sore throat. He has tried acetaminophen for the symptoms. The treatment provided mild relief. His past medical history is significant for hearing loss (chronic worsening). There is no history of a tympanostomy tube.       Review of Systems   Constitutional: Negative for chills and fever.   HENT: Positive for congestion, ear pain, hearing loss, rhinorrhea and tinnitus. Negative for ear discharge and sore throat.    Musculoskeletal: Negative for neck pain.   Neurological: Positive for headaches.   All other systems reviewed and are negative.             Objective     /70   Pulse 70   Temp 36.4 °C (97.5 °F) (Temporal)   Resp 18   Ht 1.778 m (5' 10\")   Wt 102 kg (224 lb)   SpO2 97%   BMI 32.14 kg/m²      Physical Exam  Vitals and nursing note reviewed.   Constitutional:       General: He is not in acute distress.     Appearance: Normal appearance. He is well-developed. He is not ill-appearing or toxic-appearing.   HENT:      Head: Normocephalic and atraumatic.      Right Ear: Tympanic membrane normal.      Left Ear: Decreased hearing noted. No tenderness. A middle ear effusion is present. Tympanic membrane is erythematous and retracted.      Nose: Congestion present. No rhinorrhea.      Mouth/Throat:      Lips: Pink.      Mouth: Mucous membranes are moist.      " Pharynx: Oropharynx is clear. Uvula midline.   Eyes:      Extraocular Movements: Extraocular movements intact.      Conjunctiva/sclera: Conjunctivae normal.      Pupils: Pupils are equal, round, and reactive to light.   Cardiovascular:      Rate and Rhythm: Normal rate and regular rhythm.      Pulses: Normal pulses.      Heart sounds: Normal heart sounds.   Pulmonary:      Effort: Pulmonary effort is normal.      Breath sounds: Normal breath sounds.   Abdominal:      General: Bowel sounds are normal.      Palpations: Abdomen is soft.   Musculoskeletal:         General: Normal range of motion.      Cervical back: Normal range of motion and neck supple.   Skin:     General: Skin is warm and dry.      Capillary Refill: Capillary refill takes less than 2 seconds.   Neurological:      General: No focal deficit present.      Mental Status: He is alert and oriented to person, place, and time.      Cranial Nerves: No cranial nerve deficit.      Motor: Motor function is intact.      Coordination: Coordination is intact.      Gait: Gait normal.   Psychiatric:         Mood and Affect: Mood normal.                   Assessment & Plan               1. Acute suppurative otitis media of left ear without spontaneous rupture of tympanic membrane, recurrence not specified  amoxicillin-clavulanate (AUGMENTIN) 875-125 MG Tab    methylPREDNISolone (MEDROL DOSEPAK) 4 MG Tablet Therapy Pack   2. Sinus congestion  amoxicillin-clavulanate (AUGMENTIN) 875-125 MG Tab    methylPREDNISolone (MEDROL DOSEPAK) 4 MG Tablet Therapy Pack   3. Tinnitus, bilateral  amoxicillin-clavulanate (AUGMENTIN) 875-125 MG Tab    methylPREDNISolone (MEDROL DOSEPAK) 4 MG Tablet Therapy Pack     Patient was evaluated in clinic today while wearing appropriate personal protective equipment.      PT to begin prescription medications today as discussed for acute OM.     PT can begin or continue OTC medications, increase fluids and rest until symptoms improve.     PT  should follow up with PCP in 1-2 days for re-evaluation if symptoms have not improved.      Discussed red flags and reasons to return to UC or ED.      Pt and/or family verbalized understanding of diagnosis and follow up instructions and was offered informational handout on diagnosis.  PT discharged.

## 2022-03-03 ASSESSMENT — ENCOUNTER SYMPTOMS
CHILLS: 0
FEVER: 0
SORE THROAT: 0
RHINORRHEA: 1
NECK PAIN: 0
HEADACHES: 1

## 2022-04-15 DIAGNOSIS — I10 ESSENTIAL HYPERTENSION: ICD-10-CM

## 2022-04-15 RX ORDER — LOSARTAN POTASSIUM 100 MG/1
TABLET ORAL
Qty: 90 TABLET | Refills: 2 | Status: SHIPPED | OUTPATIENT
Start: 2022-04-15

## 2022-04-25 ENCOUNTER — HOSPITAL ENCOUNTER (OUTPATIENT)
Dept: LAB | Facility: MEDICAL CENTER | Age: 67
End: 2022-04-25
Attending: INTERNAL MEDICINE
Payer: MEDICARE

## 2022-04-25 LAB
ALBUMIN SERPL BCP-MCNC: 4.4 G/DL (ref 3.2–4.9)
ALBUMIN/GLOB SERPL: 1.5 G/DL
ALP SERPL-CCNC: 67 U/L (ref 30–99)
ALT SERPL-CCNC: 22 U/L (ref 2–50)
ANION GAP SERPL CALC-SCNC: 14 MMOL/L (ref 7–16)
APPEARANCE UR: CLEAR
AST SERPL-CCNC: 19 U/L (ref 12–45)
BACTERIA #/AREA URNS HPF: NEGATIVE /HPF
BASOPHILS # BLD AUTO: 0.4 % (ref 0–1.8)
BASOPHILS # BLD: 0.04 K/UL (ref 0–0.12)
BILIRUB CONJ SERPL-MCNC: <0.2 MG/DL (ref 0.1–0.5)
BILIRUB INDIRECT SERPL-MCNC: NORMAL MG/DL (ref 0–1)
BILIRUB SERPL-MCNC: 0.3 MG/DL (ref 0.1–1.5)
BILIRUB UR QL STRIP.AUTO: NEGATIVE
BUN SERPL-MCNC: 23 MG/DL (ref 8–22)
CALCIUM SERPL-MCNC: 9.6 MG/DL (ref 8.5–10.5)
CHLORIDE SERPL-SCNC: 98 MMOL/L (ref 96–112)
CHOLEST SERPL-MCNC: 140 MG/DL (ref 100–199)
CO2 SERPL-SCNC: 27 MMOL/L (ref 20–33)
COLOR UR: YELLOW
CREAT SERPL-MCNC: 1.22 MG/DL (ref 0.5–1.4)
EOSINOPHIL # BLD AUTO: 0.24 K/UL (ref 0–0.51)
EOSINOPHIL NFR BLD: 2.6 % (ref 0–6.9)
EPI CELLS #/AREA URNS HPF: NEGATIVE /HPF
ERYTHROCYTE [DISTWIDTH] IN BLOOD BY AUTOMATED COUNT: 42.5 FL (ref 35.9–50)
FASTING STATUS PATIENT QL REPORTED: NORMAL
GFR SERPLBLD CREATININE-BSD FMLA CKD-EPI: 65 ML/MIN/1.73 M 2
GLOBULIN SER CALC-MCNC: 2.9 G/DL (ref 1.9–3.5)
GLUCOSE SERPL-MCNC: 145 MG/DL (ref 65–99)
GLUCOSE UR STRIP.AUTO-MCNC: >=1000 MG/DL
HCT VFR BLD AUTO: 44.2 % (ref 42–52)
HDLC SERPL-MCNC: 37 MG/DL
HGB BLD-MCNC: 13.7 G/DL (ref 14–18)
HYALINE CASTS #/AREA URNS LPF: ABNORMAL /LPF
IMM GRANULOCYTES # BLD AUTO: 0.06 K/UL (ref 0–0.11)
IMM GRANULOCYTES NFR BLD AUTO: 0.6 % (ref 0–0.9)
KETONES UR STRIP.AUTO-MCNC: NEGATIVE MG/DL
LDLC SERPL CALC-MCNC: 73 MG/DL
LEUKOCYTE ESTERASE UR QL STRIP.AUTO: NEGATIVE
LYMPHOCYTES # BLD AUTO: 1.65 K/UL (ref 1–4.8)
LYMPHOCYTES NFR BLD: 17.6 % (ref 22–41)
MAGNESIUM SERPL-MCNC: 2.3 MG/DL (ref 1.5–2.5)
MCH RBC QN AUTO: 24.1 PG (ref 27–33)
MCHC RBC AUTO-ENTMCNC: 31 G/DL (ref 33.7–35.3)
MCV RBC AUTO: 77.7 FL (ref 81.4–97.8)
MICRO URNS: ABNORMAL
MONOCYTES # BLD AUTO: 0.86 K/UL (ref 0–0.85)
MONOCYTES NFR BLD AUTO: 9.2 % (ref 0–13.4)
NEUTROPHILS # BLD AUTO: 6.5 K/UL (ref 1.82–7.42)
NEUTROPHILS NFR BLD: 69.6 % (ref 44–72)
NITRITE UR QL STRIP.AUTO: NEGATIVE
NRBC # BLD AUTO: 0 K/UL
NRBC BLD-RTO: 0 /100 WBC
PH UR STRIP.AUTO: 6.5 [PH] (ref 5–8)
PLATELET # BLD AUTO: 170 K/UL (ref 164–446)
PMV BLD AUTO: 10.4 FL (ref 9–12.9)
POTASSIUM SERPL-SCNC: 4.3 MMOL/L (ref 3.6–5.5)
PROT SERPL-MCNC: 7.3 G/DL (ref 6–8.2)
PROT UR QL STRIP: 30 MG/DL
RBC # BLD AUTO: 5.69 M/UL (ref 4.7–6.1)
RBC # URNS HPF: ABNORMAL /HPF
RBC UR QL AUTO: NEGATIVE
SODIUM SERPL-SCNC: 139 MMOL/L (ref 135–145)
SP GR UR STRIP.AUTO: 1.03
TRIGL SERPL-MCNC: 152 MG/DL (ref 0–149)
TSH SERPL DL<=0.005 MIU/L-ACNC: 5.36 UIU/ML (ref 0.38–5.33)
UROBILINOGEN UR STRIP.AUTO-MCNC: 0.2 MG/DL
WBC # BLD AUTO: 9.4 K/UL (ref 4.8–10.8)
WBC #/AREA URNS HPF: ABNORMAL /HPF

## 2022-04-25 PROCEDURE — 36415 COLL VENOUS BLD VENIPUNCTURE: CPT

## 2022-04-25 PROCEDURE — 81001 URINALYSIS AUTO W/SCOPE: CPT

## 2022-04-25 PROCEDURE — 82248 BILIRUBIN DIRECT: CPT

## 2022-04-25 PROCEDURE — 80061 LIPID PANEL: CPT | Mod: GA

## 2022-04-25 PROCEDURE — 83735 ASSAY OF MAGNESIUM: CPT | Mod: GA

## 2022-04-25 PROCEDURE — 80053 COMPREHEN METABOLIC PANEL: CPT

## 2022-04-25 PROCEDURE — 84443 ASSAY THYROID STIM HORMONE: CPT

## 2022-04-25 PROCEDURE — 85025 COMPLETE CBC W/AUTO DIFF WBC: CPT

## 2022-04-27 ENCOUNTER — HOSPITAL ENCOUNTER (OUTPATIENT)
Dept: RADIOLOGY | Facility: MEDICAL CENTER | Age: 67
End: 2022-04-27
Attending: INTERNAL MEDICINE
Payer: MEDICARE

## 2022-04-27 DIAGNOSIS — M25.519 SHOULDER PAIN, UNSPECIFIED CHRONICITY, UNSPECIFIED LATERALITY: ICD-10-CM

## 2022-04-27 PROCEDURE — 73030 X-RAY EXAM OF SHOULDER: CPT | Mod: LT

## 2022-04-27 PROCEDURE — 73030 X-RAY EXAM OF SHOULDER: CPT | Mod: RT

## 2022-04-28 ENCOUNTER — HOSPITAL ENCOUNTER (OUTPATIENT)
Dept: RADIOLOGY | Facility: MEDICAL CENTER | Age: 67
End: 2022-04-28
Attending: INTERNAL MEDICINE
Payer: MEDICARE

## 2022-04-28 DIAGNOSIS — M25.561 RIGHT KNEE PAIN, UNSPECIFIED CHRONICITY: ICD-10-CM

## 2022-04-28 PROCEDURE — 73560 X-RAY EXAM OF KNEE 1 OR 2: CPT | Mod: RT

## 2022-05-04 ENCOUNTER — HOSPITAL ENCOUNTER (OUTPATIENT)
Dept: LAB | Facility: MEDICAL CENTER | Age: 67
End: 2022-05-04
Attending: INTERNAL MEDICINE
Payer: MEDICARE

## 2022-05-04 LAB
T3FREE SERPL-MCNC: 2.77 PG/ML (ref 2–4.4)
T4 FREE SERPL-MCNC: 0.92 NG/DL (ref 0.93–1.7)
THYROPEROXIDASE AB SERPL-ACNC: <9 IU/ML (ref 0–9)

## 2022-05-04 PROCEDURE — 86376 MICROSOMAL ANTIBODY EACH: CPT

## 2022-05-04 PROCEDURE — 84482 T3 REVERSE: CPT

## 2022-05-04 PROCEDURE — 86800 THYROGLOBULIN ANTIBODY: CPT

## 2022-05-04 PROCEDURE — 84439 ASSAY OF FREE THYROXINE: CPT

## 2022-05-04 PROCEDURE — 36415 COLL VENOUS BLD VENIPUNCTURE: CPT

## 2022-05-04 PROCEDURE — 84481 FREE ASSAY (FT-3): CPT

## 2022-05-06 LAB — THYROGLOB AB SERPL-ACNC: <0.9 IU/ML (ref 0–4)

## 2022-05-07 LAB — T4 FREE SERPL DIALY-MCNC: 1.2 NG/DL (ref 1.1–2.4)

## 2022-05-10 LAB — T3REVERSE SERPL-MCNC: 18.2 NG/DL (ref 9–27)

## 2022-09-29 ENCOUNTER — HOSPITAL ENCOUNTER (OUTPATIENT)
Dept: LAB | Facility: MEDICAL CENTER | Age: 67
End: 2022-09-29
Attending: INTERNAL MEDICINE
Payer: MEDICARE

## 2022-09-29 LAB
ALBUMIN SERPL BCP-MCNC: 4.5 G/DL (ref 3.2–4.9)
ALBUMIN/GLOB SERPL: 1.6 G/DL
ALP SERPL-CCNC: 58 U/L (ref 30–99)
ALT SERPL-CCNC: 29 U/L (ref 2–50)
ANION GAP SERPL CALC-SCNC: 13 MMOL/L (ref 7–16)
AST SERPL-CCNC: 24 U/L (ref 12–45)
BASOPHILS # BLD AUTO: 0.6 % (ref 0–1.8)
BASOPHILS # BLD: 0.06 K/UL (ref 0–0.12)
BILIRUB SERPL-MCNC: 0.3 MG/DL (ref 0.1–1.5)
BUN SERPL-MCNC: 29 MG/DL (ref 8–22)
CALCIUM SERPL-MCNC: 9.4 MG/DL (ref 8.5–10.5)
CHLORIDE SERPL-SCNC: 100 MMOL/L (ref 96–112)
CHOLEST SERPL-MCNC: 138 MG/DL (ref 100–199)
CO2 SERPL-SCNC: 23 MMOL/L (ref 20–33)
CREAT SERPL-MCNC: 1.37 MG/DL (ref 0.5–1.4)
EOSINOPHIL # BLD AUTO: 0.16 K/UL (ref 0–0.51)
EOSINOPHIL NFR BLD: 1.5 % (ref 0–6.9)
ERYTHROCYTE [DISTWIDTH] IN BLOOD BY AUTOMATED COUNT: 45.3 FL (ref 35.9–50)
EST. AVERAGE GLUCOSE BLD GHB EST-MCNC: 206 MG/DL
GFR SERPLBLD CREATININE-BSD FMLA CKD-EPI: 56 ML/MIN/1.73 M 2
GLOBULIN SER CALC-MCNC: 2.8 G/DL (ref 1.9–3.5)
GLUCOSE SERPL-MCNC: 190 MG/DL (ref 65–99)
HBA1C MFR BLD: 8.8 % (ref 4–5.6)
HCT VFR BLD AUTO: 42.8 % (ref 42–52)
HDLC SERPL-MCNC: 36 MG/DL
HGB BLD-MCNC: 13.6 G/DL (ref 14–18)
IMM GRANULOCYTES # BLD AUTO: 0.06 K/UL (ref 0–0.11)
IMM GRANULOCYTES NFR BLD AUTO: 0.6 % (ref 0–0.9)
LDLC SERPL CALC-MCNC: 65 MG/DL
LYMPHOCYTES # BLD AUTO: 1.34 K/UL (ref 1–4.8)
LYMPHOCYTES NFR BLD: 12.3 % (ref 22–41)
MAGNESIUM SERPL-MCNC: 2.1 MG/DL (ref 1.5–2.5)
MCH RBC QN AUTO: 24.4 PG (ref 27–33)
MCHC RBC AUTO-ENTMCNC: 31.8 G/DL (ref 33.7–35.3)
MCV RBC AUTO: 76.7 FL (ref 81.4–97.8)
MONOCYTES # BLD AUTO: 1 K/UL (ref 0–0.85)
MONOCYTES NFR BLD AUTO: 9.2 % (ref 0–13.4)
NEUTROPHILS # BLD AUTO: 8.26 K/UL (ref 1.82–7.42)
NEUTROPHILS NFR BLD: 75.8 % (ref 44–72)
NRBC # BLD AUTO: 0 K/UL
NRBC BLD-RTO: 0 /100 WBC
PLATELET # BLD AUTO: 185 K/UL (ref 164–446)
PMV BLD AUTO: 10.2 FL (ref 9–12.9)
POTASSIUM SERPL-SCNC: 4.2 MMOL/L (ref 3.6–5.5)
PROT SERPL-MCNC: 7.3 G/DL (ref 6–8.2)
RBC # BLD AUTO: 5.58 M/UL (ref 4.7–6.1)
SODIUM SERPL-SCNC: 136 MMOL/L (ref 135–145)
T3FREE SERPL-MCNC: 2.79 PG/ML (ref 2–4.4)
T4 FREE SERPL-MCNC: 1 NG/DL (ref 0.93–1.7)
TRIGL SERPL-MCNC: 183 MG/DL (ref 0–149)
TSH SERPL DL<=0.005 MIU/L-ACNC: 3.84 UIU/ML (ref 0.38–5.33)
WBC # BLD AUTO: 10.9 K/UL (ref 4.8–10.8)

## 2022-09-29 PROCEDURE — 84481 FREE ASSAY (FT-3): CPT

## 2022-09-29 PROCEDURE — 84482 T3 REVERSE: CPT

## 2022-09-29 PROCEDURE — 83036 HEMOGLOBIN GLYCOSYLATED A1C: CPT | Mod: GA

## 2022-09-29 PROCEDURE — 85025 COMPLETE CBC W/AUTO DIFF WBC: CPT

## 2022-09-29 PROCEDURE — 84443 ASSAY THYROID STIM HORMONE: CPT

## 2022-09-29 PROCEDURE — 84439 ASSAY OF FREE THYROXINE: CPT

## 2022-09-29 PROCEDURE — 80053 COMPREHEN METABOLIC PANEL: CPT

## 2022-09-29 PROCEDURE — 80061 LIPID PANEL: CPT

## 2022-09-29 PROCEDURE — 83735 ASSAY OF MAGNESIUM: CPT | Mod: GA

## 2022-09-29 PROCEDURE — 36415 COLL VENOUS BLD VENIPUNCTURE: CPT

## 2022-10-07 LAB — T3REVERSE SERPL-MCNC: 12.5 NG/DL (ref 9–27)

## 2022-10-18 DIAGNOSIS — I47.10 SVT (SUPRAVENTRICULAR TACHYCARDIA) (HCC): ICD-10-CM

## 2022-10-20 NOTE — TELEPHONE ENCOUNTER
Is the patient due for a refill? Yes    Was the patient seen the past year? No    Date of last office visit: 9/30/21    Does the patient have an upcoming appointment?  Yes   If yes, When? 11/29/22    Provider to refill:SAMIA,ADD    Does the patients insurance require a 100 day supply?  No

## 2022-10-21 RX ORDER — AMIODARONE HYDROCHLORIDE 200 MG/1
TABLET ORAL
Qty: 90 TABLET | Refills: 0 | Status: SHIPPED | OUTPATIENT
Start: 2022-10-21 | End: 2023-11-30

## 2022-11-08 ENCOUNTER — PATIENT MESSAGE (OUTPATIENT)
Dept: HEALTH INFORMATION MANAGEMENT | Facility: OTHER | Age: 67
End: 2022-11-08

## 2022-12-16 ENCOUNTER — TELEPHONE (OUTPATIENT)
Dept: SCHEDULING | Facility: IMAGING CENTER | Age: 67
End: 2022-12-16

## 2022-12-16 NOTE — TELEPHONE ENCOUNTER
1st attempt     Called patient to schedule with a pcp if he hasn't already done so please transfer the patient to [phone]

## 2023-03-10 ENCOUNTER — OFFICE VISIT (OUTPATIENT)
Dept: URGENT CARE | Facility: PHYSICIAN GROUP | Age: 68
End: 2023-03-10
Payer: MEDICARE

## 2023-03-10 VITALS
BODY MASS INDEX: 32.24 KG/M2 | HEART RATE: 89 BPM | WEIGHT: 225.2 LBS | TEMPERATURE: 97.7 F | RESPIRATION RATE: 16 BRPM | OXYGEN SATURATION: 97 % | DIASTOLIC BLOOD PRESSURE: 74 MMHG | HEIGHT: 70 IN | SYSTOLIC BLOOD PRESSURE: 128 MMHG

## 2023-03-10 DIAGNOSIS — J01.40 ACUTE NON-RECURRENT PANSINUSITIS: ICD-10-CM

## 2023-03-10 PROCEDURE — 99213 OFFICE O/P EST LOW 20 MIN: CPT | Performed by: PHYSICIAN ASSISTANT

## 2023-03-10 RX ORDER — AMOXICILLIN 500 MG/1
CAPSULE ORAL
COMMUNITY
Start: 2022-12-28 | End: 2023-03-10

## 2023-03-10 RX ORDER — MOXIFLOXACIN HYDROCHLORIDE 400 MG/1
400 TABLET ORAL DAILY
COMMUNITY
Start: 2022-12-30 | End: 2023-03-10

## 2023-03-10 RX ORDER — PEN NEEDLE, DIABETIC 32GX 5/32"
NEEDLE, DISPOSABLE MISCELLANEOUS
COMMUNITY
Start: 2023-02-20

## 2023-03-10 RX ORDER — METOPROLOL SUCCINATE 25 MG/1
25 TABLET, EXTENDED RELEASE ORAL
COMMUNITY
Start: 2023-02-06

## 2023-03-10 RX ORDER — BUDESONIDE 0.5 MG/2ML
INHALANT ORAL
COMMUNITY
Start: 2023-02-08

## 2023-03-10 RX ORDER — INSULIN LISPRO 100 [IU]/ML
INJECTION, SOLUTION INTRAVENOUS; SUBCUTANEOUS
COMMUNITY
Start: 2023-02-07

## 2023-03-10 RX ORDER — AMOXICILLIN AND CLAVULANATE POTASSIUM 875; 125 MG/1; MG/1
1 TABLET, FILM COATED ORAL 2 TIMES DAILY
Qty: 14 TABLET | Refills: 0 | Status: SHIPPED | OUTPATIENT
Start: 2023-03-10 | End: 2023-03-17

## 2023-03-10 ASSESSMENT — ENCOUNTER SYMPTOMS
PALPITATIONS: 0
CHILLS: 0
SHORTNESS OF BREATH: 0
FEVER: 0
HEADACHES: 1
SPUTUM PRODUCTION: 1
HEMOPTYSIS: 0
SINUS PRESSURE: 1
SINUS PAIN: 1
COUGH: 1

## 2023-03-10 ASSESSMENT — FIBROSIS 4 INDEX: FIB4 SCORE: 1.64

## 2023-03-10 NOTE — PROGRESS NOTES
Subjective:   Sharad Millan is a 67 y.o. male who presents today with   Chief Complaint   Patient presents with    Sinus Problem     4-5 weeks, trouble breathing through nose, all in sinus and ears, pain, post nasal drip, slight cough, OTC: Excedrin, mucinex, nothing has gotten better, clear to yellow, grey       Sinus Problem  This is a new problem. The current episode started more than 1 month ago. The problem has been gradually worsening since onset. There has been no fever. Associated symptoms include congestion, coughing, headaches and sinus pressure. Pertinent negatives include no chills or shortness of breath. Treatments tried: saline spray. The treatment provided no relief.     PMH:  has a past medical history of Aortic stenosis (10/20/2011), Arrhythmia, Breath shortness, Carotid bruit (12/9/2009), Cataract, Chronic right shoulder pain, Diabetes, Fatty liver (1/17/2011), Heart attack ([facility]) (12/2018), History of cardiac catheterization (7/13/2009), History of echocardiogram (10/20/2011), HTN (hypertension) (10/12/2012), Hypertension, Memory loss (10/20/2011), Murmur (7/7/2009), Obesity (8/23/2011), Palpitations (10/20/2011), PSVT (paroxysmal supraventricular tachycardia) ([facility]) (2/14/2012), S/P coronary artery stent placement (1996), S/P coronary artery stent placement (1998), S/P coronary artery stent placement (2003), Sciatica (8/23/2011), and Uncontrolled diabetes mellitus (11/29/2010).  MEDS:   Current Outpatient Medications:     amoxicillin-clavulanate (AUGMENTIN) 875-125 MG Tab, Take 1 Tablet by mouth 2 times a day for 7 days., Disp: 14 Tablet, Rfl: 0    insulin lispro 100 UNIT/ML SC SOPN injection PEN, UP TO 76 UNITS BY CORRECTION SCALE WITH MEALS SUBCUTANEOUS 90 DAYS 30, Disp: , Rfl:     metoprolol SR (TOPROL XL) 25 MG TABLET SR 24 HR, Take 25 mg by mouth every day., Disp: , Rfl:     BD PEN NEEDLE AVANI 2ND GEN, INJECT 8 NEEDLE SUBCUTANEOUSLY AS DIRECTED, Disp: , Rfl:     budesonide (PULMICORT)  0.5 MG/2ML Suspension, INHALE 1 VIAL VIA NEBULIZER TWICE A DAY FOR 10DAYS NOT COV, Disp: , Rfl:     Continuous Blood Gluc  (FREESTYLE AZIZA 2 READER) Device, USE TO CHECK BLOOD SUGAR (USE DISCOUNT CARD), Disp: , Rfl:     amiodarone (CORDARONE) 200 MG Tab, TAKE 1 TABLET BY MOUTH EVERY DAY (FROM RX [id number]), Disp: 90 Tablet, Rfl: 0    losartan (COZAAR) 100 MG Tab, TAKE 1 TABLET BY MOUTH EVERY DAY, Disp: 90 Tablet, Rfl: 2    propranolol (INDERAL) 20 MG Tab, Take 20 mg by mouth 2 times a day., Disp: , Rfl:     methylPREDNISolone (MEDROL DOSEPAK) 4 MG Tablet Therapy Pack, Follow schedule on package instructions., Disp: 21 Tablet, Rfl: 0    amLODIPine (NORVASC) 2.5 MG Tab, Take 2.5 mg by mouth every evening., Disp: , Rfl:     metformin (GLUCOPHAGE) 1000 MG tablet, Take 1,000 mg by mouth every evening., Disp: , Rfl:     rosuvastatin (CRESTOR) 20 MG Tab, TAKE 1 TABLET BY MOUTH EVERY DAY (Patient taking differently: Take 20 mg by mouth every day.), Disp: 90 tablet, Rfl: 3    insulin lispro (HUMALOG) 100 UNIT/ML, Inject 20-25 Units under the skin 2 times a day., Disp: , Rfl:     aspirin EC (ECOTRIN) 81 MG Tablet Delayed Response, Take 81 mg by mouth every evening., Disp: , Rfl:     Empagliflozin (JARDIANCE) 25 MG Tab, Take 25 mg by mouth every morning., Disp: , Rfl:   ALLERGIES:   Allergies   Allergen Reactions    Bydureon [Exenatide] Hives     hives    Cycloset [Bromocriptine Mesylate] Rash     Rash      Morphine Vomiting and Nausea     Dizziness    Morphine      Dizziness      Morphine Sulfate Unspecified     SURGHX:   Past Surgical History:   Procedure Laterality Date    MULTIPLE CORONARY ARTERY BYPASS ENDO VEIN HARVEST N/A 8/31/2020    Procedure: CABG, WITH ENDOSCOPIC VEIN PROCUREMENT X1;  Surgeon: Dorys Casas M.D.;  Location: [facility];  Service: Cardiothoracic    AORTIC VALVE REPLACEMENT N/A 8/31/2020    Procedure: REPLACEMENT, AORTIC VALVE;  Surgeon: Dorys Casas M.D.;  Location: [facility]  "[place];  Service: Cardiothoracic    ROMERO N/A 8/31/2020    Procedure: ECHOCARDIOGRAM, TRANSESOPHAGEAL;  Surgeon: Dorys Casas M.D.;  Location: SUR[facility]ERY [facility];  Service: Cardiothoracic    PO BY LAPAROSCOPY N/A 10/25/2015    Procedure: PO BY LAPAROSCOPY-with grams ;  Surgeon: Ronnie Bowman M.D.;  Location: SURGERY [facility];  Service:     CAROTID STENT ANGIOGRAPHY  1996    CATARACT EXTRACTION WITH IOL Bilateral      SOCHX:  reports that he has never smoked. He has never used smokeless tobacco. He reports that he does not drink alcohol and does not use drugs.  FH: Reviewed with patient, not pertinent to this visit.     Review of Systems   Constitutional:  Negative for chills and fever.   HENT:  Positive for congestion, sinus pressure and sinus pain.    Respiratory:  Positive for cough and sputum production. Negative for hemoptysis and shortness of breath.    Cardiovascular:  Negative for chest pain and palpitations.   Neurological:  Positive for headaches.      Objective:   /74   Pulse 89   Temp 36.5 °C (97.7 °F) (Temporal)   Resp 16   Ht 1.778 m (5' 10\")   Wt 102 kg (225 lb 3.2 oz)   SpO2 97%   BMI 32.31 kg/m²   Physical Exam  Vitals and nursing note reviewed.   Constitutional:       General: He is not in acute distress.     Appearance: Normal appearance. He is well-developed. He is not ill-appearing or toxic-appearing.   HENT:      Head: Normocephalic and atraumatic.      Right Ear: Hearing normal.      Left Ear: Hearing normal.      Nose: Mucosal edema and congestion present.      Right Sinus: Maxillary sinus tenderness and frontal sinus tenderness present.      Left Sinus: Maxillary sinus tenderness and frontal sinus tenderness present.   Cardiovascular:      Rate and Rhythm: Normal rate and regular rhythm.      Heart sounds: Normal heart sounds.   Pulmonary:      Effort: Pulmonary effort is normal.      Breath sounds: Normal breath sounds. No stridor. No wheezing, rhonchi or " rales.   Musculoskeletal:      Comments: Normal movement in all 4 extremities   Skin:     General: Skin is warm and dry.   Neurological:      Mental Status: He is alert.      Coordination: Coordination normal.   Psychiatric:         Mood and Affect: Mood normal.       Assessment/Plan:   Assessment    1. Acute non-recurrent pansinusitis  - amoxicillin-clavulanate (AUGMENTIN) 875-125 MG Tab; Take 1 Tablet by mouth 2 times a day for 7 days.  Dispense: 14 Tablet; Refill: 0    Other orders  - insulin lispro 100 UNIT/ML SC SOPN injection PEN; UP TO 76 UNITS BY CORRECTION SCALE WITH MEALS SUBCUTANEOUS 90 DAYS 30  - metoprolol SR (TOPROL XL) 25 MG TABLET SR 24 HR; Take 25 mg by mouth every day.  - BD PEN NEEDLE AVANI 2ND GEN; INJECT 8 NEEDLE SUBCUTANEOUSLY AS DIRECTED  - budesonide (PULMICORT) 0.5 MG/2ML Suspension; INHALE 1 VIAL VIA NEBULIZER TWICE A DAY FOR 10DAYS NOT COV  - Continuous Blood Gluc  (OpenROVYLE AZIZA 2 READER) Device; USE TO CHECK BLOOD SUGAR (USE DISCOUNT CARD)  Symptoms and presentation are consistent with sinus infection at this time and we will treat accordingly with antibiotics.  Recommend patient continue with over-the-counter remedies for symptomatic relief as well such as sinus rinse and nasal sprays.    Differential diagnosis, natural history, supportive care, and indications for immediate follow-up discussed.   Patient given instructions and understanding of medications and treatment.    If not improving in 3-5 days, F/U with PCP or return to  if symptoms worsen.    Patient agreeable to plan.      Please note that this dictation was created using voice recognition software. I have made every reasonable attempt to correct obvious errors, but I expect that there are errors of grammar and possibly content that I did not discover before finalizing the note.    Britton Maguire PA-C

## 2023-04-12 ENCOUNTER — HOSPITAL ENCOUNTER (OUTPATIENT)
Dept: LAB | Facility: MEDICAL CENTER | Age: 68
End: 2023-04-12
Attending: INTERNAL MEDICINE
Payer: MEDICARE

## 2023-04-12 LAB
ALBUMIN SERPL BCP-MCNC: 4.3 G/DL (ref 3.2–4.9)
ALBUMIN/GLOB SERPL: 1.4 G/DL
ALP SERPL-CCNC: 59 U/L (ref 30–99)
ALT SERPL-CCNC: 41 U/L (ref 2–50)
ANION GAP SERPL CALC-SCNC: 18 MMOL/L (ref 7–16)
APPEARANCE UR: CLEAR
AST SERPL-CCNC: 25 U/L (ref 12–45)
BASOPHILS # BLD AUTO: 0.6 % (ref 0–1.8)
BASOPHILS # BLD: 0.05 K/UL (ref 0–0.12)
BILIRUB SERPL-MCNC: 0.3 MG/DL (ref 0.1–1.5)
BILIRUB UR QL STRIP.AUTO: NEGATIVE
BUN SERPL-MCNC: 23 MG/DL (ref 8–22)
CALCIUM ALBUM COR SERPL-MCNC: 9.4 MG/DL (ref 8.5–10.5)
CALCIUM SERPL-MCNC: 9.6 MG/DL (ref 8.5–10.5)
CHLORIDE SERPL-SCNC: 101 MMOL/L (ref 96–112)
CHOLEST SERPL-MCNC: 149 MG/DL (ref 100–199)
CO2 SERPL-SCNC: 22 MMOL/L (ref 20–33)
COLOR UR: YELLOW
CREAT SERPL-MCNC: 1.23 MG/DL (ref 0.5–1.4)
EOSINOPHIL # BLD AUTO: 0.18 K/UL (ref 0–0.51)
EOSINOPHIL NFR BLD: 2.3 % (ref 0–6.9)
ERYTHROCYTE [DISTWIDTH] IN BLOOD BY AUTOMATED COUNT: 45.9 FL (ref 35.9–50)
EST. AVERAGE GLUCOSE BLD GHB EST-MCNC: 212 MG/DL
GFR SERPLBLD CREATININE-BSD FMLA CKD-EPI: 64 ML/MIN/1.73 M 2
GLOBULIN SER CALC-MCNC: 3.1 G/DL (ref 1.9–3.5)
GLUCOSE SERPL-MCNC: 248 MG/DL (ref 65–99)
GLUCOSE UR STRIP.AUTO-MCNC: >=1000 MG/DL
HBA1C MFR BLD: 9 % (ref 4–5.6)
HCT VFR BLD AUTO: 44.8 % (ref 42–52)
HDLC SERPL-MCNC: 40 MG/DL
HGB BLD-MCNC: 13.5 G/DL (ref 14–18)
IMM GRANULOCYTES # BLD AUTO: 0.04 K/UL (ref 0–0.11)
IMM GRANULOCYTES NFR BLD AUTO: 0.5 % (ref 0–0.9)
KETONES UR STRIP.AUTO-MCNC: NEGATIVE MG/DL
LDLC SERPL CALC-MCNC: 68 MG/DL
LEUKOCYTE ESTERASE UR QL STRIP.AUTO: NEGATIVE
LYMPHOCYTES # BLD AUTO: 1.35 K/UL (ref 1–4.8)
LYMPHOCYTES NFR BLD: 17.3 % (ref 22–41)
MAGNESIUM SERPL-MCNC: 2.2 MG/DL (ref 1.5–2.5)
MCH RBC QN AUTO: 23.8 PG (ref 27–33)
MCHC RBC AUTO-ENTMCNC: 30.1 G/DL (ref 33.7–35.3)
MCV RBC AUTO: 79 FL (ref 81.4–97.8)
MICRO URNS: ABNORMAL
MONOCYTES # BLD AUTO: 0.88 K/UL (ref 0–0.85)
MONOCYTES NFR BLD AUTO: 11.3 % (ref 0–13.4)
NEUTROPHILS # BLD AUTO: 5.31 K/UL (ref 1.82–7.42)
NEUTROPHILS NFR BLD: 68 % (ref 44–72)
NITRITE UR QL STRIP.AUTO: NEGATIVE
NRBC # BLD AUTO: 0 K/UL
NRBC BLD-RTO: 0 /100 WBC
PH UR STRIP.AUTO: 5.5 [PH] (ref 5–8)
PLATELET # BLD AUTO: 159 K/UL (ref 164–446)
PMV BLD AUTO: 10.8 FL (ref 9–12.9)
POTASSIUM SERPL-SCNC: 4.8 MMOL/L (ref 3.6–5.5)
PROT SERPL-MCNC: 7.4 G/DL (ref 6–8.2)
PROT UR QL STRIP: NEGATIVE MG/DL
RBC # BLD AUTO: 5.67 M/UL (ref 4.7–6.1)
RBC UR QL AUTO: NEGATIVE
SODIUM SERPL-SCNC: 141 MMOL/L (ref 135–145)
SP GR UR STRIP.AUTO: 1.02
TRIGL SERPL-MCNC: 207 MG/DL (ref 0–149)
UROBILINOGEN UR STRIP.AUTO-MCNC: 0.2 MG/DL
WBC # BLD AUTO: 7.8 K/UL (ref 4.8–10.8)

## 2023-04-12 PROCEDURE — 83036 HEMOGLOBIN GLYCOSYLATED A1C: CPT | Mod: GA

## 2023-04-12 PROCEDURE — 80053 COMPREHEN METABOLIC PANEL: CPT

## 2023-04-12 PROCEDURE — 81003 URINALYSIS AUTO W/O SCOPE: CPT

## 2023-04-12 PROCEDURE — 84443 ASSAY THYROID STIM HORMONE: CPT

## 2023-04-12 PROCEDURE — 36415 COLL VENOUS BLD VENIPUNCTURE: CPT

## 2023-04-12 PROCEDURE — 83735 ASSAY OF MAGNESIUM: CPT

## 2023-04-12 PROCEDURE — 85025 COMPLETE CBC W/AUTO DIFF WBC: CPT

## 2023-04-12 PROCEDURE — 80061 LIPID PANEL: CPT | Mod: GA

## 2023-04-13 LAB — TSH SERPL DL<=0.005 MIU/L-ACNC: 3.35 UIU/ML (ref 0.38–5.33)

## 2023-05-30 ENCOUNTER — HOSPITAL ENCOUNTER (OUTPATIENT)
Dept: LAB | Facility: MEDICAL CENTER | Age: 68
End: 2023-05-30
Payer: MEDICARE

## 2023-05-30 LAB
ALBUMIN SERPL BCP-MCNC: 4.4 G/DL (ref 3.2–4.9)
ALBUMIN/GLOB SERPL: 1.6 G/DL
ALP SERPL-CCNC: 54 U/L (ref 30–99)
ALT SERPL-CCNC: 20 U/L (ref 2–50)
ANION GAP SERPL CALC-SCNC: 10 MMOL/L (ref 7–16)
AST SERPL-CCNC: 17 U/L (ref 12–45)
BASOPHILS # BLD AUTO: 0.6 % (ref 0–1.8)
BASOPHILS # BLD: 0.05 K/UL (ref 0–0.12)
BILIRUB SERPL-MCNC: 0.3 MG/DL (ref 0.1–1.5)
BUN SERPL-MCNC: 18 MG/DL (ref 8–22)
CALCIUM ALBUM COR SERPL-MCNC: 9.1 MG/DL (ref 8.5–10.5)
CALCIUM SERPL-MCNC: 9.4 MG/DL (ref 8.5–10.5)
CHLORIDE SERPL-SCNC: 100 MMOL/L (ref 96–112)
CO2 SERPL-SCNC: 27 MMOL/L (ref 20–33)
CREAT SERPL-MCNC: 1.04 MG/DL (ref 0.5–1.4)
EOSINOPHIL # BLD AUTO: 0.32 K/UL (ref 0–0.51)
EOSINOPHIL NFR BLD: 3.8 % (ref 0–6.9)
ERYTHROCYTE [DISTWIDTH] IN BLOOD BY AUTOMATED COUNT: 42.8 FL (ref 35.9–50)
EST. AVERAGE GLUCOSE BLD GHB EST-MCNC: 212 MG/DL
FASTING STATUS PATIENT QL REPORTED: NORMAL
GFR SERPLBLD CREATININE-BSD FMLA CKD-EPI: 78 ML/MIN/1.73 M 2
GLOBULIN SER CALC-MCNC: 2.7 G/DL (ref 1.9–3.5)
GLUCOSE SERPL-MCNC: 142 MG/DL (ref 65–99)
HBA1C MFR BLD: 9 % (ref 4–5.6)
HCT VFR BLD AUTO: 40.8 % (ref 42–52)
HGB BLD-MCNC: 12.7 G/DL (ref 14–18)
IMM GRANULOCYTES # BLD AUTO: 0.03 K/UL (ref 0–0.11)
IMM GRANULOCYTES NFR BLD AUTO: 0.4 % (ref 0–0.9)
LYMPHOCYTES # BLD AUTO: 1.99 K/UL (ref 1–4.8)
LYMPHOCYTES NFR BLD: 23.6 % (ref 22–41)
MCH RBC QN AUTO: 23.7 PG (ref 27–33)
MCHC RBC AUTO-ENTMCNC: 31.1 G/DL (ref 32.3–36.5)
MCV RBC AUTO: 76.3 FL (ref 81.4–97.8)
MONOCYTES # BLD AUTO: 0.84 K/UL (ref 0–0.85)
MONOCYTES NFR BLD AUTO: 10 % (ref 0–13.4)
NEUTROPHILS # BLD AUTO: 5.21 K/UL (ref 1.82–7.42)
NEUTROPHILS NFR BLD: 61.6 % (ref 44–72)
NRBC # BLD AUTO: 0 K/UL
NRBC BLD-RTO: 0 /100 WBC (ref 0–0.2)
PLATELET # BLD AUTO: 173 K/UL (ref 164–446)
PMV BLD AUTO: 10.4 FL (ref 9–12.9)
POTASSIUM SERPL-SCNC: 4.3 MMOL/L (ref 3.6–5.5)
PROT SERPL-MCNC: 7.1 G/DL (ref 6–8.2)
RBC # BLD AUTO: 5.35 M/UL (ref 4.7–6.1)
SODIUM SERPL-SCNC: 137 MMOL/L (ref 135–145)
TSH SERPL DL<=0.005 MIU/L-ACNC: 4.78 UIU/ML (ref 0.38–5.33)
WBC # BLD AUTO: 8.4 K/UL (ref 4.8–10.8)

## 2023-05-30 PROCEDURE — 36415 COLL VENOUS BLD VENIPUNCTURE: CPT

## 2023-05-30 PROCEDURE — 80053 COMPREHEN METABOLIC PANEL: CPT

## 2023-05-30 PROCEDURE — 85025 COMPLETE CBC W/AUTO DIFF WBC: CPT

## 2023-05-30 PROCEDURE — 84403 ASSAY OF TOTAL TESTOSTERONE: CPT

## 2023-05-30 PROCEDURE — 82642 DIHYDROTESTOSTERONE: CPT

## 2023-05-30 PROCEDURE — 84443 ASSAY THYROID STIM HORMONE: CPT

## 2023-05-30 PROCEDURE — 83036 HEMOGLOBIN GLYCOSYLATED A1C: CPT | Mod: GA

## 2023-05-30 PROCEDURE — 84270 ASSAY OF SEX HORMONE GLOBUL: CPT

## 2023-05-30 PROCEDURE — 84402 ASSAY OF FREE TESTOSTERONE: CPT

## 2023-05-31 LAB
SHBG SERPL-SCNC: 42 NMOL/L (ref 19–76)
TESTOST FREE MFR SERPL: 1.6 % (ref 1.6–2.9)
TESTOST FREE SERPL-MCNC: 74 PG/ML (ref 47–244)
TESTOST SERPL-MCNC: 455 NG/DL (ref 300–720)

## 2023-06-02 LAB — ANDROSTANOLONE SERPL-MCNC: 305.3 PG/ML (ref 106–719)

## 2023-06-26 NOTE — PROGRESS NOTES
Per SW, refer to Dr. Casas (CardioThoracic Surgeon) for severe aortic stenosis  Referral initiated  
Attending Attestation (For Attendings USE Only)...

## 2023-09-13 ENCOUNTER — HOSPITAL ENCOUNTER (OUTPATIENT)
Dept: LAB | Facility: MEDICAL CENTER | Age: 68
End: 2023-09-13
Attending: INTERNAL MEDICINE
Payer: MEDICARE

## 2023-09-13 PROCEDURE — 84403 ASSAY OF TOTAL TESTOSTERONE: CPT

## 2023-09-13 PROCEDURE — 82670 ASSAY OF TOTAL ESTRADIOL: CPT

## 2023-09-13 PROCEDURE — 84144 ASSAY OF PROGESTERONE: CPT

## 2023-09-13 PROCEDURE — 36415 COLL VENOUS BLD VENIPUNCTURE: CPT

## 2023-09-13 PROCEDURE — 84270 ASSAY OF SEX HORMONE GLOBUL: CPT

## 2023-09-13 PROCEDURE — 82679 ASSAY OF ESTRONE: CPT

## 2023-09-13 PROCEDURE — 84402 ASSAY OF FREE TESTOSTERONE: CPT

## 2023-09-13 PROCEDURE — 82642 DIHYDROTESTOSTERONE: CPT

## 2023-09-14 LAB
ESTRADIOL SERPL-MCNC: 29.6 PG/ML
PROGEST SERPL-MCNC: 0.18 NG/ML
SHBG SERPL-SCNC: 40 NMOL/L (ref 19–76)
TESTOST FREE MFR SERPL: 1.7 % (ref 1.6–2.9)
TESTOST FREE SERPL-MCNC: 70 PG/ML (ref 47–244)
TESTOST SERPL-MCNC: 423 NG/DL (ref 300–720)

## 2023-09-16 LAB
ANDROSTANOLONE SERPL-MCNC: 270.9 PG/ML (ref 106–719)
ESTRONE SERPL-MCNC: 23.8 PG/ML (ref 9–36)

## 2023-11-30 ENCOUNTER — OFFICE VISIT (OUTPATIENT)
Dept: URGENT CARE | Facility: PHYSICIAN GROUP | Age: 68
End: 2023-11-30
Payer: MEDICARE

## 2023-11-30 ENCOUNTER — HOSPITAL ENCOUNTER (OUTPATIENT)
Facility: MEDICAL CENTER | Age: 68
End: 2023-11-30
Attending: FAMILY MEDICINE
Payer: MEDICARE

## 2023-11-30 VITALS
SYSTOLIC BLOOD PRESSURE: 142 MMHG | WEIGHT: 221 LBS | HEIGHT: 70 IN | DIASTOLIC BLOOD PRESSURE: 70 MMHG | RESPIRATION RATE: 16 BRPM | OXYGEN SATURATION: 98 % | TEMPERATURE: 97.4 F | HEART RATE: 78 BPM | BODY MASS INDEX: 31.64 KG/M2

## 2023-11-30 DIAGNOSIS — Z98.890 HISTORY OF CARDIAC CATHETERIZATION: Chronic | ICD-10-CM

## 2023-11-30 DIAGNOSIS — I10 ESSENTIAL HYPERTENSION: ICD-10-CM

## 2023-11-30 DIAGNOSIS — R39.198 DIFFICULTY IN URINATION: ICD-10-CM

## 2023-11-30 DIAGNOSIS — Z86.39 HISTORY OF DIABETES MELLITUS: ICD-10-CM

## 2023-11-30 DIAGNOSIS — I47.10 SVT (SUPRAVENTRICULAR TACHYCARDIA) (HCC): ICD-10-CM

## 2023-11-30 LAB
APPEARANCE UR: CLEAR
BILIRUB UR STRIP-MCNC: NEGATIVE MG/DL
COLOR UR AUTO: YELLOW
GLUCOSE UR STRIP.AUTO-MCNC: >=1000 MG/DL
KETONES UR STRIP.AUTO-MCNC: NEGATIVE MG/DL
LEUKOCYTE ESTERASE UR QL STRIP.AUTO: NEGATIVE
NITRITE UR QL STRIP.AUTO: NEGATIVE
PH UR STRIP.AUTO: 5 [PH] (ref 5–8)
PROT UR QL STRIP: 100 MG/DL
RBC UR QL AUTO: NORMAL
SP GR UR STRIP.AUTO: 1.01
UROBILINOGEN UR STRIP-MCNC: 0.2 MG/DL

## 2023-11-30 PROCEDURE — 3077F SYST BP >= 140 MM HG: CPT | Performed by: FAMILY MEDICINE

## 2023-11-30 PROCEDURE — 87086 URINE CULTURE/COLONY COUNT: CPT

## 2023-11-30 PROCEDURE — 81002 URINALYSIS NONAUTO W/O SCOPE: CPT | Performed by: FAMILY MEDICINE

## 2023-11-30 PROCEDURE — 3078F DIAST BP <80 MM HG: CPT | Performed by: FAMILY MEDICINE

## 2023-11-30 PROCEDURE — 99214 OFFICE O/P EST MOD 30 MIN: CPT | Performed by: FAMILY MEDICINE

## 2023-11-30 RX ORDER — TAMSULOSIN HYDROCHLORIDE 0.4 MG/1
0.4 CAPSULE ORAL DAILY
Qty: 14 CAPSULE | Refills: 0 | Status: SHIPPED | OUTPATIENT
Start: 2023-11-30 | End: 2023-12-05

## 2023-11-30 RX ORDER — PHENAZOPYRIDINE HYDROCHLORIDE 200 MG/1
200 TABLET, FILM COATED ORAL EVERY 12 HOURS PRN
Qty: 4 TABLET | Refills: 0 | Status: SHIPPED | OUTPATIENT
Start: 2023-11-30

## 2023-11-30 RX ORDER — SULFAMETHOXAZOLE AND TRIMETHOPRIM 800; 160 MG/1; MG/1
1 TABLET ORAL EVERY 12 HOURS
Qty: 10 TABLET | Refills: 0 | Status: SHIPPED | OUTPATIENT
Start: 2023-11-30 | End: 2023-12-05

## 2023-11-30 ASSESSMENT — FIBROSIS 4 INDEX: FIB4 SCORE: 1.45

## 2023-12-01 NOTE — PROGRESS NOTES
Subjective     Sharad Millan is a 67 y.o. male who presents with Urinary Retention (Catheter from ER was pulled out and hurt, pain with urinating started X 2 weeks ago having pain, UA ran, no infection)      - This is a very pleasant 67 y.o. who has come to the walk-in clinic today for ~4 weeks or so w/ having some pain on urination and urinary frequency (sometimes 3-4x/hr) and urine stream weak. Says symptoms started after having a catheter pulled out and felt a lot of pain as it was pulled out. Was sedated for cardiac ablation for svt       Hx SVT, had ablation ~4 weeks ago.     Hx DM II, HgA1C 8.8 10/23. Says sugars have been runnin under 200 past week  GFR 62  10/23    Hx HTN, well controlled on current meds     ALLERGIES:  Bydureon [exenatide], Cycloset [bromocriptine mesylate], Morphine, Morphine, and Morphine sulfate     PMH:  Past Medical History:   Diagnosis Date    Aortic stenosis 10/20/2011    Arrhythmia     Breath shortness     Carotid bruit 12/9/2009    Cataract     cataract extraction with IOL    Chronic right shoulder pain     Diabetes     oral medication    Fatty liver 1/17/2011    Heart attack (HCC) 12/2018    History of cardiac catheterization 7/13/2009    History of echocardiogram 10/20/2011    HTN (hypertension) 10/12/2012    Hypertension     Memory loss 10/20/2011    Murmur 7/7/2009    Obesity 8/23/2011    Palpitations 10/20/2011    PSVT (paroxysmal supraventricular tachycardia) 2/14/2012    S/P coronary artery stent placement 1996    coronary stent implantation x3, [facility]    S/P coronary artery stent placement 1998    coronary stent implantation; [facility]    S/P coronary artery stent placement 2003    cardiac catheterization; patent stents; [place]    Sciatica 8/23/2011    Uncontrolled diabetes mellitus 11/29/2010        PSH:  Past Surgical History:   Procedure Laterality Date    MULTIPLE CORONARY ARTERY BYPASS ENDO VEIN HARVEST N/A 8/31/2020    Procedure: CABG, WITH  ENDOSCOPIC VEIN PROCUREMENT X1;  Surgeon: Dorys Casas M.D.;  Location: SURGERY [facility];  Service: Cardiothoracic    AORTIC VALVE REPLACEMENT N/A 8/31/2020    Procedure: REPLACEMENT, AORTIC VALVE;  Surgeon: Dorys Casas M.D.;  Location: SURGERY [facility];  Service: Cardiothoracic    ROMERO N/A 8/31/2020    Procedure: ECHOCARDIOGRAM, TRANSESOPHAGEAL;  Surgeon: Dorys Casas M.D.;  Location: SURGERY [facility];  Service: Cardiothoracic    PO BY LAPAROSCOPY N/A 10/25/2015    Procedure: PO BY LAPAROSCOPY-with grams ;  Surgeon: Ronnie Bowman M.D.;  Location: SURGERY [facility];  Service:     CAROTID STENT ANGIOGRAPHY  1996    CATARACT EXTRACTION WITH IOL Bilateral        MEDS:    Current Outpatient Medications:     phenazopyridine (PYRIDIUM) 200 MG Tab, Take 1 Tablet by mouth every 12 hours as needed for Mild Pain., Disp: 4 Tablet, Rfl: 0    sulfamethoxazole-trimethoprim (BACTRIM DS) 800-160 MG tablet, Take 1 Tablet by mouth every 12 hours for 5 days., Disp: 10 Tablet, Rfl: 0    tamsulosin (FLOMAX) 0.4 MG capsule, Take 1 Capsule by mouth every day., Disp: 14 Capsule, Rfl: 0    insulin lispro 100 UNIT/ML SC SOPN injection PEN, UP TO 76 UNITS BY CORRECTION SCALE WITH MEALS SUBCUTANEOUS 90 DAYS 30, Disp: , Rfl:     metoprolol SR (TOPROL XL) 25 MG TABLET SR 24 HR, Take 25 mg by mouth every day., Disp: , Rfl:     BD PEN NEEDLE AVANI 2ND GEN, INJECT 8 NEEDLE SUBCUTANEOUSLY AS DIRECTED, Disp: , Rfl:     budesonide (PULMICORT) 0.5 MG/2ML Suspension, INHALE 1 VIAL VIA NEBULIZER TWICE A DAY FOR 10DAYS NOT COV, Disp: , Rfl:     Continuous Blood Gluc  (FREESTYLE AZIZA 2 READER) Device, USE TO CHECK BLOOD SUGAR (USE DISCOUNT CARD), Disp: , Rfl:     losartan (COZAAR) 100 MG Tab, TAKE 1 TABLET BY MOUTH EVERY DAY, Disp: 90 Tablet, Rfl: 2    amLODIPine (NORVASC) 2.5 MG Tab, Take 2.5 mg by mouth every evening., Disp: , Rfl:     metformin (GLUCOPHAGE) 1000 MG tablet, Take 1,000 mg by mouth every evening.,  "Disp: , Rfl:     rosuvastatin (CRESTOR) 20 MG Tab, TAKE 1 TABLET BY MOUTH EVERY DAY (Patient taking differently: Take 20 mg by mouth every day.), Disp: 90 tablet, Rfl: 3    insulin lispro (HUMALOG) 100 UNIT/ML, Inject 20-25 Units under the skin 2 times a day., Disp: , Rfl:     aspirin EC (ECOTRIN) 81 MG Tablet Delayed Response, Take 81 mg by mouth every evening., Disp: , Rfl:     Empagliflozin (JARDIANCE) 25 MG Tab, Take 25 mg by mouth every morning., Disp: , Rfl:     ** I have documented what I find to be significant in regards to past medical, social, family and surgical history  in my HPI or under PMH/PSH/FH review section, otherwise it is noncontributory **         HPI    Review of Systems   Genitourinary:  Positive for dysuria.   All other systems reviewed and are negative.             Objective     BP (!) 142/70   Pulse 78   Temp 36.3 °C (97.4 °F) (Temporal)   Resp 16   Ht 1.778 m (5' 10\")   Wt 100 kg (221 lb)   SpO2 98%   BMI 31.71 kg/m²      Physical Exam  Vitals and nursing note reviewed.   Constitutional:       General: He is not in acute distress.     Appearance: Normal appearance. He is well-developed.   HENT:      Head: Normocephalic.   Cardiovascular:      Heart sounds: Normal heart sounds. No murmur heard.  Pulmonary:      Effort: Pulmonary effort is normal. No respiratory distress.      Breath sounds: Normal breath sounds.   Abdominal:      General: There is no distension.      Palpations: Abdomen is soft.      Tenderness: There is no abdominal tenderness. There is no guarding or rebound.   Neurological:      Mental Status: He is alert.      Motor: No abnormal muscle tone.   Psychiatric:         Mood and Affect: Mood normal.         Behavior: Behavior normal.                             Assessment & Plan     1. Difficulty in urination  POCT Urinalysis    URINE CULTURE(NEW)    Referral to Urology    phenazopyridine (PYRIDIUM) 200 MG Tab    sulfamethoxazole-trimethoprim (BACTRIM DS) 800-160 MG " tablet    tamsulosin (FLOMAX) 0.4 MG capsule      2. SVT (supraventricular tachycardia) (HCC)        3. History of cardiac catheterization        4. History of diabetes mellitus        5. Essential hypertension            - Dx, plan & d/c instructions discussed   - Rest, stay hydrated, OTC Tylenol as needed  - E.R. precautions discussed     Follow up with your regular primary care providers office within a week to keep them updated and informed of this visit and for regular routine health maintenance check-ups. ER if not improving in 2-3 days or if feeling/getting worse.     Any realistic side effects of medications that may have been given today reviewed.     Patient left in stable condition     POCT results reviewed/discussed    Pertinent prior lab work and/or imaging studies in Epic have been reviewed by me today on day of this visit and taken into account for my treatment and plan today    Pertinent PMH/PSH and/or chronic conditions and medications if any were reviewed today and taken into account for my treatment and plan today    Pertinent prior office visit notes in [facility] have been reviewed by me today on day of this visit.    Please note that this dictation may have been created using voice recognition software, if so I have made every reasonable attempt to correct obvious errors, but I expect that there are errors of grammar and possibly content that I did not discover before finalizing the note.

## 2023-12-02 LAB
BACTERIA UR CULT: NORMAL
SIGNIFICANT IND 70042: NORMAL
SITE SITE: NORMAL
SOURCE SOURCE: NORMAL

## 2023-12-05 ENCOUNTER — OFFICE VISIT (OUTPATIENT)
Dept: UROLOGY | Facility: MEDICAL CENTER | Age: 68
End: 2023-12-05
Payer: MEDICARE

## 2023-12-05 VITALS
SYSTOLIC BLOOD PRESSURE: 127 MMHG | BODY MASS INDEX: 31.34 KG/M2 | HEART RATE: 86 BPM | HEIGHT: 70 IN | DIASTOLIC BLOOD PRESSURE: 54 MMHG | WEIGHT: 218.9 LBS | OXYGEN SATURATION: 97 %

## 2023-12-05 DIAGNOSIS — N13.8 BPH WITH OBSTRUCTION/LOWER URINARY TRACT SYMPTOMS: ICD-10-CM

## 2023-12-05 DIAGNOSIS — N40.1 BPH WITH OBSTRUCTION/LOWER URINARY TRACT SYMPTOMS: ICD-10-CM

## 2023-12-05 LAB
APPEARANCE UR: CLEAR
BILIRUB UR STRIP-MCNC: NEGATIVE MG/DL
COLOR UR AUTO: YELLOW
GLUCOSE UR STRIP.AUTO-MCNC: 500 MG/DL
KETONES UR STRIP.AUTO-MCNC: NEGATIVE MG/DL
LEUKOCYTE ESTERASE UR QL STRIP.AUTO: NEGATIVE
NITRITE UR QL STRIP.AUTO: NEGATIVE
PH UR STRIP.AUTO: 5.5 [PH] (ref 5–8)
POC POST-VOID: 128 ML
POC PRE-VOID: 238 ML
PROT UR QL STRIP: 30 MG/DL
RBC UR QL AUTO: NEGATIVE
SP GR UR STRIP.AUTO: 1.01
UROBILINOGEN UR STRIP-MCNC: 0.2 MG/DL

## 2023-12-05 PROCEDURE — 81002 URINALYSIS NONAUTO W/O SCOPE: CPT | Performed by: STUDENT IN AN ORGANIZED HEALTH CARE EDUCATION/TRAINING PROGRAM

## 2023-12-05 PROCEDURE — 51798 US URINE CAPACITY MEASURE: CPT | Performed by: STUDENT IN AN ORGANIZED HEALTH CARE EDUCATION/TRAINING PROGRAM

## 2023-12-05 PROCEDURE — 3078F DIAST BP <80 MM HG: CPT | Performed by: STUDENT IN AN ORGANIZED HEALTH CARE EDUCATION/TRAINING PROGRAM

## 2023-12-05 PROCEDURE — 99204 OFFICE O/P NEW MOD 45 MIN: CPT | Performed by: STUDENT IN AN ORGANIZED HEALTH CARE EDUCATION/TRAINING PROGRAM

## 2023-12-05 PROCEDURE — 3074F SYST BP LT 130 MM HG: CPT | Performed by: STUDENT IN AN ORGANIZED HEALTH CARE EDUCATION/TRAINING PROGRAM

## 2023-12-05 RX ORDER — TAMSULOSIN HYDROCHLORIDE 0.4 MG/1
0.4 CAPSULE ORAL EVERY 12 HOURS
Qty: 60 CAPSULE | Refills: 3 | Status: SHIPPED | OUTPATIENT
Start: 2023-12-05

## 2023-12-05 ASSESSMENT — FIBROSIS 4 INDEX: FIB4 SCORE: 1.45

## 2023-12-05 NOTE — PROGRESS NOTES
Subjective  Sharad Millan is a 67 y.o. male who presents today for evaluation of BPH/LUTS.    He had an ablation for SVT approximately 5 weeks ago, since that time he has had significant changes in his urinary symptoms. He is straining, his stream is intermittent, stream is a trickle, he feels he is not emptying, he is peeing Q2 hours or more often, getting up 5 or more times at night, experiencing pain at the tip of his penis. He notes slight improvement in his stream with tamsulosin, this effect only lasts 4-6 hours. No UTI, no hematuria.    Family History   Problem Relation Age of Onset    Heart Disease Mother     Heart Attack Mother     Heart Disease Father        Social History     Socioeconomic History    Marital status:      Spouse name: Not on file    Number of children: Not on file    Years of education: Not on file    Highest education level: Not on file   Occupational History    Not on file   Tobacco Use    Smoking status: Never    Smokeless tobacco: Never   Vaping Use    Vaping Use: Never used   Substance and Sexual Activity    Alcohol use: Never    Drug use: Never    Sexual activity: Yes     Partners: Female   Other Topics Concern     Service No    Blood Transfusions No    Caffeine Concern No    Occupational Exposure No    Hobby Hazards No    Sleep Concern No    Stress Concern No    Weight Concern No    Special Diet No    Back Care Yes     Comment: BELT SUPPORT    Exercise Yes    Bike Helmet No    Seat Belt Yes    Self-Exams No   Social History Narrative    ** Merged History Encounter **          Social Determinants of Health     Financial Resource Strain: Not on file   Food Insecurity: Not on file   Transportation Needs: Not on file   Physical Activity: Not on file   Stress: Not on file   Social Connections: Not on file   Intimate Partner Violence: Not on file   Housing Stability: Not on file       Past Surgical History:   Procedure Laterality Date    MULTIPLE CORONARY ARTERY  BYPASS ENDO VEIN HARVEST N/A 8/31/2020    Procedure: CABG, WITH ENDOSCOPIC VEIN PROCUREMENT X1;  Surgeon: Dorys Casas M.D.;  Location: SURGERY [facility];  Service: Cardiothoracic    AORTIC VALVE REPLACEMENT N/A 8/31/2020    Procedure: REPLACEMENT, AORTIC VALVE;  Surgeon: Dorys Casas M.D.;  Location: SURGERY [facility];  Service: Cardiothoracic    ROMERO N/A 8/31/2020    Procedure: ECHOCARDIOGRAM, TRANSESOPHAGEAL;  Surgeon: Dorys Casas M.D.;  Location: SURGERY [facility];  Service: Cardiothoracic    PO BY LAPAROSCOPY N/A 10/25/2015    Procedure: PO BY LAPAROSCOPY-with grams ;  Surgeon: Ronnie Bowman M.D.;  Location: [facility];  Service:     CAROTID STENT ANGIOGRAPHY  1996    CATARACT EXTRACTION WITH IOL Bilateral        Past Medical History:   Diagnosis Date    Aortic stenosis 10/20/2011    Arrhythmia     Breath shortness     Carotid bruit 12/9/2009    Cataract     cataract extraction with IOL    Chronic right shoulder pain     Diabetes     oral medication    Fatty liver 1/17/2011    Heart attack (HCC) 12/2018    History of cardiac catheterization 7/13/2009    History of echocardiogram 10/20/2011    HTN (hypertension) 10/12/2012    Hypertension     Memory loss 10/20/2011    Murmur 7/7/2009    Obesity 8/23/2011    Palpitations 10/20/2011    PSVT (paroxysmal supraventricular tachycardia) 2/14/2012    S/P coronary artery stent placement 1996    coronary stent implantation x3, [facility]    S/P coronary artery stent placement 1998    coronary stent implantation; [facility]    S/P coronary artery stent placement 2003    cardiac catheterization; patent stents; [place]    Sciatica 8/23/2011    Uncontrolled diabetes mellitus 11/29/2010       Current Outpatient Medications   Medication Sig Dispense Refill    tamsulosin (FLOMAX) 0.4 MG capsule Take 1 Capsule by mouth every 12 hours. 60 Capsule 3    phenazopyridine (PYRIDIUM) 200 MG Tab Take 1 Tablet by mouth every 12 hours as needed  "for Mild Pain. 4 Tablet 0    sulfamethoxazole-trimethoprim (BACTRIM DS) 800-160 MG tablet Take 1 Tablet by mouth every 12 hours for 5 days. 10 Tablet 0    insulin lispro 100 UNIT/ML SC SOPN injection PEN UP TO 76 UNITS BY CORRECTION SCALE WITH MEALS SUBCUTANEOUS 90 DAYS 30      metoprolol SR (TOPROL XL) 25 MG TABLET SR 24 HR Take 25 mg by mouth every day.      BD PEN NEEDLE AVANI 2ND GEN INJECT 8 NEEDLE SUBCUTANEOUSLY AS DIRECTED      Continuous Blood Gluc  (FREESTYLE AZIZA 2 READER) Device USE TO CHECK BLOOD SUGAR (USE DISCOUNT CARD)      losartan (COZAAR) 100 MG Tab TAKE 1 TABLET BY MOUTH EVERY DAY 90 Tablet 2    amLODIPine (NORVASC) 2.5 MG Tab Take 2.5 mg by mouth every evening.      metformin (GLUCOPHAGE) 1000 MG tablet Take 1,000 mg by mouth every evening.      rosuvastatin (CRESTOR) 20 MG Tab TAKE 1 TABLET BY MOUTH EVERY DAY (Patient taking differently: Take 20 mg by mouth every day.) 90 tablet 3    aspirin EC (ECOTRIN) 81 MG Tablet Delayed Response Take 81 mg by mouth every evening.      Empagliflozin (JARDIANCE) 25 MG Tab Take 25 mg by mouth every morning.      budesonide (PULMICORT) 0.5 MG/2ML Suspension INHALE 1 VIAL VIA NEBULIZER TWICE A DAY FOR 10DAYS NOT COV      insulin lispro (HUMALOG) 100 UNIT/ML Inject 20-25 Units under the skin 2 times a day.       No current facility-administered medications for this visit.       Allergies   Allergen Reactions    Bydureon [Exenatide] Hives     hives    Cycloset [Bromocriptine Mesylate] Rash     Rash      Morphine Vomiting and Nausea     Dizziness    Morphine      Dizziness      Morphine Sulfate Unspecified       Objective  /54 (BP Location: Right arm, Patient Position: Sitting, BP Cuff Size: Adult)   Pulse 86   Ht 1.778 m (5' 10\")   Wt 99.3 kg (218 lb 14.4 oz)   SpO2 97%   Physical Exam  Constitutional:       Appearance: Normal appearance.   HENT:      Head: Normocephalic and atraumatic.   Eyes:      Extraocular Movements: Extraocular movements " intact.      Conjunctiva/sclera: Conjunctivae normal.   Pulmonary:      Effort: No respiratory distress.   Musculoskeletal:      Cervical back: Normal range of motion.   Skin:     General: Skin is warm and dry.   Neurological:      General: No focal deficit present.      Mental Status: He is alert.   Psychiatric:         Mood and Affect: Mood normal.         Labs:   CBC   Lab Results   Component Value Date/Time    WBC 12.7 (H) 10/18/2023 0409    RBC 5.35 05/30/2023 0727    HEMOGLOBIN 12.6 (L) 10/18/2023 0409    HEMATOCRIT 40.4 10/18/2023 0409    MCV 78 (L) 10/18/2023 0409    MCH 24.5 (L) 10/18/2023 0409    MCHC 31.2 10/18/2023 0409    RDW 15.6 10/18/2023 0409    MPV 10.4 05/30/2023 0727    LYMPHOCYTES 20 10/17/2023 0905    LYMPHS 1.99 05/30/2023 0727    MONOCYTES 11 10/17/2023 0905    MONOS 0.7 10/17/2023 0905    EOSINOPHILS 1 10/17/2023 0905    EOS 0.1 10/17/2023 0905    BASOPHILS 1 10/17/2023 0905    BASO 0.0 10/17/2023 0905    NRBC 0.00 05/30/2023 0727     BMP   Lab Results   Component Value Date/Time    SODIUM 137 05/30/2023 0727    POTASSIUM 4.3 05/30/2023 0727    CHLORIDE 100 05/30/2023 0727    CO2 27 05/30/2023 0727    GLUCOSE 142 (H) 05/30/2023 0727    BUN 18 05/30/2023 0727    CREATININE 1.04 05/30/2023 0727    CALCIUM 9.4 05/30/2023 0727     PSA   Lab Results   Component Value Date/Time    PSATOTAL 0.87 01/03/2017 0800     UA 12/5/23: negative    Imaging:     none    Assessment    And I discussed with the patient that after undergoing anesthesia it is possible to have an acute worsening of lower urinary tract symptoms as the bladder may have been compensating adequately prior, but then has rapid decompensation.  Given he has some improvement with the Flomax in the morning we will have him take this once in the morning and once at night.  If he still has very bothersome symptoms we could consider a procedure for bladder outlet obstruction such as TURP.  He understands it to move forward with any kind of  procedure we would need to perform a cystoscopy and a transrectal ultrasound to assess the anatomy of his prostate.  He is agreeable to this plan.  PVR today 128 cc    Plan    Problem List Items Addressed This Visit       BPH with obstruction/lower urinary tract symptoms     Tamsulosin 0.4 mg BID  RTC 2 weeks with PVR and symptom check  Referral to PFPT         Relevant Medications    tamsulosin (FLOMAX) 0.4 MG capsule    Other Relevant Orders    POCT Urinalysis    POCT BLADDER SCAN    Referral to Physical Therapy

## 2023-12-11 ENCOUNTER — TELEPHONE (OUTPATIENT)
Dept: UROLOGY | Facility: MEDICAL CENTER | Age: 68
End: 2023-12-11
Payer: MEDICARE

## 2023-12-11 NOTE — TELEPHONE ENCOUNTER
VOICEMAIL  1. Caller Name: Sharad Millan                      Call Back Number: [phone]    2. Message: patient called and lm wanting to know what medication it was that Dr. Velasquez told him to double up as he had forgot. He did mention that his BP had dropped down to 89-57 and felt like he was going to pass out so he skipped one day of the medication. He would like a call back from the provider on what to do next.    3. Patient approves office to leave a detailed voicemail/MyChart message: yes        I did try to call the patient to get more information but the call would not go through

## 2023-12-14 NOTE — TELEPHONE ENCOUNTER
Call to patient regarding hypotension with taking flomax BID. Patient now taking flomax once daily with no reported hypotension. I have informed patient that he can continue taking the flomax once daily but if he has any hypotension, please discontinue. Pt will move forward with cystoscopy with Dr Velasquez for further evaluation

## 2023-12-21 ENCOUNTER — OFFICE VISIT (OUTPATIENT)
Dept: UROLOGY | Facility: MEDICAL CENTER | Age: 68
End: 2023-12-21
Payer: MEDICARE

## 2023-12-21 ENCOUNTER — HOSPITAL ENCOUNTER (OUTPATIENT)
Facility: MEDICAL CENTER | Age: 68
End: 2023-12-21
Attending: STUDENT IN AN ORGANIZED HEALTH CARE EDUCATION/TRAINING PROGRAM | Admitting: STUDENT IN AN ORGANIZED HEALTH CARE EDUCATION/TRAINING PROGRAM
Payer: MEDICARE

## 2023-12-21 ENCOUNTER — TELEPHONE (OUTPATIENT)
Dept: UROLOGY | Facility: MEDICAL CENTER | Age: 68
End: 2023-12-21

## 2023-12-21 DIAGNOSIS — N40.1 BPH WITH OBSTRUCTION/LOWER URINARY TRACT SYMPTOMS: ICD-10-CM

## 2023-12-21 DIAGNOSIS — N13.8 BPH WITH OBSTRUCTION/LOWER URINARY TRACT SYMPTOMS: ICD-10-CM

## 2023-12-21 PROCEDURE — 99214 OFFICE O/P EST MOD 30 MIN: CPT | Mod: 25 | Performed by: STUDENT IN AN ORGANIZED HEALTH CARE EDUCATION/TRAINING PROGRAM

## 2023-12-21 PROCEDURE — 52000 CYSTOURETHROSCOPY: CPT | Performed by: STUDENT IN AN ORGANIZED HEALTH CARE EDUCATION/TRAINING PROGRAM

## 2023-12-21 NOTE — PROGRESS NOTES
Subjective  Sharad Millan is a 68 y.o. male who presents today for cystoscopy to evaluate BPH and OAB. No signs of UTI today.    Family History   Problem Relation Age of Onset    Heart Disease Mother     Heart Attack Mother     Heart Disease Father        Social History     Socioeconomic History    Marital status:      Spouse name: Not on file    Number of children: Not on file    Years of education: Not on file    Highest education level: Not on file   Occupational History    Not on file   Tobacco Use    Smoking status: Never    Smokeless tobacco: Never   Vaping Use    Vaping Use: Never used   Substance and Sexual Activity    Alcohol use: Never    Drug use: Never    Sexual activity: Yes     Partners: Female   Other Topics Concern     Service No    Blood Transfusions No    Caffeine Concern No    Occupational Exposure No    Hobby Hazards No    Sleep Concern No    Stress Concern No    Weight Concern No    Special Diet No    Back Care Yes     Comment: BELT SUPPORT    Exercise Yes    Bike Helmet No    Seat Belt Yes    Self-Exams No   Social History Narrative    ** Merged History Encounter **          Social Determinants of Health     Financial Resource Strain: Not on file   Food Insecurity: Not on file   Transportation Needs: Not on file   Physical Activity: Not on file   Stress: Not on file   Social Connections: Not on file   Intimate Partner Violence: Not on file   Housing Stability: Not on file       Past Surgical History:   Procedure Laterality Date    MULTIPLE CORONARY ARTERY BYPASS ENDO VEIN HARVEST N/A 8/31/2020    Procedure: CABG, WITH ENDOSCOPIC VEIN PROCUREMENT X1;  Surgeon: Dorys Casas M.D.;  Location: SURGERY [facility];  Service: Cardiothoracic    AORTIC VALVE REPLACEMENT N/A 8/31/2020    Procedure: REPLACEMENT, AORTIC VALVE;  Surgeon: Dorys Casas M.D.;  Location: SURGERY [facility];  Service: Cardiothoracic    ROMERO N/A 8/31/2020    Procedure: ECHOCARDIOGRAM, TRANSESOPHAGEAL;   Surgeon: Dorys Casas M.D.;  Location: SURGERY [facility];  Service: Cardiothoracic    PO BY LAPAROSCOPY N/A 10/25/2015    Procedure: PO BY LAPAROSCOPY-with grams ;  Surgeon: Ronnie Bowman M.D.;  Location: SURGERY [facility];  Service:     CAROTID STENT ANGIOGRAPHY  1996    CATARACT EXTRACTION WITH IOL Bilateral        Past Medical History:   Diagnosis Date    Aortic stenosis 10/20/2011    Arrhythmia     Breath shortness     Carotid bruit 12/9/2009    Cataract     cataract extraction with IOL    Chronic right shoulder pain     Diabetes     oral medication    Fatty liver 1/17/2011    Heart attack (HCC) 12/2018    History of cardiac catheterization 7/13/2009    History of echocardiogram 10/20/2011    HTN (hypertension) 10/12/2012    Hypertension     Memory loss 10/20/2011    Murmur 7/7/2009    Obesity 8/23/2011    Palpitations 10/20/2011    PSVT (paroxysmal supraventricular tachycardia) 2/14/2012    S/P coronary artery stent placement 1996    coronary stent implantation x3, [facility]    S/P coronary artery stent placement 1998    coronary stent implantation; [facility]    S/P coronary artery stent placement 2003    cardiac catheterization; patent stents; [place]    Sciatica 8/23/2011    Uncontrolled diabetes mellitus 11/29/2010       Current Outpatient Medications   Medication Sig Dispense Refill    tamsulosin (FLOMAX) 0.4 MG capsule Take 1 Capsule by mouth every 12 hours. 60 Capsule 3    phenazopyridine (PYRIDIUM) 200 MG Tab Take 1 Tablet by mouth every 12 hours as needed for Mild Pain. 4 Tablet 0    insulin lispro 100 UNIT/ML SC SOPN injection PEN UP TO 76 UNITS BY CORRECTION SCALE WITH MEALS SUBCUTANEOUS 90 DAYS 30      metoprolol SR (TOPROL XL) 25 MG TABLET SR 24 HR Take 25 mg by mouth every day.      BD PEN NEEDLE AVANI 2ND GEN INJECT 8 NEEDLE SUBCUTANEOUSLY AS DIRECTED      Continuous Blood Gluc  (FREESTYLE AZIZA 2 READER) Device USE TO CHECK BLOOD SUGAR (USE DISCOUNT CARD)       losartan (COZAAR) 100 MG Tab TAKE 1 TABLET BY MOUTH EVERY DAY 90 Tablet 2    amLODIPine (NORVASC) 2.5 MG Tab Take 2.5 mg by mouth every evening.      metformin (GLUCOPHAGE) 1000 MG tablet Take 1,000 mg by mouth every evening.      rosuvastatin (CRESTOR) 20 MG Tab TAKE 1 TABLET BY MOUTH EVERY DAY (Patient taking differently: Take 20 mg by mouth every day.) 90 tablet 3    aspirin EC (ECOTRIN) 81 MG Tablet Delayed Response Take 81 mg by mouth every evening.      Empagliflozin (JARDIANCE) 25 MG Tab Take 25 mg by mouth every morning.      budesonide (PULMICORT) 0.5 MG/2ML Suspension INHALE 1 VIAL VIA NEBULIZER TWICE A DAY FOR 10DAYS NOT COV      insulin lispro (HUMALOG) 100 UNIT/ML Inject 20-25 Units under the skin 2 times a day.       No current facility-administered medications for this visit.       Allergies   Allergen Reactions    Bydureon [Exenatide] Hives     hives    Cycloset [Bromocriptine Mesylate] Rash     Rash      Morphine Vomiting and Nausea     Dizziness    Morphine      Dizziness      Morphine Sulfate Unspecified       Objective  There were no vitals taken for this visit.  Physical Exam  Constitutional:       Appearance: Normal appearance.   HENT:      Head: Normocephalic and atraumatic.   Eyes:      Extraocular Movements: Extraocular movements intact.      Conjunctiva/sclera: Conjunctivae normal.   Pulmonary:      Effort: No respiratory distress.   Abdominal:      General: There is no distension.   Musculoskeletal:      Cervical back: Normal range of motion.   Skin:     General: Skin is warm and dry.   Neurological:      General: No focal deficit present.      Mental Status: He is alert.   Psychiatric:         Mood and Affect: Mood normal.         Labs:     POC UA  Lab Results   Component Value Date/Time    POCCOLOR yellow 12/05/2023 09:46 AM    POCAPPEAR clear 12/05/2023 09:46 AM    POCLEUKEST negative 12/05/2023 09:46 AM    POCNITRITE negative 12/05/2023 09:46 AM    POCUROBILIGE 0.2 12/05/2023 09:46 AM     POCPROTEIN 30 12/05/2023 09:46 AM    POCURPH 5.5 12/05/2023 09:46 AM    POCBLOOD negative 12/05/2023 09:46 AM    POCSPGRV 1.015 12/05/2023 09:46 AM    POCKETONES negative 12/05/2023 09:46 AM    POCBILIRUBIN negative 12/05/2023 09:46 AM    POCGLUCUA 500 12/05/2023 09:46 AM        A1C  Lab Results   Component Value Date/Time    HBA1C 8.8 (H) 10/18/2023 0409    AVGLUC 206 10/18/2023 0409       Imaging:     CT A/P 03/2021: Prostate volume estimated to be 60 g    Procedure    Procedure performed: Cystoscopy  with complex strange placement    Surgeon: Dr. Kiesha Velasquez    Indications For Procedure: BPH and OAB    Blood Loss: 0 cc     Anesthesia: Lidocaine jelly     Specimen: none     Findings:     1. Urethra: bulbar urethral stricture approximately 14 Fr    2. Bladder: not able to assess due to stricture    3. Bilateral ureteral jets not able to assess    4. Prostate: not assessed due to stricture    Description of Procedure: The patient was prepped and draped in the usual sterile fashion.  A 17Fr flexible cystoscope was advanced along the urethra into the bladder under direct vision.  Given I was not able to advance the scope beyond the stricture I placed a sensor wire and placed an 18Fr [employer] [place] tip catheter over the wire with return of clear yellow urine. 10cc sterile water was placed in the balloon. This concluded the procedure, the patient tolerated it well.     Assessment  Patient was instructed to call our office if he develops any signs of infection including increased frequency, urgency, dysuria, fever, or chills.  We discussed the results of the cystoscopy with the patient, all questions and concerns addressed.     Plan    RTC 12/26 for void trial, will plan for further intervention in the OR. We will assess his stricture and provide any treatment needed, I will also assess his prostate. If he has enough prostatic obstruction I will also perform  TURP. Patient prefers to avoid additional trips to the OR and would  like to complete any procedure necessary.

## 2023-12-22 ENCOUNTER — TELEPHONE (OUTPATIENT)
Dept: UROLOGY | Facility: MEDICAL CENTER | Age: 68
End: 2023-12-22
Payer: MEDICARE

## 2023-12-22 RX ORDER — OXYBUTYNIN 3.9 MG/D
1 PATCH TRANSDERMAL
Qty: 8 PATCH | Refills: 2 | Status: SHIPPED | OUTPATIENT
Start: 2023-12-22

## 2023-12-22 NOTE — TELEPHONE ENCOUNTER
----- Message from Zee Dominguez sent at 12/21/2023  2:25 PM PST -----  Regarding: Susie  Sharad just called and was wondering if he needed to wear the patch you're ordering today and if so he said the [facility] had no order placed and I did not see one in the chart. Please let me know if he needs to get it today so I can call him and reach back out to him.

## 2023-12-26 ENCOUNTER — OFFICE VISIT (OUTPATIENT)
Dept: UROLOGY | Facility: MEDICAL CENTER | Age: 68
End: 2023-12-26
Payer: MEDICARE

## 2023-12-26 VITALS — HEIGHT: 70 IN | BODY MASS INDEX: 31.23 KG/M2 | WEIGHT: 218.14 LBS

## 2023-12-26 DIAGNOSIS — N13.8 BPH WITH OBSTRUCTION/LOWER URINARY TRACT SYMPTOMS: ICD-10-CM

## 2023-12-26 DIAGNOSIS — N40.1 BPH WITH OBSTRUCTION/LOWER URINARY TRACT SYMPTOMS: ICD-10-CM

## 2023-12-26 PROCEDURE — 99213 OFFICE O/P EST LOW 20 MIN: CPT | Mod: 25 | Performed by: STUDENT IN AN ORGANIZED HEALTH CARE EDUCATION/TRAINING PROGRAM

## 2023-12-26 PROCEDURE — 52000 CYSTOURETHROSCOPY: CPT | Performed by: STUDENT IN AN ORGANIZED HEALTH CARE EDUCATION/TRAINING PROGRAM

## 2023-12-26 RX ORDER — SULFAMETHOXAZOLE AND TRIMETHOPRIM 800; 160 MG/1; MG/1
1 TABLET ORAL 2 TIMES DAILY
Qty: 6 TABLET | Refills: 0 | Status: SHIPPED | OUTPATIENT
Start: 2023-12-26

## 2023-12-26 ASSESSMENT — FIBROSIS 4 INDEX: FIB4 SCORE: 1.47

## 2023-12-26 NOTE — PROGRESS NOTES
Subjective  Sharad Millan is a 68 y.o. male who presents today for cystoscopy to evaluate BPH and Urethral Stricture. The patient is here today for catheter removal after attempted cystoscopy last week. During our last cystoscopy I noted a bulbar urethral stricture, this was dilated and an 18Fr strange was placed. He wishes to proceed with cystoscopy today to inspect for any prostatic hypertrophy that could be contributing to his symptoms.     Family History   Problem Relation Age of Onset    Heart Disease Mother     Heart Attack Mother     Heart Disease Father        Social History     Socioeconomic History    Marital status:      Spouse name: Not on file    Number of children: Not on file    Years of education: Not on file    Highest education level: Not on file   Occupational History    Not on file   Tobacco Use    Smoking status: Never    Smokeless tobacco: Never   Vaping Use    Vaping Use: Never used   Substance and Sexual Activity    Alcohol use: Never    Drug use: Never    Sexual activity: Yes     Partners: Female   Other Topics Concern     Service No    Blood Transfusions No    Caffeine Concern No    Occupational Exposure No    Hobby Hazards No    Sleep Concern No    Stress Concern No    Weight Concern No    Special Diet No    Back Care Yes     Comment: BELT SUPPORT    Exercise Yes    Bike Helmet No    Seat Belt Yes    Self-Exams No   Social History Narrative    ** Merged History Encounter **          Social Determinants of Health     Financial Resource Strain: Not on file   Food Insecurity: Not on file   Transportation Needs: Not on file   Physical Activity: Not on file   Stress: Not on file   Social Connections: Not on file   Intimate Partner Violence: Not on file   Housing Stability: Not on file       Past Surgical History:   Procedure Laterality Date    MULTIPLE CORONARY ARTERY BYPASS ENDO VEIN HARVEST N/A 8/31/2020    Procedure: CABG, WITH ENDOSCOPIC VEIN PROCUREMENT X1;  Surgeon:  Dorys Casas M.D.;  Location: SURGERY [facility];  Service: Cardiothoracic    AORTIC VALVE REPLACEMENT N/A 8/31/2020    Procedure: REPLACEMENT, AORTIC VALVE;  Surgeon: Dorys Casas M.D.;  Location: SURGERY [facility];  Service: Cardiothoracic    ROMERO N/A 8/31/2020    Procedure: ECHOCARDIOGRAM, TRANSESOPHAGEAL;  Surgeon: Dorys Casas M.D.;  Location: SURGERY [facility];  Service: Cardiothoracic    PO BY LAPAROSCOPY N/A 10/25/2015    Procedure: PO BY LAPAROSCOPY-with grams ;  Surgeon: Ronnie Bowman M.D.;  Location: SURGERY [facility];  Service:     CAROTID STENT ANGIOGRAPHY  1996    CATARACT EXTRACTION WITH IOL Bilateral        Past Medical History:   Diagnosis Date    Aortic stenosis 10/20/2011    Arrhythmia     Breath shortness     Carotid bruit 12/9/2009    Cataract     cataract extraction with IOL    Chronic right shoulder pain     Diabetes     oral medication    Fatty liver 1/17/2011    Heart attack (HCC) 12/2018    History of cardiac catheterization 7/13/2009    History of echocardiogram 10/20/2011    HTN (hypertension) 10/12/2012    Hypertension     Memory loss 10/20/2011    Murmur 7/7/2009    Obesity 8/23/2011    Palpitations 10/20/2011    PSVT (paroxysmal supraventricular tachycardia) 2/14/2012    S/P coronary artery stent placement 1996    coronary stent implantation x3, [facility]    S/P coronary artery stent placement 1998    coronary stent implantation; [facility]    S/P coronary artery stent placement 2003    cardiac catheterization; patent stents; [place]    Sciatica 8/23/2011    Uncontrolled diabetes mellitus 11/29/2010       Current Outpatient Medications   Medication Sig Dispense Refill    oxybutynin (OXYTROL) 3.9 MG/24HR patch Place 1 Patch on the skin every 3 days. 8 Patch 2    tamsulosin (FLOMAX) 0.4 MG capsule Take 1 Capsule by mouth every 12 hours. 60 Capsule 3    insulin lispro 100 UNIT/ML SC SOPN injection PEN UP TO 76 UNITS BY CORRECTION SCALE WITH MEALS  "SUBCUTANEOUS 90 DAYS 30      metoprolol SR (TOPROL XL) 25 MG TABLET SR 24 HR Take 25 mg by mouth every day.      BD PEN NEEDLE AVANI 2ND GEN INJECT 8 NEEDLE SUBCUTANEOUSLY AS DIRECTED      Continuous Blood Gluc  (FREESTYLE AZIZA 2 READER) Device USE TO CHECK BLOOD SUGAR (USE DISCOUNT CARD)      losartan (COZAAR) 100 MG Tab TAKE 1 TABLET BY MOUTH EVERY DAY 90 Tablet 2    amLODIPine (NORVASC) 2.5 MG Tab Take 2.5 mg by mouth every evening.      metformin (GLUCOPHAGE) 1000 MG tablet Take 1,000 mg by mouth every evening.      rosuvastatin (CRESTOR) 20 MG Tab TAKE 1 TABLET BY MOUTH EVERY DAY (Patient taking differently: Take 20 mg by mouth every day.) 90 tablet 3    aspirin EC (ECOTRIN) 81 MG Tablet Delayed Response Take 81 mg by mouth every evening.      Empagliflozin (JARDIANCE) 25 MG Tab Take 25 mg by mouth every morning.      phenazopyridine (PYRIDIUM) 200 MG Tab Take 1 Tablet by mouth every 12 hours as needed for Mild Pain. 4 Tablet 0    budesonide (PULMICORT) 0.5 MG/2ML Suspension INHALE 1 VIAL VIA NEBULIZER TWICE A DAY FOR 10DAYS NOT COV      insulin lispro (HUMALOG) 100 UNIT/ML Inject 20-25 Units under the skin 2 times a day.       No current facility-administered medications for this visit.       Allergies   Allergen Reactions    Bydureon [Exenatide] Hives     hives    Cycloset [Bromocriptine Mesylate] Rash     Rash      Morphine Vomiting and Nausea     Dizziness    Morphine      Dizziness      Morphine Sulfate Unspecified       Objective  Ht 1.778 m (5' 10\")   Wt 98.9 kg (218 lb 2.2 oz)   Physical Exam  Constitutional:       Appearance: Normal appearance.   HENT:      Head: Normocephalic and atraumatic.   Eyes:      Extraocular Movements: Extraocular movements intact.      Conjunctiva/sclera: Conjunctivae normal.   Pulmonary:      Effort: No respiratory distress.   Musculoskeletal:      Cervical back: Normal range of motion.   Skin:     General: Skin is warm and dry.   Neurological:      General: No " focal deficit present.      Mental Status: He is alert.   Psychiatric:         Mood and Affect: Mood normal.         Labs:     POC UA  Lab Results   Component Value Date/Time    POCCOLOR yellow 12/05/2023 09:46 AM    POCAPPEAR clear 12/05/2023 09:46 AM    POCLEUKEST negative 12/05/2023 09:46 AM    POCNITRITE negative 12/05/2023 09:46 AM    POCUROBILIGE 0.2 12/05/2023 09:46 AM    POCPROTEIN 30 12/05/2023 09:46 AM    POCURPH 5.5 12/05/2023 09:46 AM    POCBLOOD negative 12/05/2023 09:46 AM    POCSPGRV 1.015 12/05/2023 09:46 AM    POCKETONES negative 12/05/2023 09:46 AM    POCBILIRUBIN negative 12/05/2023 09:46 AM    POCGLUCUA 500 12/05/2023 09:46 AM        A1C  Lab Results   Component Value Date/Time    HBA1C 8.8 (H) 10/18/2023 0409    AVGLUC 206 10/18/2023 0409       Imaging:     none    Procedure    Procedure performed: Cystoscopy     Surgeon: Dr. Kiesha Velasquez    Indications For Procedure: BPH and Urethral Stricture    Blood Loss: 0 cc     Anesthesia: Lidocaine jelly     Specimen: none     Findings:     1. Urethra:  bulbar urethral stricture widely patent and well healed    2. Bladder:  irritation/inflammation consistent with recent catheter placement    3. Bilateral ureteral jets observed with clear efflux      4. Prostate: moderate lateral lobe hypertrophy, minimal median lobe, 2.5 cm prostate length     Description of Procedure: The patient was prepped and draped in the usual sterile fashion.  A 17Fr flexible cystoscope was advanced along the urethra into the bladder under direct vision.  We performed a thorough cystoscopic evaluation patient's bladder including retroflexion, findings noted above.  We removed the cystoscope under direct vision to evaluate the urethra and prostate, findings noted above.  This concluded the procedure, the patient tolerated it well.     Assessment  Patient was instructed to call our office if he develops any signs of infection including increased frequency, urgency, dysuria, fever, or  chills.  We discussed the results of the cystoscopy with the patient, all questions and concerns addressed. If he develops recurrent symptoms we would perform a retrograde urethrogram. We could consider various treatments for his stricture at that time. His prostate does not appear to be causing significant obstruction    Plan    1. BPH with obstruction/lower urinary tract symptoms  RTC in 2 months with PVR

## 2023-12-27 ENCOUNTER — APPOINTMENT (OUTPATIENT)
Dept: ADMISSIONS | Facility: MEDICAL CENTER | Age: 68
End: 2023-12-27
Payer: MEDICARE

## 2024-02-09 ENCOUNTER — HOSPITAL ENCOUNTER (OUTPATIENT)
Dept: LAB | Facility: MEDICAL CENTER | Age: 69
End: 2024-02-09
Attending: INTERNAL MEDICINE
Payer: MEDICARE

## 2024-02-09 LAB
ALBUMIN SERPL BCP-MCNC: 4.2 G/DL (ref 3.2–4.9)
ALBUMIN/GLOB SERPL: 1.5 G/DL
ALP SERPL-CCNC: 62 U/L (ref 30–99)
ALT SERPL-CCNC: 18 U/L (ref 2–50)
ANION GAP SERPL CALC-SCNC: 13 MMOL/L (ref 7–16)
APPEARANCE UR: CLEAR
AST SERPL-CCNC: 19 U/L (ref 12–45)
BACTERIA #/AREA URNS HPF: NEGATIVE /HPF
BASOPHILS # BLD AUTO: 0.4 % (ref 0–1.8)
BASOPHILS # BLD: 0.03 K/UL (ref 0–0.12)
BILIRUB SERPL-MCNC: 0.4 MG/DL (ref 0.1–1.5)
BILIRUB UR QL STRIP.AUTO: NEGATIVE
BUN SERPL-MCNC: 20 MG/DL (ref 8–22)
CALCIUM ALBUM COR SERPL-MCNC: 9.2 MG/DL (ref 8.5–10.5)
CALCIUM SERPL-MCNC: 9.4 MG/DL (ref 8.5–10.5)
CHLORIDE SERPL-SCNC: 99 MMOL/L (ref 96–112)
CHOLEST SERPL-MCNC: 137 MG/DL (ref 100–199)
CO2 SERPL-SCNC: 24 MMOL/L (ref 20–33)
COLOR UR: YELLOW
CREAT SERPL-MCNC: 0.94 MG/DL (ref 0.5–1.4)
EOSINOPHIL # BLD AUTO: 0.11 K/UL (ref 0–0.51)
EOSINOPHIL NFR BLD: 1.4 % (ref 0–6.9)
EPI CELLS #/AREA URNS HPF: NEGATIVE /HPF
ERYTHROCYTE [DISTWIDTH] IN BLOOD BY AUTOMATED COUNT: 43 FL (ref 35.9–50)
EST. AVERAGE GLUCOSE BLD GHB EST-MCNC: 235 MG/DL
FASTING STATUS PATIENT QL REPORTED: NORMAL
FERRITIN SERPL-MCNC: 13.3 NG/ML (ref 22–322)
GFR SERPLBLD CREATININE-BSD FMLA CKD-EPI: 88 ML/MIN/1.73 M 2
GLOBULIN SER CALC-MCNC: 2.8 G/DL (ref 1.9–3.5)
GLUCOSE SERPL-MCNC: 216 MG/DL (ref 65–99)
GLUCOSE UR STRIP.AUTO-MCNC: >=1000 MG/DL
HBA1C MFR BLD: 9.8 % (ref 4–5.6)
HCT VFR BLD AUTO: 42.5 % (ref 42–52)
HDLC SERPL-MCNC: 35 MG/DL
HGB BLD-MCNC: 13.5 G/DL (ref 14–18)
HYALINE CASTS #/AREA URNS LPF: ABNORMAL /LPF
IMM GRANULOCYTES # BLD AUTO: 0.02 K/UL (ref 0–0.11)
IMM GRANULOCYTES NFR BLD AUTO: 0.3 % (ref 0–0.9)
IRON SERPL-MCNC: 44 UG/DL (ref 50–180)
KETONES UR STRIP.AUTO-MCNC: ABNORMAL MG/DL
LDLC SERPL CALC-MCNC: 69 MG/DL
LEUKOCYTE ESTERASE UR QL STRIP.AUTO: NEGATIVE
LYMPHOCYTES # BLD AUTO: 1.57 K/UL (ref 1–4.8)
LYMPHOCYTES NFR BLD: 20.5 % (ref 22–41)
MAGNESIUM SERPL-MCNC: 2.1 MG/DL (ref 1.5–2.5)
MCH RBC QN AUTO: 25.5 PG (ref 27–33)
MCHC RBC AUTO-ENTMCNC: 31.8 G/DL (ref 32.3–36.5)
MCV RBC AUTO: 80.3 FL (ref 81.4–97.8)
MICRO URNS: ABNORMAL
MONOCYTES # BLD AUTO: 0.81 K/UL (ref 0–0.85)
MONOCYTES NFR BLD AUTO: 10.6 % (ref 0–13.4)
NEUTROPHILS # BLD AUTO: 5.13 K/UL (ref 1.82–7.42)
NEUTROPHILS NFR BLD: 66.8 % (ref 44–72)
NITRITE UR QL STRIP.AUTO: NEGATIVE
NRBC # BLD AUTO: 0 K/UL
NRBC BLD-RTO: 0 /100 WBC (ref 0–0.2)
PH UR STRIP.AUTO: 5.5 [PH] (ref 5–8)
PHOSPHATE SERPL-MCNC: 3.5 MG/DL (ref 2.5–4.5)
PLATELET # BLD AUTO: 172 K/UL (ref 164–446)
PMV BLD AUTO: 10.6 FL (ref 9–12.9)
POTASSIUM SERPL-SCNC: 3.9 MMOL/L (ref 3.6–5.5)
PROT SERPL-MCNC: 7 G/DL (ref 6–8.2)
PROT UR QL STRIP: 300 MG/DL
RBC # BLD AUTO: 5.29 M/UL (ref 4.7–6.1)
RBC # URNS HPF: ABNORMAL /HPF
RBC UR QL AUTO: ABNORMAL
SODIUM SERPL-SCNC: 136 MMOL/L (ref 135–145)
SP GR UR STRIP.AUTO: 1.04
TRIGL SERPL-MCNC: 164 MG/DL (ref 0–149)
TSH SERPL DL<=0.005 MIU/L-ACNC: 2.78 UIU/ML (ref 0.38–5.33)
URATE SERPL-MCNC: 4 MG/DL (ref 2.5–8.3)
UROBILINOGEN UR STRIP.AUTO-MCNC: 0.2 MG/DL
WBC # BLD AUTO: 7.7 K/UL (ref 4.8–10.8)
WBC #/AREA URNS HPF: ABNORMAL /HPF

## 2024-02-09 PROCEDURE — 83036 HEMOGLOBIN GLYCOSYLATED A1C: CPT | Mod: GA

## 2024-02-09 PROCEDURE — 85025 COMPLETE CBC W/AUTO DIFF WBC: CPT

## 2024-02-09 PROCEDURE — 83735 ASSAY OF MAGNESIUM: CPT

## 2024-02-09 PROCEDURE — 82728 ASSAY OF FERRITIN: CPT

## 2024-02-09 PROCEDURE — 84550 ASSAY OF BLOOD/URIC ACID: CPT

## 2024-02-09 PROCEDURE — 83540 ASSAY OF IRON: CPT

## 2024-02-09 PROCEDURE — 80061 LIPID PANEL: CPT

## 2024-02-09 PROCEDURE — 84443 ASSAY THYROID STIM HORMONE: CPT

## 2024-02-09 PROCEDURE — 80053 COMPREHEN METABOLIC PANEL: CPT

## 2024-02-09 PROCEDURE — 36415 COLL VENOUS BLD VENIPUNCTURE: CPT | Mod: GA

## 2024-02-09 PROCEDURE — 84100 ASSAY OF PHOSPHORUS: CPT

## 2024-02-09 PROCEDURE — 81001 URINALYSIS AUTO W/SCOPE: CPT

## 2024-02-29 ENCOUNTER — OFFICE VISIT (OUTPATIENT)
Dept: UROLOGY | Facility: MEDICAL CENTER | Age: 69
End: 2024-02-29
Payer: MEDICARE

## 2024-02-29 VITALS
SYSTOLIC BLOOD PRESSURE: 170 MMHG | DIASTOLIC BLOOD PRESSURE: 81 MMHG | TEMPERATURE: 97.9 F | WEIGHT: 223 LBS | HEART RATE: 88 BPM | BODY MASS INDEX: 32 KG/M2 | OXYGEN SATURATION: 97 %

## 2024-02-29 DIAGNOSIS — N13.8 BPH WITH OBSTRUCTION/LOWER URINARY TRACT SYMPTOMS: ICD-10-CM

## 2024-02-29 DIAGNOSIS — N40.1 BPH WITH OBSTRUCTION/LOWER URINARY TRACT SYMPTOMS: ICD-10-CM

## 2024-02-29 LAB
POC POST-VOID: NORMAL ML
POC PRE-VOID: NORMAL ML

## 2024-02-29 PROCEDURE — 99213 OFFICE O/P EST LOW 20 MIN: CPT | Performed by: STUDENT IN AN ORGANIZED HEALTH CARE EDUCATION/TRAINING PROGRAM

## 2024-02-29 PROCEDURE — 51798 US URINE CAPACITY MEASURE: CPT | Performed by: STUDENT IN AN ORGANIZED HEALTH CARE EDUCATION/TRAINING PROGRAM

## 2024-02-29 PROCEDURE — 3079F DIAST BP 80-89 MM HG: CPT | Performed by: STUDENT IN AN ORGANIZED HEALTH CARE EDUCATION/TRAINING PROGRAM

## 2024-02-29 PROCEDURE — 3077F SYST BP >= 140 MM HG: CPT | Performed by: STUDENT IN AN ORGANIZED HEALTH CARE EDUCATION/TRAINING PROGRAM

## 2024-02-29 ASSESSMENT — FIBROSIS 4 INDEX: FIB4 SCORE: 1.8

## 2024-02-29 NOTE — PROGRESS NOTES
Subjective  Sharad Millan is a 68 y.o. male who presents today for follow up of urethral stricture    Patient states he is no longer taking any medications to improve his flow, does not feel he needs it.  He believes he is fully healed at this time, no symptoms, no bother.    Family History   Problem Relation Age of Onset    Heart Disease Mother     Heart Attack Mother     Heart Disease Father        Social History     Socioeconomic History    Marital status:      Spouse name: Not on file    Number of children: Not on file    Years of education: Not on file    Highest education level: Not on file   Occupational History    Not on file   Tobacco Use    Smoking status: Never    Smokeless tobacco: Never   Vaping Use    Vaping Use: Never used   Substance and Sexual Activity    Alcohol use: Never    Drug use: Never    Sexual activity: Yes     Partners: Female   Other Topics Concern     Service No    Blood Transfusions No    Caffeine Concern No    Occupational Exposure No    Hobby Hazards No    Sleep Concern No    Stress Concern No    Weight Concern No    Special Diet No    Back Care Yes     Comment: BELT SUPPORT    Exercise Yes    Bike Helmet No    Seat Belt Yes    Self-Exams No   Social History Narrative    ** Merged History Encounter **          Social Determinants of Health     Financial Resource Strain: Not on file   Food Insecurity: Not on file   Transportation Needs: Not on file   Physical Activity: Not on file   Stress: Not on file   Social Connections: Not on file   Intimate Partner Violence: Not on file   Housing Stability: Not on file       Past Surgical History:   Procedure Laterality Date    MULTIPLE CORONARY ARTERY BYPASS ENDO VEIN HARVEST N/A 8/31/2020    Procedure: CABG, WITH ENDOSCOPIC VEIN PROCUREMENT X1;  Surgeon: Dorys Casas M.D.;  Location: SURGERY [facility];  Service: Cardiothoracic    AORTIC VALVE REPLACEMENT N/A 8/31/2020    Procedure: REPLACEMENT, AORTIC VALVE;  Surgeon:  Dorys Casas M.D.;  Location: SURGERY [facility];  Service: Cardiothoracic    ROMERO N/A 8/31/2020    Procedure: ECHOCARDIOGRAM, TRANSESOPHAGEAL;  Surgeon: Dorys Casas M.D.;  Location: SURGERY [facility];  Service: Cardiothoracic    PO BY LAPAROSCOPY N/A 10/25/2015    Procedure: PO BY LAPAROSCOPY-with grams ;  Surgeon: Ronnie Bowman M.D.;  Location: SURGERY [facility];  Service:     CAROTID STENT ANGIOGRAPHY  1996    CATARACT EXTRACTION WITH IOL Bilateral        Past Medical History:   Diagnosis Date    Aortic stenosis 10/20/2011    Arrhythmia     Breath shortness     Carotid bruit 12/9/2009    Cataract     cataract extraction with IOL    Chronic right shoulder pain     Diabetes     oral medication    Fatty liver 1/17/2011    Heart attack (HCC) 12/2018    History of cardiac catheterization 7/13/2009    History of echocardiogram 10/20/2011    HTN (hypertension) 10/12/2012    Hypertension     Memory loss 10/20/2011    Murmur 7/7/2009    Obesity 8/23/2011    Palpitations 10/20/2011    PSVT (paroxysmal supraventricular tachycardia) 2/14/2012    S/P coronary artery stent placement 1996    coronary stent implantation x3, [facility]    S/P coronary artery stent placement 1998    coronary stent implantation; [facility]    S/P coronary artery stent placement 2003    cardiac catheterization; patent stents; [place]    Sciatica 8/23/2011    Uncontrolled diabetes mellitus 11/29/2010       Current Outpatient Medications   Medication Sig Dispense Refill    sulfamethoxazole-trimethoprim (BACTRIM DS) 800-160 MG tablet Take 1 Tablet by mouth 2 times a day. 6 Tablet 0    oxybutynin (OXYTROL) 3.9 MG/24HR patch Place 1 Patch on the skin every 3 days. 8 Patch 2    tamsulosin (FLOMAX) 0.4 MG capsule Take 1 Capsule by mouth every 12 hours. 60 Capsule 3    phenazopyridine (PYRIDIUM) 200 MG Tab Take 1 Tablet by mouth every 12 hours as needed for Mild Pain. 4 Tablet 0    insulin lispro 100 UNIT/ML SC SOPN injection  PEN UP TO 76 UNITS BY CORRECTION SCALE WITH MEALS SUBCUTANEOUS 90 DAYS 30      metoprolol SR (TOPROL XL) 25 MG TABLET SR 24 HR Take 25 mg by mouth every day.      BD PEN NEEDLE AVANI 2ND GEN INJECT 8 NEEDLE SUBCUTANEOUSLY AS DIRECTED      budesonide (PULMICORT) 0.5 MG/2ML Suspension INHALE 1 VIAL VIA NEBULIZER TWICE A DAY FOR 10DAYS NOT COV      Continuous Blood Gluc  (FREESTYLE AZIZA 2 READER) Device USE TO CHECK BLOOD SUGAR (USE DISCOUNT CARD)      losartan (COZAAR) 100 MG Tab TAKE 1 TABLET BY MOUTH EVERY DAY 90 Tablet 2    amLODIPine (NORVASC) 2.5 MG Tab Take 2.5 mg by mouth every evening.      metformin (GLUCOPHAGE) 1000 MG tablet Take 1,000 mg by mouth every evening.      rosuvastatin (CRESTOR) 20 MG Tab TAKE 1 TABLET BY MOUTH EVERY DAY (Patient taking differently: Take 20 mg by mouth every day.) 90 tablet 3    insulin lispro (HUMALOG) 100 UNIT/ML Inject 20-25 Units under the skin 2 times a day.      aspirin EC (ECOTRIN) 81 MG Tablet Delayed Response Take 81 mg by mouth every evening.      Empagliflozin (JARDIANCE) 25 MG Tab Take 25 mg by mouth every morning.       No current facility-administered medications for this visit.       Allergies   Allergen Reactions    Bydureon [Exenatide] Hives     hives    Cycloset [Bromocriptine Mesylate] Rash     Rash      Morphine Vomiting and Nausea     Dizziness    Morphine      Dizziness      Morphine Sulfate Unspecified       Objective  BP (!) 170/81 (BP Location: Left arm, Patient Position: Sitting, BP Cuff Size: Adult)   Pulse 88   Temp 36.6 °C (97.9 °F) (Temporal)   Wt 101 kg (223 lb)   SpO2 97%   Physical Exam  Constitutional:       Appearance: Normal appearance.   HENT:      Head: Normocephalic and atraumatic.   Eyes:      Extraocular Movements: Extraocular movements intact.      Conjunctiva/sclera: Conjunctivae normal.   Pulmonary:      Effort: No respiratory distress.   Musculoskeletal:      Cervical back: Normal range of motion.   Skin:     General: Skin  is warm and dry.   Neurological:      General: No focal deficit present.      Mental Status: He is alert.   Psychiatric:         Mood and Affect: Mood normal.         Labs:     none    Imaging:     none    Assessment    Urethral Stricture  -Currently asymptomatic, PVR acceptable    BPH/LUTS  -no bother off medications    Plan    1. BPH with obstruction/lower urinary tract symptoms  - POCT BLADDER SCAN    RTC 6 months with PVR

## 2024-04-29 ENCOUNTER — APPOINTMENT (OUTPATIENT)
Dept: URGENT CARE | Facility: PHYSICIAN GROUP | Age: 69
End: 2024-04-29
Payer: MEDICARE

## 2024-07-12 ENCOUNTER — HOSPITAL ENCOUNTER (OUTPATIENT)
Dept: LAB | Facility: MEDICAL CENTER | Age: 69
End: 2024-07-12
Attending: INTERNAL MEDICINE
Payer: MEDICARE

## 2024-07-12 LAB
EST. AVERAGE GLUCOSE BLD GHB EST-MCNC: 237 MG/DL
HBA1C MFR BLD: 9.9 % (ref 4–5.6)

## 2024-07-12 PROCEDURE — 83036 HEMOGLOBIN GLYCOSYLATED A1C: CPT

## 2024-07-12 PROCEDURE — 36415 COLL VENOUS BLD VENIPUNCTURE: CPT

## 2024-08-31 ENCOUNTER — PHARMACY VISIT (OUTPATIENT)
Dept: PHARMACY | Facility: MEDICAL CENTER | Age: 69
End: 2024-08-31
Payer: COMMERCIAL

## 2024-08-31 PROCEDURE — RXMED WILLOW AMBULATORY MEDICATION CHARGE: Performed by: INTERNAL MEDICINE

## 2024-09-07 ENCOUNTER — PHARMACY VISIT (OUTPATIENT)
Dept: PHARMACY | Facility: MEDICAL CENTER | Age: 69
End: 2024-09-07
Payer: COMMERCIAL

## 2024-09-07 PROCEDURE — RXMED WILLOW AMBULATORY MEDICATION CHARGE: Performed by: INTERNAL MEDICINE

## 2024-09-07 RX ORDER — HYDROCODONE BITARTRATE AND ACETAMINOPHEN 10; 325 MG/1; MG/1
TABLET ORAL
Qty: 45 TABLET | Refills: 0 | OUTPATIENT
Start: 2024-09-07

## 2024-09-09 ENCOUNTER — OFFICE VISIT (OUTPATIENT)
Dept: UROLOGY | Facility: MEDICAL CENTER | Age: 69
End: 2024-09-09
Payer: MEDICARE

## 2024-09-09 DIAGNOSIS — N35.819 OTHER STRICTURE OF URETHRA IN MALE: ICD-10-CM

## 2024-09-09 DIAGNOSIS — N40.1 BPH WITH OBSTRUCTION/LOWER URINARY TRACT SYMPTOMS: ICD-10-CM

## 2024-09-09 DIAGNOSIS — N13.8 BPH WITH OBSTRUCTION/LOWER URINARY TRACT SYMPTOMS: ICD-10-CM

## 2024-09-09 PROBLEM — N35.919 URETHRAL STRICTURE: Status: ACTIVE | Noted: 2024-09-09

## 2024-09-09 LAB
POC POST-VOID: 97 ML
POC PRE-VOID: NORMAL

## 2024-09-09 PROCEDURE — 51798 US URINE CAPACITY MEASURE: CPT | Performed by: STUDENT IN AN ORGANIZED HEALTH CARE EDUCATION/TRAINING PROGRAM

## 2024-09-09 PROCEDURE — 99213 OFFICE O/P EST LOW 20 MIN: CPT | Performed by: STUDENT IN AN ORGANIZED HEALTH CARE EDUCATION/TRAINING PROGRAM

## 2024-09-09 NOTE — PROGRESS NOTES
Subjective  Sharad Millan is a 68 y.o. male who presents today for follow up of urethral stricture, incomplete emptying.    The patient is doing very well, no issues or worsening of symptoms since I last saw him.    Family History   Problem Relation Age of Onset    Heart Disease Mother     Heart Attack Mother     Heart Disease Father        Social History     Socioeconomic History    Marital status:      Spouse name: Not on file    Number of children: Not on file    Years of education: Not on file    Highest education level: Not on file   Occupational History    Not on file   Tobacco Use    Smoking status: Never    Smokeless tobacco: Never   Vaping Use    Vaping status: Never Used   Substance and Sexual Activity    Alcohol use: Never    Drug use: Never    Sexual activity: Yes     Partners: Female   Other Topics Concern     Service No    Blood Transfusions No    Caffeine Concern No    Occupational Exposure No    Hobby Hazards No    Sleep Concern No    Stress Concern No    Weight Concern No    Special Diet No    Back Care Yes     Comment: BELT SUPPORT    Exercise Yes    Bike Helmet No    Seat Belt Yes    Self-Exams No   Social History Narrative    ** Merged History Encounter **          Social Determinants of Health     Financial Resource Strain: Not on file   Food Insecurity: Not on file   Transportation Needs: Not on file   Physical Activity: Not on file   Stress: Not on file   Social Connections: Not on file   Intimate Partner Violence: Not on file   Housing Stability: Not on file       Past Surgical History:   Procedure Laterality Date    MULTIPLE CORONARY ARTERY BYPASS ENDO VEIN HARVEST N/A 8/31/2020    Procedure: CABG, WITH ENDOSCOPIC VEIN PROCUREMENT X1;  Surgeon: Dorys Casas M.D.;  Location: SURGERY [facility];  Service: Cardiothoracic    AORTIC VALVE REPLACEMENT N/A 8/31/2020    Procedure: REPLACEMENT, AORTIC VALVE;  Surgeon: Dorys Casas M.D.;  Location: SURGERY [facility];   Service: Cardiothoracic    ROMERO N/A 8/31/2020    Procedure: ECHOCARDIOGRAM, TRANSESOPHAGEAL;  Surgeon: Dorys Casas M.D.;  Location: SURGERY [facility];  Service: Cardiothoracic    PO BY LAPAROSCOPY N/A 10/25/2015    Procedure: PO BY LAPAROSCOPY-with grams ;  Surgeon: Ronnie Bowman M.D.;  Location: SURGERY [facility];  Service:     CAROTID STENT ANGIOGRAPHY  1996    CATARACT EXTRACTION WITH IOL Bilateral        Past Medical History:   Diagnosis Date    Aortic stenosis 10/20/2011    Arrhythmia     Breath shortness     Carotid bruit 12/9/2009    Cataract     cataract extraction with IOL    Chronic right shoulder pain     Diabetes     oral medication    Fatty liver 1/17/2011    Heart attack (HCC) 12/2018    History of cardiac catheterization 7/13/2009    History of echocardiogram 10/20/2011    HTN (hypertension) 10/12/2012    Hypertension     Memory loss 10/20/2011    Murmur 7/7/2009    Obesity 8/23/2011    Palpitations 10/20/2011    PSVT (paroxysmal supraventricular tachycardia) 2/14/2012    S/P coronary artery stent placement 1996    coronary stent implantation x3, [facility]    S/P coronary artery stent placement 1998    coronary stent implantation; [facility]    S/P coronary artery stent placement 2003    cardiac catheterization; patent stents; [place]    Sciatica 8/23/2011    Uncontrolled diabetes mellitus 11/29/2010       Current Outpatient Medications   Medication Sig Dispense Refill    HYDROcodone/acetaminophen (NORCO)  MG Tab Take 1/2 tablet by mouth three times a day as needed for pain 45 Tablet 0    erythromycin 5 MG/GM Ointment Apply a small amount into the left eye every evening 3.5 g 1    amoxicillin (AMOXIL) 500 MG Cap Take 4 capsules by mouth 1 hour prior to dental appointment 12 Capsule 2    aspirin 81 MG EC tablet Take 1 tablet by mouth everyday at bedtime 90 Tablet 3    Empagliflozin 25 MG Tab Take 25 mg by mouth every other day 30 Tablet 3    metoprolol SR (TOPROL XL)  25 MG TABLET SR 24 HR Take 1 Tablet by mouth daily 90 Tablet 3    amLODIPine (NORVASC) 2.5 MG Tab Take  1 tablet by mouth every evening 90 Tablet 1    insulin lispro (HUMALOG KWIKPEN) 100 UNIT/ML SC SOPN injection PEN Inject 3 units under the skin every evening as needed 345 mL 0    rosuvastatin (CRESTOR) 20 MG Tab take 1 Tablet every day. 90 Tablet 3    metformin (GLUCOPHAGE) 1000 MG tablet Take 2 tablets by mouth once a day. 180 Tablet 1    meloxicam (MOBIC) 7.5 MG Tab Take 1 tablet by mouth  twice a day with food 120 Tablet 1    sulfamethoxazole-trimethoprim (BACTRIM DS) 800-160 MG tablet Take 1 Tablet by mouth 2 times a day. 6 Tablet 0    oxybutynin (OXYTROL) 3.9 MG/24HR patch Place 1 Patch on the skin every 3 days. 8 Patch 2    tamsulosin (FLOMAX) 0.4 MG capsule Take 1 Capsule by mouth every 12 hours. 60 Capsule 3    insulin lispro 100 UNIT/ML SC SOPN injection PEN UP TO 76 UNITS BY CORRECTION SCALE WITH MEALS SUBCUTANEOUS 90 DAYS 30      metoprolol SR (TOPROL XL) 25 MG TABLET SR 24 HR Take 25 mg by mouth every day.      BD PEN NEEDLE AVANI 2ND GEN INJECT 8 NEEDLE SUBCUTANEOUSLY AS DIRECTED      Continuous Blood Gluc  (AmindYLE AZIZA 2 READER) Device USE TO CHECK BLOOD SUGAR (USE DISCOUNT CARD)      losartan (COZAAR) 100 MG Tab TAKE 1 TABLET BY MOUTH EVERY DAY 90 Tablet 3    amLODIPine (NORVASC) 2.5 MG Tab Take 2.5 mg by mouth every evening.      metformin (GLUCOPHAGE) 1000 MG tablet Take 1,000 mg by mouth every evening.      rosuvastatin (CRESTOR) 20 MG Tab TAKE 1 TABLET BY MOUTH EVERY DAY (Patient taking differently: Take 20 mg by mouth every day.) 90 tablet 3    aspirin EC (ECOTRIN) 81 MG Tablet Delayed Response Take 81 mg by mouth every evening.      Empagliflozin (JARDIANCE) 25 MG Tab Take 25 mg by mouth every morning.      phenazopyridine (PYRIDIUM) 200 MG Tab Take 1 Tablet by mouth every 12 hours as needed for Mild Pain. 4 Tablet 0    budesonide (PULMICORT) 0.5 MG/2ML Suspension INHALE 1 VIAL VIA  NEBULIZER TWICE A DAY FOR 10DAYS NOT COV      insulin lispro (HUMALOG) 100 UNIT/ML Inject 20-25 Units under the skin 2 times a day.       No current facility-administered medications for this visit.       Allergies   Allergen Reactions    Bydureon [Exenatide] Hives     hives    Cycloset [Bromocriptine Mesylate] Rash     Rash      Morphine Vomiting and Nausea     Dizziness    Morphine      Dizziness      Morphine Sulfate Unspecified       Objective  There were no vitals taken for this visit.  Physical Exam  Constitutional:       Appearance: Normal appearance.   HENT:      Head: Normocephalic and atraumatic.   Eyes:      Extraocular Movements: Extraocular movements intact.      Conjunctiva/sclera: Conjunctivae normal.   Pulmonary:      Effort: No respiratory distress.   Musculoskeletal:      Cervical back: Normal range of motion.   Skin:     General: Skin is warm and dry.   Neurological:      General: No focal deficit present.      Mental Status: He is alert.   Psychiatric:         Mood and Affect: Mood normal.     PVR: 97cc    Labs:       none    Imaging:     none    Assessment    Urethral Stricture   -Patient currently asymptomatic, no intervention or workup indicated  -PVR wnl    Plan    Problem List Items Addressed This Visit       BPH with obstruction/lower urinary tract symptoms    Relevant Orders    POCT BLADDER SCAN (Completed)    Urethral stricture   RTC 1 year with PVR, sooner prn

## 2024-09-14 ENCOUNTER — PHARMACY VISIT (OUTPATIENT)
Dept: PHARMACY | Facility: MEDICAL CENTER | Age: 69
End: 2024-09-14
Payer: COMMERCIAL

## 2024-09-14 PROCEDURE — RXMED WILLOW AMBULATORY MEDICATION CHARGE: Performed by: INTERNAL MEDICINE

## 2024-09-23 PROCEDURE — RXMED WILLOW AMBULATORY MEDICATION CHARGE: Performed by: INTERNAL MEDICINE

## 2024-09-28 ENCOUNTER — PHARMACY VISIT (OUTPATIENT)
Dept: PHARMACY | Facility: MEDICAL CENTER | Age: 69
End: 2024-09-28
Payer: COMMERCIAL

## 2024-09-28 PROCEDURE — RXMED WILLOW AMBULATORY MEDICATION CHARGE: Performed by: INTERNAL MEDICINE

## 2024-10-12 ENCOUNTER — PHARMACY VISIT (OUTPATIENT)
Dept: PHARMACY | Facility: MEDICAL CENTER | Age: 69
End: 2024-10-12
Payer: COMMERCIAL

## 2024-10-12 PROCEDURE — RXMED WILLOW AMBULATORY MEDICATION CHARGE: Performed by: INTERNAL MEDICINE

## 2024-10-16 ENCOUNTER — OFFICE VISIT (OUTPATIENT)
Dept: URGENT CARE | Facility: PHYSICIAN GROUP | Age: 69
End: 2024-10-16
Payer: MEDICARE

## 2024-10-16 VITALS
TEMPERATURE: 98.7 F | RESPIRATION RATE: 18 BRPM | HEIGHT: 70 IN | HEART RATE: 98 BPM | WEIGHT: 215.7 LBS | OXYGEN SATURATION: 98 % | SYSTOLIC BLOOD PRESSURE: 146 MMHG | DIASTOLIC BLOOD PRESSURE: 76 MMHG | BODY MASS INDEX: 30.88 KG/M2

## 2024-10-16 DIAGNOSIS — I10 ESSENTIAL HYPERTENSION: ICD-10-CM

## 2024-10-16 DIAGNOSIS — B96.89 ACUTE BACTERIAL SINUSITIS: ICD-10-CM

## 2024-10-16 DIAGNOSIS — J01.90 ACUTE BACTERIAL SINUSITIS: ICD-10-CM

## 2024-10-16 DIAGNOSIS — Z86.39 HISTORY OF DIABETES MELLITUS: ICD-10-CM

## 2024-10-16 PROCEDURE — 3078F DIAST BP <80 MM HG: CPT | Performed by: FAMILY MEDICINE

## 2024-10-16 PROCEDURE — 3077F SYST BP >= 140 MM HG: CPT | Performed by: FAMILY MEDICINE

## 2024-10-16 PROCEDURE — RXMED WILLOW AMBULATORY MEDICATION CHARGE: Performed by: FAMILY MEDICINE

## 2024-10-16 PROCEDURE — 99214 OFFICE O/P EST MOD 30 MIN: CPT | Performed by: FAMILY MEDICINE

## 2024-10-16 RX ORDER — DOXYCYCLINE 100 MG/1
100 CAPSULE ORAL 2 TIMES DAILY
Qty: 14 CAPSULE | Refills: 0 | Status: SHIPPED | OUTPATIENT
Start: 2024-10-16

## 2024-10-16 RX ORDER — PREDNISONE 20 MG/1
40 TABLET ORAL EVERY MORNING
Qty: 10 TABLET | Refills: 0 | Status: SHIPPED | OUTPATIENT
Start: 2024-10-16 | End: 2024-10-24

## 2024-10-16 ASSESSMENT — FIBROSIS 4 INDEX: FIB4 SCORE: 1.86

## 2024-10-16 ASSESSMENT — ENCOUNTER SYMPTOMS
SINUS PAIN: 1
HEADACHES: 1

## 2024-10-18 PROCEDURE — RXMED WILLOW AMBULATORY MEDICATION CHARGE: Performed by: INTERNAL MEDICINE

## 2024-10-19 ENCOUNTER — PHARMACY VISIT (OUTPATIENT)
Dept: PHARMACY | Facility: MEDICAL CENTER | Age: 69
End: 2024-10-19
Payer: COMMERCIAL

## 2024-10-23 PROCEDURE — RXMED WILLOW AMBULATORY MEDICATION CHARGE: Performed by: INTERNAL MEDICINE

## 2024-10-25 ENCOUNTER — PHARMACY VISIT (OUTPATIENT)
Dept: PHARMACY | Facility: MEDICAL CENTER | Age: 69
End: 2024-10-25
Payer: COMMERCIAL

## 2024-11-09 ENCOUNTER — PHARMACY VISIT (OUTPATIENT)
Dept: PHARMACY | Facility: MEDICAL CENTER | Age: 69
End: 2024-11-09
Payer: MEDICARE

## 2024-11-09 PROCEDURE — RXMED WILLOW AMBULATORY MEDICATION CHARGE: Performed by: INTERNAL MEDICINE

## 2024-11-09 RX ORDER — INFLUENZA A VIRUS A/VICTORIA/4897/2022 IVR-238 (H1N1) ANTIGEN (FORMALDEHYDE INACTIVATED), INFLUENZA A VIRUS A/CALIFORNIA/122/2022 SAN-022 (H3N2) ANTIGEN (FORMALDEHYDE INACTIVATED), AND INFLUENZA B VIRUS B/MICHIGAN/01/2021 ANTIGEN (FORMALDEHYDE INACTIVATED) 60; 60; 60 UG/.5ML; UG/.5ML; UG/.5ML
INJECTION, SUSPENSION INTRAMUSCULAR
Qty: 0.5 ML | Refills: 0 | OUTPATIENT
Start: 2024-11-09

## 2024-11-16 PROCEDURE — RXMED WILLOW AMBULATORY MEDICATION CHARGE: Performed by: INTERNAL MEDICINE

## 2024-11-19 ENCOUNTER — HOSPITAL ENCOUNTER (OUTPATIENT)
Dept: LAB | Facility: MEDICAL CENTER | Age: 69
End: 2024-11-19
Attending: INTERNAL MEDICINE
Payer: MEDICARE

## 2024-11-19 LAB
BASOPHILS # BLD AUTO: 0.3 % (ref 0–1.8)
BASOPHILS # BLD: 0.02 K/UL (ref 0–0.12)
CHOLEST SERPL-MCNC: 129 MG/DL (ref 100–199)
EOSINOPHIL # BLD AUTO: 0.18 K/UL (ref 0–0.51)
EOSINOPHIL NFR BLD: 2.5 % (ref 0–6.9)
ERYTHROCYTE [DISTWIDTH] IN BLOOD BY AUTOMATED COUNT: 39.8 FL (ref 35.9–50)
EST. AVERAGE GLUCOSE BLD GHB EST-MCNC: 266 MG/DL
FASTING STATUS PATIENT QL REPORTED: NORMAL
FERRITIN SERPL-MCNC: 11.1 NG/ML (ref 22–322)
HBA1C MFR BLD: 10.9 % (ref 4–5.6)
HCT VFR BLD AUTO: 40.2 % (ref 42–52)
HDLC SERPL-MCNC: 36 MG/DL
HGB BLD-MCNC: 12.8 G/DL (ref 14–18)
IMM GRANULOCYTES # BLD AUTO: 0.07 K/UL (ref 0–0.11)
IMM GRANULOCYTES NFR BLD AUTO: 1 % (ref 0–0.9)
IRON SERPL-MCNC: 38 UG/DL (ref 50–180)
LDLC SERPL CALC-MCNC: 69 MG/DL
LYMPHOCYTES # BLD AUTO: 1.73 K/UL (ref 1–4.8)
LYMPHOCYTES NFR BLD: 24.3 % (ref 22–41)
MAGNESIUM SERPL-MCNC: 2.2 MG/DL (ref 1.5–2.5)
MCH RBC QN AUTO: 24.5 PG (ref 27–33)
MCHC RBC AUTO-ENTMCNC: 31.8 G/DL (ref 32.3–36.5)
MCV RBC AUTO: 76.9 FL (ref 81.4–97.8)
MONOCYTES # BLD AUTO: 0.8 K/UL (ref 0–0.85)
MONOCYTES NFR BLD AUTO: 11.2 % (ref 0–13.4)
NEUTROPHILS # BLD AUTO: 4.33 K/UL (ref 1.82–7.42)
NEUTROPHILS NFR BLD: 60.7 % (ref 44–72)
NRBC # BLD AUTO: 0 K/UL
NRBC BLD-RTO: 0 /100 WBC (ref 0–0.2)
PHOSPHATE SERPL-MCNC: 3.4 MG/DL (ref 2.5–4.5)
PLATELET # BLD AUTO: 146 K/UL (ref 164–446)
PMV BLD AUTO: 10.4 FL (ref 9–12.9)
RBC # BLD AUTO: 5.23 M/UL (ref 4.7–6.1)
TRIGL SERPL-MCNC: 122 MG/DL (ref 0–149)
TSH SERPL-ACNC: 1.59 UIU/ML (ref 0.35–5.5)
URATE SERPL-MCNC: 4.3 MG/DL (ref 2.5–8.3)
WBC # BLD AUTO: 7.1 K/UL (ref 4.8–10.8)

## 2024-11-19 PROCEDURE — 84550 ASSAY OF BLOOD/URIC ACID: CPT

## 2024-11-19 PROCEDURE — 83036 HEMOGLOBIN GLYCOSYLATED A1C: CPT | Mod: GA

## 2024-11-19 PROCEDURE — 84100 ASSAY OF PHOSPHORUS: CPT

## 2024-11-19 PROCEDURE — 80061 LIPID PANEL: CPT

## 2024-11-19 PROCEDURE — 36415 COLL VENOUS BLD VENIPUNCTURE: CPT

## 2024-11-19 PROCEDURE — 85025 COMPLETE CBC W/AUTO DIFF WBC: CPT

## 2024-11-19 PROCEDURE — 84443 ASSAY THYROID STIM HORMONE: CPT

## 2024-11-19 PROCEDURE — 82728 ASSAY OF FERRITIN: CPT

## 2024-11-19 PROCEDURE — 83540 ASSAY OF IRON: CPT

## 2024-11-19 PROCEDURE — 83735 ASSAY OF MAGNESIUM: CPT

## 2024-11-21 PROCEDURE — RXMED WILLOW AMBULATORY MEDICATION CHARGE: Performed by: INTERNAL MEDICINE

## 2024-11-23 ENCOUNTER — PHARMACY VISIT (OUTPATIENT)
Dept: PHARMACY | Facility: MEDICAL CENTER | Age: 69
End: 2024-11-23
Payer: COMMERCIAL

## 2024-11-26 PROCEDURE — RXMED WILLOW AMBULATORY MEDICATION CHARGE: Performed by: INTERNAL MEDICINE

## 2024-11-29 ENCOUNTER — PHARMACY VISIT (OUTPATIENT)
Dept: PHARMACY | Facility: MEDICAL CENTER | Age: 69
End: 2024-11-29
Payer: COMMERCIAL

## 2024-11-29 PROCEDURE — RXMED WILLOW AMBULATORY MEDICATION CHARGE: Performed by: INTERNAL MEDICINE

## 2024-12-09 PROCEDURE — RXMED WILLOW AMBULATORY MEDICATION CHARGE: Performed by: INTERNAL MEDICINE

## 2024-12-12 ENCOUNTER — PHARMACY VISIT (OUTPATIENT)
Dept: PHARMACY | Facility: MEDICAL CENTER | Age: 69
End: 2024-12-12
Payer: COMMERCIAL

## 2024-12-27 ENCOUNTER — HOSPITAL ENCOUNTER (OUTPATIENT)
Dept: LAB | Facility: MEDICAL CENTER | Age: 69
End: 2024-12-27
Attending: INTERNAL MEDICINE
Payer: MEDICARE

## 2024-12-27 LAB
ALBUMIN SERPL BCP-MCNC: 4.2 G/DL (ref 3.2–4.9)
ALBUMIN/GLOB SERPL: 1.6 G/DL
ALP SERPL-CCNC: 59 U/L (ref 30–99)
ALT SERPL-CCNC: 47 U/L (ref 2–50)
ANION GAP SERPL CALC-SCNC: 10 MMOL/L (ref 7–16)
APPEARANCE UR: CLEAR
AST SERPL-CCNC: 57 U/L (ref 12–45)
BACTERIA #/AREA URNS HPF: NORMAL /HPF
BASOPHILS # BLD AUTO: 0.4 % (ref 0–1.8)
BASOPHILS # BLD: 0.03 K/UL (ref 0–0.12)
BILIRUB SERPL-MCNC: 0.5 MG/DL (ref 0.1–1.5)
BILIRUB UR QL STRIP.AUTO: NEGATIVE
BUN SERPL-MCNC: 17 MG/DL (ref 8–22)
CALCIUM ALBUM COR SERPL-MCNC: 9.2 MG/DL (ref 8.5–10.5)
CALCIUM SERPL-MCNC: 9.4 MG/DL (ref 8.5–10.5)
CASTS URNS QL MICRO: NORMAL /LPF (ref 0–2)
CHLORIDE SERPL-SCNC: 101 MMOL/L (ref 96–112)
CHOLEST SERPL-MCNC: 120 MG/DL (ref 100–199)
CO2 SERPL-SCNC: 27 MMOL/L (ref 20–33)
COLOR UR: YELLOW
CREAT SERPL-MCNC: 1 MG/DL (ref 0.5–1.4)
EOSINOPHIL # BLD AUTO: 0.13 K/UL (ref 0–0.51)
EOSINOPHIL NFR BLD: 1.8 % (ref 0–6.9)
EPITHELIAL CELLS 1715: NORMAL /HPF (ref 0–5)
ERYTHROCYTE [DISTWIDTH] IN BLOOD BY AUTOMATED COUNT: 49.1 FL (ref 35.9–50)
FASTING STATUS PATIENT QL REPORTED: NORMAL
FERRITIN SERPL-MCNC: 53.6 NG/ML (ref 22–322)
GFR SERPLBLD CREATININE-BSD FMLA CKD-EPI: 81 ML/MIN/1.73 M 2
GLOBULIN SER CALC-MCNC: 2.6 G/DL (ref 1.9–3.5)
GLUCOSE SERPL-MCNC: 220 MG/DL (ref 65–99)
GLUCOSE UR STRIP.AUTO-MCNC: >=1000 MG/DL
HCT VFR BLD AUTO: 43.8 % (ref 42–52)
HDLC SERPL-MCNC: 36 MG/DL
HGB BLD-MCNC: 14.2 G/DL (ref 14–18)
IMM GRANULOCYTES # BLD AUTO: 0.04 K/UL (ref 0–0.11)
IMM GRANULOCYTES NFR BLD AUTO: 0.6 % (ref 0–0.9)
IRON SERPL-MCNC: 356 UG/DL (ref 50–180)
KETONES UR STRIP.AUTO-MCNC: NEGATIVE MG/DL
LDLC SERPL CALC-MCNC: 55 MG/DL
LEUKOCYTE ESTERASE UR QL STRIP.AUTO: NEGATIVE
LYMPHOCYTES # BLD AUTO: 1.43 K/UL (ref 1–4.8)
LYMPHOCYTES NFR BLD: 20.3 % (ref 22–41)
MAGNESIUM SERPL-MCNC: 2.3 MG/DL (ref 1.5–2.5)
MCH RBC QN AUTO: 26 PG (ref 27–33)
MCHC RBC AUTO-ENTMCNC: 32.4 G/DL (ref 32.3–36.5)
MCV RBC AUTO: 80.2 FL (ref 81.4–97.8)
MICRO URNS: ABNORMAL
MONOCYTES # BLD AUTO: 0.71 K/UL (ref 0–0.85)
MONOCYTES NFR BLD AUTO: 10.1 % (ref 0–13.4)
NEUTROPHILS # BLD AUTO: 4.71 K/UL (ref 1.82–7.42)
NEUTROPHILS NFR BLD: 66.8 % (ref 44–72)
NITRITE UR QL STRIP.AUTO: NEGATIVE
NRBC # BLD AUTO: 0 K/UL
NRBC BLD-RTO: 0 /100 WBC (ref 0–0.2)
PH UR STRIP.AUTO: 6 [PH] (ref 5–8)
PHOSPHATE SERPL-MCNC: 3.7 MG/DL (ref 2.5–4.5)
PLATELET # BLD AUTO: 139 K/UL (ref 164–446)
PMV BLD AUTO: 10.3 FL (ref 9–12.9)
POTASSIUM SERPL-SCNC: 4.3 MMOL/L (ref 3.6–5.5)
PROT SERPL-MCNC: 6.8 G/DL (ref 6–8.2)
PROT UR QL STRIP: 100 MG/DL
RBC # BLD AUTO: 5.46 M/UL (ref 4.7–6.1)
RBC # URNS HPF: NORMAL /HPF (ref 0–2)
RBC UR QL AUTO: NEGATIVE
SODIUM SERPL-SCNC: 138 MMOL/L (ref 135–145)
SP GR UR STRIP.AUTO: 1.02
TRIGL SERPL-MCNC: 143 MG/DL (ref 0–149)
TSH SERPL-ACNC: 1.59 UIU/ML (ref 0.35–5.5)
URATE SERPL-MCNC: 4.1 MG/DL (ref 2.5–8.3)
UROBILINOGEN UR STRIP.AUTO-MCNC: 1 EU/DL
WBC # BLD AUTO: 7.1 K/UL (ref 4.8–10.8)
WBC #/AREA URNS HPF: NORMAL /HPF

## 2024-12-27 PROCEDURE — 81001 URINALYSIS AUTO W/SCOPE: CPT

## 2024-12-27 PROCEDURE — 80061 LIPID PANEL: CPT

## 2024-12-27 PROCEDURE — 84443 ASSAY THYROID STIM HORMONE: CPT

## 2024-12-27 PROCEDURE — 83735 ASSAY OF MAGNESIUM: CPT | Mod: GA

## 2024-12-27 PROCEDURE — 36415 COLL VENOUS BLD VENIPUNCTURE: CPT

## 2024-12-27 PROCEDURE — 80053 COMPREHEN METABOLIC PANEL: CPT

## 2024-12-27 PROCEDURE — 84550 ASSAY OF BLOOD/URIC ACID: CPT

## 2024-12-27 PROCEDURE — 82728 ASSAY OF FERRITIN: CPT

## 2024-12-27 PROCEDURE — 85025 COMPLETE CBC W/AUTO DIFF WBC: CPT

## 2024-12-27 PROCEDURE — 84100 ASSAY OF PHOSPHORUS: CPT

## 2024-12-27 PROCEDURE — 83540 ASSAY OF IRON: CPT

## 2024-12-31 ENCOUNTER — PHARMACY VISIT (OUTPATIENT)
Dept: PHARMACY | Facility: MEDICAL CENTER | Age: 69
End: 2024-12-31
Payer: COMMERCIAL

## 2024-12-31 PROCEDURE — RXMED WILLOW AMBULATORY MEDICATION CHARGE: Performed by: INTERNAL MEDICINE

## 2025-01-06 ENCOUNTER — PHARMACY VISIT (OUTPATIENT)
Dept: PHARMACY | Facility: MEDICAL CENTER | Age: 70
End: 2025-01-06
Payer: COMMERCIAL

## 2025-01-06 PROCEDURE — RXMED WILLOW AMBULATORY MEDICATION CHARGE: Performed by: DENTIST

## 2025-01-06 RX ORDER — AMOXICILLIN 500 MG/1
CAPSULE ORAL
Qty: 30 CAPSULE | Refills: 0 | OUTPATIENT
Start: 2025-01-06

## 2025-01-20 ENCOUNTER — OFFICE VISIT (OUTPATIENT)
Dept: URGENT CARE | Facility: PHYSICIAN GROUP | Age: 70
End: 2025-01-20
Payer: MEDICARE

## 2025-01-20 VITALS
BODY MASS INDEX: 28.63 KG/M2 | RESPIRATION RATE: 18 BRPM | SYSTOLIC BLOOD PRESSURE: 160 MMHG | TEMPERATURE: 97.9 F | HEIGHT: 70 IN | HEART RATE: 86 BPM | DIASTOLIC BLOOD PRESSURE: 94 MMHG | OXYGEN SATURATION: 98 % | WEIGHT: 200 LBS

## 2025-01-20 DIAGNOSIS — R03.0 ELEVATED BLOOD PRESSURE READING: ICD-10-CM

## 2025-01-20 DIAGNOSIS — H93.11 TINNITUS OF RIGHT EAR: ICD-10-CM

## 2025-01-20 PROCEDURE — 99214 OFFICE O/P EST MOD 30 MIN: CPT

## 2025-01-20 PROCEDURE — RXMED WILLOW AMBULATORY MEDICATION CHARGE: Performed by: INTERNAL MEDICINE

## 2025-01-20 PROCEDURE — 3077F SYST BP >= 140 MM HG: CPT

## 2025-01-20 PROCEDURE — 3080F DIAST BP >= 90 MM HG: CPT

## 2025-01-20 ASSESSMENT — ENCOUNTER SYMPTOMS
FEVER: 0
SENSORY CHANGE: 0
SPEECH CHANGE: 0
COUGH: 0
FOCAL WEAKNESS: 0
DIZZINESS: 1
NAUSEA: 1
BLURRED VISION: 0
WEAKNESS: 0
SHORTNESS OF BREATH: 0

## 2025-01-20 ASSESSMENT — FIBROSIS 4 INDEX: FIB4 SCORE: 4.19

## 2025-01-20 ASSESSMENT — VISUAL ACUITY: OU: 1

## 2025-01-21 NOTE — PROGRESS NOTES
Subjective:     CHIEF COMPLAINT  Chief Complaint   Patient presents with    Other     Severe ringing on R ear, dizziness, not been able to sleep x 5 days       HPI  Sharad Millan is a very pleasant 69 y.o. male who presents with ringing in his right ear that has been present for the past 5 days.  He reports that the ringing is high-pitched and is interrupting his sleep.  He has been unable to sleep more than 2 hours at a time secondary to the ringing.  He has tried mitigating his symptoms with a ceiling fan, TV, and playing music.  The ringing has been causing him anxiety and has been causing him to pace during the night.  He has had episodes of feeling lightheaded and nauseous.  He does report a history of vertigo.  He denies any chest pain or shortness of breath.  He reports a baseline tremor.  He has not had any fevers.  He denies any nasal congestion or recent illnesses.    REVIEW OF SYSTEMS  Review of Systems   Constitutional:  Negative for fever.   HENT:  Positive for tinnitus (High pitched). Negative for congestion.    Eyes:  Negative for blurred vision.   Respiratory:  Negative for cough and shortness of breath.    Cardiovascular:  Negative for chest pain.   Gastrointestinal:  Positive for nausea.   Neurological:  Positive for dizziness. Negative for sensory change, speech change, focal weakness and weakness.       PAST MEDICAL HISTORY  Patient Active Problem List    Diagnosis Date Noted    Urethral stricture 09/09/2024    BPH with obstruction/lower urinary tract symptoms 12/05/2023    Acute pain of left shoulder 09/20/2021    Traumatic closed displaced fracture of acromial end of clavicle, left, with delayed healing, subsequent encounter 06/07/2021    S/P CABG (coronary artery bypass graft) 11/09/2020    S/P AVR (aortic valve replacement) 11/09/2020    Coronary artery disease, occlusive 01/23/2020    Tremor 04/17/2018    Raynaud's disease without gangrene 04/17/2018    History of multiple concussions  08/07/2017    Obesity (BMI 30-39.9) 08/07/2017    Chronic hyponatremia 08/07/2017    S/P drug eluting coronary stent placement 04/29/2016    SVT (supraventricular tachycardia) ([facility]) 10/24/2015    DM2 (diabetes mellitus, type 2) ([facility]) 12/24/2014    Vitamin D deficiency disease 03/14/2014    HDL deficiency 07/26/2013    Vitamin B12 deficiency 12/06/2012    Essential hypertension 10/12/2012    Memory loss 10/20/2011    Sciatica 08/23/2011    Fatty liver 01/17/2011    Carotid bruit 12/09/2009    History of cardiac catheterization 07/13/2009    Dyslipidemia 06/05/2009       SURGICAL HISTORY   has a past surgical history that includes cataract extraction with iol (Bilateral); carotid stent angiography (1996); tree by laparoscopy (N/A, 10/25/2015); multiple coronary artery bypass endo vein harvest (N/A, 8/31/2020); aortic valve replacement (N/A, 8/31/2020); and gale (N/A, 8/31/2020).    ALLERGIES  Allergies   Allergen Reactions    Bydureon [Exenatide] Hives     hives    Cycloset [Bromocriptine Mesylate] Rash     Rash      Morphine Vomiting and Nausea     Dizziness    Morphine      Dizziness      Morphine Sulfate Unspecified       CURRENT MEDICATIONS  Home Medications       Reviewed by aKthryn Merritt, P.A.-C. (Physician Assistant) on 01/20/25 at 1813  Med List Status: <None>     Medication Last Dose Status   amLODIPine (NORVASC) 2.5 MG Tab Taking Active   amLODIPine (NORVASC) 2.5 MG Tab Taking Active   amoxicillin (AMOXIL) 500 MG Cap Taking Active   amoxicillin (AMOXIL) 500 MG Cap Taking Active   aspirin 81 MG EC tablet Taking Active   aspirin EC (ECOTRIN) 81 MG Tablet Delayed Response Taking Active   BD PEN NEEDLE AVANI 2ND GEN Taking Active   Continuous Blood Gluc  (FREESTYLE AZIZA 2 READER) Device Taking Active   doxycycline (MONODOX) 100 MG capsule Taking Active   Empagliflozin (JARDIANCE) 25 MG Tab Taking Active   Empagliflozin 25 MG Tab Taking Active   erythromycin 5 MG/GM Ointment Taking Active  "  HYDROcodone/acetaminophen (NORCO)  MG Tab Taking Active   influenza vaccine High-Dose (FLUZONE HIGH-DOSE) 0.5 ML Suspension Prefilled Syringe injection Taking Active   Insulin Glargine, 2 Unit Dial, (TOUJEO MAX SOLOSTAR) 300 UNIT/ML Solution Pen-injector Taking Active   insulin lispro (HUMALOG KWIKPEN) 100 UNIT/ML SC SOPN injection PEN Taking Active   insulin lispro 100 UNIT/ML SC SOPN injection PEN Taking Active   Insulin Pen Needle 32 G x 4 mm (BD PEN NEEDLE AVANI U/F) Taking Active   JARDIANCE 25 MG Tab Taking Active   losartan (COZAAR) 100 MG Tab Taking Active   meloxicam (MOBIC) 7.5 MG Tab Taking Active   metformin (GLUCOPHAGE) 1000 MG tablet Taking Active   metformin (GLUCOPHAGE) 1000 MG tablet Taking Active   metoprolol SR (TOPROL XL) 25 MG TABLET SR 24 HR Taking Active   metoprolol SR (TOPROL XL) 25 MG TABLET SR 24 HR Taking Active   ondansetron (ZOFRAN) 4 MG Tab tablet Taking Active   oxybutynin (OXYTROL) 3.9 MG/24HR patch Taking Active   rosuvastatin (CRESTOR) 20 MG Tab Taking Active   rosuvastatin (CRESTOR) 20 MG Tab Taking Active   Semaglutide,0.25 or 0.5MG/DOS, (OZEMPIC, 0.25 OR 0.5 MG/DOSE,) 2 MG/3ML Solution Pen-injector Taking Active   sulfamethoxazole-trimethoprim (BACTRIM DS) 800-160 MG tablet Taking Active   tamsulosin (FLOMAX) 0.4 MG capsule Taking Active                    SOCIAL HISTORY  Social History     Tobacco Use    Smoking status: Never    Smokeless tobacco: Never   Vaping Use    Vaping status: Never Used   Substance and Sexual Activity    Alcohol use: Never    Drug use: Never    Sexual activity: Yes     Partners: Female       FAMILY HISTORY  Family History   Problem Relation Age of Onset    Heart Disease Mother     Heart Attack Mother     Heart Disease Father           Objective:     VITAL SIGNS: BP (!) 160/94   Pulse 86   Temp 36.6 °C (97.9 °F)   Resp 18   Ht 1.778 m (5' 10\")   Wt 90.7 kg (200 lb)   SpO2 98%   BMI 28.70 kg/m²     PHYSICAL EXAM  Physical Exam  Vitals " reviewed.   Constitutional:       General: He is not in acute distress.     Appearance: He is ill-appearing. He is not toxic-appearing or diaphoretic.   HENT:      Head: Normocephalic and atraumatic.      Right Ear: Tympanic membrane, ear canal and external ear normal. No middle ear effusion. There is no impacted cerumen. Tympanic membrane is not erythematous or bulging.      Left Ear: Tympanic membrane, ear canal and external ear normal.  No middle ear effusion. There is no impacted cerumen. Tympanic membrane is not erythematous or bulging.      Nose: Nose normal. No congestion.      Mouth/Throat:      Mouth: Mucous membranes are moist.   Eyes:      General: Vision grossly intact. Gaze aligned appropriately.      Extraocular Movements: Extraocular movements intact.      Conjunctiva/sclera: Conjunctivae normal.      Pupils: Pupils are equal, round, and reactive to light.      Comments: No nystagmus   Cardiovascular:      Rate and Rhythm: Normal rate and regular rhythm.      Heart sounds: Normal heart sounds.   Pulmonary:      Effort: Pulmonary effort is normal. No respiratory distress.      Breath sounds: Normal breath sounds. No stridor. No wheezing, rhonchi or rales.   Skin:     General: Skin is warm and dry.      Coloration: Skin is not jaundiced or pale.      Findings: No rash.   Neurological:      Mental Status: He is alert.      Cranial Nerves: No dysarthria or facial asymmetry.      Motor: Tremor (Baseline) present.         Assessment/Plan:     1. Elevated blood pressure reading    2. Tinnitus of right ear  - Referral to ENT  -Ceiling fan and sound machine to help with tinnitus during sleep  -Be seen in emergency department if lightheadedness worsens in any way  -Rest and hydrate  -Follow-up with PCP    MDM/Comments:  I have prepared for this visit by personally reviewing the patient's prevous medical records, vitals, and labs including: most recent GFR of 81. Patient has stable vital signs and is non-toxic  appearing.  Patient is ill-appearing and appears to be distressed regarding tinnitus of the right ear.  His physical exam is overall within normal limits.  He denies any chest pain or shortness of breath.  Patient has a baseline tremor, but otherwise is neurologically intact.  A referral has been placed to ENT for further management of tinnitus.  Strict ER precautions discussed with patient if any worsening of symptoms.  Discussed supportive care with use of sound machines and stands during the night.  Patient demonstrated understanding of treatment plan at this time and will RTC if symptoms worsen or fail to resolve.  Patient has a history of hypertension and reports his current reading is his typical baseline.  Patient will follow-up with his primary care provider regarding elevated blood pressure.      Differential diagnosis, natural history, supportive care, and indications for immediate follow-up discussed. All questions answered. Patient agrees with the plan of care.    Follow-up as needed if symptoms worsen or fail to improve to PCP, Urgent care or Emergency Room.    I have personally reviewed prior external notes and test results pertinent to today's visit.  I have independently reviewed and interpreted all diagnostics ordered during this urgent care acute visit.   Discussed management options (risks,benefits, and alternatives to treatment). Pt expresses understanding and the treatment plan was agreed upon. Questions were encouraged and answered to pt's satisfaction.    Please note that this dictation was created using voice recognition software. I have made a reasonable attempt to correct obvious errors, but I expect that there are errors of grammar and possibly content that I did not discover before finalizing the note.

## 2025-01-23 ENCOUNTER — PHARMACY VISIT (OUTPATIENT)
Dept: PHARMACY | Facility: MEDICAL CENTER | Age: 70
End: 2025-01-23
Payer: COMMERCIAL

## 2025-01-24 PROCEDURE — RXMED WILLOW AMBULATORY MEDICATION CHARGE: Performed by: INTERNAL MEDICINE

## 2025-01-25 ENCOUNTER — PHARMACY VISIT (OUTPATIENT)
Dept: PHARMACY | Facility: MEDICAL CENTER | Age: 70
End: 2025-01-25
Payer: COMMERCIAL

## 2025-01-31 PROCEDURE — RXMED WILLOW AMBULATORY MEDICATION CHARGE: Performed by: INTERNAL MEDICINE

## 2025-02-01 ENCOUNTER — PHARMACY VISIT (OUTPATIENT)
Dept: PHARMACY | Facility: MEDICAL CENTER | Age: 70
End: 2025-02-01
Payer: COMMERCIAL

## 2025-02-03 PROCEDURE — RXMED WILLOW AMBULATORY MEDICATION CHARGE: Performed by: INTERNAL MEDICINE

## 2025-02-09 ENCOUNTER — PHARMACY VISIT (OUTPATIENT)
Dept: PHARMACY | Facility: MEDICAL CENTER | Age: 70
End: 2025-02-09
Payer: COMMERCIAL

## 2025-02-11 ENCOUNTER — PHARMACY VISIT (OUTPATIENT)
Dept: PHARMACY | Facility: MEDICAL CENTER | Age: 70
End: 2025-02-11
Payer: COMMERCIAL

## 2025-02-11 ENCOUNTER — HOSPITAL ENCOUNTER (OUTPATIENT)
Dept: LAB | Facility: MEDICAL CENTER | Age: 70
End: 2025-02-11
Attending: INTERNAL MEDICINE
Payer: MEDICARE

## 2025-02-11 LAB
ALBUMIN SERPL BCP-MCNC: 4 G/DL (ref 3.2–4.9)
ALBUMIN/GLOB SERPL: 1.4 G/DL
ALP SERPL-CCNC: 61 U/L (ref 30–99)
ALT SERPL-CCNC: 49 U/L (ref 2–50)
ANION GAP SERPL CALC-SCNC: 9 MMOL/L (ref 7–16)
APPEARANCE UR: CLEAR
AST SERPL-CCNC: 35 U/L (ref 12–45)
BACTERIA #/AREA URNS HPF: NORMAL /HPF
BASOPHILS # BLD AUTO: 0.4 % (ref 0–1.8)
BASOPHILS # BLD: 0.04 K/UL (ref 0–0.12)
BILIRUB SERPL-MCNC: 0.3 MG/DL (ref 0.1–1.5)
BILIRUB UR QL STRIP.AUTO: NEGATIVE
BUN SERPL-MCNC: 14 MG/DL (ref 8–22)
CALCIUM ALBUM COR SERPL-MCNC: 9.2 MG/DL (ref 8.5–10.5)
CALCIUM SERPL-MCNC: 9.2 MG/DL (ref 8.5–10.5)
CASTS URNS QL MICRO: NORMAL /LPF (ref 0–2)
CHLORIDE SERPL-SCNC: 100 MMOL/L (ref 96–112)
CHOLEST SERPL-MCNC: 146 MG/DL (ref 100–199)
CO2 SERPL-SCNC: 26 MMOL/L (ref 20–33)
COLOR UR: YELLOW
CREAT SERPL-MCNC: 1.02 MG/DL (ref 0.5–1.4)
EOSINOPHIL # BLD AUTO: 0.15 K/UL (ref 0–0.51)
EOSINOPHIL NFR BLD: 1.7 % (ref 0–6.9)
EPITHELIAL CELLS 1715: NORMAL /HPF (ref 0–5)
ERYTHROCYTE [DISTWIDTH] IN BLOOD BY AUTOMATED COUNT: 47 FL (ref 35.9–50)
EST. AVERAGE GLUCOSE BLD GHB EST-MCNC: 252 MG/DL
FASTING STATUS PATIENT QL REPORTED: NORMAL
FERRITIN SERPL-MCNC: 26.9 NG/ML (ref 22–322)
GFR SERPLBLD CREATININE-BSD FMLA CKD-EPI: 79 ML/MIN/1.73 M 2
GLOBULIN SER CALC-MCNC: 2.9 G/DL (ref 1.9–3.5)
GLUCOSE SERPL-MCNC: 265 MG/DL (ref 65–99)
GLUCOSE UR STRIP.AUTO-MCNC: >=1000 MG/DL
HBA1C MFR BLD: 10.4 % (ref 4–5.6)
HCT VFR BLD AUTO: 45.6 % (ref 42–52)
HDLC SERPL-MCNC: 38 MG/DL
HGB BLD-MCNC: 15.3 G/DL (ref 14–18)
IMM GRANULOCYTES # BLD AUTO: 0.06 K/UL (ref 0–0.11)
IMM GRANULOCYTES NFR BLD AUTO: 0.7 % (ref 0–0.9)
IRON SERPL-MCNC: 64 UG/DL (ref 50–180)
KETONES UR STRIP.AUTO-MCNC: NEGATIVE MG/DL
LDLC SERPL CALC-MCNC: 75 MG/DL
LEUKOCYTE ESTERASE UR QL STRIP.AUTO: NEGATIVE
LYMPHOCYTES # BLD AUTO: 1.99 K/UL (ref 1–4.8)
LYMPHOCYTES NFR BLD: 22.3 % (ref 22–41)
MAGNESIUM SERPL-MCNC: 2.3 MG/DL (ref 1.5–2.5)
MCH RBC QN AUTO: 27.4 PG (ref 27–33)
MCHC RBC AUTO-ENTMCNC: 33.6 G/DL (ref 32.3–36.5)
MCV RBC AUTO: 81.7 FL (ref 81.4–97.8)
MICRO URNS: ABNORMAL
MONOCYTES # BLD AUTO: 0.86 K/UL (ref 0–0.85)
MONOCYTES NFR BLD AUTO: 9.6 % (ref 0–13.4)
NEUTROPHILS # BLD AUTO: 5.82 K/UL (ref 1.82–7.42)
NEUTROPHILS NFR BLD: 65.3 % (ref 44–72)
NITRITE UR QL STRIP.AUTO: NEGATIVE
NRBC # BLD AUTO: 0 K/UL
NRBC BLD-RTO: 0 /100 WBC (ref 0–0.2)
PH UR STRIP.AUTO: 6 [PH] (ref 5–8)
PHOSPHATE SERPL-MCNC: 3.7 MG/DL (ref 2.5–4.5)
PLATELET # BLD AUTO: 126 K/UL (ref 164–446)
PMV BLD AUTO: 10.8 FL (ref 9–12.9)
POTASSIUM SERPL-SCNC: 4.4 MMOL/L (ref 3.6–5.5)
PROT SERPL-MCNC: 6.9 G/DL (ref 6–8.2)
PROT UR QL STRIP: 100 MG/DL
RBC # BLD AUTO: 5.58 M/UL (ref 4.7–6.1)
RBC # URNS HPF: NORMAL /HPF (ref 0–2)
RBC UR QL AUTO: NEGATIVE
SODIUM SERPL-SCNC: 135 MMOL/L (ref 135–145)
SP GR UR STRIP.AUTO: 1.04
TRIGL SERPL-MCNC: 164 MG/DL (ref 0–149)
TSH SERPL-ACNC: 1.75 UIU/ML (ref 0.35–5.5)
URATE SERPL-MCNC: 4.7 MG/DL (ref 2.5–8.3)
UROBILINOGEN UR STRIP.AUTO-MCNC: 0.2 EU/DL
WBC # BLD AUTO: 8.9 K/UL (ref 4.8–10.8)
WBC #/AREA URNS HPF: NORMAL /HPF

## 2025-02-11 PROCEDURE — 85025 COMPLETE CBC W/AUTO DIFF WBC: CPT

## 2025-02-11 PROCEDURE — 83735 ASSAY OF MAGNESIUM: CPT

## 2025-02-11 PROCEDURE — 84443 ASSAY THYROID STIM HORMONE: CPT

## 2025-02-11 PROCEDURE — 84100 ASSAY OF PHOSPHORUS: CPT

## 2025-02-11 PROCEDURE — 82728 ASSAY OF FERRITIN: CPT

## 2025-02-11 PROCEDURE — RXMED WILLOW AMBULATORY MEDICATION CHARGE: Performed by: INTERNAL MEDICINE

## 2025-02-11 PROCEDURE — 84550 ASSAY OF BLOOD/URIC ACID: CPT

## 2025-02-11 PROCEDURE — 36415 COLL VENOUS BLD VENIPUNCTURE: CPT | Mod: GA

## 2025-02-11 PROCEDURE — 83036 HEMOGLOBIN GLYCOSYLATED A1C: CPT | Mod: GA

## 2025-02-11 PROCEDURE — 81001 URINALYSIS AUTO W/SCOPE: CPT

## 2025-02-11 PROCEDURE — 80053 COMPREHEN METABOLIC PANEL: CPT

## 2025-02-11 PROCEDURE — 83540 ASSAY OF IRON: CPT

## 2025-02-11 PROCEDURE — 80061 LIPID PANEL: CPT

## 2025-02-26 PROCEDURE — RXMED WILLOW AMBULATORY MEDICATION CHARGE: Performed by: INTERNAL MEDICINE

## 2025-02-28 ENCOUNTER — PHARMACY VISIT (OUTPATIENT)
Dept: PHARMACY | Facility: MEDICAL CENTER | Age: 70
End: 2025-02-28
Payer: COMMERCIAL

## 2025-03-28 PROCEDURE — RXMED WILLOW AMBULATORY MEDICATION CHARGE: Performed by: DENTIST

## 2025-03-31 ENCOUNTER — PHARMACY VISIT (OUTPATIENT)
Dept: PHARMACY | Facility: MEDICAL CENTER | Age: 70
End: 2025-03-31
Payer: COMMERCIAL

## 2025-03-31 PROCEDURE — RXMED WILLOW AMBULATORY MEDICATION CHARGE: Performed by: INTERNAL MEDICINE

## 2025-03-31 RX ORDER — AMOXICILLIN 500 MG/1
CAPSULE ORAL
Qty: 30 CAPSULE | Refills: 0 | OUTPATIENT
Start: 2025-03-31

## 2025-04-05 ENCOUNTER — PHARMACY VISIT (OUTPATIENT)
Dept: PHARMACY | Facility: MEDICAL CENTER | Age: 70
End: 2025-04-05
Payer: COMMERCIAL

## 2025-04-08 PROCEDURE — RXMED WILLOW AMBULATORY MEDICATION CHARGE: Performed by: INTERNAL MEDICINE

## 2025-04-12 ENCOUNTER — PHARMACY VISIT (OUTPATIENT)
Dept: PHARMACY | Facility: MEDICAL CENTER | Age: 70
End: 2025-04-12
Payer: COMMERCIAL

## 2025-04-14 ENCOUNTER — HOSPITAL ENCOUNTER (OUTPATIENT)
Dept: LAB | Facility: MEDICAL CENTER | Age: 70
End: 2025-04-14
Attending: INTERNAL MEDICINE
Payer: MEDICARE

## 2025-04-14 LAB
BASOPHILS # BLD AUTO: 0.5 % (ref 0–1.8)
BASOPHILS # BLD: 0.04 K/UL (ref 0–0.12)
EOSINOPHIL # BLD AUTO: 0.21 K/UL (ref 0–0.51)
EOSINOPHIL NFR BLD: 2.5 % (ref 0–6.9)
ERYTHROCYTE [DISTWIDTH] IN BLOOD BY AUTOMATED COUNT: 43.3 FL (ref 35.9–50)
HCT VFR BLD AUTO: 44.6 % (ref 42–52)
HGB BLD-MCNC: 15.5 G/DL (ref 14–18)
IMM GRANULOCYTES # BLD AUTO: 0.05 K/UL (ref 0–0.11)
IMM GRANULOCYTES NFR BLD AUTO: 0.6 % (ref 0–0.9)
LYMPHOCYTES # BLD AUTO: 2.36 K/UL (ref 1–4.8)
LYMPHOCYTES NFR BLD: 28.4 % (ref 22–41)
MCH RBC QN AUTO: 29.2 PG (ref 27–33)
MCHC RBC AUTO-ENTMCNC: 34.8 G/DL (ref 32.3–36.5)
MCV RBC AUTO: 84.2 FL (ref 81.4–97.8)
MONOCYTES # BLD AUTO: 0.8 K/UL (ref 0–0.85)
MONOCYTES NFR BLD AUTO: 9.6 % (ref 0–13.4)
NEUTROPHILS # BLD AUTO: 4.86 K/UL (ref 1.82–7.42)
NEUTROPHILS NFR BLD: 58.4 % (ref 44–72)
NRBC # BLD AUTO: 0 K/UL
NRBC BLD-RTO: 0 /100 WBC (ref 0–0.2)
PLATELET # BLD AUTO: 141 K/UL (ref 164–446)
PMV BLD AUTO: 10 FL (ref 9–12.9)
RBC # BLD AUTO: 5.3 M/UL (ref 4.7–6.1)
WBC # BLD AUTO: 8.3 K/UL (ref 4.8–10.8)

## 2025-04-14 PROCEDURE — 85025 COMPLETE CBC W/AUTO DIFF WBC: CPT

## 2025-04-14 PROCEDURE — 36415 COLL VENOUS BLD VENIPUNCTURE: CPT

## 2025-04-18 ENCOUNTER — HOSPITAL ENCOUNTER (OUTPATIENT)
Dept: LAB | Facility: MEDICAL CENTER | Age: 70
End: 2025-04-18
Attending: INTERNAL MEDICINE
Payer: MEDICARE

## 2025-04-18 LAB
EST. AVERAGE GLUCOSE BLD GHB EST-MCNC: 232 MG/DL
HBA1C MFR BLD: 9.7 % (ref 4–5.6)

## 2025-04-18 PROCEDURE — 83036 HEMOGLOBIN GLYCOSYLATED A1C: CPT | Mod: GA

## 2025-04-18 PROCEDURE — 36415 COLL VENOUS BLD VENIPUNCTURE: CPT

## 2025-04-21 PROCEDURE — RXMED WILLOW AMBULATORY MEDICATION CHARGE: Performed by: INTERNAL MEDICINE

## 2025-04-21 PROCEDURE — RXMED WILLOW AMBULATORY MEDICATION CHARGE: Performed by: DENTIST

## 2025-04-26 ENCOUNTER — PHARMACY VISIT (OUTPATIENT)
Dept: PHARMACY | Facility: MEDICAL CENTER | Age: 70
End: 2025-04-26
Payer: COMMERCIAL

## 2025-05-13 PROCEDURE — RXMED WILLOW AMBULATORY MEDICATION CHARGE: Performed by: INTERNAL MEDICINE

## 2025-05-15 ENCOUNTER — PHARMACY VISIT (OUTPATIENT)
Dept: PHARMACY | Facility: MEDICAL CENTER | Age: 70
End: 2025-05-15
Payer: COMMERCIAL

## 2025-05-15 ENCOUNTER — HOSPITAL ENCOUNTER (OUTPATIENT)
Dept: RADIOLOGY | Facility: MEDICAL CENTER | Age: 70
End: 2025-05-15
Attending: INTERNAL MEDICINE
Payer: MEDICARE

## 2025-05-15 DIAGNOSIS — M54.50 LOIN PAIN-HEMATURIA SYNDROME: ICD-10-CM

## 2025-05-15 DIAGNOSIS — R31.9 LOIN PAIN-HEMATURIA SYNDROME: ICD-10-CM

## 2025-05-15 DIAGNOSIS — R26.2 DIFFICULTY WALKING: ICD-10-CM

## 2025-05-15 PROCEDURE — 72100 X-RAY EXAM L-S SPINE 2/3 VWS: CPT

## 2025-05-15 RX ORDER — PEN NEEDLE, DIABETIC 32GX 5/32"
NEEDLE, DISPOSABLE MISCELLANEOUS
Qty: 300 EACH | Refills: 0 | Status: CANCELLED | OUTPATIENT
Start: 2025-05-15

## 2025-05-21 PROCEDURE — RXMED WILLOW AMBULATORY MEDICATION CHARGE: Performed by: INTERNAL MEDICINE

## 2025-05-24 ENCOUNTER — PHARMACY VISIT (OUTPATIENT)
Dept: PHARMACY | Facility: MEDICAL CENTER | Age: 70
End: 2025-05-24
Payer: COMMERCIAL

## 2025-05-27 PROCEDURE — RXMED WILLOW AMBULATORY MEDICATION CHARGE: Performed by: INTERNAL MEDICINE

## 2025-05-31 ENCOUNTER — PHARMACY VISIT (OUTPATIENT)
Dept: PHARMACY | Facility: MEDICAL CENTER | Age: 70
End: 2025-05-31
Payer: COMMERCIAL

## 2025-06-22 ENCOUNTER — PHARMACY VISIT (OUTPATIENT)
Dept: PHARMACY | Facility: MEDICAL CENTER | Age: 70
End: 2025-06-22
Payer: COMMERCIAL

## 2025-06-22 PROCEDURE — RXMED WILLOW AMBULATORY MEDICATION CHARGE: Performed by: INTERNAL MEDICINE

## 2025-06-30 PROCEDURE — RXMED WILLOW AMBULATORY MEDICATION CHARGE: Performed by: INTERNAL MEDICINE

## 2025-07-02 ENCOUNTER — PHARMACY VISIT (OUTPATIENT)
Dept: PHARMACY | Facility: MEDICAL CENTER | Age: 70
End: 2025-07-02
Payer: COMMERCIAL

## 2025-07-02 PROCEDURE — RXMED WILLOW AMBULATORY MEDICATION CHARGE: Performed by: INTERNAL MEDICINE

## 2025-07-08 ENCOUNTER — PHARMACY VISIT (OUTPATIENT)
Dept: PHARMACY | Facility: MEDICAL CENTER | Age: 70
End: 2025-07-08
Payer: COMMERCIAL

## 2025-07-14 PROCEDURE — RXMED WILLOW AMBULATORY MEDICATION CHARGE: Performed by: INTERNAL MEDICINE

## 2025-07-16 ENCOUNTER — PHARMACY VISIT (OUTPATIENT)
Dept: PHARMACY | Facility: MEDICAL CENTER | Age: 70
End: 2025-07-16
Payer: COMMERCIAL

## 2025-07-17 PROCEDURE — RXMED WILLOW AMBULATORY MEDICATION CHARGE: Performed by: INTERNAL MEDICINE

## 2025-07-19 ENCOUNTER — PHARMACY VISIT (OUTPATIENT)
Dept: PHARMACY | Facility: MEDICAL CENTER | Age: 70
End: 2025-07-19
Payer: COMMERCIAL

## 2025-07-30 PROCEDURE — RXMED WILLOW AMBULATORY MEDICATION CHARGE: Performed by: INTERNAL MEDICINE

## 2025-08-04 ENCOUNTER — HOSPITAL ENCOUNTER (OUTPATIENT)
Dept: LAB | Facility: MEDICAL CENTER | Age: 70
End: 2025-08-04
Attending: INTERNAL MEDICINE
Payer: MEDICARE

## 2025-08-04 LAB
ALBUMIN SERPL BCP-MCNC: 4.2 G/DL (ref 3.2–4.9)
ALBUMIN/GLOB SERPL: 1.5 G/DL
ALP SERPL-CCNC: 61 U/L (ref 30–99)
ALT SERPL-CCNC: 29 U/L (ref 2–50)
ANION GAP SERPL CALC-SCNC: 11 MMOL/L (ref 7–16)
APPEARANCE UR: CLEAR
AST SERPL-CCNC: 22 U/L (ref 12–45)
BACTERIA #/AREA URNS HPF: NORMAL /HPF
BASOPHILS # BLD AUTO: 0.6 % (ref 0–1.8)
BASOPHILS # BLD: 0.05 K/UL (ref 0–0.12)
BILIRUB SERPL-MCNC: 0.5 MG/DL (ref 0.1–1.5)
BILIRUB UR QL STRIP.AUTO: NEGATIVE
BUN SERPL-MCNC: 19 MG/DL (ref 8–22)
CALCIUM ALBUM COR SERPL-MCNC: 9.4 MG/DL (ref 8.5–10.5)
CALCIUM SERPL-MCNC: 9.6 MG/DL (ref 8.5–10.5)
CASTS URNS QL MICRO: NORMAL /LPF (ref 0–2)
CHLORIDE SERPL-SCNC: 101 MMOL/L (ref 96–112)
CHOLEST SERPL-MCNC: 121 MG/DL (ref 100–199)
CO2 SERPL-SCNC: 26 MMOL/L (ref 20–33)
COLOR UR: YELLOW
CREAT SERPL-MCNC: 1.12 MG/DL (ref 0.5–1.4)
EOSINOPHIL # BLD AUTO: 0.18 K/UL (ref 0–0.51)
EOSINOPHIL NFR BLD: 2.2 % (ref 0–6.9)
EPITHELIAL CELLS 1715: NORMAL /HPF (ref 0–5)
ERYTHROCYTE [DISTWIDTH] IN BLOOD BY AUTOMATED COUNT: 40.3 FL (ref 35.9–50)
EST. AVERAGE GLUCOSE BLD GHB EST-MCNC: 212 MG/DL
FASTING STATUS PATIENT QL REPORTED: NORMAL
FERRITIN SERPL-MCNC: 39.6 NG/ML (ref 22–322)
GFR SERPLBLD CREATININE-BSD FMLA CKD-EPI: 70 ML/MIN/1.73 M 2
GLOBULIN SER CALC-MCNC: 2.8 G/DL (ref 1.9–3.5)
GLUCOSE SERPL-MCNC: 190 MG/DL (ref 65–99)
GLUCOSE UR STRIP.AUTO-MCNC: >=1000 MG/DL
HBA1C MFR BLD: 9 % (ref 4–5.6)
HCT VFR BLD AUTO: 46.1 % (ref 42–52)
HDLC SERPL-MCNC: 32 MG/DL
HGB BLD-MCNC: 15.7 G/DL (ref 14–18)
IMM GRANULOCYTES # BLD AUTO: 0.04 K/UL (ref 0–0.11)
IMM GRANULOCYTES NFR BLD AUTO: 0.5 % (ref 0–0.9)
IRON SATN MFR SERPL: 34 % (ref 15–55)
IRON SERPL-MCNC: 119 UG/DL (ref 50–180)
KETONES UR STRIP.AUTO-MCNC: NEGATIVE MG/DL
LDLC SERPL CALC-MCNC: 57 MG/DL
LEUKOCYTE ESTERASE UR QL STRIP.AUTO: NEGATIVE
LYMPHOCYTES # BLD AUTO: 1.84 K/UL (ref 1–4.8)
LYMPHOCYTES NFR BLD: 22 % (ref 22–41)
MAGNESIUM SERPL-MCNC: 2.2 MG/DL (ref 1.5–2.5)
MCH RBC QN AUTO: 29.7 PG (ref 27–33)
MCHC RBC AUTO-ENTMCNC: 34.1 G/DL (ref 32.3–36.5)
MCV RBC AUTO: 87.1 FL (ref 81.4–97.8)
MICRO URNS: ABNORMAL
MONOCYTES # BLD AUTO: 0.8 K/UL (ref 0–0.85)
MONOCYTES NFR BLD AUTO: 9.6 % (ref 0–13.4)
NEUTROPHILS # BLD AUTO: 5.46 K/UL (ref 1.82–7.42)
NEUTROPHILS NFR BLD: 65.1 % (ref 44–72)
NITRITE UR QL STRIP.AUTO: NEGATIVE
NRBC # BLD AUTO: 0 K/UL
NRBC BLD-RTO: 0 /100 WBC (ref 0–0.2)
PH UR STRIP.AUTO: 6 [PH] (ref 5–8)
PHOSPHATE SERPL-MCNC: 3.2 MG/DL (ref 2.5–4.5)
PLATELET # BLD AUTO: 147 K/UL (ref 164–446)
PMV BLD AUTO: 10.4 FL (ref 9–12.9)
POTASSIUM SERPL-SCNC: 4.4 MMOL/L (ref 3.6–5.5)
PROT SERPL-MCNC: 7 G/DL (ref 6–8.2)
PROT UR QL STRIP: 100 MG/DL
RBC # BLD AUTO: 5.29 M/UL (ref 4.7–6.1)
RBC # URNS HPF: NORMAL /HPF (ref 0–2)
RBC UR QL AUTO: NEGATIVE
SODIUM SERPL-SCNC: 138 MMOL/L (ref 135–145)
SP GR UR STRIP.AUTO: 1.02
TIBC SERPL-MCNC: 348 UG/DL (ref 250–450)
TRIGL SERPL-MCNC: 159 MG/DL (ref 0–149)
TSH SERPL-ACNC: 1.63 UIU/ML (ref 0.38–5.33)
UIBC SERPL-MCNC: 229 UG/DL (ref 110–370)
URATE SERPL-MCNC: 5.1 MG/DL (ref 2.5–8.3)
UROBILINOGEN UR STRIP.AUTO-MCNC: 0.2 EU/DL
WBC # BLD AUTO: 8.4 K/UL (ref 4.8–10.8)
WBC #/AREA URNS HPF: NORMAL /HPF

## 2025-08-04 PROCEDURE — 85025 COMPLETE CBC W/AUTO DIFF WBC: CPT

## 2025-08-04 PROCEDURE — 84550 ASSAY OF BLOOD/URIC ACID: CPT

## 2025-08-04 PROCEDURE — 83550 IRON BINDING TEST: CPT

## 2025-08-04 PROCEDURE — 83036 HEMOGLOBIN GLYCOSYLATED A1C: CPT | Mod: GA

## 2025-08-04 PROCEDURE — 80061 LIPID PANEL: CPT

## 2025-08-04 PROCEDURE — 84443 ASSAY THYROID STIM HORMONE: CPT

## 2025-08-04 PROCEDURE — 80053 COMPREHEN METABOLIC PANEL: CPT

## 2025-08-04 PROCEDURE — 83735 ASSAY OF MAGNESIUM: CPT

## 2025-08-04 PROCEDURE — 82728 ASSAY OF FERRITIN: CPT

## 2025-08-04 PROCEDURE — 84100 ASSAY OF PHOSPHORUS: CPT

## 2025-08-04 PROCEDURE — 83540 ASSAY OF IRON: CPT

## 2025-08-04 PROCEDURE — 36415 COLL VENOUS BLD VENIPUNCTURE: CPT

## 2025-08-04 PROCEDURE — RXMED WILLOW AMBULATORY MEDICATION CHARGE: Performed by: INTERNAL MEDICINE

## 2025-08-04 PROCEDURE — 81001 URINALYSIS AUTO W/SCOPE: CPT

## 2025-08-05 ENCOUNTER — PHARMACY VISIT (OUTPATIENT)
Dept: PHARMACY | Facility: MEDICAL CENTER | Age: 70
End: 2025-08-05
Payer: COMMERCIAL

## 2025-08-20 PROCEDURE — RXMED WILLOW AMBULATORY MEDICATION CHARGE: Performed by: INTERNAL MEDICINE

## 2025-08-22 ENCOUNTER — TELEPHONE (OUTPATIENT)
Dept: UROLOGY | Facility: MEDICAL CENTER | Age: 70
End: 2025-08-22
Payer: MEDICARE

## 2025-08-22 DIAGNOSIS — N40.1 BPH WITH OBSTRUCTION/LOWER URINARY TRACT SYMPTOMS: Primary | ICD-10-CM

## 2025-08-22 DIAGNOSIS — N13.8 BPH WITH OBSTRUCTION/LOWER URINARY TRACT SYMPTOMS: Primary | ICD-10-CM

## 2025-08-22 DIAGNOSIS — N35.819 OTHER STRICTURE OF URETHRA IN MALE: ICD-10-CM

## 2025-08-24 ENCOUNTER — PHARMACY VISIT (OUTPATIENT)
Dept: PHARMACY | Facility: MEDICAL CENTER | Age: 70
End: 2025-08-24
Payer: COMMERCIAL

## 2025-08-24 RX ORDER — INSULIN GLARGINE-YFGN 100 [IU]/ML
INJECTION, SOLUTION SUBCUTANEOUS
Qty: 9 ML | Refills: 0 | OUTPATIENT
Start: 2025-08-24

## 2025-10-08 ENCOUNTER — APPOINTMENT (OUTPATIENT)
Dept: UROLOGY | Facility: MEDICAL CENTER | Age: 70
End: 2025-10-08
Payer: MEDICARE

## (undated) DEVICE — TUBE E-T HI-LO CUFF 8.0MM (10EA/PK)

## (undated) DEVICE — SUTURE 5-0 PROLENE C-1 D/A 24 (36PK/BX)"

## (undated) DEVICE — LEAD PACING TEMP MYO - (12/BX)

## (undated) DEVICE — SUTURE 4-0 30CM STRATAFIX SPIRAL PS-2 (12EA/BX)

## (undated) DEVICE — SET FLUID WARMING STANDARD FLOW - (10/CA)

## (undated) DEVICE — SPRING BULLDOG 1/2 FORCE BLUE - (10/BX)

## (undated) DEVICE — BAG RESUSCITATION DISPOSABLE - WITH MASK (10 EA/CA)

## (undated) DEVICE — TRAY SURESTEP FOLEY TEMP SENSING 16FR (10EA/CA) ORDER  #18764 FOR TEMP FOLEY ONLY

## (undated) DEVICE — SUTURE 2-0 ETHIBOND V-5 30 (12EA/BX)"

## (undated) DEVICE — TUBE CHEST 32FR. STRAIGHT - (10EA/CA)

## (undated) DEVICE — SODIUM CHL. INJ. 0.9% 500ML (24EA/CA 50CA/PF)

## (undated) DEVICE — SUTURE 6-0 PROLENE RB-2 D/A 30 (36PK/BX)"

## (undated) DEVICE — SUTURE 7-0 PROLENE BV-1 D/A 30 (36PK/BX)"

## (undated) DEVICE — HEAD HOLDER JUNIOR/ADULT

## (undated) DEVICE — TRANSDUCER BIFURCATED MONITORING KIT (10EA/CA)

## (undated) DEVICE — GOWN SURGEONS LARGE - (32/CA)

## (undated) DEVICE — SUCTION INSTRUMENT YANKAUER BULBOUS TIP W/O VENT (50EA/CA)

## (undated) DEVICE — PUNCH DISP VASCULAR 4.4 - 6/BX

## (undated) DEVICE — ORGANIZER SUTURE GABBAY-FRAT - ER STERILE (3/SET 4ST/BX)

## (undated) DEVICE — QUICKLOADS COR-KNOT TITANIUM - (6EA/PK 12PK/BX)

## (undated) DEVICE — CANISTER SUCTION 3000ML MECHANICAL FILTER AUTO SHUTOFF MEDI-VAC NONSTERILE LF DISP  (40EA/CA)

## (undated) DEVICE — PACK CV DRAPING/BASIN 2PART - (1/CA)

## (undated) DEVICE — DERMABOND ADVANCED - (12EA/BX)

## (undated) DEVICE — BLADE SURGICAL CLIPPER - (50EA/CA)

## (undated) DEVICE — SYRINGE SAFETY 3 ML 18 GA X 1 1/2 BLUNT LL (100/BX 8BX/CA)

## (undated) DEVICE — MASK ANESTHESIA ADULT  - (100/CA)

## (undated) DEVICE — WIRE STEEL 5-0 B&S 20 OHS - 5/PK 12PK/BX ITEM. D5329 OR D6625 CAN BE USED AS A SUB

## (undated) DEVICE — ARMBOARD  SMALL IV 9 INLONG - (25EA/CA)

## (undated) DEVICE — SET EXTENSION WITH 2 PORTS (48EA/CA) ***PART #2C8610 IS A SUBSTITUTE*****

## (undated) DEVICE — SOLUTION NORMOSOL-4 INJ 1000ML

## (undated) DEVICE — LACTATED RINGERS INJ 1000 ML - (14EA/CA 60CA/PF)

## (undated) DEVICE — SUTURE OHS

## (undated) DEVICE — MICRODRIP PRIMARY VENTED 60 (48EA/CA) THIS WAS PART #2C8428 WHICH WAS DISCONTINUED

## (undated) DEVICE — GLOVE BIOGEL SZ 7.5 SURGICAL PF LTX - (50PR/BX 4BX/CA)

## (undated) DEVICE — TUBE CHEST 32FR. RIGHT ANGLED (10EA/CA)

## (undated) DEVICE — SOD. CHL. INJ. 0.9% 1000 ML - (14EA/CA 60CA/PF)

## (undated) DEVICE — GOWN WARMING STANDARD FLEX - (30/CA)

## (undated) DEVICE — TUBING CLEARLINK DUO-VENT - C-FLO (48EA/CA)

## (undated) DEVICE — PACK VEIN - (19/CA)

## (undated) DEVICE — GLOVE BIOGEL PI ORTHO SZ 6 1/2 SURGICAL PF LF (40PR/BX)

## (undated) DEVICE — SET BIFURCATED BLOOD - (48EA/CS)

## (undated) DEVICE — RETRACTOR OFF PUMP OCTO ONLY - 10/BX

## (undated) DEVICE — DRESSING TRANSPARENT FILM TEGADERM 2.375 X 2.75"  (100EA/BX)"

## (undated) DEVICE — SUTURE 2-0 ETHIBOND V-5 - (6/BX)

## (undated) DEVICE — DEVICE KIT COR-KNOT COMBO2 DEVICES & 12 KNOTS PER KIT (6KT/CA)

## (undated) DEVICE — SENSOR CEREBRAL AND SOMATIC MONITORING (20/CA)

## (undated) DEVICE — STOPCOCK MALE 4-WAY - (50/CA)

## (undated) DEVICE — NEEDLE SAFETY 18 GA X 1 1/2 IN (100EA/BX)

## (undated) DEVICE — ADHESIVE DERMABOND HVD MINI (12EA/BX)

## (undated) DEVICE — KIT ANESTHESIA W/CIRCUIT & 3/LT BAG W/FILTER (20EA/CA)

## (undated) DEVICE — KIT INTROPERCUTANEOUS SHEATH - 8.5 FR W/MAX BARRIER AND BIOPATCH  (5/CA)

## (undated) DEVICE — SODIUM CHL IRRIGATION 0.9% 1000ML (12EA/CA)

## (undated) DEVICE — SYRINGE 30 ML LL (56/BX)

## (undated) DEVICE — GLOVE BIOGEL PI INDICATOR SZ 6.5 SURGICAL PF LF - (50/BX 4BX/CA)

## (undated) DEVICE — GLOVE BIOGEL SZ 8.5 SURGICAL PF LTX - (50PR/BX 4BX/CA)

## (undated) DEVICE — GLOVE BIOGEL SZ 6.5 SURGICAL PF LTX (50PR/BX 4BX/CA)

## (undated) DEVICE — FIBRILLAR SURGICEL 4X4 - 10/CA

## (undated) DEVICE — PROTECTOR ULNA NERVE - (36PR/CA)

## (undated) DEVICE — SUTURE 5-0 PROLENE RB-1 D/A 36 (36PK/BX)"

## (undated) DEVICE — CATHETER THERMALDILUTION SWAN - (5EA/CA)

## (undated) DEVICE — CHLORAPREP 26 ML APPLICATOR - ORANGE TINT(25/CA)

## (undated) DEVICE — BLADE STERNUM SAW SURGICAL 32.0 X 6.4 MM STERILE (1/EA)

## (undated) DEVICE — INSERT STEALTH #5 - (10/BX)

## (undated) DEVICE — SYS DLV COST CLS RM TEMP - INJECTATE (CO-SET II) (10EA/CA)

## (undated) DEVICE — SUTURE 2-0 ETHIBOND V-5 (36PK/BX)

## (undated) DEVICE — DECANTER FLD BLS - (50/CA)

## (undated) DEVICE — NEPTUNE 4 PORT MANIFOLD - (20/PK)

## (undated) DEVICE — TUBE CONNECT SUCTION CLEAR 120 X 1/4" (50EA/CA)"

## (undated) DEVICE — ELECTRODE DUAL RETURN W/ CORD - (50/PK)

## (undated) DEVICE — KIT RADIAL ARTERY 20GA W/MAX BARRIER AND BIOPATCH  (5EA/CA) #10740 IS FOR THE SET RADIAL ARTERIAL

## (undated) DEVICE — ELECTRODE 850 FOAM ADHESIVE - HYDROGEL RADIOTRNSPRNT (50/PK)

## (undated) DEVICE — SET LEADWIRE 5 LEAD BEDSIDE DISPOSABLE ECG (1SET OF 5/EA)

## (undated) DEVICE — DRAPE MAYO STAND - (30/CA)

## (undated) DEVICE — GLOVE BIOGEL INDICATOR SZ 8 SURGICAL PF LTX - (50/BX 4BX/CA)

## (undated) DEVICE — DRESSING TRANSPARENT FILM TEGADERM 4 X 4.75" (50EA/BX)"

## (undated) DEVICE — BLADE SURGICAL #15 - (50/BX 3BX/CA)

## (undated) DEVICE — SENSOR SPO2 NEO LNCS ADHESIVE (20/BX) SEE USER NOTES